# Patient Record
Sex: MALE | Race: WHITE | Employment: OTHER | URBAN - METROPOLITAN AREA
[De-identification: names, ages, dates, MRNs, and addresses within clinical notes are randomized per-mention and may not be internally consistent; named-entity substitution may affect disease eponyms.]

---

## 2017-01-19 ENCOUNTER — HOSPITAL ENCOUNTER (OUTPATIENT)
Dept: NUCLEAR MEDICINE | Facility: CLINIC | Age: 82
Setting detail: NUCLEAR MEDICINE
Discharge: HOME | End: 2017-01-19
Attending: INTERNAL MEDICINE | Admitting: INTERNAL MEDICINE
Payer: MEDICARE

## 2017-01-19 PROCEDURE — 34300033 ZZH RX 343: Performed by: INTERNAL MEDICINE

## 2017-01-19 PROCEDURE — 78452 HT MUSCLE IMAGE SPECT MULT: CPT

## 2017-01-19 PROCEDURE — A9502 TC99M TETROFOSMIN: HCPCS | Performed by: INTERNAL MEDICINE

## 2017-01-19 PROCEDURE — 25000125 ZZHC RX 250: Performed by: INTERNAL MEDICINE

## 2017-01-19 PROCEDURE — 78452 HT MUSCLE IMAGE SPECT MULT: CPT | Mod: 26 | Performed by: INTERNAL MEDICINE

## 2017-01-19 PROCEDURE — 93017 CV STRESS TEST TRACING ONLY: CPT

## 2017-01-19 PROCEDURE — 93016 CV STRESS TEST SUPVJ ONLY: CPT | Performed by: INTERNAL MEDICINE

## 2017-01-19 PROCEDURE — 93018 CV STRESS TEST I&R ONLY: CPT | Performed by: INTERNAL MEDICINE

## 2017-01-19 RX ORDER — REGADENOSON 0.08 MG/ML
0.4 INJECTION, SOLUTION INTRAVENOUS ONCE
Status: COMPLETED | OUTPATIENT
Start: 2017-01-19 | End: 2017-01-19

## 2017-01-19 RX ADMIN — TETROFOSMIN 31.1 MCI.: 0.23 INJECTION, POWDER, LYOPHILIZED, FOR SOLUTION INTRAVENOUS at 14:42

## 2017-01-19 RX ADMIN — REGADENOSON 0.4 MG: 0.08 INJECTION, SOLUTION INTRAVENOUS at 13:46

## 2017-01-19 RX ADMIN — TETROFOSMIN 10.4 MCI.: 0.23 INJECTION, POWDER, LYOPHILIZED, FOR SOLUTION INTRAVENOUS at 12:30

## 2017-02-02 ENCOUNTER — OFFICE VISIT (OUTPATIENT)
Dept: DERMATOLOGY | Facility: CLINIC | Age: 82
End: 2017-02-02
Payer: MEDICARE

## 2017-02-02 VITALS — SYSTOLIC BLOOD PRESSURE: 134 MMHG | HEART RATE: 71 BPM | OXYGEN SATURATION: 92 % | DIASTOLIC BLOOD PRESSURE: 59 MMHG

## 2017-02-02 DIAGNOSIS — D18.01 CHERRY ANGIOMA: ICD-10-CM

## 2017-02-02 DIAGNOSIS — L81.4 LENTIGO: ICD-10-CM

## 2017-02-02 DIAGNOSIS — D48.5 NEOPLASM OF UNCERTAIN BEHAVIOR OF SKIN: Primary | ICD-10-CM

## 2017-02-02 DIAGNOSIS — L82.1 SEBORRHEIC KERATOSIS: ICD-10-CM

## 2017-02-02 PROCEDURE — 88305 TISSUE EXAM BY PATHOLOGIST: CPT | Performed by: PHYSICIAN ASSISTANT

## 2017-02-02 PROCEDURE — 99213 OFFICE O/P EST LOW 20 MIN: CPT | Mod: 25 | Performed by: PHYSICIAN ASSISTANT

## 2017-02-02 PROCEDURE — 11101 HC BIOPSY SKIN/SUBQ/MUC MEM, EACH ADDTL LESION: CPT | Performed by: PHYSICIAN ASSISTANT

## 2017-02-02 PROCEDURE — 11100 HC BIOPSY SKIN/SUBQ/MUC MEM, SINGLE LESION: CPT | Performed by: PHYSICIAN ASSISTANT

## 2017-02-02 PROCEDURE — 88305 TISSUE EXAM BY PATHOLOGIST: CPT | Mod: 26 | Performed by: PHYSICIAN ASSISTANT

## 2017-02-02 NOTE — NURSING NOTE
"Chief Complaint   Patient presents with     Derm Problem     skin check       Initial /59 mmHg  Pulse 71  SpO2 92% Estimated body mass index is 30.85 kg/(m^2) as calculated from the following:    Height as of 12/26/16: 1.727 m (5' 8\").    Weight as of 12/26/16: 92 kg (202 lb 13.2 oz).  BP completed using cuff size: bibiana Botello LPN.................2/2/2017      "

## 2017-02-02 NOTE — MR AVS SNAPSHOT
After Visit Summary   2/2/2017    Griffin Walton    MRN: [id number]           Patient Information     Date Of Birth          5/18/1934        Visit Information        Provider Department      2/2/2017 2:40 PM Camilla Sanabria PA-C [facility]        Care Instructions          Wound Care Instructions     FOR SUPERFICIAL WOUNDS     [facility] [phone]    [facility] [phone]                       AFTER 24 HOURS YOU SHOULD REMOVE THE BANDAGE AND BEGIN DAILY DRESSING CHANGES AS FOLLOWS:     1) Remove Dressing.     2) Clean and dry the area with tap water using a Q-tip or sterile gauze pad.     3) Apply Vaseline, Aquaphor, Polysporin ointment or Bacitracin ointment over entire wound.  Do NOT use Neosporin ointment.     4) Cover the wound with a band-aid, or a sterile non-stick gauze pad and micropore paper tape      REPEAT THESE INSTRUCTIONS AT LEAST ONCE A DAY UNTIL THE WOUND HAS COMPLETELY HEALED.    It is an old wives tale that a wound heals better when it is exposed to air and allowed to dry out. The wound will heal faster with a better cosmetic result if it is kept moist with ointment and covered with a bandage.    **Do not let the wound dry out.**      Supplies Needed:      *Cotton tipped applicators (Q-tips)    *Polysporin Ointment or Bacitracin Ointment (NOT NEOSPORIN)    *Band-aids or non-stick gauze pads and micropore paper tape.      PATIENT INFORMATION:    During the healing process you will notice a number of changes. All wounds develop a small halo of redness surrounding the wound.  This means healing is occurring. Severe itching with extensive redness usually indicates sensitivity to the ointment or bandage tape used to dress the wound.  You should call our office if this develops.      Swelling  and/or discoloration around your surgical site is common, particularly when performed around the eye.    All wounds normally drain.  The  larger the wound the more drainage there will be.  After 7-10 days, you will notice the wound beginning to shrink and new skin will begin to grow.  The wound is healed when you can see skin has formed over the entire area.  A healed wound has a healthy, shiny look to the surface and is red to dark pink in color to normalize.  Wounds may take approximately 4-6 weeks to heal.  Larger wounds may take 6-8 weeks.  After the wound is healed you may discontinue dressing changes.    You may experience a sensation of tightness as your wound heals. This is normal and will gradually subside.    Your healed wound may be sensitive to temperature changes. This sensitivity improves with time, but if you re having a lot of discomfort, try to avoid temperature extremes.    Patients frequently experience itching after their wound appears to have healed because of the continue healing under the skin.  Plain Vaseline will help relieve the itching.        POSSIBLE COMPLICATIONS    BLEEDIN. Leave the bandage in place.  2. Use tightly rolled up gauze or a cloth to apply direct pressure over the bandage for 30  minutes.  3. Reapply pressure for an additional 30 minutes if necessary  4. Use additional gauze and tape to maintain pressure once the bleeding has stopped.  5.         Follow-ups after your visit        Who to contact     If you have questions or need follow up information about today's clinic visit or your schedule please contact [facility] directly at [phone].  Normal or non-critical lab and imaging results will be communicated to you by [email or web address], letter or phone within 4 business days after the clinic has received the results. If you do not hear from us within 7 days, please contact the clinic through [email or web address] or phone. If you have a critical or abnormal lab result, we will notify you by phone as soon as possible.  Submit refill requests through [employer] or call your pharmacy and they will forward the  "refill request to us. Please allow 3 business days for your refill to be completed.          Additional Information About Your Visit        MyChart Information     [facility] lets you send messages to your doctor, view your test results, renew your prescriptions, schedule appointments and more. To sign up, go to [email or web address] . Click on \"Log in\" on the left side of the screen, which will take you to the Welcome page. Then click on \"Sign up Now\" on the right side of the page.     You will be asked to enter the access code listed below, as well as some personal information. Please follow the directions to create your username and password.     Your access code is: [email or web address]  Expires: 3/26/2017 11:27 AM     Your access code will  in 90 days. If you need help or a new code, please call your [place] clinic or [phone].        Care EveryWhere ID     This is your Care EveryWhere ID. This could be used by other organizations to access your [place] medical records  [id number]        Your Vitals Were     Pulse Pulse Oximetry                71 92%           Blood Pressure from Last 3 Encounters:   17 134/59   16 145/60   16 101/69    Weight from Last 3 Encounters:   16 92 kg (202 lb 13.2 oz)   16 92.534 kg (204 lb)   16 93.895 kg (207 lb)              Today, you had the following     No orders found for display       Primary Care Provider Office Phone # Fax #    Stefanie DANNA [facility] [phone]       [facility] [address] [address]        Thank you!     Thank you for choosing [facility]  for your care. Our goal is always to provide you with excellent care. Hearing back from our patients is one way we can continue to improve our services. Please take a few minutes to complete the written survey that you may receive in the mail after your visit with us. Thank you!             Your Updated Medication List - " "Protect others around you: Learn how to safely use, store and throw away your medicines at www.disposemymeds.org.          This list is accurate as of: 2/2/17  3:37 PM.  Always use your most recent med list.                   Brand Name Dispense Instructions for use    albuterol (2.5 MG/3ML) 0.083% neb solution     1 Box    Take 1 vial (2.5 mg) by nebulization every 6 hours as needed for shortness of breath / dyspnea or wheezing       blood glucose monitoring meter device kit    no brand specified    1 kit    Use to test blood sugar 3 times daily or as directed.       blood glucose monitoring test strip    ONE TOUCH ULTRA    350 each    test 4 times daily Profile Rx: patient will contact pharmacy when needed       bumetanide 1 MG tablet    BUMEX    180 tablet    Take 1 tablet (1 mg) by mouth 2 times daily       candesartan cilexetil 32 MG Tabs     90 tablet    Take 32 mg by mouth daily       clopidogrel 75 MG tablet    PLAVIX    90 tablet    Take 1 tablet (75 mg) by mouth daily       dorzolamide-timolol 2-0.5 % ophthalmic solution    COSOPT     Place 1 drop into both eyes 2 times daily       fluticasone-salmeterol 100-50 MCG/DOSE diskus inhaler    ADVAIR DISKUS    1 Inhaler    Inhale 1 puff into the lungs every 12 hours       insulin aspart 100 UNIT/ML injection    NovoLOG FLEXPEN    3 Month    6 units before breakfast, 6 units before lunch, 4 units before dinner       insulin glargine 100 UNIT/ML injection    LANTUS    3 Month    Inject 8 Units Subcutaneous At Bedtime       insulin pen needle 30G X 8 MM    NOVOFINE    300 each    Use 3 daily or as directed. Profile Rx: patient will contact pharmacy when needed       insulin syringe-needle U-100 31G X 5/16\" 0.5 ML     100 each    Profile Rx: patient will contact pharmacy when needed       latanoprost 0.005 % ophthalmic solution    XALATAN     Place 1 drop into both eyes At Bedtime       lovastatin 40 MG tablet    MEVACOR    135 tablet    Take 1.5 tablets (60 mg) by " mouth daily (with dinner)       NIFEdipine ER 90 MG Tb24    ADALAT CC    90 tablet    Take 1 tablet (90 mg) by mouth daily       * NONFORMULARY      Take 1 tablet by mouth At Bedtime Vitamin (Dialyvite) that he gets from [facility].       * order for DME     1 each    Equipment being ordered: Walker with wheels and brakes, and a seat       order for DME     1 Units    Nebulizer machine Qty #1       order for DME     2 each    Equipment being ordered: Oxygen- HOME and portable. Georgie Coulter [facility] Medical Assistant Signed  Service date: 05/24/2016 10:48 AM     O2 Sat on Room air at rest:84% O2 sat on room air with walk: 82-91% O2 Sat on 1L of oxygen with walk 91-93% O2 Sat on 2 L of Oxygen with walk 91-96% O2 Sat on 1 L O2 at rest 94%       terazosin 10 MG capsule    HYTRIN    90 capsule    Take 1 capsule (10 mg) by mouth At Bedtime       TYLENOL PO      Take 650 mg by mouth nightly as needed       WARFARIN SODIUM PO      Daily as directed per [facility]       * Notice:  This list has 2 medication(s) that are the same as other medications prescribed for you. Read the directions carefully, and ask your doctor or other care provider to review them with you.

## 2017-02-02 NOTE — PATIENT INSTRUCTIONS
Wound Care Instructions     FOR SUPERFICIAL WOUNDS     [facility] [phone]    [facility] [phone]                       AFTER 24 HOURS YOU SHOULD REMOVE THE BANDAGE AND BEGIN DAILY DRESSING CHANGES AS FOLLOWS:     1) Remove Dressing.     2) Clean and dry the area with tap water using a Q-tip or sterile gauze pad.     3) Apply Vaseline, Aquaphor, Polysporin ointment or Bacitracin ointment over entire wound.  Do NOT use Neosporin ointment.     4) Cover the wound with a band-aid, or a sterile non-stick gauze pad and micropore paper tape      REPEAT THESE INSTRUCTIONS AT LEAST ONCE A DAY UNTIL THE WOUND HAS COMPLETELY HEALED.    It is an old wives tale that a wound heals better when it is exposed to air and allowed to dry out. The wound will heal faster with a better cosmetic result if it is kept moist with ointment and covered with a bandage.    **Do not let the wound dry out.**      Supplies Needed:      *Cotton tipped applicators (Q-tips)    *Polysporin Ointment or Bacitracin Ointment (NOT NEOSPORIN)    *Band-aids or non-stick gauze pads and micropore paper tape.      PATIENT INFORMATION:    During the healing process you will notice a number of changes. All wounds develop a small halo of redness surrounding the wound.  This means healing is occurring. Severe itching with extensive redness usually indicates sensitivity to the ointment or bandage tape used to dress the wound.  You should call our office if this develops.      Swelling  and/or discoloration around your surgical site is common, particularly when performed around the eye.    All wounds normally drain.  The larger the wound the more drainage there will be.  After 7-10 days, you will notice the wound beginning to shrink and new skin will begin to grow.  The wound is healed when you can see skin has formed over the entire area.  A healed wound has a healthy, shiny look to the surface and is red to dark pink in color  to normalize.  Wounds may take approximately 4-6 weeks to heal.  Larger wounds may take 6-8 weeks.  After the wound is healed you may discontinue dressing changes.    You may experience a sensation of tightness as your wound heals. This is normal and will gradually subside.    Your healed wound may be sensitive to temperature changes. This sensitivity improves with time, but if you re having a lot of discomfort, try to avoid temperature extremes.    Patients frequently experience itching after their wound appears to have healed because of the continue healing under the skin.  Plain Vaseline will help relieve the itching.        POSSIBLE COMPLICATIONS    BLEEDIN. Leave the bandage in place.  2. Use tightly rolled up gauze or a cloth to apply direct pressure over the bandage for 30  minutes.  3. Reapply pressure for an additional 30 minutes if necessary  4. Use additional gauze and tape to maintain pressure once the bleeding has stopped.  5.

## 2017-02-07 LAB — COPATH REPORT: NORMAL

## 2017-02-14 ENCOUNTER — OFFICE VISIT (OUTPATIENT)
Dept: DERMATOLOGY | Facility: CLINIC | Age: 82
End: 2017-02-14
Payer: MEDICARE

## 2017-02-14 VITALS — DIASTOLIC BLOOD PRESSURE: 57 MMHG | SYSTOLIC BLOOD PRESSURE: 120 MMHG | OXYGEN SATURATION: 97 % | HEART RATE: 90 BPM

## 2017-02-14 DIAGNOSIS — C44.612 BASAL CELL CARCINOMA OF RIGHT UPPER EXTREMITY: ICD-10-CM

## 2017-02-14 DIAGNOSIS — C44.319 BASAL CELL CARCINOMA OF TEMPLE REGION: Primary | ICD-10-CM

## 2017-02-14 PROCEDURE — 17311 MOHS 1 STAGE H/N/HF/G: CPT | Performed by: DERMATOLOGY

## 2017-02-14 PROCEDURE — 11603 EXC TR-EXT MAL+MARG 2.1-3 CM: CPT | Mod: 59 | Performed by: DERMATOLOGY

## 2017-02-14 PROCEDURE — 14041 TIS TRNFR F/C/C/M/N/A/G/H/F: CPT | Performed by: DERMATOLOGY

## 2017-02-14 PROCEDURE — 13121 CMPLX RPR S/A/L 2.6-7.5 CM: CPT | Mod: 59 | Performed by: DERMATOLOGY

## 2017-02-14 PROCEDURE — 88331 PATH CONSLTJ SURG 1 BLK 1SPC: CPT | Mod: 59 | Performed by: DERMATOLOGY

## 2017-02-14 PROCEDURE — 17312 MOHS ADDL STAGE: CPT | Performed by: DERMATOLOGY

## 2017-02-14 NOTE — PATIENT INSTRUCTIONS
Sutured Wound Care     [facility]: [phone]    [facility]: [phone]    Right temple and right arm      ? No strenuous activity for 48 hours. Resume moderate activity in 48 hours. No heavy exercising until you are seen for follow up in one week.     ? Take Tylenol as needed for discomfort.                         ? Do not drink alcoholic beverages for 48 hours.     ? Keep the pressure bandage in place for 24 hours. If the bandage becomes blood tinged or loose, reinforce it with gauze and tape.        (Refer to the reverse side of this page for management of bleeding).    ? Remove pressure bandage in 24 hours     ? Leave the flat bandage in place until your follow up appointment.    ? Keep the bandage dry. Wash around it carefully.    ? If the tape becomes soiled or starts to come off, reinforce it with additional paper tape.    ? Do not smoke for 3 weeks; smoking is detrimental to wound healing.    ? It is normal to have swelling and bruising around the surgical site. The bruising will fade in approximately 10-14 days. Elevate the area to reduce swelling.    ? Numbness, itchiness and sensitivity to temperature changes can occur after surgery and may take up to 18 months to normalize.      POSSIBLE COMPLICATIONS    BLEEDIN. Leave the bandage in place.  2. Use tightly rolled up gauze or a cloth to apply direct pressure over the bandage for 20   minutes.  3. Reapply pressure for an additional 20 minutes if necessary  4. Call the office or go to the nearest emergency room if pressure fails to stop the bleeding.  5. Use additional gauze and tape to maintain pressure once the bleeding has stopped.        PAIN:    1. Post operative pain should slowly get better, never worse.  2. A severe increase in pain may indicate a problem. Call the office if this occurs.    In case of emergency phone:Dr Ware [phone]      Return in 1 week for a bandage change

## 2017-02-14 NOTE — PROGRESS NOTES
Griffin Walton is a 82 year old year old male patient here today for evaluation and managment of basal cell carcinoma on right arm and temple.   .  Patient states this has been present for years.  Patient reports the following symptoms:  none .  Patient reports the following previous treatments none.  Patient reports the following modifying factors none.  Associated symptoms: none.  Patient has no other skin complaints today.  Remainder of the HPI, Meds, PMH, Allergies, FH, and SH was reviewed in chart.    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     Type II or unspecified type diabetes mellitus without mention of complication, not stated as uncontrolled      Unspecified disorder of kidney and ureter        Past Surgical History   Procedure Laterality Date     Surgical history of -   2/13/2004     Right knee arthroscopy     Surgical history of -        Vocal cord biopsy-benign     Surgical history of -   3/3/2005     AAA, left partial nephrectomy     Cataract iol, rt/lt  2/4/08     left eye - phacoemulsification w/ posterior chamber lens implantation combined w/ glaucoma filtering procedure     Abdomen surgery  2005     aortic aneurysm     Create fistula arteriovenous upper extremity  1/3/2012     Procedure:CREATE FISTULA ARTERIOVENOUS UPPER EXTREMITY; LEFT UPPER ARM ARTERIOVENOUS FISTULA; Surgeon:JENA PEARSON; Location:[facility]        Family History   Problem Relation Age of Onset     CANCER Father       Asophageal      CANCER Brother      Throat      Other Cancer Sister      Lung        Social History     Social History     Marital status:      Spouse name: N/A     Number of children: N/A     Years of education: N/A     Occupational History     Paper  Retired     Social History Main Topics     Smoking status: Former Smoker     Packs/day: 1.00     Years: 55.00     Types: Cigarettes     Quit date: 1/1/2005     Smokeless tobacco: Never Used     Alcohol use No     Drug use: No     Sexual activity: Not Currently     Other Topics Concern     Parent/Sibling W/ Cabg, Mi Or Angioplasty Before 65f 55m? No     Social History Narrative       Outpatient Encounter Prescriptions as of 2/14/2017   Medication Sig Dispense Refill     latanoprost (XALATAN) 0.005 % ophthalmic solution Place 1 drop into both eyes At Bedtime       insulin glargine (LANTUS) 100 UNIT/ML injection Inject 8 Units Subcutaneous At Bedtime 3 Month 1     blood glucose monitoring (NO BRAND SPECIFIED) meter device kit Use to test blood sugar 3 times daily or as directed. 1 kit 0     insulin aspart (NOVOLOG FLEXPEN) 100 UNIT/ML soln 6 units before breakfast, 6 units before lunch, 4 units before dinner 3 Month 3     blood glucose monitoring (ONE TOUCH ULTRA) test strip test 4 times daily  Profile Rx: patient will contact pharmacy when needed 350 each 3     candesartan cilexetil 32 MG TABS Take 32 mg by mouth daily 90 tablet 3     fluticasone-salmeterol (ADVAIR DISKUS) 100-50 MCG/DOSE diskus inhaler Inhale 1 puff into the lungs every 12 hours 1 Inhaler 3     terazosin (HYTRIN) 10 MG capsule Take 1 capsule (10 mg) by mouth At Bedtime 90 capsule 3     clopidogrel (PLAVIX) 75 MG tablet Take 1 tablet (75 mg) by mouth daily 90 tablet 3     insulin pen needle (NOVOFINE) 30G X 8 MM Use 3 daily or as directed.  Profile Rx: patient will contact pharmacy when needed 300 each 3     bumetanide (BUMEX) 1 MG tablet Take 1 tablet (1 mg) by mouth 2 times daily 180  "tablet 3     NIFEdipine ER (ADALAT CC) 90 MG TB24 Take 1 tablet (90 mg) by mouth daily 90 tablet 3     insulin syringe-needle U-100 31G X 5/16\" 0.5 ML Profile Rx: patient will contact pharmacy when needed 100 each 11     lovastatin (MEVACOR) 40 MG tablet Take 1.5 tablets (60 mg) by mouth daily (with dinner) 135 tablet 1     order for DME Equipment being ordered: Oxygen- HOME and portable. Georgie Coulter CMA Medical Assistant Signed  Service date: 05/24/2016 10:48 AM       O2 Sat on Room air at rest:84%  O2 sat on room air with walk: 82-91%  O2 Sat on 1L of oxygen with walk 91-93%  O2 Sat on 2 L of Oxygen with walk 91-96%  O2 Sat on 1 L O2 at rest 94% 2 each prn     WARFARIN SODIUM PO Daily as directed per [facility]       order for DME Nebulizer machine Qty #1 1 Units 0     Acetaminophen (TYLENOL PO) Take 650 mg by mouth nightly as needed       NONFORMULARY Take 1 tablet by mouth At Bedtime Vitamin (Dialyvite) that he gets from [facility].       dorzolamide-timolol (COSOPT) 2-0.5 % ophthalmic solution Place 1 drop into both eyes 2 times daily       ORDER FOR DME Equipment being ordered: Walker with wheels and brakes, and a seat 1 each 0     albuterol (2.5 MG/3ML) 0.083% nebulizer solution Take 1 vial (2.5 mg) by nebulization every 6 hours as needed for shortness of breath / dyspnea or wheezing (Patient not taking: Reported on 2/14/2017) 1 Box 11     No facility-administered encounter medications on file as of 2/14/2017.              Review Of Systems  Skin: As above  Eyes: negative  Ears/Nose/Throat: negative  Respiratory: No shortness of breath, dyspnea on exertion, cough, or hemoptysis  Cardiovascular: negative  Gastrointestinal: negative  Genitourinary: negative  Musculoskeletal: negative  Neurologic: negative  Psychiatric: negative  Hematologic/Lymphatic/Immunologic: negative  Endocrine: negative      O:   NAD, WDWN, Alert & Oriented, Mood & Affect wnl, Vitals stable   Here today alone   BP " 120/57  Pulse 90  SpO2 97%   General appearance normal   Vitals stable   Alert, oriented and in no acute distress      Following lymph nodes palpated: Occipital, Cervical, Supraclavicular no lad   R TEMPLE 1.5CM PINK PEARLY PAPULE    R arm 1.5cm pink pearly papule         Eyes: Conjunctivae/lids:Normal     ENT: Lips, buccal mucosa, tongue: normal    MSK:Normal    Cardiovascular: peripheral edema none    Pulm: Breathing Normal    Lymph Nodes: No Head and Neck Lymphadenopathy     Neuro/Psych: Orientation:Normal; Mood/Affect:Normal      MICRO:   R arm:Unremarkable epidermis with parallel bundles of cellular collagen within the superficial dermis.  No concerning areas for malignancy.     A/P:  1. R arm basal cell carcinoma   EXCISION OF BASAL CELL CARCINOMA, Margins confirmed with FROZEN SECTIONS AND COMPLEX: After thorough discussion of PGACAC, consent obtained, anesthesia and prep, the margins of the lesion were identified and an elliptical incision was made encompassing the lesion with 4mm margin. The incisions were made through the skin and down to and including the subcutaneous tissue. The lesion was removed en bloc and submitted for frozen section pathologic review. Clear margins obtained (1.9cm).  The wound edges were widely undermined until adequate tissue mobility was obtained. hemostasis was achieved. The wound edges were then closed in a layered fashion, being careful not to leave any dead space. Postoperative length was 4 cm.   EBL minimal; complications none; wound care routine. The patient was from the clinic in good condition and will return in one week for wound check.       2. R temple basal cell carcinoma   MOHS:   Location    After PGACAC discussed with patient, decision for Mohs surgery was made. Indication for Mohs was Location. Patient confirmed the site with Dr. Ware.  After anesthesia with LEC, the tumor was excised using standard Mohs technique in 4 stages(s).  CLEAR MARGINS OBTAINED and  Final defect size was 3.3 cm.     REPAIR RHOMBIC: Because of Because of the size and full thickness nature of the defect, a rhombic transposition flap was planned. After anesthesia and prep, the rhombic flap was carefully incised within relaxed skin tension lines, and the resulting Burow's triangle removed. The flap was raised and the wound edges were all undermined in the subcutaneous plane. After hemostasis, the flap was transposed into the defect and sutured in a layered fashion using Vicryl and Fast Absorbing Plain Gut sutures. Postoperative size was 6 x 3.3 cm.  EBL minimal; complications none; wound care routine.  The patient was discharged in good condition and will return in one week for wound evaluation.          BENIGN LESIONS DISCUSSED WITH PATIENT:  I discussed the specifics of tumor, prognosis, and genetics of benign lesions.  I explained that treatment of these lesions would be purely cosmetic and not medically neccessary.  I discussed with patient different removal options including excision, cautery and /or laser.      Nature and genetics of benign skin lesions dicussed with patient.  Signs and Symptoms of skin cancer discussed with patient.  Patient encouraged to perform monthly skin exams.  UV precautions reviewed with patient.  Skin care regimen reviewed with patient: Eliminate harsh soaps, i.e. Dial, zest, irsih spring; Mild soaps such as Cetaphil or Dove sensitive skin, avoid hot or cold showers, aggressive use of emollients including vanicream, cetaphil or cerave discussed with patient.    Risks of non-melanoma skin cancer discussed with patient   Return to clinic 6 months

## 2017-02-14 NOTE — NURSING NOTE
"Initial /57  Pulse 90  SpO2 97% Estimated body mass index is 30.84 kg/(m^2) as calculated from the following:    Height as of 12/26/16: 1.727 m (5' 8\").    Weight as of 12/26/16: 92 kg (202 lb 13.2 oz). .      "

## 2017-02-14 NOTE — MR AVS SNAPSHOT
After Visit Summary   2017    Griffin Walton    MRN: [id number]           Patient Information     Date Of Birth          1934        Visit Information        Provider Department      2017 8:00 AM Geraldo Ware MD [facility]        Today's Diagnoses     Basal cell carcinoma of temple region    -  1    Basal cell carcinoma of right upper extremity          Care Instructions      Sutured Wound Care     [facility]: [phone]    [facility]: [phone]    Right temple and right arm      ? No strenuous activity for 48 hours. Resume moderate activity in 48 hours. No heavy exercising until you are seen for follow up in one week.     ? Take Tylenol as needed for discomfort.                         ? Do not drink alcoholic beverages for 48 hours.     ? Keep the pressure bandage in place for 24 hours. If the bandage becomes blood tinged or loose, reinforce it with gauze and tape.        (Refer to the reverse side of this page for management of bleeding).    ? Remove pressure bandage in 24 hours     ? Leave the flat bandage in place until your follow up appointment.    ? Keep the bandage dry. Wash around it carefully.    ? If the tape becomes soiled or starts to come off, reinforce it with additional paper tape.    ? Do not smoke for 3 weeks; smoking is detrimental to wound healing.    ? It is normal to have swelling and bruising around the surgical site. The bruising will fade in approximately 10-14 days. Elevate the area to reduce swelling.    ? Numbness, itchiness and sensitivity to temperature changes can occur after surgery and may take up to 18 months to normalize.      POSSIBLE COMPLICATIONS    BLEEDIN. Leave the bandage in place.  2. Use tightly rolled up gauze or a cloth to apply direct pressure over the bandage for 20   minutes.  3. Reapply pressure for an additional 20 minutes if necessary  4. Call the office or go to the nearest  "emergency room if pressure fails to stop the bleeding.  5. Use additional gauze and tape to maintain pressure once the bleeding has stopped.        PAIN:    1. Post operative pain should slowly get better, never worse.  2. A severe increase in pain may indicate a problem. Call the office if this occurs.    In case of emergency phone:Dr Ware [phone]      Return in 1 week for a bandage change            Follow-ups after your visit        Who to contact     If you have questions or need follow up information about today's clinic visit or your schedule please contact [facility] directly at [phone].  Normal or non-critical lab and imaging results will be communicated to you by Snapversehart, letter or phone within 4 business days after the clinic has received the results. If you do not hear from us within 7 days, please contact the clinic through Snapversehart or phone. If you have a critical or abnormal lab result, we will notify you by phone as soon as possible.  Submit refill requests through [employer] or call your pharmacy and they will forward the refill request to us. Please allow 3 business days for your refill to be completed.          Additional Information About Your Visit        SnapverseharInstaclustr Information     [employer] lets you send messages to your doctor, view your test results, renew your prescriptions, schedule appointments and more. To sign up, go to [email or web address] . Click on \"Log in\" on the left side of the screen, which will take you to the Welcome page. Then click on \"Sign up Now\" on the right side of the page.     You will be asked to enter the access code listed below, as well as some personal information. Please follow the directions to create your username and password.     Your access code is: [email or web address]  Expires: 3/26/2017 11:27 AM     Your access code will  in 90 days. If you need help or a new code, please call your [facility] or [phone].        Care EveryWhere ID "     This is your Care EveryWhere ID. This could be used by other organizations to access your [place] medical records  [id number]        Your Vitals Were     Pulse Pulse Oximetry                90 97%           Blood Pressure from Last 3 Encounters:   02/14/17 120/57   02/02/17 134/59   12/26/16 145/60    Weight from Last 3 Encounters:   12/26/16 92 kg (202 lb 13.2 oz)   11/29/16 92.5 kg (204 lb)   09/20/16 93.9 kg (207 lb)              Today, you had the following     No orders found for display       Primary Care Provider Office Phone # Fax #    Stefanie Louis, DANNA [facility] [phone]       [facility] [address] [address]        Thank you!     Thank you for choosing [facility]  for your care. Our goal is always to provide you with excellent care. Hearing back from our patients is one way we can continue to improve our services. Please take a few minutes to complete the written survey that you may receive in the mail after your visit with us. Thank you!             Your Updated Medication List - Protect others around you: Learn how to safely use, store and throw away your medicines at www.disposemymeds.org.          This list is accurate as of: 2/14/17 12:48 PM.  Always use your most recent med list.                   Brand Name Dispense Instructions for use    albuterol (2.5 MG/3ML) 0.083% neb solution     1 Box    Take 1 vial (2.5 mg) by nebulization every 6 hours as needed for shortness of breath / dyspnea or wheezing       blood glucose monitoring meter device kit    no brand specified    1 kit    Use to test blood sugar 3 times daily or as directed.       blood glucose monitoring test strip    ONE TOUCH ULTRA    350 each    test 4 times daily Profile Rx: patient will contact pharmacy when needed       bumetanide 1 MG tablet    BUMEX    180 tablet    Take 1 tablet (1 mg) by mouth 2 times daily       candesartan cilexetil 32 MG Tabs     90 tablet    Take 32  "mg by mouth daily       clopidogrel 75 MG tablet    PLAVIX    90 tablet    Take 1 tablet (75 mg) by mouth daily       dorzolamide-timolol 2-0.5 % ophthalmic solution    COSOPT     Place 1 drop into both eyes 2 times daily       fluticasone-salmeterol 100-50 MCG/DOSE diskus inhaler    ADVAIR DISKUS    1 Inhaler    Inhale 1 puff into the lungs every 12 hours       insulin aspart 100 UNIT/ML injection    NovoLOG FLEXPEN    3 Month    6 units before breakfast, 6 units before lunch, 4 units before dinner       insulin glargine 100 UNIT/ML injection    LANTUS    3 Month    Inject 8 Units Subcutaneous At Bedtime       insulin pen needle 30G X 8 MM    NOVOFINE    300 each    Use 3 daily or as directed. Profile Rx: patient will contact pharmacy when needed       insulin syringe-needle U-100 31G X 5/16\" 0.5 ML     100 each    Profile Rx: patient will contact pharmacy when needed       latanoprost 0.005 % ophthalmic solution    XALATAN     Place 1 drop into both eyes At Bedtime       lovastatin 40 MG tablet    MEVACOR    135 tablet    Take 1.5 tablets (60 mg) by mouth daily (with dinner)       NIFEdipine ER 90 MG Tb24    ADALAT CC    90 tablet    Take 1 tablet (90 mg) by mouth daily       * NONFORMULARY      Take 1 tablet by mouth At Bedtime Vitamin (Dialyvite) that he gets from [facility].       * order for DME     1 each    Equipment being ordered: Walker with wheels and brakes, and a seat       order for DME     1 Units    Nebulizer machine Qty #1       order for DME     2 each    Equipment being ordered: Oxygen- HOME and portable. Georgie Coulter CMA Medical Assistant Signed  Service date: 05/24/2016 10:48 AM     O2 Sat on Room air at rest:84% O2 sat on room air with walk: 82-91% O2 Sat on 1L of oxygen with walk 91-93% O2 Sat on 2 L of Oxygen with walk 91-96% O2 Sat on 1 L O2 at rest 94%       terazosin 10 MG capsule    HYTRIN    90 capsule    Take 1 capsule (10 mg) by mouth At Bedtime       TYLENOL PO      " Take 650 mg by mouth nightly as needed       WARFARIN SODIUM PO      Daily as directed per [facility]       * Notice:  This list has 2 medication(s) that are the same as other medications prescribed for you. Read the directions carefully, and ask your doctor or other care provider to review them with you.

## 2017-02-15 DIAGNOSIS — J44.9 CHRONIC OBSTRUCTIVE PULMONARY DISEASE, UNSPECIFIED COPD TYPE (H): ICD-10-CM

## 2017-02-15 NOTE — TELEPHONE ENCOUNTER
Advair      Last Written Prescription Date: 09/20/16  Last Fill Quantity: 60, # refills: 3    Last Office Visit with G, P or [facility] prescribing provider:  11/29/16   Future Office Visit:    Next 5 appointments (look out 90 days)     Feb 21, 2017  1:00 PM CST   Nurse Only with [facility]   [facility]    [address] [address]   [phone]                   Date of Last Asthma Action Plan Letter:   There are no preventive care reminders to display for this patient.   Asthma Control Test: No flowsheet data found.    Date of Last Spirometry Test:   No results found for this or any previous visit.

## 2017-02-21 ENCOUNTER — ALLIED HEALTH/NURSE VISIT (OUTPATIENT)
Dept: DERMATOLOGY | Facility: CLINIC | Age: 82
End: 2017-02-21
Payer: MEDICARE

## 2017-02-21 DIAGNOSIS — Z48.01 ENCOUNTER FOR CHANGE OR REMOVAL OF SURGICAL WOUND DRESSING: Primary | ICD-10-CM

## 2017-02-21 PROCEDURE — 99207 ZZC NO CHARGE NURSE ONLY: CPT

## 2017-02-21 NOTE — PROGRESS NOTES
Pt returned to clinic for post surgery 1 week follow up bandage change. Pt has no complaints, denies pain. Bandage removed from right arm and right temple area, area cleansed with normal saline. Site is healing and wound edges approximating well. Reapplied new steri strips and paper tape.    Advised to watch for signs/sx of infection; spreading redness, drainage, odor, fever. Call or report promptly to clinic. Pt given written instructions and informed to rtc as needed. Patient verbalized understanding.     Christianne Kovacs, CMA

## 2017-02-21 NOTE — PATIENT INSTRUCTIONS
WOUND CARE INSTRUCTIONS  for  ONE WEEK AFTER SURGERY          1) Leave flat bandage on your skin for one week after today s bandage change.  2) In one week when you remove the bandage, you may resume your regular skin care routine, including washing with mild soap and water, applying moisturizer, make-up and sunscreen.    3) If there are any open or bleeding areas at the incision/graft site you should begin to cover the area with a bandage daily as follows:    1) Clean and dry the area with plain tap water using a Q-tip or sterile gauze pad.  2) Apply Polysporin or Bacitracin ointment to the open area.  3) Cover the wound with a band-aid or a sterile non-stick gauze pad and micropore paper tape.             *Once the bandages are removed, the scar will be red and firm (especially in the lip/chin area). This is normal and will fade in time. It might take 6-12 months for this to happen.     *Massaging the area will help the scar soften and fade quicker. Begin to massage the area one month after the bandages have been removed. To massage apply pressure directly and firmly over the scar with the fingertips and move in a circular motion. Massage the area for a few minutes several times a day. Continue to massage the site for several months.    *Approximately 6-8 weeks after surgery it is not uncommon to see the formation of  tender pimple-like  bump along the scar. This is normal. As the scar continues to mature and the stitches underneath the skin begin to dissolve, this might occur. Do not pick or squeeze, this will resolve on it s own. Should one break open producing a small amount of drainage, apply Polysporin or Bacitracin ointment a few times a day until the wound is completely healed.    *Numbness in the surgical area is expected. It might take 12-18 months for the feeling to return to normal. During this time sensations of itchiness, tingling and occasional sharp pains might be noted. These feelings are normal  and will subside once the nerves have completely healed.         IN CASE OF EMERGENCY: Dr Ware [phone]     If you were seen in [place] call: [phone]    If you were seen in [place] call: [phone]

## 2017-02-21 NOTE — MR AVS SNAPSHOT
After Visit Summary   2/21/2017    Griffin Walton    MRN: [id number]           Patient Information     Date Of Birth          5/18/1934        Visit Information        Provider Department      2/21/2017 1:00 PM [facility]        Care Instructions    WOUND CARE INSTRUCTIONS  for  ONE WEEK AFTER SURGERY          1) Leave flat bandage on your skin for one week after today s bandage change.  2) In one week when you remove the bandage, you may resume your regular skin care routine, including washing with mild soap and water, applying moisturizer, make-up and sunscreen.    3) If there are any open or bleeding areas at the incision/graft site you should begin to cover the area with a bandage daily as follows:    1) Clean and dry the area with plain tap water using a Q-tip or sterile gauze pad.  2) Apply Polysporin or Bacitracin ointment to the open area.  3) Cover the wound with a band-aid or a sterile non-stick gauze pad and micropore paper tape.             *Once the bandages are removed, the scar will be red and firm (especially in the lip/chin area). This is normal and will fade in time. It might take 6-12 months for this to happen.     *Massaging the area will help the scar soften and fade quicker. Begin to massage the area one month after the bandages have been removed. To massage apply pressure directly and firmly over the scar with the fingertips and move in a circular motion. Massage the area for a few minutes several times a day. Continue to massage the site for several months.    *Approximately 6-8 weeks after surgery it is not uncommon to see the formation of  tender pimple-like  bump along the scar. This is normal. As the scar continues to mature and the stitches underneath the skin begin to dissolve, this might occur. Do not pick or squeeze, this will resolve on it s own. Should one break open producing a small amount of drainage, apply Polysporin or Bacitracin  "ointment a few times a day until the wound is completely healed.    *Numbness in the surgical area is expected. It might take 12-18 months for the feeling to return to normal. During this time sensations of itchiness, tingling and occasional sharp pains might be noted. These feelings are normal and will subside once the nerves have completely healed.         IN CASE OF EMERGENCY: Dr Ware [phone]     If you were seen in [place] call: [phone]    If you were seen in [place] call: [phone]            Follow-ups after your visit        Who to contact     If you have questions or need follow up information about today's clinic visit or your schedule please contact [facility] directly at [phone].  Normal or non-critical lab and imaging results will be communicated to you by MyChart, letter or phone within 4 business days after the clinic has received the results. If you do not hear from us within 7 days, please contact the clinic through MyChart or phone. If you have a critical or abnormal lab result, we will notify you by phone as soon as possible.  Submit refill requests through [employer] or call your pharmacy and they will forward the refill request to us. Please allow 3 business days for your refill to be completed.          Additional Information About Your Visit        [employer]own Information     [employer] lets you send messages to your doctor, view your test results, renew your prescriptions, schedule appointments and more. To sign up, go to [email or web address] . Click on \"Log in\" on the left side of the screen, which will take you to the Welcome page. Then click on \"Sign up Now\" on the right side of the page.     You will be asked to enter the access code listed below, as well as some personal information. Please follow the directions to create your username and password.     Your access code is: [email or web address]  Expires: 3/26/2017 11:27 AM     Your access code will  in 90 " days. If you need help or a new code, please call your [place] clinic or [phone].        Care EveryWhere ID     This is your Care EveryWhere ID. This could be used by other organizations to access your [place] medical records  [id number]         Blood Pressure from Last 3 Encounters:   02/14/17 120/57   02/02/17 134/59   12/26/16 145/60    Weight from Last 3 Encounters:   12/26/16 92 kg (202 lb 13.2 oz)   11/29/16 92.5 kg (204 lb)   09/20/16 93.9 kg (207 lb)              Today, you had the following     No orders found for display       Primary Care Provider Office Phone # Fax #    Stefanie Guillen Heriberto APRMIR [facility] [phone]       [facility] [address] [address]        Thank you!     Thank you for choosing [facility]  for your care. Our goal is always to provide you with excellent care. Hearing back from our patients is one way we can continue to improve our services. Please take a few minutes to complete the written survey that you may receive in the mail after your visit with us. Thank you!             Your Updated Medication List - Protect others around you: Learn how to safely use, store and throw away your medicines at www.disposemymeds.org.          This list is accurate as of: 2/21/17  1:12 PM.  Always use your most recent med list.                   Brand Name Dispense Instructions for use    albuterol (2.5 MG/3ML) 0.083% neb solution     1 Box    Take 1 vial (2.5 mg) by nebulization every 6 hours as needed for shortness of breath / dyspnea or wheezing       blood glucose monitoring meter device kit    no brand specified    1 kit    Use to test blood sugar 3 times daily or as directed.       blood glucose monitoring test strip    ONE TOUCH ULTRA    350 each    test 4 times daily Profile Rx: patient will contact pharmacy when needed       bumetanide 1 MG tablet    BUMEX    180 tablet    Take 1 tablet (1 mg) by mouth 2 times daily       candesartan  "cilexetil 32 MG Tabs     90 tablet    Take 32 mg by mouth daily       clopidogrel 75 MG tablet    PLAVIX    90 tablet    Take 1 tablet (75 mg) by mouth daily       dorzolamide-timolol 2-0.5 % ophthalmic solution    COSOPT     Place 1 drop into both eyes 2 times daily       fluticasone-salmeterol 100-50 MCG/DOSE diskus inhaler    ADVAIR DISKUS    1 Inhaler    Inhale 1 puff into the lungs every 12 hours       insulin aspart 100 UNIT/ML injection    NovoLOG FLEXPEN    3 Month    6 units before breakfast, 6 units before lunch, 4 units before dinner       insulin glargine 100 UNIT/ML injection    LANTUS    3 Month    Inject 8 Units Subcutaneous At Bedtime       insulin pen needle 30G X 8 MM    NOVOFINE    300 each    Use 3 daily or as directed. Profile Rx: patient will contact pharmacy when needed       insulin syringe-needle U-100 31G X 5/16\" 0.5 ML     100 each    Profile Rx: patient will contact pharmacy when needed       latanoprost 0.005 % ophthalmic solution    XALATAN     Place 1 drop into both eyes At Bedtime       lovastatin 40 MG tablet    MEVACOR    135 tablet    Take 1.5 tablets (60 mg) by mouth daily (with dinner)       NIFEdipine ER 90 MG Tb24    ADALAT CC    90 tablet    Take 1 tablet (90 mg) by mouth daily       * NONFORMULARY      Take 1 tablet by mouth At Bedtime Vitamin (Dialyvite) that he gets from [facility].       * order for DME     1 each    Equipment being ordered: Walker with wheels and brakes, and a seat       order for DME     1 Units    Nebulizer machine Qty #1       order for DME     2 each    Equipment being ordered: Oxygen- HOME and portable. Georgie Coulter CMA Medical Assistant Signed  Service date: 05/24/2016 10:48 AM     O2 Sat on Room air at rest:84% O2 sat on room air with walk: 82-91% O2 Sat on 1L of oxygen with walk 91-93% O2 Sat on 2 L of Oxygen with walk 91-96% O2 Sat on 1 L O2 at rest 94%       terazosin 10 MG capsule    HYTRIN    90 capsule    Take 1 capsule (10 " mg) by mouth At Bedtime       TYLENOL PO      Take 650 mg by mouth nightly as needed       WARFARIN SODIUM PO      Daily as directed per [facility]       * Notice:  This list has 2 medication(s) that are the same as other medications prescribed for you. Read the directions carefully, and ask your doctor or other care provider to review them with you.

## 2017-02-24 DIAGNOSIS — E78.5 HYPERLIPIDEMIA LDL GOAL <130: ICD-10-CM

## 2017-02-24 NOTE — TELEPHONE ENCOUNTER
Lovastatin     Last Written Prescription Date: 08/25/16  Last Fill Quantity: 135, # refills: 1  Last Office Visit with G, P or [facility] prescribing provider: 11/29/16       Lab Results   Component Value Date    CHOL 129 05/31/2016     Lab Results   Component Value Date    HDL 48 05/31/2016     Lab Results   Component Value Date    LDL 62 05/31/2016     Lab Results   Component Value Date    TRIG 93 05/31/2016     Lab Results   Component Value Date    CHOLHDLRATIO 2.4 02/10/2015

## 2017-03-01 ENCOUNTER — TELEPHONE (OUTPATIENT)
Dept: FAMILY MEDICINE | Facility: CLINIC | Age: 82
End: 2017-03-01

## 2017-03-01 NOTE — TELEPHONE ENCOUNTER
Reason for Call:  Other call back    Detailed comments: Pt would like to talk with a nurse about getting a new order for home oxygen, as his will  in April    Phone Number Patient can be reached at: Home number on file [phone] (home)    Best Time: any    Can we leave a detailed message on this number? YES    Call taken on 3/1/2017 at 1:00 PM by Janette Mancuso

## 2017-03-02 ENCOUNTER — TELEPHONE (OUTPATIENT)
Dept: FAMILY MEDICINE | Facility: CLINIC | Age: 82
End: 2017-03-02

## 2017-03-02 DIAGNOSIS — E11.21 TYPE 2 DIABETES MELLITUS WITH DIABETIC NEPHROPATHY, UNSPECIFIED LONG TERM INSULIN USE STATUS: Primary | ICD-10-CM

## 2017-03-02 RX ORDER — LOVASTATIN 40 MG
60 TABLET ORAL
Qty: 135 TABLET | Refills: 0 | Status: SHIPPED | OUTPATIENT
Start: 2017-03-02 | End: 2017-05-26

## 2017-03-02 NOTE — TELEPHONE ENCOUNTER
Reason for Call:  Other prescription    Detailed comments: Monique is calling because pt is out of his insulin pen needle (NOVOFINE) 30G X 8 MM  Needs filled today   Last filled: 9/20/16  Qt.300 Refills 3    Phone Number Patient can be reached at: Other phone number:  [phone]    Best Time: any     Can we leave a detailed message on this number? YES    Call taken on 3/2/2017 at 11:29 AM by Heather Castillo

## 2017-03-03 ENCOUNTER — TELEPHONE (OUTPATIENT)
Dept: FAMILY MEDICINE | Facility: CLINIC | Age: 82
End: 2017-03-03

## 2017-03-03 NOTE — TELEPHONE ENCOUNTER
He needs to be seen sometime in April per [place]. He needs to be retested to be sure he qualifies. This is medicare's requirement, after that he shouldn't have to do it again. Please set him up an appt to get the documentation done.  Natalia Recinos RN

## 2017-03-03 NOTE — TELEPHONE ENCOUNTER
Reason for call:  Patient reporting a symptom    Symptom or request: cough    Duration (how long have symptoms been present): on going    Have you been treated for this before? No    Additional comments: pt calling stating he was at the [facility] this morning and the nurse said he sounded wheezy. He has had trouble with a cough and they suggested him to call us and maybe get a prescription.    Phone Number patient can be reached at:  Home number on file [phone] (home)    Best Time:  any    Can we leave a detailed message on this number:  YES    Call taken on 3/3/2017 at 12:18 PM by Ruby Ball

## 2017-03-03 NOTE — TELEPHONE ENCOUNTER
Reason for Call:  Other oxygen    Detailed comments: pt calling to let us know that the oxygen company he goes through was to renew and he hasn't used the oxygen for a while now. He wants to know if you thing he should cancel it. The oxygen company asked him to call them and let them know what you decided.    Phone Number Patient can be reached at: Home number on file [phone] (home)    Best Time: any    Can we leave a detailed message on this number? YES    Call taken on 3/3/2017 at 12:17 PM by Ruby Ball

## 2017-03-03 NOTE — TELEPHONE ENCOUNTER
Advised him he should try his neb and see if it helps. If it does not he may need to be seen. He has no fever had a cold 3-4 days. Using advair.Natalia Recinos RN

## 2017-03-09 ENCOUNTER — OFFICE VISIT (OUTPATIENT)
Dept: FAMILY MEDICINE | Facility: CLINIC | Age: 82
End: 2017-03-09
Payer: MEDICARE

## 2017-03-09 ENCOUNTER — TELEPHONE (OUTPATIENT)
Dept: FAMILY MEDICINE | Facility: CLINIC | Age: 82
End: 2017-03-09

## 2017-03-09 VITALS
WEIGHT: 205 LBS | DIASTOLIC BLOOD PRESSURE: 75 MMHG | SYSTOLIC BLOOD PRESSURE: 135 MMHG | HEART RATE: 67 BPM | BODY MASS INDEX: 31.17 KG/M2 | OXYGEN SATURATION: 94 % | TEMPERATURE: 97.1 F

## 2017-03-09 DIAGNOSIS — Z99.2 CKD (CHRONIC KIDNEY DISEASE) STAGE V REQUIRING CHRONIC DIALYSIS (H): ICD-10-CM

## 2017-03-09 DIAGNOSIS — N18.6 CKD (CHRONIC KIDNEY DISEASE) STAGE V REQUIRING CHRONIC DIALYSIS (H): ICD-10-CM

## 2017-03-09 DIAGNOSIS — E78.5 HYPERLIPIDEMIA LDL GOAL <70: ICD-10-CM

## 2017-03-09 DIAGNOSIS — Z79.4 CONTROLLED TYPE 2 DIABETES MELLITUS WITH DIABETIC NEPHROPATHY, WITH LONG-TERM CURRENT USE OF INSULIN (H): ICD-10-CM

## 2017-03-09 DIAGNOSIS — J44.9 CHRONIC OBSTRUCTIVE PULMONARY DISEASE, UNSPECIFIED COPD TYPE (H): Primary | ICD-10-CM

## 2017-03-09 DIAGNOSIS — E11.21 CONTROLLED TYPE 2 DIABETES MELLITUS WITH DIABETIC NEPHROPATHY, WITH LONG-TERM CURRENT USE OF INSULIN (H): ICD-10-CM

## 2017-03-09 DIAGNOSIS — Z85.528 HISTORY OF RENAL CELL CANCER: ICD-10-CM

## 2017-03-09 PROCEDURE — 99214 OFFICE O/P EST MOD 30 MIN: CPT | Performed by: NURSE PRACTITIONER

## 2017-03-09 NOTE — PROGRESS NOTES
SUBJECTIVE:                                                    Griffin Walton is a 82 year old male who presents to clinic today for the following health issues:    History of CHRONIC KIDNEY DISEASE- stage 4- stable- followed by nephrology with dialysis 3 days/week. Started dialysis in 2012.     History of renal cell carcinoma - partial left nephrectomy in 2007.    Hyperlipidemia: Tolerating statin- will need labs in 2 months.   LDL Cholesterol Calculated   Date Value Ref Range Status   05/31/2016 62 <100 mg/dL Final     Comment:     Desirable:       <100 mg/dl   ]  HDL Cholesterol   Date Value Ref Range Status   05/31/2016 48 >39 mg/dL Final   ]          COPD Follow-Up    Symptoms are currently: improved    Current fatigue or dyspnea with ambulation: none    Shortness of breath: improved    Increased or change in Cough/Sputum: Yes- cough now has a cold getting better    Fever(s): No    Baseline ambulation without stopping to rest  feet, blocks. Able to walk up tow flights of stairs without stopping to rest.    Any ER/UC or hospital admissions since your last visit? No     History   Smoking Status     Former Smoker     Packs/day: 1.00     Years: 55.00     Types: Cigarettes     Quit date: 1/1/2005   Smokeless Tobacco     Never Used     No results found for: FEV1, [id number]       Amount of exercise or physical activity: 2-3 days/week for an average of 15-30 minutes    Problems taking medications regularly: No    Medication side effects: none  Diet: regular (no restrictions)    Medication Followup of Oxygen, has not used portable oxygen tank for 3-4 months. Occasionally uses his home one though    Taking Medication as prescribed: no, has been feeling better    Side Effects:  None    Medication Helping Symptoms:  Yes    Received a letter from Medicare - needs renewal of oxygen tanks- patient tells me that has not used portable tank in last 3-4 months. He occasionally uses oxygen at home- last used 2 nights ago.      Oximetry testing 10/2016- approval for overnight oxygen use.        Problem list and histories reviewed & adjusted, as indicated.  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)     Anemia     Hyperlipidemia LDL goal <70     Anxiety disorder due to medical condition     Cerebral embolism with cerebral infarction (H)     zAdvanced directives, counseling/discussion     Health Care Home     Seborrheic keratosis     Leg edema, right     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)     Thrombocytopenia, primary (H)     Hypotension     Glaucoma     Controlled type 2 diabetes mellitus with diabetic nephropathy, with long-term current use of insulin (H)     Chronic obstructive pulmonary disease, unspecified COPD type (H)     Past Surgical History   Procedure Laterality Date     Surgical history of -   2/13/2004     Right knee arthroscopy     Surgical history of -        Vocal cord biopsy-benign     Surgical history of -   3/3/2005     AAA, left partial nephrectomy     Cataract iol, rt/lt  2/4/08     left eye - phacoemulsification w/ posterior chamber lens implantation combined w/ glaucoma filtering procedure     Abdomen surgery  2005     aortic aneurysm     Create fistula arteriovenous upper extremity  1/3/2012     Procedure:CREATE FISTULA ARTERIOVENOUS UPPER EXTREMITY; LEFT UPPER ARM ARTERIOVENOUS FISTULA; Surgeon:JENA PEARSON; Location[facility]       Social History   Substance Use Topics     Smoking status: Former Smoker     Packs/day: 1.00     Years: 55.00     Types: Cigarettes     Quit date: 1/1/2005     Smokeless tobacco: Never Used     Alcohol use No     Family History   Problem Relation Age of  Onset     CANCER Father      Asophageal      CANCER Brother      Throat      Other Cancer Sister      Lung            Reviewed and updated as needed this visit by clinical staff       Reviewed and updated as needed this visit by Provider         ROS:  Constitutional, HEENT, cardiovascular, pulmonary, GI, , musculoskeletal, neuro, skin, endocrine and psych systems are negative, except as otherwise noted.    OBJECTIVE:                                                    /75 (BP Location: Left arm, Patient Position: Chair, Cuff Size: Adult Large)  Pulse 67  Temp 97.1  F (36.2  C) (Tympanic)  Wt 205 lb (93 kg)  SpO2 96%  BMI 31.17 kg/m2  Body mass index is 31.17 kg/(m^2).  GENERAL: healthy, alert and no distress  RESP: lungs clear to auscultation - no rales, rhonchi or wheezes  CV: regular rate and rhythm, normal S1 S2, no S3 or S4, no murmur, click or rub,   MS: no gross musculoskeletal defects noted, no edema    Diagnostic Test Results:  none      ASSESSMENT/PLAN:                                                      1. Chronic obstructive pulmonary disease, unspecified COPD type (H)  Patient had overnight oximetry - stating that needs to use oxygen when sleeping  He is here today with medicare form stating that needs renewal of portable oxygen if he is still using. Currently patient does not qualify for portable oxygen as his oxygen sats were> 94% on room air while walking- patient to contact medicare / send back portable oxygen         2. 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)  Stable- followed by Nephrology     3. History of renal cell cancer  Left partial nephrectomy in 2007    4. Hyperlipidemia LDL goal <70  Tolerating statin- will need updated labs in 2 months  - Lipid Profile with reflex to direct LDL  - AST    Follow up in 3 months    DANNA Coleman [facility]

## 2017-03-09 NOTE — MR AVS SNAPSHOT
After Visit Summary   3/9/2017    Griffin Walton    MRN: [id number]           Patient Information     Date Of Birth          5/18/1934        Visit Information        Provider Department      3/9/2017 9:20 AM Stefanie Louis APRN CNP [facility]        Today's Diagnoses     Chronic obstructive pulmonary disease, unspecified COPD type (H)    -  1    Controlled type 2 diabetes mellitus with diabetic nephropathy, with long-term current use of insulin (H)        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)        History of renal cell cancer        Hyperlipidemia LDL goal <70          Care Instructions    1. Schedule fasting lab work for cholesterol in May          Thank you for choosing [facility].  You may be receiving a survey in the mail from [employer] regarding your visit today.  Please take a few minutes to complete and return the survey to let us know how we are doing.      If you have questions or concerns, please contact us via [email or web address] or you can contact your care team at [phone].    Our Clinic hours are:  Monday 6:40 am  to 7:00 pm  Tuesday -Friday 6:40 am to 5:00 pm    The [place] outpatient lab hours are:  Monday - Friday 6:10 am to 4:45 pm  Saturdays 7:00 am to 11:00 am  Appointments are required, call [phone]    If you have clinical questions after hours or would like to schedule an appointment,  call the clinic at [phone].        Follow-ups after your visit        Who to contact     If you have questions or need follow up information about today's clinic visit or your schedule please contact [facility] directly at [phone].  Normal or non-critical lab and imaging results will be communicated to you by MyChart, letter or phone within 4 business days after the clinic has received the results. If you do not hear from us within 7 days, please contact the clinic through [employer] or phone. If you have a critical or  "abnormal lab result, we will notify you by phone as soon as possible.  Submit refill requests through [employer] or call your pharmacy and they will forward the refill request to us. Please allow 3 business days for your refill to be completed.          Additional Information About Your Visit        MyChart Information     [employer] lets you send messages to your doctor, view your test results, renew your prescriptions, schedule appointments and more. To sign up, go to [email or web address] . Click on \"Log in\" on the left side of the screen, which will take you to the Welcome page. Then click on \"Sign up Now\" on the right side of the page.     You will be asked to enter the access code listed below, as well as some personal information. Please follow the directions to create your username and password.     Your access code is: [email or web address]  Expires: 3/26/2017 11:27 AM     Your access code will  in 90 days. If you need help or a new code, please call your [place] clinic or [phone].        Care EveryWhere ID     This is your Care EveryWhere ID. This could be used by other organizations to access your [place] medical records  [id number]        Your Vitals Were     Pulse Temperature Pulse Oximetry BMI (Body Mass Index)          67 97.1  F (36.2  C) (Tympanic) 96% 31.17 kg/m2         Blood Pressure from Last 3 Encounters:   17 135/75   17 120/57   17 134/59    Weight from Last 3 Encounters:   17 205 lb (93 kg)   16 202 lb 13.2 oz (92 kg)   16 204 lb (92.5 kg)              We Performed the Following     AST     Lipid Profile with reflex to direct LDL        Primary Care Provider Office Phone # Fax #    StefanieDANNA Lind [phone]       [facility] [address] [facility] [address]        Thank you!     Thank you for choosing [facility]  for your care. Our goal is always to provide you with excellent care. Hearing back from our " "patients is one way we can continue to improve our services. Please take a few minutes to complete the written survey that you may receive in the mail after your visit with us. Thank you!             Your Updated Medication List - Protect others around you: Learn how to safely use, store and throw away your medicines at www.disposemymeds.org.          This list is accurate as of: 3/9/17  9:42 AM.  Always use your most recent med list.                   Brand Name Dispense Instructions for use    albuterol (2.5 MG/3ML) 0.083% neb solution     1 Box    Take 1 vial (2.5 mg) by nebulization every 6 hours as needed for shortness of breath / dyspnea or wheezing       blood glucose monitoring meter device kit    no brand specified    1 kit    Use to test blood sugar 3 times daily or as directed.       blood glucose monitoring test strip    ONE TOUCH ULTRA    350 each    test 4 times daily Profile Rx: patient will contact pharmacy when needed       bumetanide 1 MG tablet    BUMEX    180 tablet    Take 1 tablet (1 mg) by mouth 2 times daily       candesartan cilexetil 32 MG Tabs     90 tablet    Take 32 mg by mouth daily       clopidogrel 75 MG tablet    PLAVIX    90 tablet    Take 1 tablet (75 mg) by mouth daily       dorzolamide-timolol 2-0.5 % ophthalmic solution    COSOPT     Place 1 drop into both eyes 2 times daily       fluticasone-salmeterol 100-50 MCG/DOSE diskus inhaler    ADVAIR DISKUS    1 Inhaler    Inhale 1 puff into the lungs every 12 hours       insulin aspart 100 UNIT/ML injection    NovoLOG FLEXPEN    3 Month    6 units before breakfast, 6 units before lunch, 4 units before dinner       insulin glargine 100 UNIT/ML injection    LANTUS    3 Month    Inject 8 Units Subcutaneous At Bedtime       insulin pen needle 30G X 8 MM    NOVOFINE    300 each    Use 3 daily or as directed.       insulin syringe-needle U-100 31G X 5/16\" 0.5 ML     100 each    Profile Rx: patient will contact pharmacy when needed       " latanoprost 0.005 % ophthalmic solution    XALATAN     Place 1 drop into both eyes At Bedtime       lovastatin 40 MG tablet    MEVACOR    135 tablet    Take 1.5 tablets (60 mg) by mouth daily (with dinner)       NIFEdipine ER 90 MG Tb24    ADALAT CC    90 tablet    Take 1 tablet (90 mg) by mouth daily       * NONFORMULARY      Take 1 tablet by mouth At Bedtime Vitamin (Dialyvite) that he gets from [facility].       * order for DME     1 each    Equipment being ordered: Walker with wheels and brakes, and a seat       order for DME     1 Units    Nebulizer machine Qty #1       order for DME     2 each    Equipment being ordered: Oxygen- HOME and portable. Georgie Coulter [facility] Medical Assistant Signed  Service date: 05/24/2016 10:48 AM     O2 Sat on Room air at rest:84% O2 sat on room air with walk: 82-91% O2 Sat on 1L of oxygen with walk 91-93% O2 Sat on 2 L of Oxygen with walk 91-96% O2 Sat on 1 L O2 at rest 94%       terazosin 10 MG capsule    HYTRIN    90 capsule    Take 1 capsule (10 mg) by mouth At Bedtime       TYLENOL PO      Take 650 mg by mouth nightly as needed       WARFARIN SODIUM PO      Daily as directed per [facility]       * Notice:  This list has 2 medication(s) that are the same as other medications prescribed for you. Read the directions carefully, and ask your doctor or other care provider to review them with you.

## 2017-03-09 NOTE — TELEPHONE ENCOUNTER
Reason for Call:  Form, our goal is to have forms completed with 72 hours, however, some forms may require a visit or additional information.    Type of letter, form or note:  medical    Who is the form from?: Sudarshan  (if other please explain)    Where did the form come from: form was faxed in    Where the form was placed: Given to physician    What number is listed as a contact on the form?:  Fax back to [phone]    Call taken on 3/9/2017 at 2:42 PM by Heather Castillo

## 2017-03-09 NOTE — PATIENT INSTRUCTIONS
1. Schedule fasting lab work for cholesterol in May          Thank you for choosing [facility].  You may be receiving a survey in the mail from [email or web address] regarding your visit today.  Please take a few minutes to complete and return the survey to let us know how we are doing.      If you have questions or concerns, please contact us via Allclasses or you can contact your care team at [phone].    Our Clinic hours are:  Monday 6:40 am  to 7:00 pm  Tuesday -Friday 6:40 am to 5:00 pm    The [place] outpatient lab hours are:  Monday - Friday 6:10 am to 4:45 pm  Saturdays 7:00 am to 11:00 am  Appointments are required, call [phone]    If you have clinical questions after hours or would like to schedule an appointment,  call the clinic at [phone].

## 2017-03-09 NOTE — NURSING NOTE
"Initial /75 (BP Location: Left arm, Patient Position: Chair, Cuff Size: Adult Large)  Pulse 67  Temp 97.1  F (36.2  C) (Tympanic)  Wt 205 lb (93 kg)  SpO2 96%  BMI 31.17 kg/m2 Estimated body mass index is 31.17 kg/(m^2) as calculated from the following:    Height as of 12/26/16: 5' 8\" (1.727 m).    Weight as of this encounter: 205 lb (93 kg). .    Georgie Coulter    "

## 2017-03-14 ENCOUNTER — MEDICAL CORRESPONDENCE (OUTPATIENT)
Dept: HEALTH INFORMATION MANAGEMENT | Facility: CLINIC | Age: 82
End: 2017-03-14

## 2017-03-22 ENCOUNTER — TELEPHONE (OUTPATIENT)
Dept: FAMILY MEDICINE | Facility: CLINIC | Age: 82
End: 2017-03-22

## 2017-03-23 ENCOUNTER — MEDICAL CORRESPONDENCE (OUTPATIENT)
Dept: HEALTH INFORMATION MANAGEMENT | Facility: CLINIC | Age: 82
End: 2017-03-23

## 2017-03-23 NOTE — TELEPHONE ENCOUNTER
[facility] oxygen certification form completed and routed to Stefanie Louis for review and signature.

## 2017-05-26 DIAGNOSIS — E78.5 HYPERLIPIDEMIA LDL GOAL <130: ICD-10-CM

## 2017-05-26 NOTE — TELEPHONE ENCOUNTER
Lovastatin     Last Written Prescription Date: 3/2/2017  Last Fill Quantity: 135, # refills: 0  Last Office Visit with G, P or [facility] prescribing provider: 3/19/2017       Lab Results   Component Value Date    CHOL 129 05/31/2016     Lab Results   Component Value Date    HDL 48 05/31/2016     Lab Results   Component Value Date    LDL 62 05/31/2016     Lab Results   Component Value Date    TRIG 93 05/31/2016     Lab Results   Component Value Date    CHOLHDLRATIO 2.4 02/10/2015

## 2017-06-01 RX ORDER — LOVASTATIN 40 MG
60 TABLET ORAL
Qty: 45 TABLET | Refills: 0 | Status: SHIPPED | OUTPATIENT
Start: 2017-06-01 | End: 2017-06-29

## 2017-06-07 ENCOUNTER — TELEPHONE (OUTPATIENT)
Dept: FAMILY MEDICINE | Facility: CLINIC | Age: 82
End: 2017-06-07

## 2017-06-08 NOTE — TELEPHONE ENCOUNTER
[employer] oxygen concentrator order form started and routed to RN to review [facility]

## 2017-06-13 ENCOUNTER — MEDICAL CORRESPONDENCE (OUTPATIENT)
Dept: HEALTH INFORMATION MANAGEMENT | Facility: CLINIC | Age: 82
End: 2017-06-13

## 2017-06-25 DIAGNOSIS — I63.40 CEREBRAL INFARCTION DUE TO EMBOLISM OF CEREBRAL ARTERY (H): ICD-10-CM

## 2017-06-26 NOTE — TELEPHONE ENCOUNTER
Clopidogrel           Last Written Prescription Date: 09/20/16  Last Fill Quantity: 90, # refills: 3    Last Office Visit with [facility], [facility] or [facility] prescribing provider:  03/09/17   Future Office Visit:       Lab Results   Component Value Date    WBC 10.1 05/17/2016     Lab Results   Component Value Date    RBC 3.92 05/17/2016     Lab Results   Component Value Date    HGB 10.9 05/17/2016     Lab Results   Component Value Date    HCT 34.0 05/17/2016     No components found for: MCT  Lab Results   Component Value Date    MCV 87 05/17/2016     Lab Results   Component Value Date    MCH 27.8 05/17/2016     Lab Results   Component Value Date    MCHC 32.1 05/17/2016     Lab Results   Component Value Date    RDW 15.1 05/17/2016     Lab Results   Component Value Date     05/17/2016     Lab Results   Component Value Date    AST 8 11/28/2016     Lab Results   Component Value Date    ALT 15 11/28/2016     Creatinine   Date Value Ref Range Status   11/28/2016 10.01 (H) 0.60 - 1.30 mg/dL Final   ]

## 2017-06-29 DIAGNOSIS — E78.5 HYPERLIPIDEMIA LDL GOAL <130: ICD-10-CM

## 2017-06-30 NOTE — TELEPHONE ENCOUNTER
Lovastatin     Last Written Prescription Date: 06/01/17  Last Fill Quantity: 45, # refills: 0  Last Office Visit with G, P or [facility] prescribing provider: 03/09/17       Lab Results   Component Value Date    CHOL 129 05/31/2016     Lab Results   Component Value Date    HDL 48 05/31/2016     Lab Results   Component Value Date    LDL 62 05/31/2016     Lab Results   Component Value Date    TRIG 93 05/31/2016     Lab Results   Component Value Date    CHOLHDLRATIO 2.4 02/10/2015

## 2017-07-03 RX ORDER — LOVASTATIN 40 MG
TABLET ORAL
Qty: 45 TABLET | Refills: 0 | Status: SHIPPED | OUTPATIENT
Start: 2017-07-03 | End: 2017-08-03

## 2017-07-03 RX ORDER — CLOPIDOGREL BISULFATE 75 MG/1
TABLET ORAL
Qty: 30 TABLET | Refills: 0 | Status: SHIPPED | OUTPATIENT
Start: 2017-07-03 | End: 2017-08-03

## 2017-07-03 NOTE — TELEPHONE ENCOUNTER
Routing refill request to provider for review/approval because:  Labs not current:     LDL Cholesterol Calculated 62  <100 mg/dL Final 05/31/2016 10:45 AM 59     Per last office visit note on 3-9-17:    . Hyperlipidemia LDL goal <70  Tolerating statin- will need updated labs in 2 months  - Lipid Profile with reflex to direct LDL  - AST     Follow up in 3 months     DANNA Coleman [facility]    Patient did not have follow up Lipid, no future appointment noted, ok to refill?  [place]

## 2017-07-03 NOTE — TELEPHONE ENCOUNTER
[facility] pharmacy calling for the status of this refill. She thinks pt is getting close to being out and wondering if this can be filled.    Ruby Quinn  [facility] [place]

## 2017-07-03 NOTE — TELEPHONE ENCOUNTER
Routing refill request to provider for review/approval because:  Labs out of range:  Creatinine high.  Patient is almost out of this medication.    Sandra MOSS RN

## 2017-07-03 NOTE — TELEPHONE ENCOUNTER
"I called pt and his wife.  Informed that Plavix was sent for 30 days while sorting out the question about dosing with both Plavix and warfarin.  Yes, pt has been on Plavix about 5 years per pt.  (1/13/12 is what I see in pt's chart.)  Pt has been warfarin about 1 year.  Warfarin is prescribed by Dr Keene.  Warfarin is documented as \"pt reported\" on 5/17/16.  Pt was last seen in clinic 3/9/17.  Routed to provider for further follow up instructions.  Lilo Rosas RN    "

## 2017-07-03 NOTE — TELEPHONE ENCOUNTER
[facility] pharmacy calling for the status of this refill. She thinks pt is getting close to being out and wondering if this can be filled.    Ruby Quinn  [facility] [place]

## 2017-07-11 DIAGNOSIS — E78.5 HYPERLIPIDEMIA LDL GOAL <70: ICD-10-CM

## 2017-07-11 LAB
AST SERPL W P-5'-P-CCNC: 17 U/L (ref 0–45)
CHOLEST SERPL-MCNC: 105 MG/DL
HDLC SERPL-MCNC: 50 MG/DL
LDLC SERPL CALC-MCNC: 44 MG/DL
NONHDLC SERPL-MCNC: 55 MG/DL
TRIGL SERPL-MCNC: 56 MG/DL

## 2017-07-11 PROCEDURE — 80061 LIPID PANEL: CPT | Performed by: NURSE PRACTITIONER

## 2017-07-11 PROCEDURE — 84450 TRANSFERASE (AST) (SGOT): CPT | Performed by: NURSE PRACTITIONER

## 2017-07-11 PROCEDURE — 36415 COLL VENOUS BLD VENIPUNCTURE: CPT | Performed by: NURSE PRACTITIONER

## 2017-08-05 ENCOUNTER — TELEPHONE (OUTPATIENT)
Dept: FAMILY MEDICINE | Facility: CLINIC | Age: 82
End: 2017-08-05

## 2017-08-05 NOTE — TELEPHONE ENCOUNTER
[facility] pharmacy - one touch test strips order form received, completed and routed to LUCIANO Louis for review and signature.

## 2017-08-08 ENCOUNTER — OFFICE VISIT (OUTPATIENT)
Dept: FAMILY MEDICINE | Facility: CLINIC | Age: 82
End: 2017-08-08
Payer: MEDICARE

## 2017-08-08 ENCOUNTER — MEDICAL CORRESPONDENCE (OUTPATIENT)
Dept: HEALTH INFORMATION MANAGEMENT | Facility: CLINIC | Age: 82
End: 2017-08-08

## 2017-08-08 ENCOUNTER — RADIANT APPOINTMENT (OUTPATIENT)
Dept: GENERAL RADIOLOGY | Facility: CLINIC | Age: 82
End: 2017-08-08
Attending: NURSE PRACTITIONER
Payer: MEDICARE

## 2017-08-08 ENCOUNTER — TRANSFERRED RECORDS (OUTPATIENT)
Dept: HEALTH INFORMATION MANAGEMENT | Facility: CLINIC | Age: 82
End: 2017-08-08

## 2017-08-08 VITALS
WEIGHT: 204 LBS | BODY MASS INDEX: 32.02 KG/M2 | DIASTOLIC BLOOD PRESSURE: 56 MMHG | TEMPERATURE: 98.9 F | OXYGEN SATURATION: 95 % | HEIGHT: 67 IN | SYSTOLIC BLOOD PRESSURE: 114 MMHG | HEART RATE: 71 BPM

## 2017-08-08 DIAGNOSIS — N18.6 CKD (CHRONIC KIDNEY DISEASE) STAGE V REQUIRING CHRONIC DIALYSIS (H): ICD-10-CM

## 2017-08-08 DIAGNOSIS — R53.83 FATIGUE, UNSPECIFIED TYPE: ICD-10-CM

## 2017-08-08 DIAGNOSIS — Z99.2 CKD (CHRONIC KIDNEY DISEASE) STAGE V REQUIRING CHRONIC DIALYSIS (H): ICD-10-CM

## 2017-08-08 DIAGNOSIS — R06.02 SOB (SHORTNESS OF BREATH): ICD-10-CM

## 2017-08-08 DIAGNOSIS — E11.21 TYPE 2 DIABETES MELLITUS WITH DIABETIC NEPHROPATHY, WITH LONG-TERM CURRENT USE OF INSULIN (H): ICD-10-CM

## 2017-08-08 DIAGNOSIS — R06.02 SOB (SHORTNESS OF BREATH): Primary | ICD-10-CM

## 2017-08-08 DIAGNOSIS — Z79.4 TYPE 2 DIABETES MELLITUS WITH DIABETIC NEPHROPATHY, WITH LONG-TERM CURRENT USE OF INSULIN (H): ICD-10-CM

## 2017-08-08 DIAGNOSIS — J18.9 PNEUMONIA OF BOTH LUNGS DUE TO INFECTIOUS ORGANISM, UNSPECIFIED PART OF LUNG: Primary | ICD-10-CM

## 2017-08-08 LAB
ERYTHROCYTE [DISTWIDTH] IN BLOOD BY AUTOMATED COUNT: 12.9 % (ref 10–15)
HCT VFR BLD AUTO: 28.8 % (ref 40–53)
HGB BLD-MCNC: 9.3 G/DL (ref 13.3–17.7)
MCH RBC QN AUTO: 30.4 PG (ref 26.5–33)
MCHC RBC AUTO-ENTMCNC: 32.3 G/DL (ref 31.5–36.5)
MCV RBC AUTO: 94 FL (ref 78–100)
PLATELET # BLD AUTO: 271 10E9/L (ref 150–450)
RBC # BLD AUTO: 3.06 10E12/L (ref 4.4–5.9)
TSH SERPL DL<=0.005 MIU/L-ACNC: 2.11 MU/L (ref 0.4–4)
WBC # BLD AUTO: 10.3 10E9/L (ref 4–11)

## 2017-08-08 PROCEDURE — 85027 COMPLETE CBC AUTOMATED: CPT | Performed by: NURSE PRACTITIONER

## 2017-08-08 PROCEDURE — 84443 ASSAY THYROID STIM HORMONE: CPT | Performed by: NURSE PRACTITIONER

## 2017-08-08 PROCEDURE — 99214 OFFICE O/P EST MOD 30 MIN: CPT | Performed by: NURSE PRACTITIONER

## 2017-08-08 PROCEDURE — 71020 XR CHEST 2 VW: CPT

## 2017-08-08 PROCEDURE — 36415 COLL VENOUS BLD VENIPUNCTURE: CPT | Performed by: NURSE PRACTITIONER

## 2017-08-08 RX ORDER — AZITHROMYCIN 250 MG/1
TABLET, FILM COATED ORAL
Qty: 6 TABLET | Refills: 0 | Status: SHIPPED | OUTPATIENT
Start: 2017-08-08 | End: 2017-08-31

## 2017-08-08 NOTE — PROGRESS NOTES
SUBJECTIVE:                                                    Griffin Walton is a 83 year old male who presents to clinic today for the following health issues:    Chief Complaint   Patient presents with     Oxygen level concerns     Pt states that he would like to discuss his o2 levels. He states that about 2 weeks ago his O2sats started going into the 80's. He is currently on 4 liters of O2 and believes he may need it increased.     History of chronic kidney disease- on dialysis.   History of COPD- uses 2 liters with sleep. On albuterol and Advair.   History of diabetes mellitus-     3-4 weeks ago started feeling dyspnea on exertion, fatigued, sleeping more,  only walking 1/2 block before he is needing to rest. He had to start wearing oxygen during the day 2 liters and up to 4 liters with activity.  No pain in chest , no swelling in legs, no recent long trips. He does take warfarin. History of abdominal aortic aneurysm in 2005 with graft repair.    Denies blood in stools. No recent rep. Infections.     History of tobacco use- quit smoking 2005.     Diabetes mellitus: checking 4 X/day with each meal- BS running - occasional high reading. Uses Insulin.   Lab Results   Component Value Date    A1C 5.6 11/29/2016    A1C 6.3 10/24/2016    A1C 5.3 07/25/2016    A1C 6.2 05/24/2016    A1C 5.4 04/25/2016           -------------------------------------    Problem list and histories reviewed & adjusted, as indicated.  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)     Anemia     Hyperlipidemia LDL goal <70     Anxiety disorder due to medical condition     Cerebral embolism with cerebral  "infarction (H)     zAdvanced directives, counseling/discussion     Health Care Home     Seborrheic keratosis     Leg edema, right     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)     Thrombocytopenia, primary (H)     Hypotension     Glaucoma     Controlled type 2 diabetes mellitus with diabetic nephropathy, with long-term current use of insulin (H)     Chronic obstructive pulmonary disease, unspecified COPD type (H)     Past Surgical History:   Procedure Laterality Date     ABDOMEN SURGERY  2005    aortic aneurysm     CATARACT IOL, RT/LT  2/4/08    left eye - phacoemulsification w/ posterior chamber lens implantation combined w/ glaucoma filtering procedure     CREATE FISTULA ARTERIOVENOUS UPPER EXTREMITY  1/3/2012    Procedure:CREATE FISTULA ARTERIOVENOUS UPPER EXTREMITY; LEFT UPPER ARM ARTERIOVENOUS FISTULA; Surgeon:JENA PEARSON; Location:[facility]     SURGICAL HISTORY OF -   2/13/2004    Right knee arthroscopy     SURGICAL HISTORY OF -       Vocal cord biopsy-benign     SURGICAL HISTORY OF -   3/3/2005    AAA, left partial nephrectomy       Social History   Substance Use Topics     Smoking status: Former Smoker     Packs/day: 1.00     Years: 55.00     Types: Cigarettes     Quit date: 1/1/2005     Smokeless tobacco: Never Used     Alcohol use No     Family History   Problem Relation Age of Onset     CANCER Father      Asophageal      CANCER Brother      Throat      Other Cancer Sister      Lung              Reviewed and updated as needed this visit by clinical staff       Reviewed and updated as needed this visit by Provider         ROS:  Constitutional, HEENT, cardiovascular, pulmonary, GI, , musculoskeletal, neuro, skin, endocrine and psych systems are negative, except as otherwise noted.      OBJECTIVE:   /56 (BP Location: Right arm, Patient Position: Sitting, Cuff Size: Adult Regular)  Pulse 71  Temp 98.9  F (37.2  C) (Tympanic)  Ht 5' 6.5\" (1.689 m)  Wt 204 lb (92.5 kg)  SpO2 95%  BMI 32.43 " kg/m2  Body mass index is 32.43 kg/(m^2).  GENERAL: healthy, alert and no distress  NECK: no adenopathy, no asymmetry, masses, or scars and thyroid normal to palpation  RESP: lungs clear to auscultation - no rales, rhonchi or wheezes  CV: regular rate and rhythm, normal S1 S2, no S3 or S4, no murmur, click or rub  MS: no gross musculoskeletal defects noted, no edema    Diagnostic Test Results:  none     ASSESSMENT/PLAN:         1. SOB (shortness of breath)  Unknown etiology- ? Respiratory vs anemia vs Cardiology  - XR Chest 2 Views; Future  - CBC with platelets  - Echocardiogram Complete; Future    2. Fatigue, unspecified type  ? Cardiac vs thyroid vs anemia  - TSH with free T4 reflex    3. Type 2 diabetes mellitus with diabetic nephropathy, with long-term current use of insulin (H)  - patient currently checking BS 4X/day a day- will reduce to bid since BS are stable.   - blood glucose monitoring (ONE TOUCH ULTRA) test strip; test 2 times daily  Profile Rx: patient will contact pharmacy when needed  Dispense: 60 each; Refill: 11    4. 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)  Stable on dialysis           DANNA Lopez [facility]

## 2017-08-08 NOTE — NURSING NOTE
"Chief Complaint   Patient presents with     Oxygen level concerns     Pt states that he would like to discuss his o2 levels. He states that about 2 weeks ago his O2sats started going into the 80's. He is currently on 4 liters of O2 and believes he may need it increased.       Initial /56 (BP Location: Right arm, Patient Position: Sitting, Cuff Size: Adult Regular)  Pulse 71  Temp 98.9  F (37.2  C) (Tympanic)  Ht 5' 6.5\" (1.689 m)  Wt 204 lb (92.5 kg)  SpO2 95%  BMI 32.43 kg/m2 Estimated body mass index is 32.43 kg/(m^2) as calculated from the following:    Height as of this encounter: 5' 6.5\" (1.689 m).    Weight as of this encounter: 204 lb (92.5 kg).  Medication Reconciliation: complete    "

## 2017-08-08 NOTE — MR AVS SNAPSHOT
After Visit Summary   8/8/2017    Griffin Walton    MRN: [id number]           Patient Information     Date Of Birth          5/18/1934        Visit Information        Provider Department      8/8/2017 10:00 AM Stefanie Louis APRN CNP [facility]        Today's Diagnoses     SOB (shortness of breath)    -  1    Fatigue, unspecified type          Care Instructions    1. Labs and chest x-ray today  2. Schedule echocardiogram        Thank you for choosing [facility].  You may be receiving a survey in the mail from [employer] regarding your visit today.  Please take a few minutes to complete and return the survey to let us know how we are doing.      If you have questions or concerns, please contact us via [employer] or you can contact your care team at [phone].    Our Clinic hours are:  Monday 6:40 am  to 7:00 pm  Tuesday -Friday 6:40 am to 5:00 pm    The [place] outpatient lab hours are:  Monday - Friday 6:10 am to 4:45 pm  Saturdays 7:00 am to 11:00 am  Appointments are required, call [phone]    If you have clinical questions after hours or would like to schedule an appointment,  call the clinic at [phone].            Follow-ups after your visit        Your next 10 appointments already scheduled     Aug 29, 2017 10:15 AM CDT   Return Visit with Geraldo Ware MD   [facility]    [address] [address]   [phone]              Future tests that were ordered for you today     Open Future Orders        Priority Expected Expires Ordered    XR Chest 2 Views Routine 8/8/2017 8/8/2018 8/8/2017    Echocardiogram Complete Routine  8/8/2018 8/8/2017            Who to contact     If you have questions or need follow up information about today's clinic visit or your schedule please contact [facility] directly at [phone].  Normal or non-critical lab and imaging results will be  "communicated to you by [employer], letter or phone within 4 business days after the clinic has received the results. If you do not hear from us within 7 days, please contact the clinic through [employer] or phone. If you have a critical or abnormal lab result, we will notify you by phone as soon as possible.  Submit refill requests through [employer] or call your pharmacy and they will forward the refill request to us. Please allow 3 business days for your refill to be completed.          Additional Information About Your Visit        [employer] Information     [employer] lets you send messages to your doctor, view your test results, renew your prescriptions, schedule appointments and more. To sign up, go to [email or web address] . Click on \"Log in\" on the left side of the screen, which will take you to the Welcome page. Then click on \"Sign up Now\" on the right side of the page.     You will be asked to enter the access code listed below, as well as some personal information. Please follow the directions to create your username and password.     Your access code is: [email or web address]  Expires: 2017 10:38 AM     Your access code will  in 90 days. If you need help or a new code, please call your [facility] clinic or [phone].        Care EveryWhere ID     This is your Care EveryWhere ID. This could be used by other organizations to access your [facility] medical records  [id number]        Your Vitals Were     Pulse Temperature Height Pulse Oximetry BMI (Body Mass Index)       71 98.9  F (37.2  C) (Tympanic) 5' 6.5\" (1.689 m) 95% 32.43 kg/m2        Blood Pressure from Last 3 Encounters:   17 114/56   17 135/75   17 120/57    Weight from Last 3 Encounters:   17 204 lb (92.5 kg)   17 205 lb (93 kg)   16 202 lb 13.2 oz (92 kg)              We Performed the Following     CBC with platelets     TSH with free T4 reflex        Primary Care Provider Office Phone # Fax #    StefanieDANNA Lind " [phone]       [facility] [address] [address]        Equal Access to Services     ANUSHKA BILL : Guy Gamboa, vera gauthier, charyjoi danielspbzack meraroxy, renzo iliain hayaamele merayifan gottlieb hakeem correa. So [facility] [phone].    ATENCIÓN: Si habla español, tiene a louis disposición servicios gratuitos de asistencia lingüística. Llame al [phone].    We comply with applicable federal civil rights laws and [place] laws. We do not discriminate on the basis of race, color, national origin, age, disability sex, sexual orientation or gender identity.            Thank you!     Thank you for choosing [facility]  for your care. Our goal is always to provide you with excellent care. Hearing back from our patients is one way we can continue to improve our services. Please take a few minutes to complete the written survey that you may receive in the mail after your visit with us. Thank you!             Your Updated Medication List - Protect others around you: Learn how to safely use, store and throw away your medicines at www.disposemymeds.org.          This list is accurate as of: 8/8/17 10:39 AM.  Always use your most recent med list.                   Brand Name Dispense Instructions for use Diagnosis    albuterol (2.5 MG/3ML) 0.083% neb solution     1 Box    Take 1 vial (2.5 mg) by nebulization every 6 hours as needed for shortness of breath / dyspnea or wheezing    Chronic obstructive pulmonary disease, unspecified COPD type (H)       blood glucose monitoring meter device kit    no brand specified    1 kit    Use to test blood sugar 3 times daily or as directed.    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)       dorzolamide-timolol 2-0.5 % ophthalmic solution    COSOPT     Place 1 drop into both eyes 2 times daily        fluticasone-salmeterol 100-50 MCG/DOSE diskus inhaler    ADVAIR DISKUS    1 Inhaler    Inhale 1 puff into the lungs every 12 hours    Chronic obstructive pulmonary disease, unspecified COPD type (H)       insulin aspart 100 UNIT/ML injection    NovoLOG FLEXPEN    3 Month    6 units before breakfast, 6 units before lunch, 4 units before dinner    Type 2 diabetes mellitus with diabetic nephropathy (H)       insulin glargine 100 UNIT/ML injection    LANTUS    3 Month    Inject 8 Units Subcutaneous At Bedtime    Type 2 diabetes mellitus with diabetic nephropathy (H)       insulin pen needle 30G X 8 MM    NOVOFINE    300 each    Use 3 daily or as directed.    Type 2 diabetes mellitus with diabetic nephropathy, unspecified long term insulin use status (H)       insulin syringe-needle U-100 31G X 5/16\" 0.5 ML     100 each    Profile Rx: patient will contact pharmacy when needed    Type 2 diabetes mellitus with diabetic nephropathy (H)       latanoprost 0.005 % ophthalmic solution    XALATAN     Place 1 drop into both eyes At Bedtime        LOSARTAN POTASSIUM PO      Take by mouth daily        lovastatin 40 MG tablet    MEVACOR    45 tablet    TAKE 1 & 1/2 TABLETS BY MOUTH ONCE A DAY WITH DINNER. *NEEDS FASTING LAB WORK*    Hyperlipidemia LDL goal <130       NIFEdipine ER 90 MG Tb24    ADALAT CC    90 tablet    Take 1 tablet (90 mg) by mouth daily    Benign essential hypertension       * NONFORMULARY      Take 1 tablet by mouth At Bedtime Vitamin (Dialyvite) that he gets from [facility].        * order for DME     1 each    Equipment being ordered: Walker with wheels and brakes, and a seat    ESRD (end stage renal disease) on dialysis (H), Cerebral embolism with cerebral infarction (H)       order for DME     1 Units    Nebulizer machine Qty #1    Fever, unspecified, Cough, Chronic obstructive pulmonary disease, unspecified COPD type (H), Acute bronchospasm       order for DME     2 each    Equipment being ordered: Oxygen- HOME and portable. 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), Type 2 diabetes mellitus with diabetic nephropathy (H)       terazosin 10 MG capsule    HYTRIN    90 capsule    Take 1 capsule (10 mg) by mouth At Bedtime    Benign non-nodular prostatic hyperplasia with lower urinary tract symptoms       TYLENOL PO      Take 650 mg by mouth nightly as needed        WARFARIN SODIUM PO      Daily as directed per [facility]        * Notice:  This list has 2 medication(s) that are the same as other medications prescribed for you. Read the directions carefully, and ask your doctor or other care provider to review them with you.

## 2017-08-08 NOTE — PATIENT INSTRUCTIONS
1. Labs and chest x-ray today  2. Schedule echocardiogram        Thank you for choosing [facility].  You may be receiving a survey in the mail from [employer] regarding your visit today.  Please take a few minutes to complete and return the survey to let us know how we are doing.      If you have questions or concerns, please contact us via [employer] or you can contact your care team at [phone].    Our Clinic hours are:  Monday 6:40 am  to 7:00 pm  Tuesday -Friday 6:40 am to 5:00 pm    The [place] outpatient lab hours are:  Monday - Friday 6:10 am to 4:45 pm  Saturdays 7:00 am to 11:00 am  Appointments are required, call [phone]    If you have clinical questions after hours or would like to schedule an appointment,  call the clinic at [phone].

## 2017-08-10 DIAGNOSIS — I10 BENIGN ESSENTIAL HYPERTENSION: ICD-10-CM

## 2017-08-10 NOTE — TELEPHONE ENCOUNTER
Bumetanide      Last Written Prescription Date: 09/20/2016  Last Fill Quantity: 180, # refills: 3  Last Office Visit with [facility], [facility] or [facility] prescribing provider: 08/08/2017  Next 5 appointments (look out 90 days)     Aug 29, 2017 10:15 AM CDT   Return Visit with Geraldo Ware MD   [facility]    [address] [address]   [phone]            Aug 31, 2017  9:20 AM CDT   SHORT with DANNA Lopez [facility] ([facility])    [address] [address]   [phone]                   Potassium   Date Value Ref Range Status   11/28/2016 4.8 3.5 - 5.1 mmol/L Final     Creatinine   Date Value Ref Range Status   11/28/2016 10.01 (H) 0.60 - 1.30 mg/dL Final     BP Readings from Last 3 Encounters:   08/08/17 114/56   03/09/17 135/75   02/14/17 120/57       Dillon ALFARO (R)

## 2017-08-11 RX ORDER — BUMETANIDE 1 MG/1
TABLET ORAL
Qty: 180 TABLET | Refills: 2 | Status: SHIPPED | OUTPATIENT
Start: 2017-08-11 | End: 2018-02-12

## 2017-08-11 NOTE — TELEPHONE ENCOUNTER
"Doctor covering for Andrés Lux refill request to provider for review/approval because:  Labs out of range:  Creatinine; dialysis patient  Labs not current:  Basic  LOV 8/8/17    Component      Latest Ref Rng & Units 5/24/2016   Sodium      133 - 144 mmol/L 136   Potassium      3.4 - 5.3 mmol/L 4.6   Chloride      94 - 109 mmol/L 101   Carbon Dioxide      20 - 32 mmol/L 26   Anion Gap      3 - 14 mmol/L 9   Glucose      70 - 99 mg/dL 103 (H)   Urea Nitrogen      7 - 30 mg/dL 39 (H)   Creatinine      0.66 - 1.25 mg/dL 5.34 (H)   GFR Estimate      >60 mL/min/1.7m2 10 (L)   GFR Estimate If Black      >60 mL/min/1.7m2 13 (L)   Calcium      8.5 - 10.1 mg/dL 7.6 (L)     Vital Signs 2/14/2017 3/9/2017 3/9/2017 8/8/2017   Systolic 120 135  114   Diastolic 57 75  56   Pulse 90 67  71   Temperature  97.1  98.9   Respirations       Weight (LB)  205 lb  204 lb   Height    5' 6.5\"   BMI (Calculated)    32.5   Pain       O2 97 96 94 95     "

## 2017-08-18 ENCOUNTER — TELEPHONE (OUTPATIENT)
Dept: FAMILY MEDICINE | Facility: CLINIC | Age: 82
End: 2017-08-18

## 2017-08-18 DIAGNOSIS — Z79.4 CONTROLLED TYPE 2 DIABETES MELLITUS WITH DIABETIC NEPHROPATHY, WITH LONG-TERM CURRENT USE OF INSULIN (H): ICD-10-CM

## 2017-08-18 DIAGNOSIS — Z12.11 SPECIAL SCREENING FOR MALIGNANT NEOPLASMS, COLON: Primary | ICD-10-CM

## 2017-08-18 DIAGNOSIS — E11.21 CONTROLLED TYPE 2 DIABETES MELLITUS WITH DIABETIC NEPHROPATHY, WITH LONG-TERM CURRENT USE OF INSULIN (H): ICD-10-CM

## 2017-08-18 NOTE — TELEPHONE ENCOUNTER
Pt is requesting a refill on his Novolog. Pt was seen last on 8/8/17. My protocol calls for annual LDL,A1C and Microalbumin. Last LDL 7/11/17, last A1C 11/29/16, last microalbumin 2010.   Also, pt reports that he is taking this medication 6 units before breakfast, 6 units before lunch, 6 units before dinner.   I will route this request to the provider for review. Pt has an appt scheduled for 8/31/17.   I have an order for a FIT test cued up. It looks like it was recommended in an lab result. I was not sure what diagnosis to add.   Marilu Lemos RN

## 2017-08-18 NOTE — TELEPHONE ENCOUNTER
Pt's wife said he take 6, 6 and 6 and somehow it was written for 8, 8 and 8. She's not sure where the error happened but wanted to make sure he get the correct prescription sent in.      insulin aspart (NOVOLOG FLEXPEN) 100 UNIT/ML soln           Last Written Prescription Date: 09/20/16  Last Fill Quantity: 3 mth, # refills: 3  [facility] Visit with [facility] or [facility] prescribing provider:  08/08/17   Next 5 appointments (look out 90 days)     Aug 29, 2017 10:15 AM CDT   Return Visit with Geraldo Ware MD   [facility] ([facility]    [address] [place] [address]   [phone]            Aug 31, 2017  9:20 AM CDT   SHORT with DANNA Lopez [facility] ([facility])    [address] [address]   [phone]                   BP Readings from Last 3 Encounters:   08/08/17 114/56   03/09/17 135/75   02/14/17 120/57     Lab Results   Component Value Date    MICROL 1821 05/06/2010     Lab Results   Component Value Date    UMALCR 1640.54 05/06/2010     Creatinine   Date Value Ref Range Status   11/28/2016 10.01 (H) 0.60 - 1.30 mg/dL Final   ]  GFR Estimate   Date Value Ref Range Status   05/24/2016 10 (L) >60 mL/min/1.7m2 Final     Comment:     Non  GFR Calc   02/10/2015 9 (L) >60 mL/min/1.7m2 Final     Comment:     Non  GFR Calc   01/12/2014 12 (L) >60 mL/min/1.7m2 Final     GFR Estimate If Black   Date Value Ref Range Status   05/24/2016 13 (L) >60 mL/min/1.7m2 Final     Comment:      GFR Calc   02/10/2015 11 (L) >60 mL/min/1.7m2 Final     Comment:      GFR Calc   01/12/2014 15 (L) >60 mL/min/1.7m2 Final     Lab Results   Component Value Date    CHOL 105 07/11/2017     Lab Results   Component Value Date    HDL 50 07/11/2017     Lab Results   Component Value Date    LDL 44 07/11/2017     Lab Results   Component Value Date    TRIG 56  07/11/2017     Lab Results   Component Value Date    CHOLHDLRATIO 2.4 02/10/2015     Lab Results   Component Value Date    AST 17 07/11/2017     Lab Results   Component Value Date    ALT 15 11/28/2016     Lab Results   Component Value Date    A1C 5.6 11/29/2016    A1C 6.3 10/24/2016    A1C 5.3 07/25/2016    A1C 6.2 05/24/2016    A1C 5.4 04/25/2016     Potassium   Date Value Ref Range Status   11/28/2016 4.8 3.5 - 5.1 mmol/L Final     [facility]

## 2017-08-18 NOTE — TELEPHONE ENCOUNTER
Pt's wife is also asking about previous lab results. See notes below:  Notes Recorded by Stefanie Louis APRN CNP on 8/9/2017 at 7:05 AM  THS normal  HBG stable compared to [facility] labs however HBG is still low- please send out FIT screen  Follow up in 2 weeks in clinic    Pt's wife reports that they have not received a FIT test. We will send another test out.   Marilu Lemos RN

## 2017-08-20 DIAGNOSIS — E11.21 TYPE 2 DIABETES MELLITUS WITH DIABETIC NEPHROPATHY (H): ICD-10-CM

## 2017-08-21 NOTE — TELEPHONE ENCOUNTER
Please see note below,   Please send in mail for patient  Order completed in [facility]    Bhavana MOSS Rn

## 2017-08-22 ENCOUNTER — HOSPITAL ENCOUNTER (OUTPATIENT)
Dept: CARDIOLOGY | Facility: CLINIC | Age: 82
Discharge: HOME | End: 2017-08-22
Attending: NURSE PRACTITIONER | Admitting: NURSE PRACTITIONER
Payer: MEDICARE

## 2017-08-22 ENCOUNTER — TELEPHONE (OUTPATIENT)
Dept: FAMILY MEDICINE | Facility: CLINIC | Age: 82
End: 2017-08-22

## 2017-08-22 DIAGNOSIS — Z79.4 CONTROLLED TYPE 2 DIABETES MELLITUS WITH DIABETIC NEPHROPATHY, WITH LONG-TERM CURRENT USE OF INSULIN (H): ICD-10-CM

## 2017-08-22 DIAGNOSIS — E11.21 CONTROLLED TYPE 2 DIABETES MELLITUS WITH DIABETIC NEPHROPATHY, WITH LONG-TERM CURRENT USE OF INSULIN (H): ICD-10-CM

## 2017-08-22 DIAGNOSIS — R06.02 SOB (SHORTNESS OF BREATH): ICD-10-CM

## 2017-08-22 PROCEDURE — 93306 TTE W/DOPPLER COMPLETE: CPT | Mod: 26 | Performed by: INTERNAL MEDICINE

## 2017-08-22 PROCEDURE — 93306 TTE W/DOPPLER COMPLETE: CPT

## 2017-08-22 NOTE — TELEPHONE ENCOUNTER
Pharmacy calling for Clarification on novalog pen     Pen comes in 3ml per pen   15ml per box  Vital comes in 10ml    [facility]   The prescription sent  Was       insulin aspart (NOVOLOG FLEXPEN) 100 UNIT/ML injection 10 mL 11 2017  No   Si units before breakfast, 6 units before lunch, 4 units before dinner     They can not use pen for  RX  Per Pharmacy   Please call and advise  Jackie Medellin- CSS

## 2017-08-22 NOTE — TELEPHONE ENCOUNTER
Pharmacy asking for clarification on novalog pen as the dispensed amount on the prescription is for the novolog vial.  Order pended.    Helena Cr RN

## 2017-08-29 ENCOUNTER — OFFICE VISIT (OUTPATIENT)
Dept: DERMATOLOGY | Facility: CLINIC | Age: 82
End: 2017-08-29
Payer: MEDICARE

## 2017-08-29 VITALS — DIASTOLIC BLOOD PRESSURE: 62 MMHG | SYSTOLIC BLOOD PRESSURE: 142 MMHG | HEART RATE: 65 BPM | OXYGEN SATURATION: 97 %

## 2017-08-29 DIAGNOSIS — L81.4 LENTIGO: ICD-10-CM

## 2017-08-29 DIAGNOSIS — Z85.828 HISTORY OF SKIN CANCER: Primary | ICD-10-CM

## 2017-08-29 DIAGNOSIS — D18.00 ANGIOMA: ICD-10-CM

## 2017-08-29 DIAGNOSIS — L82.1 SK (SEBORRHEIC KERATOSIS): ICD-10-CM

## 2017-08-29 PROCEDURE — 99213 OFFICE O/P EST LOW 20 MIN: CPT | Performed by: DERMATOLOGY

## 2017-08-29 NOTE — PROGRESS NOTES
Griffin Walton is a 83 year old year old male patient here today for f/u hx of non-melanoma skin cancer.  Today he notes red spot on left calf.   .  Patient states this has been present for ?.  Patient reports the following symptoms:  none .  Patient reports the following previous treatments none.  Patient reports the following modifying factors none.  Associated symptoms: none.  Patient has no other skin complaints today.  Remainder of the HPI, Meds, PMH, Allergies, FH, and SH was reviewed in chart.    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     Type II or unspecified type diabetes mellitus without mention of complication, not stated as uncontrolled      Unspecified disorder of kidney and ureter        Past Surgical History:   Procedure Laterality Date     ABDOMEN SURGERY  2005    aortic aneurysm     CATARACT IOL, RT/LT  2/4/08    left eye - phacoemulsification w/ posterior chamber lens implantation combined w/ glaucoma filtering procedure     CREATE FISTULA ARTERIOVENOUS UPPER EXTREMITY  1/3/2012    Procedure:CREATE FISTULA ARTERIOVENOUS UPPER EXTREMITY; LEFT UPPER ARM ARTERIOVENOUS FISTULA; Surgeon:JENA PEARSON; Location: [facility]     SURGICAL HISTORY OF -   2/13/2004    Right knee arthroscopy     SURGICAL HISTORY OF -       Vocal cord biopsy-benign     SURGICAL HISTORY OF -   3/3/2005    AAA, left partial nephrectomy        Family History   Problem Relation Age of Onset     CANCER Father      Asophageal      CANCER  Brother      Throat      Other Cancer Sister      Lung        Social History     Social History     Marital status:      Spouse name: N/A     Number of children: N/A     Years of education: N/A     Occupational History     Paper  Retired     Social History Main Topics     Smoking status: Former Smoker     Packs/day: 1.00     Years: 55.00     Types: Cigarettes     Quit date: 1/1/2005     Smokeless tobacco: Never Used     Alcohol use No     Drug use: No     Sexual activity: Not Currently     Other Topics Concern     Parent/Sibling W/ Cabg, Mi Or Angioplasty Before 65f 55m? No     Social History Narrative       Outpatient Encounter Prescriptions as of 8/29/2017   Medication Sig Dispense Refill     insulin aspart (NOVOLOG FLEXPEN) 100 UNIT/ML injection 6 units before breakfast, 6 units before lunch, 4 units before dinner 15 mL 11     bumetanide (BUMEX) 1 MG tablet TAKE 1 TABLET (1 MG) BY MOUTH 2 TIMES DAILY 180 tablet 2     lovastatin (MEVACOR) 40 MG tablet TAKE 1 AND 1/2 TABLETS BY MOUTH ONCE A DAY WITH DINNER. NEED FASTING LAB WORK 135 tablet 3     clopidogrel (PLAVIX) 75 MG tablet TAKE ONE TABLET BY MOUTH ONE TIME DAILY  90 tablet 3     LOSARTAN POTASSIUM PO Take by mouth daily       blood glucose monitoring (ONE TOUCH ULTRA) test strip test 2 times daily  Profile Rx: patient will contact pharmacy when needed 60 each 11     insulin pen needle (NOVOFINE) 30G X 8 MM Use 3 daily or as directed. 300 each 2     fluticasone-salmeterol (ADVAIR DISKUS) 100-50 MCG/DOSE diskus inhaler Inhale 1 puff into the lungs every 12 hours 1 Inhaler 5     latanoprost (XALATAN) 0.005 % ophthalmic solution Place 1 drop into both eyes At Bedtime       insulin glargine (LANTUS) 100 UNIT/ML injection Inject 8 Units Subcutaneous At Bedtime 3 Month 1     blood glucose monitoring (NO BRAND SPECIFIED) meter device kit Use to test blood sugar 3 times daily or as directed. 1 kit 0     albuterol (2.5 MG/3ML) 0.083% nebulizer  "solution Take 1 vial (2.5 mg) by nebulization every 6 hours as needed for shortness of breath / dyspnea or wheezing 1 Box 11     terazosin (HYTRIN) 10 MG capsule Take 1 capsule (10 mg) by mouth At Bedtime 90 capsule 3     NIFEdipine ER (ADALAT CC) 90 MG TB24 Take 1 tablet (90 mg) by mouth daily 90 tablet 3     insulin syringe-needle U-100 31G X 5/16\" 0.5 ML Profile Rx: patient will contact pharmacy when needed 100 each 11     order for DME Equipment being ordered: Oxygen- HOME and portable. Georgie Coulter [facility] Medical Assistant Signed  Service date: 05/24/2016 10:48 AM       O2 Sat on Room air at rest:84%  O2 sat on room air with walk: 82-91%  O2 Sat on 1L of oxygen with walk 91-93%  O2 Sat on 2 L of Oxygen with walk 91-96%  O2 Sat on 1 L O2 at rest 94% 2 each prn     WARFARIN SODIUM PO Daily as directed per [facility]       order for DME Nebulizer machine Qty #1 1 Units 0     Acetaminophen (TYLENOL PO) Take 650 mg by mouth nightly as needed       NONFORMULARY Take 1 tablet by mouth At Bedtime Vitamin (Dialyvite) that he gets from [facility].       dorzolamide-timolol (COSOPT) 2-0.5 % ophthalmic solution Place 1 drop into both eyes 2 times daily       ORDER FOR DME Equipment being ordered: Walker with wheels and brakes, and a seat 1 each 0     azithromycin (ZITHROMAX) 250 MG tablet Two tablets first day, then one tablet daily for four days. (Patient not taking: Reported on 8/29/2017) 6 tablet 0     No facility-administered encounter medications on file as of 8/29/2017.              Review Of Systems  Skin: As above  Eyes: negative  Ears/Nose/Throat: negative  Respiratory: No shortness of breath, dyspnea on exertion, cough, or hemoptysis  Cardiovascular: negative  Gastrointestinal: negative  Genitourinary: negative  Musculoskeletal: negative  Neurologic: negative  Psychiatric: negative  Hematologic/Lymphatic/Immunologic: negative  Endocrine: negative      O:   NAD, WDWN, Alert & Oriented, Mood & " Affect wnl, Vitals stable   Here today alone   /62 (BP Location: Right arm, Patient Position: Chair, Cuff Size: Adult Regular)  Pulse 65  SpO2 97%   General appearance normal   Vitals stable   Alert, oriented and in no acute distress      Following lymph nodes palpated: Occipital, Cervical, Supraclavicular no lad   Stuck on papules and brown macules on trunk and ext    Red papules on trunk and L calf           The remainder of the full exam was unremarkable; the following areas were examined:  conjunctiva/lids, oral mucosa, neck, peripheral vascular system, abdomen, lymph nodes, digits/nails, eccrine and apocrine glands, scalp/hair, face, neck, chest, abdomen, buttocks, back, RUE, LUE, RLE, LLE       Eyes: Conjunctivae/lids:Normal     ENT: Lips, buccal mucosa, tongue: normal    MSK:Normal    Cardiovascular: peripheral edema none    Pulm: Breathing Normal    Lymph Nodes: No Head and Neck Lymphadenopathy     Neuro/Psych: Orientation:Normal; Mood/Affect:Normal      A/P:  1. Hx of non-melanoma skin cancer, seborrheic keratosis, lentigo, angioma  BENIGN LESIONS DISCUSSED WITH PATIENT:  I discussed the specifics of tumor, prognosis, and genetics of benign lesions.  I explained that treatment of these lesions would be purely cosmetic and not medically neccessary.  I discussed with patient different removal options including excision, cautery and /or laser.      Nature and genetics of benign skin lesions dicussed with patient.  Signs and Symptoms of skin cancer discussed with patient.  ABCDEs of melanoma reviewed with patient.  Patient encouraged to perform monthly skin exams.  UV precautions reviewed with patient.  Skin care regimen reviewed with patient: Eliminate harsh soaps, i.e. Dial, zest, irsih spring; Mild soaps such as Cetaphil or Dove sensitive skin, avoid hot or cold showers, aggressive use of emollients including vanicream, cetaphil or cerave discussed with patient.    Risks of non-melanoma skin cancer  discussed with patient   Return to clinic 12 months

## 2017-08-29 NOTE — NURSING NOTE
"Chief Complaint   Patient presents with     Derm Problem     6 month skin check       Initial /62 (BP Location: Right arm, Patient Position: Chair, Cuff Size: Adult Regular)  Pulse 65  SpO2 97% Estimated body mass index is 32.43 kg/(m^2) as calculated from the following:    Height as of 8/8/17: 1.689 m (5' 6.5\").    Weight as of 8/8/17: 92.5 kg (204 lb).  Medication Reconciliation: complete     Laura Viera CMA      "

## 2017-08-29 NOTE — MR AVS SNAPSHOT
After Visit Summary   8/29/2017    Griffin Walton    MRN: [id number]           Patient Information     Date Of Birth          5/18/1934        Visit Information        Provider Department      8/29/2017 10:15 AM Geraldo Ware MD [facility]        Today's Diagnoses     History of skin cancer    -  1    Lentigo        SK (seborrheic keratosis)        Angioma           Follow-ups after your visit        Your next 10 appointments already scheduled     Aug 31, 2017  9:20 AM CDT   SHORT with DANNA Lopez [facility] ([facility])    [address] [address]   [phone]            Feb 27, 2018 10:00 AM CST   Return Visit with Geraldo Ware MD   [facility] ([facility])    [address] [address]   [phone]              Who to contact     If you have questions or need follow up information about today's clinic visit or your schedule please contact [facility] directly at [phone].  Normal or non-critical lab and imaging results will be communicated to you by MyChart, letter or phone within 4 business days after the clinic has received the results. If you do not hear from us within 7 days, please contact the clinic through [facility] or phone. If you have a critical or abnormal lab result, we will notify you by phone as soon as possible.  Submit refill requests through [employer] or call your pharmacy and they will forward the refill request to us. Please allow 3 business days for your refill to be completed.          Additional Information About Your Visit        MyChart Information     [employer] gives you secure access to your electronic health record. If you see a primary care provider, you can also send messages to your care team and make appointments. If you have questions, please call your primary care clinic.  If you do not have a primary  care provider, please call [phone] and they will assist you.        Care EveryWhere ID     This is your Care EveryWhere ID. This could be used by other organizations to access your [facility] medical records  [id number]        Your Vitals Were     Pulse Pulse Oximetry                65 97%           Blood Pressure from Last 3 Encounters:   08/29/17 142/62   08/08/17 114/56   03/09/17 135/75    Weight from Last 3 Encounters:   08/08/17 92.5 kg (204 lb)   03/09/17 93 kg (205 lb)   12/26/16 92 kg (202 lb 13.2 oz)              Today, you had the following     No orders found for display       Primary Care Provider Office Phone # Fax #    DNANA Lopez [facility] [phone] [address] [address]        Equal Access to Services     ANUSHKA BILL : Hadii camila ku hadasho Sodaphneali, waaxda luqadaha, qaybta kaalmada adeegyada, renzo mitchell haykurt palacio . So [facility] [phone].    ATENCIÓN: Si habla español, tiene a lousi disposición servicios gratuitos de asistencia lingüística. Zarina al [phone].    We comply with applicable federal civil rights laws and [place] laws. We do not discriminate on the basis of race, color, national origin, age, disability sex, sexual orientation or gender identity.            Thank you!     Thank you for choosing [facility]  for your care. Our goal is always to provide you with excellent care. Hearing back from our patients is one way we can continue to improve our services. Please take a few minutes to complete the written survey that you may receive in the mail after your visit with us. Thank you!             Your Updated Medication List - Protect others around you: Learn how to safely use, store and throw away your medicines at www.disposemymeds.org.          This list is accurate as of: 8/29/17 12:51 PM.  Always use your most recent med list.                   Brand Name Dispense Instructions for use Diagnosis     albuterol (2.5 MG/3ML) 0.083% neb solution     1 Box    Take 1 vial (2.5 mg) by nebulization every 6 hours as needed for shortness of breath / dyspnea or wheezing    Chronic obstructive pulmonary disease, unspecified COPD type (H)       azithromycin 250 MG tablet    ZITHROMAX    6 tablet    Two tablets first day, then one tablet daily for four days.    Pneumonia of both lungs due to infectious organism, unspecified part of lung       blood glucose monitoring meter device kit    no brand specified    1 kit    Use to test blood sugar 3 times daily or as directed.    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)       dorzolamide-timolol 2-0.5 % ophthalmic solution    COSOPT     Place 1 drop into both eyes 2 times daily        fluticasone-salmeterol 100-50 MCG/DOSE diskus inhaler    ADVAIR DISKUS    1 Inhaler    Inhale 1 puff into the lungs every 12 hours    Chronic obstructive pulmonary disease, unspecified COPD type (H)       insulin aspart 100 UNIT/ML injection    NovoLOG FLEXPEN    15 mL    6 units before breakfast, 6 units before lunch, 4 units before dinner    Controlled type 2 diabetes mellitus with diabetic nephropathy, with long-term current use of insulin (H)       insulin glargine 100 UNIT/ML injection    LANTUS    3 Month    Inject 8 Units Subcutaneous At Bedtime    Type 2 diabetes mellitus with diabetic nephropathy (H)     "   insulin pen needle 30G X 8 MM    NOVOFINE    300 each    Use 3 daily or as directed.    Type 2 diabetes mellitus with diabetic nephropathy, unspecified long term insulin use status (H)       insulin syringe-needle U-100 31G X 5/16\" 0.5 ML     100 each    Profile Rx: patient will contact pharmacy when needed    Type 2 diabetes mellitus with diabetic nephropathy (H)       latanoprost 0.005 % ophthalmic solution    XALATAN     Place 1 drop into both eyes At Bedtime        LOSARTAN POTASSIUM PO      Take by mouth daily        lovastatin 40 MG tablet    MEVACOR    135 tablet    TAKE 1 AND 1/2 TABLETS BY MOUTH ONCE A DAY WITH DINNER. NEED FASTING LAB WORK    Hyperlipidemia LDL goal <130       NIFEdipine ER 90 MG Tb24    ADALAT CC    90 tablet    Take 1 tablet (90 mg) by mouth daily    Benign essential hypertension       * NONFORMULARY      Take 1 tablet by mouth At Bedtime Vitamin (Dialyvite) that he gets from [facility].        * order for DME     1 each    Equipment being ordered: Walker with wheels and brakes, and a seat    ESRD (end stage renal disease) on dialysis (H), Cerebral embolism with cerebral infarction (H)       order for DME     1 Units    Nebulizer machine Qty #1    Fever, unspecified, Cough, Chronic obstructive pulmonary disease, unspecified COPD type (H), Acute bronchospasm       order for DME     2 each    Equipment being ordered: Oxygen- HOME and portable. 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), Type 2 diabetes mellitus with diabetic nephropathy (H) "       terazosin 10 MG capsule    HYTRIN    90 capsule    Take 1 capsule (10 mg) by mouth At Bedtime    Benign non-nodular prostatic hyperplasia with lower urinary tract symptoms       TYLENOL PO      Take 650 mg by mouth nightly as needed        WARFARIN SODIUM PO      Daily as directed per [facility]        * Notice:  This list has 2 medication(s) that are the same as other medications prescribed for you. Read the directions carefully, and ask your doctor or other care provider to review them with you.

## 2017-08-31 ENCOUNTER — OFFICE VISIT (OUTPATIENT)
Dept: FAMILY MEDICINE | Facility: CLINIC | Age: 82
End: 2017-08-31
Payer: MEDICARE

## 2017-08-31 VITALS
HEIGHT: 66 IN | WEIGHT: 204 LBS | SYSTOLIC BLOOD PRESSURE: 134 MMHG | DIASTOLIC BLOOD PRESSURE: 64 MMHG | HEART RATE: 64 BPM | OXYGEN SATURATION: 97 % | BODY MASS INDEX: 32.78 KG/M2 | TEMPERATURE: 98.2 F

## 2017-08-31 DIAGNOSIS — I35.0 NONRHEUMATIC AORTIC VALVE STENOSIS: ICD-10-CM

## 2017-08-31 DIAGNOSIS — R09.02 HYPOXIA: ICD-10-CM

## 2017-08-31 DIAGNOSIS — D63.1 ANEMIA OF CHRONIC RENAL FAILURE, STAGE 5 (H): ICD-10-CM

## 2017-08-31 DIAGNOSIS — N18.5 ANEMIA OF CHRONIC RENAL FAILURE, STAGE 5 (H): ICD-10-CM

## 2017-08-31 DIAGNOSIS — D50.8 OTHER IRON DEFICIENCY ANEMIA: ICD-10-CM

## 2017-08-31 DIAGNOSIS — J43.8 OTHER EMPHYSEMA (H): Primary | ICD-10-CM

## 2017-08-31 PROBLEM — H54.8 LEGALLY BLIND IN RIGHT EYE, AS DEFINED IN USA: Status: ACTIVE | Noted: 2017-08-31

## 2017-08-31 LAB
ERYTHROCYTE [DISTWIDTH] IN BLOOD BY AUTOMATED COUNT: 15.1 % (ref 10–15)
FERRITIN SERPL-MCNC: 635 NG/ML (ref 26–388)
FOLATE SERPL-MCNC: 35.5 NG/ML
HCT VFR BLD AUTO: 29.7 % (ref 40–53)
HGB BLD-MCNC: 9.5 G/DL (ref 13.3–17.7)
IRON SERPL-MCNC: 68 UG/DL (ref 35–180)
MCH RBC QN AUTO: 30.4 PG (ref 26.5–33)
MCHC RBC AUTO-ENTMCNC: 32 G/DL (ref 31.5–36.5)
MCV RBC AUTO: 95 FL (ref 78–100)
PLATELET # BLD AUTO: 177 10E9/L (ref 150–450)
RBC # BLD AUTO: 3.12 10E12/L (ref 4.4–5.9)
RETICS # AUTO: 71.2 10E9/L (ref 25–95)
RETICS/RBC NFR AUTO: 2.2 % (ref 0.5–2)
VIT B12 SERPL-MCNC: 617 PG/ML (ref 193–986)
WBC # BLD AUTO: 8.3 10E9/L (ref 4–11)

## 2017-08-31 PROCEDURE — 85045 AUTOMATED RETICULOCYTE COUNT: CPT | Performed by: NURSE PRACTITIONER

## 2017-08-31 PROCEDURE — 99214 OFFICE O/P EST MOD 30 MIN: CPT | Performed by: NURSE PRACTITIONER

## 2017-08-31 PROCEDURE — 82728 ASSAY OF FERRITIN: CPT | Performed by: NURSE PRACTITIONER

## 2017-08-31 PROCEDURE — 83540 ASSAY OF IRON: CPT | Performed by: NURSE PRACTITIONER

## 2017-08-31 PROCEDURE — 36415 COLL VENOUS BLD VENIPUNCTURE: CPT | Performed by: NURSE PRACTITIONER

## 2017-08-31 PROCEDURE — 82274 ASSAY TEST FOR BLOOD FECAL: CPT | Performed by: NURSE PRACTITIONER

## 2017-08-31 PROCEDURE — 82746 ASSAY OF FOLIC ACID SERUM: CPT | Performed by: NURSE PRACTITIONER

## 2017-08-31 PROCEDURE — 85027 COMPLETE CBC AUTOMATED: CPT | Performed by: NURSE PRACTITIONER

## 2017-08-31 PROCEDURE — 82607 VITAMIN B-12: CPT | Performed by: NURSE PRACTITIONER

## 2017-08-31 RX ORDER — INSULIN ASPART 100 [IU]/ML
INJECTION, SOLUTION INTRAVENOUS; SUBCUTANEOUS
Qty: 15 ML | Refills: 2 | OUTPATIENT
Start: 2017-08-31

## 2017-08-31 NOTE — PATIENT INSTRUCTIONS
1. Labs today  2. Schedule CT scan    Thank you for choosing [facility].  You may be receiving a survey in the mail from [facility] regarding your visit today.  Please take a few minutes to complete and return the survey to let us know how we are doing.      If you have questions or concerns, please contact us via [facility] or you can contact your care team at [phone].    Our Clinic hours are:  Monday 6:40 am  to 7:00 pm  Tuesday -Friday 6:40 am to 5:00 pm    The [place] outpatient lab hours are:  Monday - Friday 6:10 am to 4:45 pm  Saturdays 7:00 am to 11:00 am  Appointments are required, call [phone]    If you have clinical questions after hours or would like to schedule an appointment,  call the clinic at [phone].

## 2017-08-31 NOTE — PROGRESS NOTES
SUBJECTIVE:   Griffin Walton is a 83 year old male who presents to clinic today for the following health issues:        Chief Complaint   Patient presents with     Shortness of Breath     Follow up brom 8/8/17 offoce visit, possible infection on cxr - given antibiotic, finished with med, hgb stable, patient states he is doing much better.     Patient is here today for 2 week follow up for worsening shortness of breath- overall patient is feeling slightly better however he continues to use his oxygen 24/7.   He denies chest pain or palpitations. Feels like he is able to walk longer distances than 2 weeks ago before stopping to rest.     History of chronic kidney disease- on dialysis.   History of COPD- uses 2 liters with sleep. On albuterol and Advair.   History of diabetes mellitus-   History of abdominal aortic aneurysm in 2005 with graft repair.  History of anemia      History of tobacco use- quit smoking 2005.     Chest x-ray 8/8/17  IMPRESSION: Mildly enlarged cardiac silhouette. Interstitial  prominence is seen bilaterally, nonspecific and may represent  fibrosis, atypical infection or mild pulmonary edema. No pleural  effusion or pneumothorax. Vascular stent projects over the left  Axilla.    ECHO 8/22/17:  The left ventricle is normal in size.  There is mild concentric left ventricular hypertrophy.  Left ventricular systolic function is normal.  The visual ejection fraction is estimated at 55-60%.  Grade II or moderate diastolic dysfunction.  The left atrium is severely dilated.  There is mild (1+) mitral regurgitation.  There is mild (1+) aortic regurgitation.  Mild to moderate valvular aortic stenosis.     Since the study on 1/12/2014, the mean AV gradient has increased from 19 to 25  MmHg      Problem list and histories reviewed & adjusted, as indicated.  Additional history: as documented    Patient Active Problem List   Diagnosis     Essential Hypertension, Benign     Gouty arthropathy     Disorder  of bursae and tendons in shoulder region     Malignant neoplasm of kidney excluding renal pelvis (H)     Abdominal aortic aneurysm (H)     Proteinuria     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)     Anemia     Hyperlipidemia LDL goal <70     Anxiety disorder due to medical condition     Cerebral embolism with cerebral infarction (H)     zAdvanced directives, counseling/discussion     Health Care Home     Seborrheic keratosis     Leg edema, right     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)     Thrombocytopenia, primary (H)     Hypotension     Glaucoma     Controlled type 2 diabetes mellitus with diabetic nephropathy, with long-term current use of insulin (H)     Chronic obstructive pulmonary disease, unspecified COPD type (H)     Legally blind in right eye, as defined in [place]     Past Surgical History:   Procedure Laterality Date     ABDOMEN SURGERY  2005    aortic aneurysm     CATARACT IOL, RT/LT  2/4/08    left eye - phacoemulsification w/ posterior chamber lens implantation combined w/ glaucoma filtering procedure     CREATE FISTULA ARTERIOVENOUS UPPER EXTREMITY  1/3/2012    Procedure:CREATE FISTULA ARTERIOVENOUS UPPER EXTREMITY; LEFT UPPER ARM ARTERIOVENOUS FISTULA; Surgeon:JENA PEARSON; Location:[facility]     SURGICAL HISTORY OF -   2/13/2004    Right knee arthroscopy     SURGICAL HISTORY OF -       Vocal cord biopsy-benign     SURGICAL HISTORY OF -   3/3/2005    AAA, left partial nephrectomy       Social History   Substance Use Topics     Smoking status: Former Smoker     Packs/day: 1.00     Years: 55.00     Types: Cigarettes     Quit date: 1/1/2005     Smokeless tobacco: Never Used     Alcohol use No     Family History   Problem Relation Age of Onset     CANCER Father      Asophageal      CANCER Brother      Throat      Other Cancer Sister      Lung              Reviewed and updated as needed this visit by clinical staffTobacco  Allergies  Med Hx  Surg Hx  Fam Hx  Soc Hx     "  Reviewed and updated as needed this visit by Provider         ROS:  Constitutional, HEENT, cardiovascular, pulmonary, GI, , musculoskeletal, neuro, skin, endocrine and psych systems are negative, except as otherwise noted.      OBJECTIVE:   /64 (BP Location: Right arm, Patient Position: Chair, Cuff Size: Adult Large)  Pulse 64  Temp 98.2  F (36.8  C) (Tympanic)  Ht 5' 5.5\" (1.664 m)  Wt 204 lb (92.5 kg)  SpO2 97%  BMI 33.43 kg/m2  Body mass index is 33.43 kg/(m^2).  GENERAL: healthy, alert and no distress  RESP: lungs clear to auscultation - no rales, rhonchi or wheezes  CV: regular rate and rhythm, normal S1 S2, no S3 or S4, no murmur, click or rub, no peripheral edema and peripheral pulses strong  MS: no gross musculoskeletal defects noted, no edema    Diagnostic Test Results:  none     ASSESSMENT/PLAN:       1. Other emphysema (H)  History of COPD- however patient having increasing shortness of breath- chest x-ray was stable  - CT Chest w/o Contrast; Future    2. Hypoxia  Needing to use oxygen 24/7 ranging from 2-4 liters  - CT Chest w/o Contrast; Future    3. Anemia of chronic renal failure, stage 5 (H)    - CBC with platelets  - Ferritin  - IRON  - Reticulocyte count  - Vitamin B12  - Folate  - ONC/HEME ADULT REFERRAL    4. Nonrheumatic aortic valve stenosis    - CARDIOLOGY EVAL ADULT REFERRAL      Follow up to discuss CT scan results     DANNA Lopez [facility]  "

## 2017-08-31 NOTE — NURSING NOTE
"Chief Complaint   Patient presents with     Shortness of Breath     Follow up brom 8/8/17 offoce visit, possible infection on cxr - given antibiotic, finished with med, hgb stable, patient states he is doing much better.       Initial /64 (BP Location: Right arm, Patient Position: Chair, Cuff Size: Adult Large)  Pulse 64  Temp 98.2  F (36.8  C) (Tympanic)  Ht 5' 5.5\" (1.664 m)  Wt 204 lb (92.5 kg)  SpO2 97%  BMI 33.43 kg/m2 Estimated body mass index is 33.43 kg/(m^2) as calculated from the following:    Height as of this encounter: 5' 5.5\" (1.664 m).    Weight as of this encounter: 204 lb (92.5 kg).  Medication Reconciliation: complete  "

## 2017-08-31 NOTE — MR AVS SNAPSHOT
After Visit Summary   8/31/2017    Griffin Walton    MRN: [id number]           Patient Information     Date Of Birth          5/18/1934        Visit Information        Provider Department      8/31/2017 9:20 AM Stefanie Louis APRN CNP [facility]        Today's Diagnoses     Other emphysema (H)    -  1    Hypoxia        Anemia of chronic renal failure, stage 5 (H)          Care Instructions      1. Labs today  2. Schedule CT scan    Thank you for choosing [facility].  You may be receiving a survey in the mail from [employer] regarding your visit today.  Please take a few minutes to complete and return the survey to let us know how we are doing.      If you have questions or concerns, please contact us via [employer] or you can contact your care team at [phone].    Our Clinic hours are:  Monday 6:40 am  to 7:00 pm  Tuesday -Friday 6:40 am to 5:00 pm    The [place] outpatient lab hours are:  Monday - Friday 6:10 am to 4:45 pm  Saturdays 7:00 am to 11:00 am  Appointments are required, call [phone]    If you have clinical questions after hours or would like to schedule an appointment,  call the clinic at [phone].            Follow-ups after your visit        Your next 10 appointments already scheduled     Feb 27, 2018 10:00 AM CST   [facility] Visit with [facility]eraldo Ware MD   [facility]    [address] [address]   [phone]              Future tests that were ordered for you today     Open Future Orders        Priority Expected Expires Ordered    CT Chest w/o Contrast Routine  8/31/2018 8/31/2017            Who to contact     If you have questions or need follow up information about today's clinic visit or your schedule please contact [facility] directly at [phone].  Normal or non-critical lab and imaging results will be communicated to you by MyChart, letter or phone within 4  "business days after the clinic has received the results. If you do not hear from us within 7 days, please contact the clinic through [employer] or phone. If you have a critical or abnormal lab result, we will notify you by phone as soon as possible.  Submit refill requests through [employer] or call your pharmacy and they will forward the refill request to us. Please allow 3 business days for your refill to be completed.          Additional Information About Your Visit        [employer] Information     [employer] gives you secure access to your electronic health record. If you see a primary care provider, you can also send messages to your care team and make appointments. If you have questions, please call your primary care clinic.  If you do not have a primary care provider, please call [phone] and they will assist you.        Care EveryWhere ID     This is your Care EveryWhere ID. This could be used by other organizations to access your [place] medical records  [id number]        Your Vitals Were     Pulse Temperature Height Pulse Oximetry BMI (Body Mass Index)       64 98.2  F (36.8  C) (Tympanic) 5' 5.5\" (1.664 m) 97% 33.43 kg/m2        Blood Pressure from Last 3 Encounters:   08/31/17 134/64   08/29/17 142/62   08/08/17 114/56    Weight from Last 3 Encounters:   08/31/17 204 lb (92.5 kg)   08/08/17 204 lb (92.5 kg)   03/09/17 205 lb (93 kg)              We Performed the Following     CBC with platelets     Ferritin     Folate     IRON     Reticulocyte count     Vitamin B12        Primary Care Provider Office Phone # Fax #    Stefanie Guillen DANNA Louis [phone] [address] [address]        Equal Access to Services     [facility] Hospital[facility]ANESSA : Hadii camila mejia Somichael, waaxda luqadaha, qaybta kaalmada adeegyada, renzo correa. So [facility] [phone].    ATENCIÓN: Si habla español, tiene a louis disposición servicios gratuitos de asistencia lingüística. Llame al " [phone].    We comply with applicable federal civil rights laws and [place] laws. We do not discriminate on the basis of race, color, national origin, age, disability sex, sexual orientation or gender identity.            Thank you!     Thank you for choosing [facility]  for your care. Our goal is always to provide you with excellent care. Hearing back from our patients is one way we can continue to improve our services. Please take a few minutes to complete the written survey that you may receive in the mail after your visit with us. Thank you!             Your Updated Medication List - Protect others around you: Learn how to safely use, store and throw away your medicines at www.disposemymeds.org.          This list is accurate as of: 8/31/17  9:56 AM.  Always use your most recent med list.                   Brand Name Dispense Instructions for use Diagnosis    albuterol (2.5 MG/3ML) 0.083% neb solution     1 Box    Take 1 vial (2.5 mg) by nebulization every 6 hours as needed for shortness of breath / dyspnea or wheezing    Chronic obstructive pulmonary disease, unspecified COPD type (H)       blood glucose monitoring meter device kit    no brand specified    1 kit    Use to test blood sugar 3 times daily or as directed.    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)       dorzolamide-timolol 2-0.5 %  "ophthalmic solution    COSOPT     Place 1 drop into both eyes 2 times daily        fluticasone-salmeterol 100-50 MCG/DOSE diskus inhaler    ADVAIR DISKUS    1 Inhaler    Inhale 1 puff into the lungs every 12 hours    Chronic obstructive pulmonary disease, unspecified COPD type (H)       insulin aspart 100 UNIT/ML injection    NovoLOG FLEXPEN    15 mL    6 units before breakfast, 6 units before lunch, 4 units before dinner    Controlled type 2 diabetes mellitus with diabetic nephropathy, with long-term current use of insulin (H)       insulin glargine 100 UNIT/ML injection    LANTUS    3 Month    Inject 8 Units Subcutaneous At Bedtime    Type 2 diabetes mellitus with diabetic nephropathy (H)       insulin pen needle 30G X 8 MM    NOVOFINE    300 each    Use 3 daily or as directed.    Type 2 diabetes mellitus with diabetic nephropathy, unspecified long term insulin use status (H)       insulin syringe-needle U-100 31G X 5/16\" 0.5 ML     100 each    Profile Rx: patient will contact pharmacy when needed    Type 2 diabetes mellitus with diabetic nephropathy (H)       latanoprost 0.005 % ophthalmic solution    XALATAN     Place 1 drop into both eyes At Bedtime        LOSARTAN POTASSIUM PO      Take by mouth daily        lovastatin 40 MG tablet    MEVACOR    135 tablet    TAKE 1 AND 1/2 TABLETS BY MOUTH ONCE A DAY WITH DINNER. NEED FASTING LAB WORK    Hyperlipidemia LDL goal <130       NIFEdipine ER 90 MG Tb24    ADALAT CC    90 tablet    Take 1 tablet (90 mg) by mouth daily    Benign essential hypertension       * NONFORMULARY      Take 1 tablet by mouth At Bedtime Vitamin (Dialyvite) that he gets from [facility] dialysis [place].        * order for DME     1 each    Equipment being ordered: Walker with wheels and brakes, and a seat    ESRD (end stage renal disease) on dialysis (H), Cerebral embolism with cerebral infarction (H)       order for DME     1 Units    Nebulizer machine Qty #1    Fever, unspecified, Cough, Chronic " obstructive pulmonary disease, unspecified COPD type (H), Acute bronchospasm       order for DME     2 each    Equipment being ordered: Oxygen- HOME and portable. 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), Type 2 diabetes mellitus with diabetic nephropathy (H)       terazosin 10 MG capsule    HYTRIN    90 capsule    Take 1 capsule (10 mg) by mouth At Bedtime    Benign non-nodular prostatic hyperplasia with lower urinary tract symptoms       TYLENOL PO      Take 650 mg by mouth nightly as needed        WARFARIN SODIUM PO      Daily as directed per [facility]madin Clinic        * Notice:  This list has 2 medication(s) that are the same as other medications prescribed for you. Read the directions carefully, and ask your doctor or other care provider to review them with you.

## 2017-09-01 DIAGNOSIS — Z12.11 SPECIAL SCREENING FOR MALIGNANT NEOPLASMS, COLON: ICD-10-CM

## 2017-09-01 PROCEDURE — 82274 ASSAY TEST FOR BLOOD FECAL: CPT | Performed by: NURSE PRACTITIONER

## 2017-09-04 LAB
HEMOCCULT STL QL IA: NEGATIVE
HEMOCCULT STL QL IA: NEGATIVE

## 2017-09-08 ENCOUNTER — HOSPITAL ENCOUNTER (OUTPATIENT)
Dept: CT IMAGING | Facility: CLINIC | Age: 82
Discharge: HOME | End: 2017-09-08
Attending: NURSE PRACTITIONER | Admitting: NURSE PRACTITIONER
Payer: MEDICARE

## 2017-09-08 DIAGNOSIS — R09.02 HYPOXIA: ICD-10-CM

## 2017-09-08 DIAGNOSIS — J43.8 OTHER EMPHYSEMA (H): ICD-10-CM

## 2017-09-08 PROCEDURE — 71250 CT THORAX DX C-: CPT

## 2017-09-11 DIAGNOSIS — J84.9 ILD (INTERSTITIAL LUNG DISEASE) (H): Primary | ICD-10-CM

## 2017-09-20 DIAGNOSIS — J84.9 ILD (INTERSTITIAL LUNG DISEASE) (H): Primary | ICD-10-CM

## 2017-09-27 ENCOUNTER — TELEPHONE (OUTPATIENT)
Dept: FAMILY MEDICINE | Facility: CLINIC | Age: 82
End: 2017-09-27

## 2017-09-27 DIAGNOSIS — J44.9 CHRONIC OBSTRUCTIVE PULMONARY DISEASE, UNSPECIFIED COPD TYPE (H): ICD-10-CM

## 2017-09-28 RX ORDER — ALBUTEROL SULFATE 0.83 MG/ML
SOLUTION RESPIRATORY (INHALATION)
Qty: 1 BOX | Refills: 11 | Status: SHIPPED | OUTPATIENT
Start: 2017-09-28 | End: 2017-09-28

## 2017-09-28 NOTE — TELEPHONE ENCOUNTER
albuterol (2.5 MG/3ML) 0.083% nebulizer solution       Last Written Prescription Date: 9/20/16  Last Fill Quantity: 1, # refills: 11    Last Office Visit with G, P or [facility] prescribing provider:  8/31/17   Future Office Visit:       Date of Last Asthma Action Plan Letter:   There are no preventive care reminders to display for this patient.   Asthma Control Test: No flowsheet data found.    Date of Last Spirometry Test:   No results found for this or any previous visit.

## 2017-09-28 NOTE — TELEPHONE ENCOUNTER
Routing refill request to provider for review/approval because:  Drug interaction warning    Routing to provider.    Bhavana MOSS Rn

## 2017-09-28 NOTE — TELEPHONE ENCOUNTER
[facility] calling wondering about qty. She states there are 2 box sizes one that is 25 vials which would last 6 days and 63 which would last 15 days. She needs to know what size box you want.     Ruby [place]  [facility]

## 2017-10-02 RX ORDER — ALBUTEROL SULFATE 0.83 MG/ML
SOLUTION RESPIRATORY (INHALATION)
Qty: 63 VIAL | Refills: 11 | Status: SHIPPED | OUTPATIENT
Start: 2017-10-02 | End: 2018-11-07

## 2017-10-04 ENCOUNTER — TELEPHONE (OUTPATIENT)
Dept: FAMILY MEDICINE | Facility: CLINIC | Age: 82
End: 2017-10-04

## 2017-10-04 NOTE — TELEPHONE ENCOUNTER
Called patient and left message to return call. JANESSA Louis wanted the patient to be informed of two referrals that were placed. 1) Cardiology- his Echo was normal but she recommends that he should  follow up with them 2) Hematology- he has been Anemic so she would like him to see them for evaluation.  FYI- the patient might need help arranging these appointments  Georgie Coulter

## 2017-10-04 NOTE — TELEPHONE ENCOUNTER
Patient returned our call and I gave him the message, I also helped him with phone numbers for the Cardiology Clinic here and Hematology Here.  Susan [facility]  [facility]

## 2017-10-23 ENCOUNTER — TRANSFERRED RECORDS (OUTPATIENT)
Dept: HEALTH INFORMATION MANAGEMENT | Facility: CLINIC | Age: 82
End: 2017-10-23

## 2017-10-24 ENCOUNTER — MYC MEDICAL ADVICE (OUTPATIENT)
Dept: FAMILY MEDICINE | Facility: CLINIC | Age: 82
End: 2017-10-24

## 2017-10-26 ENCOUNTER — TRANSFERRED RECORDS (OUTPATIENT)
Dept: HEALTH INFORMATION MANAGEMENT | Facility: CLINIC | Age: 82
End: 2017-10-26

## 2017-10-26 ENCOUNTER — ONCOLOGY VISIT (OUTPATIENT)
Dept: ONCOLOGY | Facility: CLINIC | Age: 82
End: 2017-10-26
Attending: INTERNAL MEDICINE
Payer: MEDICARE

## 2017-10-26 VITALS
SYSTOLIC BLOOD PRESSURE: 138 MMHG | HEART RATE: 73 BPM | WEIGHT: 205.9 LBS | BODY MASS INDEX: 32.31 KG/M2 | RESPIRATION RATE: 12 BRPM | TEMPERATURE: 98.9 F | DIASTOLIC BLOOD PRESSURE: 85 MMHG | OXYGEN SATURATION: 95 % | HEIGHT: 67 IN

## 2017-10-26 DIAGNOSIS — C64.9 MALIGNANT NEOPLASM OF KIDNEY EXCLUDING RENAL PELVIS, UNSPECIFIED LATERALITY (H): Primary | ICD-10-CM

## 2017-10-26 DIAGNOSIS — D63.1 ANEMIA IN STAGE 5 CHRONIC KIDNEY DISEASE, NOT ON CHRONIC DIALYSIS (H): ICD-10-CM

## 2017-10-26 DIAGNOSIS — N18.5 ANEMIA IN STAGE 5 CHRONIC KIDNEY DISEASE, NOT ON CHRONIC DIALYSIS (H): ICD-10-CM

## 2017-10-26 PROCEDURE — 99204 OFFICE O/P NEW MOD 45 MIN: CPT | Performed by: INTERNAL MEDICINE

## 2017-10-26 PROCEDURE — 99211 OFF/OP EST MAY X REQ PHY/QHP: CPT

## 2017-10-26 RX ORDER — LORAZEPAM 0.5 MG
1 TABLET ORAL DAILY
COMMUNITY

## 2017-10-26 ASSESSMENT — PAIN SCALES - GENERAL: PAINLEVEL: NO PAIN (0)

## 2017-10-26 NOTE — MR AVS SNAPSHOT
After Visit Summary   10/26/2017    Griffin Walton    MRN: [id number]           Patient Information     Date Of Birth          5/18/1934        Visit Information        Provider Department      10/26/2017 2:30 PM Jessica Corral MD [facility] ONCOLOGY       Follow-ups after your visit        Follow-up notes from your care team     Return if symptoms worsen or fail to improve.      Your next 10 appointments already scheduled     Nov 02, 2017  2:00 PM CDT   New Visit with Mac Perla MD   [facility] PHYSICIAN HEART AT [facility] ([facility])    [address] [place] [address]   [phone]            Nov 09, 2017  9:30 AM CST   SIX MINUTE WALK with UC PFL B, UC PFL 6 MINUTE WALK 2   [facility] Pulmonary Function Testing ([facility])    [address] [place] [address]   [phone]            Nov 09, 2017 10:30 AM CST   (Arrive by 10:15 AM)   New Interstitial Lung with David Morris Perlman, MD   [facility] ([facility])    [address] [place] [address]   [phone]            Feb 27, 2018 10:00 AM CST   Return Visit with Geraldo Ware MD   [facility]    [address] [place] [id number] [phone]              Who to contact     If you have questions or need follow up information about today's clinic visit or your schedule please contact [facility] [place] directly at [phone].  Normal or non-critical lab and imaging results will be communicated to you by MyChart, letter or phone within 4 business days after the clinic has received the results. If you do not hear from us within 7 days, please contact the clinic through MyChart or phone. If you have a critical or abnormal lab result, we will notify you by phone as soon as  "possible.  Submit refill requests through [employer] or call your pharmacy and they will forward the refill request to us. Please allow 3 business days for your refill to be completed.          Additional Information About Your Visit        [employer] Information     [employer] gives you secure access to your electronic health record. If you see a primary care provider, you can also send messages to your care team and make appointments. If you have questions, please call your primary care clinic.  If you do not have a primary care provider, please call [phone] and they will assist you.        Care EveryWhere ID     This is your Care EveryWhere ID. This could be used by other organizations to access your [place] medical records  [id number]        Your Vitals Were     Pulse Temperature Respirations Height Pulse Oximetry BMI (Body Mass Index)    73 98.9  F (37.2  C) (Tympanic) 12 1.695 m (5' 6.75\") 95% 32.49 kg/m2       Blood Pressure from Last 3 Encounters:   10/26/17 138/85   08/31/17 134/64   08/29/17 142/62    Weight from Last 3 Encounters:   10/26/17 93.4 kg (205 lb 14.4 oz)   08/31/17 92.5 kg (204 lb)   08/08/17 92.5 kg (204 lb)              Today, you had the following     No orders found for display       Primary Care Provider Office Phone # Fax #    DANNA Lopez [phone] [address] [facility] [facility] [address]        Equal Access to Services     ANUSHKA BILL : Hadii aad ku hadasho Soomaali, waaxda luqadaha, qaybta kaalmada adeegyada, renzo palacio . So [facility] [phone].    ATENCIÓN: Si habla español, tiene a louis disposición servicios gratuitos de asistencia lingüística. Llame al [phone].    We comply with applicable federal civil rights laws and [place] laws. We do not discriminate on the basis of race, color, national origin, age, disability, sex, sexual orientation, or gender identity.            Thank you!     Thank you for choosing [facility]  for your care. Our goal is always to provide you with excellent care. Hearing back from our patients is one way we can continue to improve our services. Please take a few minutes to complete the written survey that you may receive in the mail after your visit with us. Thank you!             Your Updated Medication List - Protect others around you: Learn how to safely use, store and throw away your medicines at www.disposemymeds.org.          This list is accurate as of: 10/26/17  3:27 PM.  Always use your most recent med list.                   Brand Name Dispense Instructions for use Diagnosis    ADVAIR DISKUS 100-50 MCG/DOSE diskus inhaler   Generic drug:  fluticasone-salmeterol     3 Inhaler    inhale 1 puff into the lungs every 12 hours.    Chronic obstructive pulmonary disease, unspecified COPD type (H)       albuterol (2.5 MG/3ML) 0.083% neb solution     63 vial    TAKE 1 VIAL (2.5 MG) BY NEBULIZATION EVERY 6 HOURS AS NEEDED FOR SHORTNESS OF BREATH / DYSPNEA OR WHEEZING    Chronic obstructive pulmonary disease, unspecified COPD type (H)       blood glucose monitoring meter device kit    no brand specified    1 kit    Use to test blood sugar 3 times daily or as directed.    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)       dorzolamide-timolol 2-0.5 % ophthalmic solution    COSOPT     Place 1 drop into both eyes  "2 times daily        insulin aspart 100 UNIT/ML injection    NovoLOG FLEXPEN    15 mL    6 units before breakfast, 6 units before lunch, 4 units before dinner    Controlled type 2 diabetes mellitus with diabetic nephropathy, with long-term current use of insulin (H)       insulin glargine 100 UNIT/ML injection    LANTUS    3 Month    Inject 11 Units Subcutaneous At Bedtime    Type 2 diabetes mellitus with diabetic nephropathy (H)       insulin pen needle 30G X 8 MM    NOVOFINE    300 each    Use 3 daily or as directed.    Type 2 diabetes mellitus with diabetic nephropathy, unspecified long term insulin use status (H)       insulin syringe-needle U-100 31G X 5/16\" 0.5 ML     100 each    Profile Rx: patient will contact pharmacy when needed    Type 2 diabetes mellitus with diabetic nephropathy (H)       latanoprost 0.005 % ophthalmic solution    XALATAN     Place 1 drop into both eyes At Bedtime        lovastatin 40 MG tablet    MEVACOR    135 tablet    TAKE 1 AND 1/2 TABLETS BY MOUTH ONCE A DAY WITH DINNER. NEED FASTING LAB WORK    Hyperlipidemia LDL goal <130       NIFEdipine ER 90 MG Tb24    ADALAT CC    90 tablet    Take 1 tablet (90 mg) by mouth daily    Benign essential hypertension       * NONFORMULARY      Take 1 tablet by mouth At Bedtime Vitamin (Dialyvite) that he gets from [facility] dialysis [place].        * order for DME     1 each    Equipment being ordered: Walker with wheels and brakes, and a seat    ESRD (end stage renal disease) on dialysis (H), Cerebral embolism with cerebral infarction (H)       order for DME     1 Units    Nebulizer machine Qty #1    Fever, unspecified, Cough, Chronic obstructive pulmonary disease, unspecified COPD type (H), Acute bronchospasm       order for DME     2 each    Equipment being ordered: Oxygen- HOME and portable. Georgie Coulter CMA Medical Assistant Signed  Service date: 05/24/2016 10:48 AM     O2 Sat on Room air at rest:84% O2 sat on room air with walk: 82-91% O2 " Sat on 1L of oxygen with walk 91-93% O2 Sat on 2 L of Oxygen with walk 91-96% O2 Sat on 1 L O2 at rest 94%    Cough, Hypoxia, Chronic obstructive pulmonary disease, unspecified COPD type (H), Chronic obstructive pulmonary disease with acute exacerbation (H), 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), Type 2 diabetes mellitus with diabetic nephropathy (H)       TART CHERRY ADVANCED Caps      Take 1 capsule by mouth daily        terazosin 10 MG capsule    HYTRIN    90 capsule    TAKE 1 CAPSULE (10 MG) BY MOUTH AT BEDTIME    Benign non-nodular prostatic hyperplasia with lower urinary tract symptoms       TYLENOL PO      Take 650 mg by mouth nightly as needed        WARFARIN SODIUM PO      Daily as directed per Coumadin Clinic        * Notice:  This list has 2 medication(s) that are the same as other medications prescribed for you. Read the directions carefully, and ask your doctor or other care provider to review them with you.

## 2017-10-26 NOTE — PATIENT INSTRUCTIONS
We would like to see you back in clinic with Dr. Corral as needed if symptoms worsen or fail to improve. Copy of appointments, and after visit summary (AVS) given to patient.  If you have any questions during business hours (M-F 8 AM- 4PM), please call Annel Sheriff RN, BSN, OCN Oncology Hematology /Breast Cancer Navigator at [facility] [phone].   For questions after business hours, or on holidays/weekends, please call our after hours Nurse Triage line [phone]. Thank you.

## 2017-10-26 NOTE — NURSING NOTE
"Oncology Rooming Note    October 26, 2017 2:49 PM   Griffin Walton is a 83 year old male who presents for:    Chief Complaint   Patient presents with     Hematology     New Patient - Anemia      Initial Vitals: /59 (BP Location: Right arm, Patient Position: Sitting, Cuff Size: Adult Regular)  Pulse 74  Temp 98.9  F (37.2  C) (Tympanic)  Resp 12  Ht 1.695 m (5' 6.75\")  Wt 93.4 kg (205 lb 14.4 oz)  SpO2 95%  BMI 32.49 kg/m2 Estimated body mass index is 32.49 kg/(m^2) as calculated from the following:    Height as of this encounter: 1.695 m (5' 6.75\").    Weight as of this encounter: 93.4 kg (205 lb 14.4 oz). Body surface area is 2.1 meters squared.  No Pain (0) Comment: Data Unavailable   No LMP for male patient.  Allergies reviewed: Yes  Medications reviewed: Yes    Medications: Medication refills not needed today.  Pharmacy name entered into [facility]:    [facility] - [place], [place] - [address] [id number] - [place], [place] - [address] [place], [place]    Clinical concerns New Patient - Anemia     10 minutes for nursing intake (face to face time)     Shana Snowden CMA              "

## 2017-10-27 NOTE — PROGRESS NOTES
DATE OF VISIT: Oct 26, 2017    REASON FOR REFERRAL: Management of anemia    CHIEF COMPLAINT:   Chief Complaint   Patient presents with     Hematology     New Patient - Anemia        HISTORY OF PRESENT ILLNESS:   This is a 83-year-old gentleman with a long history of diabetes, diabetic nephropathy and has been on dialysis for the last 5 years. He was recently found to be anemic. His hemoglobin was 9.5 on August 31, 2017. His MCV although 95. His total white count of 8.3 and platelet count of 171. Stool for occult blood was repeated with came back negative. Patient currently on dialysis 3 times a week. His been getting Neupogen with his dialysis. Most recent hemoglobin during dialysis was 10.6 and September 30, 2017 11.1 on October 14, 2017. He has been receiving Epogen 30,000 units 3 times a week. Patient has been feeling well without any recent complaints of nausea or vomiting or diarrhea. He denies any bruising or bleeding. Denies any fever or chills. Denies any recent weight loss or poor appetite.    REVIEW OF SYSTEMS:   Constitutional: Negative for fever, chills, and night sweats.  Skin: negative.  Eyes: negative.  Ears/Nose/Throat: negative.  Respiratory: No shortness of breath, dyspnea on exertion, cough, or hemoptysis.  Cardiovascular: negative.  Gastrointestinal: negative.  Genitourinary: negative.  Musculoskeletal: negative.  Neurologic: negative.  Psychiatric: negative.  Hematologic/Lymphatic/Immunologic: negative.  Endocrine: negative.    PAST MEDICAL HISTORY:   Past Medical History:   Diagnosis Date     Abdominal aneurysm without mention of rupture     s/p open repair 2005     Atherosclerosis of renal artery (H)      Basal cell carcinoma      Essential hypertension, benign      Gout, unspecified      Malignant neoplasm of kidney, except pelvis 2005    papillary carcinoma, s/p partial left nephrectomy     Other and unspecified hyperlipidemia      Other peripheral vascular disease(443.89) 2005    s/p  aorto-right common femoral; left external iliac bypass with graft.     Type II or unspecified type diabetes mellitus without mention of complication, not stated as uncontrolled      Unspecified disorder of kidney and ureter        PAST SURGICAL HISTORY:   Past Surgical History:   Procedure Laterality Date     ABDOMEN SURGERY  2005    aortic aneurysm     CATARACT IOL, RT/LT  2/4/08    left eye - phacoemulsification w/ posterior chamber lens implantation combined w/ glaucoma filtering procedure     CREATE FISTULA ARTERIOVENOUS UPPER EXTREMITY  1/3/2012    Procedure:CREATE FISTULA ARTERIOVENOUS UPPER EXTREMITY; LEFT UPPER ARM ARTERIOVENOUS FISTULA; Surgeon:JENA PEARSON; Location:[facility]     SURGICAL HISTORY OF -   2/13/2004    Right knee arthroscopy     SURGICAL HISTORY OF -       Vocal cord biopsy-benign     SURGICAL HISTORY OF -   3/3/2005    AAA, left partial nephrectomy       ALLERGIES:   Allergies as of 10/26/2017 - Bud as Reviewed 10/26/2017   Allergen Reaction Noted     Hydralazine Shortness Of Breath and Swelling 04/13/2010     Aspirin Swelling 03/24/2005     Heparin flush Other (See Comments) 01/08/2014     Monosodium glutamate Swelling 01/12/2012       MEDICATIONS:   Current Outpatient Prescriptions   Medication Sig Dispense Refill     Misc Natural Products (TART CHERRY ADVANCED) CAPS Take 1 capsule by mouth daily       terazosin (HYTRIN) 10 MG capsule TAKE 1 CAPSULE (10 MG) BY MOUTH AT BEDTIME 90 capsule 3     albuterol (2.5 MG/3ML) 0.083% neb solution TAKE 1 VIAL (2.5 MG) BY NEBULIZATION EVERY 6 HOURS AS NEEDED FOR SHORTNESS OF BREATH / DYSPNEA OR WHEEZING 63 vial 11     ADVAIR DISKUS 100-50 MCG/DOSE diskus inhaler inhale 1 puff into the lungs every 12 hours. 3 Inhaler 3     insulin aspart (NOVOLOG FLEXPEN) 100 UNIT/ML injection 6 units before breakfast, 6 units before lunch, 4 units before dinner 15 mL 11     bumetanide (BUMEX) 1 MG tablet TAKE 1 TABLET (1 MG) BY MOUTH 2 TIMES DAILY 180 tablet 2      "lovastatin (MEVACOR) 40 MG tablet TAKE 1 AND 1/2 TABLETS BY MOUTH ONCE A DAY WITH DINNER. NEED FASTING LAB WORK 135 tablet 3     clopidogrel (PLAVIX) 75 MG tablet TAKE ONE TABLET BY MOUTH ONE TIME DAILY  90 tablet 3     latanoprost (XALATAN) 0.005 % ophthalmic solution Place 1 drop into both eyes At Bedtime       insulin glargine (LANTUS) 100 UNIT/ML injection Inject 11 Units Subcutaneous At Bedtime  3 Month 1     WARFARIN SODIUM PO Daily as directed per [facility]       Acetaminophen (TYLENOL PO) Take 650 mg by mouth nightly as needed       dorzolamide-timolol (COSOPT) 2-0.5 % ophthalmic solution Place 1 drop into both eyes 2 times daily       blood glucose monitoring (ONE TOUCH ULTRA) test strip test 2 times daily  Profile Rx: patient will contact pharmacy when needed 60 each 11     insulin pen needle (NOVOFINE) 30G X 8 MM Use 3 daily or as directed. 300 each 2     blood glucose monitoring (NO BRAND SPECIFIED) meter device kit Use to test blood sugar 3 times daily or as directed. 1 kit 0     NIFEdipine ER (ADALAT CC) 90 MG TB24 Take 1 tablet (90 mg) by mouth daily 90 tablet 3     insulin syringe-needle U-100 31G X 5/16\" 0.5 ML Profile Rx: patient will contact pharmacy when needed 100 each 11     order for DME Equipment being ordered: Oxygen- HOME and portable. Georgie Coulter [facility] Medical Assistant Signed  Service date: 05/24/2016 10:48 AM       O2 Sat on Room air at rest:84%  O2 sat on room air with walk: 82-91%  O2 Sat on 1L of oxygen with walk 91-93%  O2 Sat on 2 L of Oxygen with walk 91-96%  O2 Sat on 1 L O2 at rest 94% 2 each prn     order for DME Nebulizer machine Qty #1 1 Units 0     NONFORMULARY Take 1 tablet by mouth At Bedtime Vitamin (Dialyvite) that he gets from [facility].       ORDER FOR DME Equipment being ordered: Walker with wheels and brakes, and a seat 1 each 0        FAMILY HISTORY:   Family History   Problem Relation Age of Onset     CANCER Father      Asophageal      CANCER " "Brother      Throat      Other Cancer Sister      Lung       There is no history of hematologic disease in his family. Has 1 brother with head and neck cancer and his father  with complication of esophageal cancer. He has 1 sister with lung cancer    SOCIAL HISTORY:   Social History     Social History     Marital status:      Spouse name: N/A     Number of children: N/A     Years of education: N/A     Occupational History     Paper  Retired     Social History Main Topics     Smoking status: Former Smoker     Packs/day: 1.00     Years: 55.00     Types: Cigarettes     Quit date: 2005     Smokeless tobacco: Never Used     Alcohol use No     Drug use: No     Sexual activity: Not Currently     Other Topics Concern     Parent/Sibling W/ Cabg, Mi Or Angioplasty Before 65f 55m? No     Social History Narrative       PHYSICAL EXAMINATION:   /85 (BP Location: Right arm, Patient Position: Sitting, Cuff Size: Adult Large)  Pulse 73  Temp 98.9  F (37.2  C) (Tympanic)  Resp 12  Ht 1.695 m (5' 6.75\")  Wt 93.4 kg (205 lb 14.4 oz)  SpO2 95%  BMI 32.49 kg/m2  Wt Readings from Last 10 Encounters:   10/26/17 93.4 kg (205 lb 14.4 oz)   17 92.5 kg (204 lb)   17 92.5 kg (204 lb)   17 93 kg (205 lb)   16 92 kg (202 lb 13.2 oz)   16 92.5 kg (204 lb)   16 93.9 kg (207 lb)   16 93.4 kg (206 lb)   16 94.8 kg (209 lb)   16 94.6 kg (208 lb 9.6 oz)      GENERAL APPEARANCE: Healthy, alert and in no acute distress.  HEENT: Sclerae anicteric. PERRLA. Oropharynx without ulcers, lesions, or thrush.  NECK: Supple. No asymmetry or masses.  LYMPHATICS: No palpable cervical, supraclavicular, axillary, or inguinal lymphadenopathy.  RESP: Lungs clear to auscultation bilaterally without rales, rhonchi or wheezes.  CARDIOVASCULAR: Regular rate and rhythm. Normal S1, S2; no S3 or S4. No murmur, gallop, or rub.  ABDOMEN: Soft, nontender. Bowel sounds normal. No palpable " organomegaly or masses.  MUSCULOSKELETAL: Extremities without gross deformities noted. No edema of bilateral lower extremities.  SKIN: No suspicious lesions or rashes.  NEURO: Alert and oriented x 3. Cranial nerves II-XII grossly intact.  PSYCHIATRIC: Mentation and affect appear normal.    LABORATORY RESULTS:  Orders Only on 09/01/2017   Component Date Value Ref Range Status     Occult Blood Scn FIT 08/31/2017 Negative  NEG^Negative Final     Occult Blood Scn FIT 09/01/2017 Negative  NEG^Negative Final       Medical records from outside hemodialysis facility [facility] was reviewed today.    ASSESSMENT AND PLAN:  This is an 83-year-old gentleman with long history of diabetes, and diabetic nephropathy, history of kidney cancer status post resection with chronic renal failure. The patient currently on dialysis 3 times a week. Patient with anemia of chronic disease secondary to his renal impairment. His anemia has been controlled with the use if Epogen 3000 units 3 times weekly. Patient is currently asymptomatic from his anemia. I haven't ordered any new investigations. I would recommend hemoglobin to be continuously monitored. I will see the patient if there is worsening of his hemoglobin. At this time the patient has been compensated and managed by nephrology team during his dialysis.     The patient is ready to learn, no apparent learning barriers were identified, Diagnosis and treatment plans were explained to the patient. The patient expressed understanding of the content. The patient questions were answered to his satisfaction.    Jessica Corral MD    Chart documentation with Dragon Voice recognition Software. Although reviewed after completion, some words and grammatical errors may remain.

## 2017-11-01 ENCOUNTER — TELEPHONE (OUTPATIENT)
Dept: FAMILY MEDICINE | Facility: CLINIC | Age: 82
End: 2017-11-01

## 2017-11-01 NOTE — TELEPHONE ENCOUNTER
Lillie from [facility] called and needed help with some clinic notes and and order from June for this patients Oxygen. I faxed the clinic note and order to [facility] at [phone].  Susan Baumann  [facility] [place] [facility]

## 2017-11-02 ENCOUNTER — OFFICE VISIT (OUTPATIENT)
Dept: CARDIOLOGY | Facility: CLINIC | Age: 82
End: 2017-11-02
Attending: NURSE PRACTITIONER
Payer: MEDICARE

## 2017-11-02 VITALS
WEIGHT: 205 LBS | OXYGEN SATURATION: 95 % | DIASTOLIC BLOOD PRESSURE: 62 MMHG | SYSTOLIC BLOOD PRESSURE: 143 MMHG | BODY MASS INDEX: 32.35 KG/M2 | HEART RATE: 77 BPM

## 2017-11-02 DIAGNOSIS — I71.40 ABDOMINAL AORTIC ANEURYSM (AAA) WITHOUT RUPTURE (H): ICD-10-CM

## 2017-11-02 DIAGNOSIS — I35.0 NONRHEUMATIC AORTIC VALVE STENOSIS: Primary | ICD-10-CM

## 2017-11-02 DIAGNOSIS — I10 ESSENTIAL HYPERTENSION: ICD-10-CM

## 2017-11-02 PROCEDURE — 99205 OFFICE O/P NEW HI 60 MIN: CPT | Performed by: INTERNAL MEDICINE

## 2017-11-02 RX ORDER — METOPROLOL SUCCINATE 25 MG/1
25 TABLET, EXTENDED RELEASE ORAL DAILY
Qty: 90 TABLET | Refills: 3 | Status: SHIPPED | OUTPATIENT
Start: 2017-11-02 | End: 2017-12-19

## 2017-11-02 NOTE — PROGRESS NOTES
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     Type II or unspecified type diabetes mellitus without mention of complication, not stated as uncontrolled      Unspecified disorder of kidney and ureter        CURRENT MEDICATIONS:  Current Outpatient Prescriptions   Medication Sig Dispense Refill     Misc Natural Products (TART CHERRY ADVANCED) CAPS Take 1 capsule by mouth daily       terazosin (HYTRIN) 10 MG capsule TAKE 1 CAPSULE (10 MG) BY MOUTH AT BEDTIME 90 capsule 3     albuterol (2.5 MG/3ML) 0.083% neb solution TAKE 1 VIAL (2.5 MG) BY NEBULIZATION EVERY 6 HOURS AS NEEDED FOR SHORTNESS OF BREATH / DYSPNEA OR WHEEZING 63 vial 11     ADVAIR DISKUS 100-50 MCG/DOSE diskus inhaler inhale 1 puff into the lungs every 12 hours. 3 Inhaler 3     insulin aspart (NOVOLOG FLEXPEN) 100 UNIT/ML injection 6 units before breakfast, 6 units before lunch, 4 units before dinner 15 mL 11     bumetanide (BUMEX) 1 MG tablet TAKE 1 TABLET (1 MG) BY MOUTH 2 TIMES DAILY 180 tablet 2     lovastatin (MEVACOR) 40 MG tablet TAKE 1 AND 1/2 TABLETS BY MOUTH ONCE A DAY WITH DINNER. NEED FASTING LAB WORK 135 tablet 3     clopidogrel (PLAVIX) 75 MG tablet TAKE ONE TABLET BY MOUTH ONE TIME DAILY  90 tablet 3     blood glucose monitoring (ONE TOUCH ULTRA) test strip test 2 times daily  Profile Rx: patient will contact pharmacy when needed 60 each 11     insulin pen needle (NOVOFINE) 30G X 8 MM Use 3 daily or as directed. 300 each  "2     latanoprost (XALATAN) 0.005 % ophthalmic solution Place 1 drop into both eyes At Bedtime       insulin glargine (LANTUS) 100 UNIT/ML injection Inject 11 Units Subcutaneous At Bedtime  3 Month 1     blood glucose monitoring (NO BRAND SPECIFIED) meter device kit Use to test blood sugar 3 times daily or as directed. 1 kit 0     NIFEdipine ER (ADALAT CC) 90 MG TB24 Take 1 tablet (90 mg) by mouth daily 90 tablet 3     insulin syringe-needle U-100 31G X 5/16\" 0.5 ML Profile Rx: patient will contact pharmacy when needed 100 each 11     order for DME Equipment being ordered: Oxygen- HOME and portable. Georgie Coulter [facility] Medical Assistant Signed  Service date: 05/24/2016 10:48 AM       O2 Sat on Room air at rest:84%  O2 sat on room air with walk: 82-91%  O2 Sat on 1L of oxygen with walk 91-93%  O2 Sat on 2 L of Oxygen with walk 91-96%  O2 Sat on 1 L O2 at rest 94% 2 each prn     WARFARIN SODIUM PO Daily as directed per [facility]       order for DME Nebulizer machine Qty #1 1 Units 0     Acetaminophen (TYLENOL PO) Take 650 mg by mouth nightly as needed       NONFORMULARY Take 1 tablet by mouth At Bedtime Vitamin (Dialyvite) that he gets from [facility].       dorzolamide-timolol (COSOPT) 2-0.5 % ophthalmic solution Place 1 drop into both eyes 2 times daily       ORDER FOR DME Equipment being ordered: Walker with wheels and brakes, and a seat 1 each 0       PAST SURGICAL HISTORY:  Past Surgical History:   Procedure Laterality Date     ABDOMEN SURGERY  2005    aortic aneurysm     CATARACT IOL, RT/LT  2/4/08    left eye - phacoemulsification w/ posterior chamber lens implantation combined w/ glaucoma filtering procedure     CREATE FISTULA ARTERIOVENOUS UPPER EXTREMITY  1/3/2012    Procedure:CREATE FISTULA ARTERIOVENOUS UPPER EXTREMITY; LEFT UPPER ARM ARTERIOVENOUS FISTULA; Surgeon:JENA PEARSON; Location:[facility]     SURGICAL HISTORY OF -   2/13/2004    Right knee arthroscopy     SURGICAL HISTORY OF -  "      Vocal cord biopsy-benign     SURGICAL HISTORY OF -   3/3/2005    AAA, left partial nephrectomy       ALLERGIES     Allergies   Allergen Reactions     Hydralazine Shortness Of Breath and Swelling     Aspirin Swelling     Heparin Flush Other (See Comments)     thrombocytopenia     Monosodium Glutamate Swelling       FAMILY HISTORY:  Family History   Problem Relation Age of Onset     CANCER Father      Asophageal      CANCER Brother      Throat      Other Cancer Sister      Lung        SOCIAL HISTORY:  Social History     Social History     Marital status:      Spouse name: N/A     Number of children: N/A     Years of education: N/A     Occupational History     Paper  Retired     Social History Main Topics     Smoking status: Former Smoker     Packs/day: 1.00     Years: 55.00     Types: Cigarettes     Quit date: 1/1/2005     Smokeless tobacco: Never Used     Alcohol use No     Drug use: No     Sexual activity: Not Currently     Other Topics Concern     Parent/Sibling W/ Cabg, Mi Or Angioplasty Before 65f 55m? No     Social History Narrative       ROS:   Constitutional: No fever, chills, or sweats. No weight gain/loss   ENT: No visual disturbance, ear ache, epistaxis, sore throat  Allergies/Immunologic: Negative.   Respiratory: No cough, hemoptysia  Cardiovascular: As per HPI  GI: No nausea, vomiting, hematemesis, melena, or hematochezia  : No urinary frequency, dysuria, or hematuria  Integument: Negative  Psychiatric: Negative  Neuro: Negative  Endocrinology: Negative   Musculoskeletal: Negative    EXAM:  /62  Pulse 77  Wt 93 kg (205 lb)  SpO2 95%  BMI 32.35 kg/m2  In general, the patient is a pleasant male in no apparent distress.    HEENT: NC/AT.  PERRLA.  EOMI.  Sclerae white, not injected.  Nares clear.    Neck:  Carotids +4/4 bilaterally without bruits.  No jugular venous distension.   Heart: RRR. Normal S1, S2 splits physiologically. There is a mid-peaking systolic murmur.  No  diastolic murmur.  Lungs: CTA.  No ronchi, wheezes, rales.    Abdomen: Soft, nontender, nondistended.  Extremities: No clubbing, cyanosis, or edema.   Neurologic: Alert and oriented to person/place/time, normal speech, gait and affect  Skin: No petechiae, purpura or rash.    Labs:  LIPID RESULTS:  Lab Results   Component Value Date    CHOL 105 07/11/2017    HDL 50 07/11/2017    LDL 44 07/11/2017    TRIG 56 07/11/2017    CHOLHDLRATIO 2.4 02/10/2015    NHDL 55 07/11/2017       LIVER ENZYME RESULTS:  Lab Results   Component Value Date    AST 17 07/11/2017    ALT 15 11/28/2016       CBC RESULTS:  Lab Results   Component Value Date    WBC 8.3 08/31/2017    RBC 3.12 (L) 08/31/2017    HGB 9.5 (L) 08/31/2017    HCT 29.7 (L) 08/31/2017    MCV 95 08/31/2017    MCH 30.4 08/31/2017    MCHC 32.0 08/31/2017    RDW 15.1 (H) 08/31/2017     08/31/2017       BMP RESULTS:  Lab Results   Component Value Date     05/24/2016    POTASSIUM 4.8 11/28/2016    CHLORIDE 101 05/24/2016    CO2 26 05/24/2016    ANIONGAP 9 05/24/2016     (H) 11/28/2016    BUN 39 (H) 05/24/2016    CR 10.01 (H) 11/28/2016    GFRESTIMATED 10 (L) 05/24/2016    GFRESTBLACK 13 (L) 05/24/2016    MELISSA 7.6 (L) 05/24/2016        A1C RESULTS:  Lab Results   Component Value Date    A1C 5.6 11/29/2016       INR RESULTS:  Lab Results   Component Value Date    INR 0.96 01/10/2014    INR 0.94 01/08/2014     ROXANA Perla MD     CC  Patient Care Team:  Kitty Deras APRN CNP as PCP - General (Nurse Practitioner)  Rafael Keene MD as MD (Nephrology)  Darion Arshad MD as MD (Ophthalmology)  KITTY DERAS

## 2017-11-02 NOTE — PATIENT INSTRUCTIONS
Thank you for your [facility] visit today. Your provider has recommended the following:  Medication Changes:  Start Metoprolol 25mg daily    Recommendations:  Schedule Ultrasound   Schedule Echocardiogram    Follow-up:  See Dr. Perla for cardiology follow up in summer 2018    We kindly ask that you call cardiology scheduling at [phone] three months prior to requested revisit date to schedule future cardiology appointments.  Reminder:  1. Please bring in your current medication list or your medication, over the counter supplements and vitamin bottles as we will review these at each office visit.               [facility]  [facility]~[address]~[place], [place]~[address]  Questions about your visit today?  Call your Cardiology Clinic RN's-Edith Dumont and/or Ally Canada at [phone].

## 2017-11-02 NOTE — MR AVS SNAPSHOT
After Visit Summary   11/2/2017    Griffin Walton    MRN: [id number]           Patient Information     Date Of Birth          5/18/1934        Visit Information        Provider Department      11/2/2017 2:00 PM Mac Perla MD [facility]        Today's Diagnoses     Nonrheumatic aortic valve stenosis    -  1    Essential hypertension        Abdominal aortic aneurysm (AAA) without rupture (H)          Care Instructions    Thank you for your  Heart Care visit today. Your provider has recommended the following:  Medication Changes:  Start Metoprolol 25mg daily    Recommendations:  Schedule Ultrasound   Schedule Echocardiogram    Follow-up:  See Dr. Perla for cardiology follow up in summer 2018    We kindly ask that you call cardiology scheduling at [phone] three months prior to requested revisit date to schedule future cardiology appointments.  Reminder:  1. Please bring in your current medication list or your medication, over the counter supplements and vitamin bottles as we will review these at each office visit.               [facility]~[address]~[place], [place]~[address]  Questions about your visit today?  Call your Cardiology Clinic RN's-Edith Dumont and/or Ally Canada at [phone].          Follow-ups after your visit        Additional Services     Follow-Up with Cardiologist                 Your next 10 appointments already scheduled     Nov 09, 2017  9:30 AM CST   SIX MINUTE WALK with [facility] PFL B, UC PFL 6 MINUTE WALK 2   [facility] Function Testing ([facility])    [address] [place] [address]  3rd [facility] [id number] [phone]            Nov 09, 2017 10:30 AM CST   (Arrive by 10:15 AM)   New Interstitial Lung with David Morris Perlman, MD   [facility] ([facility])    [facility]  [address] [address]   [phone]            Feb 27, 2018 10:00 AM CST   Return Visit with Geraldo Ware MD   [facility]    [address] [address]   [phone]              Future tests that were ordered for you today     Open Future Orders        Priority Expected Expires Ordered    US Abdominal Aorta Imaging Routine 5/2/2018 11/2/2018 11/2/2017    Echocardiogram Routine 11/2/2018 11/3/2018 11/2/2017    Follow-Up with Cardiologist Routine 11/2/2018 11/3/2018 11/2/2017            Who to contact     If you have questions or need follow up information about today's clinic visit or your schedule please contact [facility] directly at [phone].  Normal or non-critical lab and imaging results will be communicated to you by MyChart, letter or phone within 4 business days after the clinic has received the results. If you do not hear from us within 7 days, please contact the clinic through [employer] or phone. If you have a critical or abnormal lab result, we will notify you by phone as soon as possible.  Submit refill requests through [employer] or call your pharmacy and they will forward the refill request to us. Please allow 3 business days for your refill to be completed.          Additional Information About Your Visit        MyChart Information     [employer] gives you secure access to your electronic health record. If you see a primary care provider, you can also send messages to your care team and make appointments. If you have questions, please call your primary care clinic.  If you do not have a primary care provider, please call [phone] and they will assist you.        Care EveryWhere ID     This is your Care EveryWhere ID. This could be used by other organizations to access your [facility] medical records  [id number]        Your Vitals Were     Pulse Pulse  Oximetry BMI (Body Mass Index)             77 95% 32.35 kg/m2          Blood Pressure from Last 3 Encounters:   11/02/17 143/62   10/26/17 138/85   08/31/17 134/64    Weight from Last 3 Encounters:   11/02/17 93 kg (205 lb)   10/26/17 93.4 kg (205 lb 14.4 oz)   08/31/17 92.5 kg (204 lb)                 Today's Medication Changes          These changes are accurate as of: 11/2/17  2:28 PM.  If you have any questions, ask your nurse or doctor.               Start taking these medicines.        Dose/Directions    metoprolol 25 MG 24 hr tablet   Commonly known as:  TOPROL XL   Used for:  Essential hypertension   Started by:  Mac Perla MD        Dose:  25 mg   Take 1 tablet (25 mg) by mouth daily   Quantity:  90 tablet   Refills:  3            Where to get your medicines      These medications were sent to [id number] - [place], [place] - [address]  [address] [facility] [address]     Phone:  [phone]     metoprolol 25 MG 24 hr tablet                Primary Care Provider Office Phone # Fax #    Stefanie Louis, APRN [facility] [phone] [address] [address]        Equal Access to Services     ANUSHKA BILL AH: Hadii camila ku hadasho Soomaali, waaxda luqadaha, qaybta kaalmada adeegyada, waxay idiin haykurt correa. So [facility] [phone].    ATENCIÓN: Si habla español, tiene a louis disposición servicios gratuitos de asistencia lingüística. Llame al [phone].    We comply with applicable federal civil rights laws and [place] laws. We do not discriminate on the basis of race, color, national origin, age, disability, sex, sexual orientation, or gender identity.            Thank you!     Thank you for choosing [facility]  for your care. Our goal is always to provide you with excellent care. Hearing back from our patients is one way we can continue to improve our services. Please take a few  minutes to complete the written survey that you may receive in the mail after your visit with us. Thank you!             Your Updated Medication List - Protect others around you: Learn how to safely use, store and throw away your medicines at www.disposemymeds.org.          This list is accurate as of: 11/2/17  2:28 PM.  Always use your most recent med list.                   Brand Name Dispense Instructions for use Diagnosis    ADVAIR DISKUS 100-50 MCG/DOSE diskus inhaler   Generic drug:  fluticasone-salmeterol     3 Inhaler    inhale 1 puff into the lungs every 12 hours.    Chronic obstructive pulmonary disease, unspecified COPD type (H)       albuterol (2.5 MG/3ML) 0.083% neb solution     63 vial    TAKE 1 VIAL (2.5 MG) BY NEBULIZATION EVERY 6 HOURS AS NEEDED FOR SHORTNESS OF BREATH / DYSPNEA OR WHEEZING    Chronic obstructive pulmonary disease, unspecified COPD type (H)       blood glucose monitoring meter device kit    no brand specified    1 kit    Use to test blood sugar 3 times daily or as directed.    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)       dorzolamide-timolol 2-0.5 % ophthalmic solution    COSOPT     Place 1 drop into both eyes 2 times daily        insulin aspart 100 UNIT/ML injection    NovoLOG FLEXPEN    15 mL    6 units before breakfast, 6 units before lunch, 4 units before dinner    Controlled type 2  "diabetes mellitus with diabetic nephropathy, with long-term current use of insulin (H)       insulin glargine 100 UNIT/ML injection    LANTUS    3 Month    Inject 11 Units Subcutaneous At Bedtime    Type 2 diabetes mellitus with diabetic nephropathy (H)       insulin pen needle 30G X 8 MM    NOVOFINE    300 each    Use 3 daily or as directed.    Type 2 diabetes mellitus with diabetic nephropathy, unspecified long term insulin use status (H)       insulin syringe-needle U-100 31G X 5/16\" 0.5 ML     100 each    Profile Rx: patient will contact pharmacy when needed    Type 2 diabetes mellitus with diabetic nephropathy (H)       latanoprost 0.005 % ophthalmic solution    XALATAN     Place 1 drop into both eyes At Bedtime        lovastatin 40 MG tablet    MEVACOR    135 tablet    TAKE 1 AND 1/2 TABLETS BY MOUTH ONCE A DAY WITH DINNER. NEED FASTING LAB WORK    Hyperlipidemia LDL goal <130       metoprolol 25 MG 24 hr tablet    TOPROL XL    90 tablet    Take 1 tablet (25 mg) by mouth daily    Essential hypertension       NIFEdipine ER 90 MG Tb24    ADALAT CC    90 tablet    Take 1 tablet (90 mg) by mouth daily    Benign essential hypertension       * NONFORMULARY      Take 1 tablet by mouth At Bedtime Vitamin (Dialyvite) that he gets from [facility].        * order for DME     1 each    Equipment being ordered: Walker with wheels and brakes, and a seat    ESRD (end stage renal disease) on dialysis (H), Cerebral embolism with cerebral infarction (H)       order for DME     1 Units    Nebulizer machine Qty #1    Fever, unspecified, Cough, Chronic obstructive pulmonary disease, unspecified COPD type (H), Acute bronchospasm       order for DME     2 each    Equipment being ordered: Oxygen- HOME and portable. Georgie Coulter CMA Medical Assistant Signed  Service date: 05/24/2016 10:48 AM     O2 Sat on Room air at rest:84% O2 sat on room air with walk: 82-91% O2 Sat on 1L of oxygen with walk 91-93% O2 Sat on 2 L of " Oxygen with walk 91-96% O2 Sat on 1 L O2 at rest 94%    Cough, Hypoxia, Chronic obstructive pulmonary disease, unspecified COPD type (H), Chronic obstructive pulmonary disease with acute exacerbation (H), 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), Type 2 diabetes mellitus with diabetic nephropathy (H)       TART CHERRY ADVANCED Caps      Take 1 capsule by mouth daily        terazosin 10 MG capsule    HYTRIN    90 capsule    TAKE 1 CAPSULE (10 MG) BY MOUTH AT BEDTIME    Benign non-nodular prostatic hyperplasia with lower urinary tract symptoms       TYLENOL PO      Take 650 mg by mouth nightly as needed        WARFARIN SODIUM PO      Daily as directed per Coumadin Clinic        * Notice:  This list has 2 medication(s) that are the same as other medications prescribed for you. Read the directions carefully, and ask your doctor or other care provider to review them with you.

## 2017-11-02 NOTE — LETTER
11/2/2017    Stefanie Louis, APRN CNP  [address] [place] [address]    RE: Griffin Collinsjulieta       Dear Colleague,    I had the pleasure of seeing Griffin Walton in the [facility].    Mr. Walton is a very pleasant 83-year-old gentleman who is here with his wife.  He has a past medical history significant for abdominal aortic aneurysm, end-stage renal disease on dialysis x5 years, diabetes type 2 x10 years, COPD on 4 liters of oxygen, hypertension, hyperlipidemia, history of cerebrovascular accident, history of peripheral vascular disease with bifem bypass who quit smoking 30 years ago.  Notably, he has no known history of coronary artery disease with his last stress test, a Lexiscan in 01/2017 that was negative for ischemia.      Mr. Walton does have aortic valve stenosis.  His aortic valve stenosis is just mild to moderate.  In 2014 when he had his last echo, his mean gradient was 19.  He had this most recent echo in 08/2017, and his mean gradient has increased to 25 and his ejection fraction is preserved with grade II diastolic dysfunction, so he does have a bit of HFpEF as well, most likely.  He is on diuretic in addition to the dialysis.  He actually is fairly active.  His wife and him note that they walk at least an hour a day, and he has no symptoms with his activities.  It does appear that it would likely be at a slow pace, as he does look like he uses a walker.  He has good control of his risk factors.  His cholesterol was checked last in 07/2017, LDL was 44 with an HDL of 50.  His A1c was 5.6 in 11/2016 when it was last checked.  He does have anemia of chronic disease with a hemoglobin sticking around 9.5 at baseline.  His last screening for his aneurysm was in 2015 and it measured 4.4 at that time, so he is due for that in the near future.     Outpatient Encounter Prescriptions as of 11/2/2017   Medication Sig Dispense Refill     metoprolol (TOPROL XL)  "25 MG 24 hr tablet Take 1 tablet (25 mg) by mouth daily 90 tablet 3     Misc Natural Products (TART CHERRY ADVANCED) CAPS Take 1 capsule by mouth daily       terazosin (HYTRIN) 10 MG capsule TAKE 1 CAPSULE (10 MG) BY MOUTH AT BEDTIME 90 capsule 3     albuterol (2.5 MG/3ML) 0.083% neb solution TAKE 1 VIAL (2.5 MG) BY NEBULIZATION EVERY 6 HOURS AS NEEDED FOR SHORTNESS OF BREATH / DYSPNEA OR WHEEZING 63 vial 11     ADVAIR DISKUS 100-50 MCG/DOSE diskus inhaler inhale 1 puff into the lungs every 12 hours. 3 Inhaler 3     insulin aspart (NOVOLOG FLEXPEN) 100 UNIT/ML injection 6 units before breakfast, 6 units before lunch, 4 units before dinner 15 mL 11     bumetanide (BUMEX) 1 MG tablet TAKE 1 TABLET (1 MG) BY MOUTH 2 TIMES DAILY 180 tablet 2     lovastatin (MEVACOR) 40 MG tablet TAKE 1 AND 1/2 TABLETS BY MOUTH ONCE A DAY WITH DINNER. NEED FASTING LAB WORK 135 tablet 3     clopidogrel (PLAVIX) 75 MG tablet TAKE ONE TABLET BY MOUTH ONE TIME DAILY  90 tablet 3     blood glucose monitoring (ONE TOUCH ULTRA) test strip test 2 times daily  Profile Rx: patient will contact pharmacy when needed 60 each 11     insulin pen needle (NOVOFINE) 30G X 8 MM Use 3 daily or as directed. 300 each 2     latanoprost (XALATAN) 0.005 % ophthalmic solution Place 1 drop into both eyes At Bedtime       insulin glargine (LANTUS) 100 UNIT/ML injection Inject 11 Units Subcutaneous At Bedtime  3 Month 1     blood glucose monitoring (NO BRAND SPECIFIED) meter device kit Use to test blood sugar 3 times daily or as directed. 1 kit 0     NIFEdipine ER (ADALAT CC) 90 MG TB24 Take 1 tablet (90 mg) by mouth daily 90 tablet 3     insulin syringe-needle U-100 31G X 5/16\" 0.5 ML Profile Rx: patient will contact pharmacy when needed 100 each 11     order for DME Equipment being ordered: Oxygen- HOME and portable. Georgie Coulter CMA Medical Assistant Signed  Service date: 05/24/2016 10:48 AM       O2 Sat on Room air at rest:84%  O2 sat on room air with walk: " 82-91%  O2 Sat on 1L of oxygen with walk 91-93%  O2 Sat on 2 L of Oxygen with walk 91-96%  O2 Sat on 1 L O2 at rest 94% 2 each prn     WARFARIN SODIUM PO Daily as directed per Coumadin Clinic       order for DME Nebulizer machine Qty #1 1 Units 0     Acetaminophen (TYLENOL PO) Take 650 mg by mouth nightly as needed       NONFORMULARY Take 1 tablet by mouth At Bedtime Vitamin (Dialyvite) that he gets from [facility].       dorzolamide-timolol (COSOPT) 2-0.5 % ophthalmic solution Place 1 drop into both eyes 2 times daily       ORDER FOR DME Equipment being ordered: Walker with wheels and brakes, and a seat 1 each 0     No facility-administered encounter medications on file as of 11/2/2017.       IMPRESSION, REPORT, PLAN:   1.  Aortic valve stenosis with a mean gradient of 25.   2.  Diastolic dysfunction, grade II, with a preserved ejection fraction of 55%-60%.   3.  End-stage renal disease on dialysis.   4.  COPD on chronic O2.   5.  History of nicotine dependence, quit 30 years ago.   6.  History of stroke in 2012.  Negative carotid ultrasound at less than 50% bilaterally in 09/2015.   7.  Abdominal aortic aneurysm repair in 2005, with last CT evaluation in 2015 measuring 4.4.   8.  Peripheral vascular disease, status post bypass, with no claudication symptoms at this time.   9.  Hypertension.   10.  Hyperlipidemia, reasonably controlled.      DISCUSSION:  It was a pleasure to see Mr. Walton in Cardiology Clinic.  Today I discussed his history, reviewed all of his testing and discussed his symptoms.  He reports to me that currently he has not had any concerning symptoms and he has been feeling well.  His wife also reported the same.  I think there is a concern about his oxygen requirements going up, but that does not suggest a cardiovascular etiology for that, based on what I reviewed today.  He has not had any chest pain or discomfort and had a negative stress test back in January.  I would like to  have him on a beta blocker and discussed starting him on Toprol 25 a day.  He will continue with the Plavix that he has been taking.  His cholesterol is well controlled.  I will plan to see him back in 6 months with an echo and an ultrasound of the abdomen.  All questions were answered.  It was a pleasure to see him today.  Please do not hesitate to contact me with any questions or concerns.     Again, thank you for allowing me to participate in the care of your patient.      Sincerely,    Mac Perla MD     [facility]

## 2017-11-03 NOTE — PROGRESS NOTES
HISTORY OF PRESENT ILLNESS:  Mr. Walton is a very pleasant 83-year-old gentleman who is here with his wife.  He has a past medical history significant for abdominal aortic aneurysm, end-stage renal disease on dialysis x5 years, diabetes type 2 x10 years, COPD on 4 liters of oxygen, hypertension, hyperlipidemia, history of cerebrovascular accident, history of peripheral vascular disease with bifem bypass who quit smoking 30 years ago.  Notably, he has no known history of coronary artery disease with his last stress test, a Lexiscan in 01/2017 that was negative for ischemia.      Mr. Walton does have aortic valve stenosis.  His aortic valve stenosis is just mild to moderate.  In 2014 when he had his last echo, his mean gradient was 19.  He had this most recent echo in 08/2017, and his mean gradient has increased to 25 and his ejection fraction is preserved with grade II diastolic dysfunction, so he does have a bit of HFpEF as well, most likely.  He is on diuretic in addition to the dialysis.  He actually is fairly active.  His wife and him note that they walk at least an hour a day, and he has no symptoms with his activities.  It does appear that it would likely be at a slow pace, as he does look like he uses a walker.  He has good control of his risk factors.  His cholesterol was checked last in 07/2017, LDL was 44 with an HDL of 50.  His A1c was 5.6 in 11/2016 when it was last checked.  He does have anemia of chronic disease with a hemoglobin sticking around 9.5 at baseline.  His last screening for his aneurysm was in 2015 and it measured 4.4 at that time, so he is due for that in the near future.      IMPRESSION, REPORT, PLAN:   1.  Aortic valve stenosis with a mean gradient of 25.   2.  Diastolic dysfunction, grade II, with a preserved ejection fraction of 55%-60%.   3.  End-stage renal disease on dialysis.   4.  COPD on chronic O2.   5.  History of nicotine dependence, quit 30 years ago.   6.  History of  stroke in .  Negative carotid ultrasound at less than 50% bilaterally in 2015.   7.  Abdominal aortic aneurysm repair in , with last CT evaluation in  measuring 4.4.   8.  Peripheral vascular disease, status post bypass, with no claudication symptoms at this time.   9.  Hypertension.   10.  Hyperlipidemia, reasonably controlled.      DISCUSSION:  It was a pleasure to see Mr. Ledesma in Cardiology Clinic.  Today I discussed his history, reviewed all of his testing and discussed his symptoms.  He reports to me that currently he has not had any concerning symptoms and he has been feeling well.  His wife also reported the same.  I think there is a concern about his oxygen requirements going up, but that does not suggest a cardiovascular etiology for that, based on what I reviewed today.  He has not had any chest pain or discomfort and had a negative stress test back in January.  I would like to have him on a beta blocker and discussed starting him on Toprol 25 a day.  He will continue with the Plavix that he has been taking.  His cholesterol is well controlled.  I will plan to see him back in 6 months with an echo and an ultrasound of the abdomen.  All questions were answered.  It was a pleasure to see him today.  Please do not hesitate to contact me with any questions or concerns.         BELKYS SORIANO MD             D: 2017 14:35   T: 2017 22:27   MT: VEE      Name:     JULI LEDESMA   MRN:      [id number]        Account:      [id number]   :      1934           Service Date: 2017      Document: [id number]

## 2017-11-04 ENCOUNTER — MYC MEDICAL ADVICE (OUTPATIENT)
Dept: FAMILY MEDICINE | Facility: CLINIC | Age: 82
End: 2017-11-04

## 2017-11-09 ENCOUNTER — OFFICE VISIT (OUTPATIENT)
Dept: PULMONOLOGY | Facility: CLINIC | Age: 82
End: 2017-11-09
Attending: INTERNAL MEDICINE
Payer: MEDICARE

## 2017-11-09 VITALS
RESPIRATION RATE: 16 BRPM | HEART RATE: 63 BPM | DIASTOLIC BLOOD PRESSURE: 77 MMHG | SYSTOLIC BLOOD PRESSURE: 171 MMHG | OXYGEN SATURATION: 98 %

## 2017-11-09 DIAGNOSIS — J84.9 ILD (INTERSTITIAL LUNG DISEASE) (H): Primary | ICD-10-CM

## 2017-11-09 DIAGNOSIS — J43.2 CENTRILOBULAR EMPHYSEMA (H): ICD-10-CM

## 2017-11-09 LAB
6 MIN WALK (FT): 890 FT
6 MIN WALK (M): 271 M
DLCOUNC-%PRED-PRE: 45 %
DLCOUNC-PRE: 9.75 ML/MIN/MMHG
DLCOUNC-PRED: 21.59 ML/MIN/MMHG
ERV-%PRED-PRE: 135 %
ERV-PRE: 0.49 L
ERV-PRED: 0.36 L
EXPTIME-PRE: 7.78 SEC
FEF2575-%PRED-PRE: 64 %
FEF2575-PRE: 1.07 L/SEC
FEF2575-PRED: 1.66 L/SEC
FEFMAX-%PRED-PRE: 50 %
FEFMAX-PRE: 3.01 L/SEC
FEFMAX-PRED: 6.01 L/SEC
FEV1-%PRED-PRE: 73 %
FEV1-PRE: 1.7 L
FEV1FEV6-PRE: 70 %
FEV1FEV6-PRED: 76 %
FEV1FVC-PRE: 69 %
FEV1FVC-PRED: 76 %
FEV1SVC-PRE: 67 %
FEV1SVC-PRED: 60 %
FIFMAX-PRE: 2.17 L/SEC
FRCPLETH-%PRED-PRE: 68 %
FRCPLETH-PRE: 2.47 L
FRCPLETH-PRED: 3.61 L
FVC-%PRED-PRE: 80 %
FVC-PRE: 2.48 L
FVC-PRED: 3.08 L
IC-%PRED-PRE: 58 %
IC-PRE: 2.04 L
IC-PRED: 3.48 L
RVPLETH-%PRED-PRE: 70 %
RVPLETH-PRE: 1.99 L
RVPLETH-PRED: 2.82 L
TLCPLETH-%PRED-PRE: 70 %
TLCPLETH-PRE: 4.52 L
TLCPLETH-PRED: 6.42 L
VA-%PRED-PRE: 53 %
VA-PRE: 3.26 L
VC-%PRED-PRE: 65 %
VC-PRE: 2.53 L
VC-PRED: 3.84 L

## 2017-11-09 ASSESSMENT — PAIN SCALES - GENERAL: PAINLEVEL: NO PAIN (0)

## 2017-11-09 NOTE — PROGRESS NOTES
Reason for Visit  rGiffin Walton is a 83 year old year old male who is being seen for Consult (Patient is being seen for consulation of ILD)    ILD HPI    Griffin Walton is an 83-year-old male with a history of COPD and some evidence of interstitial lung disease was seen today for consult at the request of Stefanie Louis. Other patient first began to develop breathing problems about one year ago at which time he was diagnosed with COPD given that he has a 40-pack-year history of smoking. He was started on oxygen at that time as well as Advair and he has albuterol nebulizer at home. He used an albuterol Joanna for a period of time although he did not feel that that helped him very much. Overall the patient states that actually he feels relatively well and in terms of his breathing while he does have some dyspnea on exertion it is fairly stable and if he uses his oxygen he does not feel limited he is able to go up stairs and do all his other activities. Of note the patient has a history of end-stage renal disease he is on hemodialysis 3 times a week. He denies significant cough or shortness of breath at rest. No wheezing.      Current Outpatient Prescriptions   Medication     metoprolol (TOPROL XL) 25 MG 24 hr tablet     Misc Natural Products (TART CHERRY ADVANCED) CAPS     terazosin (HYTRIN) 10 MG capsule     albuterol (2.5 MG/3ML) 0.083% neb solution     ADVAIR DISKUS 100-50 MCG/DOSE diskus inhaler     insulin aspart (NOVOLOG FLEXPEN) 100 UNIT/ML injection     bumetanide (BUMEX) 1 MG tablet     lovastatin (MEVACOR) 40 MG tablet     clopidogrel (PLAVIX) 75 MG tablet     blood glucose monitoring (ONE TOUCH ULTRA) test strip     insulin pen needle (NOVOFINE) 30G X 8 MM     latanoprost (XALATAN) 0.005 % ophthalmic solution     insulin glargine (LANTUS) 100 UNIT/ML injection     blood glucose monitoring (NO BRAND SPECIFIED) meter device kit     NIFEdipine ER (ADALAT CC) 90 MG TB24     insulin syringe-needle U-100  "31G X 5/16\" 0.5 ML     order for DME     WARFARIN SODIUM PO     order for DME     Acetaminophen (TYLENOL PO)     NONFORMULARY     dorzolamide-timolol (COSOPT) 2-0.5 % ophthalmic solution     ORDER FOR DME     No current facility-administered medications for this visit.      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     Type II or unspecified type diabetes mellitus without mention of complication, not stated as uncontrolled      Unspecified disorder of kidney and ureter        Past Surgical History:   Procedure Laterality Date     ABDOMEN SURGERY  2005    aortic aneurysm     CATARACT IOL, RT/LT  2/4/08    left eye - phacoemulsification w/ posterior chamber lens implantation combined w/ glaucoma filtering procedure     CREATE FISTULA ARTERIOVENOUS UPPER EXTREMITY  1/3/2012    Procedure:CREATE FISTULA ARTERIOVENOUS UPPER EXTREMITY; LEFT UPPER ARM ARTERIOVENOUS FISTULA; Surgeon:JENA PEARSON; Location:[facility]     SURGICAL HISTORY OF -   2/13/2004    Right knee arthroscopy     SURGICAL HISTORY OF -       Vocal cord biopsy-benign     SURGICAL HISTORY OF -   3/3/2005    AAA, left partial nephrectomy       Social History     Social History     Marital status:      Spouse name: N/A     Number of children: N/A     Years of education: N/A     Occupational " History     Paper  Retired     Social History Main Topics     Smoking status: Former Smoker     Packs/day: 1.00     Years: 55.00     Types: Cigarettes     Quit date: 1/1/2005     Smokeless tobacco: Never Used     Alcohol use No     Drug use: No     Sexual activity: Not Currently     Other Topics Concern     Parent/Sibling W/ Cabg, Mi Or Angioplasty Before 65f 55m? No     Social History Narrative       Family History   Problem Relation Age of Onset     CANCER Father      Asophageal      CANCER Brother      Throat      Other Cancer Sister      Lung        ROS Pulmonary    A complete ROS was otherwise negative except as noted in the HPI.  Vitals:    11/09/17 1019   BP: 171/77   BP Location: Right arm   Patient Position: Chair   Cuff Size: Adult Large   Pulse: 63   Resp: 16   SpO2: 98%     Exam:   GENERAL APPEARANCE: Well developed, well nourished, alert, and in no apparent distress.  NECK: supple, no masses, no thyromegaly.  LYMPHATICS: No significant axillary, cervical, or supraclavicular nodes.  RESP: good air flow throughout, - mildly diminished BS, mild crackles at the bases bilaterally.  CV: Normal S1, S2, regular rhythm, normal rate, no rub, no murmur,  no gallop, no LE edema.   ABDOMEN:  Bowel sounds normal, soft, nontender, no HSM or masses.   MS: extremities normal- no clubbing, no cyanosis.  SKIN: no rash on limited exam  NEURO: Mentation intact, speech normal, normal strength and tone, normal gait and stance  PSYCH: mentation appears normal. and affect normal/bright  Results: I have reviewed all imaging, PFTs and other relavent tests, please see below for details, PFT and imaging results were reviewed with the patient.  PFTs: Mild mixed obstriction and restriction, moderate reduction in DLCO, c/w CPFE physiology\  Chest CT (reviewed by me): centrilobular emphysema with some peripheral interstitial infiltrates. No honeycombing. Not suggestive of UIP  Assessment and plan:     83-year-old male with  COPD and some evidence of interstitial lung disease on his CT scan. His primary function tests are consistent with CPFE physiology. At this point the patient is doing relatively well he feels that on his oxygen is able to do all his activities of daily living. In reviewing the patient's pulmonary function tests and CT scans it appears that overall the disease is quite stable PFTs are actually improved slightly from 5 years ago. And there was clearly evidence of interstitial markings on his previous CT scans going back as far as 2006 although it has progressed since then. Overall I think his imaging over the past 5 years has remained quite stable. 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 I do not think his CT is suggestive of UIP. There is no evidence of underlying connective tissue disorder. We will do the standard ILD workup for connective tissue disease and hypersensitivity pneumonitis however at this point given the patient's stability and relative lack of symptoms I certainly do not think any additional therapy is indicated. If he were to have worsening of his interstitial markings or flare then I think we could consider treatment and it would be helpful to have had those serologies done prior to that. Otherwise he should continue on his oxygen at 1.5 L which is adequate based on the 6 minute walk test in clinic today. I will order the labs to be drawn the next time he has hemodialysis. Otherwise we'll plan to see the patient back in 6 months I will have him call sooner with any changes in his breathing however this point he is quite stable.    CBC   Recent Labs   Lab Test  08/31/17   1006  08/08/17   1059   RBC  3.12*  3.06*   HGB  9.5*  9.3*   HCT  29.7*  28.8*   PLT  177  271       Basic Metabolic Panel  Recent Labs   Lab Test 11/28/16 05/24/16   1051   02/10/15   0757    02/29/12   1544   NA   --   136   --   137   < >   --    POTASSIUM  4.8  4.6   < >  4.5   < >   --    CHLORIDE   --   101   --   103   < >   --    CO2   --   26   --   27   < >   --    BUN   --   39*   --   38*   < >   --    CT   --    --    --    --    --   Red Blood Cells Leukocyte Reduced  Red Blood Cells Leukocyte Reduced  Red Blood Cells Leukocyte Reduced  Red Blood Cells Leukocyte Reduced   GLC  154*  103*   < >  116*   < >   --    MELISSA   --   7.6*   --   7.9*   < >   --     < > = values in this interval not displayed.       INR  Recent Labs   Lab Test  01/10/14   1035  01/08/14   1005   INR  0.96  0.94       PFT  PFT Latest Ref Rng & Units 11/9/2017   FVC L 2.48   FEV1 L 1.70   FVC% % 80   FEV1% % 73           CC:

## 2017-11-09 NOTE — NURSING NOTE
Chief Complaint   Patient presents with     Consult     Patient is being seen for consulation of ILD      Khushi Montiel CMA at 10:28 AM on 11/9/2017

## 2017-11-09 NOTE — MR AVS SNAPSHOT
After Visit Summary   11/9/2017    Griffin Walton    MRN: [id number]           Patient Information     Date Of Birth          5/18/1934        Visit Information        Provider Department      11/9/2017 10:30 AM Perlman, David Morris, MD [facility]        Today's Diagnoses     ILD (interstitial lung disease) (H)    -  1    Centrilobular emphysema (H)           Follow-ups after your visit        Follow-up notes from your care team     Return in about 6 months (around 5/9/2018).      Your next 10 appointments already scheduled     Nov 14, 2017  9:30 AM CST   US ABDOMINAL AORTIC IMAGING with [email or web address]   [facility] Ultrasound ([facility])    [address] [facility] [id number] [phone]           Please bring a list of your medicines (including vitamins, minerals and over-the-counter drugs). Also, tell your doctor about any allergies you may have. Wear comfortable clothes and leave your valuables at home.  Adults: No eating or drinking for 8 hours before the exam. You may take medicine with a small sip of water.  Children: - Children 6+ years: No food or drink for 6 hours before exam. - Children 1-5 years: No food or drink for 4 hours before exam. - Infants, breast-fed: may have breast milk up to 2 hours before exam. - Infants, formula: may have bottle until 4 hours before exam.  Please call the Imaging Department at your exam site with any questions.            Nov 14, 2017 10:45 AM CST   Ech Complete with [id number]   [facility] Echocardiography ([facility])    [address] [id number] [phone]           1. Please bring or wear a comfortable two-piece outfit. 2. You may eat, drink and take your normal medicines. 3. For any questions that cannot be answered, please contact the ordering physician            Feb 27, 2018 10:00 AM CST   Return Visit with Geraldo Ware MD   [facility]  [place] ([facility])    [address] [address]   [phone]            May 15, 2018  1:00 PM CDT   SIX MINUTE WALK with UC PFL A, UC PFL 6 MINUTE WALK 1   [facility] Pulmonary Function Testing ([facility])    [address] [facility] [id number] [phone]            May 15, 2018  2:00 PM CDT   (Arrive by 1:45 PM)   Return Interstitial Lung with David Morris Perlman, MD   [facility] ([facility])    [address]  [address] [facility] [id number] [phone]              Future tests that were ordered for you today     Open Future Orders        Priority Expected Expires Ordered    Anti Nuclear Sarah IgG by IFA with Reflex Routine 11/10/2017 7/9/2018 11/9/2017    Erythrocyte sedimentation rate auto Routine 11/10/2017 7/9/2018 11/9/2017    CRP inflammation Routine 11/10/2017 7/9/2018 11/9/2017    CK total Routine 11/10/2017 7/9/2018 11/9/2017    Bebe 1 Antibody IgG Routine 11/10/2017 7/9/2018 11/9/2017    Scleroderma Antibody Scl70 JOSE RAMON IgG Routine 11/10/2017 7/9/2018 11/9/2017    Rheumatoid factor Routine 11/10/2017 7/9/2018 11/9/2017    SSA Ro JOSE RAMON Antibody IgG Routine 11/10/2017 7/9/2018 11/9/2017    SSB La JOSE RAMON Antibody IgG Routine 11/10/2017 7/9/2018 11/9/2017    Aldolase Routine 11/10/2017 7/9/2018 11/9/2017    Hypersensitivity pneumonitis Routine 11/10/2017 7/9/2018 11/9/2017    Hypersens Pneumonitis - Farmer's Lung II Routine 11/10/2017 7/9/2018 11/9/2017    Antineutrophil cytoplasmic Sarah IgG Routine 11/10/2017 7/9/2018 11/9/2017    IgG Subclasses Routine 11/10/2017 7/9/2018 11/9/2017    Cyclic Citrullinated Peptide Antibody IgG Routine 11/10/2017 7/9/2018 11/9/2017    General PFT Lab (Please always keep checked) Routine  11/9/2018 11/9/2017    Pulmonary Function Test Routine  1/26/2027 11/9/2017    6 minute walk test Routine  11/9/2018 11/9/2017             Who to contact     If you have questions or need follow up information about today's clinic visit or your schedule please contact [facility] directly at [phone].  Normal or non-critical lab and imaging results will be communicated to you by MyChart, letter or phone within 4 business days after the clinic has received the results. If you do not hear from us within 7 days, please contact the clinic through MyChart or phone. If you have a critical or abnormal lab result, we will notify you by phone as soon as possible.  Submit refill requests through [employer] or call your pharmacy and they will forward the refill request to us. Please allow 3 business days for your refill to be completed.          Additional Information About Your Visit        [employer] Information     [employer] gives you secure access to your electronic health record. If you see a primary care provider, you can also send messages to your care team and make appointments. If you have questions, please call your primary care clinic.  If you do not have a primary care provider, please call [phone] and they will assist you.        Care EveryWhere ID     This is your Care EveryWhere ID. This could be used by other organizations to access your [facility] medical records  [id number]        Your Vitals Were     Pulse Respirations Pulse Oximetry             63 16 98%          Blood Pressure from Last 3 Encounters:   11/09/17 171/77   11/02/17 143/62   10/26/17 138/85    Weight from Last 3 Encounters:   11/02/17 93 kg (205 lb)   10/26/17 93.4 kg (205 lb 14.4 oz)   08/31/17 92.5 kg (204 lb)               Primary Care Provider Office Phone # Fax #    Stefanie Guillen DANNA Louis [facility] [phone] [address] [facility] [address]        Equal Access to Services     ANUSHKA BILL : Guy Gamboa, vera gauthier, bre patten, renzo correa. So [facility]  [phone].    ATENCIÓN: Si saad torres, tiene a louis disposición servicios gratuitos de asistencia lingüística. Zarina bartholomew [phone].    We comply with applicable federal civil rights laws and [place] laws. We do not discriminate on the basis of race, color, national origin, age, disability, sex, sexual orientation, or gender identity.            Thank you!     Thank you for choosing [facility]  for your care. Our goal is always to provide you with excellent care. Hearing back from our patients is one way we can continue to improve our services. Please take a few minutes to complete the written survey that you may receive in the mail after your visit with us. Thank you!             Your Updated Medication List - Protect others around you: Learn how to safely use, store and throw away your medicines at www.disposemymeds.org.          This list is accurate as of: 11/9/17 11:24 AM.  Always use your most recent med list.                   Brand Name Dispense Instructions for use Diagnosis    ADVAIR DISKUS 100-50 MCG/DOSE diskus inhaler   Generic drug:  fluticasone-salmeterol     3 Inhaler    inhale 1 puff into the lungs every 12 hours.    Chronic obstructive pulmonary disease, unspecified COPD type (H)       albuterol (2.5 MG/3ML) 0.083% neb solution     63 vial    TAKE 1 VIAL (2.5 MG) BY NEBULIZATION EVERY 6 HOURS AS NEEDED FOR SHORTNESS OF BREATH / DYSPNEA OR WHEEZING    Chronic obstructive pulmonary disease, unspecified COPD type (H)       blood glucose monitoring meter device kit    no brand specified    1 kit    Use to test blood sugar 3 times daily or as directed.    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)       dorzolamide-timolol 2-0.5 % ophthalmic solution    COSOPT     Place 1 drop into both eyes 2 times daily        insulin aspart 100 UNIT/ML injection    NovoLOG FLEXPEN    15 mL    6 units before breakfast, 6 units before lunch, 4 units before dinner    Controlled type 2 diabetes mellitus with diabetic nephropathy, with long-term current use of insulin (H)       insulin glargine 100 UNIT/ML injection    LANTUS    3 Month    Inject 11 Units Subcutaneous At Bedtime    Type 2 diabetes mellitus with diabetic nephropathy (H)       insulin pen needle 30G X 8 MM    NOVOFINE    300 each    Use 3 daily or as directed.    Type 2 diabetes mellitus with diabetic nephropathy, unspecified long term insulin use status (H)       insulin syringe-needle U-100 31G X 5/16\" 0.5 ML     100 each    Profile Rx: patient will contact pharmacy when needed    Type 2 diabetes mellitus with diabetic nephropathy (H)       latanoprost 0.005 % ophthalmic solution    XALATAN     Place 1 drop into both eyes At Bedtime        lovastatin 40 MG tablet    MEVACOR    135 tablet    TAKE 1 AND 1/2 TABLETS BY MOUTH ONCE A DAY WITH DINNER. NEED FASTING LAB WORK    Hyperlipidemia LDL goal <130       metoprolol 25 MG 24 hr tablet    TOPROL XL    90 tablet    Take 1 tablet (25 mg) by mouth daily    Essential hypertension       NIFEdipine ER 90 MG Tb24    ADALAT CC    90 tablet    Take 1 tablet (90 mg) by mouth daily    Benign essential hypertension       * NONFORMULARY      Take 1 tablet by mouth At Bedtime Vitamin (Dialyvite) that he gets from [facility] [place].        * order for DME     1 each    Equipment being ordered: Walker " with wheels and brakes, and a seat    ESRD (end stage renal disease) on dialysis (H), Cerebral embolism with cerebral infarction (H)       order for DME     1 Units    Nebulizer machine Qty #1    Fever, unspecified, Cough, Chronic obstructive pulmonary disease, unspecified COPD type (H), Acute bronchospasm       order for DME     2 each    Equipment being ordered: Oxygen- HOME and portable. 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), Type 2 diabetes mellitus with diabetic nephropathy (H)       TART CHERRY ADVANCED Caps      Take 1 capsule by mouth daily        terazosin 10 MG capsule    HYTRIN    90 capsule    TAKE 1 CAPSULE (10 MG) BY MOUTH AT BEDTIME    Benign non-nodular prostatic hyperplasia with lower urinary tract symptoms       TYLENOL PO      Take 650 mg by mouth nightly as needed        WARFARIN SODIUM PO      Daily as directed per [facility]        * Notice:  This list has 2 medication(s) that are the same as other medications prescribed for you. Read the directions carefully, and ask your doctor or other care provider to review them with you.

## 2017-11-11 ENCOUNTER — MYC MEDICAL ADVICE (OUTPATIENT)
Dept: FAMILY MEDICINE | Facility: CLINIC | Age: 82
End: 2017-11-11

## 2017-11-11 DIAGNOSIS — Z79.4 CONTROLLED TYPE 2 DIABETES MELLITUS WITH DIABETIC NEPHROPATHY, WITH LONG-TERM CURRENT USE OF INSULIN (H): Primary | ICD-10-CM

## 2017-11-11 DIAGNOSIS — E11.21 CONTROLLED TYPE 2 DIABETES MELLITUS WITH DIABETIC NEPHROPATHY, WITH LONG-TERM CURRENT USE OF INSULIN (H): Primary | ICD-10-CM

## 2017-11-13 RX ORDER — INSULIN GLARGINE 100 [IU]/ML
11 INJECTION, SOLUTION SUBCUTANEOUS AT BEDTIME
Qty: 15 ML | Refills: 0 | Status: SHIPPED | OUTPATIENT
Start: 2017-11-13 | End: 2018-01-02

## 2017-11-13 NOTE — TELEPHONE ENCOUNTER
Maci, please see his mychart note re: insurance formulary change and his insulin.   Currently on   insulin glargine (LANTUS) 100 UNIT/ML injection 3 Month 1 11/30/2016  No   Sig: Inject 11 Units Subcutaneous At Bedtime      Do you want to change to basaglar?     Christine Oliver RNC

## 2017-11-13 NOTE — TELEPHONE ENCOUNTER
Yes- that is ok to switch to- please que up new order of Basaglar with current units patient is using of his Lantus,

## 2017-11-14 ENCOUNTER — TRANSFERRED RECORDS (OUTPATIENT)
Dept: HEALTH INFORMATION MANAGEMENT | Facility: CLINIC | Age: 82
End: 2017-11-14

## 2017-11-14 ENCOUNTER — HOSPITAL ENCOUNTER (EMERGENCY)
Facility: CLINIC | Age: 82
Discharge: ANOTHER HOSPITAL | End: 2017-11-14
Attending: FAMILY MEDICINE | Admitting: FAMILY MEDICINE
Payer: MEDICARE

## 2017-11-14 ENCOUNTER — APPOINTMENT (OUTPATIENT)
Dept: GENERAL RADIOLOGY | Facility: CLINIC | Age: 82
End: 2017-11-14
Attending: FAMILY MEDICINE
Payer: MEDICARE

## 2017-11-14 VITALS
RESPIRATION RATE: 30 BRPM | HEIGHT: 68 IN | WEIGHT: 204 LBS | BODY MASS INDEX: 30.92 KG/M2 | SYSTOLIC BLOOD PRESSURE: 109 MMHG | OXYGEN SATURATION: 97 % | TEMPERATURE: 98.9 F | DIASTOLIC BLOOD PRESSURE: 76 MMHG

## 2017-11-14 DIAGNOSIS — I48.91 ATRIAL FIBRILLATION WITH RAPID VENTRICULAR RESPONSE (H): ICD-10-CM

## 2017-11-14 DIAGNOSIS — J18.9 COMMUNITY ACQUIRED PNEUMONIA OF LEFT LOWER LOBE OF LUNG: ICD-10-CM

## 2017-11-14 DIAGNOSIS — J96.01 ACUTE RESPIRATORY FAILURE WITH HYPOXIA (H): ICD-10-CM

## 2017-11-14 LAB
ALBUMIN SERPL-MCNC: 3.2 G/DL (ref 3.4–5)
ALBUMIN UR-MCNC: 300 MG/DL
ALP SERPL-CCNC: 59 U/L (ref 40–150)
ALT SERPL W P-5'-P-CCNC: 21 U/L (ref 0–70)
ANION GAP SERPL CALCULATED.3IONS-SCNC: 15 MMOL/L (ref 3–14)
APPEARANCE UR: CLEAR
AST SERPL W P-5'-P-CCNC: 17 U/L (ref 0–45)
BASE DEFICIT BLDV-SCNC: 2.8 MMOL/L
BASOPHILS # BLD AUTO: 0 10E9/L (ref 0–0.2)
BASOPHILS NFR BLD AUTO: 0.2 %
BILIRUB SERPL-MCNC: 0.6 MG/DL (ref 0.2–1.3)
BILIRUB UR QL STRIP: NEGATIVE
BUN SERPL-MCNC: 92 MG/DL (ref 7–30)
CALCIUM SERPL-MCNC: 8.4 MG/DL (ref 8.5–10.1)
CHLORIDE SERPL-SCNC: 96 MMOL/L (ref 94–109)
CO2 SERPL-SCNC: 21 MMOL/L (ref 20–32)
COLOR UR AUTO: YELLOW
CREAT SERPL-MCNC: 9.87 MG/DL (ref 0.66–1.25)
DIFFERENTIAL METHOD BLD: ABNORMAL
EOSINOPHIL # BLD AUTO: 0.1 10E9/L (ref 0–0.7)
EOSINOPHIL NFR BLD AUTO: 0.9 %
ERYTHROCYTE [DISTWIDTH] IN BLOOD BY AUTOMATED COUNT: 15.2 % (ref 10–15)
GFR SERPL CREATININE-BSD FRML MDRD: 5 ML/MIN/1.7M2
GLUCOSE SERPL-MCNC: 161 MG/DL (ref 70–99)
GLUCOSE UR STRIP-MCNC: 70 MG/DL
HCO3 BLDV-SCNC: 22 MMOL/L (ref 21–28)
HCT VFR BLD AUTO: 31.7 % (ref 40–53)
HGB BLD-MCNC: 10.5 G/DL (ref 13.3–17.7)
HGB UR QL STRIP: ABNORMAL
IMM GRANULOCYTES # BLD: 0 10E9/L (ref 0–0.4)
IMM GRANULOCYTES NFR BLD: 0.2 %
INR PPP: 1.53 (ref 0.86–1.14)
KETONES UR STRIP-MCNC: NEGATIVE MG/DL
LACTATE BLD-SCNC: 1.2 MMOL/L (ref 0.7–2)
LEUKOCYTE ESTERASE UR QL STRIP: NEGATIVE
LYMPHOCYTES # BLD AUTO: 0.8 10E9/L (ref 0.8–5.3)
LYMPHOCYTES NFR BLD AUTO: 5.4 %
MCH RBC QN AUTO: 29.7 PG (ref 26.5–33)
MCHC RBC AUTO-ENTMCNC: 33.1 G/DL (ref 31.5–36.5)
MCV RBC AUTO: 90 FL (ref 78–100)
MONOCYTES # BLD AUTO: 1.1 10E9/L (ref 0–1.3)
MONOCYTES NFR BLD AUTO: 7.4 %
NEUTROPHILS # BLD AUTO: 12.4 10E9/L (ref 1.6–8.3)
NEUTROPHILS NFR BLD AUTO: 85.9 %
NITRATE UR QL: NEGATIVE
NT-PROBNP SERPL-MCNC: ABNORMAL PG/ML (ref 0–1800)
PCO2 BLDV: 37 MM HG (ref 40–50)
PH BLDV: 7.38 PH (ref 7.32–7.43)
PH UR STRIP: 7.5 PH (ref 5–7)
PLATELET # BLD AUTO: 150 10E9/L (ref 150–450)
PO2 BLDV: 50 MM HG (ref 25–47)
POTASSIUM SERPL-SCNC: 5 MMOL/L (ref 3.4–5.3)
PROT SERPL-MCNC: 7.3 G/DL (ref 6.8–8.8)
RBC # BLD AUTO: 3.53 10E12/L (ref 4.4–5.9)
RBC #/AREA URNS AUTO: 6 /HPF (ref 0–2)
SODIUM SERPL-SCNC: 132 MMOL/L (ref 133–144)
SOURCE: ABNORMAL
SP GR UR STRIP: 1.01 (ref 1–1.03)
SQUAMOUS #/AREA URNS AUTO: 4 /HPF (ref 0–1)
TROPONIN I SERPL-MCNC: 0.14 UG/L (ref 0–0.04)
UROBILINOGEN UR STRIP-MCNC: NORMAL MG/DL (ref 0–2)
WBC # BLD AUTO: 14.4 10E9/L (ref 4–11)
WBC #/AREA URNS AUTO: 2 /HPF (ref 0–2)

## 2017-11-14 PROCEDURE — 81001 URINALYSIS AUTO W/SCOPE: CPT | Performed by: FAMILY MEDICINE

## 2017-11-14 PROCEDURE — 85610 PROTHROMBIN TIME: CPT | Performed by: FAMILY MEDICINE

## 2017-11-14 PROCEDURE — 71020 XR CHEST 2 VW: CPT

## 2017-11-14 PROCEDURE — 87086 URINE CULTURE/COLONY COUNT: CPT | Performed by: FAMILY MEDICINE

## 2017-11-14 PROCEDURE — 96365 THER/PROPH/DIAG IV INF INIT: CPT | Performed by: FAMILY MEDICINE

## 2017-11-14 PROCEDURE — 80053 COMPREHEN METABOLIC PANEL: CPT | Performed by: FAMILY MEDICINE

## 2017-11-14 PROCEDURE — 94640 AIRWAY INHALATION TREATMENT: CPT

## 2017-11-14 PROCEDURE — 96375 TX/PRO/DX INJ NEW DRUG ADDON: CPT | Performed by: FAMILY MEDICINE

## 2017-11-14 PROCEDURE — 83880 ASSAY OF NATRIURETIC PEPTIDE: CPT | Performed by: FAMILY MEDICINE

## 2017-11-14 PROCEDURE — 25000128 H RX IP 250 OP 636: Performed by: FAMILY MEDICINE

## 2017-11-14 PROCEDURE — 93010 ELECTROCARDIOGRAM REPORT: CPT | Mod: Z6 | Performed by: FAMILY MEDICINE

## 2017-11-14 PROCEDURE — 99285 EMERGENCY DEPT VISIT HI MDM: CPT | Mod: 25 | Performed by: FAMILY MEDICINE

## 2017-11-14 PROCEDURE — 93005 ELECTROCARDIOGRAM TRACING: CPT | Performed by: FAMILY MEDICINE

## 2017-11-14 PROCEDURE — 87040 BLOOD CULTURE FOR BACTERIA: CPT | Mod: 91 | Performed by: FAMILY MEDICINE

## 2017-11-14 PROCEDURE — 85025 COMPLETE CBC W/AUTO DIFF WBC: CPT | Performed by: FAMILY MEDICINE

## 2017-11-14 PROCEDURE — 83605 ASSAY OF LACTIC ACID: CPT | Performed by: FAMILY MEDICINE

## 2017-11-14 PROCEDURE — 84484 ASSAY OF TROPONIN QUANT: CPT | Performed by: FAMILY MEDICINE

## 2017-11-14 PROCEDURE — 82803 BLOOD GASES ANY COMBINATION: CPT | Performed by: FAMILY MEDICINE

## 2017-11-14 PROCEDURE — 25000125 ZZHC RX 250: Performed by: FAMILY MEDICINE

## 2017-11-14 RX ORDER — AZITHROMYCIN 250 MG/1
250 TABLET, FILM COATED ORAL EVERY 24 HOURS
Status: DISCONTINUED | OUTPATIENT
Start: 2017-11-15 | End: 2017-11-14 | Stop reason: HOSPADM

## 2017-11-14 RX ORDER — CEFTRIAXONE SODIUM 2 G/50ML
2 INJECTION, SOLUTION INTRAVENOUS EVERY 24 HOURS
Status: DISCONTINUED | OUTPATIENT
Start: 2017-11-14 | End: 2017-11-14 | Stop reason: HOSPADM

## 2017-11-14 RX ORDER — METHYLPREDNISOLONE SODIUM SUCCINATE 40 MG/ML
60 INJECTION, POWDER, LYOPHILIZED, FOR SOLUTION INTRAMUSCULAR; INTRAVENOUS ONCE
Status: COMPLETED | OUTPATIENT
Start: 2017-11-14 | End: 2017-11-14

## 2017-11-14 RX ORDER — DILTIAZEM HYDROCHLORIDE 5 MG/ML
15 INJECTION INTRAVENOUS
Status: DISCONTINUED | OUTPATIENT
Start: 2017-11-14 | End: 2017-11-14 | Stop reason: HOSPADM

## 2017-11-14 RX ORDER — DILTIAZEM HYDROCHLORIDE 5 MG/ML
10 INJECTION INTRAVENOUS ONCE
Status: DISCONTINUED | OUTPATIENT
Start: 2017-11-14 | End: 2017-11-14 | Stop reason: HOSPADM

## 2017-11-14 RX ORDER — SODIUM CHLORIDE 9 MG/ML
INJECTION, SOLUTION INTRAVENOUS CONTINUOUS
Status: DISCONTINUED | OUTPATIENT
Start: 2017-11-14 | End: 2017-11-14 | Stop reason: HOSPADM

## 2017-11-14 RX ORDER — IPRATROPIUM BROMIDE AND ALBUTEROL SULFATE 2.5; .5 MG/3ML; MG/3ML
3 SOLUTION RESPIRATORY (INHALATION) ONCE
Status: COMPLETED | OUTPATIENT
Start: 2017-11-14 | End: 2017-11-14

## 2017-11-14 RX ADMIN — IPRATROPIUM BROMIDE AND ALBUTEROL SULFATE 3 ML: .5; 3 SOLUTION RESPIRATORY (INHALATION) at 05:42

## 2017-11-14 RX ADMIN — AZITHROMYCIN MONOHYDRATE 500 MG: 500 INJECTION, POWDER, LYOPHILIZED, FOR SOLUTION INTRAVENOUS at 07:34

## 2017-11-14 RX ADMIN — METHYLPREDNISOLONE SODIUM SUCCINATE 60 MG: 40 INJECTION, POWDER, FOR SOLUTION INTRAMUSCULAR; INTRAVENOUS at 07:01

## 2017-11-14 RX ADMIN — CEFTRIAXONE SODIUM 2 G: 2 INJECTION, SOLUTION INTRAVENOUS at 07:01

## 2017-11-14 NOTE — ED PROVIDER NOTES
History     Chief Complaint   Patient presents with     Shortness of Breath     12 hrs ago, acute onset/ bladder control problem as well/ diabetic   SOA  HPI  Rajinderabdias Walton is a 83 year old male, past medical history is significant for CVA, renal cancer, type II diabetes, COPD, thrombocytopenia, anxiety, hyperlipidemia, anemia, stage V kidney disease on dialysis, hypertension, abdominal aortic aneurysm, gout, presents to the emergency department with concerns of shortness of air.  History is obtained from the patient and to a lesser extent his wife.  He states that his felt well up until late yesterday afternoon evening when he began with more shortness of breath than usual at baseline.  Questionable if any change in his cough.  He slept very little if at all being fitful and short of air questionable if this is worse lying down.  He notes no fever or chills or sweats.  No change in baseline cough.  Has not been around any sick contacts that he is aware of such as grandchildren with illness.  He notes no chest pain palpitations nausea or vomiting.  He did notice that he had some urinary frequency and urgency, also notes 3 episodes of urinary incontinence with coughing.  His wife states that he normally uses oxygen around 2 L at home, she noted that his saturations were in the 70s on 2 L and increasing this to 3 had a minimal impact on his saturation.  They were advised by the nurse advice line to come in.        Problem List:    Patient Active Problem List    Diagnosis Date Noted     Cerebral embolism with cerebral infarction (H) 02/17/2012     Priority: High     1/12/12 presented with dizziness, ataxia and disorientation.  Dx with acute infarct in the left occipital lobe and posterior left temporal lobe without hemorrhage on head CT. Also had right homonymous hemianopsia. MRI 1/12- 1. Acute infarction of the left occipital lobe in left PCA distribution. No evidence of hemorrhage. Likely occlusion of the left  posterior cerebral artery at its P2 segment.  Severe chronic small vessel ischemic disease.  Neck vessels are grossly patent. However cannot exclude 50% stenosis of the left ICA.       Malignant neoplasm of kidney excluding renal pelvis (H) 11/03/2007     Priority: High     Renal cell cancer.  S/p partial left nephrectomy.  MRI abd 7/07 - showed no recurrence of cancer  Followed by Dr. Carrion, urology, in [place], yearly  Problem list name updated by automated process. Provider to review       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) 11/20/2017     Priority: Medium     Legally blind in right eye, as defined in [place] 08/31/2017     Priority: Medium     Controlled type 2 diabetes mellitus with diabetic nephropathy, with long-term current use of insulin (H) 11/29/2016     Priority: Medium     Chronic obstructive pulmonary disease, unspecified COPD type (H) 11/29/2016     Priority: Medium     Glaucoma 02/04/2015     Priority: Medium     Thrombocytopenia, primary (H) 01/16/2014     Priority: Medium     Hypotension 01/16/2014     Priority: Medium     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   Questions or concerns outside clinic hours   I will call the 24 hour nurse line at   [phone] or [address] [place] [address].   Need to schedule an appointment   I will call the 24 hour scheduling team at [phone] or my clinic directly at [phone].   Same day treatment     I will call my clinic first, nurse line if after hours, urgent care and express care if needed.  "  Clinic care coordination services (regular clinic hours)   I will call a clinic care coordinator directly:     Snow Ribera, RN  [phone]    PARKER Barcenas:    [phone]    Or call my clinic at [phone] and ask to speak with care coordination.   Crisis Services: Behavioral or Mental Health  [facility] 24 Hour Phone Line  [phone]    [facility] 24 Hour Crisis Services  [phone]    [facility] (Behavioral Health Providers) [facility] [phone]    [facility]   [phone]     Emergency treatment -- Immediately    CAll 911              Anxiety disorder due to medical condition 02/17/2012     Priority: Medium     S/p CVA in 1/12  Did not want to take citalopram due to fear of side effects, but seems to be improving       Hyperlipidemia LDL goal <70 10/31/2010     Priority: Medium     CHOL      137   1/13/2012  HDL       35   1/13/2012  LDL       85   1/13/2012  TRIG       84   1/13/2012  CHOLHDLRATIO      3.9   1/13/2012  Lovastatin 60mg         Anemia 09/10/2010     Priority: Medium     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) 05/06/2010     Priority: Medium     Fistula placed fall 2011, on dialysis since 5/12       Disorder of bursae and tendons in shoulder region 05/03/2007     Priority: Medium     Problem list name updated by automated process. Provider to review       Essential Hypertension, Benign 04/06/2005     Priority: Medium     Controlled  Nifedipine does cause fatigue       zAdvanced directives, counseling/discussion 02/29/2012     Priority: Low     Patient does not have an Advance/Health Care Directive (HCD), given \"What is Advance Care Planning?\" flyer and requests blank HCD form.    Jane Martinez  February 29, 2012         Proteinuria 05/06/2010     Priority: Low     Abdominal aortic aneurysm (H) 11/03/2007     Priority: Low     S/p AAA repair 2005  Follows with Dr. Tinsley.  Will see him in f/u fall 2008  Problem list name " updated by automated process. Provider to review       Gouty arthropathy 04/25/2006     Priority: Low     Frequent attacks   Discussed allopurinol will hold off for now  Problem list name updated by automated process. Provider to review and confirm  Imo Update utility          Past Medical History:    Past Medical History:   Diagnosis Date     Abdominal aneurysm without mention of rupture      Atherosclerosis of renal artery (H)      Basal cell carcinoma      Essential hypertension, benign      Gout, unspecified      Malignant neoplasm of kidney, except pelvis 2005     Other and unspecified hyperlipidemia      Other peripheral vascular disease(443.89) 2005     Type II or unspecified type diabetes mellitus without mention of complication, not stated as uncontrolled      Unspecified disorder of kidney and ureter        Past Surgical History:    Past Surgical History:   Procedure Laterality Date     ABDOMEN SURGERY  2005    aortic aneurysm     CATARACT IOL, RT/LT  2/4/08    left eye - phacoemulsification w/ posterior chamber lens implantation combined w/ glaucoma filtering procedure     CREATE FISTULA ARTERIOVENOUS UPPER EXTREMITY  1/3/2012    Procedure:CREATE FISTULA ARTERIOVENOUS UPPER EXTREMITY; LEFT UPPER ARM ARTERIOVENOUS FISTULA; Surgeon:JENA PEARSON; Location:[facility]     SURGICAL HISTORY OF -   2/13/2004    Right knee arthroscopy     SURGICAL HISTORY OF -       Vocal cord biopsy-benign     SURGICAL HISTORY OF -   3/3/2005    AAA, left partial nephrectomy       Family History:    Family History   Problem Relation Age of Onset     CANCER Father      Asophageal      CANCER Brother      Throat      Other Cancer Sister      Lung        Social History:  Marital Status:   [2]  Social History   Substance Use Topics     Smoking status: Former Smoker     Packs/day: 1.00     Years: 55.00     Types: Cigarettes     Quit date: 1/1/2005     Smokeless tobacco: Never Used     Alcohol use No        Medications:   "    metoprolol (TOPROL XL) 25 MG 24 hr tablet   albuterol (2.5 MG/3ML) 0.083% neb solution   ADVAIR DISKUS 100-50 MCG/DOSE diskus inhaler   bumetanide (BUMEX) 1 MG tablet   clopidogrel (PLAVIX) 75 MG tablet   BASAGLAR 100 UNIT/ML injection   Misc Natural Products (TART CHERRY ADVANCED) CAPS   terazosin (HYTRIN) 10 MG capsule   insulin aspart (NOVOLOG FLEXPEN) 100 UNIT/ML injection   lovastatin (MEVACOR) 40 MG tablet   blood glucose monitoring (ONE TOUCH ULTRA) test strip   insulin pen needle (NOVOFINE) 30G X 8 MM   latanoprost (XALATAN) 0.005 % ophthalmic solution   blood glucose monitoring (NO BRAND SPECIFIED) meter device kit   NIFEdipine ER (ADALAT CC) 90 MG TB24   insulin syringe-needle U-100 31G X 5/16\" 0.5 ML   order for DME   WARFARIN SODIUM PO   order for DME   Acetaminophen (TYLENOL PO)   NONFORMULARY   dorzolamide-timolol (COSOPT) 2-0.5 % ophthalmic solution   ORDER FOR DME         Review of Systems   All other systems reviewed and are negative.      Physical Exam   BP: 156/85  Heart Rate: 130  Temp: 98.9  F (37.2  C)  Resp: 28  Height: 172.7 cm (5' 8\")  Weight: 92.5 kg (204 lb)  SpO2: 91 %      Physical Exam   Constitutional: He is oriented to person, place, and time. He appears well-developed and well-nourished.   HENT:   Head: Normocephalic and atraumatic.   Right Ear: External ear normal.   Left Ear: External ear normal.   Nose: Nose normal.   Eyes: Conjunctivae and EOM are normal. Pupils are equal, round, and reactive to light.   Neck: Normal range of motion. Neck supple.   Cardiovascular: Normal rate, regular rhythm, normal heart sounds and intact distal pulses.    Pulmonary/Chest: Effort normal.   Poor air entry throughout.  Expiratory wheezes bibasilar.   Abdominal: Soft. Bowel sounds are normal.   Musculoskeletal: Normal range of motion.   Scant lower extremity edema.  No change per patient.   Neurological: He is alert and oriented to person, place, and time.   Skin: Skin is warm and dry. "   Psychiatric: He has a normal mood and affect. His behavior is normal.   Nursing note and vitals reviewed.      ED Course     ED Course     Procedures               EKG Interpretation:      Interpreted by Antonio Bear  Time reviewed: 05:57  Symptoms at time of EKG: SOA   Rhythm: Possible A. fib/flutter  Rate: 133  Axis: Normal  Ectopy: none  Conduction: normal  ST Segments/ T Waves: No ST-T wave changes and No acute ischemic changes  Q Waves: none  Comparison to prior: Significant increase in rate and possible change in rhythm from a cardiogram dated 12/26/16    Clinical Impression: Probable atrial flutter/fibrillation 133 bpm without evidence of ischemic changes or infarct.                    Critical Care time:  none             Labs Ordered and Resulted from Time of ED Arrival Up to the Time of Departure from the ED   CBC WITH PLATELETS DIFFERENTIAL - Abnormal; Notable for the following:        Result Value    WBC 14.4 (*)     RBC Count 3.53 (*)     Hemoglobin 10.5 (*)     Hematocrit 31.7 (*)     RDW 15.2 (*)     Absolute Neutrophil 12.4 (*)     All other components within normal limits   COMPREHENSIVE METABOLIC PANEL - Abnormal; Notable for the following:     Sodium 132 (*)     Anion Gap 15 (*)     Glucose 161 (*)     Urea Nitrogen 92 (*)     Creatinine 9.87 (*)     GFR Estimate 5 (*)     GFR Estimate If Black 6 (*)     Calcium 8.4 (*)     Albumin 3.2 (*)     All other components within normal limits   TROPONIN I - Abnormal; Notable for the following:     Troponin I ES 0.137 (*)     All other components within normal limits   NT PROBNP INPATIENT - Abnormal; Notable for the following:     N-Terminal Pro BNP Inpatient 79447 (*)     All other components within normal limits   BLOOD GAS VENOUS - Abnormal; Notable for the following:     PCO2 Venous 37 (*)     PO2 Venous 50 (*)     All other components within normal limits   UA MACROSCOPIC WITH REFLEX TO MICRO AND CULTURE - Abnormal; Notable for the  following:     Glucose Urine 70 (*)     Blood Urine Moderate (*)     pH Urine 7.5 (*)     Protein Albumin Urine 300 (*)     RBC Urine 6 (*)     Squamous Epithelial /HPF Urine 4 (*)     All other components within normal limits   INR - Abnormal; Notable for the following:     INR 1.53 (*)     All other components within normal limits   LACTIC ACID WHOLE BLOOD     This patient was discussed with and signed out to Dr. Norberto Chandler at shift change with pending lab diagnostics and imaging.  Disposition per Dr. Chandler      Assessments & Plan (with Medical Decision Making)       Disclaimer: This note consists of symbols derived from keyboarding, dictation and/or voice recognition software. As a result, there may be errors in the script that have gone undetected. Please consider this when interpreting information found in this chart.      I have reviewed the nursing notes.    I have reviewed the findings, diagnosis, plan and need for follow up with the patient.       Discharge Medication List as of 11/14/2017  9:18 AM          Final diagnoses:   Community acquired pneumonia of left lower lobe of lung (H)   Acute respiratory failure with hypoxia (H)   Atrial fibrillation with rapid ventricular response (H)       11/14/2017   [facility] EMERGENCY DEPARTMENT     Antonio Bear MD  11/21/17 0633

## 2017-11-14 NOTE — ED PROVIDER NOTES
"ED Physician Note: Continued Care     HPI  Patient is an 83-year-old male presenting with shortness of breath and fever symptoms.  Please see Dr. Bear's note for more HPI details.      Physical  /69  Temp 98.9  F (37.2  C) (Oral)  Resp 24  Ht 1.727 m (5' 8\")  Wt 92.5 kg (204 lb)  SpO2 96%  BMI 31.02 kg/m2  General: Patient is in no distress.  Talking in complete sentences.  Neurological: Alert.  Moving upper and lower extremities equally, bilaterally.  Head / Neck: Atraumatic.  Ears: Not done.  Eyes: Pupils are equal, round, and reactive.  Normal conjunctiva.  Nose: Midline.  No epistaxis.  Mouth / Throat: No ulcerations or lesions.  Upper pharynx is not erythematous.   Respiratory: No respiratory distress.  Rhonchi.   Cardiovascular: Tachycardia.  Peripheral extremities are warm.  Trace edema in the lower extremities.  Abdomen / Pelvis: Not tender.  Genitalia: Not done.  Musculoskeletal: Not tender to palpation over major muscles and joints.  Skin: No evidence of rash or trauma.       ED Course    Labs Ordered and Resulted from Time of ED Arrival Up to the Time of Departure from the ED   CBC WITH PLATELETS DIFFERENTIAL - Abnormal; Notable for the following:        Result Value    WBC 14.4 (*)     RBC Count 3.53 (*)     Hemoglobin 10.5 (*)     Hematocrit 31.7 (*)     RDW 15.2 (*)     Absolute Neutrophil 12.4 (*)     All other components within normal limits   COMPREHENSIVE METABOLIC PANEL - Abnormal; Notable for the following:     Sodium 132 (*)     Anion Gap 15 (*)     Glucose 161 (*)     Urea Nitrogen 92 (*)     Creatinine 9.87 (*)     GFR Estimate 5 (*)     GFR Estimate If Black 6 (*)     Calcium 8.4 (*)     Albumin 3.2 (*)     All other components within normal limits   TROPONIN I - Abnormal; Notable for the following:     Troponin I ES 0.137 (*)     All other components within normal limits   NT PROBNP INPATIENT - Abnormal; Notable for the following:     N-Terminal Pro BNP Inpatient 67209 (*)     " "All other components within normal limits   BLOOD GAS VENOUS - Abnormal; Notable for the following:     PCO2 Venous 37 (*)     PO2 Venous 50 (*)     All other components within normal limits   INR - Abnormal; Notable for the following:     INR 1.53 (*)     All other components within normal limits   LACTIC ACID WHOLE BLOOD   UA MACROSCOPIC WITH REFLEX TO MICRO AND CULTURE   URINE CULTURE AEROBIC BACTERIAL   BLOOD CULTURE   BLOOD CULTURE       XR Chest 2 Views   Final Result   IMPRESSION: Left basilar pneumonia and left pleural effusion.      ODILON VIGIL MD          0647.  Patient presents with shortness of breath over the past 2-3 days with significant worsening overnight.  Two days ago, the patient had onset of chills and nausea.  He threw up once.  He has been coughing with minimal change in production.  Chest x-ray shows obvious infiltrate on the left side consistent with pneumonia.  No recent hospitalization.  Ceftriaxone and azithromycin will be given.  He continues to have tachycardia with an irregularly irregular rhythm consistent with atrial fibrillation.  His pulse is 130.  He is a hemodialysis patient and I'm hesitant to provide large boluses of fluid until he has access to hemodialysis if needed.  He does not appear dry clinically.  Normal saline infusion at 100 mL per hour is ordered.  His pulse has not gone above 135.  He is very comfortable clinically at this time.  He would like to be transferred to [facility].  I am awaiting their call back.    0724.  Patient continues to have atrial fibrillation with rapid rate.  Diltiazem 10 mg bolus, 15 mg bolus prn, and then IV infusion is ordered.  Patient is accepted at [facility].    0728.  The patient\"s vital signs are reassessed and he is found to have a pulse between 113 and 123.  His blood pressure is 117 systolic.  He is very comfortable.  I'm not going to provide diltiazem at this time.  He can be reassessed at his receiving center.  " I will provide diltiazem as needed for increasing heart rate.  He does have a troponin that is mildly elevated.  This is likely related to his poor kidney function.  He also has a BNP that is elevated.  This may be related to some mild CHF but is more likely related to his kidney function as well.      IMPRESSION    ICD-10-CM    1. Community acquired pneumonia of left lower lobe of lung (H) J18.1    2. Acute respiratory failure with hypoxia (H) J96.01    3. Atrial fibrillation with rapid ventricular response (H) I48.91              Critical Care time:  none                 Norberto Espinoza MD  11/14/17 6693

## 2017-11-14 NOTE — ED NOTES
Pt states that he had a sudden onset of SOB about 12 hrs ago. He has been on home oxygen for 2 yrs. He is alert and cooperative and states that he is currently SOB. He is tachypnea with low reserve with activity. He is on hemo dialysis and has been for 5 yrs. He denies any recent swelling in legs. He states that he had a sensation of chills yesterday. He has not had a fever. His blood sugar was 187 this evening. Has had 3 episodes of incontinence. Recent cough.

## 2017-11-15 LAB
BACTERIA SPEC CULT: NORMAL
BACTERIA SPEC CULT: NORMAL
SPECIMEN SOURCE: NORMAL

## 2017-11-16 ENCOUNTER — HOME CARE/HOSPICE - HEALTHEAST (OUTPATIENT)
Dept: HOME HEALTH SERVICES | Facility: HOME HEALTH | Age: 82
End: 2017-11-16

## 2017-11-18 ENCOUNTER — COMMUNICATION - HEALTHEAST (OUTPATIENT)
Dept: SCHEDULING | Facility: CLINIC | Age: 82
End: 2017-11-18

## 2017-11-20 ENCOUNTER — ANTICOAGULATION THERAPY VISIT (OUTPATIENT)
Dept: ANTICOAGULATION | Facility: CLINIC | Age: 82
End: 2017-11-20
Payer: MEDICARE

## 2017-11-20 ENCOUNTER — TELEPHONE (OUTPATIENT)
Dept: FAMILY MEDICINE | Facility: CLINIC | Age: 82
End: 2017-11-20

## 2017-11-20 ENCOUNTER — ALLIED HEALTH/NURSE VISIT (OUTPATIENT)
Dept: FAMILY MEDICINE | Facility: CLINIC | Age: 82
End: 2017-11-20
Payer: MEDICARE

## 2017-11-20 ENCOUNTER — OFFICE VISIT (OUTPATIENT)
Dept: FAMILY MEDICINE | Facility: CLINIC | Age: 82
End: 2017-11-20
Payer: MEDICARE

## 2017-11-20 ENCOUNTER — TELEPHONE (OUTPATIENT)
Dept: ANTICOAGULATION | Facility: CLINIC | Age: 82
End: 2017-11-20

## 2017-11-20 VITALS
SYSTOLIC BLOOD PRESSURE: 128 MMHG | TEMPERATURE: 98.9 F | WEIGHT: 206 LBS | DIASTOLIC BLOOD PRESSURE: 58 MMHG | OXYGEN SATURATION: 92 % | BODY MASS INDEX: 31.32 KG/M2 | HEART RATE: 80 BPM

## 2017-11-20 DIAGNOSIS — I63.40 CEREBRAL INFARCTION DUE TO EMBOLISM OF CEREBRAL ARTERY (H): ICD-10-CM

## 2017-11-20 DIAGNOSIS — A41.9 SEPSIS, DUE TO UNSPECIFIED ORGANISM: ICD-10-CM

## 2017-11-20 DIAGNOSIS — I63.40 CEREBRAL INFARCTION DUE TO EMBOLISM OF CEREBRAL ARTERY (H): Primary | ICD-10-CM

## 2017-11-20 DIAGNOSIS — I48.91 ATRIAL FIBRILLATION, NEW ONSET (H): ICD-10-CM

## 2017-11-20 DIAGNOSIS — J44.9 CHRONIC OBSTRUCTIVE PULMONARY DISEASE, UNSPECIFIED COPD TYPE (H): Primary | ICD-10-CM

## 2017-11-20 DIAGNOSIS — J18.9 PNEUMONIA DUE TO INFECTIOUS ORGANISM, UNSPECIFIED LATERALITY, UNSPECIFIED PART OF LUNG: ICD-10-CM

## 2017-11-20 DIAGNOSIS — Z09 HOSPITAL DISCHARGE FOLLOW-UP: Primary | ICD-10-CM

## 2017-11-20 LAB
BACTERIA SPEC CULT: NO GROWTH
BACTERIA SPEC CULT: NO GROWTH
INR POINT OF CARE: 3.3 (ref 0.86–1.14)
Lab: NORMAL
Lab: NORMAL
SPECIMEN SOURCE: NORMAL
SPECIMEN SOURCE: NORMAL

## 2017-11-20 PROCEDURE — 99207 ZZC NO CHARGE NURSE ONLY: CPT

## 2017-11-20 PROCEDURE — 36416 COLLJ CAPILLARY BLOOD SPEC: CPT

## 2017-11-20 PROCEDURE — 85610 PROTHROMBIN TIME: CPT | Mod: QW

## 2017-11-20 PROCEDURE — 99214 OFFICE O/P EST MOD 30 MIN: CPT | Performed by: FAMILY MEDICINE

## 2017-11-20 NOTE — PATIENT INSTRUCTIONS
Thank you for choosing [facility].  You may be receiving a survey in the mail from [employer] regarding your visit today.  Please take a few minutes to complete and return the survey to let us know how we are doing.      If you have questions or concerns, please contact us via [employer] or you can contact your care team at [phone].    Our Clinic hours are:  Monday 6:40 am  to 7:00 pm  Tuesday -Friday 6:40 am to 5:00 pm    The [place] outpatient lab hours are:  Monday - Friday 6:10 am to 4:45 pm  Saturdays 7:00 am to 11:00 am  Appointments are required, call [phone]    If you have clinical questions after hours or would like to schedule an appointment,  call the clinic at [phone].  Understanding Atrial Fibrillation    An arrhythmia is any problem with the speed or pattern of the heartbeat. Atrial fibrillation (AFib) is a common type of arrhythmia. It causes fast, chaotic electrical signals in the atria. This leads to poor functioning of the heart. It also affects how much blood your heart can pump out to the body.  Afib may occur once in a while and go away on its own. Or it may continue for longer periods and need treatment.  AFib can lead to serious problems, such as stroke. Your healthcare provider will need to monitor and manage it.  What happens during atrial fibrillation?   The heart has an electrical system that sends signals to control the heartbeat. As signals move through the heart, they tell the heart s upper chambers (atria) and lower chambers (ventricles) when to squeeze (contract) and relax. This lets blood move through the heart and out to the body and lungs.  With AFib, the atria receive abnormal signals. This causes them to contract in a fast and irregular way, and out of sync with the ventricles. When this happens, the atria also have a harder time moving blood into the ventricles. Blood may then pool in the atria, which increases the risk for blood clots and stroke. The  ventricles also may contract too quickly and irregularly. As a result, they may not pump blood to the body and lungs as well as they should. This can weaken the heart muscle over time and cause heart failure.  What causes atrial fibrillation?  AFib is more common in older adults. It has many possible causes including:    Coronary artery disease    Heart valve disease    Heart attack    Heart surgery    High blood pressure    Thyroid disease    Diabetes    Lung disease    Sleep apnea    Heavy alcohol use  In some cases of AFib, doctors do not know the cause.  What are the symptoms of atrial fibrillation?  AFib may or may not cause symptoms. If symptoms do occur, they may include:    A fast, pounding, irregular heartbeat    Shortness of breath    Tiredness    Dizziness or fainting    Chest pain  How is atrial fibrillation treated?  Treatments for AFib can include any of the options below.    Medicines. You may be prescribed:    Heart rate medicines to help slow down the heartbeat    Heart rhythm medicines to help the heart beat more regularly    Anti-clotting medicines to help reduce the risk for blood clots and stroke    Electrical cardioversion. Your healthcare provider uses special pads or paddles to send one or more brief electrical shocks to the heart. This can help reset the heartbeat to normal.    Ablation. Long, thin tubes called catheters are threaded through a blood vessel to the heart. There, the catheters send out hot or cold energy to the areas causing the abnormal signals. This energy destroys the problem tissue or cells. This improves the chances that your heart will stay in normal rhythm without using medicines. If your heart rate and rhythm can t be controlled, you may need ablation and a pacemaker. These will help control the heart rate and regularity of the heartbeat.    Surgery. During surgery, your healthcare provider may use different methods to create scar tissue in the areas of the heart  causing the abnormal signals. The scar tissue disrupts the abnormal signals and may stop AFib from occurring.  What are the complications of atrial fibrillation?  These can include:    Blood clots    Stroke    Heart failure. This problem occurs when the heart muscle weakens so much that it can no longer pump blood well.  When should I call my healthcare provider?  Call your healthcare provider right away if you have any of these:    Symptoms that don t get better with treatment, or get worse    New symptoms   Date Last Reviewed: 5/1/2016 2000-2017 [employer]. [address] [place] [address], [place] [address]. All rights reserved. This information is not intended as a substitute for professional medical care. Always follow your healthcare professional's instructions.

## 2017-11-20 NOTE — NURSING NOTE
Wife Justine stopped in hoping that Griffin would be able to get an appointment for Griffin today with Stefanie Louis for a hospital follow-up today. Was not able to get him in with Stefanie but did get him scheduled with Dr. Jasmine today. He was seen at [facility].    Parvin Naik RN

## 2017-11-20 NOTE — PROGRESS NOTES
ANTICOAGULATION FOLLOW-UP CLINIC VISIT    Patient Name:  Griffin Walton  Date:  11/20/2017  Contact Type:  Face to Face, accompanied by spouse    SUBJECTIVE:     Patient Findings     Positives Hospital admission (11/14/17 came to ED then was sent to [facility] due to dialysis needs and was there from 11/14 - 11/18)    Comments Pt was sent direct from appt with Dr. Jasmine to [facility] for an INR, had not been scheduled, was added - no time to prepare or review chart. Seen by Dr. Jasmine as his PCP is not available today.  Spouse reports that pt's maintenance dose is 4mg every day, and was this prior to going to hospital.  Has been on warfarin for approx one year. Per pt, he has been getting dosing instructions from Dr. Keene, now plans to come to [facility].            OBJECTIVE    INR Protime   Date Value Ref Range Status   11/20/2017 3.3 (A) 0.86 - 1.14 Final       ASSESSMENT / PLAN  INR assessment SUPRA    Recheck INR In: 8 DAYS due to holiday and dialysis schedule   INR Location Clinic      Anticoagulation Summary as of 11/20/2017     INR goal 2.0-3.0   Today's INR 3.3!   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 [facility] MWF 8-12pm in [place].      Anticoagulation Care Providers     Provider Role Specialty Phone number    Stefanie Louis APRN CNP Responsible Nurse Practitioner [phone]            See the Encounter Report to view Anticoagulation Flowsheet and Dosing Calendar (Go to Encounters tab in chart review, and find the Anticoagulation Therapy Visit)    Sending message to  Dr. Keene.    Alisa Craig RN

## 2017-11-20 NOTE — NURSING NOTE
"Chief Complaint   Patient presents with     Hospital F/U       Initial /58 (BP Location: Right arm, Cuff Size: Adult Regular)  Pulse 80  Temp 98.9  F (37.2  C) (Tympanic)  Wt 206 lb (93.4 kg)  SpO2 92%  BMI 31.32 kg/m2 Estimated body mass index is 31.32 kg/(m^2) as calculated from the following:    Height as of 11/14/17: 5' 8\" (1.727 m).    Weight as of this encounter: 206 lb (93.4 kg).  Medication Reconciliation: complete  "

## 2017-11-20 NOTE — MR AVS SNAPSHOT
Griffin Walton   11/20/2017 2:00 PM   Anticoagulation Therapy Visit    Description:  83 year old male   Provider:  [place] [facility]   Department:  [place] [facility]           INR as of 11/20/2017     Today's INR 3.3!      Anticoagulation Summary as of 11/20/2017     INR goal 2.0-3.0   Today's INR 3.3!   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  Then resume 4mg every day.  Recheck INR Tuesday 11/28/17.      Your next Anticoagulation Clinic appointment(s)     Nov 28, 2017 10:30 AM CST   [facility]agulation [facility] with [place] [facility]    [address] [facility] [id number] [phone]              Contact Numbers     Please call [phone] to cancel and/or reschedule your appointment.  Please call [phone] with any problems or questions regarding your therapy          November 2017 Details    Sun Mon Tue Wed Thu Fri Sat        1               2               3               4                 5               6               7               8               9               10               11                 12               13               14               15               16               17               18                 19               20      2 mg   See details      21      4 mg         22      4 mg         23      4 mg         24      4 mg         25      4 mg           26      4 mg         27      4 mg         28            29               30                  Date Details   11/20 This INR check       Date of next INR:  11/28/2017         How to take your warfarin dose     To take:  2 mg Take 1 of the 2 mg tablets.    To take:  4 mg Take 2 of the 2 mg tablets.

## 2017-11-20 NOTE — TELEPHONE ENCOUNTER
Verbal order has been given. Homecare has been ordered on discharge from the hospital. The pt is in to see Dr Jasmine today.  Marilu Lemos RN

## 2017-11-20 NOTE — MR AVS SNAPSHOT
After Visit Summary   11/20/2017    Griffin Walton    MRN: [id number]           Patient Information     Date Of Birth          5/18/1934        Visit Information        Provider Department      11/20/2017 1:00 PM Amna Jasmine MD [facility]        Today's Diagnoses     Atrial fibrillation, new onset (H)    -  1      Care Instructions          Thank you for choosing [facility].  You may be receiving a survey in the mail from [facility] regarding your visit today.  Please take a few minutes to complete and return the survey to let us know how we are doing.      If you have questions or concerns, please contact us via [employer] or you can contact your care team at [phone].    Our Clinic hours are:  Monday 6:40 am  to 7:00 pm  Tuesday -Friday 6:40 am to 5:00 pm    The [place] outpatient lab hours are:  Monday - Friday 6:10 am to 4:45 pm  Saturdays 7:00 am to 11:00 am  Appointments are required, call [phone]    If you have clinical questions after hours or would like to schedule an appointment,  call the clinic at [phone].  Understanding Atrial Fibrillation    An arrhythmia is any problem with the speed or pattern of the heartbeat. Atrial fibrillation (AFib) is a common type of arrhythmia. It causes fast, chaotic electrical signals in the atria. This leads to poor functioning of the heart. It also affects how much blood your heart can pump out to the body.  Afib may occur once in a while and go away on its own. Or it may continue for longer periods and need treatment.  AFib can lead to serious problems, such as stroke. Your healthcare provider will need to monitor and manage it.  What happens during atrial fibrillation?   The heart has an electrical system that sends signals to control the heartbeat. As signals move through the heart, they tell the heart s upper chambers (atria) and lower chambers (ventricles) when to squeeze (contract) and relax. This lets  blood move through the heart and out to the body and lungs.  With AFib, the atria receive abnormal signals. This causes them to contract in a fast and irregular way, and out of sync with the ventricles. When this happens, the atria also have a harder time moving blood into the ventricles. Blood may then pool in the atria, which increases the risk for blood clots and stroke. The ventricles also may contract too quickly and irregularly. As a result, they may not pump blood to the body and lungs as well as they should. This can weaken the heart muscle over time and cause heart failure.  What causes atrial fibrillation?  AFib is more common in older adults. It has many possible causes including:    Coronary artery disease    Heart valve disease    Heart attack    Heart surgery    High blood pressure    Thyroid disease    Diabetes    Lung disease    Sleep apnea    Heavy alcohol use  In some cases of AFib, doctors do not know the cause.  What are the symptoms of atrial fibrillation?  AFib may or may not cause symptoms. If symptoms do occur, they may include:    A fast, pounding, irregular heartbeat    Shortness of breath    Tiredness    Dizziness or fainting    Chest pain  How is atrial fibrillation treated?  Treatments for AFib can include any of the options below.    Medicines. You may be prescribed:    Heart rate medicines to help slow down the heartbeat    Heart rhythm medicines to help the heart beat more regularly    Anti-clotting medicines to help reduce the risk for blood clots and stroke    Electrical cardioversion. Your healthcare provider uses special pads or paddles to send one or more brief electrical shocks to the heart. This can help reset the heartbeat to normal.    Ablation. Long, thin tubes called catheters are threaded through a blood vessel to the heart. There, the catheters send out hot or cold energy to the areas causing the abnormal signals. This energy destroys the problem tissue or cells. This  improves the chances that your heart will stay in normal rhythm without using medicines. If your heart rate and rhythm can t be controlled, you may need ablation and a pacemaker. These will help control the heart rate and regularity of the heartbeat.    Surgery. During surgery, your healthcare provider may use different methods to create scar tissue in the areas of the heart causing the abnormal signals. The scar tissue disrupts the abnormal signals and may stop AFib from occurring.  What are the complications of atrial fibrillation?  These can include:    Blood clots    Stroke    Heart failure. This problem occurs when the heart muscle weakens so much that it can no longer pump blood well.  When should I call my healthcare provider?  Call your healthcare provider right away if you have any of these:    Symptoms that don t get better with treatment, or get worse    New symptoms   Date Last Reviewed: 5/1/2016 2000-2017 [employer]. [address] [place] [address]. All rights reserved. This information is not intended as a substitute for professional medical care. Always follow your healthcare professional's instructions.                Follow-ups after your visit        Additional Services     INR CLINIC REFERRAL       Your provider has referred you to INR Services.    Please be aware that coverage of these services is subject to the terms and limitations of your health insurance plan.  Call member services at your health plan with any benefit or coverage questions.    Indication for Anticoagulation: Atrial Fibrillation  If nonstandard INR is desired, indicate goal range and explanation: 2-3  Expected Duration of Therapy: Lifetime                  Your next 10 appointments already scheduled     Feb 27, 2018 10:00 AM CST   [facility] with Geraldo [facility] MD   [facility]    [address] [place] [id number]   [phone]             May 15, 2018  1:00 PM CDT   SIX MINUTE WALK with UC PFL A, UC PFL 6 MINUTE WALK 1   [facility] Pulmonary Function Testing ([facility])    [address] [place] [id number] [phone]            May 15, 2018  2:00 PM CDT   (Arrive by 1:45 PM)   Return Interstitial Lung with David Morris Perlman, MD   [facility] ([facility])    [address] [place] [address] [phone]              Who to contact     If you have questions or need follow up information about today's clinic visit or your schedule please contact [facility] directly at [phone].  Normal or non-critical lab and imaging results will be communicated to you by MyChart, letter or phone within 4 business days after the clinic has received the results. If you do not hear from us within 7 days, please contact the clinic through [facility] or phone. If you have a critical or abnormal lab result, we will notify you by phone as soon as possible.  Submit refill requests through [employer] or call your pharmacy and they will forward the refill request to us. Please allow 3 business days for your refill to be completed.          Additional Information About Your Visit        [facility] Information     [employer] gives you secure access to your electronic health record. If you see a primary care provider, you can also send messages to your care team and make appointments. If you have questions, please call your primary care clinic.  If you do not have a primary care provider, please call [phone] and they will assist you.        Care EveryWhere ID     This is your Care EveryWhere ID. This could be used by other organizations to access your [facility] medical records  [id number]        Your Vitals Were     Pulse Temperature Pulse Oximetry BMI (Body Mass Index)          80 98.9  F (37.2  C) (Tympanic) 92% 31.32 kg/m2          Blood Pressure from Last 3 Encounters:   11/20/17 128/58   11/14/17 109/76   11/09/17 171/77    Weight from Last 3 Encounters:   11/20/17 206 lb (93.4 kg)   11/14/17 204 lb (92.5 kg)   11/02/17 205 lb (93 kg)              We Performed the Following     INR CLINIC REFERRAL     INR        Primary Care Provider Office Phone # Fax #    DANNA Lopez [phone] [address] [facility] [address]        Equal Access to Services     BASIA BILL : Hadii camila ku hadasho Somichael, waaxda luqadaha, qaybta kaalmada aderoxy, renzo palacio . So [facility] [phone].    ATENCIÓN: Si habla español, tiene a louis disposición servicios gratuitos de asistencia lingüística. Llame al [phone].    We comply with applicable federal civil rights laws and [place] laws. We do not discriminate on the basis of race, color, national origin, age, disability, sex, sexual orientation, or gender identity.            Thank you!     Thank you for choosing [facility]  for your care. Our goal is always to provide you with excellent care. Hearing back from our patients is one way we can continue to improve our services. Please take a few minutes to complete the written survey that you may receive in the mail after your visit with us. Thank you!             Your Updated Medication List - Protect others around you: Learn how to safely use, store and throw away your medicines at www.disposemymeds.org.          This list is accurate as of: 11/20/17  1:46 PM.  Always use your most recent med list.                   Brand Name Dispense Instructions for use Diagnosis    ADVAIR DISKUS 100-50 MCG/DOSE diskus inhaler   Generic drug:  fluticasone-salmeterol     3 Inhaler    inhale 1 puff into the lungs every 12 hours.    Chronic obstructive pulmonary disease, unspecified COPD type (H)       albuterol (2.5 MG/3ML) 0.083% neb solution     63 vial    TAKE 1 VIAL (2.5 MG) BY  "NEBULIZATION EVERY 6 HOURS AS NEEDED FOR SHORTNESS OF BREATH / DYSPNEA OR WHEEZING    Chronic obstructive pulmonary disease, unspecified COPD type (H)       BASAGLAR 100 UNIT/ML injection     15 mL    Inject 11 Units Subcutaneous At Bedtime    Controlled type 2 diabetes mellitus with diabetic nephropathy, with long-term current use of insulin (H)       blood glucose monitoring meter device kit    no brand specified    1 kit    Use to test blood sugar 3 times daily or as directed.    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)       dorzolamide-timolol 2-0.5 % ophthalmic solution    COSOPT     Place 1 drop into both eyes 2 times daily        insulin aspart 100 UNIT/ML injection    NovoLOG FLEXPEN    15 mL    6 units before breakfast, 6 units before lunch, 4 units before dinner    Controlled type 2 diabetes mellitus with diabetic nephropathy, with long-term current use of insulin (H)       insulin pen needle 30G X 8 MM    NOVOFINE    300 each    Use 3 daily or as directed.    Type 2 diabetes mellitus with diabetic nephropathy, unspecified long term insulin use status (H)       insulin syringe-needle U-100 31G X 5/16\" 0.5 ML     100 each    Profile Rx: patient will contact pharmacy when needed    Type 2 diabetes mellitus with diabetic nephropathy (H)       latanoprost 0.005 % ophthalmic solution    XALATAN     Place 1 drop into both " eyes At Bedtime        lovastatin 40 MG tablet    MEVACOR    135 tablet    TAKE 1 AND 1/2 TABLETS BY MOUTH ONCE A DAY WITH DINNER. NEED FASTING LAB WORK    Hyperlipidemia LDL goal <130       metoprolol 25 MG 24 hr tablet    TOPROL XL    90 tablet    Take 1 tablet (25 mg) by mouth daily    Essential hypertension       NIFEdipine ER 90 MG Tb24    ADALAT CC    90 tablet    Take 1 tablet (90 mg) by mouth daily    Benign essential hypertension       * NONFORMULARY      Take 1 tablet by mouth At Bedtime Vitamin (Dialyvite) that he gets from [facility].        * order for DME     1 each    Equipment being ordered: Walker with wheels and brakes, and a seat    ESRD (end stage renal disease) on dialysis (H), Cerebral embolism with cerebral infarction (H)       order for DME     1 Units    Nebulizer machine Qty #1    Fever, unspecified, Cough, Chronic obstructive pulmonary disease, unspecified COPD type (H), Acute bronchospasm       order for DME     2 each    Equipment being ordered: Oxygen- HOME and portable. 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), Type 2 diabetes mellitus with diabetic nephropathy (H)       TART CHERRY ADVANCED Caps      Take 1 capsule by mouth daily        terazosin 10 MG capsule    HYTRIN    90 capsule    TAKE 1 CAPSULE (10 MG) BY MOUTH AT BEDTIME    Benign non-nodular prostatic hyperplasia with lower urinary tract symptoms       TYLENOL PO      Take 650 mg by mouth nightly as needed        WARFARIN SODIUM PO      Daily as directed per [facility]madin Clinic         * Notice:  This list has 2 medication(s) that are the same as other medications prescribed for you. Read the directions carefully, and ask your doctor or other care provider to review them with you.

## 2017-11-20 NOTE — TELEPHONE ENCOUNTER
Reason for Call: Request for an order or referral:Verbal Orders from Dr. Cummins  Order or referral being requested: Physical Therapy start of care.    Date needed: as soon as possible    Has the patient been seen by the PCP for this problem? YES    Additional comments: please call Mary from [facility] at [phone]    Best Time:  any    Can we leave a detailed message on this number?  YES this is a secure Voice Mail    Susan Baumann  Clinic Station  [facility]      Call taken on 11/20/2017 at 1:14 PM by Susan Baumann

## 2017-11-20 NOTE — TELEPHONE ENCOUNTER
"Dr. Keene,     [facility] received an INR Clinic Referral from Dr. Jasmine (seeing pt today as he could not see PCP, Stefanie Louis CNP), and was seen right after appt with provider. Pt states he has been on warfarin for about a year and INRs and dosing have been done by you. Is this accurate and if so do you wish to continue? Or are you Ok with [facility] now checking INRs and managing dosing?    The INR Clinic Referral received today indicates: \" Indication for Anticoagulation: Atrial Fibrillation  If nonstandard INR is desired, indicate goal range and explanation: 2-3  Expected Duration of Therapy: Lifetime'    Thanks,     Alisa LI RN  P - [phone]  "

## 2017-11-20 NOTE — MR AVS SNAPSHOT
After Visit Summary   11/20/2017    Griffin Walton    MRN: [id number]           Patient Information     Date Of Birth          5/18/1934        Visit Information        Provider Department      11/20/2017 9:30 AM ANTHONY ZAMBRANO FP/IM RN [facility]        Today's Diagnoses     Chronic obstructive pulmonary disease, unspecified COPD type (H)    -  1       Follow-ups after your visit        Your next 10 appointments already scheduled     Nov 20, 2017  9:30 AM CST   Nurse Only with ANTHONY ZAMBRANO FP/IM RN   [facility]    [address] [address]   [phone]            Nov 20, 2017  1:00 PM CST   SHORT with Amna Jasmine MD   [facility] ([facility]    [address] [address]   [phone]            Feb 27, 2018 10:00 AM CST   Return Visit with Geraldo Ware MD   [facility] ([facility])    [address] [address]   [phone]            May 15, 2018  1:00 PM CDT   SIX MINUTE WALK with UC PFL A, UC PFL 6 MINUTE WALK 1   [facility] Pulmonary Function Testing ([facility])    [address] [place] [id number] [phone]            May 15, 2018  2:00 PM CDT   (Arrive by 1:45 PM)   Return Interstitial Lung with David Morris Perlman, MD   [facility] ([facility])    [address] [place] [id number] [phone]              Who to contact     If you have questions or need follow up information about today's clinic visit or your schedule please contact [facility] directly at [phone].  Normal or non-critical lab and imaging results will be communicated to you by MyChart, letter or phone within 4 business days after the clinic has received the results. If you do not hear  from us within 7 days, please contact the clinic through [employer] or phone. If you have a critical or abnormal lab result, we will notify you by phone as soon as possible.  Submit refill requests through [employer] or call your pharmacy and they will forward the refill request to us. Please allow 3 business days for your refill to be completed.          Additional Information About Your Visit        Adapt[employer] Information     [employer] gives you secure access to your electronic health record. If you see a primary care provider, you can also send messages to your care team and make appointments. If you have questions, please call your primary care clinic.  If you do not have a primary care provider, please call [phone] and they will assist you.        Care EveryWhere ID     This is your Care EveryWhere ID. This could be used by other organizations to access your [employer] medical records  [id number]         Blood Pressure from Last 3 Encounters:   11/14/17 109/76   11/09/17 171/77   11/02/17 143/62    Weight from Last 3 Encounters:   11/14/17 204 lb (92.5 kg)   11/02/17 205 lb (93 kg)   10/26/17 205 lb 14.4 oz (93.4 kg)              Today, you had the following     No orders found for display       Primary Care Provider Office Phone # Fax #    Stefanie Falconbrandt APRMIR [facility] [phone] [address] [address]        Equal Access to Services     ANUSHKA BILL : Hadii aad ku hadasho Soomaali, waaxda luqadaha, qaybta kaalmada adeegyada, renzo mitchell haykurt palacio . So [facility] [phone].    ATENCIÓN: Si habla español, tiene a louis disposición servicios gratuitos de asistencia lingüística. Llame al [phone].    We comply with applicable federal civil rights laws and [place] laws. We do not discriminate on the basis of race, color, national origin, age, disability, sex, sexual orientation, or gender identity.            Thank you!     Thank you for choosing [facility]  [place]  for your care. Our goal is always to provide you with excellent care. Hearing back from our patients is one way we can continue to improve our services. Please take a few minutes to complete the written survey that you may receive in the mail after your visit with us. Thank you!             Your Updated Medication List - Protect others around you: Learn how to safely use, store and throw away your medicines at www.disposemymeds.org.          This list is accurate as of: 11/20/17  8:44 AM.  Always use your most recent med list.                   Brand Name Dispense Instructions for use Diagnosis    ADVAIR DISKUS 100-50 MCG/DOSE diskus inhaler   Generic drug:  fluticasone-salmeterol     3 Inhaler    inhale 1 puff into the lungs every 12 hours.    Chronic obstructive pulmonary disease, unspecified COPD type (H)       albuterol (2.5 MG/3ML) 0.083% neb solution     63 vial    TAKE 1 VIAL (2.5 MG) BY NEBULIZATION EVERY 6 HOURS AS NEEDED FOR SHORTNESS OF BREATH / DYSPNEA OR WHEEZING    Chronic obstructive pulmonary disease, unspecified COPD type (H)       BASAGLAR 100 UNIT/ML injection     15 mL    Inject 11 Units Subcutaneous At Bedtime    Controlled type 2 diabetes mellitus with diabetic nephropathy, with long-term current use of insulin (H)       blood glucose monitoring meter device kit    no brand specified    1 kit    Use to test blood sugar 3 times daily or as directed.    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    Type 2 diabetes mellitus with diabetic nephropathy, unspecified long term insulin use status (H)       insulin syringe-needle U-100 31G X 5/16\" 0.5 ML     100 each    Profile Rx: patient will contact pharmacy when needed    Type 2 diabetes mellitus with diabetic nephropathy (H)       latanoprost 0.005 % ophthalmic solution    XALATAN     Place 1 drop into both eyes At Bedtime        lovastatin 40 MG tablet    MEVACOR    135 tablet    TAKE 1 AND 1/2 TABLETS BY MOUTH ONCE A DAY WITH DINNER. NEED FASTING LAB WORK    Hyperlipidemia LDL goal <130       metoprolol 25 MG 24 hr tablet    TOPROL XL    90 tablet    Take 1 tablet (25 mg) by mouth daily    Essential hypertension       NIFEdipine ER 90 MG Tb24    ADALAT CC    90 tablet    Take 1 tablet (90 mg) by mouth daily    Benign essential hypertension       * NONFORMULARY      Take 1 tablet by mouth At Bedtime Vitamin (Dialyvite) that he gets from [facility].        * order for DME     1 each    Equipment being ordered: Walker with wheels and brakes, and a seat    ESRD (end stage renal disease) on dialysis (H), Cerebral embolism with cerebral infarction (H)       order for DME     1 Units    Nebulizer machine Qty #1    Fever, unspecified, Cough, Chronic obstructive pulmonary disease, unspecified COPD type (H), Acute bronchospasm       order for DME     2 each    Equipment being ordered: Oxygen- HOME and portable. " Georgie Coulter, [facility] Medical Assistant Signed  Service date: 05/24/2016 10:48 AM     O2 Sat on Room air at rest:84% O2 sat on room air with walk: 82-91% O2 Sat on 1L of oxygen with walk 91-93% O2 Sat on 2 L of Oxygen with walk 91-96% O2 Sat on 1 L O2 at rest 94%    Cough, Hypoxia, Chronic obstructive pulmonary disease, unspecified COPD type (H), Chronic obstructive pulmonary disease with acute exacerbation (H), 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), Type 2 diabetes mellitus with diabetic nephropathy (H)       TART CHERRY ADVANCED Caps      Take 1 capsule by mouth daily        terazosin 10 MG capsule    HYTRIN    90 capsule    TAKE 1 CAPSULE (10 MG) BY MOUTH AT BEDTIME    Benign non-nodular prostatic hyperplasia with lower urinary tract symptoms       TYLENOL PO      Take 650 mg by mouth nightly as needed        WARFARIN SODIUM PO      Daily as directed per [facility]mad[facility] Clinic        * Notice:  This list has 2 medication(s) that are the same as other medications prescribed for you. Read the directions carefully, and ask your doctor or other care provider to review them with you.

## 2017-11-20 NOTE — PROGRESS NOTES
SUBJECTIVE:   Griffin Walton is a 83 year old male who presents to clinic today for the following health issues:          Hospital Follow-up Visit:    Hospital/Nursing Home/ Rehab Facility: [facility]   Date of Admission: 11/14  Date of Discharge: 11/17  Reason(s) for Admission: Sepsis  Pneumonia             Problems taking medications regularly:  None       Medication changes since discharge: losartan was change dicreased to 25mg  Levofloxacin and diltiazem         Problems adhering to non-medication therapy:  Home health aid to come out and acess    Summary of hospitalization:  [facility] discharge summary reviewed  Diagnostic Tests/Treatments reviewed.  Follow up needed: none  Other Healthcare Providers Involved in Patient s Care:         None  Update since discharge: stable.     Post Discharge Medication Reconciliation: discharge medications reconciled, continue medications without change.  Plan of care communicated with patient     Coding guidelines for this visit:  Type of Medical   Decision Making Face-to-Face Visit       within 7 Days of discharge Face-to-Face Visit        within 14 days of discharge   Moderate Complexity [id number]   High Complexity 08854 19503            Patient is an 83 yr old male here for a hospital follow up . He was seen for pneumonia, atrial fibrillation with RVR and also sepsis. He reports that he is feeling much better presently. Patient denies any cough or shortness of breath. He was started on diltiazem in the hospital because of the atrial fibrillation with RVR. He was already on Toprol XL before this admission.Patient was also already on coumadin started by his nephrologist and being managed by his nephrologist . He will like to establish with coumadin clinic today. No other complaints today.    Problem list and histories reviewed & adjusted, as indicated.  Additional history: as documented    Patient Active Problem List   Diagnosis     Essential Hypertension,  Benign     Gouty arthropathy     Disorder of bursae and tendons in shoulder region     Malignant neoplasm of kidney excluding renal pelvis (H)     Abdominal aortic aneurysm (H)     Proteinuria     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)     Anemia     Hyperlipidemia LDL goal <70     Anxiety disorder due to medical condition     Cerebral embolism with cerebral infarction (H)     zAdvanced directives, counseling/discussion     Health Care Home     Seborrheic keratosis     Leg edema, right     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)     Thrombocytopenia, primary (H)     Hypotension     Glaucoma     Controlled type 2 diabetes mellitus with diabetic nephropathy, with long-term current use of insulin (H)     Chronic obstructive pulmonary disease, unspecified COPD type (H)     Legally blind in right eye, as defined in [place]     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)     Past Surgical History:   Procedure Laterality Date     ABDOMEN SURGERY  2005    aortic aneurysm     CATARACT IOL, RT/LT  2/4/08    left eye - phacoemulsification w/ posterior chamber lens implantation combined w/ glaucoma filtering procedure     CREATE FISTULA ARTERIOVENOUS UPPER EXTREMITY  1/3/2012    Procedure:CREATE FISTULA ARTERIOVENOUS UPPER EXTREMITY; LEFT UPPER ARM ARTERIOVENOUS FISTULA; Surgeon:JENA PEARSON; [facility]     SURGICAL HISTORY OF -   2/13/2004    Right knee arthroscopy     SURGICAL HISTORY OF -       Vocal cord biopsy-benign     SURGICAL HISTORY OF -   3/3/2005    AAA, left partial nephrectomy       Social History   Substance Use Topics     Smoking status: Former Smoker     Packs/day: 1.00     Years: 55.00     Types: Cigarettes     Quit date: 1/1/2005     Smokeless tobacco: Never Used     Alcohol use No     Family History   Problem Relation Age of Onset     CANCER Father      Asophageal      CANCER Brother      Throat      Other Cancer Sister      Lung          Current Outpatient  Prescriptions   Medication Sig Dispense Refill     BASAGLAR 100 UNIT/ML injection Inject 11 Units Subcutaneous At Bedtime 15 mL 0     metoprolol (TOPROL XL) 25 MG 24 hr tablet Take 1 tablet (25 mg) by mouth daily 90 tablet 3     terazosin (HYTRIN) 10 MG capsule TAKE 1 CAPSULE (10 MG) BY MOUTH AT BEDTIME 90 capsule 3     albuterol (2.5 MG/3ML) 0.083% neb solution TAKE 1 VIAL (2.5 MG) BY NEBULIZATION EVERY 6 HOURS AS NEEDED FOR SHORTNESS OF BREATH / DYSPNEA OR WHEEZING 63 vial 11     ADVAIR DISKUS 100-50 MCG/DOSE diskus inhaler inhale 1 puff into the lungs every 12 hours. 3 Inhaler 3     insulin aspart (NOVOLOG FLEXPEN) 100 UNIT/ML injection 6 units before breakfast, 6 units before lunch, 4 units before dinner 15 mL 11     bumetanide (BUMEX) 1 MG tablet TAKE 1 TABLET (1 MG) BY MOUTH 2 TIMES DAILY 180 tablet 2     lovastatin (MEVACOR) 40 MG tablet TAKE 1 AND 1/2 TABLETS BY MOUTH ONCE A DAY WITH DINNER. NEED FASTING LAB WORK 135 tablet 3     clopidogrel (PLAVIX) 75 MG tablet TAKE ONE TABLET BY MOUTH ONE TIME DAILY  90 tablet 3     blood glucose monitoring (ONE TOUCH ULTRA) test strip test 2 times daily  Profile Rx: patient will contact pharmacy when needed 60 each 11     insulin pen needle (NOVOFINE) 30G X 8 MM Use 3 daily or as directed. 300 each 2     latanoprost (XALATAN) 0.005 % ophthalmic solution Place 1 drop into both eyes At Bedtime       WARFARIN SODIUM PO Daily as directed per [facility]madin Clinic       NONFORMULARY Take 1 tablet by mouth At Bedtime Vitamin (Dialyvite) that he gets from [facility].       dorzolamide-timolol (COSOPT) 2-0.5 % ophthalmic solution Place 1 drop into both eyes 2 times daily       Misc Natural Products (TART CHERRY ADVANCED) CAPS Take 1 capsule by mouth daily       blood glucose monitoring (NO BRAND SPECIFIED) meter device kit Use to test blood sugar 3 times daily or as directed. 1 kit 0     NIFEdipine ER (ADALAT CC) 90 MG TB24 Take 1 tablet (90 mg) by mouth daily (Patient not  "taking: Reported on 11/20/2017) 90 tablet 3     insulin syringe-needle U-100 31G X 5/16\" 0.5 ML Profile Rx: patient will contact pharmacy when needed 100 each 11     order for DME Equipment being ordered: Oxygen- HOME and portable. Georgie Coulter CMA Medical Assistant Signed  Service date: 05/24/2016 10:48 AM       O2 Sat on Room air at rest:84%  O2 sat on room air with walk: 82-91%  O2 Sat on 1L of oxygen with walk 91-93%  O2 Sat on 2 L of Oxygen with walk 91-96%  O2 Sat on 1 L O2 at rest 94% 2 each prn     order for DME Nebulizer machine Qty #1 1 Units 0     Acetaminophen (TYLENOL PO) Take 650 mg by mouth nightly as needed       ORDER FOR DME Equipment being ordered: Walker with wheels and brakes, and a seat 1 each 0     Allergies   Allergen Reactions     Hydralazine Shortness Of Breath and Swelling     Aspirin Swelling     Heparin Flush Other (See Comments)     thrombocytopenia     Monosodium Glutamate Swelling         Reviewed and updated as needed this visit by clinical staffAllergies       Reviewed and updated as needed this visit by Provider         ROS:  Constitutional, HEENT, cardiovascular, pulmonary, gi and gu systems are negative, except as otherwise noted.      OBJECTIVE:   /58 (BP Location: Right arm, Cuff Size: Adult Regular)  Pulse 80  Temp 98.9  F (37.2  C) (Tympanic)  Wt 206 lb (93.4 kg)  SpO2 92%  BMI 31.32 kg/m2  Body mass index is 31.32 kg/(m^2).  GENERAL: healthy, alert and no distress  EYES: Eyes grossly normal to inspection, PERRL and conjunctivae and sclerae normal  HENT: ear canals and TM's normal, nose and mouth without ulcers or lesions  NECK: no adenopathy, no asymmetry, masses, or scars and thyroid normal to palpation  RESP: lungs clear to auscultation - no rales, rhonchi or wheezes  CV: irregularly irregular rhythm, normal S1 S2, no S3 or S4, no murmur, click or rub and no peripheral edema  ABDOMEN: soft, nontender, no hepatosplenomegaly, no masses and bowel sounds " normal  MS: no gross musculoskeletal defects noted, no edema  SKIN: no suspicious lesions or rashes  BACK: no CVA tenderness, no paralumbar tenderness  PSYCH: mentation appears normal, affect normal/bright    Diagnostic Test Results:  Results for orders placed or performed during the hospital encounter of 11/14/17   XR Chest 2 Views    Narrative    XR CHEST 2 VW  11/14/2017 6:02 AM      HISTORY: Shortness of air.    COMPARISON: 8/8/2017.    FINDINGS: There is a left lower lobe infiltrate and a left pleural  effusion. Minimal right basilar atelectasis. The heart size is  probably normal. Thoracic aorta is calcified. No pneumothorax.  Degenerative disease in the spine.      Impression    IMPRESSION: Left basilar pneumonia and left pleural effusion.    ODILON VIGIL MD   CBC with platelets differential   Result Value Ref Range    WBC 14.4 (H) 4.0 - 11.0 10e9/L    RBC Count 3.53 (L) 4.4 - 5.9 10e12/L    Hemoglobin 10.5 (L) 13.3 - 17.7 g/dL    Hematocrit 31.7 (L) 40.0 - 53.0 %    MCV 90 78 - 100 fl    MCH 29.7 26.5 - 33.0 pg    MCHC 33.1 31.5 - 36.5 g/dL    RDW 15.2 (H) 10.0 - 15.0 %    Platelet Count 150 150 - 450 10e9/L    Diff Method Automated Method     % Neutrophils 85.9 %    % Lymphocytes 5.4 %    % Monocytes 7.4 %    % Eosinophils 0.9 %    % Basophils 0.2 %    % Immature Granulocytes 0.2 %    Absolute Neutrophil 12.4 (H) 1.6 - 8.3 10e9/L    Absolute Lymphocytes 0.8 0.8 - 5.3 10e9/L    Absolute Monocytes 1.1 0.0 - 1.3 10e9/L    Absolute Eosinophils 0.1 0.0 - 0.7 10e9/L    Absolute Basophils 0.0 0.0 - 0.2 10e9/L    Abs Immature Granulocytes 0.0 0 - 0.4 10e9/L   Comprehensive metabolic panel   Result Value Ref Range    Sodium 132 (L) 133 - 144 mmol/L    Potassium 5.0 3.4 - 5.3 mmol/L    Chloride 96 94 - 109 mmol/L    Carbon Dioxide 21 20 - 32 mmol/L    Anion Gap 15 (H) 3 - 14 mmol/L    Glucose 161 (H) 70 - 99 mg/dL    Urea Nitrogen 92 (H) 7 - 30 mg/dL    Creatinine 9.87 (H) 0.66 - 1.25 mg/dL    GFR Estimate 5 (L)  >60 mL/min/1.7m2    GFR Estimate If Black 6 (L) >60 mL/min/1.7m2    Calcium 8.4 (L) 8.5 - 10.1 mg/dL    Bilirubin Total 0.6 0.2 - 1.3 mg/dL    Albumin 3.2 (L) 3.4 - 5.0 g/dL    Protein Total 7.3 6.8 - 8.8 g/dL    Alkaline Phosphatase 59 40 - 150 U/L    ALT 21 0 - 70 U/L    AST 17 0 - 45 U/L   Lactic acid whole blood   Result Value Ref Range    Lactic Acid 1.2 0.7 - 2.0 mmol/L   Troponin I   Result Value Ref Range    Troponin I ES 0.137 (HH) 0.000 - 0.045 ug/L   Nt probnp inpatient (BNP)   Result Value Ref Range    N-Terminal Pro BNP Inpatient 18867 (H) 0 - 1800 pg/mL   Blood gas venous   Result Value Ref Range    Ph Venous 7.38 7.32 - 7.43 pH    PCO2 Venous 37 (L) 40 - 50 mm Hg    PO2 Venous 50 (H) 25 - 47 mm Hg    Bicarbonate Venous 22 21 - 28 mmol/L    Base Deficit Venous 2.8 mmol/L   UA reflex to Microscopic and Culture   Result Value Ref Range    Color Urine Yellow     Appearance Urine Clear     Glucose Urine 70 (A) NEG^Negative mg/dL    Bilirubin Urine Negative NEG^Negative    Ketones Urine Negative NEG^Negative mg/dL    Specific Gravity Urine 1.011 1.003 - 1.035    Blood Urine Moderate (A) NEG^Negative    pH Urine 7.5 (H) 5.0 - 7.0 pH    Protein Albumin Urine 300 (A) NEG^Negative mg/dL    Urobilinogen mg/dL Normal 0.0 - 2.0 mg/dL    Nitrite Urine Negative NEG^Negative    Leukocyte Esterase Urine Negative NEG^Negative    Source Midstream Urine     RBC Urine 6 (H) 0 - 2 /HPF    WBC Urine 2 0 - 2 /HPF    Squamous Epithelial /HPF Urine 4 (H) 0 - 1 /HPF   INR   Result Value Ref Range    INR 1.53 (H) 0.86 - 1.14   Urine Culture Aerobic Bacterial   Result Value Ref Range    Specimen Description Midstream Urine     Culture Micro <10,000 colonies/mL  mixed urogenital kiera       Culture Micro Susceptibility testing not routinely done    Blood culture   Result Value Ref Range    Specimen Description Blood Left Hand     Special Requests Aerobic and anaerobic bottles received     Culture Micro No growth    Blood culture    Result Value Ref Range    Specimen Description Blood Right Arm     Special Requests Aerobic and anaerobic bottles received     Culture Micro No growth        ASSESSMENT/PLAN:   1. Hospital discharge follow-up  Doing better    2. Atrial fibrillation, new onset (H)  - INR CLINIC REFERRAL  - INR; Future  Encouraged a cardiology follow up for new onset atrial fibrillation.Talked to cardiology about him being on Toprol XL and Cardizem and they said it is okay for now and this will be addressed at his follow up.    3. Sepsis, due to unspecified organism (H)  No signs of sepsis    4. Pneumonia due to infectious organism, unspecified laterality, unspecified part of lung  Doing better      FUTURE APPOINTMENTS:       - Follow-up visit as needed    Amna Jasmine MD  [facility]

## 2017-11-21 ENCOUNTER — MEDICAL CORRESPONDENCE (OUTPATIENT)
Dept: HEALTH INFORMATION MANAGEMENT | Facility: CLINIC | Age: 82
End: 2017-11-21

## 2017-11-21 ENCOUNTER — HOME CARE/HOSPICE - HEALTHEAST (OUTPATIENT)
Dept: HOME HEALTH SERVICES | Facility: HOME HEALTH | Age: 82
End: 2017-11-21

## 2017-11-21 ENCOUNTER — TELEPHONE (OUTPATIENT)
Dept: FAMILY MEDICINE | Facility: CLINIC | Age: 82
End: 2017-11-21

## 2017-11-21 NOTE — TELEPHONE ENCOUNTER
Reason for Call: Request for an order or referral:    Order or referral being requested: verbal orders    Date needed: as soon as possible    Has the patient been seen by the PCP for this problem? YES    Additional comments: [facility] for verbal orders for PT 2 times a week for 4 weeks to work on strength, gait and balance.    Phone number Patient can be reached at:  Other phone number:  Lila [phone]    Best Time:  any    Can we leave a detailed message on this number?  YES    Call taken on 11/21/2017 at 2:27 PM by Ruby Ball

## 2017-11-22 ENCOUNTER — MEDICAL CORRESPONDENCE (OUTPATIENT)
Dept: HEALTH INFORMATION MANAGEMENT | Facility: CLINIC | Age: 82
End: 2017-11-22

## 2017-11-22 NOTE — TELEPHONE ENCOUNTER
'Lois Moore     I had been managing Ed's warfarin.  I prescribed it for the purpose of preventing his dialysis circuit from clotting as he has an allergy to heparin.  In order to prevent this clotting he did not require full anticoagulation. All it took was an INR of 1.5 or so.     Now he has developed atrial fibrillation.  This developed during a recent hospital stay for pneumonia.       So now he needs more traditional anticoagulation management.  INR 2-3.       I am very happy to have the [facility] handle his warfarin now.     Thanks.       Rafael Keene MD'

## 2017-11-24 ENCOUNTER — TELEPHONE (OUTPATIENT)
Dept: FAMILY MEDICINE | Facility: CLINIC | Age: 82
End: 2017-11-24

## 2017-11-25 ENCOUNTER — HOME CARE/HOSPICE - HEALTHEAST (OUTPATIENT)
Dept: HOME HEALTH SERVICES | Facility: HOME HEALTH | Age: 82
End: 2017-11-25

## 2017-11-27 PROBLEM — I48.91 ATRIAL FIBRILLATION, NEW ONSET (H): Status: ACTIVE | Noted: 2017-11-27

## 2017-11-28 ENCOUNTER — HOME CARE/HOSPICE - HEALTHEAST (OUTPATIENT)
Dept: HOME HEALTH SERVICES | Facility: HOME HEALTH | Age: 82
End: 2017-11-28

## 2017-11-28 ENCOUNTER — ANTICOAGULATION THERAPY VISIT (OUTPATIENT)
Dept: ANTICOAGULATION | Facility: CLINIC | Age: 82
End: 2017-11-28
Payer: MEDICARE

## 2017-11-28 DIAGNOSIS — I63.40 CEREBRAL INFARCTION DUE TO EMBOLISM OF CEREBRAL ARTERY (H): ICD-10-CM

## 2017-11-28 DIAGNOSIS — I48.91 ATRIAL FIBRILLATION, NEW ONSET (H): ICD-10-CM

## 2017-11-28 DIAGNOSIS — Z79.01 LONG-TERM (CURRENT) USE OF ANTICOAGULANTS: ICD-10-CM

## 2017-11-28 LAB — INR POINT OF CARE: 3.3 (ref 0.86–1.14)

## 2017-11-28 PROCEDURE — 99207 ZZC NO CHARGE NURSE ONLY: CPT

## 2017-11-28 PROCEDURE — 36416 COLLJ CAPILLARY BLOOD SPEC: CPT

## 2017-11-28 PROCEDURE — 85610 PROTHROMBIN TIME: CPT | Mod: QW

## 2017-11-28 NOTE — PROGRESS NOTES
ANTICOAGULATION FOLLOW-UP CLINIC VISIT    Patient Name:  Griffin Walton  Date:  11/28/2017  Contact Type:  Face to Face    SUBJECTIVE:     Patient Findings     Positives Other complaints (still has a cough from recent pneumonia)    Comments Patient is on oxygen and states his only concern is a lingering cough (he was hospitalized for pneumonia a couple weeks ago). The patient states his cough is productive but he is not sure if it is clear or not as he does not look. Due to INR at 3.3 twice in a row and no other changes, will decrease dosing by 7% with a recheck in 2 weeks after dialysis.           OBJECTIVE    INR Protime   Date Value Ref Range Status   11/28/2017 3.3 (A) 0.86 - 1.14 Final       ASSESSMENT / PLAN  INR assessment SUPRA    Recheck INR In: 2 WEEKS    INR Location Clinic      Anticoagulation Summary as of 11/28/2017     INR goal 2.0-3.0   Today's INR 3.3!   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 [facility] MWF 8-12pm in [place].      Anticoagulation Care Providers     Provider Role Specialty Phone number    Stefanie Louis APRN CNP Responsible Nurse Practitioner [phone]            See the Encounter Report to view Anticoagulation Flowsheet and Dosing Calendar (Go to Encounters tab in chart review, and find the Anticoagulation Therapy Visit)        Lissa Bello RN

## 2017-11-28 NOTE — MR AVS SNAPSHOT
Griffin Walton   11/28/2017 10:30 AM   Anticoagulation Therapy Visit    Description:  83 year old male   Provider:  [place] [facility]   Department:  [place] [facility]           INR as of 11/28/2017     Today's INR 3.3!      Anticoagulation Summary as of 11/28/2017     INR goal 2.0-3.0   Today's INR 3.3!   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 Take 4 mg (2 tabs) all other days.      Your next Anticoagulation Clinic appointment(s)     Dec 11, 2017  1:00 PM CST   Anticoagulation Visit with [place] [facility]AG   [facility]    [address], [place] [address] [phone]              Contact Numbers     Please call [phone] to cancel and/or reschedule your appointment.  Please call [phone] with any problems or questions regarding your therapy          November 2017 Details    Sun Mon Tue Wed Thu Fri Sat        1               2               3               4                 5               6               7               8               9               10               11                 12               13               14               15               16               17               18                 19               20               21               22               23               24               25                 26               27               28      2 mg   See details      29      4 mg         30      4 mg            Date Details   11/28 This INR check               How to take your warfarin dose     To take:  2 mg Take 1 of the 2 mg tablets.    To take:  4 mg Take 2 of the 2 mg tablets.           December 2017 Details    Sun Mon Tue Wed Thu Fri Sat          1      4 mg         2      4 mg           3      4 mg         4      4  mg         5      2 mg         6      4 mg         7      4 mg         8      4 mg         9      4 mg           10      4 mg         11            12               13               14               15               16                 17               18               19               20               21               22               23                 24               25               26               27               28               29               30                 31                      Date Details   No additional details    Date of next INR:  12/11/2017         How to take your warfarin dose     To take:  2 mg Take 1 of the 2 mg tablets.    To take:  4 mg Take 2 of the 2 mg tablets.

## 2017-12-01 ENCOUNTER — HOME CARE/HOSPICE - HEALTHEAST (OUTPATIENT)
Dept: HOME HEALTH SERVICES | Facility: HOME HEALTH | Age: 82
End: 2017-12-01

## 2017-12-02 ENCOUNTER — HOME CARE/HOSPICE - HEALTHEAST (OUTPATIENT)
Dept: HOME HEALTH SERVICES | Facility: HOME HEALTH | Age: 82
End: 2017-12-02

## 2017-12-05 ENCOUNTER — OFFICE VISIT (OUTPATIENT)
Dept: FAMILY MEDICINE | Facility: CLINIC | Age: 82
End: 2017-12-05
Payer: MEDICARE

## 2017-12-05 ENCOUNTER — HOME CARE/HOSPICE - HEALTHEAST (OUTPATIENT)
Dept: HOME HEALTH SERVICES | Facility: HOME HEALTH | Age: 82
End: 2017-12-05

## 2017-12-05 VITALS
TEMPERATURE: 98 F | HEART RATE: 80 BPM | HEIGHT: 67 IN | BODY MASS INDEX: 31.86 KG/M2 | SYSTOLIC BLOOD PRESSURE: 118 MMHG | WEIGHT: 203 LBS | OXYGEN SATURATION: 94 % | DIASTOLIC BLOOD PRESSURE: 49 MMHG

## 2017-12-05 DIAGNOSIS — J06.9 URI WITH COUGH AND CONGESTION: Primary | ICD-10-CM

## 2017-12-05 DIAGNOSIS — I48.20 CHRONIC ATRIAL FIBRILLATION (H): ICD-10-CM

## 2017-12-05 DIAGNOSIS — T50.905A ADVERSE EFFECT OF DRUG, INITIAL ENCOUNTER: ICD-10-CM

## 2017-12-05 PROCEDURE — 99214 OFFICE O/P EST MOD 30 MIN: CPT | Performed by: FAMILY MEDICINE

## 2017-12-05 RX ORDER — DILTIAZEM HYDROCHLORIDE 240 MG/1
240 CAPSULE, COATED, EXTENDED RELEASE ORAL DAILY
Qty: 30 CAPSULE | Refills: 1 | COMMUNITY
Start: 2017-12-05 | End: 2017-12-18

## 2017-12-05 RX ORDER — PREDNISONE 20 MG/1
40 TABLET ORAL DAILY
Qty: 10 TABLET | Refills: 0 | Status: SHIPPED | OUTPATIENT
Start: 2017-12-05 | End: 2017-12-10

## 2017-12-05 NOTE — NURSING NOTE
"Chief Complaint   Patient presents with     URI       Initial /49 (BP Location: Right arm, Cuff Size: Adult Large)  Pulse 80  Temp 98  F (36.7  C) (Tympanic)  Ht 5' 6.75\" (1.695 m)  Wt 203 lb (92.1 kg)  SpO2 94%  BMI 32.03 kg/m2 Estimated body mass index is 32.03 kg/(m^2) as calculated from the following:    Height as of this encounter: 5' 6.75\" (1.695 m).    Weight as of this encounter: 203 lb (92.1 kg).  Medication Reconciliation: complete  "

## 2017-12-05 NOTE — MR AVS SNAPSHOT
After Visit Summary   12/5/2017    Griffin Walton    MRN: [id number]           Patient Information     Date Of Birth          5/18/1934        Visit Information        Provider Department      12/5/2017 10:00 AM Amna Jasmine MD [facility]        Today's Diagnoses     URI with cough and congestion    -  1      Care Instructions          Thank you for choosing [facility].  You may be receiving a survey in the mail from [facility] regarding your visit today.  Please take a few minutes to complete and return the survey to let us know how we are doing.      If you have questions or concerns, please contact us via [employer] or you can contact your care team at [phone].    Our Clinic hours are:  Monday 6:40 am  to 7:00 pm  Tuesday -Friday 6:40 am to 5:00 pm    The [place] outpatient lab hours are:  Monday - Friday 6:10 am to 4:45 pm  Saturdays 7:00 am to 11:00 am  Appointments are required, call [phone]    If you have clinical questions after hours or would like to schedule an appointment,  call the clinic at [phone].  Viral Upper Respiratory Illness (Adult)  You have a viral upper respiratory illness (URI), which is another term for the common cold. This illness is contagious during the first few days. It is spread through the air by coughing and sneezing. It may also be spread by direct contact (touching the sick person and then touching your own eyes, nose, or mouth). Frequent handwashing will decrease risk of spread. Most viral illnesses go away within 7 to 10 days with rest and simple home remedies. Sometimes the illness may last for several weeks. Antibiotics will not kill a virus, and they are generally not prescribed for this condition.    Home care    If symptoms are severe, rest at home for the first 2 to 3 days. When you resume activity, don't let yourself get too tired.    Avoid being exposed to cigarette smoke (yours or others ).    You may use  acetaminophen or ibuprofen to control pain and fever, unless another medicine was prescribed. (Note: If you have chronic liver or kidney disease, have ever had a stomach ulcer or gastrointestinal bleeding, or are taking blood-thinning medicines, talk with your healthcare provider before using these medicines.) Aspirin should never be given to anyone under 18 years of age who is ill with a viral infection or fever. It may cause severe liver or brain damage.    Your appetite may be poor, so a light diet is fine. Avoid dehydration by drinking 6 to 8 glasses of fluids per day (water, soft drinks, juices, tea, or soup). Extra fluids will help loosen secretions in the nose and lungs.    Over-the-counter cold medicines will not shorten the length of time you re sick, but they may be helpful for the following symptoms: cough, sore throat, and nasal and sinus congestion. (Note: Do not use decongestants if you have high blood pressure.)  Follow-up care  Follow up with your healthcare provider, or as advised.  When to seek medical advice  Call your healthcare provider right away if any of these occur:    Cough with lots of colored sputum (mucus)    Severe headache; face, neck, or ear pain    Difficulty swallowing due to throat pain    Fever of 100.4 F (38 C)  Call 911, or get immediate medical care  Call emergency services right away if any of these occur:    Chest pain, shortness of breath, wheezing, or difficulty breathing    Coughing up blood    Inability to swallow due to throat pain  Date Last Reviewed: 9/13/2015 2000-2017 [employer]. [address] [place] [address]. All rights reserved. This information is not intended as a substitute for professional medical care. Always follow your healthcare professional's instructions.                Follow-ups after your visit        Your next 10 appointments already scheduled     Dec 11, 2017  1:00 PM CST   Anticoagulation Visit with [place] [facility]    [facility] ([facility]    [address] [address]   [phone]            Jan 04, 2018  1:30 PM CST   US ABDOMINAL AORTIC IMAGING with MIKEYVUS   [facility] Echocardiography ([facility])    [address] [address]   [phone]           Please bring a list of your medicines (including vitamins, minerals and over-the-counter drugs). Also, tell your doctor about any allergies you may have. Wear comfortable clothes and leave your valuables at home.  Adults: No eating or drinking for 8 hours before the exam. You may take medicine with a small sip of water.  Children: - Children 6+ years: No food or drink for 6 hours before exam. - Children 1-5 years: No food or drink for 4 hours before exam. - Infants, breast-fed: may have breast milk up to 2 hours before exam. - Infants, formula: may have bottle until 4 hours before exam.  Please call the Imaging Department at your exam site with any questions.            Jan 04, 2018  3:00 PM CST   Ech Complete with WYECHKEVIN   [facility] Echocardiography ([facility])    [address] [address]   [phone]           1. Please bring or wear a comfortable two-piece outfit. 2. You may eat, drink and take your normal medicines. 3. For any questions that cannot be answered, please contact the ordering physician            Jan 31, 2018  3:00 PM CST   Return Visit with Mac Perla MD   [facility] ([facility])    [address] [address]   [phone]            Feb 27, 2018 10:00 AM CST   Return Visit with Geraldo Ware MD   [facility] ([facility])    [address] [address]   [phone]            May 15, 2018  1:00 PM CDT   SIX MINUTE WALK with [facility] PFL A, UC PFL 6 MINUTE WALK 1   [facility] Function  "Testing ([facility])    [address]  [address] [facility] [id number] [phone]            May 15, 2018  2:00 PM CDT   (Arrive by 1:45 PM)   Return Interstitial Lung with David Morris Perlman, MD   [facility] ([facility])    [address] [id number] [phone]              Who to contact     If you have questions or need follow up information about today's clinic visit or your schedule please contact [facility] directly at [phone].  Normal or non-critical lab and imaging results will be communicated to you by MyChart, letter or phone within 4 business days after the clinic has received the results. If you do not hear from us within 7 days, please contact the clinic through [employer] or phone. If you have a critical or abnormal lab result, we will notify you by phone as soon as possible.  Submit refill requests through [employer] or call your pharmacy and they will forward the refill request to us. Please allow 3 business days for your refill to be completed.          Additional Information About Your Visit        [employer] Information     [employer] gives you secure access to your electronic health record. If you see a primary care provider, you can also send messages to your care team and make appointments. If you have questions, please call your primary care clinic.  If you do not have a primary care provider, please call [phone] and they will assist you.        Care EveryWhere ID     This is your Care EveryWhere ID. This could be used by other organizations to access your [place] medical records  [id number]        Your Vitals Were     Pulse Temperature Height Pulse Oximetry BMI (Body Mass Index)       80 98  F (36.7  C) (Tympanic) 5' 6.75\" (1.695 m) 94% 32.03 kg/m2        Blood Pressure from Last 3 Encounters:   12/05/17 118/49   11/20/17 128/58   11/14/17 " 109/76    Weight from Last 3 Encounters:   12/05/17 203 lb (92.1 kg)   11/20/17 206 lb (93.4 kg)   11/14/17 204 lb (92.5 kg)              Today, you had the following     No orders found for display         Today's Medication Changes          These changes are accurate as of: 12/5/17 10:48 AM.  If you have any questions, ask your nurse or doctor.               Start taking these medicines.        Dose/Directions    predniSONE 20 MG tablet   Commonly known as:  DELTASONE   Used for:  URI with cough and congestion   Started by:  Amna Jasmine MD        Dose:  40 mg   Take 2 tablets (40 mg) by mouth daily for 5 days   Quantity:  10 tablet   Refills:  0            Where to get your medicines      These medications were sent to [id number] - [place], [place] - [address]  [address] [address]     Phone:  [phone]     predniSONE 20 MG tablet                Primary Care Provider Office Phone # Fax #    Stefanie Guillen Heriberto, APRN [facility] [phone] [address] [facility] [address]        Equal Access to Services     ANUSHKA BILL AH: Hadii camila ku hadasho Soomaali, waaxda luqadaha, qaybta kaalmada adeegyada, renzo palacio . So [facility] [phone].    ATENCIÓN: Si habla español, tiene a louis disposición servicios gratuitos de asistencia lingüística. Zarina al [phone].    We comply with applicable federal civil rights laws and [place] laws. We do not discriminate on the basis of race, color, national origin, age, disability, sex, sexual orientation, or gender identity.            Thank you!     Thank you for choosing [facility]  for your care. Our goal is always to provide you with excellent care. Hearing back from our patients is one way we can continue to improve our services. Please take a few minutes to complete the written survey that you may receive in the mail after your visit with us. Thank you!              Your Updated Medication List - Protect others around you: Learn how to safely use, store and throw away your medicines at www.disposemymeds.org.          This list is accurate as of: 12/5/17 10:48 AM.  Always use your most recent med list.                   Brand Name Dispense Instructions for use Diagnosis    ADVAIR DISKUS 100-50 MCG/DOSE diskus inhaler   Generic drug:  fluticasone-salmeterol     3 Inhaler    inhale 1 puff into the lungs every 12 hours.    Chronic obstructive pulmonary disease, unspecified COPD type (H)       albuterol (2.5 MG/3ML) 0.083% neb solution     63 vial    TAKE 1 VIAL (2.5 MG) BY NEBULIZATION EVERY 6 HOURS AS NEEDED FOR SHORTNESS OF BREATH / DYSPNEA OR WHEEZING    Chronic obstructive pulmonary disease, unspecified COPD type (H)       BASAGLAR 100 UNIT/ML injection     15 mL    Inject 11 Units Subcutaneous At Bedtime    Controlled type 2 diabetes mellitus with diabetic nephropathy, with long-term current use of insulin (H)       blood glucose monitoring meter device kit    no brand specified    1 kit    Use to test blood sugar 3 times daily or as directed.    Controlled type 2 diabetes mellitus with diabetic nephropathy, with long-term current use of insulin (H)       blood glucose monitoring test strip    ONETOUCH ULTRA    60 each    test 2 times daily Profile Rx: patient will contact pharmacy when needed    Type 2 diabetes mellitus with diabetic nephropathy, with long-term current use of insulin (H)       bumetanide 1 MG tablet    BUMEX    180 tablet    TAKE 1 TABLET (1 MG) BY MOUTH 2 TIMES DAILY    Benign essential hypertension       CARDIZEM  MG 24 hr capsule   Generic drug:  diltiazem     30 capsule    Take 1 capsule (240 mg) by mouth daily        clopidogrel 75 MG tablet    PLAVIX    90 tablet    TAKE ONE TABLET BY MOUTH ONE TIME DAILY    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)       dorzolamide-timolol 2-0.5 % ophthalmic solution    COSOPT      "Place 1 drop into both eyes 2 times daily        insulin aspart 100 UNIT/ML injection    NovoLOG FLEXPEN    15 mL    6 units before breakfast, 6 units before lunch, 4 units before dinner    Controlled type 2 diabetes mellitus with diabetic nephropathy, with long-term current use of insulin (H)       insulin pen needle 30G X 8 MM    NOVOFINE    300 each    Use 3 daily or as directed.    Type 2 diabetes mellitus with diabetic nephropathy, unspecified long term insulin use status (H)       insulin syringe-needle U-100 31G X 5/16\" 0.5 ML     100 each    Profile Rx: patient will contact pharmacy when needed    Type 2 diabetes mellitus with diabetic nephropathy (H)       latanoprost 0.005 % ophthalmic solution    XALATAN     Place 1 drop into both eyes At Bedtime        lovastatin 40 MG tablet    MEVACOR    135 tablet    TAKE 1 AND 1/2 TABLETS BY MOUTH ONCE A DAY WITH DINNER. NEED FASTING LAB WORK    Hyperlipidemia LDL goal <130       metoprolol 25 MG 24 hr tablet    TOPROL XL    90 tablet    Take 1 tablet (25 mg) by mouth daily    Essential hypertension       NIFEdipine ER 90 MG Tb24    ADALAT CC    90 tablet    Take 1 tablet (90 mg) by mouth daily    Benign essential hypertension       * NONFORMULARY      Take 1 tablet by mouth At Bedtime Vitamin (Dialyvite) that he gets from [facility].        * order for DME     1 each    Equipment being ordered: Walker with wheels and brakes, and a seat    ESRD (end stage renal disease) on dialysis (H), Cerebral embolism with cerebral infarction (H)       order for DME     1 Units    Nebulizer machine Qty #1    Fever, unspecified, Cough, Chronic obstructive pulmonary disease, unspecified COPD type (H), Acute bronchospasm       order for DME     2 each    Equipment being ordered: Oxygen- HOME and portable. Goergie Coulter CMA Medical Assistant Signed  Service date: 05/24/2016 10:48 AM     O2 Sat on Room air at rest:84% O2 sat on room air with walk: 82-91% O2 Sat on 1L of " oxygen with walk 91-93% O2 Sat on 2 L of Oxygen with walk 91-96% O2 Sat on 1 L O2 at rest 94%    Cough, Hypoxia, Chronic obstructive pulmonary disease, unspecified COPD type (H), Chronic obstructive pulmonary disease with acute exacerbation (H), 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), Type 2 diabetes mellitus with diabetic nephropathy (H)       predniSONE 20 MG tablet    DELTASONE    10 tablet    Take 2 tablets (40 mg) by mouth daily for 5 days    URI with cough and congestion       TART CHERRY ADVANCED Caps      Take 1 capsule by mouth daily        terazosin 10 MG capsule    HYTRIN    90 capsule    TAKE 1 CAPSULE (10 MG) BY MOUTH AT BEDTIME    Benign non-nodular prostatic hyperplasia with lower urinary tract symptoms       TYLENOL PO      Take 650 mg by mouth nightly as needed        WARFARIN SODIUM PO      Daily as directed per Coumadin Clinic        * Notice:  This list has 2 medication(s) that are the same as other medications prescribed for you. Read the directions carefully, and ask your doctor or other care provider to review them with you.

## 2017-12-05 NOTE — PATIENT INSTRUCTIONS
Thank you for choosing [facility].  You may be receiving a survey in the mail from [employer] regarding your visit today.  Please take a few minutes to complete and return the survey to let us know how we are doing.      If you have questions or concerns, please contact us via [employer] or you can contact your care team at [phone].    Our Clinic hours are:  Monday 6:40 am  to 7:00 pm  Tuesday -Friday 6:40 am to 5:00 pm    The [place] outpatient lab hours are:  Monday - Friday 6:10 am to 4:45 pm  Saturdays 7:00 am to 11:00 am  Appointments are required, call [phone]    If you have clinical questions after hours or would like to schedule an appointment,  call the clinic at [phone].  Viral Upper Respiratory Illness (Adult)  You have a viral upper respiratory illness (URI), which is another term for the common cold. This illness is contagious during the first few days. It is spread through the air by coughing and sneezing. It may also be spread by direct contact (touching the sick person and then touching your own eyes, nose, or mouth). Frequent handwashing will decrease risk of spread. Most viral illnesses go away within 7 to 10 days with rest and simple home remedies. Sometimes the illness may last for several weeks. Antibiotics will not kill a virus, and they are generally not prescribed for this condition.    Home care    If symptoms are severe, rest at home for the first 2 to 3 days. When you resume activity, don't let yourself get too tired.    Avoid being exposed to cigarette smoke (yours or others ).    You may use acetaminophen or ibuprofen to control pain and fever, unless another medicine was prescribed. (Note: If you have chronic liver or kidney disease, have ever had a stomach ulcer or gastrointestinal bleeding, or are taking blood-thinning medicines, talk with your healthcare provider before using these medicines.) Aspirin should never be given to anyone under 18 years of age who  is ill with a viral infection or fever. It may cause severe liver or brain damage.    Your appetite may be poor, so a light diet is fine. Avoid dehydration by drinking 6 to 8 glasses of fluids per day (water, soft drinks, juices, tea, or soup). Extra fluids will help loosen secretions in the nose and lungs.    Over-the-counter cold medicines will not shorten the length of time you re sick, but they may be helpful for the following symptoms: cough, sore throat, and nasal and sinus congestion. (Note: Do not use decongestants if you have high blood pressure.)  Follow-up care  Follow up with your healthcare provider, or as advised.  When to seek medical advice  Call your healthcare provider right away if any of these occur:    Cough with lots of colored sputum (mucus)    Severe headache; face, neck, or ear pain    Difficulty swallowing due to throat pain    Fever of 100.4 F (38 C)  Call 911, or get immediate medical care  Call emergency services right away if any of these occur:    Chest pain, shortness of breath, wheezing, or difficulty breathing    Coughing up blood    Inability to swallow due to throat pain  Date Last Reviewed: 9/13/2015 2000-2017 [employer]. [address] [place] [address], [place] [address]. All rights reserved. This information is not intended as a substitute for professional medical care. Always follow your healthcare professional's instructions.

## 2017-12-05 NOTE — PROGRESS NOTES
SUBJECTIVE:   Griffin Walton is a 83 year old male who presents to clinic today for the following health issues:      Acute Illness   Acute illness concerns: cough congestion   Onset: 3 days ago     Fever: no     Chills/Sweats: no     Headache (location?): no     Sinus Pressure:no    Conjunctivitis:  no    Ear Pain: no    Rhinorrhea: YES    Congestion: YES- chest     Sore Throat: YES- little     Cough: YES-productive of brownish sputum, with shortness of breath    Wheeze: YES    Decreased Appetite: no    Nausea: YES    Vomiting: no    Diarrhea:  no    Dysuria/Freq.: no    Fatigue/Achiness: YES    Sick/Strep Exposure: no     Therapies Tried and outcome: Patient was in the hosp 11-14 with pneumonia at that time was given antibiotic last taken on 11/24         83 yr old male with history of COPD on oxygen. Here for cough ongoing for weeks. He was hospitalized in November for pneumonia. He felt better on getting home. He says the cough has not really gone away.   He is not more short of breath than usual .Patient reports that cough is sometimes productive of yellowish sputum . He reports no fevers or chills. Wife says she checks his pulse oximeter at home and there are times that it drops into the 70 s .    Patient also had atrial fibrillation with RVR in the hospital . He was started on Diltiazem even though he was on Toprol XL. He reports that since starting the Diltiazem he has been experiencing this gagging feeling where it almost feels like he wants to vomit but he actually does not . He says that he thinks it is the medication.     Otherwise he has been doing well.   Problem list and histories reviewed & adjusted, as indicated.  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)     Anemia     Hyperlipidemia LDL goal <70     Anxiety disorder due to medical condition     Cerebral embolism with cerebral infarction (H)     zAdvanced directives, counseling/discussion     Health Care Home     Seborrheic keratosis     Leg edema, right     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)     Thrombocytopenia, primary (H)     Hypotension     Glaucoma     Controlled type 2 diabetes mellitus with diabetic nephropathy, with long-term current use of insulin (H)     Chronic obstructive pulmonary disease, unspecified COPD type (H)     Legally blind in right eye, as defined in [place]     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)     Atrial fibrillation, new onset (H)     Long-term (current) use of anticoagulants [Z79.01]     Past Surgical History:   Procedure Laterality Date     ABDOMEN SURGERY  2005    aortic aneurysm     CATARACT IOL, RT/LT  2/4/08    left eye - phacoemulsification w/ posterior chamber lens implantation combined w/ glaucoma filtering procedure     CREATE FISTULA ARTERIOVENOUS UPPER EXTREMITY  1/3/2012    Procedure:CREATE FISTULA ARTERIOVENOUS UPPER EXTREMITY; LEFT UPPER ARM ARTERIOVENOUS FISTULA; Surgeon:JENA PEARSON; Location:[facility]     SURGICAL HISTORY OF -   2/13/2004    Right knee arthroscopy     SURGICAL HISTORY OF -       Vocal cord biopsy-benign     SURGICAL HISTORY OF -   3/3/2005    AAA, left partial nephrectomy       Social History   Substance Use Topics     Smoking status: Former Smoker     Packs/day: 1.00     Years: 55.00     Types: Cigarettes     Quit date: 1/1/2005     Smokeless tobacco: Never Used     Alcohol use No     Family History   Problem Relation Age of Onset     CANCER Father      Asophageal      CANCER Brother      Throat      Other Cancer Sister      Lung          Current Outpatient Prescriptions   Medication Sig Dispense Refill     diltiazem (CARDIZEM CD) 240 MG  24 hr capsule Take 1 capsule (240 mg) by mouth daily 30 capsule 1     predniSONE (DELTASONE) 20 MG tablet Take 2 tablets (40 mg) by mouth daily for 5 days 10 tablet 0     metoprolol (TOPROL XL) 25 MG 24 hr tablet Take 1 tablet (25 mg) by mouth daily 90 tablet 3     Misc Natural Products (TART CHERRY ADVANCED) CAPS Take 1 capsule by mouth daily       terazosin (HYTRIN) 10 MG capsule TAKE 1 CAPSULE (10 MG) BY MOUTH AT BEDTIME 90 capsule 3     albuterol (2.5 MG/3ML) 0.083% neb solution TAKE 1 VIAL (2.5 MG) BY NEBULIZATION EVERY 6 HOURS AS NEEDED FOR SHORTNESS OF BREATH / DYSPNEA OR WHEEZING 63 vial 11     ADVAIR DISKUS 100-50 MCG/DOSE diskus inhaler inhale 1 puff into the lungs every 12 hours. 3 Inhaler 3     insulin aspart (NOVOLOG FLEXPEN) 100 UNIT/ML injection 6 units before breakfast, 6 units before lunch, 4 units before dinner 15 mL 11     bumetanide (BUMEX) 1 MG tablet TAKE 1 TABLET (1 MG) BY MOUTH 2 TIMES DAILY 180 tablet 2     lovastatin (MEVACOR) 40 MG tablet TAKE 1 AND 1/2 TABLETS BY MOUTH ONCE A DAY WITH DINNER. NEED FASTING LAB WORK 135 tablet 3     clopidogrel (PLAVIX) 75 MG tablet TAKE ONE TABLET BY MOUTH ONE TIME DAILY  90 tablet 3     blood glucose monitoring (ONE TOUCH ULTRA) test strip test 2 times daily  Profile Rx: patient will contact pharmacy when needed 60 each 11     insulin pen needle (NOVOFINE) 30G X 8 MM Use 3 daily or as directed. 300 each 2     latanoprost (XALATAN) 0.005 % ophthalmic solution Place 1 drop into both eyes At Bedtime       WARFARIN SODIUM PO Daily as directed per [facility]mad[facility] Clinic       dorzolamide-timolol (COSOPT) 2-0.5 % ophthalmic solution Place 1 drop into both eyes 2 times daily       BASAGLAR 100 UNIT/ML injection Inject 11 Units Subcutaneous At Bedtime 15 mL 0     blood glucose monitoring (NO BRAND SPECIFIED) meter device kit Use to test blood sugar 3 times daily or as directed. 1 kit 0     NIFEdipine ER (ADALAT CC) 90 MG TB24 Take 1 tablet (90 mg) by mouth daily  "(Patient not taking: Reported on 11/20/2017) 90 tablet 3     insulin syringe-needle U-100 31G X 5/16\" 0.5 ML Profile Rx: patient will contact pharmacy when needed 100 each 11     order for DME Equipment being ordered: Oxygen- HOME and portable. Georgie Coulter [facility] Medical Assistant Signed  Service date: 05/24/2016 10:48 AM       O2 Sat on Room air at rest:84%  O2 sat on room air with walk: 82-91%  O2 Sat on 1L of oxygen with walk 91-93%  O2 Sat on 2 L of Oxygen with walk 91-96%  O2 Sat on 1 L O2 at rest 94% 2 each prn     order for DME Nebulizer machine Qty #1 1 Units 0     Acetaminophen (TYLENOL PO) Take 650 mg by mouth nightly as needed       NONFORMULARY Take 1 tablet by mouth At Bedtime Vitamin (Dialyvite) that he gets from [facility].       ORDER FOR DME Equipment being ordered: Walker with wheels and brakes, and a seat 1 each 0     Allergies   Allergen Reactions     Hydralazine Shortness Of Breath and Swelling     Aspirin Swelling     Heparin Flush Other (See Comments)     thrombocytopenia     Monosodium Glutamate Swelling     BP Readings from Last 3 Encounters:   12/05/17 118/49   11/20/17 128/58   11/14/17 109/76    Wt Readings from Last 3 Encounters:   12/05/17 203 lb (92.1 kg)   11/20/17 206 lb (93.4 kg)   11/14/17 204 lb (92.5 kg)                  Labs reviewed in EPIC        Reviewed and updated as needed this visit by clinical staff     Reviewed and updated as needed this visit by Provider         ROS:  Constitutional, HEENT, cardiovascular, pulmonary, gi and gu systems are negative, except as otherwise noted.      OBJECTIVE:   /49 (BP Location: Right arm, Cuff Size: Adult Large)  Pulse 80  Temp 98  F (36.7  C) (Tympanic)  Ht 5' 6.75\" (1.695 m)  Wt 203 lb (92.1 kg)  SpO2 94%  BMI 32.03 kg/m2  Body mass index is 32.03 kg/(m^2).  GENERAL: healthy, alert and no distress  NECK: no adenopathy, no asymmetry, masses, or scars and thyroid normal to palpation  RESP: expiratory wheezes " very mild and scattered, lungs otherwise clear to auscultation .   CV: regular rate and rhythm, normal S1 S2, no S3 or S4, no murmur, click or rub, no peripheral edema and peripheral pulses strong  ABDOMEN: soft, nontender, no hepatosplenomegaly, no masses and bowel sounds normal  MS: no gross musculoskeletal defects noted, no edema    Diagnostic Test Results:  none     ASSESSMENT/PLAN:       1. URI with cough and congestion  Patient prescribed some prednisone. Because he is diabetic , warned that blood sugars are going to go up . Asked to increase Lantus to 15 units at night . No need for more antibiotics at this time .  - predniSONE (DELTASONE) 20 MG tablet; Take 2 tablets (40 mg) by mouth daily for 5 days  Dispense: 10 tablet; Refill: 0    2. Chronic atrial fibrillation (H)  Rate is controlled today . Asked that he continue with the Diltiazem for now excpet if the reaction is bad.     3. Adverse effect of drug, initial encounter  Stay on medication for now , if symptoms worsen stop medication       FUTURE APPOINTMENTS:       - Follow-up visit as needed    Amna Jasmine MD  [facility]

## 2017-12-07 ENCOUNTER — HOME CARE/HOSPICE - HEALTHEAST (OUTPATIENT)
Dept: HOME HEALTH SERVICES | Facility: HOME HEALTH | Age: 82
End: 2017-12-07

## 2017-12-11 ENCOUNTER — ANTICOAGULATION THERAPY VISIT (OUTPATIENT)
Dept: ANTICOAGULATION | Facility: CLINIC | Age: 82
End: 2017-12-11
Payer: MEDICARE

## 2017-12-11 DIAGNOSIS — I63.40 CEREBRAL INFARCTION DUE TO EMBOLISM OF CEREBRAL ARTERY (H): ICD-10-CM

## 2017-12-11 DIAGNOSIS — Z79.01 LONG-TERM (CURRENT) USE OF ANTICOAGULANTS: ICD-10-CM

## 2017-12-11 DIAGNOSIS — I48.91 ATRIAL FIBRILLATION, NEW ONSET (H): ICD-10-CM

## 2017-12-11 LAB — INR POINT OF CARE: 3.4 (ref 0.86–1.14)

## 2017-12-11 PROCEDURE — 99207 ZZC NO CHARGE NURSE ONLY: CPT

## 2017-12-11 PROCEDURE — 85610 PROTHROMBIN TIME: CPT | Mod: QW

## 2017-12-11 PROCEDURE — 36416 COLLJ CAPILLARY BLOOD SPEC: CPT

## 2017-12-11 RX ORDER — WARFARIN SODIUM 2 MG/1
TABLET ORAL
Qty: 30 TABLET | COMMUNITY
Start: 2017-12-11 | End: 2018-01-04

## 2017-12-11 NOTE — PROGRESS NOTES
ANTICOAGULATION FOLLOW-UP CLINIC VISIT    Patient Name:  Griffin Walton  Date:  12/11/2017  Contact Type:  Face to Face, accompanied by his daughter    SUBJECTIVE:     Patient Findings     Positives Antibiotic use or infection (prednisone for URI with cough and congestion)    Comments Patient is feeling much better now.            OBJECTIVE    INR Protime   Date Value Ref Range Status   12/11/2017 3.4 (A) 0.86 - 1.14 Final       ASSESSMENT / PLAN  INR assessment SUPRA    Recheck INR In: 2 WEEKS    INR Location Clinic      Anticoagulation Summary as of 12/11/2017     INR goal 2.0-3.0   Today's INR 3.4!   Maintenance plan 2 mg (2 mg x 1) on Tue; 4 mg (2 mg x 2) all other days   Full instructions 12/11: 2 mg; Otherwise 2 mg on Tue; 4 mg all other days   Weekly total 26 mg   Plan last modified Lissa Bello RN (11/28/2017)   Next INR check 12/27/2017   Priority INR   Target end date Indefinite    Indications   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) [I63.40]  Atrial fibrillation  new onset (H) [I48.91]  Long-term (current) use of anticoagulants [Z79.01] [Z79.01]         Anticoagulation Episode Summary     INR check location     Preferred lab     Send INR reminders to [facility]    Comments * 2mg tablets. [facility] MWF 8-12pm in [place]. Pt not new to warfarin (has been on approx 1 yr)      Anticoagulation Care Providers     Provider Role Specialty Phone number    HeribertoStefanie carlton, APRN CNP Responsible Nurse Practitioner [phone]            See the Encounter Report to view Anticoagulation Flowsheet and Dosing Calendar (Go to Encounters tab in chart review, and find the Anticoagulation Therapy Visit)        Sandra Keller, FAUSTINA

## 2017-12-11 NOTE — MR AVS SNAPSHOT
Griffin LYNCH Isabelle   12/11/2017 1:00 PM   Anticoagulation Therapy Visit    Description:  83 year old male   Provider:  [place] [facility]   Department:  [facility]           INR as of 12/11/2017     Today's INR 3.4!      Anticoagulation Summary as of 12/11/2017     INR goal 2.0-3.0   Today's INR 3.4!   Full instructions 12/11: 2 mg; Otherwise 2 mg on Tue; 4 mg all other days   Next INR check 12/27/2017    Indications   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) [I63.40]  Atrial fibrillation  new onset (H) [I48.91]  Long-term (current) use of anticoagulants [Z79.01] [Z79.01]         Your next Anticoagulation Clinic appointment(s)     Dec 27, 2017  1:15 PM CST   Anticoagulation Visit with [place] [facility]   [facility]    [id number] [id number] [phone]              Contact Numbers     Please call [phone] to cancel and/or reschedule your appointment.  Please call [phone] with any problems or questions regarding your therapy          December 2017 Details    Sun Mon Tue Wed Thu Fri Sat          1               2                 3               4               5               6               7               8               9                 10               11      2 mg   See details      12      2 mg         13      4 mg         14      4 mg         15      4 mg         16      4 mg           17      4 mg         18      4 mg         19      2 mg         20      4 mg         21      4 mg         22      4 mg         23      4 mg           24      4 mg         25      4 mg         26      2 mg         27            28               29               30                 31                      Date Details   12/11 This INR check       Date of next INR:  12/27/2017         How to take your warfarin dose     To take:  2 mg Take 1 of the 2 mg tablets.    To take:  4 mg Take 2 of the 2 mg tablets.

## 2017-12-12 ENCOUNTER — HOME CARE/HOSPICE - HEALTHEAST (OUTPATIENT)
Dept: HOME HEALTH SERVICES | Facility: HOME HEALTH | Age: 82
End: 2017-12-12

## 2017-12-14 ENCOUNTER — HOME CARE/HOSPICE - HEALTHEAST (OUTPATIENT)
Dept: HOME HEALTH SERVICES | Facility: HOME HEALTH | Age: 82
End: 2017-12-14

## 2017-12-14 ENCOUNTER — MYC MEDICAL ADVICE (OUTPATIENT)
Dept: FAMILY MEDICINE | Facility: CLINIC | Age: 82
End: 2017-12-14

## 2017-12-14 DIAGNOSIS — I10 ESSENTIAL HYPERTENSION: ICD-10-CM

## 2017-12-14 RX ORDER — DILTIAZEM HYDROCHLORIDE 240 MG/1
240 CAPSULE, COATED, EXTENDED RELEASE ORAL DAILY
Qty: 30 CAPSULE | Refills: 1 | Status: CANCELLED | OUTPATIENT
Start: 2017-12-14

## 2017-12-15 RX ORDER — LOSARTAN POTASSIUM 25 MG/1
25 TABLET ORAL DAILY
Qty: 90 TABLET | Refills: 3 | Status: SHIPPED | OUTPATIENT
Start: 2017-12-15 | End: 2017-12-15 | Stop reason: ALTCHOICE

## 2017-12-15 RX ORDER — LOSARTAN POTASSIUM 50 MG/1
50 TABLET ORAL DAILY
Qty: 90 TABLET | Refills: 1 | Status: SHIPPED | OUTPATIENT
Start: 2017-12-15 | End: 2017-12-19

## 2017-12-15 NOTE — TELEPHONE ENCOUNTER
I reviewed Care Everywhere records and saw that at last admission 11/201/7 he was supposed to be on 25mg of the losartan but I don't see any records after that of it having changes doses.  But then on the most recent [facility] medication list it has both the 50mg and the 100mg losartan. And it says the 50mg is the active dose so let's plan that I'll send the 50mg daily dose.  If this is not correct please schedule a clinic visit to get medications and blood pressure sorted out.    Thanks,  .Covering for provider  Santos Ortiz MD  [facility]

## 2017-12-15 NOTE — TELEPHONE ENCOUNTER
Patient is requesting losartan refill - this is not active on his med list.  He states it was ordered by Dr. Oumou James at [facility] - see mychart.    Med and pharm ready..  Routing to provider.  Sandra MOSS RN

## 2017-12-18 DIAGNOSIS — I48.91 ATRIAL FIBRILLATION (H): Primary | ICD-10-CM

## 2017-12-18 RX ORDER — DILTIAZEM HYDROCHLORIDE 240 MG/1
240 CAPSULE, COATED, EXTENDED RELEASE ORAL DAILY
Qty: 30 CAPSULE | Refills: 1 | Status: SHIPPED | OUTPATIENT
Start: 2017-12-18 | End: 2017-12-19

## 2017-12-19 ENCOUNTER — MEDICAL CORRESPONDENCE (OUTPATIENT)
Dept: HEALTH INFORMATION MANAGEMENT | Facility: CLINIC | Age: 82
End: 2017-12-19

## 2017-12-19 ENCOUNTER — HOME CARE/HOSPICE - HEALTHEAST (OUTPATIENT)
Dept: HOME HEALTH SERVICES | Facility: HOME HEALTH | Age: 82
End: 2017-12-19

## 2017-12-19 ENCOUNTER — OFFICE VISIT (OUTPATIENT)
Dept: FAMILY MEDICINE | Facility: CLINIC | Age: 82
End: 2017-12-19
Payer: MEDICARE

## 2017-12-19 VITALS
DIASTOLIC BLOOD PRESSURE: 80 MMHG | BODY MASS INDEX: 31.88 KG/M2 | SYSTOLIC BLOOD PRESSURE: 145 MMHG | WEIGHT: 202 LBS | TEMPERATURE: 98.2 F | OXYGEN SATURATION: 97 %

## 2017-12-19 DIAGNOSIS — I10 ESSENTIAL HYPERTENSION: ICD-10-CM

## 2017-12-19 DIAGNOSIS — N40.1 BENIGN NON-NODULAR PROSTATIC HYPERPLASIA WITH LOWER URINARY TRACT SYMPTOMS: ICD-10-CM

## 2017-12-19 DIAGNOSIS — H35.30 MACULAR DEGENERATION (SENILE) OF RETINA: ICD-10-CM

## 2017-12-19 DIAGNOSIS — E78.5 HYPERLIPIDEMIA LDL GOAL <130: ICD-10-CM

## 2017-12-19 DIAGNOSIS — I48.0 PAROXYSMAL ATRIAL FIBRILLATION (H): Primary | ICD-10-CM

## 2017-12-19 DIAGNOSIS — E11.21 TYPE 2 DIABETES MELLITUS WITH DIABETIC NEPHROPATHY, UNSPECIFIED LONG TERM INSULIN USE STATUS: ICD-10-CM

## 2017-12-19 DIAGNOSIS — I63.40 CEREBRAL INFARCTION DUE TO EMBOLISM OF CEREBRAL ARTERY (H): ICD-10-CM

## 2017-12-19 LAB
ANION GAP SERPL CALCULATED.3IONS-SCNC: 7 MMOL/L (ref 3–14)
BUN SERPL-MCNC: 39 MG/DL (ref 7–30)
CALCIUM SERPL-MCNC: 8.1 MG/DL (ref 8.5–10.1)
CHLORIDE SERPL-SCNC: 99 MMOL/L (ref 94–109)
CO2 SERPL-SCNC: 32 MMOL/L (ref 20–32)
CREAT SERPL-MCNC: 5.44 MG/DL (ref 0.66–1.25)
GFR SERPL CREATININE-BSD FRML MDRD: 10 ML/MIN/1.7M2
GLUCOSE SERPL-MCNC: 110 MG/DL (ref 70–99)
HBA1C MFR BLD: 6.3 % (ref 4.3–6)
POTASSIUM SERPL-SCNC: 4.2 MMOL/L (ref 3.4–5.3)
SODIUM SERPL-SCNC: 138 MMOL/L (ref 133–144)

## 2017-12-19 PROCEDURE — 36415 COLL VENOUS BLD VENIPUNCTURE: CPT | Performed by: NURSE PRACTITIONER

## 2017-12-19 PROCEDURE — 99214 OFFICE O/P EST MOD 30 MIN: CPT | Performed by: NURSE PRACTITIONER

## 2017-12-19 PROCEDURE — 80048 BASIC METABOLIC PNL TOTAL CA: CPT | Mod: AY | Performed by: NURSE PRACTITIONER

## 2017-12-19 PROCEDURE — 83036 HEMOGLOBIN GLYCOSYLATED A1C: CPT | Performed by: NURSE PRACTITIONER

## 2017-12-19 RX ORDER — LOVASTATIN 40 MG
TABLET ORAL
Qty: 135 TABLET | Refills: 3 | Status: SHIPPED | OUTPATIENT
Start: 2017-12-19

## 2017-12-19 RX ORDER — METOPROLOL SUCCINATE 25 MG/1
25 TABLET, EXTENDED RELEASE ORAL DAILY
Qty: 90 TABLET | Refills: 3 | Status: SHIPPED | OUTPATIENT
Start: 2017-12-19 | End: 2018-01-31

## 2017-12-19 RX ORDER — CLOPIDOGREL BISULFATE 75 MG/1
75 TABLET ORAL DAILY
Qty: 90 TABLET | Refills: 3 | Status: SHIPPED | OUTPATIENT
Start: 2017-12-19

## 2017-12-19 RX ORDER — DILTIAZEM HYDROCHLORIDE 240 MG/1
240 CAPSULE, COATED, EXTENDED RELEASE ORAL DAILY
Qty: 90 CAPSULE | Refills: 3 | Status: SHIPPED | OUTPATIENT
Start: 2017-12-19

## 2017-12-19 RX ORDER — LOSARTAN POTASSIUM 50 MG/1
50 TABLET ORAL DAILY
Qty: 90 TABLET | Refills: 3 | Status: ON HOLD | OUTPATIENT
Start: 2017-12-19 | End: 2018-01-20

## 2017-12-19 RX ORDER — TERAZOSIN 10 MG/1
CAPSULE ORAL
Qty: 90 CAPSULE | Refills: 3 | Status: SHIPPED | OUTPATIENT
Start: 2017-12-19

## 2017-12-19 NOTE — NURSING NOTE
"Initial /84 (BP Location: Right arm, Patient Position: Chair, Cuff Size: Adult Large)  Temp 98.2  F (36.8  C) (Tympanic)  Wt 202 lb (91.6 kg)  BMI 31.88 kg/m2 Estimated body mass index is 31.88 kg/(m^2) as calculated from the following:    Height as of 12/5/17: 5' 6.75\" (1.695 m).    Weight as of this encounter: 202 lb (91.6 kg). .    Georgie Coulter    "

## 2017-12-19 NOTE — PATIENT INSTRUCTIONS
1. Lab today  2. Schedule diabetic eye exam          Thank you for choosing [facility].  You may be receiving a survey in the mail from [employer] regarding your visit today.  Please take a few minutes to complete and return the survey to let us know how we are doing.      If you have questions or concerns, please contact us via [facility] or you can contact your care team at [phone].    Our Clinic hours are:  Monday 6:40 am  to 7:00 pm  Tuesday -Friday 6:40 am to 5:00 pm    The [place] outpatient lab hours are:  Monday - Friday 6:10 am to 4:45 pm  Saturdays 7:00 am to 11:00 am  Appointments are required, call [phone]    If you have clinical questions after hours or would like to schedule an appointment,  call the clinic at [phone].

## 2017-12-19 NOTE — PROGRESS NOTES
"  SUBJECTIVE:   Griffin Walton is a 83 year old male who presents to clinic today for the following health issues:      Diabetes Follow-up    Patient is checking blood sugars: 2 times daily.    Blood sugar testing frequency justification:   Results are as follows:       100        Diabetic concerns: None     Symptoms of hypoglycemia (low blood sugar): none     Paresthesias (numbness or burning in feet) or sores: No     Date of last diabetic eye exam: 10/2017  Lab Results   Component Value Date    A1C 6.3 12/19/2017    A1C 5.6 11/29/2016    A1C 6.3 10/24/2016    A1C 5.3 07/25/2016    A1C 6.2 05/24/2016       BPH: stable with medications    AFIB: stable on medications and plavix.     Hyperlipidemia Follow-Up      Rate your low fat/cholesterol diet?: good    Taking statin?  Yes, no muscle aches from statin    Other lipid medications/supplements?:  none    Hypertension Follow-up      Outpatient blood pressures are being checked at home.  Results are \"good\".    Low Salt Diet: no added salt  BP Readings from Last 2 Encounters:   12/05/17 118/49   11/20/17 128/58     Hemoglobin A1C (%)   Date Value   11/29/2016 5.6   10/24/2016 6.3 (H)     LDL Cholesterol Calculated (mg/dL)   Date Value   07/11/2017 44   05/31/2016 62         Amount of exercise or physical activity: None    Problems taking medications regularly: No    Medication side effects: none    Diet: regular (no restrictions)        -------------------------------------    Problem list and histories reviewed & adjusted, as indicated.  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)     Anemia     Hyperlipidemia LDL goal <70     Anxiety disorder " due to medical condition     Cerebral embolism with cerebral infarction (H)     zAdvanced directives, counseling/discussion     Health Care Home     Seborrheic keratosis     Leg edema, right     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)     Thrombocytopenia, primary (H)     Hypotension     Glaucoma     Controlled type 2 diabetes mellitus with diabetic nephropathy, with long-term current use of insulin (H)     Chronic obstructive pulmonary disease, unspecified COPD type (H)     Legally blind in right eye, as defined in [place]     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)     Atrial fibrillation, new onset (H)     Long-term (current) use of anticoagulants [Z79.01]     Past Surgical History:   Procedure Laterality Date     ABDOMEN SURGERY  2005    aortic aneurysm     CATARACT IOL, RT/LT  2/4/08    left eye - phacoemulsification w/ posterior chamber lens implantation combined w/ glaucoma filtering procedure     CREATE FISTULA ARTERIOVENOUS UPPER EXTREMITY  1/3/2012    Procedure:CREATE FISTULA ARTERIOVENOUS UPPER EXTREMITY; LEFT UPPER ARM ARTERIOVENOUS FISTULA; Surgeon:JENA PEARSON; [facility]     SURGICAL HISTORY OF -   2/13/2004    Right knee arthroscopy     SURGICAL HISTORY OF -       Vocal cord biopsy-benign     SURGICAL HISTORY OF -   3/3/2005    AAA, left partial nephrectomy       Social History   Substance Use Topics     Smoking status: Former Smoker     Packs/day: 1.00     Years: 55.00     Types: Cigarettes     Quit date: 1/1/2005     Smokeless tobacco: Never Used     Alcohol use No     Family History   Problem Relation Age of Onset     CANCER Father      Asophageal      CANCER Brother      Throat      Other Cancer Sister      Lung              Reviewed and updated as needed this visit by clinical staffMeds       Reviewed and updated as needed this visit by Provider         ROS:  Constitutional, HEENT, cardiovascular, pulmonary, GI, , musculoskeletal, neuro, skin, endocrine and psych  systems are negative, except as otherwise noted.      OBJECTIVE:   /80  Temp 98.2  F (36.8  C) (Tympanic)  Wt 202 lb (91.6 kg)  SpO2 97%  BMI 31.88 kg/m2  Body mass index is 31.88 kg/(m^2).  GENERAL: healthy, alert and no distress  NECK: no adenopathy, no asymmetry, masses, or scars and thyroid normal to palpation  RESP: lungs clear to auscultation - no rales, rhonchi or wheezes  CV: regular rate and rhythm, normal S1 S2, no S3 or S4, no murmur, click or rub, no peripheral edema and peripheral pulses strong  MS: no gross musculoskeletal defects noted, no edema    Diagnostic Test Results:  none     ASSESSMENT/PLAN:       1. Essential hypertension  controlled  - losartan (COZAAR) 50 MG tablet; Take 1 tablet (50 mg) by mouth daily  Dispense: 90 tablet; Refill: 3  - metoprolol (TOPROL XL) 25 MG 24 hr tablet; Take 1 tablet (25 mg) by mouth daily  Dispense: 90 tablet; Refill: 3  - Albumin Random Urine Quantitative with Creat Ratio; Future    2. Benign non-nodular prostatic hyperplasia with lower urinary tract symptoms  stable  - terazosin (HYTRIN) 10 MG capsule; Profile Rx: patient will contact pharmacy when needed  TAKE 1 CAPSULE (10 MG) BY MOUTH AT BEDTIME  Dispense: 90 capsule; Refill: 3  - Basic metabolic panel    3. Hyperlipidemia LDL goal <130  Tolerating statin  - lovastatin (MEVACOR) 40 MG tablet; Profile Rx: patient will contact pharmacy when needed  TAKE 1 AND 1/2 TABLETS BY MOUTH ONCE A DAY WITH DINNER. NEED FASTING LAB WORK  Dispense: 135 tablet; Refill: 3    4. 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)  stable  - clopidogrel (PLAVIX) 75 MG tablet; Take 1 tablet (75 mg) by mouth daily  Dispense: 90 tablet; Refill: 3    5. Type 2 diabetes mellitus with diabetic nephropathy, unspecified long term insulin use status (H)    - insulin pen needle (NOVOFINE) 30G X 8 MM; Use 3 daily or as directed.  Dispense: 300 each; Refill: 3  - Hemoglobin A1c    6. Paroxysmal atrial fibrillation  (H)  Stable  - followed by cardiology   - diltiazem (CARDIZEM CD) 240 MG 24 hr capsule; Take 1 capsule (240 mg) by mouth daily  Dispense: 90 capsule; Refill: 3    7. Macular degeneration (senile) of retina  Followed by Ophthalmologist       Follow up in 3 months     DANNA Lopez [facility]

## 2017-12-19 NOTE — NURSING NOTE
"Initial /84 (BP Location: Right arm, Patient Position: Chair, Cuff Size: Adult Large)  Temp 98.2  F (36.8  C) (Tympanic)  Wt 202 lb (91.6 kg)  SpO2 97%  BMI 31.88 kg/m2 Estimated body mass index is 31.88 kg/(m^2) as calculated from the following:    Height as of 12/5/17: 5' 6.75\" (1.695 m).    Weight as of this encounter: 202 lb (91.6 kg). .    Georgie Coulter  Initial /80  Temp 98.2  F (36.8  C) (Tympanic)  Wt 202 lb (91.6 kg)  SpO2 97%  BMI 31.88 kg/m2 Estimated body mass index is 31.88 kg/(m^2) as calculated from the following:    Height as of 12/5/17: 5' 6.75\" (1.695 m).    Weight as of this encounter: 202 lb (91.6 kg). .    Georgie Coulter    "

## 2017-12-19 NOTE — MR AVS SNAPSHOT
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 Lab today  2. Schedule diabetic eye exam          Thank you for choosing [facility]  You may be receiving a survey in the mail from [employer] regarding your visit today.  Please take a few minutes to complete and return the survey to let us know how we are doing.      If you have questions or concerns, please contact us via [employer] or you can contact your care team at [phone].    Our Clinic hours are:  Monday 6:40 am  to 7:00 pm  Tuesday -Friday 6:40 am to 5:00 pm    The [place] outpatient lab hours are:  Monday - Friday 6:10 am to 4:45 pm  Saturdays 7:00 am to 11:00 am  Appointments are required, call [phone]    If you have clinical questions after hours or would like to schedule an appointment,  call the clinic at [phone].          Follow-ups after your visit        Your next 10 appointments already scheduled     Dec 27, 2017  1:15 PM CST   Anticoagulation Visit with [place] ANTI COAG   [facility] ([facility])    [address]  [facility] [place] [address]   [phone]            Jan 04, 2018  1:30 PM CST   US ABDOMINAL AORTIC IMAGING with FERNANDO   [facility] Echocardiography ([facility])    [address]  [facility] [address]   [phone]           Please bring a list of your medicines (including vitamins, minerals and over-the-counter drugs). Also, tell your doctor about any allergies you may have. Wear comfortable clothes and leave your valuables at home.  Adults: No eating or drinking for 8 hours before the exam. You may take medicine with a small sip of water.  Children: - Children 6+ years: No food or drink for 6 hours before exam. - Children 1-5 years: No food or drink for 4 hours before exam. - Infants, breast-fed: may have breast milk up to 2 hours before exam. - Infants, formula: may have bottle until 4 hours before exam.  Please call the Imaging Department at your exam site with any questions.            Jan 04, 2018  3:00 PM CST   Ech Complete with [address] Echocardiography ([facility])    [address] [address]   [phone]           1. Please bring or wear a comfortable two-piece outfit. 2. You may eat, drink and take your normal medicines. 3. For any questions that cannot be answered, please contact the ordering physician            Jan 31, 2018  3:00 PM CST   Return Visit with Mac Perla MD   [facility] ([facility])    [address] [address]   [phone]            Feb 27, 2018 10:00 AM CST   [facility]    [address] [address]   [phone]            May 15, 2018  1:00 PM CDT   SIX MINUTE WALK with UC PFL A, UC PFL 6 MINUTE WALK 1   [facility] Pulmonary Function Testing ([facility])    [address]  3rd [address]  [place] [address] [phone]            May 15, 2018  2:00 PM CDT   (Arrive by 1:45 PM)   Return Interstitial Lung with David Morris Perlman, MD   [facility]  ([facility])    [address]  3rd [address]  [facility] [address] [phone]              Who to contact     If you have questions or need follow up information about today's clinic visit or your schedule please contact [facility] directly at [phone].  Normal or non-critical lab and imaging results will be communicated to you by MyChart, letter or phone within 4 business days after the clinic has received the results. If you do not hear from us within 7 days, please contact the clinic through [employer] or phone. If you have a critical or abnormal lab result, we will notify you by phone as soon as possible.  Submit refill requests through [employer] or call your pharmacy and they will forward the refill request to us. Please allow 3 business days for your refill to be completed.          Additional Information About Your Visit        MyChart Information     [employer] gives you secure access to your electronic health record. If you see a primary care provider, you can also send messages to your care team and make appointments. If you have questions, please call your primary care clinic.  If you do not have a primary care provider, please call [phone] and they will assist you.        Care EveryWhere ID     This is your Care EveryWhere ID. This could be used by other organizations to access your [place] medical records  [id number]        Your Vitals Were     Temperature BMI (Body Mass Index)                98.2  F (36.8  C) (Tympanic) 31.88 kg/m2           Blood Pressure from Last 3 Encounters:   12/19/17 149/84   12/05/17 118/49   11/20/17 128/58    Weight from Last 3 Encounters:   12/19/17 202 lb (91.6 kg)   12/05/17 203 lb (92.1 kg)   11/20/17 206 lb (93.4 kg)              We Performed the Following     Albumin Random Urine Quantitative with Creat Ratio     Basic metabolic panel     Hemoglobin A1c          Today's Medication Changes          These changes are  accurate as of: 12/19/17 11:53 AM.  If you have any questions, ask your nurse or doctor.               These medicines have changed or have updated prescriptions.        Dose/Directions    clopidogrel 75 MG tablet   Commonly known as:  PLAVIX   This may have changed:  See the new instructions.   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   Used for:  Hyperlipidemia LDL goal <130        Profile Rx: patient will contact pharmacy when needed TAKE 1 AND 1/2 TABLETS BY MOUTH ONCE A DAY WITH DINNER. NEED FASTING LAB WORK   Quantity:  135 tablet   Refills:  3       terazosin 10 MG capsule   Commonly known as:  HYTRIN   This may have changed:  See the new instructions.   Used for:  Benign non-nodular prostatic hyperplasia with lower urinary tract symptoms        Profile Rx: patient will contact pharmacy when needed TAKE 1 CAPSULE (10 MG) BY MOUTH AT BEDTIME   Quantity:  90 capsule   Refills:  3         Stop taking these medicines if you haven't already. Please contact your care team if you have questions.     NIFEdipine ER 90 MG Tb24   Commonly known as:  ADALAT CC                Where to get your medicines      These medications were sent to [id number] - [place], [place] - [address]  [address] [facility] [address]     Phone:  [phone]     clopidogrel 75 MG tablet    diltiazem 240 MG 24 hr capsule    insulin pen needle 30G X 8 MM    losartan 50 MG tablet    metoprolol 25 MG 24 hr tablet         Some of these will need a paper prescription and others can be bought over the counter.  Ask your nurse if you have questions.     Bring a paper prescription for each of these medications     lovastatin 40 MG tablet    terazosin 10 MG capsule                 Primary Care Provider Office Phone # Fax #    DANNA Lopez [facility] [phone] [address] [address]        Equal Access to Services     ANUSHKA BILL : Hadii aad ku hadcoletteo Soomaali, waaxda luqadaha, qaybta kaalmada adeegyada, renzo huertamele martinez marceloseth correa. So [facility] [phone].    ATENCIÓN: Si habla español, tiene a louis disposición servicios gratuitos de asistencia lingüística. Llame al [phone].    We comply with applicable federal civil rights laws and [place] laws. We do not discriminate on the basis of race, color, national origin, age, disability, sex, sexual orientation, or gender identity.            Thank you!     Thank you for choosing [facility]  for your care. Our goal is always to provide you with excellent care. Hearing back from our patients is one way we can continue to improve our services. Please take a few minutes to complete the written survey that you may receive in the mail after your visit with us. Thank you!             Your Updated Medication List - Protect others around you: Learn how to safely use, store and throw away your medicines at www.disposemymeds.org.          This list is accurate as of: 12/19/17 11:53 AM.  Always use your most recent med list.                   Brand Name Dispense Instructions for use Diagnosis    ADVAIR DISKUS 100-50 MCG/DOSE diskus inhaler   Generic drug:  fluticasone-salmeterol     3 Inhaler    inhale 1 puff into the lungs every 12 hours.    Chronic obstructive pulmonary disease, unspecified COPD type (H)       albuterol (2.5 MG/3ML) 0.083% neb solution     63 vial    TAKE 1 VIAL (2.5 MG) BY NEBULIZATION EVERY 6 HOURS AS NEEDED FOR SHORTNESS OF BREATH / DYSPNEA OR WHEEZING    Chronic obstructive pulmonary disease, unspecified COPD type (H)       BASAGLAR 100 UNIT/ML injection     15 mL    Inject 11 Units Subcutaneous At Bedtime    Controlled type 2 diabetes mellitus with diabetic  "nephropathy, with long-term current use of insulin (H)       blood glucose monitoring meter device kit    no brand specified    1 kit    Use to test blood sugar 3 times daily or as directed.    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)       diltiazem 240 MG 24 hr capsule    CARDIZEM CD    90 capsule    Take 1 capsule (240 mg) by mouth daily    Paroxysmal atrial fibrillation (H)       dorzolamide-timolol 2-0.5 % ophthalmic solution    COSOPT     Place 1 drop into both eyes 2 times daily        insulin aspart 100 UNIT/ML injection    NovoLOG FLEXPEN    15 mL    6 units before breakfast, 6 units before lunch, 4 units before dinner    Controlled type 2 diabetes mellitus with diabetic nephropathy, with long-term current use of insulin (H)       insulin pen needle 30G X 8 MM    NOVOFINE    300 each    Use 3 daily or as directed.    Type 2 diabetes mellitus with diabetic nephropathy, unspecified long term insulin use status (H)       insulin syringe-needle U-100 31G X 5/16\" 0.5 ML     100 each    Profile Rx: patient will contact pharmacy when needed    Type 2 diabetes mellitus with diabetic nephropathy (H)       latanoprost 0.005 % ophthalmic solution    XALATAN     Place 1 drop into both eyes At Bedtime        losartan 50 MG tablet    COZAAR    90 tablet    Take 1 tablet (50 mg) by mouth daily    Essential hypertension       lovastatin " 40 MG tablet    MEVACOR    135 tablet    Profile Rx: patient will contact pharmacy when needed TAKE 1 AND 1/2 TABLETS BY MOUTH ONCE A DAY WITH DINNER. NEED FASTING LAB WORK    Hyperlipidemia LDL goal <130       metoprolol 25 MG 24 hr tablet    TOPROL XL    90 tablet    Take 1 tablet (25 mg) by mouth daily    Essential hypertension       * NONFORMULARY      Take 1 tablet by mouth At Bedtime Vitamin (Dialyvite) that he gets from [facility].        * order for DME     1 each    Equipment being ordered: Walker with wheels and brakes, and a seat    ESRD (end stage renal disease) on dialysis (H), Cerebral embolism with cerebral infarction (H)       order for DME     1 Units    Nebulizer machine Qty #1    Fever, unspecified, Cough, Chronic obstructive pulmonary disease, unspecified COPD type (H), Acute bronchospasm       order for DME     2 each    Equipment being ordered: Oxygen- HOME and portable. 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), Type 2 diabetes mellitus with diabetic nephropathy (H)       TART CHERRY ADVANCED Caps      Take 1 capsule by mouth daily        terazosin 10 MG capsule    HYTRIN    90 capsule    Profile Rx: patient will contact pharmacy when needed TAKE 1 CAPSULE (10 MG) BY MOUTH AT BEDTIME    Benign non-nodular prostatic hyperplasia with lower urinary tract symptoms       TYLENOL PO      Take 650 mg by mouth nightly as needed        warfarin 2 MG tablet    COUMADIN    30 tablet    Take 2 mg on Tue; 4 mg all other days or as directed by the  Anticoagulation Clinic        * Notice:  This list has 2 medication(s) that are the same as other medications prescribed for you. Read the directions carefully, and ask your doctor or other care provider to review them with you.

## 2017-12-20 ENCOUNTER — MEDICAL CORRESPONDENCE (OUTPATIENT)
Dept: HEALTH INFORMATION MANAGEMENT | Facility: CLINIC | Age: 82
End: 2017-12-20

## 2017-12-22 ENCOUNTER — ALLIED HEALTH/NURSE VISIT (OUTPATIENT)
Dept: FAMILY MEDICINE | Facility: CLINIC | Age: 82
End: 2017-12-22
Payer: MEDICARE

## 2017-12-22 ENCOUNTER — HOSPITAL ENCOUNTER (EMERGENCY)
Facility: CLINIC | Age: 82
Discharge: HOME | End: 2017-12-22
Attending: FAMILY MEDICINE | Admitting: FAMILY MEDICINE
Payer: MEDICARE

## 2017-12-22 ENCOUNTER — APPOINTMENT (OUTPATIENT)
Dept: GENERAL RADIOLOGY | Facility: CLINIC | Age: 82
End: 2017-12-22
Attending: FAMILY MEDICINE
Payer: MEDICARE

## 2017-12-22 VITALS — DIASTOLIC BLOOD PRESSURE: 54 MMHG | SYSTOLIC BLOOD PRESSURE: 157 MMHG | HEART RATE: 87 BPM | OXYGEN SATURATION: 90 %

## 2017-12-22 VITALS
OXYGEN SATURATION: 89 % | SYSTOLIC BLOOD PRESSURE: 158 MMHG | RESPIRATION RATE: 17 BRPM | DIASTOLIC BLOOD PRESSURE: 85 MMHG | TEMPERATURE: 98.3 F

## 2017-12-22 DIAGNOSIS — J18.9 COMMUNITY ACQUIRED PNEUMONIA OF LEFT LOWER LOBE OF LUNG: ICD-10-CM

## 2017-12-22 DIAGNOSIS — R09.02 HYPOXIA: Primary | ICD-10-CM

## 2017-12-22 DIAGNOSIS — J44.1 COPD EXACERBATION (H): ICD-10-CM

## 2017-12-22 LAB
ALBUMIN SERPL-MCNC: 3 G/DL (ref 3.4–5)
ALP SERPL-CCNC: 82 U/L (ref 40–150)
ALT SERPL W P-5'-P-CCNC: 25 U/L (ref 0–70)
ANION GAP SERPL CALCULATED.3IONS-SCNC: 8 MMOL/L (ref 3–14)
AST SERPL W P-5'-P-CCNC: 23 U/L (ref 0–45)
BASOPHILS # BLD AUTO: 0 10E9/L (ref 0–0.2)
BASOPHILS NFR BLD AUTO: 0.3 %
BILIRUB SERPL-MCNC: 0.6 MG/DL (ref 0.2–1.3)
BUN SERPL-MCNC: 22 MG/DL (ref 7–30)
CALCIUM SERPL-MCNC: 7.9 MG/DL (ref 8.5–10.1)
CHLORIDE SERPL-SCNC: 96 MMOL/L (ref 94–109)
CO2 SERPL-SCNC: 32 MMOL/L (ref 20–32)
CREAT SERPL-MCNC: 3.25 MG/DL (ref 0.66–1.25)
DIFFERENTIAL METHOD BLD: ABNORMAL
EOSINOPHIL # BLD AUTO: 0.2 10E9/L (ref 0–0.7)
EOSINOPHIL NFR BLD AUTO: 1.6 %
ERYTHROCYTE [DISTWIDTH] IN BLOOD BY AUTOMATED COUNT: 15 % (ref 10–15)
GFR SERPL CREATININE-BSD FRML MDRD: 18 ML/MIN/1.7M2
GLUCOSE SERPL-MCNC: 205 MG/DL (ref 70–99)
HCT VFR BLD AUTO: 25.5 % (ref 40–53)
HGB BLD-MCNC: 8.1 G/DL (ref 13.3–17.7)
IMM GRANULOCYTES # BLD: 0 10E9/L (ref 0–0.4)
IMM GRANULOCYTES NFR BLD: 0.3 %
LYMPHOCYTES # BLD AUTO: 0.8 10E9/L (ref 0.8–5.3)
LYMPHOCYTES NFR BLD AUTO: 7.8 %
MCH RBC QN AUTO: 29.6 PG (ref 26.5–33)
MCHC RBC AUTO-ENTMCNC: 31.8 G/DL (ref 31.5–36.5)
MCV RBC AUTO: 93 FL (ref 78–100)
MONOCYTES # BLD AUTO: 0.9 10E9/L (ref 0–1.3)
MONOCYTES NFR BLD AUTO: 8.9 %
NEUTROPHILS # BLD AUTO: 7.9 10E9/L (ref 1.6–8.3)
NEUTROPHILS NFR BLD AUTO: 81.1 %
NT-PROBNP SERPL-MCNC: ABNORMAL PG/ML (ref 0–1800)
PLATELET # BLD AUTO: 209 10E9/L (ref 150–450)
POTASSIUM SERPL-SCNC: 3.5 MMOL/L (ref 3.4–5.3)
PROT SERPL-MCNC: 7 G/DL (ref 6.8–8.8)
RBC # BLD AUTO: 2.74 10E12/L (ref 4.4–5.9)
SODIUM SERPL-SCNC: 136 MMOL/L (ref 133–144)
TROPONIN I SERPL-MCNC: 0.03 UG/L (ref 0–0.04)
WBC # BLD AUTO: 9.8 10E9/L (ref 4–11)

## 2017-12-22 PROCEDURE — 25000132 ZZH RX MED GY IP 250 OP 250 PS 637: Mod: GY | Performed by: FAMILY MEDICINE

## 2017-12-22 PROCEDURE — 93005 ELECTROCARDIOGRAM TRACING: CPT | Performed by: FAMILY MEDICINE

## 2017-12-22 PROCEDURE — 94640 AIRWAY INHALATION TREATMENT: CPT

## 2017-12-22 PROCEDURE — 93010 ELECTROCARDIOGRAM REPORT: CPT | Mod: Z6 | Performed by: FAMILY MEDICINE

## 2017-12-22 PROCEDURE — 99285 EMERGENCY DEPT VISIT HI MDM: CPT | Mod: 25 | Performed by: FAMILY MEDICINE

## 2017-12-22 PROCEDURE — 25000125 ZZHC RX 250: Performed by: FAMILY MEDICINE

## 2017-12-22 PROCEDURE — 99207 ZZC NO BILLABLE SERVICE THIS VISIT: CPT

## 2017-12-22 PROCEDURE — 85025 COMPLETE CBC W/AUTO DIFF WBC: CPT | Performed by: FAMILY MEDICINE

## 2017-12-22 PROCEDURE — 71020 XR CHEST 2 VW: CPT

## 2017-12-22 PROCEDURE — 80053 COMPREHEN METABOLIC PANEL: CPT | Performed by: FAMILY MEDICINE

## 2017-12-22 PROCEDURE — 84484 ASSAY OF TROPONIN QUANT: CPT | Performed by: FAMILY MEDICINE

## 2017-12-22 PROCEDURE — 83880 ASSAY OF NATRIURETIC PEPTIDE: CPT | Performed by: FAMILY MEDICINE

## 2017-12-22 PROCEDURE — A9270 NON-COVERED ITEM OR SERVICE: HCPCS | Mod: GY | Performed by: FAMILY MEDICINE

## 2017-12-22 RX ORDER — AZITHROMYCIN 250 MG/1
250 TABLET, FILM COATED ORAL DAILY
Qty: 4 TABLET | Refills: 0 | Status: SHIPPED | OUTPATIENT
Start: 2017-12-22 | End: 2017-12-26

## 2017-12-22 RX ORDER — AZITHROMYCIN 250 MG/1
500 TABLET, FILM COATED ORAL ONCE
Status: COMPLETED | OUTPATIENT
Start: 2017-12-22 | End: 2017-12-22

## 2017-12-22 RX ORDER — IPRATROPIUM BROMIDE AND ALBUTEROL SULFATE 2.5; .5 MG/3ML; MG/3ML
3 SOLUTION RESPIRATORY (INHALATION) ONCE
Status: COMPLETED | OUTPATIENT
Start: 2017-12-22 | End: 2017-12-22

## 2017-12-22 RX ORDER — PREDNISONE 20 MG/1
40 TABLET ORAL ONCE
Status: COMPLETED | OUTPATIENT
Start: 2017-12-22 | End: 2017-12-22

## 2017-12-22 RX ORDER — PREDNISONE 10 MG/1
TABLET ORAL
Qty: 32 TABLET | Refills: 0 | Status: SHIPPED | OUTPATIENT
Start: 2017-12-22 | End: 2018-01-19

## 2017-12-22 RX ADMIN — PREDNISONE 40 MG: 20 TABLET ORAL at 16:26

## 2017-12-22 RX ADMIN — IPRATROPIUM BROMIDE AND ALBUTEROL SULFATE 3 ML: .5; 3 SOLUTION RESPIRATORY (INHALATION) at 14:26

## 2017-12-22 RX ADMIN — AZITHROMYCIN 500 MG: 250 TABLET, FILM COATED ORAL at 16:26

## 2017-12-22 NOTE — ED AVS SNAPSHOT
[facility] Emergency Department    [address] [address]    Phone:  [phone]    Fax:  [phone]                                       Griffin Walton   MRN: [id number]    Department:  [facility] Emergency Department   Date of Visit:  12/22/2017           Patient Information     Date Of Birth          5/18/1934        Your diagnoses for this visit were:     COPD exacerbation (H)     Community acquired pneumonia of left lower lobe of lung (H)        You were seen by Norberto Espinoza MD.        Discharge Instructions       Return to the Emergency Room if the following occurs:     Worsened breathing, fever >101, dehydration, or for any concern at anytime.    Or, follow-up with the following provider as we discussed:     Return to your primary doctor next week for reevaluation.    Medications discussed:    Azithromycin.  Your first dose was given here in the ER.  Start your prescription tomorrow.  Of note, if taking this medication and using Coumadin at the same time it can increase your INR.  As a result, I recommend you decrease the Coumadin dose by half while taking the azithromycin.  Prednisone.  Your first dose was given here in the ER.  Start her prescription tomorrow.    If you received pain-relieving or sedating medication during your time in the ER, avoid alcohol, driving automobiles, or working with machinery.  Also, a responsible adult must stay with you.        Call the Nurse Advice Line at [phone] or [phone] for any concern at anytime.      Future Appointments        Provider Department Dept Phone Center    12/27/2017 1:15 PM [place] [facility] provider, [facility] [phone] GABRIELE    1/4/2018 1:30 PM [facility] VASCULAR ULTRASOUND ROOM [facility] Echocardiography [phone] [place] DAMIÁN    1/4/2018 3:00 PM [facility] ECHO ROOM 1 [facility] Echocardiography [phone] [facility]    1/31/2018 3:00 PM  Mac Perla MD [facility] [phone] UMP PSA CLIN    2/27/2018 10:00 AM Geraldo Ware MD [facility] [phone] FLWY    5/15/2018 1:00 PM PFL 6 minute Walk; Pulmonary Function Lab [facility] Pulmonary Function Testing [phone] [facility]    5/15/2018 2:00 PM David Morris Perlman, MD [facility] [phone] [facility]      24 Hour Appointment Hotline       To make an appointment at any [facility], call [phone] ([phone]). If you don't have a family doctor or clinic, we will help you find one. [facility] are conveniently located to serve the needs of you and your family.             Review of your medicines      START taking        Dose / Directions Last dose taken    azithromycin 250 MG tablet   Commonly known as:  ZITHROMAX Z-MICHAELA   Dose:  250 mg   Quantity:  4 tablet        Take 1 tablet (250 mg) by mouth daily for 4 days   Refills:  0        predniSONE 10 MG tablet   Commonly known as:  DELTASONE   Quantity:  32 tablet        Take 40mg daily x 3 days, then 30mg daily x 3 days, then 20mg daily x 3 days, then 10mg daily x 3 days, then 5 mg daily x 3 days.   Refills:  0          Our records show that you are taking the medicines listed below. If these are incorrect, please call your family doctor or clinic.        Dose / Directions Last dose taken    ADVAIR DISKUS 100-50 MCG/DOSE diskus inhaler   Quantity:  3 Inhaler   Generic drug:  fluticasone-salmeterol        inhale 1 puff into the lungs every 12 hours.   Refills:  3        albuterol (2.5 MG/3ML) 0.083% neb solution   Quantity:  63 vial        TAKE 1 VIAL (2.5 MG) BY NEBULIZATION EVERY 6 HOURS AS NEEDED FOR SHORTNESS OF BREATH / DYSPNEA OR WHEEZING   Refills:  11        BASAGLAR 100 UNIT/ML injection   Dose:  11 Units   Quantity:  15 mL        Inject 11 Units Subcutaneous At Bedtime   Refills:  0        blood glucose monitoring meter  "device kit   Commonly known as:  no brand specified   Quantity:  1 kit        Use to test blood sugar 3 times daily or as directed.   Refills:  0        blood glucose monitoring test strip   Commonly known as:  ONETOUCH ULTRA   Quantity:  60 each        test 2 times daily Profile Rx: patient will contact pharmacy when needed   Refills:  11        bumetanide 1 MG tablet   Commonly known as:  BUMEX   Quantity:  180 tablet        TAKE 1 TABLET (1 MG) BY MOUTH 2 TIMES DAILY   Refills:  2        clopidogrel 75 MG tablet   Commonly known as:  PLAVIX   Dose:  75 mg   Quantity:  90 tablet        Take 1 tablet (75 mg) by mouth daily   Refills:  3        diltiazem 240 MG 24 hr capsule   Commonly known as:  CARDIZEM CD   Dose:  240 mg   Quantity:  90 capsule        Take 1 capsule (240 mg) by mouth daily   Refills:  3        dorzolamide-timolol 2-0.5 % ophthalmic solution   Commonly known as:  COSOPT   Dose:  1 drop        Place 1 drop into both eyes 2 times daily   Refills:  0        insulin aspart 100 UNIT/ML injection   Commonly known as:  NovoLOG FLEXPEN   Quantity:  15 mL        6 units before breakfast, 6 units before lunch, 4 units before dinner   Refills:  11        insulin pen needle 30G X 8 MM   Commonly known as:  NOVOFINE   Quantity:  300 each        Use 3 daily or as directed.   Refills:  3        insulin syringe-needle U-100 31G X 5/16\" 0.5 ML   Quantity:  100 each        Profile Rx: patient will contact pharmacy when needed   Refills:  11        latanoprost 0.005 % ophthalmic solution   Commonly known as:  XALATAN   Dose:  1 drop        Place 1 drop into both eyes At Bedtime   Refills:  0        losartan 50 MG tablet   Commonly known as:  COZAAR   Dose:  50 mg   Quantity:  90 tablet        Take 1 tablet (50 mg) by mouth daily   Refills:  3        lovastatin 40 MG tablet   Commonly known as:  MEVACOR   Quantity:  135 tablet        Profile Rx: patient will contact pharmacy when needed TAKE 1 AND 1/2 TABLETS BY " MOUTH ONCE A DAY WITH DINNER. NEED FASTING LAB WORK   Refills:  3        metoprolol 25 MG 24 hr tablet   Commonly known as:  TOPROL XL   Dose:  25 mg   Quantity:  90 tablet        Take 1 tablet (25 mg) by mouth daily   Refills:  3        order for DME   Quantity:  1 Units        Nebulizer machine Qty #1   Refills:  0        order for DME   Quantity:  2 each        Equipment being ordered: Oxygen- HOME and portable. Georgie Coulter CMA Medical Assistant Signed  Service date: 05/24/2016 10:48 AM     O2 Sat on Room air at rest:84% O2 sat on room air with walk: 82-91% O2 Sat on 1L of oxygen with walk 91-93% O2 Sat on 2 L of Oxygen with walk 91-96% O2 Sat on 1 L O2 at rest 94%   Refills:  prn        TART CHERRY ADVANCED Caps   Dose:  1 capsule        Take 1 capsule by mouth daily   Refills:  0        terazosin 10 MG capsule   Commonly known as:  HYTRIN   Quantity:  90 capsule        Profile Rx: patient will contact pharmacy when needed TAKE 1 CAPSULE (10 MG) BY MOUTH AT BEDTIME   Refills:  3        TYLENOL PO   Dose:  650 mg        Take 650 mg by mouth nightly as needed   Refills:  0        warfarin 2 MG tablet   Commonly known as:  COUMADIN   Quantity:  30 tablet        Take 2 mg on Tue; 4 mg all other days or as directed by the Anticoagulation Clinic   Refills:  0          ASK your doctor about these medications        Dose / Directions Last dose taken    * NONFORMULARY   Dose:  1 tablet   Ask about: Which instructions should I use?        Take 1 tablet by mouth At Bedtime Vitamin (Dialyvite) that he gets from [facility].   Refills:  0        * order for DME   Quantity:  1 each   Ask about: Which instructions should I use?        Equipment being ordered: Walker with wheels and brakes, and a seat   Refills:  0        * Notice:  This list has 2 medication(s) that are the same as other medications prescribed for you. Read the directions carefully, and ask your doctor or other care provider to review them  with you.            Prescriptions were sent or printed at these locations (2 Prescriptions)                   [id number] - [place], [place] - [address]   [address] [address]    Telephone:  [phone]   Fax:  [phone]   Hours:                  Printed at Department/Unit printer (2 of 2)         azithromycin (ZITHROMAX Z-MICHAELA) 250 MG tablet               predniSONE (DELTASONE) 10 MG tablet                Procedures and tests performed during your visit     CBC with platelets, differential    Comprehensive metabolic panel    EKG 12-lead, tracing only    NT pro BNP    Troponin I    XR Chest 2 Views      Orders Needing Specimen Collection     None      Pending Results     No orders found from 12/20/2017 to 12/23/2017.            Pending Culture Results     No orders found from 12/20/2017 to 12/23/2017.            Pending Results Instructions     If you had any lab results that were not finalized at the time of your Discharge, you can call the ED Lab Result RN at [phone]. You will be contacted by this team for any positive Lab results or changes in treatment. The nurses are available 7 days a week from 10A to 6:30P.  You can leave a message 24 hours per day and they will return your call.        Test Results From Your Hospital Stay        12/22/2017  3:04 PM      Component Results     Component Value Ref Range & Units Status    WBC 9.8 4.0 - 11.0 10e9/L Final    RBC Count 2.74 (L) 4.4 - 5.9 10e12/L Final    Hemoglobin 8.1 (L) 13.3 - 17.7 g/dL Final    Hematocrit 25.5 (L) 40.0 - 53.0 % Final    MCV 93 78 - 100 fl Final    MCH 29.6 26.5 - 33.0 pg Final    MCHC 31.8 31.5 - 36.5 g/dL Final    RDW 15.0 10.0 - 15.0 % Final    Platelet Count 209 150 - 450 10e9/L Final    Diff Method Automated Method  Final    % Neutrophils 81.1 % Final    % Lymphocytes 7.8 % Final    % Monocytes 8.9 % Final    % Eosinophils 1.6 % Final    % Basophils 0.3 % Final    % Immature  Granulocytes 0.3 % Final    Absolute Neutrophil 7.9 1.6 - 8.3 10e9/L Final    Absolute Lymphocytes 0.8 0.8 - 5.3 10e9/L Final    Absolute Monocytes 0.9 0.0 - 1.3 10e9/L Final    Absolute Eosinophils 0.2 0.0 - 0.7 10e9/L Final    Absolute Basophils 0.0 0.0 - 0.2 10e9/L Final    Abs Immature Granulocytes 0.0 0 - 0.4 10e9/L Final         12/22/2017  3:13 PM      Component Results     Component Value Ref Range & Units Status    Sodium 136 133 - 144 mmol/L Final    Potassium 3.5 3.4 - 5.3 mmol/L Final    Chloride 96 94 - 109 mmol/L Final    Carbon Dioxide 32 20 - 32 mmol/L Final    Anion Gap 8 3 - 14 mmol/L Final    Glucose 205 (H) 70 - 99 mg/dL Final    Urea Nitrogen 22 7 - 30 mg/dL Final    Creatinine 3.25 (H) 0.66 - 1.25 mg/dL Final    GFR Estimate 18 (L) >60 mL/min/1.7m2 Final    Non  GFR Calc    GFR Estimate If Black 22 (L) >60 mL/min/1.7m2 Final    African American GFR Calc    Calcium 7.9 (L) 8.5 - 10.1 mg/dL Final    Bilirubin Total 0.6 0.2 - 1.3 mg/dL Final    Albumin 3.0 (L) 3.4 - 5.0 g/dL Final    Protein Total 7.0 6.8 - 8.8 g/dL Final    Alkaline Phosphatase 82 40 - 150 U/L Final    ALT 25 0 - 70 U/L Final    AST 23 0 - 45 U/L Final         12/22/2017  3:13 PM      Component Results     Component Value Ref Range & Units Status    N-Terminal Pro BNP Inpatient [address] (H) 0 - 1800 pg/mL Final       Reference range shown and results flagged as abnormal are suggested inpatient   cut points for confirming diagnosis if CHF in an acute setting. Establishing a   baseline value for each individual patient is useful for follow-up. An   inpatient or emergency department NT-proPBNP <300 pg/mL effectively rules out   acute CHF, with 99% negative predictive value.  The outpatient non-acute reference range for ruling out CHF is:   0-125 pg/mL (age 18 to less than 75)   0-450 pg/mL (age 75 yrs and older)           12/22/2017  3:13 PM      Component Results     Component Value Ref Range & Units Status    Troponin  I ES 0.027 0.000 - 0.045 ug/L Final    The 99th percentile for upper reference range is 0.045 ug/L.  Troponin values   in the range of 0.045 - 0.120 ug/L may be associated with risks of adverse   clinical events.           12/22/2017  3:35 PM      Narrative     CHEST TWO VIEWS 12/22/2017 3:17 PM     HISTORY: Shortness of breath.    COMPARISON: 11/14/2017.    FINDINGS: Chronic-appearing interstitial abnormalities are again  noted. There appears to be superimposed airspace opacity at the left  lung base, suspicious for pneumonia. No pneumothorax or significant  pleural fluid. Heart size similar to prior.         Impression     IMPRESSION: Probable left lower lobe pneumonia.     ROBBIN DICKSON MD                Thank you for choosing [facility]       Thank you for choosing [facility] for your care. Our goal is always to provide you with excellent care. Hearing back from our patients is one way we can continue to improve our services. Please take a few minutes to complete the written survey that you may receive in the mail after you visit with us. Thank you!        [employer] Information     [employer] gives you secure access to your electronic health record. If you see a primary care provider, you can also send messages to your care team and make appointments. If you have questions, please call your primary care clinic.  If you do not have a primary care provider, please call [phone] and they will assist you.        Care EveryWhere ID     This is your Care EveryWhere ID. This could be used by other organizations to access your [facility] medical records  [id number]        Equal Access to Services     ANUSHKA BILL : Hadii camila mayo Somichael, waaxda luqadaha, qaybta kaalmada adeegyada, renzo correa. So [facility] [phone].    ATENCIÓN: Si habla español, tiene a louis disposición servicios gratuitos de asistencia lingüística. Llame al [phone].    We comply with applicable federal civil  rights laws and [place] laws. We do not discriminate on the basis of race, color, national origin, age, disability, sex, sexual orientation, or gender identity.            After Visit Summary       This is your record. Keep this with you and show to your community pharmacist(s) and doctor(s) at your next visit.

## 2017-12-22 NOTE — DISCHARGE INSTRUCTIONS
Return to the Emergency Room if the following occurs:     Worsened breathing, fever >101, dehydration, or for any concern at anytime.    Or, follow-up with the following provider as we discussed:     Return to your primary doctor next week for reevaluation.    Medications discussed:    Azithromycin.  Your first dose was given here in the ER.  Start your prescription tomorrow.  Of note, if taking this medication and using Coumadin at the same time it can increase your INR.  As a result, I recommend you decrease the Coumadin dose by half while taking the azithromycin.  Prednisone.  Your first dose was given here in the ER.  Start her prescription tomorrow.    If you received pain-relieving or sedating medication during your time in the ER, avoid alcohol, driving automobiles, or working with machinery.  Also, a responsible adult must stay with you.        Call the Nurse Advice Line at [phone] or [phone] for any concern at anytime.

## 2017-12-22 NOTE — MR AVS SNAPSHOT
After Visit Summary   12/22/2017    Griffin Walton    MRN: [id number]           Patient Information     Date Of Birth          5/18/1934        Visit Information        Provider Department      12/22/2017 1:00 PM ANTHONY PULIDO/DAVID WEEMS [facility]        Today's Diagnoses     Hypoxia    -  1       Follow-ups after your visit        Your next 10 appointments already scheduled     Dec 27, 2017  1:15 PM CST   Anticoagulation Visit with [place] ANTI COAG   [facility] ([facility])    [address], [place] [address]   [phone]            Jan 04, 2018  1:30 PM CST   US ABDOMINAL AORTIC IMAGING with MIKEYVUS   [facility] Echocardiography ([facility])    [address] [address]   [phone]           Please bring a list of your medicines (including vitamins, minerals and over-the-counter drugs). Also, tell your doctor about any allergies you may have. Wear comfortable clothes and leave your valuables at home.  Adults: No eating or drinking for 8 hours before the exam. You may take medicine with a small sip of water.  Children: - Children 6+ years: No food or drink for 6 hours before exam. - Children 1-5 years: No food or drink for 4 hours before exam. - Infants, breast-fed: may have breast milk up to 2 hours before exam. - Infants, formula: may have bottle until 4 hours before exam.  Please call the Imaging Department at your exam site with any questions.            Jan 04, 2018  3:00 PM CST   Ech Complete with PATRICIO   [facility]chocardiography ([facility])    [address] [address]   [phone]           1. Please bring or wear a comfortable two-piece outfit. 2. You may eat, drink and take your normal medicines. 3. For any questions that cannot be answered, please contact the ordering physician            Jan 31, 2018  3:00 PM CST   Return Visit with Mac Hyde  MD Minda   [facility] ([facility])    [address]  [facility] [address]   [phone]            Feb 27, 2018 10:00 AM CST   Return Visit with Geraldo Ware MD   [facility]    [address] [address]   [phone]            May 15, 2018  1:00 PM CDT   SIX MINUTE WALK with UC PFL A, UC PFL 6 MINUTE WALK 1   [facility] Pulmonary Function Testing ([facility])    [address] [place] [id number] [phone]            May 15, 2018  2:00 PM CDT   (Arrive by 1:45 PM)   Return Interstitial Lung with David Morris Perlman, MD   [facility] ([facility])    [address] [place] [id number] [phone]              Who to contact     If you have questions or need follow up information about today's clinic visit or your schedule please contact [facility] directly at [phone].  Normal or non-critical lab and imaging results will be communicated to you by MyChart, letter or phone within 4 business days after the clinic has received the results. If you do not hear from us within 7 days, please contact the clinic through [facility] or phone. If you have a critical or abnormal lab result, we will notify you by phone as soon as possible.  Submit refill requests through [facility] or call your pharmacy and they will forward the refill request to us. Please allow 3 business days for your refill to be completed.          Additional Information About Your Visit        [facility] Information     [facility] gives you secure access to your electronic health record. If you see a primary care provider, you can also send messages to your care team and make appointments. If you have questions, please call your primary care clinic.  If you do not have a primary care  provider, please call [phone] and they will assist you.        Care EveryWhere ID     This is your Care EveryWhere ID. This could be used by other organizations to access your [place] medical records  [id number]        Your Vitals Were     Pulse Pulse Oximetry                87 90%           Blood Pressure from Last 3 Encounters:   12/22/17 157/54   12/19/17 145/80   12/05/17 118/49    Weight from Last 3 Encounters:   12/19/17 202 lb (91.6 kg)   12/05/17 203 lb (92.1 kg)   11/20/17 206 lb (93.4 kg)              Today, you had the following     No orders found for display       Primary Care Provider Office Phone # Fax #    DANNA Lopez [facility] [phone] [address] [address]        Equal Access to Services     ANUSHKA BILL : Hadii camila ku hadasho Soomaali, waaxda luqadaha, qaybta kaalmada adeegyada, renzo mitchell haykurt palacio . So [facility] [phone].    ATENCIÓN: Si habla español, tiene a louis disposición servicios gratuitos de asistencia lingüística. Llame al [phone].    We comply with applicable federal civil rights laws and [place] laws. We do not discriminate on the basis of race, color, national origin, age, disability, sex, sexual orientation, or gender identity.            Thank you!     Thank you for choosing [facility]  for your care. Our goal is always to provide you with excellent care. Hearing back from our patients is one way we can continue to improve our services. Please take a few minutes to complete the written survey that you may receive in the mail after your visit with us. Thank you!             Your Updated Medication List - Protect others around you: Learn how to safely use, store and throw away your medicines at www.disposemymeds.org.          This list is accurate as of: 12/22/17  1:37 PM.  Always use your most recent med list.                   Brand Name Dispense Instructions for use Diagnosis    ADVAIR DISKUS  100-50 MCG/DOSE diskus inhaler   Generic drug:  fluticasone-salmeterol     3 Inhaler    inhale 1 puff into the lungs every 12 hours.    Chronic obstructive pulmonary disease, unspecified COPD type (H)       albuterol (2.5 MG/3ML) 0.083% neb solution     63 vial    TAKE 1 VIAL (2.5 MG) BY NEBULIZATION EVERY 6 HOURS AS NEEDED FOR SHORTNESS OF BREATH / DYSPNEA OR WHEEZING    Chronic obstructive pulmonary disease, unspecified COPD type (H)       BASAGLAR 100 UNIT/ML injection     15 mL    Inject 11 Units Subcutaneous At Bedtime    Controlled type 2 diabetes mellitus with diabetic nephropathy, with long-term current use of insulin (H)       blood glucose monitoring meter device kit    no brand specified    1 kit    Use to test blood sugar 3 times daily or as directed.    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)       diltiazem 240 MG 24 hr capsule    CARDIZEM CD    90 capsule    Take 1 capsule (240 mg) by mouth daily    Paroxysmal atrial fibrillation (H)       dorzolamide-timolol 2-0.5 % ophthalmic solution    COSOPT     Place 1 drop into both eyes 2 times daily        insulin aspart 100 UNIT/ML injection    NovoLOG FLEXPEN    15 mL    6 units before breakfast, 6 units before lunch, 4 units before dinner    Controlled type 2 diabetes mellitus with diabetic nephropathy, with long-term current use of insulin (H)       insulin pen needle  "30G X 8 MM    NOVOFINE    300 each    Use 3 daily or as directed.    Type 2 diabetes mellitus with diabetic nephropathy, unspecified long term insulin use status (H)       insulin syringe-needle U-100 31G X 5/16\" 0.5 ML     100 each    Profile Rx: patient will contact pharmacy when needed    Type 2 diabetes mellitus with diabetic nephropathy (H)       latanoprost 0.005 % ophthalmic solution    XALATAN     Place 1 drop into both eyes At Bedtime        losartan 50 MG tablet    COZAAR    90 tablet    Take 1 tablet (50 mg) by mouth daily    Essential hypertension       lovastatin 40 MG tablet    MEVACOR    135 tablet    Profile Rx: patient will contact pharmacy when needed TAKE 1 AND 1/2 TABLETS BY MOUTH ONCE A DAY WITH DINNER. NEED FASTING LAB WORK    Hyperlipidemia LDL goal <130       metoprolol 25 MG 24 hr tablet    TOPROL XL    90 tablet    Take 1 tablet (25 mg) by mouth daily    Essential hypertension       * NONFORMULARY      Take 1 tablet by mouth At Bedtime Vitamin (Dialyvite) that he gets from [facility].        * order for DME     1 each    Equipment being ordered: Walker with wheels and brakes, and a seat    ESRD (end stage renal disease) on dialysis (H), Cerebral embolism with cerebral infarction (H)       order for DME     1 Units    Nebulizer machine Qty #1    Fever, unspecified, Cough, Chronic obstructive pulmonary disease, unspecified COPD type (H), Acute bronchospasm       order for DME     2 each    Equipment being ordered: Oxygen- HOME and portable. Georgie Coulter [facility] Medical Assistant Signed  Service date: 05/24/2016 10:48 AM     O2 Sat on Room air at rest:84% O2 sat on room air with walk: 82-91% O2 Sat on 1L of oxygen with walk 91-93% O2 Sat on 2 L of Oxygen with walk 91-96% O2 Sat on 1 L O2 at rest 94%    Cough, Hypoxia, Chronic obstructive pulmonary disease, unspecified COPD type (H), Chronic obstructive pulmonary disease with acute exacerbation (H), CKD (chronic kidney disease) " stage V requiring chronic dialysis (H), Type 2 diabetes mellitus with diabetic nephropathy (H)       TART CHERRY ADVANCED Caps      Take 1 capsule by mouth daily        terazosin 10 MG capsule    HYTRIN    90 capsule    Profile Rx: patient will contact pharmacy when needed TAKE 1 CAPSULE (10 MG) BY MOUTH AT BEDTIME    Benign non-nodular prostatic hyperplasia with lower urinary tract symptoms       TYLENOL PO      Take 650 mg by mouth nightly as needed        warfarin 2 MG tablet    COUMADIN    30 tablet    Take 2 mg on Tue; 4 mg all other days or as directed by the Anticoagulation Clinic        * Notice:  This list has 2 medication(s) that are the same as other medications prescribed for you. Read the directions carefully, and ask your doctor or other care provider to review them with you.

## 2017-12-22 NOTE — ED AVS SNAPSHOT
[facility] Emergency Department    [address] [address]    Phone:  [phone]    Fax:  [phone]                                       Griffin Walton   MRN: [id number]    Department:  [facility] Emergency Department   Date of Visit:  12/22/2017           After Visit Summary Signature Page     I have received my discharge instructions, and my questions have been answered. I have discussed any challenges I see with this plan with the nurse or doctor.    ..........................................................................................................................................  Patient/Patient Representative Signature      ..........................................................................................................................................  Patient Representative Print Name and Relationship to Patient    ..................................................               ................................................  Date                                            Time    ..........................................................................................................................................  Reviewed by Signature/Title    ...................................................              ..............................................  Date                                                            Time

## 2017-12-22 NOTE — ED PROVIDER NOTES
HPI  Patient is an 83-year-old male presenting with shortness of breath and hypoxemia.  Known history of COPD.  He uses oxygen by nasal cannula at home with 2 L.  He has renal failure and gets dialysis 3 times a week, Monday, Wednesday, and Friday.  He was at dialysis today and finished his run before he came over.  Known history of renal malignancy.  Known history of atrial fibrillation.  Known history of hypertension.  Known history of diabetes.  Known history of anemia.  He has had an abdominal aortic aneurysm repaired.  He denies other medication changes.  He takes albuterol once a day and Advair twice a day.  Quit smoking in 2005.  No drugs.  No alcohol.    The patient awoke at about 6:30 AM this morning and was found to have an O2 saturation of 78%.  He told his wife off and on throughout the morning that he was short of breath.  He denies feeling sick yesterday.  His wife tells me that he had not complained of any shortness of breath yesterday.  The patient denies chest pain.  He denies leg pain.  No calf or ankle or foot swelling reported.  He has had some cough today but this is dry.  No fever.  No upper back pain.  No neck, jaw, arm pain.  No abdominal pain.  No nausea or vomiting.  No diarrhea.  No constipation.  No hematochezia or melena.  No dysuria, urgency, frequency, or hematuria.  He was found to have an O2 saturation that was in the 60s at the dialysis clinic.  His O2 saturation was in the 70s here but with facemask is in the 90s.  He denies any shortness of breath.  He does report feeling short of breath throughout the day even at rest.    ROS: All other review of systems are negative other than that noted above.     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     Type II or unspecified type diabetes mellitus without mention of complication, not stated as uncontrolled      Unspecified disorder of kidney and ureter      Past Surgical History:   Procedure Laterality Date     ABDOMEN SURGERY  2005    aortic aneurysm     CATARACT IOL, RT/LT  2/4/08    left eye - phacoemulsification w/ posterior chamber lens implantation combined w/ glaucoma filtering procedure     CREATE FISTULA ARTERIOVENOUS UPPER EXTREMITY  1/3/2012    Procedure:CREATE FISTULA ARTERIOVENOUS UPPER EXTREMITY; LEFT UPPER ARM ARTERIOVENOUS FISTULA; Surgeon:JENA PEARSON; Location:[facility]     SURGICAL HISTORY OF -   2/13/2004    Right knee arthroscopy     SURGICAL HISTORY OF -       Vocal cord biopsy-benign     SURGICAL HISTORY OF -   3/3/2005    AAA, left partial nephrectomy     Family History   Problem Relation Age of Onset     CANCER Father      Asophageal      CANCER Brother      Throat      Other Cancer Sister      Lung      Social History   Substance Use Topics     Smoking status: Former Smoker     Packs/day: 1.00     Years: 55.00     Types: Cigarettes     Quit date: 1/1/2005     Smokeless tobacco: Never Used     Alcohol use No         PHYSICAL  /85  Temp 98.3  F (36.8  C) (Oral)  Resp 17  SpO2 (!) 89%  General: Patient is alert and in mild distress.  Facemask on, answering questions appropriately.  Wife at his side.  Neurological: Alert.  Moving upper and lower extremities equally, bilaterally.  Head / Neck: Atraumatic.  Ears: Not done.  Eyes: Pupils are equal, round, and reactive.  Normal conjunctiva.  Nose: Midline.  No epistaxis.  Mouth / Throat: No ulcerations or lesions.  Upper pharynx is not erythematous.  Moist.  Respiratory: No respiratory distress.  Distant  breath sounds, wheeze.  Cardiovascular: Regular rhythm.  Peripheral extremities are warm.  Trace edema.  No calf tenderness.  Abdomen / Pelvis: Not tender.  No distention.  Soft throughout.  Genitalia: Not done.  Musculoskeletal: No tenderness over major muscles and joints.  Skin: No evidence of rash or trauma.        PHYSICIAN  1358.  Patient has shortness of breath and documented hypoxemia.  He does use nasal cannula oxygen 2 L/min at baseline.  Chest x-ray pending.  EKG pending.  Blood work pending.  Albuterol/Atrovent neb ordered.    Labs Ordered and Resulted from Time of ED Arrival Up to the Time of Departure from the ED   CBC WITH PLATELETS DIFFERENTIAL - Abnormal; Notable for the following:        Result Value    RBC Count 2.74 (*)     Hemoglobin 8.1 (*)     Hematocrit 25.5 (*)     All other components within normal limits   COMPREHENSIVE METABOLIC PANEL - Abnormal; Notable for the following:     Glucose 205 (*)     Creatinine 3.25 (*)     GFR Estimate 18 (*)     GFR Estimate If Black 22 (*)     Calcium 7.9 (*)     Albumin 3.0 (*)     All other components within normal limits   NT PROBNP INPATIENT - Abnormal; Notable for the following:     N-Terminal Pro BNP Inpatient 20546 (*)     All other components within normal limits   TROPONIN I       IMAGING  Images reviewed by me.  Radiology report also reviewed.  XR Chest 2 Views   Final Result   IMPRESSION: Probable left lower lobe pneumonia.       ROBBIN DICKSON MD        EKG  (1400)   Interpretation performed by me.  Rate: 78     Rhythm: atrial fib     Axis: nl  Intervals: NE (12-2) -, QRS (<12) 94, QTc (>5) 417  P wave: -     QRS complex: nl  ST segment / T-wave: nl  Conclusion: Atrial fibrillation, otherwise unremarkable    1623.  Chest x-ray is read by the radiologist as showing a new infiltrate in the left lower lobe.  There is chronic infiltrate and scarring present throughout the lungs which makes it difficult to read.  The patient does not have a fever  and his white blood cell count is normal.  He did have a new cough described over the past 1-2 days.  It is nonproductive.  The radiologist is concerned about her pneumonia.  The patient will be given azithromycin over the next 5 days.  Also, prednisone will be given for presumed COPD exacerbation.  No evidence of acute coronary syndrome.  His BNP is quite high but there is no evidence for pulmonary congestion or effusion on x-ray.  No peripheral edema that is new or different.  He has been getting his dialysis runs as scheduled.  Low concern at this point for true CHF exacerbation or pulmonary edema.  The patient is adamant that he wants to go home.  He is able to maintain his O2 saturation at 89-91% while at rest with nasal cannula 2 L.  The patient will be discharged with azithromycin and prednisone to finish his courses.  Follow-up next week for reevaluation.  Return here for worsening as discussed.  Family is present in the room and agrees with the plan.      IMPRESSION    ICD-10-CM    1. COPD exacerbation (H) J44.1    2. Community acquired pneumonia of left lower lobe of lung (H) J18.1            Critical Care time:  none                    Norberto Espinoza MD  12/22/17 4539

## 2017-12-22 NOTE — PROGRESS NOTES
Wife brings him in as his o2 sat was low at home about 75. They turned up o2 to 4 liters and his order is only for 2 liters. Then he went to dialysis. At 4 liters he was 95 %, I got 89 % at 2 liters  (after just a few minutes)and he is quite sleepy. He fell asleep about 3 times just in a few minutes. Brought patient to ER as it seems he has low profusion.  Natalia Recinos RN

## 2017-12-26 ENCOUNTER — MEDICAL CORRESPONDENCE (OUTPATIENT)
Dept: HEALTH INFORMATION MANAGEMENT | Facility: CLINIC | Age: 82
End: 2017-12-26

## 2017-12-27 ENCOUNTER — ANTICOAGULATION THERAPY VISIT (OUTPATIENT)
Dept: ANTICOAGULATION | Facility: CLINIC | Age: 82
End: 2017-12-27
Payer: MEDICARE

## 2017-12-27 ENCOUNTER — CARE COORDINATION (OUTPATIENT)
Dept: CARE COORDINATION | Facility: CLINIC | Age: 82
End: 2017-12-27

## 2017-12-27 ENCOUNTER — TELEPHONE (OUTPATIENT)
Dept: FAMILY MEDICINE | Facility: CLINIC | Age: 82
End: 2017-12-27

## 2017-12-27 DIAGNOSIS — I48.91 ATRIAL FIBRILLATION, NEW ONSET (H): ICD-10-CM

## 2017-12-27 DIAGNOSIS — Z79.01 LONG-TERM (CURRENT) USE OF ANTICOAGULANTS: ICD-10-CM

## 2017-12-27 DIAGNOSIS — I63.40 CEREBRAL INFARCTION DUE TO EMBOLISM OF CEREBRAL ARTERY (H): ICD-10-CM

## 2017-12-27 LAB — INR POINT OF CARE: 1.8 (ref 0.86–1.14)

## 2017-12-27 PROCEDURE — 36416 COLLJ CAPILLARY BLOOD SPEC: CPT

## 2017-12-27 PROCEDURE — 85610 PROTHROMBIN TIME: CPT | Mod: QW

## 2017-12-27 PROCEDURE — 99207 ZZC NO CHARGE NURSE ONLY: CPT

## 2017-12-27 NOTE — LETTER
[place] CARE COORDINATION  [facility]  [address] [place] [address]  Phone: [phone]          December 27, 2017        Griffin Walton  [address]  [facility] [address]      Dear Griffin,    I am a clinic care coordinator who works with DANNA Lopez CNP at [facility]. I wanted to introduce myself and provide you with my contact information so that you can call me with questions or concerns about your health care. Below is a description of clinic care coordination and how I can further assist you.     The clinic care coordinator is a registered nurse and/or  who understand the health care system. The goal of clinic care coordination is to help you manage your health and improve access to the [facility] in the most efficient manner. The registered nurse can assist you in meeting your health care goals by providing education, coordinating services, and strengthening the communication among your providers. The  can assist you with financial, behavioral, psychosocial, chemical dependency, counseling, and/or psychiatric resources.    Please feel free to contact me at [phone], with any questions or concerns. We at [facility] are focused on providing you with the highest-quality healthcare experience possible and that all starts with you.     Sincerely,     Janette Hurd    Enclosed: I have enclosed a copy of a 24 Hour Access Plan. This has helpful phone numbers for you to call when needed. Please keep this in an easy to access place to use as needed. and I have enclosed an Authorization to Discuss Protected Health Information form. Please review, fill out, sign and send back to me so I can make sure we have this on file to be able to talk to family/friends if you would like us to be able to.

## 2017-12-27 NOTE — PROGRESS NOTES
ANTICOAGULATION FOLLOW-UP CLINIC VISIT    Patient Name:  Griffin Walton  Date:  12/27/2017  Contact Type:  Face to Face    SUBJECTIVE:     Patient Findings     Positives Antibiotic use or infection (just finished 4 day zpak yesterday. still taking prednisone taper)    Comments Pt in ED on 12-22-17 for pneumonia and COPD flare up. Was taking zpak but just finished this. He was advised at ED visit to cut warfarin dose in half while on zpak. Patient's prior INRs had been supra therapeutic but he is low today from 4 days of cutting warfarin in half. Will resume prior dose but he will likely need this reduced if INR supra therapeutic at next recheck in 8 days.           OBJECTIVE    INR Protime   Date Value Ref Range Status   12/27/2017 1.8 (A) 0.86 - 1.14 Final       ASSESSMENT / PLAN  INR assessment SUB    Recheck INR In: 8 DAYS    INR Location Clinic      Anticoagulation Summary as of 12/27/2017     INR goal 2.0-3.0   Today's INR 1.8!   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 [facility] MWF 8-12pm in [place]. Pt not new to warfarin (has been on approx 1 yr)      Anticoagulation Care Providers     Provider Role Specialty Phone number    Stefanie Louis APRN CNP Responsible Nurse Practitioner [phone]            See the Encounter Report to view Anticoagulation Flowsheet and Dosing Calendar (Go to Encounters tab  in chart review, and find the Anticoagulation Therapy Visit)        Lissa Bello RN

## 2017-12-27 NOTE — MR AVS SNAPSHOT
Griffin LYNCH Isabelle   12/27/2017 1:15 PM   Anticoagulation Therapy Visit    Description:  83 year old male   Provider:  [facility]   Department:  [facility]           INR as of 12/27/2017     Today's INR 1.8!      Anticoagulation Summary as of 12/27/2017     INR goal 2.0-3.0   Today's INR 1.8!   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 Then resume 2 mg Tuesdays and 4 mg all other days.      December 2017 Details    Sun Mon Tue Wed Thu Fri Sat          1               2                 3               4               5               6               7               8               9                 10               11               12               13               14               15               16                 17               18               19               20               21               22               23                 24               25               26               27      5 mg   See details      28      4 mg         29      4 mg         30      4 mg           31      4 mg                Date Details   12/27 This INR check               How to take your warfarin dose     To take:  4 mg Take 2 of the 2 mg tablets.    To take:  5 mg Take 2.5 of the 2 mg tablets.           January 2018 Details    Sun Mon Tue Wed Thu Fri Sat      1      4 mg         2      2 mg         3      4 mg         4            5               6                 7               8               9               10               11               12               13                 14               15               16               17               18               19               20                 21               22               23                24               25               26               27                 28               29               30               31                   Date Details   No additional details    Date of next INR:  1/4/2018         How to take your warfarin dose     To take:  2 mg Take 1 of the 2 mg tablets.    To take:  4 mg Take 2 of the 2 mg tablets.

## 2017-12-27 NOTE — LETTER
Health Care Home - Access Care Plan    About Me  Patient Name:  Griffin Ledesma    YOB: 1934  Age:                             83 year old   [place] MRN:            [id number] Telephone Information:     Home Phone [phone]   Mobile [phone]       Address:    [address] [facility] [address] Email address:  [email or web address]      Emergency Contact(s)  Name Relationship Lgl Grd Work Phone Home Phone Mobile Phone   1. MARTIN LEDESMA Spouse No [phone]   2. MARGARITO LEDESMA Son No [phone]             Health Maintenance: Routine Health maintenance Reviewed: Due/Overdue   Health Maintenance Due   Topic Date Due     EYE EXAM Q1 YEAR  10/01/2015     COPD ACTION PLAN Q1 YR  02/04/2016     ADVANCE DIRECTIVE PLANNING Q5 YRS  02/28/2017         My Access Plan  Medical Emergency 911   Questions or concerns during clinic hours Primary Clinic Line, I will call the clinic directly: Primary Clinic: [facility]- [phone]   24 Hour Appointment Line [phone] or  [phone]) (toll free)   24 Hour Nurse Line [phone] (toll free)   Questions or concerns outside clinic hours 24 Hour Appointment Line, I will call the after-hours on-call line:   [facility] [phone] or [phone]) (toll-free)   Preferred Urgent Care Preferred Urgent Care: [facility], [phone]   Preferred Hospital Preferred Hospital: [place], [place]  [phone]   Preferred Pharmacy [facility], [address]     Behavioral Health Crisis Line The National Suicide Prevention Lifeline at 1-589.110.1597 or 911     My Care Team Members  Patient Care Team       Relationship Specialty Notifications Start End    HeribertoStefanie APRN CNP PCP - General Nurse Practitioner  1/8/14     Phone: [phone] Fax: [phone]          [address] [facility] [address]    Rafael Keene MD MD Nephrology  10/26/17     Phone: [phone] Fax: [phone]         [facility] [address] [place] [address]    Darion Arshad MD MD Ophthalmology  10/26/17     Phone: [phone] Fax: [phone]         [facility] [address] [facility] [address]    Janette Hurd, RN Clinic Care Coordinator  Admissions 12/27/17     Phone: [phone] Fax: [phone]        Perlman, David Morris, MD MD Pulmonary Admissions 12/27/17     Phone: [phone] Pager: [phone] Fax: [phone]        [address] [facility] [address] [facility] [address]    March, Mac Hyde MD MD Cardiology Admissions 12/27/17     Phone: [phone] Fax: [phone] [address] [facility] [address]    Jessica Corral MD MD Hematology & Oncology Admissions 12/27/17     Phone: [phone] Fax: [phone]         [address] [facility] [address]    Geraldo Ware MD MD Dermatology Admissions 12/27/17     Phone: [phone] Pager: [phone] Fax: [phone]        [address] 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)     Anemia     Hyperlipidemia LDL goal <70     Anxiety disorder due to medical condition     Cerebral embolism with cerebral infarction (H)     zAdvanced directives, counseling/discussion     Health Care Home     Seborrheic keratosis     Leg edema, right      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

## 2017-12-27 NOTE — PROGRESS NOTES
Clinic Care Coordination Contact  [facility]/Voicemail    Referral Source: ED Follow-Up    Clinical Data: Care Coordinator Outreach, ED f/u 12/22 for COPD exacerbation, home O2 2L/NC, Zpack x 5 days, Prednisone.    Outreach attempted x 1.  Left message on voicemail with call back information and requested return call.    Plan: Care Coordinator will mail out care coordination introduction letter with care coordinator contact information and explanation of care coordination services. Care Coordinator will try to reach patient again in 1-2 business days.    Janette Hurd RN, [facility]  RN Care Coordinator  [facility]  Phone # [phone]  12/27/2017 8:28 AM

## 2017-12-28 NOTE — PROGRESS NOTES
Clinic Care Coordination Contact  Care Team Conversations    Patient's wife left RN CC a VM stating she was returning RN CC call and that they would be home after 3pm yesterday afternoon.    Janette Hurd RN, [facility]  RN Care Coordinator  [facility]  Phone # [phone]  12/28/2017 8:29 AM

## 2017-12-28 NOTE — PROGRESS NOTES
Clinic Care Coordination Contact  OUTREACH    Referral Information:  Referral Source: ED Follow-Up  Reason for Contact: Patient seen in the ER 12/22/17 for COPD exacerbation.   Care Conference: No     Universal Utilization:   ED Visits in last year: 2  Hospital visits in last year: 0  Last PCP appointment: 12/19/17             Clinical Concerns:  Current Medical Concerns: RN CC spoke with patient, he states he is feeling pretty good. He denies having any questions or concerns regarding his discharge instructions. He states he still has a few days of his medications to take but is feeling pretty good.       Current Behavioral Concerns: No concerns at this time.      Education Provided to patient: RN CC educated about Care Coordination Services, discharge instructions, medications reviewed and follow up.      Clinical Pathway Name: None  Clinical Pathway: None    Medication Management:  Per patient, he is taking his medications as prescribed and denies having any questions or concerns.     Functional Status:  Mobility Status: Independent  Equipment Currently Used at Home: oxygen  Transportation: Patient or family           Psychosocial:  Current living arrangement:: I live in a private home with spouse  Financial/Insurance: No concerns at this time.       Resources and Interventions:  Current Resources: DME, Outpatient Dialysis at [facility] in [place], M-W-F.        Advanced Care Plans/Directives on file:: No        Goals:   n/a              Barriers: No known barriers at this time.  Strengths: great family and friend support. Support from health care team.   Patient/Caregiver understanding: Patient and spouse verbalized understanding of his d/c instructions.  Frequency of Care Coordination: PRN  Upcoming appointment: 12/27/17 (INR)     Plan:   No further Care Coordination needs identified at this time. Patient may be referred to Care Coordination in the future if additional needs arise.  Pt encouraged to contact Care  Coordinator through the clinic if situation changes and assistance is needed. No follow-up planned.    Janette Hurd RN, [facility]  RN Care Coordinator  [facility]  Phone # [phone]  12/28/2017 8:48 AM

## 2018-01-02 ENCOUNTER — OFFICE VISIT (OUTPATIENT)
Dept: FAMILY MEDICINE | Facility: CLINIC | Age: 83
End: 2018-01-02
Payer: MEDICARE

## 2018-01-02 VITALS
BODY MASS INDEX: 32.98 KG/M2 | HEART RATE: 91 BPM | OXYGEN SATURATION: 94 % | TEMPERATURE: 96.8 F | DIASTOLIC BLOOD PRESSURE: 80 MMHG | WEIGHT: 209 LBS | SYSTOLIC BLOOD PRESSURE: 139 MMHG

## 2018-01-02 DIAGNOSIS — Z79.4 CONTROLLED TYPE 2 DIABETES MELLITUS WITH DIABETIC NEPHROPATHY, WITH LONG-TERM CURRENT USE OF INSULIN (H): ICD-10-CM

## 2018-01-02 DIAGNOSIS — E11.21 CONTROLLED TYPE 2 DIABETES MELLITUS WITH DIABETIC NEPHROPATHY, WITH LONG-TERM CURRENT USE OF INSULIN (H): ICD-10-CM

## 2018-01-02 DIAGNOSIS — J44.1 COPD EXACERBATION (H): Primary | ICD-10-CM

## 2018-01-02 DIAGNOSIS — J18.9 PNEUMONIA OF LEFT LOWER LOBE DUE TO INFECTIOUS ORGANISM: ICD-10-CM

## 2018-01-02 PROCEDURE — 99214 OFFICE O/P EST MOD 30 MIN: CPT | Performed by: NURSE PRACTITIONER

## 2018-01-02 RX ORDER — INSULIN GLARGINE 100 [IU]/ML
15 INJECTION, SOLUTION SUBCUTANEOUS AT BEDTIME
Qty: 3 ML | Refills: 11 | Status: ON HOLD | OUTPATIENT
Start: 2018-01-02 | End: 2018-01-24

## 2018-01-02 NOTE — PROGRESS NOTES
SUBJECTIVE:   Griffin Walton is a 83 year old male who presents to clinic today for the following health issues:      ED/UC Followup:    Facility:  [facility]  Date of visit: 12/22/17  Reason for visit: COPD exacerbation, pneumonia  Current Status: better   Patient completed taking Azithromycin and prednisone- he has been getting higher blood sugars levels due to the prednisone. He denies shortness of breath or cough- remains on 2 liters of oxygen therapy which is his baseline.       Medication Followup of Novolog- cheaper alternative    Taking Medication as prescribed: yes    Side Effects:  None    Medication Helping Symptoms:  yes         Problem list and histories reviewed & adjusted, as indicated.  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)     Anemia     Hyperlipidemia LDL goal <70     Anxiety disorder due to medical condition     Cerebral embolism with cerebral infarction (H)     zAdvanced directives, counseling/discussion     Health Care Home     Seborrheic keratosis     Leg edema, right     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)     Thrombocytopenia, primary (H)     Hypotension     Glaucoma     Controlled type 2 diabetes mellitus with diabetic nephropathy, with long-term current use of insulin (H)     Chronic obstructive pulmonary disease, unspecified COPD type (H)     Legally blind in right eye, as defined in [place]     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)     Atrial fibrillation, new onset (H)     Long-term (current) use of anticoagulants [Z79.01]     Past Surgical History:   Procedure Laterality Date     ABDOMEN SURGERY  2005    aortic aneurysm     CATARACT  IOL, RT/LT  2/4/08    left eye - phacoemulsification w/ posterior chamber lens implantation combined w/ glaucoma filtering procedure     CREATE FISTULA ARTERIOVENOUS UPPER EXTREMITY  1/3/2012    Procedure:CREATE FISTULA ARTERIOVENOUS UPPER EXTREMITY; LEFT UPPER ARM ARTERIOVENOUS FISTULA; Surgeon:JENA PEARSON; Location:[facility]     SURGICAL HISTORY OF -   2/13/2004    Right knee arthroscopy     SURGICAL HISTORY OF -       Vocal cord biopsy-benign     SURGICAL HISTORY OF -   3/3/2005    AAA, left partial nephrectomy       Social History   Substance Use Topics     Smoking status: Former Smoker     Packs/day: 1.00     Years: 55.00     Types: Cigarettes     Quit date: 1/1/2005     Smokeless tobacco: Never Used     Alcohol use No     Family History   Problem Relation Age of Onset     CANCER Father      Asophageal      CANCER Brother      Throat      Other Cancer Sister      Lung              Reviewed and updated as needed this visit by clinical staff     Reviewed and updated as needed this visit by Provider         ROS:  Constitutional, HEENT, cardiovascular, pulmonary, GI, , musculoskeletal, neuro, skin, endocrine and psych systems are negative, except as otherwise noted.      OBJECTIVE:   /80  Pulse 91  Temp 96.8  F (36  C) (Tympanic)  Wt 209 lb (94.8 kg)  SpO2 94%  BMI 32.98 kg/m2  Body mass index is 32.98 kg/(m^2).  GENERAL: alert, no distress and elderly  RESP: lungs clear to auscultation - no rales, rhonchi or wheezes  CV: regular rate and rhythm, normal S1 S2, no S3 or S4, no murmur, click or rub,   MS: no gross musculoskeletal defects noted, no edema    Diagnostic Test Results:  none     ASSESSMENT/PLAN:       1. COPD exacerbation (H)  Patient will complete prednisone taper-   - patient continues to wear supplemental oxygen at 2 Liters    2. Pneumonia of left lower lobe due to infectious organism (H)  - patient completed Azithromycin -     3. Controlled type 2 diabetes mellitus with diabetic  nephropathy, with long-term current use of insulin (H)  Currently having higher blood sugar levels while on prednisone taper.   - will try to get cheaper insulin since Novolog increased in price - will reach out to diabetic educator on cheaper insulin options.   - BASAGLAR 100 UNIT/ML injection; Inject 15 Units Subcutaneous At Bedtime  Dispense: 3 mL; Refill: 11        DANNA Lopez [facility]

## 2018-01-02 NOTE — NURSING NOTE
"Initial /66 (BP Location: Left arm, Patient Position: Chair, Cuff Size: Adult Large)  Pulse 91  Temp 96.8  F (36  C) (Tympanic)  Wt 209 lb (94.8 kg)  SpO2 94%  BMI 32.98 kg/m2 Estimated body mass index is 32.98 kg/(m^2) as calculated from the following:    Height as of 12/5/17: 5' 6.75\" (1.695 m).    Weight as of this encounter: 209 lb (94.8 kg). .    Georgie Coulter    "

## 2018-01-02 NOTE — MR AVS SNAPSHOT
After Visit Summary   1/2/2018    Griffin Walton    MRN: [id number]           Patient Information     Date Of Birth          5/18/1934        Visit Information        Provider Department      1/2/2018 10:00 AM Stefanie Louis APRN [facility]        Today's Diagnoses     Controlled type 2 diabetes mellitus with diabetic nephropathy, with long-term current use of insulin (H)          Care Instructions          Thank you for choosing [facility] (formerly [facility]).  You may be receiving a survey in the mail from Bronson Jeff regarding your visit today.  Please take a few minutes to complete and return the survey to let us know how we are doing.      If you have questions or concerns, please contact us via Neptune or you can contact your care team at [phone].    Our Clinic hours are:  Monday 6:40 am  to 7:00 pm  Tuesday -Friday 6:40 am to 5:00 pm    The [place] outpatient lab hours are:  Monday - Friday 6:10 am to 4:45 pm  Saturdays 7:00 am to 11:00 am  Appointments are required, call [phone]    If you have clinical questions after hours or would like to schedule an appointment,  call the clinic at [phone].          Follow-ups after your visit        Your next 10 appointments already scheduled     Jan 04, 2018  1:30 PM CST   US ABDOMINAL AORTIC IMAGING with EdPuzzle   [facility] Echocardiography ([facility])    [address] [id number] [phone]           Please bring a list of your medicines (including vitamins, minerals and over-the-counter drugs). Also, tell your doctor about any allergies you may have. Wear comfortable clothes and leave your valuables at home.  Adults: No eating or drinking for 8 hours before the exam. You may take medicine with a small sip of water.  Children: - Children 6+ years: No food or drink for 6 hours before exam. - Children 1-5 years: No food or drink for 4 hours before exam. - Infants, breast-fed: may have  breast milk up to 2 hours before exam. - Infants, formula: may have bottle until 4 hours before exam.  Please call the Imaging Department at your exam site with any questions.            Jan 04, 2018  2:15 PM CST   [facility] with [place] [facility]TI [facility]    [address] [address]   [phone]            Jan 04, 2018  3:00 PM CST   Ech Complete with PATRICIO   [facility] Echocardiography ([facility])    [address] [address]   [phone]           1. Please bring or wear a comfortable two-piece outfit. 2. You may eat, drink and take your normal medicines. 3. For any questions that cannot be answered, please contact the ordering physician            Jan 31, 2018  3:00 PM CST   Return Visit with Mac Perla MD   [facility] ([facility])    [address] [address]   [phone]            Feb 27, 2018 10:00 AM CST   [facility] with Geraldo Ware [facility]   [facility]    [address] [address]   [phone]            May 15, 2018  1:00 PM CDT   SIX MINUTE WALK with  PFL A,  PFL 6 MINUTE WALK 1   [facility] Pulmonary Function Testing ([facility])    [address] [address] [phone]            May 15, 2018  2:00 PM CDT   (Arrive by 1:45 PM)   Return Interstitial Lung with David Morris Perlman, MD   [facility] ([facility])    [address] [address] [phone]              Who to contact     If you have questions or need follow up information about today's clinic visit or your schedule please contact [facility] directly at [phone].  Normal or non-critical lab and imaging  results will be communicated to you by Proxlyhart, letter or phone within 4 business days after the clinic has received the results. If you do not hear from us within 7 days, please contact the clinic through Grid Net or phone. If you have a critical or abnormal lab result, we will notify you by phone as soon as possible.  Submit refill requests through Grid Net or call your pharmacy and they will forward the refill request to us. Please allow 3 business days for your refill to be completed.          Additional Information About Your Visit        Grid Net Information     Grid Net gives you secure access to your electronic health record. If you see a primary care provider, you can also send messages to your care team and make appointments. If you have questions, please call your primary care clinic.  If you do not have a primary care provider, please call [phone] and they will assist you.        Care EveryWhere ID     This is your Care EveryWhere ID. This could be used by other organizations to access your [place] medical records  [id number]        Your Vitals Were     Pulse Temperature Pulse Oximetry BMI (Body Mass Index)          91 96.8  F (36  C) (Tympanic) 94% 32.98 kg/m2         Blood Pressure from Last 3 Encounters:   01/02/18 146/66   12/22/17 158/85   12/22/17 157/54    Weight from Last 3 Encounters:   01/02/18 209 lb (94.8 kg)   12/19/17 202 lb (91.6 kg)   12/05/17 203 lb (92.1 kg)              Today, you had the following     No orders found for display         Today's Medication Changes          These changes are accurate as of: 1/2/18 10:29 AM.  If you have any questions, ask your nurse or doctor.               These medicines have changed or have updated prescriptions.        Dose/Directions    losartan 50 MG tablet   Commonly known as:  COZAAR   This may have changed:  how much to take   Used for:  Essential hypertension        Dose:  50 mg   Take 1 tablet (50 mg) by mouth daily   Quantity:  90 tablet    Refills:  3            Where to get your medicines      These medications were sent to [id number] - [place], [place] - [address]  [address] [address]     Phone:  [phone]     BASAGLAR 100 UNIT/ML injection                Primary Care Provider Office Phone # Fax #    DANNA Lopez [phone] [address] [address]        Equal Access to Services     ANUSHKA BILL : Hadii aad ku hadasho Soomaali, waaxda luqadaha, qaybta kaalmada adeegyada, waxay idiin hayaan adeeg kharash la'kurt . So [facility] [phone].    ATENCIÓN: Si habla español, tiene a louis disposición servicios gratuitos de asistencia lingüística. [facility] [phone].    We comply with applicable federal civil rights laws and [place] laws. We do not discriminate on the basis of race, color, national origin, age, disability, sex, sexual orientation, or gender identity.            Thank you!     Thank you for choosing [facility]  for your care. Our goal is always to provide you with excellent care. Hearing back from our patients is one way we can continue to improve our services. Please take a few minutes to complete the written survey that you may receive in the mail after your visit with us. Thank you!             Your Updated Medication List - Protect others around you: Learn how to safely use, store and throw away your medicines at www.disposemymeds.org.          This list is accurate as of: 1/2/18 10:29 AM.  Always use your most recent med list.                   Brand Name Dispense Instructions for use Diagnosis    ADVAIR DISKUS 100-50 MCG/DOSE diskus inhaler   Generic drug:  fluticasone-salmeterol     3 Inhaler    inhale 1 puff into the lungs every 12 hours.    Chronic obstructive pulmonary disease, unspecified COPD type (H)       albuterol (2.5 MG/3ML) 0.083% neb solution     63 vial    TAKE 1 VIAL (2.5 MG) BY NEBULIZATION EVERY 6 HOURS  "AS NEEDED FOR SHORTNESS OF BREATH / DYSPNEA OR WHEEZING    Chronic obstructive pulmonary disease, unspecified COPD type (H)       BASAGLAR 100 UNIT/ML injection     3 mL    Inject 15 Units Subcutaneous At Bedtime    Controlled type 2 diabetes mellitus with diabetic nephropathy, with long-term current use of insulin (H)       blood glucose monitoring meter device kit    no brand specified    1 kit    Use to test blood sugar 3 times daily or as directed.    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)       diltiazem 240 MG 24 hr capsule    CARDIZEM CD    90 capsule    Take 1 capsule (240 mg) by mouth daily    Paroxysmal atrial fibrillation (H)       dorzolamide-timolol 2-0.5 % ophthalmic solution    COSOPT     Place 1 drop into both eyes 2 times daily        insulin aspart 100 UNIT/ML injection    NovoLOG FLEXPEN    15 mL    6 units before breakfast, 6 units before lunch, 4 units before dinner    Controlled type 2 diabetes mellitus with diabetic nephropathy, with long-term current use of insulin (H)       insulin pen needle 30G X 8 MM    NOVOFINE    300 each    Use 3 daily or as directed.    Type 2 diabetes mellitus with diabetic nephropathy, unspecified long term insulin use status (H)       insulin syringe-needle U-100 31G X 5/16\" 0.5 ML     100 each    Profile Rx: patient will contact pharmacy when needed    Type 2 diabetes mellitus " with diabetic nephropathy (H)       latanoprost 0.005 % ophthalmic solution    XALATAN     Place 1 drop into both eyes At Bedtime        losartan 50 MG tablet    COZAAR    90 tablet    Take 1 tablet (50 mg) by mouth daily    Essential hypertension       lovastatin 40 MG tablet    MEVACOR    135 tablet    Profile Rx: patient will contact pharmacy when needed TAKE 1 AND 1/2 TABLETS BY MOUTH ONCE A DAY WITH DINNER. NEED FASTING LAB WORK    Hyperlipidemia LDL goal <130       metoprolol 25 MG 24 hr tablet    TOPROL XL    90 tablet    Take 1 tablet (25 mg) by mouth daily    Essential hypertension       * NONFORMULARY      Take 1 tablet by mouth At Bedtime Vitamin (Dialyvite) that he gets from [facility].        * order for DME     1 each    Equipment being ordered: Walker with wheels and brakes, and a seat    ESRD (end stage renal disease) on dialysis (H), Cerebral embolism with cerebral infarction (H)       order for DME     1 Units    Nebulizer machine Qty #1    Fever, unspecified, Cough, Chronic obstructive pulmonary disease, unspecified COPD type (H), Acute bronchospasm       order for DME     2 each    Equipment being ordered: Oxygen- HOME and portable. Georgie Coulter [facility] Medical Assistant Signed  Service date: 05/24/2016 10:48 AM     O2 Sat on Room air at rest:84% O2 sat on room air with walk: 82-91% O2 Sat on 1L of oxygen with walk 91-93% O2 Sat on 2 L of Oxygen with walk 91-96% O2 Sat on 1 L O2 at rest 94%    Cough, Hypoxia, Chronic obstructive pulmonary disease, unspecified COPD type (H), Chronic obstructive pulmonary disease with acute exacerbation (H), 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), Type 2 diabetes mellitus with diabetic nephropathy (H)       predniSONE 10 MG tablet    DELTASONE    32 tablet    Take 40mg daily x 3 days, then 30mg daily x 3 days, then 20mg daily x 3 days, then 10mg daily x 3 days, then 5 mg daily x 3 days.        TART CHERRY ADVANCED Caps       Take 1 capsule by mouth daily        terazosin 10 MG capsule    HYTRIN    90 capsule    Profile Rx: patient will contact pharmacy when needed TAKE 1 CAPSULE (10 MG) BY MOUTH AT BEDTIME    Benign non-nodular prostatic hyperplasia with lower urinary tract symptoms       TYLENOL PO      Take 650 mg by mouth nightly as needed        warfarin 2 MG tablet    COUMADIN    30 tablet    Take 2 mg on Tue; 4 mg all other days or as directed by the Anticoagulation Clinic        * Notice:  This list has 2 medication(s) that are the same as other medications prescribed for you. Read the directions carefully, and ask your doctor or other care provider to review them with you.

## 2018-01-02 NOTE — PATIENT INSTRUCTIONS
Thank you for choosing [facility].  You may be receiving a survey in the mail from [employer] Jeff regarding your visit today.  Please take a few minutes to complete and return the survey to let us know how we are doing.      If you have questions or concerns, please contact us via Lowfoot or you can contact your care team at [phone].    Our Clinic hours are:  Monday 6:40 am  to 7:00 pm  Tuesday -Friday 6:40 am to 5:00 pm    The [place] outpatient lab hours are:  Monday - Friday 6:10 am to 4:45 pm  Saturdays 7:00 am to 11:00 am  Appointments are required, call [phone]    If you have clinical questions after hours or would like to schedule an appointment,  call the clinic at [phone].

## 2018-01-02 NOTE — NURSING NOTE
"Initial /80  Pulse 91  Temp 96.8  F (36  C) (Tympanic)  Wt 209 lb (94.8 kg)  SpO2 94%  BMI 32.98 kg/m2 Estimated body mass index is 32.98 kg/(m^2) as calculated from the following:    Height as of 12/5/17: 5' 6.75\" (1.695 m).    Weight as of this encounter: 209 lb (94.8 kg). .    Georgie Coulter    "

## 2018-01-04 ENCOUNTER — HOSPITAL ENCOUNTER (OUTPATIENT)
Dept: CARDIOLOGY | Facility: CLINIC | Age: 83
Discharge: HOME | End: 2018-01-04
Attending: INTERNAL MEDICINE | Admitting: INTERNAL MEDICINE
Payer: MEDICARE

## 2018-01-04 ENCOUNTER — ANTICOAGULATION THERAPY VISIT (OUTPATIENT)
Dept: ANTICOAGULATION | Facility: CLINIC | Age: 83
End: 2018-01-04
Payer: MEDICARE

## 2018-01-04 DIAGNOSIS — I63.40 CEREBRAL INFARCTION DUE TO EMBOLISM OF CEREBRAL ARTERY (H): ICD-10-CM

## 2018-01-04 DIAGNOSIS — I35.0 NONRHEUMATIC AORTIC VALVE STENOSIS: ICD-10-CM

## 2018-01-04 DIAGNOSIS — Z79.01 LONG-TERM (CURRENT) USE OF ANTICOAGULANTS: ICD-10-CM

## 2018-01-04 DIAGNOSIS — I71.40 ABDOMINAL AORTIC ANEURYSM (AAA) WITHOUT RUPTURE (H): ICD-10-CM

## 2018-01-04 DIAGNOSIS — I48.91 ATRIAL FIBRILLATION, NEW ONSET (H): ICD-10-CM

## 2018-01-04 LAB — INR POINT OF CARE: 2 (ref 0.86–1.14)

## 2018-01-04 PROCEDURE — 85610 PROTHROMBIN TIME: CPT | Mod: QW

## 2018-01-04 PROCEDURE — 93306 TTE W/DOPPLER COMPLETE: CPT | Mod: 26 | Performed by: INTERNAL MEDICINE

## 2018-01-04 PROCEDURE — 99207 ZZC NO CHARGE NURSE ONLY: CPT

## 2018-01-04 PROCEDURE — 76775 US EXAM ABDO BACK WALL LIM: CPT | Mod: 26 | Performed by: INTERNAL MEDICINE

## 2018-01-04 PROCEDURE — 36416 COLLJ CAPILLARY BLOOD SPEC: CPT

## 2018-01-04 PROCEDURE — 93306 TTE W/DOPPLER COMPLETE: CPT

## 2018-01-04 RX ORDER — WARFARIN SODIUM 2 MG/1
TABLET ORAL
Qty: 30 TABLET | COMMUNITY
Start: 2018-01-04 | End: 2018-01-25

## 2018-01-04 NOTE — PROGRESS NOTES
ANTICOAGULATION FOLLOW-UP CLINIC VISIT    Patient Name:  Griffin Walton  Date:  1/4/2018  Contact Type:  Face to Face/accompanied by spouse    SUBJECTIVE:     Patient Findings     Positives No Problem Findings    Comments Pt denies changes in medications, diet, activity or health, reports taking warfarin as directed.  Patient had 26mg in the previous 7 days, will increase dose to 28 mg by next INR check in 7 days (~8% increase).             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 [facility] MWF 8-12pm in [place]. Pt not new to warfarin (has been on approx 1 yr)      Anticoagulation Care Providers     Provider Role Specialty Phone number    Stefanie Louis APRN CNP Responsible Nurse Practitioner [phone]            See the Encounter Report to view Anticoagulation Flowsheet and Dosing Calendar (Go to Encounters tab in chart review, and find the Anticoagulation Therapy Visit)        Nikki Harry RN

## 2018-01-11 ENCOUNTER — ANTICOAGULATION THERAPY VISIT (OUTPATIENT)
Dept: ANTICOAGULATION | Facility: CLINIC | Age: 83
End: 2018-01-11
Payer: MEDICARE

## 2018-01-11 DIAGNOSIS — I63.40 CEREBRAL INFARCTION DUE TO EMBOLISM OF CEREBRAL ARTERY (H): ICD-10-CM

## 2018-01-11 DIAGNOSIS — I48.91 ATRIAL FIBRILLATION, NEW ONSET (H): ICD-10-CM

## 2018-01-11 DIAGNOSIS — Z79.01 LONG-TERM (CURRENT) USE OF ANTICOAGULANTS: ICD-10-CM

## 2018-01-11 LAB — INR POINT OF CARE: 2.3 (ref 0.86–1.14)

## 2018-01-11 PROCEDURE — 85610 PROTHROMBIN TIME: CPT | Mod: QW

## 2018-01-11 PROCEDURE — 36416 COLLJ CAPILLARY BLOOD SPEC: CPT

## 2018-01-11 PROCEDURE — 99207 ZZC NO CHARGE NURSE ONLY: CPT

## 2018-01-11 NOTE — PROGRESS NOTES
ANTICOAGULATION FOLLOW-UP CLINIC VISIT    Patient Name:  Griffin Walton  Date:  1/11/2018  Contact Type:  Face to Face/accompanied by spouse    SUBJECTIVE:     Patient Findings     Positives No Problem Findings    Comments Pt denies changes in medications, diet, activity or health, reports taking warfarin as directed.             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    Comments * 2mg tablets. [facility] MWF 8-12pm in [place]. Pt not new to warfarin (has been on approx 1 yr)      Anticoagulation Care Providers     Provider Role Specialty Phone number    HeribertoStefanie carlton APRN CNP Responsible Nurse Practitioner [phone]            See the Encounter Report to view Anticoagulation Flowsheet and Dosing Calendar (Go to Encounters tab in chart review, and find the Anticoagulation Therapy Visit)        Nikki Harry, RN

## 2018-01-11 NOTE — MR AVS SNAPSHOT
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  Please call [phone] with any problems or questions regarding your therapy          January 2018 Details    Sun Mon Tue Wed Thu Fri Sat      1               2               3               4               5               6                 7               8               9               10               11      4 mg   See details      12      4 mg         13      4 mg           14      4 mg         15      4 mg         16      4 mg         17      4 mg         18      4 mg         19      4 mg         20      4 mg           21      4 mg         22      4 mg         23      4 mg         24      4 mg         25            26               27                 28               29               30               31                   Date Details   01/11 This INR check       Date of next INR:  1/25/2018         How to take your warfarin dose     To take:  4 mg Take 2 of the 2 mg tablets.

## 2018-01-12 ENCOUNTER — MYC MEDICAL ADVICE (OUTPATIENT)
Dept: FAMILY MEDICINE | Facility: CLINIC | Age: 83
End: 2018-01-12

## 2018-01-12 NOTE — TELEPHONE ENCOUNTER
Covering for PCP:  He can resume using 11 units since he is now done with the prednisone.  Thanks.    Eusebio Nugent MD

## 2018-01-12 NOTE — TELEPHONE ENCOUNTER
Please see my chart note below from patient  He has finished his Prednisone therapy, looks like insulin was increased due to being on prednisone  Please advise    Routing to provider.    Bhavana MOSS Rn

## 2018-01-19 ENCOUNTER — APPOINTMENT (OUTPATIENT)
Dept: CT IMAGING | Facility: CLINIC | Age: 83
End: 2018-01-19
Attending: EMERGENCY MEDICINE
Payer: MEDICARE

## 2018-01-19 ENCOUNTER — RADIANT APPOINTMENT (OUTPATIENT)
Dept: GENERAL RADIOLOGY | Facility: CLINIC | Age: 83
End: 2018-01-19
Attending: FAMILY MEDICINE
Payer: MEDICARE

## 2018-01-19 ENCOUNTER — HOSPITAL ENCOUNTER (EMERGENCY)
Facility: CLINIC | Age: 83
Discharge: ANOTHER HOSPITAL | End: 2018-01-20
Attending: EMERGENCY MEDICINE | Admitting: EMERGENCY MEDICINE
Payer: MEDICARE

## 2018-01-19 ENCOUNTER — OFFICE VISIT (OUTPATIENT)
Dept: FAMILY MEDICINE | Facility: CLINIC | Age: 83
End: 2018-01-19
Payer: MEDICARE

## 2018-01-19 ENCOUNTER — NURSE TRIAGE (OUTPATIENT)
Dept: NURSING | Facility: CLINIC | Age: 83
End: 2018-01-19

## 2018-01-19 VITALS
HEIGHT: 67 IN | TEMPERATURE: 99.9 F | BODY MASS INDEX: 32.8 KG/M2 | WEIGHT: 209 LBS | HEART RATE: 113 BPM | OXYGEN SATURATION: 94 % | DIASTOLIC BLOOD PRESSURE: 94 MMHG | SYSTOLIC BLOOD PRESSURE: 155 MMHG

## 2018-01-19 DIAGNOSIS — J44.9 CHRONIC OBSTRUCTIVE PULMONARY DISEASE, UNSPECIFIED COPD TYPE (H): ICD-10-CM

## 2018-01-19 DIAGNOSIS — Z79.01 LONG-TERM (CURRENT) USE OF ANTICOAGULANTS: ICD-10-CM

## 2018-01-19 DIAGNOSIS — E11.21 CONTROLLED TYPE 2 DIABETES MELLITUS WITH DIABETIC NEPHROPATHY, WITH LONG-TERM CURRENT USE OF INSULIN (H): ICD-10-CM

## 2018-01-19 DIAGNOSIS — J45.40 MODERATE PERSISTENT ASTHMA WITHOUT COMPLICATION: ICD-10-CM

## 2018-01-19 DIAGNOSIS — Z79.4 CONTROLLED TYPE 2 DIABETES MELLITUS WITH DIABETIC NEPHROPATHY, WITH LONG-TERM CURRENT USE OF INSULIN (H): ICD-10-CM

## 2018-01-19 DIAGNOSIS — J18.9 PNEUMONIA OF BOTH LUNGS DUE TO INFECTIOUS ORGANISM, UNSPECIFIED PART OF LUNG: ICD-10-CM

## 2018-01-19 DIAGNOSIS — R06.02 SOB (SHORTNESS OF BREATH): ICD-10-CM

## 2018-01-19 DIAGNOSIS — Z99.2 ESRD (END STAGE RENAL DISEASE) ON DIALYSIS (H): ICD-10-CM

## 2018-01-19 DIAGNOSIS — I50.9 ACUTE CONGESTIVE HEART FAILURE, UNSPECIFIED CONGESTIVE HEART FAILURE TYPE: ICD-10-CM

## 2018-01-19 DIAGNOSIS — N18.6 ESRD (END STAGE RENAL DISEASE) ON DIALYSIS (H): ICD-10-CM

## 2018-01-19 DIAGNOSIS — I10 ESSENTIAL HYPERTENSION, BENIGN: ICD-10-CM

## 2018-01-19 DIAGNOSIS — J18.9 PNEUMONIA OF BOTH LUNGS DUE TO INFECTIOUS ORGANISM, UNSPECIFIED PART OF LUNG: Primary | ICD-10-CM

## 2018-01-19 DIAGNOSIS — J96.21 ACUTE AND CHRONIC RESPIRATORY FAILURE WITH HYPOXIA (H): ICD-10-CM

## 2018-01-19 LAB
ALBUMIN SERPL-MCNC: 3.1 G/DL (ref 3.4–5)
ALBUMIN SERPL-MCNC: NORMAL G/DL (ref 3.4–5)
ALP SERPL-CCNC: 66 U/L (ref 40–150)
ALP SERPL-CCNC: NORMAL U/L (ref 40–150)
ALT SERPL W P-5'-P-CCNC: 25 U/L (ref 0–70)
ALT SERPL W P-5'-P-CCNC: NORMAL U/L (ref 0–70)
ANION GAP SERPL CALCULATED.3IONS-SCNC: 9 MMOL/L (ref 3–14)
ANION GAP SERPL CALCULATED.3IONS-SCNC: NORMAL MMOL/L (ref 6–17)
AST SERPL W P-5'-P-CCNC: 16 U/L (ref 0–45)
AST SERPL W P-5'-P-CCNC: NORMAL U/L (ref 0–45)
BASE EXCESS BLDV CALC-SCNC: 2.5 MMOL/L
BASOPHILS # BLD AUTO: 0 10E9/L (ref 0–0.2)
BASOPHILS NFR BLD AUTO: 0.3 %
BILIRUB SERPL-MCNC: 0.4 MG/DL (ref 0.2–1.3)
BILIRUB SERPL-MCNC: NORMAL MG/DL (ref 0.2–1.3)
BUN SERPL-MCNC: 57 MG/DL (ref 7–30)
BUN SERPL-MCNC: NORMAL MG/DL (ref 7–30)
CALCIUM SERPL-MCNC: 8.2 MG/DL (ref 8.5–10.1)
CALCIUM SERPL-MCNC: NORMAL MG/DL (ref 8.5–10.1)
CHLORIDE SERPL-SCNC: 99 MMOL/L (ref 94–109)
CHLORIDE SERPL-SCNC: NORMAL MMOL/L (ref 94–109)
CO2 SERPL-SCNC: 28 MMOL/L (ref 20–32)
CO2 SERPL-SCNC: NORMAL MMOL/L (ref 20–32)
CREAT SERPL-MCNC: 7.48 MG/DL (ref 0.66–1.25)
CREAT SERPL-MCNC: NORMAL MG/DL (ref 0.66–1.25)
DIFFERENTIAL METHOD BLD: ABNORMAL
EOSINOPHIL # BLD AUTO: 0 10E9/L (ref 0–0.7)
EOSINOPHIL NFR BLD AUTO: 0.3 %
ERYTHROCYTE [DISTWIDTH] IN BLOOD BY AUTOMATED COUNT: 16.3 % (ref 10–15)
FLUAV+FLUBV AG SPEC QL: NEGATIVE
FLUAV+FLUBV AG SPEC QL: NEGATIVE
GFR SERPL CREATININE-BSD FRML MDRD: 7 ML/MIN/1.7M2
GFR SERPL CREATININE-BSD FRML MDRD: NORMAL ML/MIN/1.7M2
GLUCOSE SERPL-MCNC: 212 MG/DL (ref 70–99)
GLUCOSE SERPL-MCNC: NORMAL MG/DL (ref 70–99)
HCO3 BLDV-SCNC: 28 MMOL/L (ref 21–28)
HCT VFR BLD AUTO: 26 % (ref 40–53)
HGB BLD-MCNC: 8 G/DL (ref 13.3–17.7)
IMM GRANULOCYTES # BLD: 0 10E9/L (ref 0–0.4)
IMM GRANULOCYTES NFR BLD: 0.3 %
INR PPP: 2.6 (ref 0.86–1.14)
LACTATE BLD-SCNC: 1.1 MMOL/L (ref 0.7–2)
LYMPHOCYTES # BLD AUTO: 0.4 10E9/L (ref 0.8–5.3)
LYMPHOCYTES NFR BLD AUTO: 6 %
MCH RBC QN AUTO: 29.7 PG (ref 26.5–33)
MCHC RBC AUTO-ENTMCNC: 30.8 G/DL (ref 31.5–36.5)
MCV RBC AUTO: 97 FL (ref 78–100)
MONOCYTES # BLD AUTO: 0.6 10E9/L (ref 0–1.3)
MONOCYTES NFR BLD AUTO: 9.8 %
NEUTROPHILS # BLD AUTO: 5.3 10E9/L (ref 1.6–8.3)
NEUTROPHILS NFR BLD AUTO: 83.3 %
NT-PROBNP SERPL-MCNC: ABNORMAL PG/ML (ref 0–1800)
PCO2 BLDV: 46 MM HG (ref 40–50)
PH BLDV: 7.39 PH (ref 7.32–7.43)
PLATELET # BLD AUTO: 240 10E9/L (ref 150–450)
PO2 BLDV: 49 MM HG (ref 25–47)
POTASSIUM SERPL-SCNC: 5 MMOL/L (ref 3.4–5.3)
POTASSIUM SERPL-SCNC: NORMAL MMOL/L (ref 3.4–5.3)
PROT SERPL-MCNC: 6.8 G/DL (ref 6.8–8.8)
PROT SERPL-MCNC: NORMAL G/DL (ref 6.8–8.8)
RBC # BLD AUTO: 2.69 10E12/L (ref 4.4–5.9)
SODIUM SERPL-SCNC: 136 MMOL/L (ref 133–144)
SODIUM SERPL-SCNC: NORMAL MMOL/L (ref 133–144)
SPECIMEN SOURCE: NORMAL
WBC # BLD AUTO: 6.3 10E9/L (ref 4–11)

## 2018-01-19 PROCEDURE — 87633 RESP VIRUS 12-25 TARGETS: CPT | Performed by: EMERGENCY MEDICINE

## 2018-01-19 PROCEDURE — 99285 EMERGENCY DEPT VISIT HI MDM: CPT | Mod: 25 | Performed by: EMERGENCY MEDICINE

## 2018-01-19 PROCEDURE — 25000125 ZZHC RX 250: Performed by: EMERGENCY MEDICINE

## 2018-01-19 PROCEDURE — 94640 AIRWAY INHALATION TREATMENT: CPT

## 2018-01-19 PROCEDURE — 85025 COMPLETE CBC W/AUTO DIFF WBC: CPT | Performed by: EMERGENCY MEDICINE

## 2018-01-19 PROCEDURE — 80053 COMPREHEN METABOLIC PANEL: CPT | Performed by: EMERGENCY MEDICINE

## 2018-01-19 PROCEDURE — 82803 BLOOD GASES ANY COMBINATION: CPT | Performed by: EMERGENCY MEDICINE

## 2018-01-19 PROCEDURE — 87804 INFLUENZA ASSAY W/OPTIC: CPT | Performed by: FAMILY MEDICINE

## 2018-01-19 PROCEDURE — 85610 PROTHROMBIN TIME: CPT | Performed by: EMERGENCY MEDICINE

## 2018-01-19 PROCEDURE — 71046 X-RAY EXAM CHEST 2 VIEWS: CPT | Mod: FY

## 2018-01-19 PROCEDURE — 83880 ASSAY OF NATRIURETIC PEPTIDE: CPT | Performed by: EMERGENCY MEDICINE

## 2018-01-19 PROCEDURE — 71250 CT THORAX DX C-: CPT

## 2018-01-19 PROCEDURE — 87040 BLOOD CULTURE FOR BACTERIA: CPT | Performed by: EMERGENCY MEDICINE

## 2018-01-19 PROCEDURE — 84145 PROCALCITONIN (PCT): CPT | Performed by: EMERGENCY MEDICINE

## 2018-01-19 PROCEDURE — 36415 COLL VENOUS BLD VENIPUNCTURE: CPT | Performed by: EMERGENCY MEDICINE

## 2018-01-19 PROCEDURE — 99214 OFFICE O/P EST MOD 30 MIN: CPT | Performed by: FAMILY MEDICINE

## 2018-01-19 PROCEDURE — 83605 ASSAY OF LACTIC ACID: CPT | Performed by: EMERGENCY MEDICINE

## 2018-01-19 PROCEDURE — 99285 EMERGENCY DEPT VISIT HI MDM: CPT | Mod: Z6 | Performed by: EMERGENCY MEDICINE

## 2018-01-19 PROCEDURE — 93010 ELECTROCARDIOGRAM REPORT: CPT | Mod: Z6 | Performed by: EMERGENCY MEDICINE

## 2018-01-19 PROCEDURE — 93005 ELECTROCARDIOGRAM TRACING: CPT | Performed by: EMERGENCY MEDICINE

## 2018-01-19 RX ORDER — METHYLPREDNISOLONE SODIUM SUCCINATE 125 MG/2ML
125 INJECTION, POWDER, LYOPHILIZED, FOR SOLUTION INTRAMUSCULAR; INTRAVENOUS ONCE
Status: COMPLETED | OUTPATIENT
Start: 2018-01-19 | End: 2018-01-20

## 2018-01-19 RX ORDER — LIDOCAINE 40 MG/G
CREAM TOPICAL
Status: DISCONTINUED | OUTPATIENT
Start: 2018-01-19 | End: 2018-01-20 | Stop reason: HOSPADM

## 2018-01-19 RX ORDER — LEVOFLOXACIN 5 MG/ML
500 INJECTION, SOLUTION INTRAVENOUS ONCE
Status: COMPLETED | OUTPATIENT
Start: 2018-01-19 | End: 2018-01-20

## 2018-01-19 RX ORDER — IPRATROPIUM BROMIDE AND ALBUTEROL SULFATE 2.5; .5 MG/3ML; MG/3ML
3 SOLUTION RESPIRATORY (INHALATION) ONCE
Status: COMPLETED | OUTPATIENT
Start: 2018-01-19 | End: 2018-01-19

## 2018-01-19 RX ORDER — IPRATROPIUM BROMIDE AND ALBUTEROL SULFATE 2.5; .5 MG/3ML; MG/3ML
3 SOLUTION RESPIRATORY (INHALATION)
Status: DISCONTINUED | OUTPATIENT
Start: 2018-01-19 | End: 2018-01-20 | Stop reason: HOSPADM

## 2018-01-19 RX ADMIN — IPRATROPIUM BROMIDE AND ALBUTEROL SULFATE 3 ML: .5; 3 SOLUTION RESPIRATORY (INHALATION) at 20:09

## 2018-01-19 NOTE — MR AVS SNAPSHOT
After Visit Summary   1/19/2018    Griffin Walton    MRN: [id number]           Patient Information     Date Of Birth          5/18/1934        Visit Information        Provider Department      1/19/2018 9:40 AM Rafael Cummins MD [facility]        Today's Diagnoses     Pneumonia of both lungs due to infectious organism, unspecified part of lung    -  1    Moderate persistent asthma without complication          Care Instructions          Thank you for choosing [facility].  You may be receiving a survey in the mail from [facility] regarding your visit today.  Please take a few minutes to complete and return the survey to let us know how we are doing.      If you have questions or concerns, please contact us via [employer] or you can contact your care team at [phone].    Our Clinic hours are:  Monday 6:40 am  to 7:00 pm  Tuesday -Friday 6:40 am to 5:00 pm    The [place] outpatient lab hours are:  Monday - Friday 6:10 am to 4:45 pm  Saturdays 7:00 am to 11:00 am  Appointments are required, call [phone]    If you have clinical questions after hours or would like to schedule an appointment,  call the clinic at [phone].      (J18.9) Pneumonia of both lungs due to infectious organism, unspecified part of lung  (primary encounter diagnosis)  Comment:   Plan: XR Chest 2 Views, Influenza A/B antigen,         CANCELED: Influenza A and B and RSV PCR        The lungs are healing slowly from the November pneumonia. The symptoms today are minimally from that old infection and there is likely some new virus with asthma.   Use the albuterol nebulizer twice daily before the Advair. Rinse the mouth after the Advair. You may use the Albuterol alone as often as every 3-4 hours. Monitor the breathing rate and   Consider the home peak flow meter. Avoid contagious exposures and use good hygiene to prevent infection. Stay well hydrated and use Robitussin DM cough medication.   Monitor  the oxygen saturation and this needs to stay over 92%. Use the lowest effective amount.     (J45.40) Moderate persistent asthma without complication  Comment:   Plan: CANCELED: Influenza A and B and RSV PCR        See above.           Follow-ups after your visit        Your next 10 appointments already scheduled     Jan 25, 2018  9:30 AM CST   [facility] with [place] [facility]TI [facility]    [address] [address]   [phone]            Jan 31, 2018  3:00 PM CST   Return Visit with Mac Perla MD   [facility]    [address] [address]   [phone]            Feb 27, 2018 10:00 AM CST   [facility] Geraldo Ware MD   [facility]    [address] [address]   [phone]            May 15, 2018  1:00 PM CDT   SIX MINUTE WALK with UC PFL A, UC PFL 6 MINUTE WALK 1   [facility] Pulmonary Function Testing ([place] [facility])    [address]  3rd Floor  [facility] [address] [phone]            May 15, 2018  2:00 PM CDT   (Arrive by 1:45 PM)   Return Interstitial Lung with David Morris Perlman, MD   [facility] ([place] [facility])    [address]  [address] [place] [id number] [phone]              Who to contact     If you have questions or need follow up information about today's clinic visit or your schedule please contact [facility] directly at [phone].  Normal or non-critical lab and imaging results will be communicated to you by MyChart, letter or phone within 4 business days after the clinic has received the results. If you do not hear from us within 7 days, please contact the clinic through MyChart or phone. If you have a critical or abnormal lab  "result, we will notify you by phone as soon as possible.  Submit refill requests through [employer] or call your pharmacy and they will forward the refill request to us. Please allow 3 business days for your refill to be completed.          Additional Information About Your Visit        [facility] Information     [employer] gives you secure access to your electronic health record. If you see a primary care provider, you can also send messages to your care team and make appointments. If you have questions, please call your primary care clinic.  If you do not have a primary care provider, please call [phone] and they will assist you.        Care EveryWhere ID     This is your Care EveryWhere ID. This could be used by other organizations to access your [facility] medical records  [id number]        Your Vitals Were     Pulse Temperature Height Pulse Oximetry BMI (Body Mass Index)       113 99.9  F (37.7  C) (Tympanic) 5' 7\" (1.702 m) 94% 32.73 kg/m2        Blood Pressure from Last 3 Encounters:   01/19/18 (!) 155/94   01/02/18 139/80   12/22/17 158/85    Weight from Last 3 Encounters:   01/19/18 209 lb (94.8 kg)   01/02/18 209 lb (94.8 kg)   12/19/17 202 lb (91.6 kg)              We Performed the Following     Influenza A/B antigen          Today's Medication Changes          These changes are accurate as of: 1/19/18 11:58 AM.  If you have any questions, ask your nurse or doctor.               These medicines have changed or have updated prescriptions.        Dose/Directions    losartan 50 MG tablet   Commonly known as:  COZAAR   This may have changed:  how much to take   Used for:  Essential hypertension        Dose:  50 mg   Take 1 tablet (50 mg) by mouth daily   Quantity:  90 tablet   Refills:  3                Primary Care Provider Office Phone # Fax #    DANNA Lopez [facility] [phone] [address] [address]        Equal Access to Services     ANUSHKA BILL AH: Guy mejia " Bee, amritda lucataadaha, kayodeta kakayla patten, renzo martinez troyellie laborismele madeline. So [facility] [phone].    ATENCIÓN: Si saad torres, tiene a louis disposición servicios gratuitos de asistencia lingüística. Zarina al [phone].    We comply with applicable federal civil rights laws and [place] laws. We do not discriminate on the basis of race, color, national origin, age, disability, sex, sexual orientation, or gender identity.            Thank you!     Thank you for choosing [facility]  for your care. Our goal is always to provide you with excellent care. Hearing back from our patients is one way we can continue to improve our services. Please take a few minutes to complete the written survey that you may receive in the mail after your visit with us. Thank you!             Your Updated Medication List - Protect others around you: Learn how to safely use, store and throw away your medicines at www.disposemymeds.org.          This list is accurate as of: 1/19/18 11:58 AM.  Always use your most recent med list.                   Brand Name Dispense Instructions for use Diagnosis    ADVAIR DISKUS 100-50 MCG/DOSE diskus inhaler   Generic drug:  fluticasone-salmeterol     3 Inhaler    inhale 1 puff into the lungs every 12 hours.    Chronic obstructive pulmonary disease, unspecified COPD type (H)       albuterol (2.5 MG/3ML) 0.083% neb solution     63 vial    TAKE 1 VIAL (2.5 MG) BY NEBULIZATION EVERY 6 HOURS AS NEEDED FOR SHORTNESS OF BREATH / DYSPNEA OR WHEEZING    Chronic obstructive pulmonary disease, unspecified COPD type (H)       BASAGLAR 100 UNIT/ML injection     3 mL    Inject 15 Units Subcutaneous At Bedtime    Controlled type 2 diabetes mellitus with diabetic nephropathy, with long-term current use of insulin (H)       blood glucose monitoring meter device kit    no brand specified    1 kit    Use to test blood sugar 3 times daily or as directed.    Controlled type 2 diabetes  "mellitus with diabetic nephropathy, with long-term current use of insulin (H)       blood glucose monitoring test strip    ONETOUCH ULTRA    60 each    test 2 times daily Profile Rx: patient will contact pharmacy when needed    Type 2 diabetes mellitus with diabetic nephropathy, with long-term current use of insulin (H)       bumetanide 1 MG tablet    BUMEX    180 tablet    TAKE 1 TABLET (1 MG) BY MOUTH 2 TIMES DAILY    Benign essential hypertension       clopidogrel 75 MG tablet    PLAVIX    90 tablet    Take 1 tablet (75 mg) by mouth daily    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)       diltiazem 240 MG 24 hr capsule    CARDIZEM CD    90 capsule    Take 1 capsule (240 mg) by mouth daily    Paroxysmal atrial fibrillation (H)       dorzolamide-timolol 2-0.5 % ophthalmic solution    COSOPT     Place 1 drop into both eyes 2 times daily        insulin aspart 100 UNIT/ML injection    NovoLOG FLEXPEN    15 mL    6 units before breakfast, 6 units before lunch, 4 units before dinner    Controlled type 2 diabetes mellitus with diabetic nephropathy, with long-term current use of insulin (H)       insulin pen needle 30G X 8 MM    NOVOFINE    300 each    Use 3 daily or as directed.    Type 2 diabetes mellitus with diabetic nephropathy, unspecified long term insulin use status (H)       insulin syringe-needle U-100 31G X 5/16\" 0.5 ML     100 each    Profile Rx: patient will contact pharmacy when needed    Type 2 diabetes mellitus with diabetic nephropathy (H)       latanoprost 0.005 % ophthalmic solution    XALATAN     Place 1 drop into both eyes At Bedtime        losartan 50 MG tablet    COZAAR    90 tablet    Take 1 tablet (50 mg) by mouth daily    Essential hypertension       lovastatin 40 MG tablet    MEVACOR    135 tablet    Profile Rx: patient will contact pharmacy when needed TAKE 1 AND 1/2 TABLETS BY MOUTH ONCE A DAY WITH DINNER. NEED FASTING LAB WORK    Hyperlipidemia LDL goal <130       metoprolol " succinate 25 MG 24 hr tablet    TOPROL XL    90 tablet    Take 1 tablet (25 mg) by mouth daily    Essential hypertension       * NONFORMULARY      Take 1 tablet by mouth At Bedtime Vitamin (Dialyvite) that he gets from [facility] [place].        * order for DME     1 each    Equipment being ordered: Walker with wheels and brakes, and a seat    ESRD (end stage renal disease) on dialysis (H), Cerebral embolism with cerebral infarction (H)       order for DME     1 Units    Nebulizer machine Qty #1    Fever, unspecified, Cough, Chronic obstructive pulmonary disease, unspecified COPD type (H), Acute bronchospasm       order for DME     2 each    Equipment being ordered: Oxygen- HOME and portable. 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), Type 2 diabetes mellitus with diabetic nephropathy (H)       TART CHERRY ADVANCED Caps      Take 1 capsule by mouth daily        terazosin 10 MG capsule    HYTRIN    90 capsule    Profile Rx: patient will contact pharmacy when needed TAKE 1 CAPSULE (10 MG) BY MOUTH AT BEDTIME    Benign non-nodular prostatic hyperplasia with lower urinary tract symptoms       TYLENOL PO      Take 650 mg by mouth nightly as needed        warfarin 2 MG tablet    COUMADIN    30 tablet    Take 4 mg daily as directed by the Anticoagulation Clinic        * Notice:  This list has 2 medication(s) that are the same as other medications prescribed for you. Read the directions carefully, and ask your doctor or other care provider to review them with you.

## 2018-01-19 NOTE — NURSING NOTE
"Chief Complaint   Patient presents with     Cough     harsh cough, SOB, and vomit this AM       Initial BP (!) 182/92 (BP Location: Right arm, Patient Position: Chair, Cuff Size: Adult Large)  Pulse 113  Temp 99.9  F (37.7  C) (Tympanic)  Ht 5' 7\" (1.702 m)  Wt 209 lb (94.8 kg)  SpO2 94%  BMI 32.73 kg/m2 Estimated body mass index is 32.73 kg/(m^2) as calculated from the following:    Height as of this encounter: 5' 7\" (1.702 m).    Weight as of this encounter: 209 lb (94.8 kg).  Medication Reconciliation: complete  "

## 2018-01-19 NOTE — PATIENT INSTRUCTIONS
Thank you for choosing [facility].  You may be receiving a survey in the mail from [facility] regarding your visit today.  Please take a few minutes to complete and return the survey to let us know how we are doing.      If you have questions or concerns, please contact us via [employer] or you can contact your care team at [phone].    Our Clinic hours are:  Monday 6:40 am  to 7:00 pm  Tuesday -Friday 6:40 am to 5:00 pm    The [place] outpatient lab hours are:  Monday - Friday 6:10 am to 4:45 pm  Saturdays 7:00 am to 11:00 am  Appointments are required, call [phone]    If you have clinical questions after hours or would like to schedule an appointment,  call the clinic at [phone].      (J18.9) Pneumonia of both lungs due to infectious organism, unspecified part of lung  (primary encounter diagnosis)  Comment:   Plan: XR Chest 2 Views, Influenza A/B antigen,         CANCELED: Influenza A and B and RSV PCR        The lungs are healing slowly from the November pneumonia. The symptoms today are minimally from that old infection and there is likely some new virus with asthma.   Use the albuterol nebulizer twice daily before the Advair. Rinse the mouth after the Advair. You may use the Albuterol alone as often as every 3-4 hours. Monitor the breathing rate and   Consider the home peak flow meter. Avoid contagious exposures and use good hygiene to prevent infection. Stay well hydrated and use Robitussin DM cough medication.   Monitor the oxygen saturation and this needs to stay over 92%. Use the lowest effective amount.     (J45.40) Moderate persistent asthma without complication  Comment:   Plan: CANCELED: Influenza A and B and RSV PCR        See above.

## 2018-01-19 NOTE — PROGRESS NOTES
SUBJECTIVE:   Griffin Walton is a 83 year old male who presents to clinic today for the following health issues:      ENT Symptoms             Symptoms: cc Present Absent Comment   Fever/Chills  x  chills   Fatigue   x    Muscle Aches  x  Body aches   Eye Irritation   x    Sneezing   x    Nasal Larry/Drg   x    Sinus Pressure/Pain   x    Loss of smell   x    Dental pain   x    Sore Throat  x  Mild sore throat   Swollen Glands   x    Ear Pain/Fullness   x    Cough x x  Harsh cough   Wheeze  x  Possible wheezing   Chest Pain  x  Due to coughing   Shortness of breath  x  Quite worse with supine position   Rash   x    Other   x      Symptom duration:  1 day   Symptom severity:  moderate   Treatments tried:  Nebulizer   Contacts:  None         Current Outpatient Prescriptions:      warfarin (COUMADIN) 2 MG tablet, Take 4 mg daily as directed by the Anticoagulation Clinic, Disp: 30 tablet, Rfl:      BASAGLAR 100 UNIT/ML injection, Inject 15 Units Subcutaneous At Bedtime, Disp: 3 mL, Rfl: 11     diltiazem (CARDIZEM CD) 240 MG 24 hr capsule, Take 1 capsule (240 mg) by mouth daily, Disp: 90 capsule, Rfl: 3     losartan (COZAAR) 50 MG tablet, Take 1 tablet (50 mg) by mouth daily (Patient taking differently: Take 25 mg by mouth daily ), Disp: 90 tablet, Rfl: 3     metoprolol (TOPROL XL) 25 MG 24 hr tablet, Take 1 tablet (25 mg) by mouth daily, Disp: 90 tablet, Rfl: 3     terazosin (HYTRIN) 10 MG capsule, Profile Rx: patient will contact pharmacy when needed TAKE 1 CAPSULE (10 MG) BY MOUTH AT BEDTIME, Disp: 90 capsule, Rfl: 3     lovastatin (MEVACOR) 40 MG tablet, Profile Rx: patient will contact pharmacy when needed TAKE 1 AND 1/2 TABLETS BY MOUTH ONCE A DAY WITH DINNER. NEED FASTING LAB WORK, Disp: 135 tablet, Rfl: 3     clopidogrel (PLAVIX) 75 MG tablet, Take 1 tablet (75 mg) by mouth daily, Disp: 90 tablet, Rfl: 3     Misc Natural Products (TART CHERRY ADVANCED) CAPS, Take 1 capsule by mouth daily, Disp: , Rfl:       "albuterol (2.5 MG/3ML) 0.083% neb solution, TAKE 1 VIAL (2.5 MG) BY NEBULIZATION EVERY 6 HOURS AS NEEDED FOR SHORTNESS OF BREATH / DYSPNEA OR WHEEZING, Disp: 63 vial, Rfl: 11     ADVAIR DISKUS 100-50 MCG/DOSE diskus inhaler, inhale 1 puff into the lungs every 12 hours., Disp: 3 Inhaler, Rfl: 3     bumetanide (BUMEX) 1 MG tablet, TAKE 1 TABLET (1 MG) BY MOUTH 2 TIMES DAILY, Disp: 180 tablet, Rfl: 2     blood glucose monitoring (ONE TOUCH ULTRA) test strip, test 2 times daily Profile Rx: patient will contact pharmacy when needed, Disp: 60 each, Rfl: 11     latanoprost (XALATAN) 0.005 % ophthalmic solution, Place 1 drop into both eyes At Bedtime, Disp: , Rfl:      NONFORMULARY, Take 1 tablet by mouth At Bedtime Vitamin (Dialyvite) that he gets from [facility] Disp: , Rfl:      dorzolamide-timolol (COSOPT) 2-0.5 % ophthalmic solution, Place 1 drop into both eyes 2 times daily, Disp: , Rfl:      insulin pen needle (NOVOFINE) 30G X 8 MM, Use 3 daily or as directed., Disp: 300 each, Rfl: 3     insulin aspart (NOVOLOG FLEXPEN) 100 UNIT/ML injection, 6 units before breakfast, 6 units before lunch, 4 units before dinner, Disp: 15 mL, Rfl: 11     blood glucose monitoring (NO BRAND SPECIFIED) meter device kit, Use to test blood sugar 3 times daily or as directed., Disp: 1 kit, Rfl: 0     insulin syringe-needle U-100 31G X 5/16\" 0.5 ML, Profile Rx: patient will contact pharmacy when needed, Disp: 100 each, Rfl: 11     order for DME, Equipment being ordered: Oxygen- HOME and portable. Georgie Coulter CMA Medical Assistant Signed  Service date: 05/24/2016 10:48 AM     O2 Sat on Room air at rest:84% O2 sat on room air with walk: 82-91% O2 Sat on 1L of oxygen with walk 91-93% O2 Sat on 2 L of Oxygen with walk 91-96% O2 Sat on 1 L O2 at rest 94%, Disp: 2 each, Rfl: prn     order for DME, Nebulizer machine Qty #1, Disp: 1 Units, Rfl: 0     Acetaminophen (TYLENOL PO), Take 650 mg by mouth nightly as needed, Disp: , Rfl: " "     ORDER FOR DME, Equipment being ordered: Walker with wheels and brakes, and a seat, Disp: 1 each, Rfl: 0    Patient Active Problem List   Diagnosis     Essential Hypertension, Benign     Gouty arthropathy     Disorder of bursae and tendons in shoulder region     Malignant neoplasm of kidney excluding renal pelvis (H)     Abdominal aortic aneurysm (H)     Proteinuria     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)     Anemia     Hyperlipidemia LDL goal <70     Anxiety disorder due to medical condition     Cerebral embolism with cerebral infarction (H)     zAdvanced directives, counseling/discussion     Health Care Home     Seborrheic keratosis     Leg edema, right     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)     Thrombocytopenia, primary (H)     Hypotension     Glaucoma     Controlled type 2 diabetes mellitus with diabetic nephropathy, with long-term current use of insulin (H)     Chronic obstructive pulmonary disease, unspecified COPD type (H)     Legally blind in right eye, as defined in [place]     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    Exam:  GENERAL APPEARANCE: healthy, alert and no distress  EYES: EOMI,  PERRL  NECK: no adenopathy, no asymmetry, masses, or scars and thyroid normal to palpation  RESP: rhonchi bilateral and expiratory wheezes bilateral    Flu swab: negative for A and B  CXR: we carefully reviewed the films from 11-14-17, 12-22-17 and today. The infiltrates are slowly resolving. There are no new infiltrates.       (J18.9) Pneumonia of both lungs due to infectious organism, unspecified part of lung  (primary encounter diagnosis)  Comment:   Plan: " XR Chest 2 Views, Influenza A/B antigen,         CANCELED: Influenza A and B and RSV PCR        The lungs are healing slowly from the November pneumonia. The symptoms today are minimally from that old infection and there is likely some new virus with asthma.   Use the albuterol nebulizer twice daily before the Advair. Rinse the mouth after the Advair. You may use the Albuterol alone as often as every 3-4 hours. Monitor the breathing rate and   Consider the home peak flow meter. Avoid contagious exposures and use good hygiene to prevent infection. Stay well hydrated and use Robitussin DM cough medication.   Monitor the oxygen saturation and this needs to stay over 92%. Use the lowest effective amount.     (J45.40) Moderate persistent asthma without complication  Comment:   Plan: CANCELED: Influenza A and B and RSV PCR        See above.     Rafael Cummins

## 2018-01-20 ENCOUNTER — HOSPITAL ENCOUNTER (INPATIENT)
Facility: CLINIC | Age: 83
LOS: 4 days | Discharge: HOME | DRG: 193 | End: 2018-01-24
Attending: INTERNAL MEDICINE | Admitting: INTERNAL MEDICINE
Payer: MEDICARE

## 2018-01-20 VITALS
OXYGEN SATURATION: 90 % | DIASTOLIC BLOOD PRESSURE: 80 MMHG | HEART RATE: 107 BPM | SYSTOLIC BLOOD PRESSURE: 143 MMHG | TEMPERATURE: 99.5 F

## 2018-01-20 DIAGNOSIS — Z79.4 CONTROLLED TYPE 2 DIABETES MELLITUS WITH DIABETIC NEPHROPATHY, WITH LONG-TERM CURRENT USE OF INSULIN (H): ICD-10-CM

## 2018-01-20 DIAGNOSIS — E11.21 CONTROLLED TYPE 2 DIABETES MELLITUS WITH DIABETIC NEPHROPATHY, WITH LONG-TERM CURRENT USE OF INSULIN (H): ICD-10-CM

## 2018-01-20 DIAGNOSIS — J44.9 CHRONIC OBSTRUCTIVE PULMONARY DISEASE, UNSPECIFIED COPD TYPE (H): Primary | ICD-10-CM

## 2018-01-20 PROBLEM — J96.01 ACUTE RESPIRATORY FAILURE WITH HYPOXIA (H): Status: ACTIVE | Noted: 2018-01-20

## 2018-01-20 LAB
ANION GAP SERPL CALCULATED.3IONS-SCNC: 11 MMOL/L (ref 3–14)
BUN SERPL-MCNC: 68 MG/DL (ref 7–30)
CALCIUM SERPL-MCNC: 8.1 MG/DL (ref 8.5–10.1)
CHLORIDE SERPL-SCNC: 97 MMOL/L (ref 94–109)
CO2 SERPL-SCNC: 26 MMOL/L (ref 20–32)
CREAT SERPL-MCNC: 7.65 MG/DL (ref 0.66–1.25)
FLUAV H1 2009 PAND RNA SPEC QL NAA+PROBE: NEGATIVE
FLUAV H1 RNA SPEC QL NAA+PROBE: NEGATIVE
FLUAV H3 RNA SPEC QL NAA+PROBE: POSITIVE
FLUAV RNA SPEC QL NAA+PROBE: POSITIVE
FLUBV RNA SPEC QL NAA+PROBE: NEGATIVE
GFR SERPL CREATININE-BSD FRML MDRD: 7 ML/MIN/1.7M2
GLUCOSE BLDC GLUCOMTR-MCNC: 193 MG/DL (ref 70–99)
GLUCOSE BLDC GLUCOMTR-MCNC: 216 MG/DL (ref 70–99)
GLUCOSE SERPL-MCNC: 265 MG/DL (ref 70–99)
HADV DNA SPEC QL NAA+PROBE: NEGATIVE
HADV DNA SPEC QL NAA+PROBE: NEGATIVE
HGB BLD-MCNC: 7.6 G/DL (ref 13.3–17.7)
HMPV RNA SPEC QL NAA+PROBE: NEGATIVE
HPIV1 RNA SPEC QL NAA+PROBE: NEGATIVE
HPIV2 RNA SPEC QL NAA+PROBE: NEGATIVE
HPIV3 RNA SPEC QL NAA+PROBE: NEGATIVE
INR PPP: 3.01 (ref 0.86–1.14)
MICROBIOLOGIST REVIEW: ABNORMAL
POTASSIUM SERPL-SCNC: 4.7 MMOL/L (ref 3.4–5.3)
PROCALCITONIN SERPL-MCNC: 0.6 NG/ML
RHINOVIRUS RNA SPEC QL NAA+PROBE: NEGATIVE
RSV RNA SPEC QL NAA+PROBE: NEGATIVE
RSV RNA SPEC QL NAA+PROBE: NEGATIVE
SODIUM SERPL-SCNC: 134 MMOL/L (ref 133–144)
SPECIMEN SOURCE: ABNORMAL
TROPONIN I SERPL-MCNC: 0.07 UG/L (ref 0–0.04)

## 2018-01-20 PROCEDURE — 25000125 ZZHC RX 250: Performed by: EMERGENCY MEDICINE

## 2018-01-20 PROCEDURE — 40000275 ZZH STATISTIC RCP TIME EA 10 MIN

## 2018-01-20 PROCEDURE — 96375 TX/PRO/DX INJ NEW DRUG ADDON: CPT | Performed by: EMERGENCY MEDICINE

## 2018-01-20 PROCEDURE — 85018 HEMOGLOBIN: CPT | Performed by: INTERNAL MEDICINE

## 2018-01-20 PROCEDURE — 5A1D70Z PERFORMANCE OF URINARY FILTRATION, INTERMITTENT, LESS THAN 6 HOURS PER DAY: ICD-10-PCS | Performed by: INTERNAL MEDICINE

## 2018-01-20 PROCEDURE — 94640 AIRWAY INHALATION TREATMENT: CPT | Mod: 76

## 2018-01-20 PROCEDURE — A9270 NON-COVERED ITEM OR SERVICE: HCPCS | Mod: GY | Performed by: INTERNAL MEDICINE

## 2018-01-20 PROCEDURE — 96374 THER/PROPH/DIAG INJ IV PUSH: CPT | Performed by: EMERGENCY MEDICINE

## 2018-01-20 PROCEDURE — 94640 AIRWAY INHALATION TREATMENT: CPT

## 2018-01-20 PROCEDURE — 25000125 ZZHC RX 250: Performed by: INTERNAL MEDICINE

## 2018-01-20 PROCEDURE — 12000007 ZZH R&B INTERMEDIATE

## 2018-01-20 PROCEDURE — 63400005 ZZH RX 634: Performed by: INTERNAL MEDICINE

## 2018-01-20 PROCEDURE — 85610 PROTHROMBIN TIME: CPT | Performed by: INTERNAL MEDICINE

## 2018-01-20 PROCEDURE — 25000131 ZZH RX MED GY IP 250 OP 636 PS 637: Mod: GY | Performed by: INTERNAL MEDICINE

## 2018-01-20 PROCEDURE — 25000132 ZZH RX MED GY IP 250 OP 250 PS 637: Mod: GY | Performed by: INTERNAL MEDICINE

## 2018-01-20 PROCEDURE — 25000128 H RX IP 250 OP 636: Performed by: INTERNAL MEDICINE

## 2018-01-20 PROCEDURE — 99207 ZZC APP CREDIT; MD BILLING SHARED VISIT: CPT | Performed by: INTERNAL MEDICINE

## 2018-01-20 PROCEDURE — 36415 COLL VENOUS BLD VENIPUNCTURE: CPT | Performed by: INTERNAL MEDICINE

## 2018-01-20 PROCEDURE — 25000128 H RX IP 250 OP 636: Performed by: EMERGENCY MEDICINE

## 2018-01-20 PROCEDURE — 84484 ASSAY OF TROPONIN QUANT: CPT | Performed by: INTERNAL MEDICINE

## 2018-01-20 PROCEDURE — 99223 1ST HOSP IP/OBS HIGH 75: CPT | Mod: AI | Performed by: INTERNAL MEDICINE

## 2018-01-20 PROCEDURE — 00000146 ZZHCL STATISTIC GLUCOSE BY METER IP

## 2018-01-20 PROCEDURE — 90937 HEMODIALYSIS REPEATED EVAL: CPT

## 2018-01-20 PROCEDURE — 80048 BASIC METABOLIC PNL TOTAL CA: CPT | Performed by: INTERNAL MEDICINE

## 2018-01-20 RX ORDER — LOVASTATIN 20 MG
40 TABLET ORAL AT BEDTIME
Status: DISCONTINUED | OUTPATIENT
Start: 2018-01-20 | End: 2018-01-24 | Stop reason: HOSPADM

## 2018-01-20 RX ORDER — METOPROLOL SUCCINATE 25 MG/1
25 TABLET, EXTENDED RELEASE ORAL DAILY
Status: DISCONTINUED | OUTPATIENT
Start: 2018-01-20 | End: 2018-01-24 | Stop reason: HOSPADM

## 2018-01-20 RX ORDER — DILTIAZEM HYDROCHLORIDE 240 MG/1
240 CAPSULE, EXTENDED RELEASE ORAL DAILY
Status: DISCONTINUED | OUTPATIENT
Start: 2018-01-20 | End: 2018-01-24 | Stop reason: HOSPADM

## 2018-01-20 RX ORDER — DORZOLAMIDE HYDROCHLORIDE AND TIMOLOL MALEATE 20; 5 MG/ML; MG/ML
1 SOLUTION/ DROPS OPHTHALMIC 2 TIMES DAILY
Status: DISCONTINUED | OUTPATIENT
Start: 2018-01-20 | End: 2018-01-20

## 2018-01-20 RX ORDER — DORZOLAMIDE HCL 20 MG/ML
1 SOLUTION/ DROPS OPHTHALMIC 2 TIMES DAILY
Status: DISCONTINUED | OUTPATIENT
Start: 2018-01-20 | End: 2018-01-24 | Stop reason: HOSPADM

## 2018-01-20 RX ORDER — FUROSEMIDE 10 MG/ML
80 INJECTION INTRAMUSCULAR; INTRAVENOUS
Status: DISCONTINUED | OUTPATIENT
Start: 2018-01-20 | End: 2018-01-20

## 2018-01-20 RX ORDER — GUAIFENESIN/DEXTROMETHORPHAN 100-10MG/5
10 SYRUP ORAL EVERY 4 HOURS PRN
Status: DISCONTINUED | OUTPATIENT
Start: 2018-01-20 | End: 2018-01-24 | Stop reason: HOSPADM

## 2018-01-20 RX ORDER — POLYETHYLENE GLYCOL 3350 17 G/17G
17 POWDER, FOR SOLUTION ORAL DAILY PRN
Status: DISCONTINUED | OUTPATIENT
Start: 2018-01-20 | End: 2018-01-24 | Stop reason: HOSPADM

## 2018-01-20 RX ORDER — DEXTROSE MONOHYDRATE 25 G/50ML
25-50 INJECTION, SOLUTION INTRAVENOUS
Status: DISCONTINUED | OUTPATIENT
Start: 2018-01-20 | End: 2018-01-24 | Stop reason: HOSPADM

## 2018-01-20 RX ORDER — ONDANSETRON 4 MG/1
4 TABLET, ORALLY DISINTEGRATING ORAL EVERY 6 HOURS PRN
Status: DISCONTINUED | OUTPATIENT
Start: 2018-01-20 | End: 2018-01-24 | Stop reason: HOSPADM

## 2018-01-20 RX ORDER — ALBUTEROL SULFATE 0.83 MG/ML
2.5 SOLUTION RESPIRATORY (INHALATION)
Status: DISCONTINUED | OUTPATIENT
Start: 2018-01-20 | End: 2018-01-24 | Stop reason: HOSPADM

## 2018-01-20 RX ORDER — LATANOPROST 50 UG/ML
1 SOLUTION/ DROPS OPHTHALMIC AT BEDTIME
Status: DISCONTINUED | OUTPATIENT
Start: 2018-01-20 | End: 2018-01-24 | Stop reason: HOSPADM

## 2018-01-20 RX ORDER — DOXERCALCIFEROL 4 UG/2ML
1 INJECTION INTRAVENOUS
Status: COMPLETED | OUTPATIENT
Start: 2018-01-20 | End: 2018-01-20

## 2018-01-20 RX ORDER — TIMOLOL MALEATE 5 MG/ML
1 SOLUTION/ DROPS OPHTHALMIC 2 TIMES DAILY
Status: DISCONTINUED | OUTPATIENT
Start: 2018-01-20 | End: 2018-01-24 | Stop reason: HOSPADM

## 2018-01-20 RX ORDER — NICOTINE POLACRILEX 4 MG
15-30 LOZENGE BUCCAL
Status: DISCONTINUED | OUTPATIENT
Start: 2018-01-20 | End: 2018-01-20

## 2018-01-20 RX ORDER — LEVOFLOXACIN 5 MG/ML
250 INJECTION, SOLUTION INTRAVENOUS
Status: DISCONTINUED | OUTPATIENT
Start: 2018-01-22 | End: 2018-01-21

## 2018-01-20 RX ORDER — IPRATROPIUM BROMIDE AND ALBUTEROL SULFATE 2.5; .5 MG/3ML; MG/3ML
3 SOLUTION RESPIRATORY (INHALATION)
Status: DISCONTINUED | OUTPATIENT
Start: 2018-01-20 | End: 2018-01-24 | Stop reason: HOSPADM

## 2018-01-20 RX ORDER — METHYLPREDNISOLONE SODIUM SUCCINATE 40 MG/ML
40 INJECTION, POWDER, LYOPHILIZED, FOR SOLUTION INTRAMUSCULAR; INTRAVENOUS EVERY 24 HOURS
Status: DISCONTINUED | OUTPATIENT
Start: 2018-01-20 | End: 2018-01-23

## 2018-01-20 RX ORDER — CLOPIDOGREL BISULFATE 75 MG/1
75 TABLET ORAL DAILY
Status: DISCONTINUED | OUTPATIENT
Start: 2018-01-20 | End: 2018-01-24 | Stop reason: HOSPADM

## 2018-01-20 RX ORDER — AMOXICILLIN 250 MG
1 CAPSULE ORAL 2 TIMES DAILY PRN
Status: DISCONTINUED | OUTPATIENT
Start: 2018-01-20 | End: 2018-01-24 | Stop reason: HOSPADM

## 2018-01-20 RX ORDER — LOSARTAN POTASSIUM 25 MG/1
25 TABLET ORAL DAILY
Status: DISCONTINUED | OUTPATIENT
Start: 2018-01-20 | End: 2018-01-24 | Stop reason: HOSPADM

## 2018-01-20 RX ORDER — ACETAMINOPHEN 325 MG/1
650 TABLET ORAL EVERY 4 HOURS PRN
Status: DISCONTINUED | OUTPATIENT
Start: 2018-01-20 | End: 2018-01-24 | Stop reason: HOSPADM

## 2018-01-20 RX ORDER — DEXTROSE MONOHYDRATE 25 G/50ML
25-50 INJECTION, SOLUTION INTRAVENOUS
Status: DISCONTINUED | OUTPATIENT
Start: 2018-01-20 | End: 2018-01-20

## 2018-01-20 RX ORDER — MAGNESIUM CARB/ALUMINUM HYDROX 105-160MG
148 TABLET,CHEWABLE ORAL
Status: DISCONTINUED | OUTPATIENT
Start: 2018-01-20 | End: 2018-01-24 | Stop reason: HOSPADM

## 2018-01-20 RX ORDER — TERAZOSIN 10 MG/1
10 CAPSULE ORAL AT BEDTIME
Status: DISCONTINUED | OUTPATIENT
Start: 2018-01-20 | End: 2018-01-24 | Stop reason: HOSPADM

## 2018-01-20 RX ORDER — NALOXONE HYDROCHLORIDE 0.4 MG/ML
.1-.4 INJECTION, SOLUTION INTRAMUSCULAR; INTRAVENOUS; SUBCUTANEOUS
Status: DISCONTINUED | OUTPATIENT
Start: 2018-01-20 | End: 2018-01-24 | Stop reason: HOSPADM

## 2018-01-20 RX ORDER — NICOTINE POLACRILEX 4 MG
15-30 LOZENGE BUCCAL
Status: DISCONTINUED | OUTPATIENT
Start: 2018-01-20 | End: 2018-01-24 | Stop reason: HOSPADM

## 2018-01-20 RX ORDER — ONDANSETRON 2 MG/ML
4 INJECTION INTRAMUSCULAR; INTRAVENOUS EVERY 6 HOURS PRN
Status: DISCONTINUED | OUTPATIENT
Start: 2018-01-20 | End: 2018-01-24 | Stop reason: HOSPADM

## 2018-01-20 RX ORDER — AMOXICILLIN 250 MG
2 CAPSULE ORAL 2 TIMES DAILY PRN
Status: DISCONTINUED | OUTPATIENT
Start: 2018-01-20 | End: 2018-01-24 | Stop reason: HOSPADM

## 2018-01-20 RX ADMIN — IPRATROPIUM BROMIDE AND ALBUTEROL SULFATE 3 ML: .5; 3 SOLUTION RESPIRATORY (INHALATION) at 00:06

## 2018-01-20 RX ADMIN — LOSARTAN POTASSIUM 25 MG: 25 TABLET ORAL at 08:30

## 2018-01-20 RX ADMIN — TIMOLOL MALEATE 1 DROP: 5 SOLUTION OPHTHALMIC at 21:31

## 2018-01-20 RX ADMIN — LATANOPROST 1 DROP: 50 SOLUTION OPHTHALMIC at 21:34

## 2018-01-20 RX ADMIN — FUROSEMIDE 80 MG: 10 INJECTION, SOLUTION INTRAVENOUS at 05:07

## 2018-01-20 RX ADMIN — TERAZOSIN HYDROCHLORIDE 10 MG: 10 CAPSULE ORAL at 21:30

## 2018-01-20 RX ADMIN — FLUTICASONE FUROATE AND VILANTEROL TRIFENATATE 1 PUFF: 100; 25 POWDER RESPIRATORY (INHALATION) at 08:36

## 2018-01-20 RX ADMIN — INSULIN ASPART 2 UNITS: 100 INJECTION, SOLUTION INTRAVENOUS; SUBCUTANEOUS at 18:43

## 2018-01-20 RX ADMIN — LOVASTATIN 40 MG: 20 TABLET ORAL at 21:29

## 2018-01-20 RX ADMIN — DORZOLAMIDE HYDROCHLORIDE 1 DROP: 20 SOLUTION/ DROPS OPHTHALMIC at 08:37

## 2018-01-20 RX ADMIN — EPOETIN ALFA 7000 UNITS: 3000 SOLUTION INTRAVENOUS; SUBCUTANEOUS at 14:58

## 2018-01-20 RX ADMIN — METHYLPREDNISOLONE SODIUM SUCCINATE 40 MG: 40 INJECTION, POWDER, FOR SOLUTION INTRAMUSCULAR; INTRAVENOUS at 21:29

## 2018-01-20 RX ADMIN — LEVOFLOXACIN 500 MG: 5 INJECTION, SOLUTION INTRAVENOUS at 00:10

## 2018-01-20 RX ADMIN — CLOPIDOGREL 75 MG: 75 TABLET, FILM COATED ORAL at 08:30

## 2018-01-20 RX ADMIN — DOXERCALCIFEROL 1 MCG: 4 INJECTION INTRAVENOUS at 14:58

## 2018-01-20 RX ADMIN — IPRATROPIUM BROMIDE AND ALBUTEROL SULFATE 3 ML: .5; 3 SOLUTION RESPIRATORY (INHALATION) at 20:36

## 2018-01-20 RX ADMIN — METOPROLOL SUCCINATE 25 MG: 25 TABLET, EXTENDED RELEASE ORAL at 08:30

## 2018-01-20 RX ADMIN — METHYLPREDNISOLONE SODIUM SUCCINATE 125 MG: 125 INJECTION, POWDER, FOR SOLUTION INTRAMUSCULAR; INTRAVENOUS at 00:09

## 2018-01-20 RX ADMIN — INSULIN GLARGINE 15 UNITS: 100 INJECTION, SOLUTION SUBCUTANEOUS at 21:30

## 2018-01-20 RX ADMIN — IPRATROPIUM BROMIDE AND ALBUTEROL SULFATE 3 ML: .5; 3 SOLUTION RESPIRATORY (INHALATION) at 08:50

## 2018-01-20 RX ADMIN — TIMOLOL MALEATE 1 DROP: 5 SOLUTION OPHTHALMIC at 08:36

## 2018-01-20 RX ADMIN — DORZOLAMIDE HYDROCHLORIDE 1 DROP: 20 SOLUTION/ DROPS OPHTHALMIC at 20:18

## 2018-01-20 RX ADMIN — IPRATROPIUM BROMIDE AND ALBUTEROL SULFATE 3 ML: .5; 3 SOLUTION RESPIRATORY (INHALATION) at 12:13

## 2018-01-20 RX ADMIN — DILTIAZEM HYDROCHLORIDE 240 MG: 240 CAPSULE, EXTENDED RELEASE ORAL at 08:31

## 2018-01-20 ASSESSMENT — ENCOUNTER SYMPTOMS
COUGH: 1
ABDOMINAL PAIN: 0
FEVER: 0
NEUROLOGICAL NEGATIVE: 1
SHORTNESS OF BREATH: 1

## 2018-01-20 NOTE — CONSULTS
"[facility]    Nephrology Consultation     Date of Admission:  1/20/2018    Assessment & Plan      Griffin Walton is a 83 year old male with ESRD who was admitted on 1/20/2018.     1) Viral Syndrome    2) Exacerbation of chronic lung disease ?    3) Volume up    4) ESRD:  He has HD MWF in [facility] under Dr. Keene.  YECENIA AVF, 3 H. TW 92.3 kg, 15 ga,  mL/min.  No heparin as he has allergy.  Missed run yesterday.    5) Anemia:  On EPO 6400 units.  HGB was 8.5 last week.  8.0 yesterday.      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    Plan:    HD today  UF as able  Increase EPO        Rafael Keene MD  [facility] - Nephrology  [phone]    Reason for Consult      I was asked to see the patient for ESRD    Primary Care Physician      Stefanie Louis    Chief Complaint      I feel fine.      History is obtained from the patient and chart review.      History of Present Illness      Griffin Walton is a 83 year old male with ESRD who presents with hypoxic respiratory failure, COPD exacerbation, viral syndrome and volume overload.    He has been ill for 4-5 days.  Progressive SOB, cough. \"Subjective fever.\"  Hypoxia noted at [place].      Findings so far:    Low grade fever  Hypoxia - on 4 LPM  Wheezes  Interstitial densities and bilateral pleural effusions  Swab neg for flu      He missed HD yesterday.    Past Medical History   I have reviewed this patient's medical history and updated it with pertinent information if needed.   Past Medical History:   Diagnosis Date     Abdominal aneurysm without mention of rupture     s/p open repair 2005     Atherosclerosis of renal artery (H)      Basal cell carcinoma      Essential hypertension, benign      Gout, unspecified      Malignant neoplasm of kidney, except pelvis 2005    papillary carcinoma, s/p partial left nephrectomy     Other and unspecified hyperlipidemia      Other peripheral vascular disease(443.89) 2005    s/p " aorto-right common femoral; left external iliac bypass with graft.     Type II or unspecified type diabetes mellitus without mention of complication, not stated as uncontrolled      Unspecified disorder of kidney and ureter        Past Surgical History   I have reviewed this patient's surgical history and updated it with pertinent information if needed.  Past Surgical History:   Procedure Laterality Date     ABDOMEN SURGERY  2005    aortic aneurysm     CATARACT IOL, RT/LT  2/4/08    left eye - phacoemulsification w/ posterior chamber lens implantation combined w/ glaucoma filtering procedure     CREATE FISTULA ARTERIOVENOUS UPPER EXTREMITY  1/3/2012    Procedure:CREATE FISTULA ARTERIOVENOUS UPPER EXTREMITY; LEFT UPPER ARM ARTERIOVENOUS FISTULA; Surgeon:JENA PEARSON; Location:[facility]     SURGICAL HISTORY OF -   2/13/2004    Right knee arthroscopy     SURGICAL HISTORY OF -       Vocal cord biopsy-benign     SURGICAL HISTORY OF -   3/3/2005    AAA, left partial nephrectomy       Prior to Admission Medications   Prior to Admission Medications   Prescriptions Last Dose Informant Patient Reported? Taking?   ADVAIR DISKUS 100-50 MCG/DOSE diskus inhaler 1/18/2018 at PM  No Yes   Sig: inhale 1 puff into the lungs every 12 hours.   Acetaminophen (TYLENOL PO) Past Week at Unknown time Self Yes Yes   Sig: Take 650 mg by mouth nightly as needed   B Complex-C-Folic Acid (DIALYVITE PO) 1/18/2018 at HS  Yes Yes   Sig: Take 1 tablet by mouth daily   BASAGLAR 100 UNIT/ML injection 1/18/2018 at HS  No Yes   Sig: Inject 15 Units Subcutaneous At Bedtime   LOSARTAN POTASSIUM PO 1/18/2018 at PM  Yes Yes   Sig: Take 25 mg by mouth daily   Misc Natural Products (TART CHERRY ADVANCED) CAPS 1/18/2018  Yes Yes   Sig: Take 1 capsule by mouth daily   ORDER FOR DME  Self No No   Sig: Equipment being ordered: Walker with wheels and brakes, and a seat   albuterol (2.5 MG/3ML) 0.083% neb solution 1/18/2018  No Yes   Sig: TAKE 1 VIAL (2.5 MG) BY  NEBULIZATION EVERY 6 HOURS AS NEEDED FOR SHORTNESS OF BREATH / DYSPNEA OR WHEEZING   blood glucose monitoring (NO BRAND SPECIFIED) meter device kit   No No   Sig: Use to test blood sugar 3 times daily or as directed.   blood glucose monitoring (ONE TOUCH ULTRA) test strip   No No   Sig: test 2 times daily  Profile Rx: patient will contact pharmacy when needed   bumetanide (BUMEX) 1 MG tablet 2018 at AM  No Yes   Sig: TAKE 1 TABLET (1 MG) BY MOUTH 2 TIMES DAILY   clopidogrel (PLAVIX) 75 MG tablet 2018 at AM  No Yes   Sig: Take 1 tablet (75 mg) by mouth daily   diltiazem (CARDIZEM CD) 240 MG 24 hr capsule 2018 at AM  No Yes   Sig: Take 1 capsule (240 mg) by mouth daily   dorzolamide-timolol (COSOPT) 2-0.5 % ophthalmic solution 2018 at AM Self Yes Yes   Sig: Place 1 drop into both eyes 2 times daily   insulin aspart (NOVOLOG FLEXPEN) 100 UNIT/ML injection 2018 at Unknown time  No Yes   Si units before breakfast, 6 units before lunch, 4 units before dinner   insulin pen needle (NOVOFINE) 30G X 8 MM   No No   Sig: Use 3 daily or as directed.   latanoprost (XALATAN) 0.005 % ophthalmic solution 2018  Yes Yes   Sig: Place 1 drop into both eyes At Bedtime   lovastatin (MEVACOR) 40 MG tablet 2018 at PM  No Yes   Sig: Profile Rx: patient will contact pharmacy when needed  TAKE 1 AND 1/2 TABLETS BY MOUTH ONCE A DAY WITH DINNER. NEED FASTING LAB WORK   metoprolol (TOPROL XL) 25 MG 24 hr tablet 2018 at PM  No Yes   Sig: Take 1 tablet (25 mg) by mouth daily   order for DME   No No   Sig: Nebulizer machine Qty #1   order for DME   No No   Sig: Equipment being ordered: Oxygen- HOME and portable. Georige Coulter CMA Medical Assistant Signed  Service date: 2016 10:48 AM       O2 Sat on Room air at rest:84%  O2 sat on room air with walk: 82-91%  O2 Sat on 1L of oxygen with walk 91-93%  O2 Sat on 2 L of Oxygen with walk 91-96%  O2 Sat on 1 L O2 at rest 94%   terazosin (HYTRIN) 10 MG  capsule 1/18/2018 at HS  No No   Sig: Profile Rx: patient will contact pharmacy when needed  TAKE 1 CAPSULE (10 MG) BY MOUTH AT BEDTIME   warfarin (COUMADIN) 2 MG tablet 1/18/2018 at HS  Yes No   Sig: Take 4 mg daily as directed by the Anticoagulation Clinic      Facility-Administered Medications: None     Allergies   Allergies   Allergen Reactions     Hydralazine Shortness Of Breath and Swelling     Aspirin Swelling     Heparin Flush Other (See Comments)     thrombocytopenia     Monosodium Glutamate Swelling       Social History   I have reviewed this patient's social history and updated it with pertinent information if needed. Griffin Walton  reports that he quit smoking about 13 years ago. His smoking use included Cigarettes. He has a 55.00 pack-year smoking history. He has never used smokeless tobacco. He reports that he does not drink alcohol or use illicit drugs.    Family History   I have reviewed this patient's family history and updated it with pertinent information if needed.   Family History   Problem Relation Age of Onset     CANCER Father      Asophageal      CANCER Brother      Throat      Other Cancer Sister      Lung        Review of Systems   The 10 point Review of Systems is negative other than noted in the HPI.    Physical Exam   Temp: 98.8  F (37.1  C) Temp src: Oral BP: 142/88 Pulse: 115   Resp: 18 SpO2: 95 % O2 Device: Nasal cannula Oxygen Delivery: 4 LPM  Vital Signs with Ranges  Temp:  [98.8  F (37.1  C)-100.3  F (37.9  C)] 98.8  F (37.1  C)  Pulse:  [107-117] 115  Resp:  [18-20] 18  BP: (131-177)/(65-94) 142/88  SpO2:  [85 %-95 %] 95 %  0 lbs 0 oz    GENERAL: sleepy, O X 3, recognizes me  HEENT:  Normocephalic. No gross abnormalities.  Pupils equal.  MMM.  Dentition is fair.   CV: irreg irreg, systole M, no clicks, gallops, or rubs, tr BLE edema  RESP: diffuse exp wheezes, insp crackles, diminished at bases  GI: Abdomen soft/nt/nd, BS normal. No masses, organomegaly  MUSCULOSKELETAL:  extremities nl - patent LUE AVF.   SKIN: no suspicious lesions or rashes, dry to touch  NEURO:  Globally weak  PSYCH: mood good, affect appropriate  LYMPH: No palpable ant/post cervical and supraclavicular adenopathy    Data   BMP  Recent Labs  Lab 01/19/18 2145 01/19/18 2025    Canceled, Test credited   POTASSIUM 5.0 Canceled, Test credited   CHLORIDE 99 Canceled, Test credited   MELISSA 8.2* Canceled, Test credited   CO2 28 Canceled, Test credited   BUN 57* Canceled, Test credited   CR 7.48* Canceled, Test credited   * Canceled, Test credited     [email or web address]([email or web address])  CBC)  Recent Labs  Lab 01/19/18 2025   WBC 6.3   HGB 8.0*   HCT 26.0*   MCV 97          Recent Labs  Lab 01/19/18 2145   AST 16   ALT 25   ALKPHOS 66   BILITOTAL 0.4       Recent Labs  Lab 01/19/18 2025   INR 2.60*     25 OH Vit D2   Date Value Ref Range Status   08/05/2011 <5 ug/L Final     25 OH Vit D3   Date Value Ref Range Status   08/05/2011 30 ug/L Final     25 OH Vit D total   Date Value Ref Range Status   04/28/2014 13  Final       Recent Labs  Lab 01/19/18 2025   HGB 8.0*   HCT 26.0*   MCV 97     No results for input(s): PTHI in the last 168 hours.

## 2018-01-20 NOTE — PROGRESS NOTES
[facility]    Hospitalist Progress Note  Miguel A Art MD    Assessment & Plan     83 year old male with PmHx of Type 2 DM, Gout, peripheral vascular disease, hypertension, Abdominal aortic aneursym, partial left nephrectomy due to cancer, gout, who was admitted on 1/19/18 due to shortness of breath and prodcutive cough for 4 days. He was found to have O2 Sats of 85% on RA at [facility]. Work up done on 1/19/18 revealed, CMP significant for BUN 57, Creat 7.48, Alb 3.1. CBC revealed, Hb 8.0, WBC 6.3 and Plts 240. Lactic acid 1.1, N terminal pro-BNP was 39752. Procalitonin was 0.6. Chest x-rays on 1/19/18 was chronic-appearing interstitial densities within the lungs. Blunting of the costophrenic angles, likely representing small pleural effusions with adjacent atelectasis. CT Chest on 1/19/18 revealed, moderate bilateral pleural effusions, new since 9/8/2017. Pulmonary emphysematous/fibrotic changes. Vitals on admission revealed, temp 100.3F.        1. COPD exacerbation with Acute hypoxic respiratory failure: continue 4L via NC. Contnue scheduled duonebs and alb nebs prn. Continue IV levaquin 750mg qd. Continue IV solu-medrol. Continue fluticasone-vilanterol inh. For Robitussin DM prn.       2. End-stage kidney disease: pt gets hemodialysis Mon/Wed/Fri as an outpt. Appreciate Nephrology input. For hemodialysis today. Discontinued scheduled IV lasix today.     3. Atrial fibrillation: continue plavix, warfarin and diltiazem XR. INR was 2.6 on 1/19/18. ECHO on 1/4/18 revealed, LVEF 55-60%, mild MR, mild concentric LVH. LA is moderately,   RA is normal.     4. Hypertension: SBP is in the 110s to 140s. Continue diltiazem 240 mg daily, losartan 50 mg daily, metoprolol 25 mg daily     5. Glaucoma: Continue eyedrops.     6. Type 2 Diabetes mellitus: HbA1C was 6.3% on 12/19/17. BG is ranging in the 200s today - due to the IV Solu-medrol. Continue lantus qhs. For medium dose insulin  aspart sliding scale prn.      7. Hyperlipidemia: Continue lovastatin      DVT Prophylaxis: Ambulate every shift  Code Status: Full Code    Disposition: Expected discharge in 2 days.      Interval History   The pt was seen in the dialysis unit. He reported having a dry cough. He has some improvement of his breathing since admission.    -Data reviewed today: I reviewed all new labs and imaging results over the last 24 hours. I personally reviewed no images or EKG's today.    Physical Exam   Temp: 98.8  F (37.1  C) Temp src: Oral BP: 142/88 Pulse: 115   Resp: 18 SpO2: 95 % O2 Device: Nasal cannula Oxygen Delivery: 4 LPM  There were no vitals filed for this visit.  Vital Signs with Ranges  Temp:  [98.8  F (37.1  C)-100.3  F (37.9  C)] 98.8  F (37.1  C)  Pulse:  [107-117] 115  Resp:  [18-20] 18  BP: (131-165)/(65-94) 142/88  SpO2:  [85 %-95 %] 95 %       Constitutional: Elderly white male, awake, cooperative, no apparent distress, O2 Sats 98% on 2L via NC  Respiratory: BS vesicular bilaterally, but absent in both bases, no crackles or wheezing  Cardiovascular: S1 and S2, reg, no murmur noted  GI: Soft, non-distended, non-tender, no masses, BS present+  Skin/Integumen: No rashes  Extremities: Bilat pedal edema 1+    Medications     - MEDICATION INSTRUCTIONS -       Warfarin Therapy Reminder         fluticasone-vilanterol  1 puff Inhalation Daily     insulin glargine  15 Units Subcutaneous At Bedtime     clopidogrel  75 mg Oral Daily     diltiazem  240 mg Oral Daily     latanoprost  1 drop Both Eyes At Bedtime     losartan  25 mg Oral Daily     lovastatin  40 mg Oral At Bedtime     metoprolol succinate  25 mg Oral Daily     terazosin  10 mg Oral At Bedtime     ipratropium - albuterol 0.5 mg/2.5 mg/3 mL  3 mL Nebulization Q4H While awake     furosemide  80 mg Intravenous BID     methylPREDNISolone  40 mg Intravenous Q24H     dorzolamide  1 drop Both Eyes BID     timolol  1 drop Both Eyes BID       Data     Recent Labs  Lab  01/19/18 2145 01/19/18 2025   WBC  --  6.3   HGB  --  8.0*   MCV  --  97   PLT  --  240   INR  --  2.60*    Canceled, Test credited   POTASSIUM 5.0 Canceled, Test credited   CHLORIDE 99 Canceled, Test credited   CO2 28 Canceled, Test credited   BUN 57* Canceled, Test credited   CR 7.48* Canceled, Test credited   ANIONGAP 9 Canceled, Test credited   MELISSA 8.2* Canceled, Test credited   * Canceled, Test credited   ALBUMIN 3.1* Canceled, Test credited   PROTTOTAL 6.8 Canceled, Test credited   BILITOTAL 0.4 Canceled, Test credited   ALKPHOS 66 Canceled, Test credited   ALT 25 Canceled, Test credited   AST 16 Canceled, Test credited       Recent Results (from the past 24 hour(s))   XR Chest 2 Views    Narrative    XR CHEST 2 VW   1/19/2018 10:55 AM     HISTORY: ; Pneumonia of both lungs due to infectious organism,  unspecified part of lung    COMPARISON: 12/22/2017.      Impression    IMPRESSION: Chronic-appearing interstitial densities are again noted  within the lungs. Again noted is blunting of the costophrenic angles  likely representing small pleural effusions with adjacent atelectasis.    SHON WINSLOW MD   Chest CT w/o contrast    Narrative    CT CHEST WITHOUT CONTRAST 1/19/2018 10:08 PM     HISTORY: COPD with hypoxia. On Coumadin. History of pneumonia. Non  contrast due to poor renal function.    Radiation dose for this scan was reduced using automated exposure  control, adjustment of the mA and/or kV according to patient size, or  iterative reconstruction technique.    FINDINGS: Bilateral moderate-sized pleural effusions are noted with  adjacent parenchymal opacity likely representing relaxation  atelectasis. Emphysematous and fibrotic changes are present throughout  both lungs. No pneumothorax. Aortic and coronary artery calcifications  are noted. Nonenlarged mediastinal lymph nodes are noted. The ave are  suboptimally evaluated without contrast enhancement. Density within  the gallbladder  could represent gallstones within a contracted  thick-walled gallbladder. Left renal atrophy is noted. The right  kidney is not included on the scan. On the distal margin of the scan,  there may be an aortic aneurysm.      Impression    IMPRESSION:  1. Moderate bilateral pleural effusions, new since 9/8/2017.  2. Pulmonary emphysematous/fibrotic changes, similar in appearance to  the prior scan.  3. Aortic and coronary artery calcification.  4. Density centrally within the gallbladder which could represent  stones within a contracted thick-walled gallbladder.  5. Suspected abdominal aortic aneurysm only partly included on the  distal edge of the scan.  6. Left renal atrophy. The right kidney is not included on the scan.    AIRAM GRAY MD

## 2018-01-20 NOTE — H&P
[facility]    History and Physical  Hospitalist       Date of Admission:  1/20/2018  Date of Service (when I saw the patient): 01/20/18    Assessment & Plan   Griffin Walton is a 83 year old male who presents with acute hypoxic respiratory failure    Acute hypoxic respiratory failure  COPD exacerbation  Question of bronchitis versus pneumonia, bacterial versus viral  This appears to be multifactorial.  He does have evidence on exam of having a COPD exacerbation as he is quite wheezy and with diminished breath sounds on exam.  He also is a temperature to 100.3 on presentation to the hospital raising the question of pneumonia, viral versus bacteria.  Is given a dose of Levaquin is outside hospital.  There is also potential volume component as he missed his dialysis on Friday.  At the time of visit he is comfortable on nasal cannula oxygen.  -Admit to inpatient  -Duo nebs every 4 hours while awake  -As needed albuterol nebs every 2 hours  -Continue steroids, will continue Solu-Medrol at 40 mg for now  -We will continue antibiotics, he was started on Levaquin and will continue this  -Nephrology has been consulted for hemodialysis, hopefully later today Saturday, 20 January  -Given the fact that he still makes urine of given 80 of Lasix IV twice daily to try to volume removal.  -Continue supplemental oxygen  -Will place on telemetry     End-stage kidney disease  He lives up by [facility] normally dialyzes on Monday Wednesday Friday under the care of Dr. Rafael Bender.  -Consult nephrology given missed dialysis as well as potential fluid removal to see if it improves his respiratory status  -Remainder of his electrolytes are stable at this time is no indication for acute dialysis    Atrial fibrillation  Hypertension  -Pharmacy consult to dose warfarin  -Continue his outpatient medications including diltiazem 240 mg daily, losartan 50 mg daily, metoprolol 25 mg daily    Gl[facility]  [facility]    Diabetes mellitus  Outpatient is normally on insulin glargine 15 units at at bedtime as well as aspart 6 units in the morning 6 units at noon and 4 units at night.  -We will can continue his Lantus  -Sliding-scale insulin  -Dialysis diet    Hyperlipidemia  Continue his lovastatin    History of AAA repair  History of kidney cancer status post removal part of his kidney  History of gout, not currently on medications  Reported history of CVA although the patient denies this  Reported history of heart failure although the patient denies this as well    DVT Prophylaxis: Warfarin  Code Status: Full Code    Disposition: Expected discharge in 2+ days once his respiratory status improves.    Miguel Acevedo MD  [phone] (P)  Text Page     Primary Care Physician   Dr Stefanie Louis    Chief Complaint   Shortness of breath, acute hypoxic respiratory failure    History is obtained from the patient and medical records    History of Present Illness   Griffin Walton is a 83 year old male who presents with the above symptoms.  Patient notes that approximately 4 days ago he started having shortness of breath with productive cough.  Last dialyzed on Wednesday.  His shortness of breath worsened to the point that he presented to the emergency department.  He went to [facility] and on arrival was found to be 85% on room air.  His transfers his oxygen sats dipped into the upper 70% range.  He was placed on nasal cannula with improvement in his oxygen status.  He states he has had a \"light\" fever but denies chills.  He denies any chest pain.  He denies any GI complaints.  He states he still makes urine.    Past Medical History    I have reviewed this patient's medical history and updated it with pertinent information if needed.   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     Type II or unspecified type diabetes mellitus without mention of complication, not stated as uncontrolled      Unspecified disorder of kidney and ureter        Past Surgical History   I have reviewed this patient's surgical history and updated it with pertinent information if needed.  Past Surgical History:   Procedure Laterality Date     ABDOMEN SURGERY  2005    aortic aneurysm     CATARACT IOL, RT/LT  2/4/08    left eye - phacoemulsification w/ posterior chamber lens implantation combined w/ glaucoma filtering procedure     CREATE FISTULA ARTERIOVENOUS UPPER EXTREMITY  1/3/2012    Procedure:CREATE FISTULA ARTERIOVENOUS UPPER EXTREMITY; LEFT UPPER ARM ARTERIOVENOUS FISTULA; Surgeon:JENA PEARSON; Location:[facility]     SURGICAL HISTORY OF -   2/13/2004    Right knee arthroscopy     SURGICAL HISTORY OF -       Vocal cord biopsy-benign     SURGICAL HISTORY OF -   3/3/2005    AAA, left partial nephrectomy       Prior to Admission Medications   Prior to Admission Medications   Prescriptions Last Dose Informant Patient Reported? Taking?   ADVAIR DISKUS 100-50 MCG/DOSE diskus inhaler   No No   Sig: inhale 1 puff into the lungs every 12 hours.   Acetaminophen (TYLENOL PO)  Self Yes No   Sig: Take 650 mg by mouth nightly as needed   BASAGLAR 100 UNIT/ML injection   No No   Sig: Inject 15 Units Subcutaneous At Bedtime   Misc Natural Products (TART CHERRY ADVANCED) CAPS   Yes No   Sig: Take 1 capsule by mouth daily   NONFORMULARY  Self Yes No   Sig: Take 1 tablet by mouth At Bedtime Vitamin (Dialyvite) that he gets from [facility] dialysis [facility].   ORDER FOR DME  Self No No   Sig:  Equipment being ordered: Walker with wheels and brakes, and a seat   albuterol (2.5 MG/3ML) 0.083% neb solution   No No   Sig: TAKE 1 VIAL (2.5 MG) BY NEBULIZATION EVERY 6 HOURS AS NEEDED FOR SHORTNESS OF BREATH / DYSPNEA OR WHEEZING   blood glucose monitoring (NO BRAND SPECIFIED) meter device kit   No No   Sig: Use to test blood sugar 3 times daily or as directed.   blood glucose monitoring (ONE TOUCH ULTRA) test strip   No No   Sig: test 2 times daily  Profile Rx: patient will contact pharmacy when needed   bumetanide (BUMEX) 1 MG tablet   No No   Sig: TAKE 1 TABLET (1 MG) BY MOUTH 2 TIMES DAILY   clopidogrel (PLAVIX) 75 MG tablet   No No   Sig: Take 1 tablet (75 mg) by mouth daily   diltiazem (CARDIZEM CD) 240 MG 24 hr capsule   No No   Sig: Take 1 capsule (240 mg) by mouth daily   dorzolamide-timolol (COSOPT) 2-0.5 % ophthalmic solution  Self Yes No   Sig: Place 1 drop into both eyes 2 times daily   insulin aspart (NOVOLOG FLEXPEN) 100 UNIT/ML injection   No No   Si units before breakfast, 6 units before lunch, 4 units before dinner   insulin pen needle (NOVOFINE) 30G X 8 MM   No No   Sig: Use 3 daily or as directed.   latanoprost (XALATAN) 0.005 % ophthalmic solution   Yes No   Sig: Place 1 drop into both eyes At Bedtime   losartan (COZAAR) 50 MG tablet   No No   Sig: Take 1 tablet (50 mg) by mouth daily   Patient taking differently: Take 25 mg by mouth daily    lovastatin (MEVACOR) 40 MG tablet   No No   Sig: Profile Rx: patient will contact pharmacy when needed  TAKE 1 AND 1/2 TABLETS BY MOUTH ONCE A DAY WITH DINNER. NEED FASTING LAB WORK   metoprolol (TOPROL XL) 25 MG 24 hr tablet   No No   Sig: Take 1 tablet (25 mg) by mouth daily   order for DME   No No   Sig: Nebulizer machine Qty #1   order for DME   No No   Sig: Equipment being ordered: Oxygen- HOME and portable. Georgie Coulter CMA Medical Assistant Signed  Service date: 2016 10:48 AM       O2 Sat on Room air at rest:84%  O2 sat on room air  with walk: 82-91%  O2 Sat on 1L of oxygen with walk 91-93%  O2 Sat on 2 L of Oxygen with walk 91-96%  O2 Sat on 1 L O2 at rest 94%   terazosin (HYTRIN) 10 MG capsule   No No   Sig: Profile Rx: patient will contact pharmacy when needed  TAKE 1 CAPSULE (10 MG) BY MOUTH AT BEDTIME   warfarin (COUMADIN) 2 MG tablet   Yes No   Sig: Take 4 mg daily as directed by the Anticoagulation Clinic      Facility-Administered Medications: None     Allergies   Allergies   Allergen Reactions     Hydralazine Shortness Of Breath and Swelling     Aspirin Swelling     Heparin Flush Other (See Comments)     thrombocytopenia     Monosodium Glutamate Swelling       Social History   I have reviewed this patient's social history and updated it with pertinent information if needed. Griffin Walton  reports that he quit smoking about 13 years ago. His smoking use included Cigarettes. He has a 55.00 pack-year smoking history. He has never used smokeless tobacco. He reports that he does not drink alcohol or use illicit drugs.    Family History   I have reviewed this patient's family history and updated it with pertinent information if needed.   Family History   Problem Relation Age of Onset     CANCER Father      Asophageal      CANCER Brother      Throat      Other Cancer Sister      Lung        Review of Systems   The 10 point Review of Systems is negative other than noted in the HPI or here.     Physical Exam   Temp: 100.3  F (37.9  C) Temp src: Oral BP: 149/81 Pulse: 117   Resp: 18 SpO2: 93 % O2 Device: Nasal cannula Oxygen Delivery: 4 LPM  Vital Signs with Ranges  0 lbs 0 oz    Constitutional: alert, oriented and in no acute distress  Eyes: EOMI, PERRL  HEENT: OP clear  Respiratory: Diminished breath sounds throughout with wheezing appreciated in all lung fields  Cardiovascular: RRR without m/r/g.  Trace peripheral edema  GI: soft, nontender, obese  Lymph/Hematologic: no cervical LAD  Genitourinary: deferred  Skin: no rashes or lesions  grossly  Musculoskeletal: no deformities or arthritis  Neurologic: CN II-XII, GERARD, sensation grossly intact  Psychiatric: mood and affect wnl    Data   Data reviewed today:  I personally reviewed the EKG tracing showing No acute changes and the chest CT image(s) showing Bilateral pleural effusions described as moderate, emphysema as well as question gallstones..    Recent Labs  Lab 01/19/18 2145 01/19/18 2025   WBC  --  6.3   HGB  --  8.0*   MCV  --  97   PLT  --  240   INR  --  2.60*    Canceled, Test credited   POTASSIUM 5.0 Canceled, Test credited   CHLORIDE 99 Canceled, Test credited   CO2 28 Canceled, Test credited   BUN 57* Canceled, Test credited   CR 7.48* Canceled, Test credited   ANIONGAP 9 Canceled, Test credited   MELISSA 8.2* Canceled, Test credited   * Canceled, Test credited   ALBUMIN 3.1* Canceled, Test credited   PROTTOTAL 6.8 Canceled, Test credited   BILITOTAL 0.4 Canceled, Test credited   ALKPHOS 66 Canceled, Test credited   ALT 25 Canceled, Test credited   AST 16 Canceled, Test credited       Recent Results (from the past 24 hour(s))   XR Chest 2 Views    Narrative    XR CHEST 2 VW   1/19/2018 10:55 AM     HISTORY: ; Pneumonia of both lungs due to infectious organism,  unspecified part of lung    COMPARISON: 12/22/2017.      Impression    IMPRESSION: Chronic-appearing interstitial densities are again noted  within the lungs. Again noted is blunting of the costophrenic angles  likely representing small pleural effusions with adjacent atelectasis.    SHON WINSLOW MD   Chest CT w/o contrast    Narrative    CT CHEST WITHOUT CONTRAST 1/19/2018 10:08 PM     HISTORY: COPD with hypoxia. On Coumadin. History of pneumonia. Non  contrast due to poor renal function.    Radiation dose for this scan was reduced using automated exposure  control, adjustment of the mA and/or kV according to patient size, or  iterative reconstruction technique.    FINDINGS: Bilateral moderate-sized pleural  effusions are noted with  adjacent parenchymal opacity likely representing relaxation  atelectasis. Emphysematous and fibrotic changes are present throughout  both lungs. No pneumothorax. Aortic and coronary artery calcifications  are noted. Nonenlarged mediastinal lymph nodes are noted. The ave are  suboptimally evaluated without contrast enhancement. Density within  the gallbladder could represent gallstones within a contracted  thick-walled gallbladder. Left renal atrophy is noted. The right  kidney is not included on the scan. On the distal margin of the scan,  there may be an aortic aneurysm.      Impression    IMPRESSION:  1. Moderate bilateral pleural effusions, new since 9/8/2017.  2. Pulmonary emphysematous/fibrotic changes, similar in appearance to  the prior scan.  3. Aortic and coronary artery calcification.  4. Density centrally within the gallbladder which could represent  stones within a contracted thick-walled gallbladder.  5. Suspected abdominal aortic aneurysm only partly included on the  distal edge of the scan.  6. Left renal atrophy. The right kidney is not included on the scan.    AIRAM GRAY MD

## 2018-01-20 NOTE — ED PROVIDER NOTES
"  History     Chief Complaint   Patient presents with     Shortness of Breath     soa for past 2 days, \"low grade fever\" yellow productive cough here this am for same complaint     HPI  Griffin Walton is a 83 year old male who has past medical history significant for multiple medical issues including atrial fibrillation anticoagulated on Coumadin, history of stroke, end-stage renal disease on dialysis Monday, Wednesday, Friday, COPD, CHF, presenting to the emergency department with increased amounts of shortness of breath.  Patient's recent history is significant for multiple COPD exacerbations, and has had pneumonia earlier in the winter, treated in November.  Patient had follow-up with his primary care provider earlier today, and had influenza swab which was negative, in addition to chest x-ray which showed no signs of pneumonia.  He was continued on nebulizers, and also continued on Advair inhaler, and instructed to monitor oxygen saturations.  Patient had difficulty maintaining oxygen saturations above 90%, and with increased amounts of shortness of breath, presented to the emergency department with his wife for further evaluation.  Patient has also had slight yellowish productive cough.  Has had low-grade fever.  No other recent changes in medications.  Has lower extremity swelling.  Secondary to patient's clinic visit, patient was unable to attend dialysis earlier today.  Normally has dialysis Monday, Wednesday, and Friday.  No traveling.  Denies any chest pain.  No headache, or vomiting.    Problem List:    Patient Active Problem List    Diagnosis Date Noted     Cerebral embolism with cerebral infarction (H) 02/17/2012     Priority: High     1/12/12 presented with dizziness, ataxia and disorientation.  Dx with acute infarct in the left occipital lobe and posterior left temporal lobe without hemorrhage on head CT. Also had right homonymous hemianopsia. MRI 1/12- 1. Acute infarction of the left occipital " lobe in left PCA distribution. No evidence of hemorrhage. Likely occlusion of the left posterior cerebral artery at its P2 segment.  Severe chronic small vessel ischemic disease.  Neck vessels are grossly patent. However cannot exclude 50% stenosis of the left ICA.       Malignant neoplasm of kidney excluding renal pelvis (H) 11/03/2007     Priority: High     Renal cell cancer.  S/p partial left nephrectomy.  MRI abd 7/07 - showed no recurrence of cancer  Followed by Dr. Carrion, urology, in [place], yearly  Problem list name updated by automated process. Provider to review       Moderate persistent asthma without complication 01/19/2018     Priority: Medium     Long-term (current) use of anticoagulants [Z79.01] 11/28/2017     Priority: Medium     Atrial fibrillation, new onset (H) 11/27/2017     Priority: Medium     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) 11/20/2017     Priority: Medium     Legally blind in right eye, as defined in [place] 08/31/2017     Priority: Medium     Controlled type 2 diabetes mellitus with diabetic nephropathy, with long-term current use of insulin (H) 11/29/2016     Priority: Medium     Chronic obstructive pulmonary disease, unspecified COPD type (H) 11/29/2016     Priority: Medium     Glaucoma 02/04/2015     Priority: Medium     Thrombocytopenia, primary (H) 01/16/2014     Priority: Medium     Hypotension 01/16/2014     Priority: Medium     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  [address] [place] [address] [phone].   Questions or concerns outside clinic  hours   I will call the 24 hour nurse line at   [phone] or [phone].   Need to schedule an appointment   I will call the 24 hour scheduling team at [phone] or my clinic directly at [phone].   Same day treatment     I will call my clinic first, nurse line if after hours, urgent care and express care if needed.   Clinic care coordination services (regular clinic hours)   I will call a clinic care coordinator directly:     Snow Ribera RN  [phone]    Ame Coe SW:    [phone]    Or call my clinic at [phone] and ask to speak with care coordination.   Crisis Services: Behavioral or Mental Health  [facility] 24 Hour Phone Line  [phone]    [facility] 24 Hour Crisis Services  [phone]    [facility] [phone]    [facility]   [phone]     Emergency treatment -- Immediately    CAll 911              Anxiety disorder due to medical condition 02/17/2012     Priority: Medium     S/p CVA in 1/12  Did not want to take citalopram due to fear of side effects, but seems to be improving       Hyperlipidemia LDL goal <70 10/31/2010     Priority: Medium     CHOL      137   1/13/2012  HDL       35   1/13/2012  LDL       85   1/13/2012  TRIG       84   1/13/2012  CHOLHDLRATIO      3.9   1/13/2012  Lovastatin 60mg         Anemia 09/10/2010     Priority: Medium     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) 05/06/2010     Priority: Medium     Fistula placed fall 2011, on dialysis since 5/12       Disorder of bursae and tendons in shoulder region 05/03/2007     Priority: Medium     Problem list name updated by automated process. Provider to review       Essential Hypertension, Benign 04/06/2005     Priority: Medium     Controlled  Nifedipine does cause fatigue       zAdvanced directives, counseling/discussion 02/29/2012     Priority: Low     Patient does not have an Advance/Health Care Directive  "(HCD), given \"What is Advance Care Planning?\" flyer and requests blank HCD form.    Jane Martinez  February 29, 2012         Proteinuria 05/06/2010     Priority: Low     Abdominal aortic aneurysm (H) 11/03/2007     Priority: Low     S/p AAA repair 2005  Follows with Dr. Pearson.  Will see him in f/u fall 2008  Problem list name updated by automated process. Provider to review       Gouty arthropathy 04/25/2006     Priority: Low     Frequent attacks   Discussed allopurinol will hold off for now  Problem list name updated by automated process. Provider to review and confirm  Imo Update utility          Past Medical History:    Past Medical History:   Diagnosis Date     Abdominal aneurysm without mention of rupture      Atherosclerosis of renal artery (H)      Basal cell carcinoma      Essential hypertension, benign      Gout, unspecified      Malignant neoplasm of kidney, except pelvis 2005     Other and unspecified hyperlipidemia      Other peripheral vascular disease(443.89) 2005     Type II or unspecified type diabetes mellitus without mention of complication, not stated as uncontrolled      Unspecified disorder of kidney and ureter        Past Surgical History:    Past Surgical History:   Procedure Laterality Date     ABDOMEN SURGERY  2005    aortic aneurysm     CATARACT IOL, RT/LT  2/4/08    left eye - phacoemulsification w/ posterior chamber lens implantation combined w/ glaucoma filtering procedure     CREATE FISTULA ARTERIOVENOUS UPPER EXTREMITY  1/3/2012    Procedure:CREATE FISTULA ARTERIOVENOUS UPPER EXTREMITY; LEFT UPPER ARM ARTERIOVENOUS FISTULA; Surgeon:JENA PEARSON; Location:[facility]     SURGICAL HISTORY OF -   2/13/2004    Right knee arthroscopy     SURGICAL HISTORY OF -       Vocal cord biopsy-benign     SURGICAL HISTORY OF -   3/3/2005    AAA, left partial nephrectomy       Family History:    Family History   Problem Relation Age of Onset     CANCER Father      Asophageal      CANCER Brother      " Throat      Other Cancer Sister      Lung        Social History:  Marital Status:   [2]  Social History   Substance Use Topics     Smoking status: Former Smoker     Packs/day: 1.00     Years: 55.00     Types: Cigarettes     Quit date: 1/1/2005     Smokeless tobacco: Never Used     Alcohol use No        Medications:      warfarin (COUMADIN) 2 MG tablet   BASAGLAR 100 UNIT/ML injection   diltiazem (CARDIZEM CD) 240 MG 24 hr capsule   losartan (COZAAR) 50 MG tablet   metoprolol (TOPROL XL) 25 MG 24 hr tablet   terazosin (HYTRIN) 10 MG capsule   lovastatin (MEVACOR) 40 MG tablet   clopidogrel (PLAVIX) 75 MG tablet   insulin pen needle (NOVOFINE) 30G X 8 MM   Misc Natural Products (TART CHERRY ADVANCED) CAPS   albuterol (2.5 MG/3ML) 0.083% neb solution   ADVAIR DISKUS 100-50 MCG/DOSE diskus inhaler   insulin aspart (NOVOLOG FLEXPEN) 100 UNIT/ML injection   bumetanide (BUMEX) 1 MG tablet   blood glucose monitoring (ONE TOUCH ULTRA) test strip   latanoprost (XALATAN) 0.005 % ophthalmic solution   blood glucose monitoring (NO BRAND SPECIFIED) meter device kit   order for DME   order for DME   Acetaminophen (TYLENOL PO)   NONFORMULARY   dorzolamide-timolol (COSOPT) 2-0.5 % ophthalmic solution   ORDER FOR DME         Review of Systems   Constitutional: Negative for fever.   Respiratory: Positive for cough and shortness of breath.    Cardiovascular: Negative for chest pain.   Gastrointestinal: Negative for abdominal pain.   Neurological: Negative.    All other systems reviewed and are negative.      Physical Exam   BP: 165/78  Pulse: 107  Temp: 99.5  F (37.5  C)  SpO2: (!) 85 %      Physical Exam  /82  Pulse 107  Temp 99.5  F (37.5  C) (Temporal)  SpO2 93%  General: alert, interactive, tachypneic, with increased work of breathing, ill-appearing, however nontoxic  Head: atraumatic  Nose: no rhinorrhea or epistaxis  Ears: no external auditory canal discharge or bleeding.    Eyes: Sclera nonicteric. Conjunctiva  noninjected.   Mouth: no tonsillar erythema, edema, or exudate  Neck: supple, no palp LAD  Lungs: Audible wheezing is present, with diminished lung sounds at the bases, with bibasilar crackles, and diffuse and expiratory wheezing  CV: Irregularly irregular, tachycardic ; peripheral pulses palpable and symmetric  Abdomen: soft, nt, nd, no guarding or rebound. Positive bowel sounds  Extremities: Bilateral lower extremity pitting edema.  LUE with palpable thrill  Skin: no rash or diaphoresis  Neuro:   sensation intact to light touch in UE and LEs bilaterally;       ED Course     ED Course     Procedures               EKG Interpretation:      Interpreted by Rafael Elise  Time reviewed: 2310  Symptoms at time of EKG: sob   Rhythm: atrial fibrillation - rapid  Rate: Tachycardia  Axis: Normal  Ectopy: none  Conduction: normal  ST Segments/ T Waves: No acute ischemic changes  Comparison to prior: Unchanged    Clinical Impression: non-specific EKG and atrial fibrillation (chronic)                  Critical Care time:  none             Labs Ordered and Resulted from Time of ED Arrival Up to the Time of Departure from the ED   CBC WITH PLATELETS DIFFERENTIAL - Abnormal; Notable for the following:        Result Value    RBC Count 2.69 (*)     Hemoglobin 8.0 (*)     Hematocrit 26.0 (*)     MCHC 30.8 (*)     RDW 16.3 (*)     Absolute Lymphocytes 0.4 (*)     All other components within normal limits   INR - Abnormal; Notable for the following:     INR 2.60 (*)     All other components within normal limits   BLOOD GAS VENOUS - Abnormal; Notable for the following:     PO2 Venous 49 (*)     All other components within normal limits   NT PROBNP INPATIENT - Abnormal; Notable for the following:     N-Terminal Pro BNP Inpatient 87957 (*)     All other components within normal limits   COMPREHENSIVE METABOLIC PANEL - Abnormal; Notable for the following:     Glucose 212 (*)     Urea Nitrogen 57 (*)     Creatinine 7.48 (*)     GFR  Estimate 7 (*)     GFR Estimate If Black 8 (*)     Calcium 8.2 (*)     Albumin 3.1 (*)     All other components within normal limits   COMPREHENSIVE METABOLIC PANEL   LACTIC ACID WHOLE BLOOD   PROCALCITONIN   ROUTINE UA WITH MICROSCOPIC   PULSE OXIMETRY NURSING   PERIPHERAL IV CATHETER   CARDIAC CONTINUOUS MONITORING   PATIENT CARE ORDER   BLOOD CULTURE   BLOOD CULTURE   RESPIRATORY VIRUS PANEL BY PCR     Results for orders placed or performed during the hospital encounter of 01/19/18 (from the past 24 hour(s))   CBC with platelets differential   Result Value Ref Range    WBC 6.3 4.0 - 11.0 10e9/L    RBC Count 2.69 (L) 4.4 - 5.9 10e12/L    Hemoglobin 8.0 (L) 13.3 - 17.7 g/dL    Hematocrit 26.0 (L) 40.0 - 53.0 %    MCV 97 78 - 100 fl    MCH 29.7 26.5 - 33.0 pg    MCHC 30.8 (L) 31.5 - 36.5 g/dL    RDW 16.3 (H) 10.0 - 15.0 %    Platelet Count 240 150 - 450 10e9/L    Diff Method Automated Method     % Neutrophils 83.3 %    % Lymphocytes 6.0 %    % Monocytes 9.8 %    % Eosinophils 0.3 %    % Basophils 0.3 %    % Immature Granulocytes 0.3 %    Absolute Neutrophil 5.3 1.6 - 8.3 10e9/L    Absolute Lymphocytes 0.4 (L) 0.8 - 5.3 10e9/L    Absolute Monocytes 0.6 0.0 - 1.3 10e9/L    Absolute Eosinophils 0.0 0.0 - 0.7 10e9/L    Absolute Basophils 0.0 0.0 - 0.2 10e9/L    Abs Immature Granulocytes 0.0 0 - 0.4 10e9/L   Comprehensive metabolic panel   Result Value Ref Range    Sodium Canceled, Test credited 133 - 144 mmol/L    Potassium Canceled, Test credited 3.4 - 5.3 mmol/L    Chloride Canceled, Test credited 94 - 109 mmol/L    Carbon Dioxide Canceled, Test credited 20 - 32 mmol/L    Anion Gap Canceled, Test credited 6 - 17 mmol/L    Glucose Canceled, Test credited 70 - 99 mg/dL    Urea Nitrogen Canceled, Test credited 7 - 30 mg/dL    Creatinine Canceled, Test credited 0.66 - 1.25 mg/dL    GFR Estimate Canceled, Test credited >60 mL/min/1.7m2    GFR Estimate If Black Canceled, Test credited >60 mL/min/1.7m2    Calcium Canceled,  Test credited 8.5 - 10.1 mg/dL    Bilirubin Total Canceled, Test credited 0.2 - 1.3 mg/dL    Albumin Canceled, Test credited 3.4 - 5.0 g/dL    Protein Total Canceled, Test credited 6.8 - 8.8 g/dL    Alkaline Phosphatase Canceled, Test credited 40 - 150 U/L    ALT Canceled, Test credited 0 - 70 U/L    AST Canceled, Test credited 0 - 45 U/L   Lactic acid whole blood   Result Value Ref Range    Lactic Acid 1.1 0.7 - 2.0 mmol/L   INR   Result Value Ref Range    INR 2.60 (H) 0.86 - 1.14   Blood gas venous   Result Value Ref Range    Ph Venous 7.39 7.32 - 7.43 pH    PCO2 Venous 46 40 - 50 mm Hg    PO2 Venous 49 (H) 25 - 47 mm Hg    Bicarbonate Venous 28 21 - 28 mmol/L    Base Excess Venous 2.5 mmol/L   NT pro BNP   Result Value Ref Range    N-Terminal Pro BNP Inpatient 10731 (H) 0 - 1800 pg/mL   Comprehensive metabolic panel   Result Value Ref Range    Sodium 136 133 - 144 mmol/L    Potassium 5.0 3.4 - 5.3 mmol/L    Chloride 99 94 - 109 mmol/L    Carbon Dioxide 28 20 - 32 mmol/L    Anion Gap 9 3 - 14 mmol/L    Glucose 212 (H) 70 - 99 mg/dL    Urea Nitrogen 57 (H) 7 - 30 mg/dL    Creatinine 7.48 (H) 0.66 - 1.25 mg/dL    GFR Estimate 7 (L) >60 mL/min/1.7m2    GFR Estimate If Black 8 (L) >60 mL/min/1.7m2    Calcium 8.2 (L) 8.5 - 10.1 mg/dL    Bilirubin Total 0.4 0.2 - 1.3 mg/dL    Albumin 3.1 (L) 3.4 - 5.0 g/dL    Protein Total 6.8 6.8 - 8.8 g/dL    Alkaline Phosphatase 66 40 - 150 U/L    ALT 25 0 - 70 U/L    AST 16 0 - 45 U/L   Chest CT w/o contrast    Narrative    CT CHEST WITHOUT CONTRAST 1/19/2018 10:08 PM     HISTORY: COPD with hypoxia. On Coumadin. History of pneumonia. Non  contrast due to poor renal function.    Radiation dose for this scan was reduced using automated exposure  control, adjustment of the mA and/or kV according to patient size, or  iterative reconstruction technique.    FINDINGS: Bilateral moderate-sized pleural effusions are noted with  adjacent parenchymal opacity likely representing  relaxation  atelectasis. Emphysematous and fibrotic changes are present throughout  both lungs. No pneumothorax. Aortic and coronary artery calcifications  are noted. Nonenlarged mediastinal lymph nodes are noted. The ave are  suboptimally evaluated without contrast enhancement. Density within  the gallbladder could represent gallstones within a contracted  thick-walled gallbladder. Left renal atrophy is noted. The right  kidney is not included on the scan. On the distal margin of the scan,  there may be an aortic aneurysm.      Impression    IMPRESSION:  1. Moderate bilateral pleural effusions, new since 9/8/2017.  2. Pulmonary emphysematous/fibrotic changes, similar in appearance to  the prior scan.  3. Aortic and coronary artery calcification.  4. Density centrally within the gallbladder which could represent  stones within a contracted thick-walled gallbladder.  5. Suspected abdominal aortic aneurysm only partly included on the  distal edge of the scan.  6. Left renal atrophy. The right kidney is not included on the scan.    AIRAM GRAY MD          Assessments & Plan (with Medical Decision Making)  83 year old male, with past medical history significant for insulin-dependent diabetes, hypertension, CHF, end-stage renal disease on dialysis Monday, Wednesday, and Friday, and recent healthcare visits, including visit with clinic earlier today for cough, low-grade fever and otherwise generalized illness.  Patient had influenza swab which was obtained earlier today, in addition to chest x-ray.  I reviewed the visit from earlier today, x-ray images, and laboratory results.  Influenza swab negative, with chest x-ray showing no signs of pneumonia.  Patient was encouraged to continue with nebulizers, inhalers, and oxygen as needed.  Patient is normally on 2 L nasal cannula oxygen.  Upon arrival, patient is tachycardic, tachypneic, with increased work of breathing.  He is breathing about 80 breaths per minute, and  with very minimal exertion, only moving from the wheelchair to the bed, patient had hypoxia with oxygen saturations around 78%, with increased work of breathing.  Audible wheezing is present.  Patient given duo nebulizer.  Multiple laboratory tests are performed to help determine patients because of acute on chronic hypoxic respiratory failure.    Patient with laboratory workup showing CBC with anemia of chronic kidney disease, with normal white blood cell count.  CMP with creatinine and BUN which are elevated consistent with end-stage renal disease.  Glucose is 212.  His potassium was 5.0 in the context of not obtaining dialysis since Wednesday, 2 days ago.  Lactate and blood cultures were performed.  Patient with VBG which is normal.  BNP is elevated.  Chest CT was performed given patient's productive cough, however no signs of pneumonia are specifically noted.  There are bilateral pleural effusions which are new, and increased in size.    At this point, patient had received duo nebulizer, and I did have discussion with patient regarding the increased amounts of oxygen use, and subjective shortness of breath, which does require hospitalization.  He is a dialysis patient, and therefore requires transfer.  We discussed with [facility] facilities, which do not have that capability.  Also discussed with hospitalist at [facility], who later also determined that no bed was available at the [facility].  Discussed also with [facility], who agreed to accept patient in transfer to [facility].    Patient has received treatment for both COPD exacerbation, possible pneumonia, and likely also has component of CHF exacerbation with volume overload.  He will require dialysis to help with volume overload, pleural effusions, and lower extremity edema, likely secondary to third spacing, in addition to CHF in patient who does not make urine, and is dialysis dependent.  Additionally, likely with viral  versus pneumonia source of respiratory symptoms and COPD exacerbation also contributing to hypoxia with increased work of breathing.  Given steroids, and Levaquin.  No overt pneumonia seen on imaging.  I discussed with hospitalist, and will defer on Tamiflu at this point as influenza swab is negative.  Viral respiratory panel has been sent, and pro-calcitonin ordered which is pending.  Patient will be transferred via [facility] ambulance to [facility].     I have reviewed the nursing notes.    I have reviewed the findings, diagnosis, plan and need for follow up with the patient.       New Prescriptions    No medications on file       Final diagnoses:   SOB (shortness of breath)   ESRD (end stage renal disease) on dialysis (H)   Acute and chronic respiratory failure with hypoxia (H)   Long-term (current) use of anticoagulants [Z79.01]   Controlled type 2 diabetes mellitus with diabetic nephropathy, with long-term current use of insulin (H)   Chronic obstructive pulmonary disease, unspecified COPD type (H)   Essential hypertension, benign   Acute congestive heart failure, unspecified congestive heart failure type (H)       1/19/2018   [facility] EMERGENCY DEPARTMENT     Rafael Elise MD  01/20/18 0113

## 2018-01-20 NOTE — LETTER
Transition Communication Hand-off for Care Transitions to Next Level of Care Provider    Name: Griffin Walton  MRN #: [id number]  Primary Care Provider: Stefanie Louis     Primary Clinic: [address] [address]  Primary Care Clinic Name: [facility]  Reason for Hospitalization:  hypoxia, chf  Acute respiratory failure with hypoxia (H)  Acute respiratory failure with hypoxia (H)  Admit Date/Time: 1/20/2018  2:37 AM  Discharge Date: 1/24/2018  Payor Source: Payor: MEDICARE / Plan: MEDICARE / Product Type: Medicare /     Readmission Assessment Measure (AGUILA) Risk Score/category: Average         Reason for Communication Hand-off Referral: Admission diagnoses: COPD    Discharge Plan: discharge to home with spouse help       Concern for non-adherence with plan of care:   Y/N N  Discharge Needs Assessment:  Needs       Most Recent Value    # of Referrals Placed by CTS Scheduled Follow-up appointments, Communication hand-offs to next level of Care Providers          Already enrolled in Tele-monitoring program and name of program:  No  Follow-up specialty is recommended: No    Follow-up plan:  Future Appointments  Date Time Provider Department Center   1/25/2018 9:30 AM [facility] ANTI COAG [facility]   1/30/2018 10:20 AM Stefanie Louis, APRN CNP [facility]   1/31/2018 3:00 PM MarchMac MD [facility]   2/27/2018 10:00 [facility]   5/15/2018 1:00 PM  [facility]   5/15/2018 2:00 PM Perlman, David Morris, MD [facility]       Any outstanding tests or procedures:              Key Recommendations:  Home today with follow up. Hospitalized for COPD exacerbation and influenza A. Home on steroid taper.     Snow Jean    AVS/Discharge Summary is the source of truth; this is a helpful guide for improved communication of patient story

## 2018-01-20 NOTE — IP AVS SNAPSHOT
MRN:[id number]                      After Visit Summary   1/20/2018    Griffin Walton    MRN: [id number]           Thank you!     Thank you for choosing [facility] for your care. Our goal is always to provide you with excellent care. Hearing back from our patients is one way we can continue to improve our services. Please take a few minutes to complete the written survey that you may receive in the mail after you visit with us. Thank you!        Patient Information     Date Of Birth          5/18/1934        Designated Caregiver       Most Recent Value    Caregiver    Will someone help with your care after discharge? yes    Name of designated caregiver Yas    Phone number of caregiver     Caregiver [place] [address] [facility]. [place]      About your hospital stay     You were admitted on:  January 20, 2018 You last received care in the:  [place] [address] [facility]    You were discharged on:  January 24, 2018        Reason for your hospital stay       You were hospitalized for the flu and COPD exacerbation                  Who to Call     For medical emergencies, please call 911.  For non-urgent questions about your medical care, please call your primary care provider or clinic, [phone]          Attending Provider     Provider Specialty    Miguel Acevedo MD Internal Medicine       Primary Care Provider Office Phone # Fax #    Stefanie Louis, DANNA [facility] [phone]      After Care Instructions     Activity       Your activity upon discharge: activity as tolerated            Diet       Follow this diet upon discharge: Orders Placed This Encounter      Combination Diet Dialysis Diet; 6122-6587 Calories: High Consistent CHO (4-7 CHO units/meal)                  Follow-up Appointments     Follow-up and recommended labs and tests        Follow up with primary care provider, Stefanie Louis, within 7 days for hospital follow up.                   Your next 10 appointments already scheduled     Jan 25, 2018  9:30 AM CST   [facility] [place] [facility]    [address] [facility] [address]   [phone]            Jan 30, 2018 10:20 AM CST   [facility] DANNA Lopez CNP   [facility]    [address] [address]   [phone]           Bring a current list of meds and any records pertaining to this visit. For Physicals, please bring immunization records and any forms needing to be filled out. Please arrive 10 minutes early to complete paperwork.            Jan 31, 2018  3:00 PM CST   Return Visit with Mac Perla MD   [facility] ([facility])    [address] [address]   [phone]            Feb 27, 2018 10:00 AM CST   [facility] Visit with Geraldo Ware MD   [facility]    [address] [address]   [phone]            May 15, 2018  1:00 PM CDT   SIX MINUTE WALK with  PFL A,  PFL 6 MINUTE WALK 1   [facility] ([facility])    [address]  [facility] [id number] [phone]            May 15, 2018  2:00 PM CDT   (Arrive by 1:45 PM)   Return Interstitial Lung with David Morris Perlman, MD   [facility] ([facility])    [address] [facility]  [address] [place] [id number] [phone]              Pending Results     Date and Time Order Name Status Description    1/19/2018 2022 Blood culture Preliminary     1/19/2018 2022 Blood culture Preliminary             Statement of Approval     Ordered          01/24/18 1040  I have reviewed and agree with all the recommendations and orders detailed in this document.   EFFECTIVE NOW     Approved and electronically signed by:  Ozzie Small MD             Admission Information     Date & Time Provider Department Dept. Phone    1/20/2018 Miguel Acevedo MD [place] [address] [facility] [phone]      Your Vitals Were     Blood Pressure Pulse Temperature Respirations Weight Pulse Oximetry    147/90 (BP Location: Right arm) 106 98.2  F (36.8  C) (Oral) 16 87.7 kg (193 lb 5.5 oz) 96%    BMI (Body Mass Index)                   30.28 kg/m2           MyChart Information     [employer] gives you secure access to your electronic health record. If you see a primary care provider, you can also send messages to your care team and make appointments. If you have questions, please call your primary care clinic.  If you do not have a primary care provider, please call [phone] and they will assist you.        Care EveryWhere ID     This is your Care EveryWhere ID. This could be used by other organizations to access your [facility] medical records  [id number]        Equal Access to Services     ANUSHKA BILL : Hadii camila rosales hadasho Soomaali, waaxda luqadaha, qaybta kaalmada adeegyada, renzo palacio . So [facility] [phone].    ATENCIÓN: Si habla español, tiene a louis disposición servicios gratuitos de asistencia lingüística. Llame al [phone].    We comply with applicable federal civil rights laws and [place] laws. We do not discriminate on the basis of race, color, national origin, age, disability, sex, sexual orientation, or gender identity.               Review of your medicines      START taking        Dose / Directions    predniSONE 10 MG tablet   Commonly known as:  DELTASONE   Used for:  Chronic obstructive pulmonary disease, unspecified COPD type (H)   Notes to Patient:  1/25- 2 tablets  1/26- 2 tablets  1/27- 1 tablet  1/28- 1 tablet        Take 3 tablets (30 mg) daily for two days, then 2 tablets for two days, then one tablet  for 2 days.   Quantity:  12 tablet   Refills:  0         CONTINUE these medicines which may have CHANGED, or have new prescriptions. If we are uncertain of the size of tablets/capsules you have at home, strength may be listed as something that might have changed.        Dose / Directions    BASAGLAR 100 UNIT/ML injection   This may have changed:    - how much to take  - how to take this  - when to take this  - additional instructions   Used for:  Controlled type 2 diabetes mellitus with diabetic nephropathy, with long-term current use of insulin (H)        Inject 20 Units Subcutaneous At Bedtime, until 1/30/2018. Thereafter, Inject 15 Units Subcutaneous At Bedtime   Quantity:  3 mL   Refills:  11         CONTINUE these medicines which have NOT CHANGED        Dose / Directions    ADVAIR DISKUS 100-50 MCG/DOSE diskus inhaler   Used for:  Chronic obstructive pulmonary disease, unspecified COPD type (H)   Generic drug:  fluticasone-salmeterol        inhale 1 puff into the lungs every 12 hours.   Quantity:  3 Inhaler   Refills:  3       albuterol (2.5 MG/3ML) 0.083% neb solution   Used for:  Chronic obstructive pulmonary disease, unspecified COPD type (H)        TAKE 1 VIAL (2.5 MG) BY NEBULIZATION EVERY 6 HOURS AS NEEDED FOR SHORTNESS OF BREATH / DYSPNEA OR WHEEZING   Quantity:  63 vial   Refills:  11       blood glucose monitoring meter device kit   Commonly known as:  no brand specified   Used for:  Controlled type 2 diabetes mellitus with diabetic nephropathy, with long-term current use of insulin (H)        Use to test blood sugar 3 times daily or as directed.   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)        Dose:  75 mg   Take 1 tablet (75 mg) by mouth daily   Quantity:  90 tablet   Refills:  3       DIALYVITE PO        Dose:  1 tablet   Take 1 tablet by mouth daily   Refills:  0       diltiazem 240 MG 24 hr capsule   Commonly known as:  CARDIZEM CD   Used for:  Paroxysmal atrial fibrillation (H)        Dose:  240 mg   Take 1 capsule (240 mg) by mouth daily   Quantity:  90 capsule   Refills:  3       dorzolamide-timolol 2-0.5 % ophthalmic solution   Commonly known as:  COSOPT        Dose:  1 drop   Place 1 drop into both eyes 2 times daily   Refills:  0       insulin aspart 100 UNIT/ML injection   Commonly known as:  NovoLOG FLEXPEN   Used for:  Controlled type 2 diabetes mellitus with diabetic nephropathy, with long-term current use of insulin (H)        6 units before breakfast, 6 units before lunch, 4 units before dinner   Quantity:  15 mL   Refills:  11       insulin pen needle 30G X 8 MM   Commonly known as:  NOVOFINE   Used for:  Type 2 diabetes mellitus with diabetic nephropathy, unspecified long term insulin use status (H)        Use 3 daily or as directed.   Quantity:  300 each   Refills:  3       latanoprost 0.005 % ophthalmic solution   Commonly known as:  XALATAN        Dose:  1 drop   Place 1 drop into both eyes At Bedtime   Refills:  0       LOSARTAN POTASSIUM PO        Dose:  25 mg   Take 25 mg by mouth daily   Refills:  0       lovastatin 40 MG tablet   Commonly known as:  MEVACOR   Used for:  Hyperlipidemia LDL goal <130        Profile Rx: patient will contact pharmacy when needed TAKE 1 AND 1/2 TABLETS BY MOUTH ONCE A DAY WITH DINNER. NEED FASTING LAB WORK   Quantity:  135 tablet   Refills:  3        metoprolol succinate 25 MG 24 hr tablet   Commonly known as:  TOPROL XL   Used for:  Essential hypertension        Dose:  25 mg   Take 1 tablet (25 mg) by mouth daily   Quantity:  90 tablet   Refills:  3       order for DME   Used for:  ESRD (end stage renal disease) on dialysis (H), Cerebral embolism with cerebral infarction (H)        Equipment being ordered: Walker with wheels and brakes, and a seat   Quantity:  1 each   Refills:  0       order for DME   Used for:  Fever, unspecified, Cough, Chronic obstructive pulmonary disease, unspecified COPD type (H), Acute bronchospasm        Nebulizer machine Qty #1   Quantity:  1 Units   Refills:  0       order for DME   Used for:  Cough, Hypoxia, Chronic obstructive pulmonary disease, unspecified COPD type (H), Chronic obstructive pulmonary disease with acute exacerbation (H), 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), Type 2 diabetes mellitus with diabetic nephropathy (H)        Equipment being ordered: Oxygen- HOME and portable. Georgie Coulter CMA Medical Assistant Signed  Service date: 05/24/2016 10:48 AM     O2 Sat on Room air at rest:84% O2 sat on room air with walk: 82-91% O2 Sat on 1L of oxygen with walk 91-93% O2 Sat on 2 L of Oxygen with walk 91-96% O2 Sat on 1 L O2 at rest 94%   Quantity:  2 each   Refills:  prn       TART CHERRY ADVANCED Caps        Dose:  1 capsule   Take 1 capsule by mouth daily   Refills:  0       terazosin 10 MG capsule   Commonly known as:  HYTRIN   Used for:  Benign non-nodular prostatic hyperplasia with lower urinary tract symptoms        Profile Rx: patient will contact pharmacy when needed TAKE 1 CAPSULE (10 MG) BY MOUTH AT BEDTIME   Quantity:  90 capsule   Refills:  3       TYLENOL PO        Dose:  650 mg   Take 650 mg by mouth nightly as needed   Refills:  0       warfarin 2 MG tablet   Commonly known as:  COUMADIN        Take 4 mg daily as directed by the Anticoagulation Clinic   Quantity:  30 tablet    Refills:  0            Where to get your medicines      These medications were sent to [facility] [place], [place] - [address]  [address] [place] [address]     Phone:  [phone]     predniSONE 10 MG tablet         Some of these will need a paper prescription and others can be bought over the counter. Ask your nurse if you have questions.     You don't need a prescription for these medications     BASAGLAR 100 UNIT/ML injection                Protect others around you: Learn how to safely use, store and throw away your medicines at www.disposemymeds.org.             Medication List: This is a list of all your medications and when to take them. Check marks below indicate your daily home schedule. Keep this list as a reference.      Medications           Morning Afternoon Evening Bedtime As Needed    ADVAIR DISKUS 100-50 MCG/DOSE diskus inhaler   inhale 1 puff into the lungs every 12 hours.   Generic drug:  fluticasone-salmeterol                                      albuterol (2.5 MG/3ML) 0.083% neb solution   TAKE 1 VIAL (2.5 MG) BY NEBULIZATION EVERY 6 HOURS AS NEEDED FOR SHORTNESS OF BREATH / DYSPNEA OR WHEEZING                                   BASAGLAR 100 UNIT/ML injection   Inject 20 Units Subcutaneous At Bedtime, until 1/30/2018. Thereafter, Inject 15 Units Subcutaneous At Bedtime   Last time this was given:  18 Units on 1/23/2018 10:32 PM                                   blood glucose monitoring meter device kit   Commonly known as:  no brand specified   Use to test blood sugar 3 times daily or as directed.                                         blood glucose monitoring test strip   Commonly known as:  ONETOUCH ULTRA   test 2 times daily Profile Rx: patient will contact pharmacy when needed                                bumetanide 1 MG tablet   Commonly known as:  BUMEX   TAKE 1 TABLET (1 MG) BY MOUTH 2 TIMES DAILY                                      clopidogrel 75  MG tablet   Commonly known as:  PLAVIX   Take 1 tablet (75 mg) by mouth daily   Last time this was given:  75 mg on 1/23/2018  8:56 AM                                   DIALYVITE PO   Take 1 tablet by mouth daily                                   diltiazem 240 MG 24 hr capsule   Commonly known as:  CARDIZEM CD   Take 1 capsule (240 mg) by mouth daily                                   dorzolamide-timolol 2-0.5 % ophthalmic solution   Commonly known as:  COSOPT   Place 1 drop into both eyes 2 times daily                                insulin aspart 100 UNIT/ML injection   Commonly known as:  NovoLOG FLEXPEN   6 units before breakfast, 6 units before lunch, 4 units before dinner   Last time this was given:  8 Units on 1/24/2018  8:15 AM            6 units       6 units       4 units               insulin pen needle 30G X 8 MM   Commonly known as:  NOVOFINE   Use 3 daily or as directed.                                latanoprost 0.005 % ophthalmic solution   Commonly known as:  XALATAN   Place 1 drop into both eyes At Bedtime   Last time this was given:  1 drop on 1/23/2018 10:28 PM                                   LOSARTAN POTASSIUM PO   Take 25 mg by mouth daily   Last time this was given:  25 mg on 1/23/2018  8:56 AM                                   lovastatin 40 MG tablet   Commonly known as:  MEVACOR   Profile Rx: patient will contact pharmacy when needed TAKE 1 AND 1/2 TABLETS BY MOUTH ONCE A DAY WITH DINNER. NEED FASTING LAB WORK   Last time this was given:  40 mg on 1/23/2018 10:28 PM                                metoprolol succinate 25 MG 24 hr tablet   Commonly known as:  TOPROL XL   Take 1 tablet (25 mg) by mouth daily   Last time this was given:  25 mg on 1/23/2018  8:56 AM                                   order for DME   Equipment being ordered: Walker with wheels and brakes, and a seat                                order for DME   Nebulizer machine Qty #1                                order for DME    Equipment being ordered: Oxygen- HOME and portable. Georgie Coulter CMA Medical Assistant Signed  Service date: 05/24/2016 10:48 AM     O2 Sat on Room air at rest:84% O2 sat on room air with walk: 82-91% O2 Sat on 1L of oxygen with walk 91-93% O2 Sat on 2 L of Oxygen with walk 91-96% O2 Sat on 1 L O2 at rest 94%                                predniSONE 10 MG tablet   Commonly known as:  DELTASONE   Take 3 tablets (30 mg) daily for two days, then 2 tablets for two days, then one tablet for 2 days.   Last time this was given:  40 mg on 1/23/2018  4:26 PM   Notes to Patient:  1/25- 2 tablets  1/26- 2 tablets  1/27- 1 tablet  1/28- 1 tablet                                   TART CHERRY ADVANCED Caps   Take 1 capsule by mouth daily                                   terazosin 10 MG capsule   Commonly known as:  HYTRIN   Profile Rx: patient will contact pharmacy when needed TAKE 1 CAPSULE (10 MG) BY MOUTH AT BEDTIME   Last time this was given:  10 mg on 1/23/2018 10:28 PM                                TYLENOL PO   Take 650 mg by mouth nightly as needed   Last time this was given:  650 mg on 1/21/2018  8:43 AM                                   warfarin 2 MG tablet   Commonly known as:  COUMADIN   Take 4 mg daily as directed by the Anticoagulation Clinic   Last time this was given:  2.5 mg on 1/23/2018  5:12 PM                                             More Information        Chronic Lung Disease: Preventing Lung Infections  Chronic lung diseases include chronic obstructive pulmonary disease (COPD), which includes chronic bronchitis and emphysema. Other chronic lung diseases include pulmonary fibrosis, sarcoidosis, and other conditions. When you have chronic lung diseases, it's very important to protect yourself from respiratory infections, like colds, the flu, and lung infections. Infections may cause your lung condition to worsen. Although you can't completely avoid them, there are things you can do to lessen the  chance of infections.    Take precautions  Taking the following precautions can help you avoid illness:    Remember to keep your hands away from your nose and mouth. Germs on your hands get into your respiratory system this way.    Wash your hands often. When you wash them:    Use soap and warm water.    Rub your hands together well for at least 20 seconds.    Make sure to rinse them well.    Dry your hands on clean towels or air-dry them.    Use hand  containing alcohol, if you are unable to wash your hands. Use the  after touching doorknobs, handles, and supermarket carts, for example, since lots of people touch them. Then wash your hands as soon as you can.    To help prevent the flu, get a flu vaccination every year. This may be given at your healthcare provider's office, a drugstore, or pharmacy, or at work. Get your flu shot as soon as the vaccines are available in your area. This is usually around September each year.    To help prevent pneumococcal pneumonia, get pneumonia vaccinations. Talk with your healthcare provider about which pneumococcal vaccinations you need.    Try to stay away from people with respiratory infections, such as colds or the flu. Stay away from crowded places, like shopping centers or movie theatres during cold and flu season.    If you smoke, think about quitting. In addition to causing or worsening many lung conditions, the lung damage from smoking increases your risk of infections. Stay away from others who smoke, too. This is also harmful and increases your chance of infections.  Date Last Reviewed: 4/14/2016 2000-2017 [employer]. [address] [place] [address], [place] [address]. All rights reserved. This information is not intended as a substitute for professional medical care. Always follow your healthcare professional's instructions.                COPD Flare    You have had a flare-up of your COPD.  COPD, or chronic obstructive pulmonary disease,  is a common lung disease. It causes your airways to become irritated and narrower. This makes it harder for you to breathe. Emphysema and chronic bronchitis are both types of COPD. This is a chronic condition, which means you always have it. Sometimes it gets worse. When this happens, it is called a flare-up.  Symptoms of COPD  People with COPD may have symptoms most of the time. In a flare-up, your symptoms get worse. These symptoms may mean you are having a flare-up:    Shortness of breath, shallow or rapid breathing, or wheezing that gets worse    Lung infection    Cough that gets worse    More mucus, thicker mucus or mucus of a different color    Tiredness, decreased energy, or trouble doing your usual activities    Fever    Chest tightness    Your symptoms don t get better even when you use your usual medicines, inhalers, and nebulizer    Trouble talking    You feel confused  Causes of flare-ups  Unfortunately, a flare-up can happen even though you did everything right, and you followed your doctor s instructions. Some causes of flare-ups are:    Smoking or secondhand smoke    Colds, the flu, or respiratory infections    Air pollution    Sudden change in the weather    Dust, irritating chemicals, or strong fumes    Not taking your medicines as prescribed  Home care  Here are some things you can do at home to treat a flare-up:    Try not to panic. This makes it harder to breathe, and keeps you from doing the right things.    Don t smoke or be around others who are smoking.    Try to drink more fluids than usual during a flare-up, unless your doctor has told you not to because of heart and kidney problems. More fluids can help loosen the mucus.    Use your inhalers and nebulizer, if you have one, as you have been told to.    If you were given antibiotics, take them until they are used up or your doctor tells you to stop. It s important to finish the antibiotics, even though you feel better. This will make sure  the infection has cleared.    If you were given prednisone or another steroid, finish it even if you feel better.  Preventing a flare-up  Even though flare-ups happen, the best way to treat one is to prevent it before it starts. Here are some pointers:    Don t smoke or be around others who are smoking.    Take your medicines as you have been told.    Talk with your doctor about getting a flu shot every year. Also find out if you need a pneumonia shot.    If there is a weather advisory warning to stay indoors, try to stay inside when possible.    Try to eat healthy and get plenty of sleep.    Try to avoid things that usually set you off, like dust, chemical fumes, hairsprays, or strong perfumes.  Follow-up care  Follow up with your healthcare provider, or as advised.  If a culture was done, you will be told if your treatment needs to be changed. You can call as directed for the results.  If X-rays were done, you will be notified of any new findings that may affect your care.  Call 911  Call 911 if any of these occur:    You have trouble breathing    You feel confused or it s difficult to wake you up    You faint or lose consciousness    You have a rapid heart rate    You have new pain in your chest, arm, shoulder, neck or upper back  When to seek medical advice  Call your healthcare provider right away if any of these occur:    Wheezing or shortness of breath gets worse    You need to use your inhalers more often than usual without relief    Fever of 100.4 F (38 C) or higher, or as directed by your healthcare provider    Coughing up lots of dark-colored or bloody mucus (sputum)    Chest pain with each breath    You do not start to get better within 24 hours    Swelling of your ankles gets worse    Dizziness or weakness  Date Last Reviewed: 9/1/2016 2000-2017 [employer]. [address] [place] [address], [place] [address]. All rights reserved. This information is not intended as a substitute for professional  medical care. Always follow your healthcare professional's instructions.                Discharge Instructions: COPD  You have been diagnosed with chronic obstructive pulmonary disease (COPD). This is a name given to a group of diseases that limit the flow of air in and out of your lungs. This makes it harder to breathe. With COPD, you are also more likely to get lung infections. COPD includes chronic bronchitis and emphysema. COPD is most often caused by heavy, long-term cigarette smoking.  Home care  Quit smoking    If you smoke, quit. It is the best thing you can do for your COPD and your overall health.    Join a stop-smoking program. There are even telephone, text message, and Internet programs to help you quit.    Ask your healthcare provider about medicines or other methods to help you quit.    Ask family members to quit smoking as well.    Don't allow people to smoke in your home, in your car, or when they are around you.  Protect yourself from infection    Wash your hands often. Do your best to keep your hands away from your face. Most germs are spread from your hands to your mouth.    Get a flu shot every year. Also ask your provider about pneumonia vaccines.    Avoid crowds. It's especially important to do this in the winter when more people have colds and flu.    To stay healthy, get enough sleep, exercise regularly, and eat a balanced diet. You should:    Get about 8 hours of sleep every night.    Try to exercise for at least 30 minutes on most days.    Have healthy foods including fruits and vegetables, 100% whole grains, lean meats and fish, and low-fat dairy products. Try to stay away from foods high in fats and sugar.  Take your medicines  Take your medicines exactly as directed. Don't skip doses.  Manage your stress  Stress can make COPD worse. Use this stress management technique:    Find a quiet place and sit or lie in a comfortable position.    Close your eyes and perform breathing exercises for  several minutes. Ask your provider about the best way to breathe.  Pulmonary rehabilitation    Pulmonary rehab can help you feel better. These programs include exercise, breathing techniques, information about COPD, counseling, and help for smokers.    Ask your provider or your local hospital about programs in your area.  When to call your healthcare provider  Call your provider immediately if you have any of the following:    Shortness of breath, wheezing, or coughing    Increased mucus    Yellow, green, bloody, or smelly mucus    Fever or chills    Tightness in your chest that does not go away with rest or medicine    An irregular heartbeat or a feeling that your heart is beating very fast    Swollen ankles   Date Last Reviewed: 5/1/2016 2000-2017 [employer]. [address] [place] [address], [place] [address]. All rights reserved. This information is not intended as a substitute for professional medical care. Always follow your healthcare professional's instructions.

## 2018-01-20 NOTE — TELEPHONE ENCOUNTER
Reason for Disposition    [1] MODERATE difficulty breathing (e.g., speaks in phrases, SOB even at rest) AND [2] worse than normal    Protocols used: COPD OXYGEN MONITORING AND HYPOXIA-ADULT-AH    Sats at 82%, using meds and can't feel improvement. Saw MD today and they ruled out flu. They think he has a virus.  She will bring him to the ER.  Corie Dupont RN-[facility]

## 2018-01-20 NOTE — PHARMACY-ANTICOAGULATION SERVICE
Clinical Pharmacy - Warfarin Dosing Consult     Pharmacy has been consulted to manage this patient s warfarin therapy.  Indication: Atrial Fibrillation  Therapy Goal: INR 2-3  Warfarin Prior to Admission: Yes  Warfarin PTA Regimen: 4 mg daily  Significant drug interactions: New Levofloxacin  Dose Comments: INR increasing depite No dose x 2 days     INR   Date Value Ref Range Status   01/20/2018 3.01 (H) 0.86 - 1.14 Final   01/19/2018 2.60 (H) 0.86 - 1.14 Final       Recommend No Warfarin dose today.  Pharmacy will monitor Griffin Walton daily and order warfarin doses to achieve specified goal.      Please contact pharmacy as soon as possible if the warfarin needs to be held for a procedure or if the warfarin goals change.        Rafael GarzonD

## 2018-01-20 NOTE — PLAN OF CARE
Problem: Patient Care Overview  Goal: Plan of Care/Patient Progress Review  Outcome: No Change  Pt arrived to unit at 0230.  A/O x4, VSS on 4L O2.  Tele:  Uncontrolled afib.  Denies pain. LS diminished, expiratory wheezes.  Frequent cough, productive at times.  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  Dialysis diet.  Up w/asst x1, uses cane. Anticipate dialysis later today as normal Friday dialysis was missed.

## 2018-01-20 NOTE — ED NOTES
Pt here with SOA and cough that has been getting worse over the past 2 days. Pt was seen in clinic today- chest x-ray showed improving pneumonia and flu swab was negative per pt's wife. Pt uses 2 L oxygen at baseline and has been unable to keep his oxygen saturations above 90%. Pt using home neb treatments and advair inhaler with no help in breathing. Hx of COPD.

## 2018-01-20 NOTE — IP AVS SNAPSHOT
[facility]    [address] [place] [address]    Phone:  [phone]                                       After Visit Summary   1/20/2018    Griffin Walton    MRN: [id number]           After Visit Summary Signature Page     I have received my discharge instructions, and my questions have been answered. I have discussed any challenges I see with this plan with the nurse or doctor.    ..........................................................................................................................................  Patient/Patient Representative Signature      ..........................................................................................................................................  Patient Representative Print Name and Relationship to Patient    ..................................................               ................................................  Date                                            Time    ..........................................................................................................................................  Reviewed by Signature/Title    ...................................................              ..............................................  Date                                                            Time

## 2018-01-20 NOTE — PROGRESS NOTES
Lab Results   Component Value Date    POTASSIUM 5.0 01/19/2018     Lab Results   Component Value Date    HGB 8.0 01/19/2018          Hemodialysis Note:      All machine safety checks completed and passed.  Total chlorine checks less than 0.1ppm   All connections secured, saline line double clamped.  Venous and arterial parameters set  Report rec'd from Mervat Leon RN    Rec'd pt per w/c acc'd by transport team. Consent obtained for dialysis Rx this hospitalization.  Hepatitis B labs verified on machine log from previous patient.  Good circulation to fistula arm. Time out taken.    LAF cannulated with 15 Ga needles with no difficulty. Tourniquet used.      Pt ran 3 hours on a K 2 bath, with 2L removed. Blood flow rate of 450 ml/min was obtained.   PRE-WEIGHT:94.8kgs,    TARGET WEIGHT 92.3kgs,     POST-WT 92.8kgs.      Complications: None.   Education regarding ESRD given to patient with good understanding.     Protocol explained to staff RN regarding possibility of incapacitated dialysis RN.  Vascular Access: Aseptic prep done for both on/off  Total Heparin given: 0 units    Meds given: Epogen and Hectorol.      Dr. Keene visited pt during run.   Transducers checked Q 15 minutes, remain clear throughout Rx.  Machine water alarms in place and functioning.  Total blood volume processed:75.4L  Hemostasis of needle sites achieved after 15 minutes. Patient dialyzes at [place] [facility] M-W-F.  POST ASSESSMENTS: Skin warm and dry, alert, denies discomfort, Lungs diminished, Resp rate regular, apical rate irregular. No change of edema.  See flowsheet in EPIC for further details.  Report given to Mervat Leon RN.   Blanche Yusuf RN  [facility]

## 2018-01-21 LAB
GLUCOSE BLDC GLUCOMTR-MCNC: 173 MG/DL (ref 70–99)
GLUCOSE BLDC GLUCOMTR-MCNC: 200 MG/DL (ref 70–99)
GLUCOSE BLDC GLUCOMTR-MCNC: 216 MG/DL (ref 70–99)
GLUCOSE BLDC GLUCOMTR-MCNC: 217 MG/DL (ref 70–99)
GLUCOSE BLDC GLUCOMTR-MCNC: 253 MG/DL (ref 70–99)
INR PPP: 1.9 (ref 0.86–1.14)

## 2018-01-21 PROCEDURE — 40000275 ZZH STATISTIC RCP TIME EA 10 MIN

## 2018-01-21 PROCEDURE — 99232 SBSQ HOSP IP/OBS MODERATE 35: CPT | Performed by: INTERNAL MEDICINE

## 2018-01-21 PROCEDURE — 85610 PROTHROMBIN TIME: CPT | Performed by: INTERNAL MEDICINE

## 2018-01-21 PROCEDURE — 25000125 ZZHC RX 250: Performed by: INTERNAL MEDICINE

## 2018-01-21 PROCEDURE — A9270 NON-COVERED ITEM OR SERVICE: HCPCS | Mod: GY | Performed by: INTERNAL MEDICINE

## 2018-01-21 PROCEDURE — 00000146 ZZHCL STATISTIC GLUCOSE BY METER IP

## 2018-01-21 PROCEDURE — 12000007 ZZH R&B INTERMEDIATE

## 2018-01-21 PROCEDURE — 36415 COLL VENOUS BLD VENIPUNCTURE: CPT | Performed by: INTERNAL MEDICINE

## 2018-01-21 PROCEDURE — 94640 AIRWAY INHALATION TREATMENT: CPT | Mod: 76

## 2018-01-21 PROCEDURE — 25000132 ZZH RX MED GY IP 250 OP 250 PS 637: Mod: GY | Performed by: INTERNAL MEDICINE

## 2018-01-21 PROCEDURE — 25000128 H RX IP 250 OP 636: Performed by: INTERNAL MEDICINE

## 2018-01-21 PROCEDURE — 25000131 ZZH RX MED GY IP 250 OP 636 PS 637: Mod: GY | Performed by: INTERNAL MEDICINE

## 2018-01-21 PROCEDURE — 94640 AIRWAY INHALATION TREATMENT: CPT

## 2018-01-21 RX ORDER — OSELTAMIVIR PHOSPHATE 30 MG/1
30 CAPSULE ORAL ONCE
Status: COMPLETED | OUTPATIENT
Start: 2018-01-21 | End: 2018-01-21

## 2018-01-21 RX ORDER — WARFARIN SODIUM 2 MG/1
2 TABLET ORAL
Status: COMPLETED | OUTPATIENT
Start: 2018-01-21 | End: 2018-01-21

## 2018-01-21 RX ORDER — OSELTAMIVIR PHOSPHATE 30 MG/1
30 CAPSULE ORAL
Status: DISCONTINUED | OUTPATIENT
Start: 2018-01-22 | End: 2018-01-24 | Stop reason: HOSPADM

## 2018-01-21 RX ADMIN — DORZOLAMIDE HYDROCHLORIDE 1 DROP: 20 SOLUTION/ DROPS OPHTHALMIC at 21:32

## 2018-01-21 RX ADMIN — DILTIAZEM HYDROCHLORIDE 240 MG: 240 CAPSULE, EXTENDED RELEASE ORAL at 08:43

## 2018-01-21 RX ADMIN — INSULIN ASPART 2 UNITS: 100 INJECTION, SOLUTION INTRAVENOUS; SUBCUTANEOUS at 08:39

## 2018-01-21 RX ADMIN — IPRATROPIUM BROMIDE AND ALBUTEROL SULFATE 3 ML: .5; 3 SOLUTION RESPIRATORY (INHALATION) at 08:53

## 2018-01-21 RX ADMIN — LATANOPROST 1 DROP: 50 SOLUTION OPHTHALMIC at 22:00

## 2018-01-21 RX ADMIN — LOVASTATIN 40 MG: 20 TABLET ORAL at 22:00

## 2018-01-21 RX ADMIN — METHYLPREDNISOLONE SODIUM SUCCINATE 40 MG: 40 INJECTION, POWDER, FOR SOLUTION INTRAMUSCULAR; INTRAVENOUS at 22:00

## 2018-01-21 RX ADMIN — WARFARIN SODIUM 2 MG: 2 TABLET ORAL at 18:18

## 2018-01-21 RX ADMIN — DORZOLAMIDE HYDROCHLORIDE 1 DROP: 20 SOLUTION/ DROPS OPHTHALMIC at 12:55

## 2018-01-21 RX ADMIN — METOPROLOL SUCCINATE 25 MG: 25 TABLET, EXTENDED RELEASE ORAL at 08:43

## 2018-01-21 RX ADMIN — TIMOLOL MALEATE 1 DROP: 5 SOLUTION OPHTHALMIC at 21:37

## 2018-01-21 RX ADMIN — LOSARTAN POTASSIUM 25 MG: 25 TABLET ORAL at 08:43

## 2018-01-21 RX ADMIN — IPRATROPIUM BROMIDE AND ALBUTEROL SULFATE 3 ML: .5; 3 SOLUTION RESPIRATORY (INHALATION) at 20:41

## 2018-01-21 RX ADMIN — FLUTICASONE FUROATE AND VILANTEROL TRIFENATATE 1 PUFF: 100; 25 POWDER RESPIRATORY (INHALATION) at 12:56

## 2018-01-21 RX ADMIN — INSULIN ASPART 2 UNITS: 100 INJECTION, SOLUTION INTRAVENOUS; SUBCUTANEOUS at 12:55

## 2018-01-21 RX ADMIN — CLOPIDOGREL 75 MG: 75 TABLET, FILM COATED ORAL at 08:43

## 2018-01-21 RX ADMIN — OSELTAMIVIR PHOSPHATE 30 MG: 30 CAPSULE ORAL at 16:09

## 2018-01-21 RX ADMIN — TIMOLOL MALEATE 1 DROP: 5 SOLUTION OPHTHALMIC at 08:45

## 2018-01-21 RX ADMIN — ACETAMINOPHEN 650 MG: 325 TABLET, FILM COATED ORAL at 08:43

## 2018-01-21 RX ADMIN — INSULIN GLARGINE 15 UNITS: 100 INJECTION, SOLUTION SUBCUTANEOUS at 22:00

## 2018-01-21 RX ADMIN — TERAZOSIN HYDROCHLORIDE 10 MG: 10 CAPSULE ORAL at 22:00

## 2018-01-21 RX ADMIN — INSULIN ASPART 1 UNITS: 100 INJECTION, SOLUTION INTRAVENOUS; SUBCUTANEOUS at 16:54

## 2018-01-21 NOTE — PLAN OF CARE
Problem: Patient Care Overview  Goal: Plan of Care/Patient Progress Review  Outcome: Improving  A&O x4, AVSS 94% on 2 liter. Lungs clear/diminished. Productive cough. Denied pain.

## 2018-01-21 NOTE — PROGRESS NOTES
"[facility]    Nephrology Progress Note     Assessment & Plan     Griffin Walton is a 83 year old male with ESRD who was admitted on 1/20/2018.      1) Viral Syndrome     2) Exacerbation of chronic lung disease ? On methylpred + levofloxacin.     3) Volume up     4) ESRD:  He has HD MWF in [facility] under Dr. Keene.  YECENIA AVF, 3 H. TW 92.3 kg, 15 ga,  mL/min.  No heparin as he has allergy.  Ran Sat instead of Fri.       5) Anemia:  On EPO 6400 units.  HGB was 8.5 last week.  8.0 yesterday.       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       Plan:    HD tomorrow.  Consider increase in metoprolol if needed for rate control.        Rafael Keene MD  [facility] - Nephrology  [phone]    Interval History     \"I'm here.\"  Not SOB.  Weak.  No N/V    Physical Exam   Temp: 98.4  F (36.9  C) Temp src: Oral BP: 148/84 Pulse: 106 Heart Rate: 91 Resp: 16 SpO2: 97 % O2 Device: Nasal cannula Oxygen Delivery: 4 LPM  Vitals:    01/21/18 0600   Weight: 90.3 kg (199 lb 1.2 oz)     Vital Signs with Ranges  Temp:  [98.2  F (36.8  C)-99.6  F (37.6  C)] 98.4  F (36.9  C)  Pulse:  [106-115] 106  Heart Rate:  [66-91] 91  Resp:  [16-18] 16  BP: (113-153)/(49-87) 148/84  SpO2:  [90 %-99 %] 97 %  I/O last 3 completed shifts:  In: 240 [P.O.:240]  Out: 2250 [Urine:250; Other:2000]    GENERAL APPEARANCE: pleasant, NAD, a & o, lying in bed.    HEENT:  Eyes/ears/nose/neck grossly normal  RESP: lungs mild exp wheeze, fine insp crackles  CV: irreg irreg, 2/6 systole M   ABDOMEN: o/s/nt/nd, bs present  EXTREMITIES/SKIN: no rashes/lesions; no edema    Medications     - MEDICATION INSTRUCTIONS -       Warfarin Therapy Reminder       - MEDICATION INSTRUCTIONS -         warfarin  2 mg Oral ONCE at 18:00     fluticasone-vilanterol  1 puff Inhalation Daily     insulin glargine  15 Units Subcutaneous At Bedtime     clopidogrel  75 mg Oral Daily     diltiazem  240 mg Oral Daily     latanoprost  1 drop " Both Eyes At Bedtime     losartan  25 mg Oral Daily     lovastatin  40 mg Oral At Bedtime     metoprolol succinate  25 mg Oral Daily     terazosin  10 mg Oral At Bedtime     ipratropium - albuterol 0.5 mg/2.5 mg/3 mL  3 mL Nebulization Q4H While awake     methylPREDNISolone  40 mg Intravenous Q24H     dorzolamide  1 drop Both Eyes BID     timolol  1 drop Both Eyes BID     sodium chloride 0.9%  250 mL Intravenous Once in dialysis     sodium chloride 0.9%  1,000-2,000 mL Hemodialysis Machine Once     gelatin absorbable  1 each Topical During Hemodialysis (from stock)     - MEDICATION INSTRUCTIONS -   Does not apply Once     lidocaine (buffered or not buffered)  0.5 mL Intradermal Once     lidocaine (buffered or not buffered)  0.5 mL Intradermal Once     [START ON 1/22/2018] levofloxacin  250 mg Intravenous Q48H     - MEDICATION INSTRUCTIONS for Dialysis Patients -   Does not apply See Admin Instructions     insulin aspart  1-7 Units Subcutaneous TID AC     insulin aspart  1-5 Units Subcutaneous At Bedtime     sodium chloride (PF)  3 mL Intracatheter Q8H       Data   BMP  Recent Labs  Lab 01/20/18  1345 01/19/18 2145 01/19/18 2025    136 Canceled, Test credited   POTASSIUM 4.7 5.0 Canceled, Test credited   CHLORIDE 97 99 Canceled, Test credited   MELISSA 8.1* 8.2* Canceled, Test credited   CO2 26 28 Canceled, Test credited   BUN 68* 57* Canceled, Test credited   CR 7.65* 7.48* Canceled, Test credited   * 212* Canceled, Test credited     [email or web address]([email or web address])  CBC)  Recent Labs  Lab 01/20/18  1345 01/19/18 2025   WBC  --  6.3   HGB 7.6* 8.0*   HCT  --  26.0*   MCV  --  97   PLT  --  240       Recent Labs  Lab 01/19/18 2145   AST 16   ALT 25   ALKPHOS 66   BILITOTAL 0.4       Recent Labs  Lab 01/21/18  0756   INR 1.90*     25 OH Vit D2   Date Value Ref Range Status   08/05/2011 <5 ug/L Final     25 OH Vit D3   Date Value Ref Range Status   08/05/2011 30 ug/L Final     25 OH Vit D total   Date Value Ref  Range Status   04/28/2014 13  Final       Recent Labs  Lab 01/20/18  1345 01/19/18 2025   HGB 7.6* 8.0*   HCT  --  26.0*   MCV  --  97     No results for input(s): PTHI in the last 168 hours.    Attestation:   I have reviewed today's relevant vital signs, notes, medications, labs and imaging.

## 2018-01-21 NOTE — PLAN OF CARE
Problem: Patient Care Overview  Goal: Plan of Care/Patient Progress Review  Outcome: No Change  A/O, VSS. LS diminished, productive cough. 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 Up w/assist x1 and cane. Patient denies pain. Renal diet. Pt had dialysis yesterday.  Tele: Afib/RVR

## 2018-01-21 NOTE — PLAN OF CARE
Problem: Patient Care Overview  Goal: Plan of Care/Patient Progress Review  Outcome: Improving  VSS, on 2L O2. Denies pain. LS diminished, expiratory wheezes. Up with 1 and cane. Had dialysis today, tolerated well.

## 2018-01-21 NOTE — PLAN OF CARE
Problem: Patient Care Overview  Goal: Plan of Care/Patient Progress Review  Outcome: Improving  A&Ox4. VSS. LS diminished with some expiratory wheezes and fine crackles noted at bases, having productive cough. Blind in right eye and partial blind in left eye. Up w/assist of 1 and cane. Patient denies pain. Tolerating Renal diet. Pt had dialysis yesterday.  LUE Fistula intact. Tele Afib with CVR. IV SL.  Positive for Influenza. On Droplet Isolation. To start Tamiflu. Voiding in urinal.  BG-200 & 217.  Wife at bedside.

## 2018-01-22 LAB
ALBUMIN SERPL-MCNC: 3.2 G/DL (ref 3.4–5)
ANION GAP SERPL CALCULATED.3IONS-SCNC: 10 MMOL/L (ref 3–14)
BASOPHILS # BLD AUTO: 0 10E9/L (ref 0–0.2)
BASOPHILS NFR BLD AUTO: 0 %
BUN SERPL-MCNC: 78 MG/DL (ref 7–30)
CALCIUM SERPL-MCNC: 8 MG/DL (ref 8.5–10.1)
CHLORIDE SERPL-SCNC: 97 MMOL/L (ref 94–109)
CO2 SERPL-SCNC: 27 MMOL/L (ref 20–32)
CREAT SERPL-MCNC: 6.93 MG/DL (ref 0.66–1.25)
DIFFERENTIAL METHOD BLD: ABNORMAL
EOSINOPHIL # BLD AUTO: 0 10E9/L (ref 0–0.7)
EOSINOPHIL NFR BLD AUTO: 0 %
ERYTHROCYTE [DISTWIDTH] IN BLOOD BY AUTOMATED COUNT: 16.4 % (ref 10–15)
GFR SERPL CREATININE-BSD FRML MDRD: 8 ML/MIN/1.7M2
GLUCOSE BLDC GLUCOMTR-MCNC: 152 MG/DL (ref 70–99)
GLUCOSE BLDC GLUCOMTR-MCNC: 169 MG/DL (ref 70–99)
GLUCOSE BLDC GLUCOMTR-MCNC: 209 MG/DL (ref 70–99)
GLUCOSE BLDC GLUCOMTR-MCNC: 351 MG/DL (ref 70–99)
GLUCOSE BLDC GLUCOMTR-MCNC: 373 MG/DL (ref 70–99)
GLUCOSE SERPL-MCNC: 205 MG/DL (ref 70–99)
HCT VFR BLD AUTO: 27.3 % (ref 40–53)
HGB BLD-MCNC: 8.6 G/DL (ref 13.3–17.7)
IMM GRANULOCYTES # BLD: 0 10E9/L (ref 0–0.4)
IMM GRANULOCYTES NFR BLD: 0.3 %
INR PPP: 1.34 (ref 0.86–1.14)
LYMPHOCYTES # BLD AUTO: 0.4 10E9/L (ref 0.8–5.3)
LYMPHOCYTES NFR BLD AUTO: 5.5 %
MCH RBC QN AUTO: 29.5 PG (ref 26.5–33)
MCHC RBC AUTO-ENTMCNC: 31.5 G/DL (ref 31.5–36.5)
MCV RBC AUTO: 94 FL (ref 78–100)
MONOCYTES # BLD AUTO: 0.3 10E9/L (ref 0–1.3)
MONOCYTES NFR BLD AUTO: 3.3 %
NEUTROPHILS # BLD AUTO: 7.1 10E9/L (ref 1.6–8.3)
NEUTROPHILS NFR BLD AUTO: 90.9 %
NRBC # BLD AUTO: 0 10*3/UL
NRBC BLD AUTO-RTO: 0 /100
PHOSPHATE SERPL-MCNC: 5.8 MG/DL (ref 2.5–4.5)
PLATELET # BLD AUTO: 237 10E9/L (ref 150–450)
POTASSIUM SERPL-SCNC: 4.9 MMOL/L (ref 3.4–5.3)
RBC # BLD AUTO: 2.92 10E12/L (ref 4.4–5.9)
SODIUM SERPL-SCNC: 134 MMOL/L (ref 133–144)
WBC # BLD AUTO: 7.8 10E9/L (ref 4–11)

## 2018-01-22 PROCEDURE — 40000275 ZZH STATISTIC RCP TIME EA 10 MIN

## 2018-01-22 PROCEDURE — 99232 SBSQ HOSP IP/OBS MODERATE 35: CPT | Performed by: INTERNAL MEDICINE

## 2018-01-22 PROCEDURE — 12000007 ZZH R&B INTERMEDIATE

## 2018-01-22 PROCEDURE — A9270 NON-COVERED ITEM OR SERVICE: HCPCS | Mod: GY | Performed by: INTERNAL MEDICINE

## 2018-01-22 PROCEDURE — 63400005 ZZH RX 634: Performed by: INTERNAL MEDICINE

## 2018-01-22 PROCEDURE — 25000128 H RX IP 250 OP 636: Performed by: INTERNAL MEDICINE

## 2018-01-22 PROCEDURE — 36415 COLL VENOUS BLD VENIPUNCTURE: CPT | Performed by: INTERNAL MEDICINE

## 2018-01-22 PROCEDURE — 90937 HEMODIALYSIS REPEATED EVAL: CPT

## 2018-01-22 PROCEDURE — 85025 COMPLETE CBC W/AUTO DIFF WBC: CPT | Performed by: INTERNAL MEDICINE

## 2018-01-22 PROCEDURE — 25000131 ZZH RX MED GY IP 250 OP 636 PS 637: Mod: GY | Performed by: INTERNAL MEDICINE

## 2018-01-22 PROCEDURE — 94640 AIRWAY INHALATION TREATMENT: CPT | Mod: 76

## 2018-01-22 PROCEDURE — 80069 RENAL FUNCTION PANEL: CPT | Performed by: INTERNAL MEDICINE

## 2018-01-22 PROCEDURE — 25000125 ZZHC RX 250: Performed by: INTERNAL MEDICINE

## 2018-01-22 PROCEDURE — 25000132 ZZH RX MED GY IP 250 OP 250 PS 637: Mod: GY | Performed by: INTERNAL MEDICINE

## 2018-01-22 PROCEDURE — 94640 AIRWAY INHALATION TREATMENT: CPT

## 2018-01-22 PROCEDURE — 00000146 ZZHCL STATISTIC GLUCOSE BY METER IP

## 2018-01-22 PROCEDURE — 85610 PROTHROMBIN TIME: CPT | Performed by: INTERNAL MEDICINE

## 2018-01-22 RX ORDER — WARFARIN SODIUM 2 MG/1
4 TABLET ORAL
Status: COMPLETED | OUTPATIENT
Start: 2018-01-22 | End: 2018-01-22

## 2018-01-22 RX ORDER — TRAZODONE HYDROCHLORIDE 50 MG/1
50 TABLET, FILM COATED ORAL
Status: DISCONTINUED | OUTPATIENT
Start: 2018-01-22 | End: 2018-01-24 | Stop reason: HOSPADM

## 2018-01-22 RX ADMIN — INSULIN ASPART 5 UNITS: 100 INJECTION, SOLUTION INTRAVENOUS; SUBCUTANEOUS at 12:35

## 2018-01-22 RX ADMIN — METOPROLOL SUCCINATE 25 MG: 25 TABLET, EXTENDED RELEASE ORAL at 09:03

## 2018-01-22 RX ADMIN — DILTIAZEM HYDROCHLORIDE 240 MG: 240 CAPSULE, EXTENDED RELEASE ORAL at 09:03

## 2018-01-22 RX ADMIN — TERAZOSIN HYDROCHLORIDE 10 MG: 10 CAPSULE ORAL at 22:02

## 2018-01-22 RX ADMIN — LOVASTATIN 40 MG: 20 TABLET ORAL at 22:02

## 2018-01-22 RX ADMIN — INSULIN ASPART 5 UNITS: 100 INJECTION, SOLUTION INTRAVENOUS; SUBCUTANEOUS at 16:56

## 2018-01-22 RX ADMIN — INSULIN GLARGINE 15 UNITS: 100 INJECTION, SOLUTION SUBCUTANEOUS at 22:01

## 2018-01-22 RX ADMIN — INSULIN ASPART 2 UNITS: 100 INJECTION, SOLUTION INTRAVENOUS; SUBCUTANEOUS at 09:20

## 2018-01-22 RX ADMIN — OSELTAMIVIR PHOSPHATE 30 MG: 30 CAPSULE ORAL at 13:12

## 2018-01-22 RX ADMIN — TIMOLOL MALEATE 1 DROP: 5 SOLUTION OPHTHALMIC at 22:09

## 2018-01-22 RX ADMIN — FLUTICASONE FUROATE AND VILANTEROL TRIFENATATE 1 PUFF: 100; 25 POWDER RESPIRATORY (INHALATION) at 09:06

## 2018-01-22 RX ADMIN — CLOPIDOGREL 75 MG: 75 TABLET, FILM COATED ORAL at 09:03

## 2018-01-22 RX ADMIN — IPRATROPIUM BROMIDE AND ALBUTEROL SULFATE 3 ML: .5; 3 SOLUTION RESPIRATORY (INHALATION) at 07:33

## 2018-01-22 RX ADMIN — IPRATROPIUM BROMIDE AND ALBUTEROL SULFATE 3 ML: .5; 3 SOLUTION RESPIRATORY (INHALATION) at 12:25

## 2018-01-22 RX ADMIN — SODIUM CHLORIDE 250 ML: 9 INJECTION, SOLUTION INTRAVENOUS at 18:42

## 2018-01-22 RX ADMIN — IPRATROPIUM BROMIDE AND ALBUTEROL SULFATE 3 ML: .5; 3 SOLUTION RESPIRATORY (INHALATION) at 20:42

## 2018-01-22 RX ADMIN — METHYLPREDNISOLONE SODIUM SUCCINATE 40 MG: 40 INJECTION, POWDER, FOR SOLUTION INTRAMUSCULAR; INTRAVENOUS at 22:02

## 2018-01-22 RX ADMIN — DORZOLAMIDE HYDROCHLORIDE 1 DROP: 20 SOLUTION/ DROPS OPHTHALMIC at 09:06

## 2018-01-22 RX ADMIN — DORZOLAMIDE HYDROCHLORIDE 1 DROP: 20 SOLUTION/ DROPS OPHTHALMIC at 22:09

## 2018-01-22 RX ADMIN — IPRATROPIUM BROMIDE AND ALBUTEROL SULFATE 3 ML: .5; 3 SOLUTION RESPIRATORY (INHALATION) at 16:00

## 2018-01-22 RX ADMIN — EPOETIN ALFA 10000 UNITS: 10000 SOLUTION INTRAVENOUS; SUBCUTANEOUS at 20:42

## 2018-01-22 RX ADMIN — TIMOLOL MALEATE 1 DROP: 5 SOLUTION OPHTHALMIC at 11:08

## 2018-01-22 RX ADMIN — WARFARIN SODIUM 4 MG: 2 TABLET ORAL at 22:02

## 2018-01-22 RX ADMIN — LOSARTAN POTASSIUM 25 MG: 25 TABLET ORAL at 09:03

## 2018-01-22 RX ADMIN — LATANOPROST 1 DROP: 50 SOLUTION OPHTHALMIC at 22:09

## 2018-01-22 NOTE — PROGRESS NOTES
Care Transition Initial Assessment - RN        Met with: Patient and Family.    DATA   Active Problems:    Acute respiratory failure with hypoxia (H)       Cognitive Status: awake, alert and oriented.        Contact information and PCP information verified: Yes    Lives With: spouse                      Insurance concerns: No Insurance issues identified    ASSESSMENT  Patient currently receives the following services:  None, home care just ended        Identified issues/concerns regarding health management: none, anxious to go home.   Patient lives with his spouse. He dialyzes Monday, Wednesday and Friday at [facility]. His oxygen provider is [facility] and he uses 2L/NC. He has a cane, walker and scooter at home to use as needed. He also follows in the anticoagulation clinic at [facility] for his chronic coumadin use.     PLAN  Financial costs for the patient include per insurance.  Patient given options and choices for discharge Yes .  Patient/family is agreeable to the plan?  Yes: home with assist as needed from spouse  Patient anticipates discharging to home .        Patient anticipates needs for home equipment: No  Discharge Planner   Discharge Plans in progress: no  Barriers to discharge plan: none  Plan/Disposition: Home   Appointments:         Care  (CTS) will continue to follow as needed.

## 2018-01-22 NOTE — PROGRESS NOTES
SPIRITUAL HEALTH SERVICES Progress Note  [facility]    Visited pt per request. Pt was sitting up in his chair eating breakfast and reported feeling much better. Pt welcomed the visit and spoke a bit about his family and his kaylin. Pt's wife is staying with pt's brother just a few miles away. Pt has 4 living children and one daughter who  at age 40 after a long struggle with a chronic illness. Pt would like to receive Qu[employer] communion but isn't a candidate for the [employer] communion volunteers due to his isolation status. Pt would appreciate seeing a .  will put in a communion request with Father Yanelis.  provided reflective listening and prayer.  has no formal plans to follow but is available upon request.    Araceli Garcia  Chaplain Resident  Pager: [phone]  Office: [phone]

## 2018-01-22 NOTE — PLAN OF CARE
Problem: Pneumonia (Adult)  Goal: Signs and Symptoms of Listed Potential Problems Will be Absent, Minimized or Managed (Pneumonia)  Signs and symptoms of listed potential problems will be absent, minimized or managed by discharge/transition of care (reference Pneumonia (Adult) CPG).   Outcome: No Change  A/O. VSS on 3lpm NC. Tele: A fib with CVR. LS-crackles in bases, expiratory wheezes. +BS, +flatus. Pt on HD. Has dialysis MWF. BLE edema +2. Up with assist of 1 and cane.

## 2018-01-22 NOTE — PLAN OF CARE
Problem: Pneumonia (Adult)  Goal: Signs and Symptoms of Listed Potential Problems Will be Absent, Minimized or Managed (Pneumonia)  Signs and symptoms of listed potential problems will be absent, minimized or managed by discharge/transition of care (reference Pneumonia (Adult) CPG).   Outcome: No Change  A/O. VSS on 4lpm NC. Tele: A fib CVR. LS diminished with expiratory wheezes and fine crackles in bases. +BS, +flatus. Pt on hemodialysis, MWF. Droplet isolation. Up with assist of 1 and cane. Tolerating diet, coverage given for glucose 173, 216. Denies pain.

## 2018-01-22 NOTE — PROGRESS NOTES
Renal Medicine Progress Note            Assessment/Plan:     Griffin Walton is a 83 year old male with ESRD who was admitted on 1/20/2018.       # Influenza A. On Tamiflu. He is improving.      # Exacerbation of chronic lung disease ? On methylpred.       # ESRD.  He has HD MWF in [facility] under Dr. Keene.  YECENIA AVF, 3 H. TW 92.3 kg, 15 ga,  mL/min.  No heparin as he has allergy.      # Anemia:  On EPO 6400 units.  HGB was 8.5 last week.        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    # Atria fibrillation   -dilt/metoprolo/coumadin    Plan.   # Hemodialysis today        Interval History:     He feels better. His breathing is better.          Medications and Allergies:       sodium chloride 0.9%  250 mL Intravenous Once in dialysis     sodium chloride 0.9%  1,000-2,000 mL Hemodialysis Machine Once     gelatin absorbable  1 each Topical During Hemodialysis (from stock)     - MEDICATION INSTRUCTIONS -   Does not apply Once     epoetin seda (EPOGEN,PROCRIT) inj ESRD  10,000 Units Intravenous Once     lidocaine (buffered or not buffered)  0.5 mL Intradermal Once     lidocaine (buffered or not buffered)  0.5 mL Intradermal Once     warfarin  4 mg Oral ONCE at 18:00     oseltamivir  30 mg Oral Once per day on Mon Wed Fri     fluticasone-vilanterol  1 puff Inhalation Daily     insulin glargine  15 Units Subcutaneous At Bedtime     clopidogrel  75 mg Oral Daily     diltiazem  240 mg Oral Daily     latanoprost  1 drop Both Eyes At Bedtime     losartan  25 mg Oral Daily     lovastatin  40 mg Oral At Bedtime     metoprolol succinate  25 mg Oral Daily     terazosin  10 mg Oral At Bedtime     ipratropium - albuterol 0.5 mg/2.5 mg/3 mL  3 mL Nebulization Q4H While awake     methylPREDNISolone  40 mg Intravenous Q24H     dorzolamide  1 drop Both Eyes BID     timolol  1 drop Both Eyes BID     - MEDICATION INSTRUCTIONS for Dialysis Patients -   Does not apply See Admin Instructions     insulin aspart  1-7  Units Subcutaneous TID AC     insulin aspart  1-5 Units Subcutaneous At Bedtime     sodium chloride (PF)  3 mL Intracatheter Q8H        Allergies   Allergen Reactions     Hydralazine Shortness Of Breath and Swelling     Aspirin Swelling     Heparin Flush Other (See Comments)     thrombocytopenia     Monosodium Glutamate Swelling            Physical Exam:   Vitals were reviewed  Heart Rate: 66, Blood pressure 118/61, pulse 77, temperature 97.9  F (36.6  C), temperature source Oral, resp. rate 16, weight 92.2 kg (203 lb 4.2 oz), SpO2 98 %.    Wt Readings from Last 3 Encounters:   01/22/18 92.2 kg (203 lb 4.2 oz)   01/19/18 94.8 kg (209 lb)   01/02/18 94.8 kg (209 lb)       Intake/Output Summary (Last 24 hours) at 01/22/18 1608  Last data filed at 01/22/18 1230   Gross per 24 hour   Intake             1170 ml   Output              325 ml   Net              845 ml       GENERAL APPEARANCE: NAD  HEENT:  Eyes/ears/nose/neck grossly normal  RESP: tight ane wheezes  CV: irregular,  nl S1/S2, no m/r/g   ABDOMEN: obese, soft, NT  EXTREMITIES/SKIN: no rashes/lesions on observed skin; some edema  Neuro: Awake and alert.         Data:     CBC RESULTS:     Recent Labs  Lab 01/22/18  0807 01/20/18  1345 01/19/18 2025   WBC 7.8  --  6.3   RBC 2.92*  --  2.69*   HGB 8.6* 7.6* 8.0*   HCT 27.3*  --  26.0*     --  240       Basic Metabolic Panel:    Recent Labs  Lab 01/22/18  0807 01/20/18  1345 01/19/18  2145 01/19/18 2025    134 136 Canceled, Test credited   POTASSIUM 4.9 4.7 5.0 Canceled, Test credited   CHLORIDE 97 97 99 Canceled, Test credited   CO2 27 26 28 Canceled, Test credited   BUN 78* 68* 57* Canceled, Test credited   CR 6.93* 7.65* 7.48* Canceled, Test credited   * 265* 212* Canceled, Test credited   MELISSA 8.0* 8.1* 8.2* Canceled, Test credited       INR  Recent Labs  Lab 01/22/18  0807 01/21/18  0756 01/20/18  1310 01/19/18 2025   INR 1.34* 1.90* 3.01* 2.60*      Attestation:   I have reviewed today's  relevant vital signs, notes, medications, labs and imaging.    Oscar Taylor MD  [facility]ph[facility]  Office phone :[phone]  Pager: [phone]

## 2018-01-22 NOTE — PLAN OF CARE
Problem: Patient Care Overview  Goal: Plan of Care/Patient Progress Review  Outcome: Improving  A/Ox4. AVSS, on 3LPM. LS occasional exp wheezing. Pt ambulated in hallway with A1 and cane. BG of 209 & 373, coverage given. Tele a-fib CVR. BLE edema 1-2+. Had large BM today. Wife at bedside.

## 2018-01-22 NOTE — PROGRESS NOTES
[facility]    Hospitalist Progress Note  Miguel A Art MD    Assessment & Plan     83 year old male with PmHx of Type 2 DM, Gout, peripheral vascular disease, hypertension, Abdominal aortic aneursym, partial left nephrectomy due to cancer, gout, who was admitted on 1/19/18 due to shortness of breath and prodcutive cough for 4 days. He was found to have O2 Sats of 85% on RA at [facility]. Work up done on 1/19/18 revealed, CMP significant for BUN 57, Creat 7.48, Alb 3.1. CBC revealed, Hb 8.0, WBC 6.3 and Plts 240. Lactic acid 1.1, N terminal pro-BNP was 24651. Procalitonin was 0.6. Chest x-rays on 1/19/18 was chronic-appearing interstitial densities within the lungs. Blunting of the costophrenic angles, likely representing small pleural effusions with adjacent atelectasis. CT Chest on 1/19/18 revealed, moderate bilateral pleural effusions, new since 9/8/2017. Pulmonary emphysematous/fibrotic changes. Vitals on admission revealed, temp 100.3F.        1. COPD exacerbation with Acute hypoxic respiratory failure: continue 4L via NC. Contnue scheduled duonebs and alb nebs prn. Continue IV levaquin 750mg qd. Continue IV solu-medrol. Continue fluticasone-vilanterol inh. For Robitussin DM prn.     2. Acute Influenza A infection: Influenza by PCR on 1/19/18 was positive. For Tamilfu.       3. End-stage kidney disease: pt gets hemodialysis Mon/Wed/Fri as an outpt. Appreciate Nephrology input.      4. Atrial fibrillation: HR ranges . Continue plavix, warfarin and diltiazem XR. INR was 2.6 on 1/19/18. ECHO on 1/4/18 revealed, LVEF 55-60%, mild MR, mild concentric LVH.   LA is moderately, RA is normal.     5. Hypertension: SBP is in the 110s to 140s. Continue diltiazem 240 mg daily, losartan 50 mg daily, metoprolol 25 mg daily     6. Glaucoma: Continue eyedrops.     7. Type 2 Diabetes mellitus: HbA1C was 6.3% on 12/19/17. BG was 200s this am. Continue lantus qhs. For medium dose  insulin aspart sliding scale prn.      8. Hyperlipidemia: Continue lovastatin      DVT Prophylaxis: Ambulate every shift  Code Status: Full Code    Disposition: Expected discharge in 2 days.      Interval History   The pt reported coughing up thick yellow sputum. No complaints of shortness of breath/chest pain.    -Data reviewed today: I reviewed all new labs and imaging results over the last 24 hours. I personally reviewed no images or EKG's today.    Physical Exam   Temp: 98.7  F (37.1  C) Temp src: Oral BP: 128/68 Pulse: 60 Heart Rate: 56 Resp: 16 SpO2: 96 % O2 Device: Nasal cannula Oxygen Delivery: 4 LPM  Vitals:    01/21/18 0600   Weight: 90.3 kg (199 lb 1.2 oz)     Vital Signs with Ranges  Temp:  [98.3  F (36.8  C)-99.6  F (37.6  C)] 98.7  F (37.1  C)  Pulse:  [] 60  Heart Rate:  [56] 56  Resp:  [16] 16  BP: (126-153)/(68-87) 128/68  SpO2:  [90 %-99 %] 96 %  I/O last 3 completed shifts:  In: 720 [P.O.:720]  Out: 2150 [Urine:150; Other:2000]    Constitutional: Elderly white male, awake, cooperative, no apparent distress, O2 Sats 96% on 4L via NC  Respiratory: BS vesicular bilaterally, but absent in both bases, few rhonchi, no crackles,   Cardiovascular: S1 and S2, reg, no murmur noted  GI: Soft, non-distended, non-tender, no masses, BS present+  Skin/Integumen: No rashes  Extremities: Bilat pedal edema 1+    Medications     - MEDICATION INSTRUCTIONS -       Warfarin Therapy Reminder       - MEDICATION INSTRUCTIONS -         [START ON 1/22/2018] oseltamivir  30 mg Oral Once per day on Mon Wed Fri     fluticasone-vilanterol  1 puff Inhalation Daily     insulin glargine  15 Units Subcutaneous At Bedtime     clopidogrel  75 mg Oral Daily     diltiazem  240 mg Oral Daily     latanoprost  1 drop Both Eyes At Bedtime     losartan  25 mg Oral Daily     lovastatin  40 mg Oral At Bedtime     metoprolol succinate  25 mg Oral Daily     terazosin  10 mg Oral At Bedtime     ipratropium - albuterol 0.5 mg/2.5 mg/3 mL  3  mL Nebulization Q4H While awake     methylPREDNISolone  40 mg Intravenous Q24H     dorzolamide  1 drop Both Eyes BID     timolol  1 drop Both Eyes BID     sodium chloride 0.9%  250 mL Intravenous Once in dialysis     sodium chloride 0.9%  1,000-2,000 mL Hemodialysis Machine Once     gelatin absorbable  1 each Topical During Hemodialysis (from stock)     - MEDICATION INSTRUCTIONS -   Does not apply Once     lidocaine (buffered or not buffered)  0.5 mL Intradermal Once     lidocaine (buffered or not buffered)  0.5 mL Intradermal Once     - MEDICATION INSTRUCTIONS for Dialysis Patients -   Does not apply See Admin Instructions     insulin aspart  1-7 Units Subcutaneous TID AC     insulin aspart  1-5 Units Subcutaneous At Bedtime     sodium chloride (PF)  3 mL Intracatheter Q8H       Data     Recent Labs  Lab 01/21/18  0756 01/20/18  1345 01/20/18  1310 01/20/18  1011 01/19/18  2145 01/19/18 2025   WBC  --   --   --   --   --  6.3   HGB  --  7.6*  --   --   --  8.0*   MCV  --   --   --   --   --  97   PLT  --   --   --   --   --  240   INR 1.90*  --  3.01*  --   --  2.60*   NA  --  134  --   --  136 Canceled, Test credited   POTASSIUM  --  4.7  --   --  5.0 Canceled, Test credited   CHLORIDE  --  97  --   --  99 Canceled, Test credited   CO2  --  26  --   --  28 Canceled, Test credited   BUN  --  68*  --   --  57* Canceled, Test credited   CR  --  7.65*  --   --  7.48* Canceled, Test credited   ANIONGAP  --  11  --   --  9 Canceled, Test credited   MELISSA  --  8.1*  --   --  8.2* Canceled, Test credited   GLC  --  265*  --   --  212* Canceled, Test credited   ALBUMIN  --   --   --   --  3.1* Canceled, Test credited   PROTTOTAL  --   --   --   --  6.8 Canceled, Test credited   BILITOTAL  --   --   --   --  0.4 Canceled, Test credited   ALKPHOS  --   --   --   --  66 Canceled, Test credited   ALT  --   --   --   --  25 Canceled, Test credited   AST  --   --   --   --  16 Canceled, Test credited   TROPI  --   --   --  0.068*   --   --        No results found for this or any previous visit (from the past 24 hour(s)).

## 2018-01-23 LAB
GLUCOSE BLDC GLUCOMTR-MCNC: 185 MG/DL (ref 70–99)
GLUCOSE BLDC GLUCOMTR-MCNC: 197 MG/DL (ref 70–99)
GLUCOSE BLDC GLUCOMTR-MCNC: 277 MG/DL (ref 70–99)
GLUCOSE BLDC GLUCOMTR-MCNC: 285 MG/DL (ref 70–99)
GLUCOSE BLDC GLUCOMTR-MCNC: 319 MG/DL (ref 70–99)
INR PPP: 1.51 (ref 0.86–1.14)

## 2018-01-23 PROCEDURE — A9270 NON-COVERED ITEM OR SERVICE: HCPCS | Mod: GY | Performed by: INTERNAL MEDICINE

## 2018-01-23 PROCEDURE — 99232 SBSQ HOSP IP/OBS MODERATE 35: CPT | Performed by: INTERNAL MEDICINE

## 2018-01-23 PROCEDURE — 85610 PROTHROMBIN TIME: CPT | Performed by: INTERNAL MEDICINE

## 2018-01-23 PROCEDURE — 00000146 ZZHCL STATISTIC GLUCOSE BY METER IP

## 2018-01-23 PROCEDURE — 12000007 ZZH R&B INTERMEDIATE

## 2018-01-23 PROCEDURE — 99207 ZZC CDG-MDM COMPONENT: MEETS LOW - DOWN CODED: CPT | Performed by: INTERNAL MEDICINE

## 2018-01-23 PROCEDURE — 25000132 ZZH RX MED GY IP 250 OP 250 PS 637: Mod: GY | Performed by: INTERNAL MEDICINE

## 2018-01-23 PROCEDURE — 25000125 ZZHC RX 250: Performed by: INTERNAL MEDICINE

## 2018-01-23 PROCEDURE — 25000132 ZZH RX MED GY IP 250 OP 250 PS 637: Mod: GY

## 2018-01-23 PROCEDURE — 25000131 ZZH RX MED GY IP 250 OP 636 PS 637: Mod: GY | Performed by: INTERNAL MEDICINE

## 2018-01-23 PROCEDURE — 40000275 ZZH STATISTIC RCP TIME EA 10 MIN

## 2018-01-23 PROCEDURE — 94640 AIRWAY INHALATION TREATMENT: CPT | Mod: 76

## 2018-01-23 PROCEDURE — A9270 NON-COVERED ITEM OR SERVICE: HCPCS | Mod: GY

## 2018-01-23 PROCEDURE — 94640 AIRWAY INHALATION TREATMENT: CPT

## 2018-01-23 PROCEDURE — 36415 COLL VENOUS BLD VENIPUNCTURE: CPT | Performed by: INTERNAL MEDICINE

## 2018-01-23 RX ORDER — WARFARIN SODIUM 2.5 MG/1
2.5 TABLET ORAL
Status: COMPLETED | OUTPATIENT
Start: 2018-01-23 | End: 2018-01-23

## 2018-01-23 RX ORDER — PREDNISONE 20 MG/1
40 TABLET ORAL DAILY
Status: DISCONTINUED | OUTPATIENT
Start: 2018-01-23 | End: 2018-01-24 | Stop reason: HOSPADM

## 2018-01-23 RX ADMIN — CLOPIDOGREL 75 MG: 75 TABLET, FILM COATED ORAL at 08:56

## 2018-01-23 RX ADMIN — FLUTICASONE FUROATE AND VILANTEROL TRIFENATATE 1 PUFF: 100; 25 POWDER RESPIRATORY (INHALATION) at 09:02

## 2018-01-23 RX ADMIN — DORZOLAMIDE HYDROCHLORIDE 1 DROP: 20 SOLUTION/ DROPS OPHTHALMIC at 20:52

## 2018-01-23 RX ADMIN — INSULIN ASPART 3 UNITS: 100 INJECTION, SOLUTION INTRAVENOUS; SUBCUTANEOUS at 12:31

## 2018-01-23 RX ADMIN — INSULIN ASPART 2 UNITS: 100 INJECTION, SOLUTION INTRAVENOUS; SUBCUTANEOUS at 08:53

## 2018-01-23 RX ADMIN — INSULIN ASPART 4 UNITS: 100 INJECTION, SOLUTION INTRAVENOUS; SUBCUTANEOUS at 17:11

## 2018-01-23 RX ADMIN — IPRATROPIUM BROMIDE AND ALBUTEROL SULFATE 3 ML: .5; 3 SOLUTION RESPIRATORY (INHALATION) at 06:58

## 2018-01-23 RX ADMIN — IPRATROPIUM BROMIDE AND ALBUTEROL SULFATE 3 ML: .5; 3 SOLUTION RESPIRATORY (INHALATION) at 16:30

## 2018-01-23 RX ADMIN — METOPROLOL SUCCINATE 25 MG: 25 TABLET, EXTENDED RELEASE ORAL at 08:56

## 2018-01-23 RX ADMIN — TERAZOSIN HYDROCHLORIDE 10 MG: 10 CAPSULE ORAL at 22:28

## 2018-01-23 RX ADMIN — PREDNISONE 40 MG: 20 TABLET ORAL at 16:26

## 2018-01-23 RX ADMIN — WARFARIN SODIUM 2.5 MG: 2.5 TABLET ORAL at 17:12

## 2018-01-23 RX ADMIN — LOVASTATIN 40 MG: 20 TABLET ORAL at 22:28

## 2018-01-23 RX ADMIN — IPRATROPIUM BROMIDE AND ALBUTEROL SULFATE 3 ML: .5; 3 SOLUTION RESPIRATORY (INHALATION) at 19:21

## 2018-01-23 RX ADMIN — INSULIN GLARGINE 18 UNITS: 100 INJECTION, SOLUTION SUBCUTANEOUS at 22:32

## 2018-01-23 RX ADMIN — DORZOLAMIDE HYDROCHLORIDE 1 DROP: 20 SOLUTION/ DROPS OPHTHALMIC at 08:52

## 2018-01-23 RX ADMIN — LOSARTAN POTASSIUM 25 MG: 25 TABLET ORAL at 08:56

## 2018-01-23 RX ADMIN — IPRATROPIUM BROMIDE AND ALBUTEROL SULFATE 3 ML: .5; 3 SOLUTION RESPIRATORY (INHALATION) at 10:44

## 2018-01-23 RX ADMIN — DILTIAZEM HYDROCHLORIDE 240 MG: 240 CAPSULE, EXTENDED RELEASE ORAL at 08:56

## 2018-01-23 RX ADMIN — LATANOPROST 1 DROP: 50 SOLUTION OPHTHALMIC at 22:28

## 2018-01-23 RX ADMIN — TIMOLOL MALEATE 1 DROP: 5 SOLUTION OPHTHALMIC at 20:52

## 2018-01-23 RX ADMIN — TIMOLOL MALEATE 1 DROP: 5 SOLUTION OPHTHALMIC at 08:59

## 2018-01-23 RX ADMIN — INSULIN ASPART 3 UNITS: 100 INJECTION, SOLUTION INTRAVENOUS; SUBCUTANEOUS at 08:53

## 2018-01-23 NOTE — PLAN OF CARE
Problem: Pneumonia (Adult)  Goal: Signs and Symptoms of Listed Potential Problems Will be Absent, Minimized or Managed (Pneumonia)  Signs and symptoms of listed potential problems will be absent, minimized or managed by discharge/transition of care (reference Pneumonia (Adult) CPG).   Outcome: No Change  A/O x4. VSS on 4L NC. Tele A-fib w/ CVR. Up w/ assist of 1, cane. Pt denies pain. LS expiratory wheeze. LE edema, +1. Dialysis this evening (has MWF). Tolerating renal diet.

## 2018-01-23 NOTE — PLAN OF CARE
Problem: Patient Care Overview  Goal: Plan of Care/Patient Progress Review  Outcome: Improving  A&O. 2L NC. Tele a.fib with cvr/rvr. LS diminished. Fistula LUE. Up with Ax1 and cane. BLE edema. Polyuria. Dialysis tomorrow.

## 2018-01-23 NOTE — PROGRESS NOTES
Potassium   Date Value Ref Range Status   01/22/2018 4.9 3.4 - 5.3 mmol/L Final   ]  Lab Results   Component Value Date    HGB 8.6 01/22/2018     Weight: 92.2 kg (203 lb 4.2 oz)  POST WT  DIALYSIS PROCEDURE NOTE  Hepatitis status of previous patient on machine log was checked and ok to use with this patient hepatitis status.  Patient dialyzed for 3 hrs on a 2 K bath with a  net fluid removal of 3L.  A BFR of 450 ml/min was obtained via LUE AVF.  Patient was seen by Dr. Taylor during treatment.  Total heparin received during treatment:: 0 units; pt has listed allergy to heparin.  Heparin instilled in both ports post run.  Meds given: Epogen. Complications: None.   Procedure and ESRD teaching done and questions answered.  See flowsheet in EPIC for further details and post assessment.  Machine water alarm in place and functioning.  HEPATITIS B SURFACE ANTIGEN Non-Reactive Date 12/26/12   HEPATITIS B SURFACE ANTIBODY Immune DATE 7/24/17  CHLORINE AND CHLORAMINES CHECK on WATER SYSTEM EVERY 4 hours.   PT returned via bed  Catheter Access: Aseptic prep done for both on/off.  Report received from: NICOLE Walsh RN  Report given to: NICOLE Walsh RN  Outpatient Dialysis at [facility]

## 2018-01-23 NOTE — PLAN OF CARE
Problem: Patient Care Overview  Goal: Plan of Care/Patient Progress Review  Outcome: No Change  [id number] Patient A/Ox4. 3L NC other VSS. Patient denies pain, SOB, nausea, numbness/tingling. Recieves hemodialysis. MWF. LS exp wheezing. + 1 BLE edema. Tele: A-fib CVR. Up ith 1 + cane. Tolerating dialysis diet. Plan possible d/c today. Will continue to monitor

## 2018-01-23 NOTE — PROGRESS NOTES
[facility]    Hospitalist Progress Note  Name: Griffin Walton    MRN: [id number]  Provider:  Ray Nevarez DO, FHM (Text Page)    Assessment & Plan   Summary of Stay: Griffin Walton is a 83 year old male with ESRD on HD, baseline reduced hearing/vision, who was admitted on 1/20/2018 with an acute COPD exacerbation/resp failure associated with influenza A.      1.  Acute COPD exacerbation with acute hypoxic resp failure superimposed on chronic hypoxic resp failure (baseline uses 2 liters NC at home):  -  Change solumedrol to prednisone.  He likely can wean to 30 mg daily tomorrow with further improvement.  -  Duonebs (reports he has a machine at home so these can continue after discharge)  -  O2 weaning back today to home 2 liter amount, he was requiring 4+ prior.    2.  Influenza A:  -  Trigger for #1.  Continue tamiflu.  He is on ESRD dosing with it timed on dialysis days.  He should receive a dose tomorrow.    3.  DM, uncontrolled/exacerbated by above:  -  I resumed his home meal scheduled novolog this AM at reduced dose but his glu further spike so I increased him back to his home full dose regiment of 6U AM, 6U Lunch, 4U supper along with the lantus.  I also will continue SSI and have increased his lantus tonight a couple more units.  I expect this all to improve over the next few days as the infection clears and his prednisone dose is weaned.    4.  HTN:  -  Variable BP values but overall reasonably controlled given infection.  Continue diltiazem, losartan, metoprolol.    5.  Glaucoma:  -  Eye drops to continue    6.  Hyperlipidemia:  -  Statin to continue    7.  BPH:  -  Hytrin (he is still making urine with his ESRD)    8.  ESRD on HD:  -  Nephrology assistance appreciated.  Dialysis planned again for tomorrow.    9.  Chronic anemia:  -  No overt bleeding and hgb stable.  Likely related to #8.  No indication for a current transfusion.  I will defer tx to nephrology as they monitor as  part of his dialysis regimen.    DVT Prophylaxis: PCD's, ambulating  Code Status: Full Code    Disposition Plan   Expected discharge tomorrow with continued improvement.  He felt he needed one more day with his current complaints.  I think that is reasonable given his comorbidities, recent illness trend.  Plan is assuming he continues to improve for him to have dialysis tomorrow with d/c following dialysis back to home with his wife.     Entered: Ray Nevarez 01/23/2018, 2:13 PM       Interval History   Assumed care for the day, history reviewed.  He is slowly feeling better.  He has felt generally weak and SOB, both of which he feels are better.  He has a dry cough.  He feels he needs one more day in the hospital.  He denies any new pain or other new complaints.    -Data reviewed today: I reviewed all new labs and imaging reports over the last 24 hours. I personally reviewed no images or EKG's today.    Physical Exam   Temp: 98.9  F (37.2  C) Temp src: Oral BP: 155/87 Pulse: 102 Heart Rate: 117 Resp: 16 SpO2: 92 % O2 Device: Nasal cannula Oxygen Delivery: 2 LPM  Vitals:    01/21/18 0600 01/22/18 0600   Weight: 90.3 kg (199 lb 1.2 oz) 92.2 kg (203 lb 4.2 oz)     Vital Signs with Ranges  Temp:  [98  F (36.7  C)-98.9  F (37.2  C)] 98.9  F (37.2  C)  Pulse:  [] 102  Heart Rate:  [] 117  Resp:  [15-16] 16  BP: (119-158)/(55-87) 155/87  SpO2:  [92 %-99 %] 92 %  I/O last 3 completed shifts:  In: 810 [P.O.:810]  Out: 3100 [Urine:100; Other:3000]     GEN:  Alert, oriented x 3, appears lll but comfortable, NAD.  He was finishing lunch when I saw him and he appeared to be eating well.  HEENT:  Normocephalic/atraumatic, no scleral icterus, no nasal discharge, mouth moist.  CV:  Regular rate and rhythm, distant.  No rub.  LUNGS:  Decreased breath sounds, mild exp wheezing.  No crackles/retractions.  Symmetric chest rise on inhalation noted.  ABD:  Active bowel sounds, soft, non-tender/non-distended.  No  rebound/guarding/rigidity.  EXT:  Trace LE edema.  No cyanosis.  No acute joint synovitis noted.  SKIN:  Dry to touch, no exanthems noted in the visualized areas.    Medications     - MEDICATION INSTRUCTIONS -       Warfarin Therapy Reminder         warfarin  2.5 mg Oral ONCE at 18:00     [START ON 1/24/2018] insulin aspart  6 Units Subcutaneous Daily with breakfast     insulin aspart  4 Units Subcutaneous Daily with supper     insulin aspart  3 Units Subcutaneous Once     [START ON 1/24/2018] insulin aspart  6 Units Subcutaneous Daily with lunch     oseltamivir  30 mg Oral Once per day on Mon Wed Fri     fluticasone-vilanterol  1 puff Inhalation Daily     insulin glargine  15 Units Subcutaneous At Bedtime     clopidogrel  75 mg Oral Daily     diltiazem  240 mg Oral Daily     latanoprost  1 drop Both Eyes At Bedtime     losartan  25 mg Oral Daily     lovastatin  40 mg Oral At Bedtime     metoprolol succinate  25 mg Oral Daily     terazosin  10 mg Oral At Bedtime     ipratropium - albuterol 0.5 mg/2.5 mg/3 mL  3 mL Nebulization Q4H While awake     methylPREDNISolone  40 mg Intravenous Q24H     dorzolamide  1 drop Both Eyes BID     timolol  1 drop Both Eyes BID     - MEDICATION INSTRUCTIONS for Dialysis Patients -   Does not apply See Admin Instructions     insulin aspart  1-7 Units Subcutaneous TID AC     insulin aspart  1-5 Units Subcutaneous At Bedtime     sodium chloride (PF)  3 mL Intracatheter Q8H     Data       Recent Labs  Lab 01/22/18  0807 01/20/18  1345 01/19/18 2025   WBC 7.8  --  6.3   HGB 8.6* 7.6* 8.0*   HCT 27.3*  --  26.0*   MCV 94  --  97     --  240       Recent Labs  Lab 01/19/18  2145 01/19/18 2025   CULT No growth after 3 days No growth after 3 days       Recent Labs  Lab 01/22/18  0807 01/20/18  1345 01/19/18  2145 01/19/18 2025    134 136 Canceled, Test credited   POTASSIUM 4.9 4.7 5.0 Canceled, Test credited   CHLORIDE 97 97 99 Canceled, Test credited   CO2 27 26 28 Canceled,  Test credited   ANIONGAP 10 11 9 Canceled, Test credited   * 265* 212* Canceled, Test credited   BUN 78* 68* 57* Canceled, Test credited   CR 6.93* 7.65* 7.48* Canceled, Test credited   GFRESTIMATED 8* 7* 7* Canceled, Test credited   GFRESTBLACK 9* 8* 8* Canceled, Test credited   MELISSA 8.0* 8.1* 8.2* Canceled, Test credited   PHOS 5.8*  --   --   --    PROTTOTAL  --   --  6.8 Canceled, Test credited   ALBUMIN 3.2*  --  3.1* Canceled, Test credited   BILITOTAL  --   --  0.4 Canceled, Test credited   ALKPHOS  --   --  66 Canceled, Test credited   AST  --   --  16 Canceled, Test credited   ALT  --   --  25 Canceled, Test credited       No results found for this or any previous visit (from the past 24 hour(s)).

## 2018-01-23 NOTE — PROGRESS NOTES
[facility]    Hospitalist Progress Note  Miguel A Art MD    Assessment & Plan     83 year old male with PmHx of Type 2 DM, Gout, peripheral vascular disease, hypertension, Abdominal aortic aneursym, partial left nephrectomy due to cancer, gout, who was admitted on 1/19/18 due to shortness of breath and prodcutive cough for 4 days. He was found to have O2 Sats of 85% on RA at [facility]. Work up done on 1/19/18 revealed, CMP significant for BUN 57, Creat 7.48, Alb 3.1. CBC revealed, Hb 8.0, WBC 6.3 and Plts 240. Lactic acid 1.1, N terminal pro-BNP was 72563. Procalitonin was 0.6. Chest x-rays on 1/19/18 was chronic-appearing interstitial densities within the lungs. Blunting of the costophrenic angles, likely representing small pleural effusions with adjacent atelectasis. CT Chest on 1/19/18 revealed, moderate bilateral pleural effusions, new since 9/8/2017. Pulmonary emphysematous/fibrotic changes. Vitals on admission revealed, temp 100.3F.        1. COPD with Acute hypoxic respiratory failure: continue 4L via NC and wean as tolerated. Contnue scheduled duonebs and alb nebs prn. Continue IV solu-medrol. Continue fluticasone-vilanterol inh. For Robitussin DM prn.     2. Acute Influenza A infection: Influenza by PCR on 1/19/18 was positive. Continue Tamilfu.       3. End-stage kidney disease: pt gets hemodialysis Mon/Wed/Fri as an outpt. Appreciate Nephrology input.      4. Atrial fibrillation: HR ranges 60-90. Continue plavix, warfarin and diltiazem XR. INR was 2.6 on 1/19/18. ECHO on 1/4/18 revealed, LVEF 55-60%, mild MR, mild concentric LVH.   LA is moderately, RA is normal.     5. Hypertension: SBP is in the 110s to 150s. Continue diltiazem 240 mg daily, losartan   50 mg daily, metoprolol 25 mg daily     6. Glaucoma: Continue eyedrops.     7. Type 2 Diabetes mellitus:  BG was 209 this am. HbA1C was 6.3% on 12/19/17. Continue lantus qhs. For medium dose insulin aspart  sliding scale prn.      8. Hyperlipidemia: Continue lovastatin.    9. BPH: continue Hytrin.     10. Insomnia: for trazodone prn.       DVT Prophylaxis: Ambulate every shift  Code Status: Full Code    Disposition: Expected discharge in 1 day.      Interval History   The pt reported that he did not sleep well last night. He is coughing less sputum today.   No complaints of shortness of breath/chest pain.    -Data reviewed today: I reviewed all new labs and imaging results over the last 24 hours. I personally reviewed no images or EKG's today.    Physical Exam   Temp: 98  F (36.7  C) (Simultaneous filing. User may not have seen previous data.) Temp src: Oral BP: 131/67 Pulse: 70 Heart Rate: 67 Resp: 16 SpO2: 99 % O2 Device: Nasal cannula Oxygen Delivery: 4 LPM  Vitals:    01/21/18 0600 01/22/18 0600   Weight: 90.3 kg (199 lb 1.2 oz) 92.2 kg (203 lb 4.2 oz)     Vital Signs with Ranges  Temp:  [97.9  F (36.6  C)-98.6  F (37  C)] 98  F (36.7  C)  Pulse:  [66-93] 70  Heart Rate:  [66-97] 67  Resp:  [16] 16  BP: (118-158)/(61-82) 131/67  SpO2:  [95 %-100 %] 99 %  I/O last 3 completed shifts:  In: 1170 [P.O.:1170]  Out: 325 [Urine:325]    Constitutional: Elderly white male, awake, cooperative, no apparent distress, O2 Sats 99% on 4L via NC  Respiratory: BS vesicular bilaterally, but absent in both bases, few exp wheezes, no crackles  Cardiovascular: S1 and S2, reg, no murmur noted  GI: Soft, non-distended, non-tender, no masses, BS present+  Skin/Integumen: No rashes  Extremities: Bilat pedal edema 1+    Medications     - MEDICATION INSTRUCTIONS -       - MEDICATION INSTRUCTIONS -       Warfarin Therapy Reminder         sodium chloride 0.9%  250 mL Intravenous Once in dialysis     sodium chloride 0.9%  1,000-2,000 mL Hemodialysis Machine Once     gelatin absorbable  1 each Topical During Hemodialysis (from stock)     - MEDICATION INSTRUCTIONS -   Does not apply Once     epoetin seda (EPOGEN,PROCRIT) inj ESRD  10,000 Units  Intravenous Once     lidocaine (buffered or not buffered)  0.5 mL Intradermal Once     lidocaine (buffered or not buffered)  0.5 mL Intradermal Once     warfarin  4 mg Oral ONCE at 18:00     oseltamivir  30 mg Oral Once per day on Mon Wed Fri     fluticasone-vilanterol  1 puff Inhalation Daily     insulin glargine  15 Units Subcutaneous At Bedtime     clopidogrel  75 mg Oral Daily     diltiazem  240 mg Oral Daily     latanoprost  1 drop Both Eyes At Bedtime     losartan  25 mg Oral Daily     lovastatin  40 mg Oral At Bedtime     metoprolol succinate  25 mg Oral Daily     terazosin  10 mg Oral At Bedtime     ipratropium - albuterol 0.5 mg/2.5 mg/3 mL  3 mL Nebulization Q4H While awake     methylPREDNISolone  40 mg Intravenous Q24H     dorzolamide  1 drop Both Eyes BID     timolol  1 drop Both Eyes BID     - MEDICATION INSTRUCTIONS for Dialysis Patients -   Does not apply See Admin Instructions     insulin aspart  1-7 Units Subcutaneous TID AC     insulin aspart  1-5 Units Subcutaneous At Bedtime     sodium chloride (PF)  3 mL Intracatheter Q8H       Data     Recent Labs  Lab 01/22/18  0807 01/21/18  0756 01/20/18  1345 01/20/18  1310 01/20/18  1011 01/19/18  2145 01/19/18 2025   WBC 7.8  --   --   --   --   --  6.3   HGB 8.6*  --  7.6*  --   --   --  8.0*   MCV 94  --   --   --   --   --  97     --   --   --   --   --  240   INR 1.34* 1.90*  --  3.01*  --   --  2.60*     --  134  --   --  136 Canceled, Test credited   POTASSIUM 4.9  --  4.7  --   --  5.0 Canceled, Test credited   CHLORIDE 97  --  97  --   --  99 Canceled, Test credited   CO2 27  --  26  --   --  28 Canceled, Test credited   BUN 78*  --  68*  --   --  57* Canceled, Test credited   CR 6.93*  --  7.65*  --   --  7.48* Canceled, Test credited   ANIONGAP 10  --  11  --   --  9 Canceled, Test credited   MELISSA 8.0*  --  8.1*  --   --  8.2* Canceled, Test credited   *  --  265*  --   --  212* Canceled, Test credited   ALBUMIN 3.2*  --   --    --   --  3.1* Canceled, Test credited   PROTTOTAL  --   --   --   --   --  6.8 Canceled, Test credited   BILITOTAL  --   --   --   --   --  0.4 Canceled, Test credited   ALKPHOS  --   --   --   --   --  66 Canceled, Test credited   ALT  --   --   --   --   --  25 Canceled, Test credited   AST  --   --   --   --   --  16 Canceled, Test credited   TROPI  --   --   --   --  0.068*  --   --        No results found for this or any previous visit (from the past 24 hour(s)).

## 2018-01-24 ENCOUNTER — CARE COORDINATION (OUTPATIENT)
Dept: CARE COORDINATION | Facility: CLINIC | Age: 83
End: 2018-01-24

## 2018-01-24 VITALS
BODY MASS INDEX: 30.28 KG/M2 | DIASTOLIC BLOOD PRESSURE: 94 MMHG | HEART RATE: 106 BPM | TEMPERATURE: 98.2 F | RESPIRATION RATE: 16 BRPM | WEIGHT: 193.34 LBS | SYSTOLIC BLOOD PRESSURE: 148 MMHG | OXYGEN SATURATION: 96 %

## 2018-01-24 DIAGNOSIS — E11.21 TYPE 2 DIABETES MELLITUS WITH DIABETIC NEPHROPATHY, UNSPECIFIED LONG TERM INSULIN USE STATUS: ICD-10-CM

## 2018-01-24 LAB
GLUCOSE BLDC GLUCOMTR-MCNC: 216 MG/DL (ref 70–99)
GLUCOSE BLDC GLUCOMTR-MCNC: 246 MG/DL (ref 70–99)
INR PPP: 1.63 (ref 0.86–1.14)

## 2018-01-24 PROCEDURE — A9270 NON-COVERED ITEM OR SERVICE: HCPCS | Mod: GY | Performed by: INTERNAL MEDICINE

## 2018-01-24 PROCEDURE — 63400005 ZZH RX 634: Performed by: INTERNAL MEDICINE

## 2018-01-24 PROCEDURE — 94640 AIRWAY INHALATION TREATMENT: CPT | Mod: 76

## 2018-01-24 PROCEDURE — 25000128 H RX IP 250 OP 636: Performed by: INTERNAL MEDICINE

## 2018-01-24 PROCEDURE — 94640 AIRWAY INHALATION TREATMENT: CPT

## 2018-01-24 PROCEDURE — 25000125 ZZHC RX 250: Performed by: INTERNAL MEDICINE

## 2018-01-24 PROCEDURE — 40000275 ZZH STATISTIC RCP TIME EA 10 MIN

## 2018-01-24 PROCEDURE — 99239 HOSP IP/OBS DSCHRG MGMT >30: CPT | Performed by: INTERNAL MEDICINE

## 2018-01-24 PROCEDURE — 00000146 ZZHCL STATISTIC GLUCOSE BY METER IP

## 2018-01-24 PROCEDURE — 85610 PROTHROMBIN TIME: CPT | Performed by: INTERNAL MEDICINE

## 2018-01-24 PROCEDURE — 90937 HEMODIALYSIS REPEATED EVAL: CPT

## 2018-01-24 PROCEDURE — 25000132 ZZH RX MED GY IP 250 OP 250 PS 637: Mod: GY | Performed by: INTERNAL MEDICINE

## 2018-01-24 RX ORDER — WARFARIN SODIUM 2 MG/1
4 TABLET ORAL
Status: DISCONTINUED | OUTPATIENT
Start: 2018-01-24 | End: 2018-01-24 | Stop reason: HOSPADM

## 2018-01-24 RX ORDER — PREDNISONE 10 MG/1
TABLET ORAL
Qty: 12 TABLET | Refills: 0 | Status: SHIPPED | OUTPATIENT
Start: 2018-01-24 | End: 2018-01-29

## 2018-01-24 RX ORDER — ALBUMIN, HUMAN INJ 5% 5 %
250 SOLUTION INTRAVENOUS
Status: DISCONTINUED | OUTPATIENT
Start: 2018-01-24 | End: 2018-01-24

## 2018-01-24 RX ORDER — INSULIN GLARGINE 100 [IU]/ML
INJECTION, SOLUTION SUBCUTANEOUS
Qty: 3 ML | Refills: 11
Start: 2018-01-24 | End: 2018-01-30

## 2018-01-24 RX ADMIN — DILTIAZEM HYDROCHLORIDE 240 MG: 240 CAPSULE, EXTENDED RELEASE ORAL at 13:20

## 2018-01-24 RX ADMIN — METOPROLOL SUCCINATE 25 MG: 25 TABLET, EXTENDED RELEASE ORAL at 13:20

## 2018-01-24 RX ADMIN — SODIUM CHLORIDE 1000 ML: 9 INJECTION, SOLUTION INTRAVENOUS at 08:29

## 2018-01-24 RX ADMIN — PREDNISONE 40 MG: 20 TABLET ORAL at 13:20

## 2018-01-24 RX ADMIN — Medication: at 08:30

## 2018-01-24 RX ADMIN — CLOPIDOGREL 75 MG: 75 TABLET, FILM COATED ORAL at 13:20

## 2018-01-24 RX ADMIN — LOSARTAN POTASSIUM 25 MG: 25 TABLET ORAL at 13:20

## 2018-01-24 RX ADMIN — IPRATROPIUM BROMIDE AND ALBUTEROL SULFATE 3 ML: .5; 3 SOLUTION RESPIRATORY (INHALATION) at 11:37

## 2018-01-24 RX ADMIN — INSULIN ASPART 2 UNITS: 100 INJECTION, SOLUTION INTRAVENOUS; SUBCUTANEOUS at 08:15

## 2018-01-24 RX ADMIN — TIMOLOL MALEATE 1 DROP: 5 SOLUTION OPHTHALMIC at 10:25

## 2018-01-24 RX ADMIN — DORZOLAMIDE HYDROCHLORIDE 1 DROP: 20 SOLUTION/ DROPS OPHTHALMIC at 10:25

## 2018-01-24 RX ADMIN — FLUTICASONE FUROATE AND VILANTEROL TRIFENATATE 1 PUFF: 100; 25 POWDER RESPIRATORY (INHALATION) at 10:25

## 2018-01-24 RX ADMIN — EPOETIN ALFA 3000 UNITS: 3000 SOLUTION INTRAVENOUS; SUBCUTANEOUS at 10:05

## 2018-01-24 RX ADMIN — OSELTAMIVIR PHOSPHATE 30 MG: 30 CAPSULE ORAL at 13:20

## 2018-01-24 RX ADMIN — EPOETIN ALFA 3400 UNITS: 10000 SOLUTION INTRAVENOUS; SUBCUTANEOUS at 10:03

## 2018-01-24 RX ADMIN — SODIUM CHLORIDE 250 ML: 9 INJECTION, SOLUTION INTRAVENOUS at 08:29

## 2018-01-24 RX ADMIN — IPRATROPIUM BROMIDE AND ALBUTEROL SULFATE 3 ML: .5; 3 SOLUTION RESPIRATORY (INHALATION) at 07:18

## 2018-01-24 NOTE — PLAN OF CARE
Problem: Patient Care Overview  Goal: Plan of Care/Patient Progress Review  Outcome: No Change  A & O, VSS 2 L via NC. BS +, flatus +. LS diminished w/ expiratory wheeze. Denies pain. Ambulates w/ assist of 1, cane. Tele a-fib w/ CVR. Trace edema +1 throughout. Bruit and thrill present in AV fistula.

## 2018-01-24 NOTE — PROGRESS NOTES
Clinic Care Coordination Contact    Situation: Patient chart reviewed by care coordinator.    Background: Patient currently hospitalized at [facility]. RN CC received CTS as follows:  Transition Communication Hand-off for Care Transitions to Next Level of Care Provider  Name: Griffin Walton  MRN #: [id number]  Primary Care Provider: Stefanie Louis  Primary Clinic: [address] [facility] [address]  Primary Care Clinic Name: [facility]  Reason for Hospitalization:  hypoxia, chf  Acute respiratory failure with hypoxia (H)  Acute respiratory failure with hypoxia (H)  Admit Date/Time: 1/20/2018  2:37 AM  Discharge Date: 1/24/2018  Payor Source: Payor: MEDICARE / Plan: MEDICARE / Product Type: Medicare /   Readmission Assessment Measure (AGUILA) Risk Score/category: Average  Reason for Communication Hand-off Referral: Admission diagnoses: COPD  Discharge Plan: discharge to home with spouse help  Concern for non-adherence with plan of care: N  Discharge Needs Assessment:  Needs        Most Recent Value     # of Referrals Placed by CTS Scheduled Follow-up appointments, Communication hand-offs to next level of Care Providers      Already enrolled in Tele-monitoring program and name of program:  No  Follow-up specialty is recommended: No    Follow-up plan:  Future Appointments  Date Time Provider Department Center   1/25/2018 9:30 AM [facility]AG [facility]   1/30/2018 10:20 AM Stefanie Louis APRN CNP [facility]   1/31/2018 3:00 PM MarchMac MD [facility] PSA CLIN   2/27/2018 10:00 AM Geraldo Ware MD [facility]   5/15/2018 1:00 PM  PFL A [facility]   5/15/2018 2:00 PM Perlman, David Morris, MD [facility]   Any outstanding tests or procedures:    Key Recommendations:  Home today with follow up. Hospitalized for COPD exacerbation and influenza A. Home on steroid taper.     Assessment: Per chart review, plan is foe TERRI today but he remains hospitalized at this  time.    Plan/Recommendations: RN CC will f/u tomorrow.    Guillermo VERDUGO,RN- BC  Clinic Care Coordinator  [facility]  Phone: [phone]

## 2018-01-24 NOTE — PROGRESS NOTES
Inpatient Dialysis Progress Note        Assessment and Plan:     Griffin Walton is a 83 year old male with ESRD who was admitted on 1/20/2018.       # Influenza A. On Tamiflu. He is improving.      # Exacerbation of chronic lung disease ? On methylpred.       # ESRD.  He has HD MWF in [facility] under Dr. Keene.  YECENIA AVF, 3 H. TW 92.3 kg, 15 ga,  mL/min.  No heparin as he has allergy.      # Anemia:  On EPO 6400 units.  HGB was 8.5 last week.        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     # Atria fibrillation                         -dilt/metoprolo/coumadin     Plan.   # Goal UF ~ 2.3 liters  # Epogen 6400 units         Interval History:     Patient is getting hemodialysis. Overall, he continues to improve. No new complaints.         Dialysis Parameters:     Vitals:    01/21/18 0600 01/22/18 0600 01/24/18 0500   Weight: 90.3 kg (199 lb 1.2 oz) 92.2 kg (203 lb 4.2 oz) 87.7 kg (193 lb 5.5 oz)     I/O last 3 completed shifts:  In: 480 [P.O.:480]  Out: 185 [Urine:185]  Temp:  [98.2  F (36.8  C)-98.9  F (37.2  C)] 98.2  F (36.8  C)  Pulse:  [] 61  Heart Rate:  [] 106  Resp:  [16] 16  BP: (115-155)/(59-87) 135/80  SpO2:  [92 %-96 %] 93 %    Current Weight: 87.7  Dry Weight: 92.3?  Dialysis Temp: 36.5  C  Access Device: AVF  Access Site: left arm  Dialyzer: revaclear  Dialysis Bath: 2K  Sodium Profile: none  UF Goal: ~ 2.3  liters  Blood Flow Rate (mL/min): 400  Total Treatment Time (hrs): 3  Heparin: none    Review of Systems:          Medications:     EPO dose: 6400 units       Physical Exam:     Vitals were reviewed  Patient Vitals for the past 24 hrs:   BP Temp Temp src Pulse Heart Rate Resp SpO2 Weight   01/24/18 0930 135/80 - - - 106 - - -   01/24/18 0915 115/65 - - - 92 - - -   01/24/18 0900 115/69 - - - 110 - - -   01/24/18 0845 143/65 - - - 92 - - -   01/24/18 0830 136/66 - - - 98 - - -   01/24/18 0815 149/73 - - - 100 - - -   01/24/18 0800 152/81 - - - 98 - - -   01/24/18  0730 149/81 98.2  F (36.8  C) Oral - 95 16 93 % -   18 0500 - - - - - - - 87.7 kg (193 lb 5.5 oz)   18 0000 136/61 98.4  F (36.9  C) Oral - 63 16 95 % -   188 129/59 98.3  F (36.8  C) Oral 61 67 16 92 % -   18 1921 - - - - - - 96 % -   18 1547 150/70 98.7  F (37.1  C) - 93 98 16 93 % -   18 1153 155/87 98.9  F (37.2  C) Oral 102 - 16 92 % -   18 1044 - - - - - - 94 % -       Temperatures:  Current - Temp: 98.2  F (36.8  C); Max - Temp  Av.5  F (36.9  C)  Min: 98.2  F (36.8  C)  Max: 98.9  F (37.2  C)  Respiration range: Resp  Av  Min: 16  Max: 16  Pulse range: Pulse  Av.3  Min: 61  Max: 102  Blood pressure range: Systolic (24hrs), Av , Min:115 , Max:155   ; Diastolic (24hrs), Av, Min:59, Max:87    Pulse oximetry range: SpO2  Av.6 %  Min: 92 %  Max: 96 %  I/O last 3 completed shifts:  In: 480 [P.O.:480]  Out: 185 [Urine:185]    Intake/Output Summary (Last 24 hours) at 18 0947  Last data filed at 18 0000   Gross per 24 hour   Intake              240 ml   Output              185 ml   Net               55 ml       GENERAL APPEARANCE: NAD  HEENT:  Eyes/ears/nose/neck grossly normal  RESP: Good airflow anteriorly. No wheezes.  CV: irregular,  nl S1/S2, no m/r/g   ABDOMEN: obese, soft, NT  EXTREMITIES/SKIN: no rashes/lesions on observed skin; trace edema  Neuro: eyes close, answering questions.      Data:     Recent Labs   Lab Test  18   0807  18   1345   NA  134  134   POTASSIUM  4.9  4.7   CHLORIDE  97  97   CO2  27  26   ANIONGAP  10  11   GLC  205*  265*   BUN  78*  68*   CR  6.93*  7.65*   MELISSA  8.0*  8.1*       Hemoglobin   Date Value Ref Range Status   2018 8.6 (L) 13.3 - 17.7 g/dL Final              Oscar Calderon Taylor MD

## 2018-01-24 NOTE — PROGRESS NOTES
Potassium   Date Value Ref Range Status   01/22/2018 4.9 3.4 - 5.3 mmol/L Final   ]  Lab Results   Component Value Date    HGB 8.6 01/22/2018     Weight: 87.7 kg (193 lb 5.5 oz)    DIALYSIS PROCEDURE NOTE    Patient dialyzed for 3 hrs. on a 2 K bath with a net fluid removal of  2L.  A BFR of 400 ml/min was obtained via a LUAF using 15guage needles and all connections were secured. The patient was seen by Dr. Taylor during the treatment.  Total heparin received during the treatment: 0 units. Sites held x 12 min then  pressure drsgs applied.  Meds  given:epogen Complications: none.  Procedure and ESRD teaching done and questions answered. See flowsheet in EPIC for further details.  Hepatitis B labs verified on machine log from previous patient.  Medical aseptic prep utilized  Patient dialyzed at bedside.   Water alarm in place and used.  [facility] consent form signed yes  Incapacitated dialysis nurse policy reviewed with Caitlin Natarajan RN pre-treatment.

## 2018-01-24 NOTE — PLAN OF CARE
Problem: Patient Care Overview  Goal: Plan of Care/Patient Progress Review  Outcome: No Change  A/Ox4. VSS on 2L NC. Tele A-fib w/ CVR. Up w/ assist of 1, cane. Pt denies pain. LS diminished. IS encouraged. BLE edema, +1. Tolerating dialysis diet. Hemodialysis tomorrow (MWF).

## 2018-01-24 NOTE — DISCHARGE SUMMARY
[facility]    Discharge Summary  Hospitalist    Date of Admission:  1/20/2018  Date of Discharge:  1/24/2018  Discharging Provider: Ozzie Small MD    Discharge Diagnoses   Acute COPD exacerbation  Influenza A  Acute hypoxic respiratory insufficiency, improved  Moderate bilateral pleural effusions    History of Present Illness   Griffin Walton is an 83 year old male with PMH of ESRD on HD, baseline reduced hearing/vision, DM2, PVD, HTN, admitted with hypoxia and low grade fever 100.3F and found to have influenza A and COPD exacerbation. A CT chest revealed moderate bilateral pleural effusions new since 9/2017, emphysematous changes, but not evidence of PNA. During this admission patient underwent regular HD and was treated for COPD exacerbation with nebulizers and steroids. He received renal dosing Tamiflu this admission. At discharge patient was able to wean back to his home dose of O2 and also ambulate near his baseline functional status. Given his slightly prolonged recovery (5 days inpatient), he will be discharged on a short 6 day taper of steroids with f/u with PCP in approx 7 days.    ESRD on HD: Continue MWF inpatient HD    Afib: Resume Warfarin, Plavix    DM2: Increase PTA Basaglar to 20 units qHS while on steroids, then resume PTA Basaglar 15 units at bedtime on 1/30/18    Bilateral pleural effusions: Likely a combination of influenza A and ESRD, I expect this to improve.  - Consider follow up CXR in 2-3 weeks, will defer to PCP    Hospital Course   Griffin Walton was admitted on 1/20/2018.  The following problems were addressed during his hospitalization:    Active Problems:    Acute respiratory failure with hypoxia (H)      Ozzie Small MD    Significant Results and Procedures   None    Pending Results   These results will be followed up by PCP  Unresulted Labs Ordered in the Past 30 Days of this Admission     Date and Time Order Name Status Description     1/19/2018 2022 Blood culture Preliminary     1/19/2018 2022 Blood culture Preliminary           Code Status   Full Code       Primary Care Physician   Stefanie Louis    Physical Exam   Temp: 98.2  F (36.8  C) Temp src: Oral BP: 122/73 Pulse: 61 Heart Rate: 102 Resp: 16 SpO2: 93 % O2 Device: Nasal cannula Oxygen Delivery: 2 LPM  Vitals:    01/21/18 0600 01/22/18 0600 01/24/18 0500   Weight: 90.3 kg (199 lb 1.2 oz) 92.2 kg (203 lb 4.2 oz) 87.7 kg (193 lb 5.5 oz)     Vital Signs with Ranges  Temp:  [98.2  F (36.8  C)-98.9  F (37.2  C)] 98.2  F (36.8  C)  Pulse:  [] 61  Heart Rate:  [] 102  Resp:  [16] 16  BP: (115-155)/(59-87) 122/73  SpO2:  [92 %-96 %] 93 %  I/O last 3 completed shifts:  In: 480 [P.O.:480]  Out: 185 [Urine:185]    Constitutional: Male in NAD  Eyes: PERRL, nonicteric, normal ocular movements  HEENT: Normocephalic, atraumatic, oral mucosa moist  Respiratory: Scattered wheezing with mildly decreased air flow bilaterally  Cardiovascular: RRR, normal S1/2, no m/r/g  GI: No organomegaly, normoactive bowel sounds, nontender, nondistended  Skin: No rashes or scars  Musculoskeletal: Normal strength in UE and LE, moves all extremities  Neurologic: A&Ox3  Psychiatric: Appropriate affect and mood    Discharge Disposition   Discharged to home  Condition at discharge: Stable    Consultations This Hospital Stay   PHARMACY TO DOSE WARFARIN  NEPHROLOGY IP CONSULT    Time Spent on this Encounter   I, Ozzie Small, personally saw the patient today and spent greater than 30 minutes discharging this patient.    Discharge Orders     Reason for your hospital stay   You were hospitalized for the flu and COPD exacerbation     Follow-up and recommended labs and tests    Follow up with primary care provider, Stefanie Louis, within 7 days for hospital follow up.     Activity   Your activity upon discharge: activity as tolerated     Full Code     Diet   Follow this diet upon discharge: Orders Placed This  Encounter     Combination Diet Dialysis Diet; 2000-2400 Calories: High Consistent CHO (4-7 CHO units/meal)       Discharge Medications   Current Discharge Medication List      START taking these medications    Details   predniSONE (DELTASONE) 10 MG tablet Take 3 tablets (30 mg) daily for two days, then 2 tablets for two days, then one tablet for 2 days.  Qty: 12 tablet, Refills: 0    Associated Diagnoses: Chronic obstructive pulmonary disease, unspecified COPD type (H)         CONTINUE these medications which have NOT CHANGED    Details   LOSARTAN POTASSIUM PO Take 25 mg by mouth daily      B Complex-C-Folic Acid (DIALYVITE PO) Take 1 tablet by mouth daily      BASAGLAR 100 UNIT/ML injection Inject 15 Units Subcutaneous At Bedtime  Qty: 3 mL, Refills: 11    Comments: Do not refill until patient calls  Associated Diagnoses: Controlled type 2 diabetes mellitus with diabetic nephropathy, with long-term current use of insulin (H)      diltiazem (CARDIZEM CD) 240 MG 24 hr capsule Take 1 capsule (240 mg) by mouth daily  Qty: 90 capsule, Refills: 3    Comments: Profile Rx: patient will contact pharmacy when needed  Associated Diagnoses: Paroxysmal atrial fibrillation (H)      metoprolol (TOPROL XL) 25 MG 24 hr tablet Take 1 tablet (25 mg) by mouth daily  Qty: 90 tablet, Refills: 3    Comments: Profile Rx: patient will contact pharmacy when needed  Associated Diagnoses: Essential hypertension      lovastatin (MEVACOR) 40 MG tablet Profile Rx: patient will contact pharmacy when needed  TAKE 1 AND 1/2 TABLETS BY MOUTH ONCE A DAY WITH DINNER. 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  Qty: 3 Inhaler, Refills: 3    Associated Diagnoses: Chronic obstructive pulmonary disease, unspecified COPD type (H)      insulin aspart (NOVOLOG FLEXPEN) 100 UNIT/ML injection 6 units before breakfast, 6 units before lunch, 4 units before dinner  Qty: 15 mL, Refills: 11    Associated Diagnoses: Controlled type 2 diabetes mellitus with diabetic nephropathy, with long-term current use of insulin (H)      bumetanide (BUMEX) 1 MG tablet TAKE 1 TABLET (1 MG) BY MOUTH 2 TIMES DAILY  Qty: 180 tablet, Refills: 2    Associated Diagnoses: Benign essential hypertension      latanoprost (XALATAN) 0.005 % ophthalmic solution Place 1 drop into both eyes At Bedtime      Acetaminophen (TYLENOL PO) Take 650 mg by mouth nightly as needed      dorzolamide-timolol (COSOPT) 2-0.5 % ophthalmic solution Place 1 drop into both eyes 2 times daily      warfarin (COUMADIN) 2 MG tablet Take 4 mg daily as directed by the Anticoagulation Clinic  Qty: 30 tablet      terazosin (HYTRIN) 10 MG capsule Profile Rx: patient will contact pharmacy when needed  TAKE 1 CAPSULE (10 MG) BY MOUTH AT BEDTIME  Qty: 90 capsule, Refills: 3    Associated Diagnoses: Benign non-nodular prostatic hyperplasia with lower urinary tract symptoms      insulin pen needle (NOVOFINE) 30G X 8 MM Use 3 daily or as directed.  Qty: 300 each, Refills: 3    Associated Diagnoses: Type 2 diabetes mellitus with diabetic nephropathy, unspecified long term insulin use status (H)      blood glucose monitoring (ONE TOUCH ULTRA) test strip test 2 times daily  Profile Rx:  patient will contact pharmacy when needed  Qty: 60 each, Refills: 11    Associated Diagnoses: Type 2 diabetes mellitus with diabetic nephropathy, with long-term current use of insulin (H)      blood glucose monitoring (NO BRAND SPECIFIED) meter device kit Use to test blood sugar 3 times daily or as directed.  Qty: 1 kit, Refills: 0    Associated Diagnoses: Controlled type 2 diabetes mellitus with diabetic nephropathy, with long-term current use of insulin (H)      !! order for DME Equipment being ordered: Oxygen- HOME and portable. Georgie Coulter CMA Medical Assistant Signed  Service date: 05/24/2016 10:48 AM       O2 Sat on Room air at rest:84%  O2 sat on room air with walk: 82-91%  O2 Sat on 1L of oxygen with walk 91-93%  O2 Sat on 2 L of Oxygen with walk 91-96%  O2 Sat on 1 L O2 at rest 94%  Qty: 2 each, Refills: prn    Associated Diagnoses: Cough; Hypoxia; Chronic obstructive pulmonary disease, unspecified COPD type (H); Chronic obstructive pulmonary disease with acute exacerbation (H); 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); Type 2 diabetes mellitus with diabetic nephropathy (H)      !! order for DME Nebulizer machine Qty #1  Qty: 1 Units, Refills: 0    Associated Diagnoses: Fever, unspecified; Cough; Chronic obstructive pulmonary disease, unspecified COPD type (H); Acute bronchospasm      !! ORDER FOR DME Equipment being ordered: Walker with wheels and brakes, and a seat  Qty: 1 each, Refills: 0    Associated Diagnoses: ESRD (end stage renal disease) on dialysis (H); Cerebral embolism with cerebral infarction (H)       !! - Potential duplicate medications found. Please discuss with provider.        Allergies   Allergies   Allergen Reactions     Hydralazine Shortness Of Breath and Swelling     Aspirin Swelling     Heparin Flush Other (See Comments)     thrombocytopenia     Monosodium Glutamate Swelling     Data   Most Recent 3 CBC's:  Recent Labs   Lab Test  01/22/18   0807   01/20/18   1345  01/19/18 2025 12/22/17   1425   WBC  7.8   --   6.3  9.8   HGB  8.6*  7.6*  8.0*  8.1*   MCV  94   --   97  93   PLT  237   --   240  209      Most Recent 3 BMP's:  Recent Labs   Lab Test  01/22/18   0807  01/20/18   1345  01/19/18   2145   NA  134  134  136   POTASSIUM  4.9  4.7  5.0   CHLORIDE  97  97  99   CO2  27  26  28   BUN  78*  68*  57*   CR  6.93*  7.65*  7.48*   ANIONGAP  10  11  9   MELISSA  8.0*  8.1*  8.2*   GLC  205*  265*  212*     Most Recent 2 LFT's:  Recent Labs   Lab Test  01/19/18 2145 01/19/18 2025   AST  16  Canceled, Test credited   ALT  25  Canceled, Test credited   ALKPHOS  66  Canceled, Test credited   BILITOTAL  0.4  Canceled, Test credited     Most Recent INR's and Anticoagulation Dosing History:  Anticoagulation Dose History     Recent Dosing and Labs Latest Ref Rng & Units 1/11/2018 1/19/2018 1/20/2018 1/21/2018 1/22/2018 1/23/2018 1/24/2018    Warfarin 2 mg - - - - 2 mg 4 mg - -    Warfarin 2.5 mg - - - - - - 2.5 mg -    INR 0.86 - 1.14 - 2.60(H) 3.01(H) 1.90(H) 1.34(H) 1.51(H) 1.63(H)    INR 0.86 - 1.14 2.3(A) - - - - - -        Most Recent 3 Troponin's:  Recent Labs   Lab Test  01/20/18   1011  12/22/17   1425  11/14/17   0540   TROPI  0.068*  0.027  0.137*     Most Recent Cholesterol Panel:  Recent Labs   Lab Test  07/11/17   0858   CHOL  105   LDL  44   HDL  50   TRIG  56     Most Recent 6 Bacteria Isolates From Any Culture (See EPIC Reports for Culture Details):  Recent Labs   Lab Test  01/19/18   2145  01/19/18 2025 11/14/17   0725  11/14/17   0612  11/14/17   0540  01/10/14   1145   CULT  No growth after 4 days  No growth after 4 days  No growth  <10,000 colonies/mL  mixed urogenital kiera    Susceptibility testing not routinely done  No growth  No growth     Most Recent TSH, T4 and A1c Labs:  Recent Labs   Lab Test  12/19/17   1203  08/08/17   1059   02/29/12   1543   TSH   --   2.11   < >  5.32*   T4   --    --    --   1.30   A1C  6.3*   --    < >    --     < > = values in this interval not displayed.     Results for orders placed or performed during the hospital encounter of 01/19/18   Chest CT w/o contrast    Narrative    CT CHEST WITHOUT CONTRAST 1/19/2018 10:08 PM     HISTORY: COPD with hypoxia. On Coumadin. History of pneumonia. Non  contrast due to poor renal function.    Radiation dose for this scan was reduced using automated exposure  control, adjustment of the mA and/or kV according to patient size, or  iterative reconstruction technique.    FINDINGS: Bilateral moderate-sized pleural effusions are noted with  adjacent parenchymal opacity likely representing relaxation  atelectasis. Emphysematous and fibrotic changes are present throughout  both lungs. No pneumothorax. Aortic and coronary artery calcifications  are noted. Nonenlarged mediastinal lymph nodes are noted. The ave are  suboptimally evaluated without contrast enhancement. Density within  the gallbladder could represent gallstones within a contracted  thick-walled gallbladder. Left renal atrophy is noted. The right  kidney is not included on the scan. On the distal margin of the scan,  there may be an aortic aneurysm.      Impression    IMPRESSION:  1. Moderate bilateral pleural effusions, new since 9/8/2017.  2. Pulmonary emphysematous/fibrotic changes, similar in appearance to  the prior scan.  3. Aortic and coronary artery calcification.  4. Density centrally within the gallbladder which could represent  stones within a contracted thick-walled gallbladder.  5. Suspected abdominal aortic aneurysm only partly included on the  distal edge of the scan.  6. Left renal atrophy. The right kidney is not included on the scan.    AIRAM GRAY MD

## 2018-01-24 NOTE — PLAN OF CARE
Problem: Patient Care Overview  Goal: Plan of Care/Patient Progress Review  Outcome: Adequate for Discharge Date Met: 01/24/18  A&O. VSS. 2L NC at baseline, d/c with home O2. Denies pain. Discussed discharge instructions and medications with pt and pt's spouse, verbalized understanding. Pt discharged to home with belongings and medicatioons. Transportation provided by spouse.

## 2018-01-25 ENCOUNTER — ANTICOAGULATION THERAPY VISIT (OUTPATIENT)
Dept: ANTICOAGULATION | Facility: CLINIC | Age: 83
End: 2018-01-25
Payer: MEDICARE

## 2018-01-25 DIAGNOSIS — I63.40 CEREBRAL INFARCTION DUE TO EMBOLISM OF CEREBRAL ARTERY (H): ICD-10-CM

## 2018-01-25 DIAGNOSIS — I48.91 ATRIAL FIBRILLATION, NEW ONSET (H): ICD-10-CM

## 2018-01-25 DIAGNOSIS — Z79.01 LONG-TERM (CURRENT) USE OF ANTICOAGULANTS: ICD-10-CM

## 2018-01-25 LAB — INR POINT OF CARE: 1.6 (ref 0.86–1.14)

## 2018-01-25 PROCEDURE — 36416 COLLJ CAPILLARY BLOOD SPEC: CPT

## 2018-01-25 PROCEDURE — 99207 ZZC NO CHARGE NURSE ONLY: CPT

## 2018-01-25 PROCEDURE — 85610 PROTHROMBIN TIME: CPT | Mod: QW

## 2018-01-25 RX ORDER — WARFARIN SODIUM 2 MG/1
TABLET ORAL
Qty: 170 TABLET | Refills: 0 | Status: SHIPPED | OUTPATIENT
Start: 2018-01-25 | End: 2018-02-21

## 2018-01-25 NOTE — TELEPHONE ENCOUNTER
Requested Prescriptions   Pending Prescriptions Disp Refills     NOVOFINE 30G X 8 MM insulin pen needle [Pharmacy Med Name: NovoFine Miscellaneous 30G X 8 MM]  Last Written Prescription Date:  12/19/2017  Last Fill Quantity: 300,  # refills: 3   [place] [facility] or [facility] prescribing provider:  01/19/2018   Future Office Visit:    Next 5 appointments (look out 90 days)     Jan 30, 2018 10:20 AM CST   [facility]    [address] [address]   [phone]            Jan 31, 2018  3:00 PM CST   Return Visit with Mac Perla MD   [facility]    [address] [address]   [phone]            Feb 27, 2018 10:00 AM CST   Return Visit with Geraldo Ware MD   [facility]    [address] [address]   [phone]                  100 each 1     Sig: USE TO INJECT INSULIN THREE TIMES DAILY OR AS DIRECTED    Diabetic Supplies Protocol Passed    1/24/2018  6:25 PM       Passed - Patient is 18 years of age or older       Passed - Patient has had appt within past 6 mos    IV to PO - Antibiotics     None                    Dillon ALFARO (R)

## 2018-01-25 NOTE — MR AVS SNAPSHOT
Griffin LYNCH Isabelle   1/25/2018 9:30 AM   Anticoagulation Therapy Visit    Description:  83 year old male   Provider:  [facility]   Department:  [facility]           INR as of 1/25/2018     Today's INR 1.6!      Anticoagulation Summary as of 1/25/2018     INR goal 2.0-3.0   Today's INR 1.6!   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    If you need to cancel and/or reschedule your appointment please call one of the following numbers:  [facility] - [phone]  [place] - [phone]  [place] - [phone]  [facility] [phone]  [place] - [phone]            January 2018 Details    Sun Mon Tue Wed Thu Fri Sat      1               2               3               4               5               6                 7               8               9               10               11               12               13                 14               15               16               17               18               19               20                 21               22               23               24               25      8 mg   See details      26      4 mg         27      4 mg           28      2 mg         29      4 mg         30            31                   Date Details   01/25 This INR check       Date of next INR:  1/30/2018         How to take your warfarin dose     To take:  2 mg Take 1 of the 2  mg tablets.    To take:  4 mg Take 2 of the 2 mg tablets.    To take:  8 mg Take 4 of the 2 mg tablets.

## 2018-01-25 NOTE — PROGRESS NOTES
ANTICOAGULATION FOLLOW-UP CLINIC VISIT    Patient Name:  Griffin Walton  Date:  1/25/2018  Contact Type:  Face to Face, accompanied by his wife    SUBJECTIVE:     Patient Findings     Positives Change in medications (prednisone taper for 6 days), Hospital admission, Activity level change (patient came in wheelchair today)    Comments Patient went into the ED on 1/19/18 and was admitted to the hospital for acute COPD exacerbation, influenza A, acute hypoxic respiratory insufficiency, improved, moderate bilateral pleural effusions. Discharged on 1/23/18 to home.     Patient is feeling better now, verified he took 4mg yesterday. INR is low due to 2 missed doses while in the ED/hospital. Plan to increase dose today but adjust weekly total so it is close to his usual maintenance dose by his next INR check on Tuesday. He has a PCP appointment that same day.            OBJECTIVE    INR Protime   Date Value Ref Range Status   01/25/2018 1.6 (A) 0.86 - 1.14 Final       ASSESSMENT / PLAN  INR assessment SUB    Recheck INR In: 5 DAYS    INR Location Clinic      Anticoagulation Summary as of 1/25/2018     INR goal 2.0-3.0   Today's INR 1.6!   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 [facility] MWF 8-12pm in [place]. Pt not new to warfarin (has been on approx 1 yr), pt cannot see well enough to read      Anticoagulation Care Providers     Provider Role Specialty  Phone number    Stefanie Louis, APRN CNP Responsible Nurse Practitioner [phone]            See the Encounter Report to view Anticoagulation Flowsheet and Dosing Calendar (Go to Encounters tab in chart review, and find the Anticoagulation Therapy Visit)        Sandra Keller RN

## 2018-01-25 NOTE — PROGRESS NOTES
Clinic Care Coordination Contact  [facility]/Voicemail    Referral Source: CTS  Clinical Data: Care Coordinator Outreach  Outreach attempted x 1.  Left message on voicemail with call back information and requested return call.  Plan: Care Coordinator mailed out care coordination introduction letter on 12/27/18. Care Coordinator will try to reach patient again in 1-2 business days.  Guillermo VERDUGO,RN- BC  Clinic Care Coordinator  [facility]  Phone: [phone]

## 2018-01-26 ENCOUNTER — MYC MEDICAL ADVICE (OUTPATIENT)
Dept: FAMILY MEDICINE | Facility: CLINIC | Age: 83
End: 2018-01-26

## 2018-01-26 LAB
BACTERIA SPEC CULT: NO GROWTH
BACTERIA SPEC CULT: NO GROWTH
Lab: NORMAL
Lab: NORMAL
SPECIMEN SOURCE: NORMAL
SPECIMEN SOURCE: NORMAL

## 2018-01-26 PROCEDURE — G0180 MD CERTIFICATION HHA PATIENT: HCPCS | Performed by: FAMILY MEDICINE

## 2018-01-26 NOTE — PROGRESS NOTES
Clinic Care Coordination Contact  [facility]/Voicemail    Referral Source: [facility]  Clinical Data: Care Coordinator Outreach  Outreach attempted x 2.  Left message on voicemail with call back information and requested return call.  Note patient has dialysis M/W/F, will attempt next call on non-dialysis day.  Plan: Care Coordinator mailed out care coordination introduction letter on 12/27/18. Care Coordinator will try to reach patient again in 3-5 business days.  Guillermo VERDUGO,RN- BC  Clinic Care Coordinator  [facility]  Phone: [phone]

## 2018-01-30 ENCOUNTER — OFFICE VISIT (OUTPATIENT)
Dept: FAMILY MEDICINE | Facility: CLINIC | Age: 83
End: 2018-01-30
Payer: MEDICARE

## 2018-01-30 ENCOUNTER — ANTICOAGULATION THERAPY VISIT (OUTPATIENT)
Dept: ANTICOAGULATION | Facility: CLINIC | Age: 83
End: 2018-01-30
Payer: MEDICARE

## 2018-01-30 VITALS
SYSTOLIC BLOOD PRESSURE: 135 MMHG | TEMPERATURE: 97.1 F | OXYGEN SATURATION: 98 % | DIASTOLIC BLOOD PRESSURE: 70 MMHG | WEIGHT: 201 LBS | BODY MASS INDEX: 31.48 KG/M2 | HEART RATE: 83 BPM

## 2018-01-30 DIAGNOSIS — J10.1 INFLUENZA A: ICD-10-CM

## 2018-01-30 DIAGNOSIS — J44.1 COPD EXACERBATION (H): ICD-10-CM

## 2018-01-30 DIAGNOSIS — Z99.2 CKD (CHRONIC KIDNEY DISEASE) STAGE V REQUIRING CHRONIC DIALYSIS (H): ICD-10-CM

## 2018-01-30 DIAGNOSIS — E11.21 CONTROLLED TYPE 2 DIABETES MELLITUS WITH DIABETIC NEPHROPATHY, WITH LONG-TERM CURRENT USE OF INSULIN (H): ICD-10-CM

## 2018-01-30 DIAGNOSIS — N18.6 CKD (CHRONIC KIDNEY DISEASE) STAGE V REQUIRING CHRONIC DIALYSIS (H): ICD-10-CM

## 2018-01-30 DIAGNOSIS — Z79.4 CONTROLLED TYPE 2 DIABETES MELLITUS WITH DIABETIC NEPHROPATHY, WITH LONG-TERM CURRENT USE OF INSULIN (H): ICD-10-CM

## 2018-01-30 DIAGNOSIS — I48.91 ATRIAL FIBRILLATION, NEW ONSET (H): ICD-10-CM

## 2018-01-30 DIAGNOSIS — Z09 HOSPITAL DISCHARGE FOLLOW-UP: Primary | ICD-10-CM

## 2018-01-30 DIAGNOSIS — Z79.01 LONG-TERM (CURRENT) USE OF ANTICOAGULANTS: ICD-10-CM

## 2018-01-30 DIAGNOSIS — I63.40 CEREBRAL INFARCTION DUE TO EMBOLISM OF CEREBRAL ARTERY (H): ICD-10-CM

## 2018-01-30 DIAGNOSIS — J90 BILATERAL PLEURAL EFFUSION: ICD-10-CM

## 2018-01-30 DIAGNOSIS — M62.81 GENERALIZED MUSCLE WEAKNESS: ICD-10-CM

## 2018-01-30 LAB — INR POINT OF CARE: 3.7 (ref 0.86–1.14)

## 2018-01-30 PROCEDURE — 99495 TRANSJ CARE MGMT MOD F2F 14D: CPT | Performed by: NURSE PRACTITIONER

## 2018-01-30 PROCEDURE — 85610 PROTHROMBIN TIME: CPT | Mod: QW

## 2018-01-30 PROCEDURE — 99207 ZZC NO CHARGE NURSE ONLY: CPT

## 2018-01-30 PROCEDURE — 36416 COLLJ CAPILLARY BLOOD SPEC: CPT

## 2018-01-30 RX ORDER — INSULIN GLARGINE 100 [IU]/ML
INJECTION, SOLUTION SUBCUTANEOUS
Qty: 3 ML | Refills: 11 | Status: SHIPPED | OUTPATIENT
Start: 2018-01-30 | End: 2018-02-01

## 2018-01-30 RX ORDER — BENZONATATE 200 MG/1
200 CAPSULE ORAL 3 TIMES DAILY PRN
Qty: 21 CAPSULE | Refills: 0 | Status: SHIPPED | OUTPATIENT
Start: 2018-01-30 | End: 2018-03-23

## 2018-01-30 NOTE — PROGRESS NOTES
Clinic Care Coordination Contact  OUTREACH    Referral Information:  Referral Source: CTS  Reason for Contact: Post hospital f/u  Care Conference: No     Universal Utilization:   ED Visits in last year: 3  Hospital visits in last year: 1  Last PCP appointment: 01/30/18     Clinical Concerns:  Current Medical Concerns: Patient hospitalized at [facility] 1/20-1/24 with Acute Respiratory Failure with Hypoxia, Influenza, COPD Exacerbation. He is a dialysis patient and has resumed this on M/W/F. Today he followed up with PCP. The following is noted:  Patient overall feeling better however reports that he has felt weak and shaky and continues to cough. Using 2-3 liters of oxygen which is his baseline- O2 sats are 98%. Denies shortness of breath. Has been taking only 11 units insulin vs 20- therefore has been having higher BS levels since also taking Prednisone- recently stopped prednisone today.   ASSESSMENT/PLAN:   1. Hospital discharge follow-up  Patient admitted for Influenza A and COPD exacerbation  Blood cultures NGTD   2. COPD exacerbation (H)  - improved after completing prednisone today-  - patient still continues to cough- therefore will prescribe Tessalon- patient is at baseline for oxygen supplement   - XR Chest 2 Views; Future  - benzonatate (TESSALON) 200 MG capsule; Take 1 capsule (200 mg) by mouth 3 times daily as needed for cough  Dispense: 21 capsule; Refill: 0  3. Influenza A  resolved  4. Bilateral pleural effusion  - XR Chest 2 Views; Future- repeat in 3 weeks  5. Controlled type 2 diabetes mellitus with diabetic nephropathy, with long-term current use of insulin (H)  Patient did not increase insulin dose while on prednisone- therefore he has had higher BS- now that patient is off of Prednisone - will continue to use baseline dose of 15 units/day  - BASAGLAR 100 UNIT/ML injection; , Inject 15 Units Subcutaneous At Bedtime  Dispense: 3 mL; Refill: 11  6. CHRONIC KIDNEY DISEASE  Tessalon perles ok to take   -  continue with HD   7. Muscle weakness  - likely due to recent illness and side effect from taking Prednisone- should improve over time     Current Behavioral Concerns: None    Education Provided to patient: role of CC, return for worsening symptoms  Clinical Pathway Name: None    Medication Management:  Patient reports he will be increasing Basaglar Insulin to 15 units at HS as instructed. He will be getting Tessalon Perls from pharmacy.    Functional Status:  Mobility Status: Independent  Equipment Currently Used at Home: oxygen  Transportation: Self      Psychosocial:  Current living arrangement: I live in a private home with spouse  Financial/Insurance:No concerns     Resources and Interventions:  Current Resources: DME )Oxygen), Outpatient Dialysis ([facility])    Advanced Care Plans/Directives on file: No  Patient/Caregiver understanding:  States the flu took a lot out of him but he is recovering slowly. Has been going to dialysis and will go again tomorrow as per schedule. Using oxygen at 2- 3 liters, which is baseline. Denies increased difficulty breathing. Denied any ongoing CC needs (as he had done 1 month ago). States he will call if he should desire assistance.  Frequency of Care Coordination: PRN  Upcoming appointment: 01/31/18 (this is cardiology)     Plan: Will close to active CC as he denies needs/concerns and does not want f/u (feels all is well).    Guillermo ENCINASN,RN- BC  Clinic Care Coordinator  [facility]  Phone: [phone]

## 2018-01-30 NOTE — MR AVS SNAPSHOT
After Visit Summary   1/30/2018    Griffin Walton    MRN: [id number]           Patient Information     Date Of Birth          5/18/1934        Visit Information        Provider Department      1/30/2018 10:20 AM Stefanie Louis APRN CNP [facility]        Today's Diagnoses     Hospital discharge follow-up    -  1    COPD exacerbation (H)        Influenza A        Controlled type 2 diabetes mellitus with diabetic nephropathy, with long-term current use of insulin (H)        Bilateral pleural effusion          Care Instructions    1. Take 25 mg of Losartan  2. Take 15 units of Basaglar insulin at bedtime  3. Schedule chest x-ray in 3 weeks.   4. Use Tessalon Perles as needed for cough          Thank you for choosing [facility] ([facility]).  You may be receiving a survey in the mail from [employer] regarding your visit today.  Please take a few minutes to complete and return the survey to let us know how we are doing.      If you have questions or concerns, please contact us via [employer] or you can contact your care team at [phone].    Our Clinic hours are:  Monday 6:40 am  to 7:00 pm  Tuesday -Friday 6:40 am to 5:00 pm    The [place] outpatient lab hours are:  Monday - Friday 6:10 am to 4:45 pm  Saturdays 7:00 am to 11:00 am  Appointments are required, call [phone]    If you have clinical questions after hours or would like to schedule an appointment,  call the clinic at [phone].          Follow-ups after your visit        Your next 10 appointments already scheduled     Jan 30, 2018 11:30 AM CST   Anticoagulation Visit with [place] ANTI COAG   [facility]    [address] [address]   [phone]            Jan 31, 2018  3:00 PM CST   Return Visit with Mac Perla MD   [facility] ([facility])    [address] [address]   [phone]             Feb 27, 2018 10:00 AM CST   Return Visit with Geraldo Ware MD   [facility]    [address] [address]   [phone]            May 15, 2018  1:00 PM CDT   SIX MINUTE WALK with UC PFL A, UC PFL 6 MINUTE WALK 1   [facility] Pulmonary Function Testing ([facility])    [address]  3rd Floor  [place] [id number] [phone]            May 15, 2018  2:00 PM CDT   (Arrive by 1:45 PM)   Return Interstitial Lung with David Morris Perlman, MD   [facility] ([facility])    [address]  [address]  [place] [id number] [phone]              Future tests that were ordered for you today     Open Future Orders        Priority Expected Expires Ordered    XR Chest 2 Views Routine 2/28/2018 1/30/2019 1/30/2018            Who to contact     If you have questions or need follow up information about today's clinic visit or your schedule please contact [facility] directly at [phone].  Normal or non-critical lab and imaging results will be communicated to you by MyChart, letter or phone within 4 business days after the clinic has received the results. If you do not hear from us within 7 days, please contact the clinic through [employer] or phone. If you have a critical or abnormal lab result, we will notify you by phone as soon as possible.  Submit refill requests through [employer] or call your pharmacy and they will forward the refill request to us. Please allow 3 business days for your refill to be completed.          Additional Information About Your Visit        [employer] Information     [employer] gives you secure access to your electronic health record. If you see a primary care provider, you can also send messages to your care team and make appointments. If you have questions, please call your primary care clinic.  If you do not  have a primary care provider, please call [phone] and they will assist you.        Care EveryWhere ID     This is your Care EveryWhere ID. This could be used by other organizations to access your [place] medical records  [id number]        Your Vitals Were     Pulse Temperature Pulse Oximetry BMI (Body Mass Index)          83 97.1  F (36.2  C) (Tympanic) 98% 31.48 kg/m2         Blood Pressure from Last 3 Encounters:   01/30/18 140/68   01/24/18 (!) 148/94   01/20/18 143/80    Weight from Last 3 Encounters:   01/30/18 201 lb (91.2 kg)   01/24/18 193 lb 5.5 oz (87.7 kg)   01/19/18 209 lb (94.8 kg)                 Today's Medication Changes          These changes are accurate as of 1/30/18 10:41 AM.  If you have any questions, ask your nurse or doctor.               Start taking these medicines.        Dose/Directions    benzonatate 200 MG capsule   Commonly known as:  TESSALON   Used for:  COPD exacerbation (H)   Started by:  Stefanie Louis APRN CNP        Dose:  200 mg   Take 1 capsule (200 mg) by mouth 3 times daily as needed for cough   Quantity:  21 capsule   Refills:  0         These medicines have changed or have updated prescriptions.        Dose/Directions    BASAGLAR 100 UNIT/ML injection   This may have changed:  additional instructions   Used for:  Controlled type 2 diabetes mellitus with diabetic nephropathy, with long-term current use of insulin (H)   Changed by:  Stefanie Louis APRN CNP        , Inject 15 Units Subcutaneous At Bedtime   Quantity:  3 mL   Refills:  11            Where to get your medicines      These medications were sent to [id number] - [place], [place] - [address]  [address] [address]     Phone:  [phone]     BASAGLAR 100 UNIT/ML injection    benzonatate 200 MG capsule                Primary Care Provider Office Phone # Fax #    DANNA Lopez [phone] [address]  [address]        Equal Access to Services     [facility] CampusJANESSA : Hadii camila rosales roberto Gamb[place], wawendyda patriciakattyha, bre jeremiahpbzack patten, renzo correa. So [facility] [phone].    ATENCIÓN: Si habla español, tiene a louis disposición servicios gratuitos de asistencia lingüística. Llame al [phone].    We comply with applicable federal civil rights laws and [place] laws. We do not discriminate on the basis of race, color, national origin, age, disability, sex, sexual orientation, or gender identity.            Thank you!     Thank you for choosing [facility]  for your care. Our goal is always to provide you with excellent care. Hearing back from our patients is one way we can continue to improve our services. Please take a few minutes to complete the written survey that you may receive in the mail after your visit with us. Thank you!             Your Updated Medication List - Protect others around you: Learn how to safely use, store and throw away your medicines at www.disposemymeds.org.          This list is accurate as of 1/30/18 10:41 AM.  Always use your most recent med list.                   Brand Name Dispense Instructions for use Diagnosis    ADVAIR DISKUS 100-50 MCG/DOSE diskus inhaler   Generic drug:  fluticasone-salmeterol     3 Inhaler    inhale 1 puff into the lungs every 12 hours.    Chronic obstructive pulmonary disease, unspecified COPD type (H)       albuterol (2.5 MG/3ML) 0.083% neb solution     63 vial    TAKE 1 VIAL (2.5 MG) BY NEBULIZATION EVERY 6 HOURS AS NEEDED FOR SHORTNESS OF BREATH / DYSPNEA OR WHEEZING    Chronic obstructive pulmonary disease, unspecified COPD type (H)       BASAGLAR 100 UNIT/ML injection     3 mL    , Inject 15 Units Subcutaneous At Bedtime    Controlled type 2 diabetes mellitus with diabetic nephropathy, with long-term current use of insulin (H)       benzonatate 200 MG capsule    TESSALON    21 capsule    Take 1 capsule (200 mg) by  mouth 3 times daily as needed for cough    COPD exacerbation (H)       blood glucose monitoring meter device kit    no brand specified    1 kit    Use to test blood sugar 3 times daily or as directed.    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)       DIALYVITE PO      Take 1 tablet by mouth daily        diltiazem 240 MG 24 hr capsule    CARDIZEM CD    90 capsule    Take 1 capsule (240 mg) by mouth daily    Paroxysmal atrial fibrillation (H)       dorzolamide-timolol 2-0.5 % ophthalmic solution    COSOPT     Place 1 drop into both eyes 2 times daily        insulin aspart 100 UNIT/ML injection    NovoLOG FLEXPEN    15 mL    6 units before breakfast, 6 units before lunch, 4 units before dinner    Controlled type 2 diabetes mellitus with diabetic nephropathy, with long-term current use of insulin (H)       insulin pen needle 30G X 8 MM    NOVOFINE    300 each    Use 3 daily or as directed.    Type 2 diabetes mellitus with diabetic nephropathy, unspecified long term insulin use status (H)       latanoprost 0.005 % ophthalmic solution    XALATAN     Place 1 drop into both eyes At Bedtime        LOSARTAN POTASSIUM PO      Take 25 mg by mouth daily        lovastatin 40 MG tablet    MEVACOR    135 tablet    Profile Rx: patient will contact pharmacy when needed TAKE 1 AND 1/2 TABLETS BY MOUTH ONCE A DAY WITH DINNER. NEED FASTING LAB WORK     Hyperlipidemia LDL goal <130       metoprolol succinate 25 MG 24 hr tablet    TOPROL XL    90 tablet    Take 1 tablet (25 mg) by mouth daily    Essential hypertension       order for DME     1 each    Equipment being ordered: Walker with wheels and brakes, and a seat    ESRD (end stage renal disease) on dialysis (H), Cerebral embolism with cerebral infarction (H)       order for DME     1 Units    Nebulizer machine Qty #1    Fever, unspecified, Cough, Chronic obstructive pulmonary disease, unspecified COPD type (H), Acute bronchospasm       order for DME     2 each    Equipment being ordered: Oxygen- HOME and portable. 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), Type 2 diabetes mellitus with diabetic nephropathy (H)       predniSONE 10 MG tablet    DELTASONE    12 tablet    Take 3 tablets (30 mg) daily for two days, then 2 tablets for two days, then one tablet for 2 days.    Chronic obstructive pulmonary disease, unspecified COPD type (H)       TART CHERRY ADVANCED Caps      Take 1 capsule by mouth daily        terazosin 10 MG capsule    HYTRIN    90 capsule    Profile Rx: patient will contact pharmacy when needed TAKE 1 CAPSULE (10 MG) BY MOUTH AT BEDTIME    Benign non-nodular prostatic hyperplasia with lower urinary tract symptoms       TYLENOL PO      Take 650 mg by mouth nightly as needed        warfarin 2 MG tablet    COUMADIN    170 tablet    Take 4 mg daily as directed by the Anticoagulation Clinic    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), Atrial  fibrillation, new onset (H), Long-term (current) use of anticoagulants

## 2018-01-30 NOTE — PROGRESS NOTES
ANTICOAGULATION FOLLOW-UP CLINIC VISIT    Patient Name:  Griffin Walton  Date:  1/30/2018  Contact Type:  Face to Face    SUBJECTIVE:     Patient Findings     Positives Change in medications (finished prednisone yesterday. losartan decreased)    Comments Pt still recovering from 5 day hospital stay for pleural effusions, COPD and influenza A. Pt states he is not feeling a whole lot better. Still coughing a lot at night and not sleeping that great. He is on oxygen but has been for the past year or more. Will temporarily reduce dosing by 14% over the next week since current maintenance dose is causing INR to be supra therapeutic.           OBJECTIVE    INR Protime   Date Value Ref Range Status   01/30/2018 3.7 (A) 0.86 - 1.14 Final       ASSESSMENT / PLAN  INR assessment SUPRA    Recheck INR In: 8 DAYS    INR Location Clinic      Anticoagulation Summary as of 1/30/2018     INR goal 2.0-3.0   Today's INR 3.7!   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 [facility] MWF 8-12pm in [place]. Pt not new to warfarin (has been on approx 1 yr), pt cannot see well enough to read      Anticoagulation Care Providers     Provider Role Specialty Phone number    Stefanie Louis, DNANA CNP Responsible Nurse Practitioner [phone]            See the Encounter Report to view Anticoagulation Flowsheet and Dosing Calendar (Go to Encounters tab in chart review, and find the  Anticoagulation Therapy Visit)        Lissa Bello RN

## 2018-01-30 NOTE — PROGRESS NOTES
SUBJECTIVE:   Griffin Walton is a 83 year old male who presents to clinic today for the following health issues:          Hospital Follow-up Visit:    Hospital/Nursing Home/IP Rehab Facility: [facility]  Date of Admission: 1/20/2018  Date of Discharge: 1/24/2018  Reason(s) for Admission:   Acute respiratory failure with Hypoxia  Influenza A  COPD exacerbation            Problems taking medications regularly:  None       Medication changes since discharge: None       Problems adhering to non-medication therapy:  None    Summary of hospitalization:  [facility] discharge summary reviewed  Diagnostic Tests/Treatments reviewed.  Follow up needed: none  Other Healthcare Providers Involved in Patient s Care:         None  Update since discharge: improved.    Post Discharge Medication Reconciliation: discharge medications reconciled, continue medications without change.  Plan of care communicated with patient and family     Coding guidelines for this visit:  Type of Medical   Decision Making Face-to-Face Visit       within 7 Days of discharge Face-to-Face Visit        within 14 days of discharge   Moderate Complexity [id number]   High Complexity [id number]          Patient overall feeling better however reports that he has felt weak and shaky and continues to cough. Using 2-3 liters of oxygen which is his baseline- O2 sats are 98%. Denies shortness of breath. Has been taking only 11 units insulin vs 20- therefore has been having higher BS levels since also taking Prednisone- recently stopped prednisone today.     -------------------------------------    Problem list and histories reviewed & adjusted, as indicated.  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)     Anemia     Hyperlipidemia LDL goal <70     Anxiety disorder due to medical condition     Cerebral embolism with cerebral infarction (H)     zAdvanced directives, counseling/discussion     Health Care Home     Seborrheic keratosis     Leg edema, right     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)     Thrombocytopenia, primary (H)     Hypotension     Glaucoma     Controlled type 2 diabetes mellitus with diabetic nephropathy, with long-term current use of insulin (H)     Chronic obstructive pulmonary disease, unspecified COPD type (H)     Legally blind in right eye, as defined in [place]     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)     Atrial fibrillation, new onset (H)     Long-term (current) use of anticoagulants [Z79.01]     Moderate persistent asthma without complication     Acute respiratory failure with hypoxia (H)     Past Surgical History:   Procedure Laterality Date     ABDOMEN SURGERY  2005    aortic aneurysm     CATARACT IOL, RT/LT  2/4/08    left eye - phacoemulsification w/ posterior chamber lens implantation combined w/ glaucoma filtering procedure     CREATE FISTULA ARTERIOVENOUS UPPER EXTREMITY  1/3/2012    Procedure:CREATE FISTULA ARTERIOVENOUS UPPER EXTREMITY; LEFT UPPER ARM ARTERIOVENOUS FISTULA; Surgeon:JENA PEARSON; Location:[facility]     SURGICAL HISTORY OF -   2/13/2004    Right knee arthroscopy     SURGICAL HISTORY OF -       Vocal cord biopsy-benign     SURGICAL HISTORY OF -   3/3/2005    AAA, left partial nephrectomy       Social History   Substance Use Topics     Smoking status: Former Smoker     Packs/day: 1.00     Years: 55.00     Types: Cigarettes     Quit date: 1/1/2005     Smokeless tobacco: Never Used     Alcohol use No     Family History   Problem Relation Age of Onset     CANCER Father      Asophageal      CANCER Brother      Throat      Other Cancer Sister      Lung             Reviewed and updated as needed this visit by clinical staff       Reviewed and updated as needed this visit by Provider         ROS:  Constitutional, HEENT, cardiovascular, pulmonary, GI, , musculoskeletal, neuro, skin, endocrine and psych systems are negative, except as otherwise noted.    OBJECTIVE:     /70  Pulse 83  Temp 97.1  F (36.2  C) (Tympanic)  Wt 201 lb (91.2 kg)  SpO2 98%  BMI 31.48 kg/m2  Body mass index is 31.48 kg/(m^2).  GENERAL: alert, no distress, elderly and seated in wheelchair  NECK: no adenopathy, no asymmetry, masses, or scars and thyroid normal to palpation  RESP: rhonchi throughout  CV: regular rate and rhythm, normal S1 S2, no S3 or S4, no murmur, click or rub,   MS: no gross musculoskeletal defects noted, no edema    Diagnostic Test Results:  none     ASSESSMENT/PLAN:     1. Hospital discharge follow-up  Patient admitted for Influenza A and COPD exacerbation  Blood cultures NGTD     2. COPD exacerbation (H)  - improved after completing prednisone today-  - patient still continues to cough- therefore will prescribe Tessalon- patient is at baseline for oxygen supplement   - XR Chest 2 Views; Future  - benzonatate (TESSALON) 200 MG capsule; Take 1 capsule (200 mg) by mouth 3 times daily as needed for cough  Dispense: 21 capsule; Refill: 0    3. Influenza A  resolved    4. Bilateral pleural effusion    - XR Chest 2 Views; Future- repeat in 3 weeks    5. Controlled type 2 diabetes mellitus with diabetic nephropathy, with long-term current use of insulin (H)  Patient did not increase insulin dose while on prednisone- therefore he has had higher BS- now that patient is off of Prednisone - will continue to use baseline dose of 15 units/day  - BASAGLAR 100 UNIT/ML injection; , Inject 15 Units Subcutaneous At Bedtime  Dispense: 3 mL; Refill: 11    6. CHRONIC KIDNEY DISEASE  Tessalon perles ok to take   - continue with HD     7. Muscle weakness  - likely due to recent illness and  side effect from taking Prednisone- should improve over time        DANNA Lopez CNP  [facility]

## 2018-01-30 NOTE — MR AVS SNAPSHOT
Griffin LYNCH Isabelle   1/30/2018 11:30 AM   Anticoagulation Therapy Visit    Description:  83 year old male   Provider:  [facility]   Department:  [facility]           INR as of 1/30/2018     Today's INR 3.7!      Anticoagulation Summary as of 1/30/2018     INR goal 2.0-3.0   Today's INR 3.7!   Full instructions 1/30: 2 mg; 2/3: 2 mg; Otherwise 4 mg every day   Next INR check 2/7/2018    Indications   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) [I63.40]  Atrial fibrillation  new onset (H) [I48.91]  Long-term (current) use of anticoagulants [Z79.01] [Z79.01]         Description     Take 2 mg (1 full tablet) today and Saturday; otherwise take 4 mg (2 tablets) all other days until next INR.      Your next Anticoagulation Clinic appointment(s)     Jan 30, 2018 11:30 AM CST   [facility] with [facility]   [facility]    [address] [address] [phone]            Feb 07, 2018  1:00 PM CST   Anticoagulation Visit with [facility]   [facility]    [address] [address]   [phone]              Contact Numbers     Please call [phone] with any problems or questions regarding your therapy.    If you need to cancel and/or reschedule your appointment please call one of the following numbers:  [facility] - [phone]  [place] - [phone]  [place] - [phone]  [facility] [phone]  [place] - [phone]            January 2018 Details    Sun Mon Tue Wed Thu Fri Sat      1               2               3               4               5               6                 7               8               9               10               11               12               13                 14               15               16               17               18               19               20                 21               22               23                24               25               26               27                 28               29               30      2 mg   See details      31      4 mg             Date Details   01/30 This INR check               How to take your warfarin dose     To take:  2 mg Take 1 of the 2 mg tablets.    To take:  4 mg Take 2 of the 2 mg tablets.           February 2018 Details    Sun Mon Tue Wed Thu Fri Sat         1      4 mg         2      4 mg         3      2 mg           4      4 mg         5      4 mg         6      4 mg         7            8               9               10                 11               12               13               14               15               16               17                 18               19               20               21               22               23               24                 25               26               27               28                   Date Details   No additional details    Date of next INR:  2/7/2018         How to take your warfarin dose     To take:  2 mg Take 1 of the 2 mg tablets.    To take:  4 mg Take 2 of the 2 mg tablets.

## 2018-01-30 NOTE — PATIENT INSTRUCTIONS
1. Take 25 mg of Losartan  2. Take 15 units of Basaglar insulin at bedtime  3. Schedule chest x-ray in 3 weeks.   4. Use Tessalon Perles as needed for cough          Thank you for choosing [facility].  You may be receiving a survey in the mail from [employer] regarding your visit today.  Please take a few minutes to complete and return the survey to let us know how we are doing.      If you have questions or concerns, please contact us via [employer] or you can contact your care team at [phone].    Our Clinic hours are:  Monday 6:40 am  to 7:00 pm  Tuesday -Friday 6:40 am to 5:00 pm    The [place] outpatient lab hours are:  Monday - Friday 6:10 am to 4:45 pm  Saturdays 7:00 am to 11:00 am  Appointments are required, call [phone]    If you have clinical questions after hours or would like to schedule an appointment,  call the clinic at [phone].

## 2018-01-30 NOTE — NURSING NOTE
"Initial /70  Pulse 83  Temp 97.1  F (36.2  C) (Tympanic)  Wt 201 lb (91.2 kg)  SpO2 98%  BMI 31.48 kg/m2 Estimated body mass index is 31.48 kg/(m^2) as calculated from the following:    Height as of 1/19/18: 5' 7\" (1.702 m).    Weight as of this encounter: 201 lb (91.2 kg). .    Georgie Coulter    "

## 2018-01-30 NOTE — NURSING NOTE
"Chief Complaint   Patient presents with     [facility] F/U     [facility], 1/20/2018  to 1/24/2018 - Acute respiratory failure with hypoxia       Initial /68 (BP Location: Right arm, Patient Position: Chair, Cuff Size: Adult Large)  Pulse 83  Temp 97.1  F (36.2  C) (Tympanic)  Wt 201 lb (91.2 kg)  SpO2 98%  BMI 31.48 kg/m2 Estimated body mass index is 31.48 kg/(m^2) as calculated from the following:    Height as of 1/19/18: 5' 7\" (1.702 m).    Weight as of this encounter: 201 lb (91.2 kg).  Medication Reconciliation: complete   Rosy Felix CMA (AAMA)   (aka: Nicole Felix)      "

## 2018-01-31 ENCOUNTER — OFFICE VISIT (OUTPATIENT)
Dept: CARDIOLOGY | Facility: CLINIC | Age: 83
End: 2018-01-31
Attending: INTERNAL MEDICINE
Payer: MEDICARE

## 2018-01-31 VITALS
OXYGEN SATURATION: 100 % | BODY MASS INDEX: 31.48 KG/M2 | DIASTOLIC BLOOD PRESSURE: 62 MMHG | HEART RATE: 98 BPM | WEIGHT: 201 LBS | SYSTOLIC BLOOD PRESSURE: 140 MMHG

## 2018-01-31 DIAGNOSIS — I10 HYPERTENSION, UNSPECIFIED TYPE: Primary | ICD-10-CM

## 2018-01-31 DIAGNOSIS — I71.40 ABDOMINAL AORTIC ANEURYSM (AAA) WITHOUT RUPTURE (H): ICD-10-CM

## 2018-01-31 DIAGNOSIS — I35.0 NONRHEUMATIC AORTIC VALVE STENOSIS: ICD-10-CM

## 2018-01-31 PROCEDURE — 99214 OFFICE O/P EST MOD 30 MIN: CPT | Performed by: INTERNAL MEDICINE

## 2018-01-31 RX ORDER — METOPROLOL SUCCINATE 50 MG/1
50 TABLET, EXTENDED RELEASE ORAL DAILY
Qty: 90 TABLET | Refills: 3 | Status: SHIPPED | OUTPATIENT
Start: 2018-01-31

## 2018-01-31 NOTE — TELEPHONE ENCOUNTER
Requested Prescriptions   Signed Prescriptions Disp Refills     NOVOFINE 30G X 8 MM insulin pen needle 100 each 6     Sig: USE TO INJECT INSULIN THREE TIMES DAILY OR AS DIRECTED    Diabetic Supplies Protocol Passed    1/25/2018  9:54 AM       Passed - Patient is 18 years of age or older       Passed - Patient has had appt within past 6 mos    IV to PO - Antibiotics     None                    Bhavana MOSS Rn

## 2018-01-31 NOTE — PROGRESS NOTES
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     Type II or unspecified type diabetes mellitus without mention of complication, not stated as uncontrolled      Unspecified disorder of kidney and ureter        CURRENT MEDICATIONS:  Current Outpatient Prescriptions   Medication Sig Dispense Refill     NOVOFINE 30G X 8 MM insulin pen needle USE TO INJECT INSULIN THREE TIMES DAILY OR AS DIRECTED 100 each 6     BASAGLAR 100 UNIT/ML injection , Inject 15 Units Subcutaneous At Bedtime 3 mL 11     benzonatate (TESSALON) 200 MG capsule Take 1 capsule (200 mg) by mouth 3 times daily as needed for cough 21 capsule 0     warfarin (COUMADIN) 2 MG tablet Take 4 mg daily as directed by the Anticoagulation Clinic 170 tablet 0     LOSARTAN POTASSIUM PO Take 25 mg by mouth daily       B Complex-C-Folic Acid (DIALYVITE PO) Take 1 tablet by mouth daily       diltiazem (CARDIZEM CD) 240 MG 24 hr capsule Take 1 capsule (240 mg) by mouth daily 90 capsule 3     metoprolol (TOPROL XL) 25 MG 24 hr tablet Take 1 tablet (25 mg) by mouth daily 90 tablet 3     terazosin (HYTRIN) 10 MG capsule Profile Rx: patient will contact pharmacy when needed  TAKE 1 CAPSULE (10 MG) BY MOUTH AT BEDTIME 90 capsule 3     lovastatin (MEVACOR) 40 MG tablet Profile Rx: patient will contact pharmacy when needed  TAKE 1 AND 1/2 TABLETS BY MOUTH ONCE A DAY WITH DINNER. NEED FASTING LAB WORK 135 tablet 3     clopidogrel (PLAVIX) 75 MG tablet Take 1  tablet (75 mg) by mouth daily 90 tablet 3     insulin pen needle (NOVOFINE) 30G X 8 MM Use 3 daily or as directed. 300 each 3     Misc Natural Products (TART CHERRY ADVANCED) CAPS Take 1 capsule by mouth daily       albuterol (2.5 MG/3ML) 0.083% neb solution TAKE 1 VIAL (2.5 MG) BY NEBULIZATION EVERY 6 HOURS AS NEEDED FOR SHORTNESS OF BREATH / DYSPNEA OR WHEEZING 63 vial 11     ADVAIR DISKUS 100-50 MCG/DOSE diskus inhaler inhale 1 puff into the lungs every 12 hours. 3 Inhaler 3     insulin aspart (NOVOLOG FLEXPEN) 100 UNIT/ML injection 6 units before breakfast, 6 units before lunch, 4 units before dinner 15 mL 11     bumetanide (BUMEX) 1 MG tablet TAKE 1 TABLET (1 MG) BY MOUTH 2 TIMES DAILY 180 tablet 2     blood glucose monitoring (ONE TOUCH ULTRA) test strip test 2 times daily  Profile Rx: patient will contact pharmacy when needed 60 each 11     latanoprost (XALATAN) 0.005 % ophthalmic solution Place 1 drop into both eyes At Bedtime       blood glucose monitoring (NO BRAND SPECIFIED) meter device kit Use to test blood sugar 3 times daily or as directed. 1 kit 0     order for DME Equipment being ordered: Oxygen- HOME and portable. Georgie Coulter CMA Medical Assistant Signed  Service date: 05/24/2016 10:48 AM       O2 Sat on Room air at rest:84%  O2 sat on room air with walk: 82-91%  O2 Sat on 1L of oxygen with walk 91-93%  O2 Sat on 2 L of Oxygen with walk 91-96%  O2 Sat on 1 L O2 at rest 94% 2 each prn     order for DME Nebulizer machine Qty #1 1 Units 0     Acetaminophen (TYLENOL PO) Take 650 mg by mouth nightly as needed       dorzolamide-timolol (COSOPT) 2-0.5 % ophthalmic solution Place 1 drop into both eyes 2 times daily       ORDER FOR DME Equipment being ordered: Walker with wheels and brakes, and a seat 1 each 0       PAST SURGICAL HISTORY:  Past Surgical History:   Procedure Laterality Date     ABDOMEN SURGERY  2005    aortic aneurysm     CATARACT IOL, RT/LT  2/4/08    left eye - phacoemulsification w/  posterior chamber lens implantation combined w/ glaucoma filtering procedure     CREATE FISTULA ARTERIOVENOUS UPPER EXTREMITY  1/3/2012    Procedure:CREATE FISTULA ARTERIOVENOUS UPPER EXTREMITY; LEFT UPPER ARM ARTERIOVENOUS FISTULA; Surgeon:JENA PEARSON; Location[facility]     SURGICAL HISTORY OF -   2/13/2004    Right knee arthroscopy     SURGICAL HISTORY OF -       Vocal cord biopsy-benign     SURGICAL HISTORY OF -   3/3/2005    AAA, left partial nephrectomy       ALLERGIES     Allergies   Allergen Reactions     Hydralazine Shortness Of Breath and Swelling     Aspirin Swelling     Heparin Flush Other (See Comments)     thrombocytopenia     Monosodium Glutamate Swelling       FAMILY HISTORY:  Family History   Problem Relation Age of Onset     CANCER Father      Asophageal      CANCER Brother      Throat      Other Cancer Sister      Lung        SOCIAL HISTORY:  Social History     Social History     Marital status:      Spouse name: N/A     Number of children: N/A     Years of education: N/A     Occupational History     Paper  Retired     Social History Main Topics     Smoking status: Former Smoker     Packs/day: 1.00     Years: 55.00     Types: Cigarettes     Quit date: 1/1/2005     Smokeless tobacco: Never Used     Alcohol use No     Drug use: No     Sexual activity: Not Currently     Other Topics Concern     Parent/Sibling W/ Cabg, Mi Or Angioplasty Before 65f 55m? No     Social History Narrative       ROS:   Constitutional: No fever, chills, or sweats. No weight gain/loss   ENT: No visual disturbance, ear ache, epistaxis, sore throat  Allergies/Immunologic: Negative.   Respiratory: No cough, hemoptysia  Cardiovascular: As per HPI  GI: No nausea, vomiting, hematemesis, melena, or hematochezia  : No urinary frequency, dysuria, or hematuria  Integument: Negative  Psychiatric: Negative  Neuro: Negative  Endocrinology: Negative   Musculoskeletal: Negative    EXAM:  /62  Pulse 98  Wt 91.2  kg (201 lb)  SpO2 100%  BMI 31.48 kg/m2  In general, the patient is a pleasant male in no apparent distress.        Labs:  LIPID RESULTS:  Lab Results   Component Value Date    CHOL 105 07/11/2017    HDL 50 07/11/2017    LDL 44 07/11/2017    TRIG 56 07/11/2017    CHOLHDLRATIO 2.4 02/10/2015    NHDL 55 07/11/2017       LIVER ENZYME RESULTS:  Lab Results   Component Value Date    AST 16 01/19/2018    ALT 25 01/19/2018       CBC RESULTS:  Lab Results   Component Value Date    WBC 7.8 01/22/2018    RBC 2.92 (L) 01/22/2018    HGB 8.6 (L) 01/22/2018    HCT 27.3 (L) 01/22/2018    MCV 94 01/22/2018    MCH 29.5 01/22/2018    MCHC 31.5 01/22/2018    RDW 16.4 (H) 01/22/2018     01/22/2018       BMP RESULTS:  Lab Results   Component Value Date     01/22/2018    POTASSIUM 4.9 01/22/2018    CHLORIDE 97 01/22/2018    CO2 27 01/22/2018    ANIONGAP 10 01/22/2018     (H) 01/22/2018    BUN 78 (H) 01/22/2018    CR 6.93 (H) 01/22/2018    GFRESTIMATED 8 (L) 01/22/2018    GFRESTBLACK 9 (L) 01/22/2018    MELISSA 8.0 (L) 01/22/2018        A1C RESULTS:  Lab Results   Component Value Date    A1C 6.3 (H) 12/19/2017       INR RESULTS:  Lab Results   Component Value Date    INR 3.7 (A) 01/30/2018    INR 1.6 (A) 01/25/2018    INR 1.63 (H) 01/24/2018    INR 1.51 (H) 01/23/2018       ROXANA Perla MD     CC  Patient Care Team:  Stefanie Louis APRN CNP as PCP - General (Nurse Practitioner)  Rafael eKene MD as MD (Nephrology)  Darion Arshad MD as MD (Ophthalmology)  Perlman, David Morris, MD as MD (Pulmonary)  Mac Perla MD as MD (Cardiology)  Jessica Corral MD as MD (Hematology & Oncology)  Geraldo Ware MD as MD (Dermatology)  [facility] MARCH, SHAMANE KIMARA

## 2018-01-31 NOTE — MR AVS SNAPSHOT
After Visit Summary   1/31/2018    Griffin Walton    MRN: [id number]           Patient Information     Date Of Birth          5/18/1934        Visit Information        Provider Department      1/31/2018 3:00 PM March, Mac Hyde MD [facility]        Today's Diagnoses     Hypertension, unspecified type    -  1    Nonrheumatic aortic valve stenosis        Abdominal aortic aneurysm (AAA) without rupture (H)           Follow-ups after your visit        Additional Services     Follow-Up with Cardiologist                 Your next 10 appointments already scheduled     Feb 07, 2018  1:00 PM CST   Anticoagulation Visit with [place] ANTI COAG   [facility]    [address] [address]   [phone]            Feb 27, 2018 10:00 AM CST   Return Visit with Geraldo Ware MD   [facility]    [address] [address]   [phone]            May 15, 2018  1:00 PM CDT   SIX MINUTE WALK with UC PFL A, UC PFL 6 MINUTE WALK 1   [facility] Pulmonary Function Testing ([facility])    [address]  3rd Floor  [facility] [address]   [phone]            May 15, 2018  2:00 PM CDT   (Arrive by 1:45 PM)   Return Interstitial Lung with David Morris Perlman, MD   [facility] ([facility])    [address]  [address] [place] [id number] [phone]              Future tests that were ordered for you today     Open Future Orders        Priority Expected Expires Ordered    CTA Angiogram Abdomen Routine 7/28/2018 1/31/2019 1/31/2018    Follow-Up with Cardiologist Routine 7/30/2018 1/31/2019 1/31/2018    Echocardiogram Routine 7/30/2018 1/31/2019 1/31/2018    XR Chest 2 Views Routine 2/28/2018 1/30/2019 1/30/2018            Who to contact     If  you have questions or need follow up information about today's clinic visit or your schedule please contact [facility] directly at [phone].  Normal or non-critical lab and imaging results will be communicated to you by MyChart, letter or phone within 4 business days after the clinic has received the results. If you do not hear from us within 7 days, please contact the clinic through Quirkyhart or phone. If you have a critical or abnormal lab result, we will notify you by phone as soon as possible.  Submit refill requests through C[employer]ang or call your pharmacy and they will forward the refill request to us. Please allow 3 business days for your refill to be completed.          Additional Information About Your Visit        Coupang Information     [employer]ang gives you secure access to your electronic health record. If you see a primary care provider, you can also send messages to your care team and make appointments. If you have questions, please call your primary care clinic.  If you do not have a primary care provider, please call [phone] and they will assist you.        Care EveryWhere ID     This is your Care EveryWhere ID. This could be used by other organizations to access your [place] medical records  [id number]        Your Vitals Were     Pulse Pulse Oximetry BMI (Body Mass Index)             98 100% 31.48 kg/m2          Blood Pressure from Last 3 Encounters:   01/31/18 140/62   01/30/18 135/70   01/24/18 (!) 148/94    Weight from Last 3 Encounters:   01/31/18 91.2 kg (201 lb)   01/30/18 91.2 kg (201 lb)   01/24/18 87.7 kg (193 lb 5.5 oz)              We Performed the Following     Follow-Up with Cardiologist          Today's Medication Changes          These changes are accurate as of 1/31/18  3:21 PM.  If you have any questions, ask your nurse or doctor.               These medicines have changed or have updated prescriptions.        Dose/Directions     metoprolol succinate 50 MG 24 hr tablet   Commonly known as:  TOPROL-XL   This may have changed:    - medication strength  - how much to take   Used for:  Hypertension, unspecified type   Changed by:  Mac Perla MD        Dose:  50 mg   Take 1 tablet (50 mg) by mouth daily   Quantity:  90 tablet   Refills:  3            Where to get your medicines      These medications were sent to [id number] - [place], [place] - [address]  [address] [address]     Phone:  [phone]     metoprolol succinate 50 MG 24 hr tablet                Primary Care Provider Office Phone # Fax #    Stefanie Louis, APRN [facility] [phone] [address] [address]        Equal Access to Services     ANUSHKA BILL : Guy mayo Somichael, waaxda luqadaha, qaybta kaalmada adeegyada, renzo palacio . [facility] [phone].    ATENCIÓN: Si habla español, tiene a louis disposición servicios gratuitos de asistencia lingüística. [facility] [phone].    We comply with applicable federal civil rights laws and [place] laws. We do not discriminate on the basis of race, color, national origin, age, disability, sex, sexual orientation, or gender identity.            Thank you!     Thank you for choosing [facility]  for your care. Our goal is always to provide you with excellent care. Hearing back from our patients is one way we can continue to improve our services. Please take a few minutes to complete the written survey that you may receive in the mail after your visit with us. Thank you!             Your Updated Medication List - Protect others around you: Learn how to safely use, store and throw away your medicines at www.disposemymeds.org.          This list is accurate as of 1/31/18  3:21 PM.  Always use your most recent med list.                   Brand Name Dispense Instructions for  use Diagnosis    ADVAIR DISKUS 100-50 MCG/DOSE diskus inhaler   Generic drug:  fluticasone-salmeterol     3 Inhaler    inhale 1 puff into the lungs every 12 hours.    Chronic obstructive pulmonary disease, unspecified COPD type (H)       albuterol (2.5 MG/3ML) 0.083% neb solution     63 vial    TAKE 1 VIAL (2.5 MG) BY NEBULIZATION EVERY 6 HOURS AS NEEDED FOR SHORTNESS OF BREATH / DYSPNEA OR WHEEZING    Chronic obstructive pulmonary disease, unspecified COPD type (H)       BASAGLAR 100 UNIT/ML injection     3 mL    , Inject 15 Units Subcutaneous At Bedtime    Controlled type 2 diabetes mellitus with diabetic nephropathy, with long-term current use of insulin (H)       benzonatate 200 MG capsule    TESSALON    21 capsule    Take 1 capsule (200 mg) by mouth 3 times daily as needed for cough    COPD exacerbation (H)       blood glucose monitoring meter device kit    no brand specified    1 kit    Use to test blood sugar 3 times daily or as directed.    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)       DIALYVITE PO      Take 1 tablet by mouth daily        diltiazem 240 MG 24 hr capsule    CARDIZEM CD    90 capsule    Take 1 capsule (240 mg) by mouth daily    Paroxysmal atrial fibrillation (H)       dorzolamide-timolol 2-0.5 % ophthalmic solution    COSOPT     Place 1 drop into both eyes 2 times daily        insulin aspart 100 UNIT/ML  injection    NovoLOG FLEXPEN    15 mL    6 units before breakfast, 6 units before lunch, 4 units before dinner    Controlled type 2 diabetes mellitus with diabetic nephropathy, with long-term current use of insulin (H)       * insulin pen needle 30G X 8 MM    NOVOFINE    300 each    Use 3 daily or as directed.    Type 2 diabetes mellitus with diabetic nephropathy, unspecified long term insulin use status (H)       * NOVOFINE 30G X 8 MM   Generic drug:  insulin pen needle     100 each    USE TO INJECT INSULIN THREE TIMES DAILY OR AS DIRECTED    Type 2 diabetes mellitus with diabetic nephropathy, unspecified long term insulin use status (H)       latanoprost 0.005 % ophthalmic solution    XALATAN     Place 1 drop into both eyes At Bedtime        LOSARTAN POTASSIUM PO      Take 25 mg by mouth daily        lovastatin 40 MG tablet    MEVACOR    135 tablet    Profile Rx: patient will contact pharmacy when needed TAKE 1 AND 1/2 TABLETS BY MOUTH ONCE A DAY WITH DINNER. NEED FASTING LAB WORK    Hyperlipidemia LDL goal <130       metoprolol succinate 50 MG 24 hr tablet    TOPROL-XL    90 tablet    Take 1 tablet (50 mg) by mouth daily    Hypertension, unspecified type       order for DME     1 each    Equipment being ordered: Walker with wheels and brakes, and a seat    ESRD (end stage renal disease) on dialysis (H), Cerebral embolism with cerebral infarction (H)       order for DME     1 Units    Nebulizer machine Qty #1    Fever, unspecified, Cough, Chronic obstructive pulmonary disease, unspecified COPD type (H), Acute bronchospasm       order for DME     2 each    Equipment being ordered: Oxygen- HOME and portable. Georgie Coulter CMA Medical Assistant Signed  Service date: 05/24/2016 10:48 AM     O2 Sat on Room air at rest:84% O2 sat on room air with walk: 82-91% O2 Sat on 1L of oxygen with walk 91-93% O2 Sat on 2 L of Oxygen with walk 91-96% O2 Sat on 1 L O2 at rest 94%    Cough, Hypoxia, Chronic obstructive pulmonary  disease, unspecified COPD type (H), Chronic obstructive pulmonary disease with acute exacerbation (H), 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), Type 2 diabetes mellitus with diabetic nephropathy (H)       TART CHERRY ADVANCED Caps      Take 1 capsule by mouth daily        terazosin 10 MG capsule    HYTRIN    90 capsule    Profile Rx: patient will contact pharmacy when needed TAKE 1 CAPSULE (10 MG) BY MOUTH AT BEDTIME    Benign non-nodular prostatic hyperplasia with lower urinary tract symptoms       TYLENOL PO      Take 650 mg by mouth nightly as needed        warfarin 2 MG tablet    COUMADIN    170 tablet    Take 4 mg daily as directed by the Anticoagulation Clinic    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 Read the directions carefully, and ask your doctor or other care provider to review them with you.

## 2018-01-31 NOTE — LETTER
1/31/2018      Stefanie Louis, APRN CNP  [address] [address]      RE: Rajinderabdias Collinsjulieta       Dear Colleague,    I had the pleasure of seeing Griffin Walton in the [facility].    Service Date: 01/31/2018      HISTORY OF PRESENT ILLNESS:  Mr. Walton is a very pleasant 83-year-old gentleman.  He is here today with his wife.  I met him in 11/2017.  He has a past medical history significant for abdominal aortic aneurysm, end-stage renal disease on dialysis, diabetes type 2 times 10 years, COPD on 4 liters of oxygen, hypertension, hyperlipidemia, history of CVA, history of peripheral vascular disease with bifem bypass, quit smoking 30 years ago but notably no known coronary artery disease.  He also has aortic valve stenosis with a mean gradient of 25 and a normal ejection fraction of 55%-60% when last checked on echo 01/04/2018.      Mr. Walton, since I last saw him, has had a rough winter.  Unfortunately, he was admitted with COPD exacerbation, pneumonia and influenza type A from 01/20 to 01/24 at [facility].  He is recovering from that now and feeling somewhat better.  He also had AFib with RVR.  He is on chronic anticoagulation with Coumadin, which he continues.  When I last saw him, we discussed starting him on Toprol, which he did and he has continued and tolerating that without issue.  We also discussed doing an ultrasound of the abdomen to evaluate his aneurysm, which had not been seen since CT when it measured 4.4 back in 2015.  He is here a bit early today for followup of those things.  He did undergo an ultrasound on 01/04/2018 that showed infrarenal aneurysm measuring 3.3 cm; however, they noted on the CT it measured 4.4 and they felt that their imaging was not ideal and CT would be a better modality.  Blood pressure was a bit up today on initial check, but he reports racing from the car and trying to get here on time being the etiology.  He reports  his blood pressure usually being in the 140s.      IMPRESSION, REPORT, PLAN:   1.  Aortic valve stenosis with a mean gradient of 25 and preserved ejection fraction.   2.  Grade II diastolic dysfunction with preserved EF.   3.  End-stage renal disease on dialysis.   4.  COPD and chronic 4 liters of oxygen.   5.  History of nicotine dependence, quit 30 years ago.   6.  History of stroke in , with negative carotid ultrasound at less than 50% bilaterally in 2015.   7.  Abdominal aortic aneurysm repair in , with last CT evaluation in  measuring 4.4 cm.   8.  Peripheral vascular disease, status post bifem bypass with no claudication symptoms.   9.  Atrial fibrillation on chronic anticoagulation with Coumadin.   10.  Hypertension.   11.  Hyperlipidemia.      DISCUSSION:  It was a pleasure to see Mr. Ledesma in followup.  Clinically, he has had a bit of a rough winter, but he is recovering now.  His aortic valve was stable when rechecked, as was his ejection fraction.  Unfortunately, the ultrasound of his aneurysm was not able to properly see it, so we will have to do a CT when we see him back in followup.  He had a slightly increased blood pressure in the 140s, higher than I would like, so I upped his Toprol from 25 to 50, and I will have this followed as well.  He understands and is in agreement with the plan as outlined.  All questions were answered.  It was a pleasure to see him.  Please do not hesitate to contact me with any questions or concerns.         BELKYS SORIANO MD             D: 2018   T: 2018   MT: VEE      Name:     JULI LEDESMA   MRN:      [id number]        Account:      [id number]   :      1934           Service Date: 2018      Document: [id number]         Outpatient Encounter Prescriptions as of 2018   Medication Sig Dispense Refill     NOVOFINE 30G X 8 MM insulin pen needle USE TO INJECT INSULIN THREE TIMES DAILY OR AS DIRECTED 100 each 6      metoprolol succinate (TOPROL-XL) 50 MG 24 hr tablet Take 1 tablet (50 mg) by mouth daily 90 tablet 3     benzonatate (TESSALON) 200 MG capsule Take 1 capsule (200 mg) by mouth 3 times daily as needed for cough 21 capsule 0     [DISCONTINUED] BASAGLAR 100 UNIT/ML injection , Inject 15 Units Subcutaneous At Bedtime 3 mL 11     warfarin (COUMADIN) 2 MG tablet Take 4 mg daily as directed by the Anticoagulation Clinic 170 tablet 0     LOSARTAN POTASSIUM PO Take 25 mg by mouth daily       B Complex-C-Folic Acid (DIALYVITE PO) Take 1 tablet by mouth daily       diltiazem (CARDIZEM CD) 240 MG 24 hr capsule Take 1 capsule (240 mg) by mouth daily 90 capsule 3     terazosin (HYTRIN) 10 MG capsule Profile Rx: patient will contact pharmacy when needed  TAKE 1 CAPSULE (10 MG) BY MOUTH AT BEDTIME 90 capsule 3     lovastatin (MEVACOR) 40 MG tablet Profile Rx: patient will contact pharmacy when needed  TAKE 1 AND 1/2 TABLETS BY MOUTH ONCE A DAY WITH DINNER. NEED FASTING LAB WORK 135 tablet 3     clopidogrel (PLAVIX) 75 MG tablet Take 1 tablet (75 mg) by mouth daily 90 tablet 3     insulin pen needle (NOVOFINE) 30G X 8 MM Use 3 daily or as directed. 300 each 3     Misc Natural Products (TART CHERRY ADVANCED) CAPS Take 1 capsule by mouth daily       albuterol (2.5 MG/3ML) 0.083% neb solution TAKE 1 VIAL (2.5 MG) BY NEBULIZATION EVERY 6 HOURS AS NEEDED FOR SHORTNESS OF BREATH / DYSPNEA OR WHEEZING 63 vial 11     ADVAIR DISKUS 100-50 MCG/DOSE diskus inhaler inhale 1 puff into the lungs every 12 hours. 3 Inhaler 3     insulin aspart (NOVOLOG FLEXPEN) 100 UNIT/ML injection 6 units before breakfast, 6 units before lunch, 4 units before dinner 15 mL 11     [DISCONTINUED] bumetanide (BUMEX) 1 MG tablet TAKE 1 TABLET (1 MG) BY MOUTH 2 TIMES DAILY 180 tablet 2     blood glucose monitoring (ONE TOUCH ULTRA) test strip test 2 times daily  Profile Rx: patient will contact pharmacy when needed 60 each 11     latanoprost (XALATAN) 0.005 %  ophthalmic solution Place 1 drop into both eyes At Bedtime       blood glucose monitoring (NO BRAND SPECIFIED) meter device kit Use to test blood sugar 3 times daily or as directed. 1 kit 0     order for DME Equipment being ordered: Oxygen- HOME and portable. Georgie Coulter [facility] Medical Assistant Signed  Service date: 05/24/2016 10:48 AM       O2 Sat on Room air at rest:84%  O2 sat on room air with walk: 82-91%  O2 Sat on 1L of oxygen with walk 91-93%  O2 Sat on 2 L of Oxygen with walk 91-96%  O2 Sat on 1 L O2 at rest 94% 2 each prn     order for DME Nebulizer machine Qty #1 1 Units 0     Acetaminophen (TYLENOL PO) Take 650 mg by mouth nightly as needed       dorzolamide-timolol (COSOPT) 2-0.5 % ophthalmic solution Place 1 drop into both eyes 2 times daily       ORDER FOR DME Equipment being ordered: Walker with wheels and brakes, and a seat 1 each 0     [DISCONTINUED] metoprolol (TOPROL XL) 25 MG 24 hr tablet Take 1 tablet (25 mg) by mouth daily 90 tablet 3     No facility-administered encounter medications on file as of 1/31/2018.        Again, thank you for allowing me to participate in the care of your patient.      Sincerely,    Mac Perla MD     [facility]

## 2018-02-01 ENCOUNTER — TELEPHONE (OUTPATIENT)
Dept: FAMILY MEDICINE | Facility: CLINIC | Age: 83
End: 2018-02-01

## 2018-02-01 DIAGNOSIS — Z79.4 CONTROLLED TYPE 2 DIABETES MELLITUS WITH DIABETIC NEPHROPATHY, WITH LONG-TERM CURRENT USE OF INSULIN (H): ICD-10-CM

## 2018-02-01 DIAGNOSIS — E11.21 CONTROLLED TYPE 2 DIABETES MELLITUS WITH DIABETIC NEPHROPATHY, WITH LONG-TERM CURRENT USE OF INSULIN (H): ICD-10-CM

## 2018-02-01 RX ORDER — INSULIN GLARGINE 100 [IU]/ML
INJECTION, SOLUTION SUBCUTANEOUS
Qty: 15 ML | Refills: 0 | Status: SHIPPED | OUTPATIENT
Start: 2018-02-01 | End: 2018-06-25

## 2018-02-01 NOTE — PROGRESS NOTES
Service Date: 01/31/2018      HISTORY OF PRESENT ILLNESS:  Mr. Walton is a very pleasant 83-year-old gentleman.  He is here today with his wife.  I met him in 11/2017.  He has a past medical history significant for abdominal aortic aneurysm, end-stage renal disease on dialysis, diabetes type 2 times 10 years, COPD on 4 liters of oxygen, hypertension, hyperlipidemia, history of CVA, history of peripheral vascular disease with bifem bypass, quit smoking 30 years ago but notably no known coronary artery disease.  He also has aortic valve stenosis with a mean gradient of 25 and a normal ejection fraction of 55%-60% when last checked on echo 01/04/2018.      Mr. Walton, since I last saw him, has had a rough winter.  Unfortunately, he was admitted with COPD exacerbation, pneumonia and influenza type A from 01/20 to 01/24 at [facility].  He is recovering from that now and feeling somewhat better.  He also had AFib with RVR.  He is on chronic anticoagulation with Coumadin, which he continues.  When I last saw him, we discussed starting him on Toprol, which he did and he has continued and tolerating that without issue.  We also discussed doing an ultrasound of the abdomen to evaluate his aneurysm, which had not been seen since CT when it measured 4.4 back in 2015.  He is here a bit early today for followup of those things.  He did undergo an ultrasound on 01/04/2018 that showed infrarenal aneurysm measuring 3.3 cm; however, they noted on the CT it measured 4.4 and they felt that their imaging was not ideal and CT would be a better modality.  Blood pressure was a bit up today on initial check, but he reports racing from the car and trying to get here on time being the etiology.  He reports his blood pressure usually being in the 140s.      IMPRESSION, REPORT, PLAN:   1.  Aortic valve stenosis with a mean gradient of 25 and preserved ejection fraction.   2.  Grade II diastolic dysfunction with preserved EF.   3.   End-stage renal disease on dialysis.   4.  COPD and chronic 4 liters of oxygen.   5.  History of nicotine dependence, quit 30 years ago.   6.  History of stroke in , with negative carotid ultrasound at less than 50% bilaterally in 2015.   7.  Abdominal aortic aneurysm repair in , with last CT evaluation in  measuring 4.4 cm.   8.  Peripheral vascular disease, status post bifem bypass with no claudication symptoms.   9.  Atrial fibrillation on chronic anticoagulation with Coumadin.   10.  Hypertension.   11.  Hyperlipidemia.      DISCUSSION:  It was a pleasure to see Mr. Ledesma in followup.  Clinically, he has had a bit of a rough winter, but he is recovering now.  His aortic valve was stable when rechecked, as was his ejection fraction.  Unfortunately, the ultrasound of his aneurysm was not able to properly see it, so we will have to do a CT when we see him back in followup.  He had a slightly increased blood pressure in the 140s, higher than I would like, so I upped his Toprol from 25 to 50, and I will have this followed as well.  He understands and is in agreement with the plan as outlined.  All questions were answered.  It was a pleasure to see him.  Please do not hesitate to contact me with any questions or concerns.         BELKYS SORIANO MD             D: 2018   T: 2018   MT: VEE      Name:     JULI LEDESMA   MRN:      [id number]        Account:      [id number]   :      1934           Service Date: 2018      Document: [id number]

## 2018-02-07 ENCOUNTER — ANTICOAGULATION THERAPY VISIT (OUTPATIENT)
Dept: ANTICOAGULATION | Facility: CLINIC | Age: 83
End: 2018-02-07
Payer: MEDICARE

## 2018-02-07 DIAGNOSIS — I63.40 CEREBRAL INFARCTION DUE TO EMBOLISM OF CEREBRAL ARTERY (H): ICD-10-CM

## 2018-02-07 DIAGNOSIS — I48.91 ATRIAL FIBRILLATION, NEW ONSET (H): ICD-10-CM

## 2018-02-07 DIAGNOSIS — Z79.01 LONG-TERM (CURRENT) USE OF ANTICOAGULANTS: ICD-10-CM

## 2018-02-07 LAB — INR POINT OF CARE: 3.4 (ref 0.86–1.14)

## 2018-02-07 PROCEDURE — 36416 COLLJ CAPILLARY BLOOD SPEC: CPT

## 2018-02-07 PROCEDURE — 85610 PROTHROMBIN TIME: CPT | Mod: QW

## 2018-02-07 PROCEDURE — 99207 ZZC NO CHARGE NURSE ONLY: CPT

## 2018-02-07 NOTE — PROGRESS NOTES
ANTICOAGULATION FOLLOW-UP CLINIC VISIT    Patient Name:  Griffin Walton  Date:  2/7/2018  Contact Type:  Face to Face    SUBJECTIVE:     Patient Findings     Positives Change in medications (toprol increased one week ago)    Comments Patient feeling a little better since last visit but still has a lingering cough. Pt still recovering from 5 day hospital stay for pleural effusions, COPD and influenza A. Will temporarily decrease warfarin dosing further. He had 26 mg/week this last week so will try 24 mg/week now. Recheck in two weeks after dialysis. No bleeding concerns or other changes.           OBJECTIVE    INR Protime   Date Value Ref Range Status   02/07/2018 3.4 (A) 0.86 - 1.14 Final       ASSESSMENT / PLAN  INR assessment SUPRA    Recheck INR In: 2 WEEKS    INR Location Clinic      Anticoagulation Summary as of 2/7/2018     INR goal 2.0-3.0   Today's INR 3.4!   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 [facility] MWF 8-12pm in [place]. Pt not new to warfarin (has been on approx 1 yr), pt cannot see well enough to read      Anticoagulation Care Providers     Provider Role Specialty Phone number    Stefanie Louis, APRN CNP Responsible Nurse Practitioner [phone]            See the Encounter Report to view Anticoagulation Flowsheet and Dosing Calendar (Go to Encounters tab in chart review, and find the Anticoagulation  Therapy Visit)        Lissa Bello RN

## 2018-02-07 NOTE — MR AVS SNAPSHOT
Griffin LYNCH Isabelle   2/7/2018 1:00 PM   Anticoagulation Therapy Visit    Description:  83 year old male   Provider:  [place] [facility]   Department:  [facility]           INR as of 2/7/2018     Today's INR 3.4!      Anticoagulation Summary as of 2/7/2018     INR goal 2.0-3.0   Today's INR 3.4!   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    If you need to cancel and/or reschedule your appointment please call one of the following numbers:  [facility] - [phone]  [place] - [phone]  [facility] [phone]  [facility] [phone]  [place] - [phone]            February 2018 Details    Sun Mon Tue Wed Thu Fri Sat         1               2               3                 4               5               6               7      2 mg   See details      8      4 mg         9      4 mg         10      2 mg           11      4 mg         12      4 mg         13      4 mg         14      2 mg         15      4 mg         16      4 mg         17      2 mg           18      4 mg         19      4 mg         20      4 mg         21            22               23               24                 25               26               27               28                   Date Details   02/07 This INR check       Date of next INR:  2/21/2018         How to take your warfarin dose     To take:  2 mg Take 1  of the 2 mg tablets.    To take:  4 mg Take 2 of the 2 mg tablets.

## 2018-02-09 ENCOUNTER — TRANSFERRED RECORDS (OUTPATIENT)
Dept: HEALTH INFORMATION MANAGEMENT | Facility: CLINIC | Age: 83
End: 2018-02-09

## 2018-02-10 ENCOUNTER — MYC MEDICAL ADVICE (OUTPATIENT)
Dept: FAMILY MEDICINE | Facility: CLINIC | Age: 83
End: 2018-02-10

## 2018-02-12 ENCOUNTER — OFFICE VISIT (OUTPATIENT)
Dept: FAMILY MEDICINE | Facility: CLINIC | Age: 83
End: 2018-02-12
Payer: MEDICARE

## 2018-02-12 ENCOUNTER — RADIANT APPOINTMENT (OUTPATIENT)
Dept: GENERAL RADIOLOGY | Facility: CLINIC | Age: 83
End: 2018-02-12
Attending: NURSE PRACTITIONER
Payer: MEDICARE

## 2018-02-12 VITALS
SYSTOLIC BLOOD PRESSURE: 129 MMHG | OXYGEN SATURATION: 98 % | BODY MASS INDEX: 31.79 KG/M2 | TEMPERATURE: 97.5 F | HEART RATE: 82 BPM | DIASTOLIC BLOOD PRESSURE: 68 MMHG | WEIGHT: 203 LBS

## 2018-02-12 DIAGNOSIS — Z99.2 CKD (CHRONIC KIDNEY DISEASE) STAGE V REQUIRING CHRONIC DIALYSIS (H): ICD-10-CM

## 2018-02-12 DIAGNOSIS — J90 BILATERAL PLEURAL EFFUSION: ICD-10-CM

## 2018-02-12 DIAGNOSIS — N18.6 CKD (CHRONIC KIDNEY DISEASE) STAGE V REQUIRING CHRONIC DIALYSIS (H): ICD-10-CM

## 2018-02-12 DIAGNOSIS — I51.89 DIASTOLIC DYSFUNCTION: ICD-10-CM

## 2018-02-12 DIAGNOSIS — I10 BENIGN ESSENTIAL HYPERTENSION: ICD-10-CM

## 2018-02-12 DIAGNOSIS — R60.0 PERIPHERAL EDEMA: ICD-10-CM

## 2018-02-12 DIAGNOSIS — J90 BILATERAL PLEURAL EFFUSION: Primary | ICD-10-CM

## 2018-02-12 PROCEDURE — 99214 OFFICE O/P EST MOD 30 MIN: CPT | Performed by: NURSE PRACTITIONER

## 2018-02-12 PROCEDURE — 71046 X-RAY EXAM CHEST 2 VIEWS: CPT | Mod: FY

## 2018-02-12 RX ORDER — BUMETANIDE 1 MG/1
TABLET ORAL
Qty: 180 TABLET | Refills: 2 | COMMUNITY
Start: 2018-02-12 | End: 2018-07-23

## 2018-02-12 NOTE — PROGRESS NOTES
SUBJECTIVE:   Griffin Walton is a 83 year old male who presents to clinic today for the following health issues:  He has a past medical history significant for abdominal aortic aneurysm, end-stage renal disease on dialysis, diabetes type 2 times 10 years, COPD on 4 liters of oxygen, hypertension, hyperlipidemia, history of CVA, atrial fibrillation, Hypertension, HLD,  history of peripheral vascular disease with bifem bypass, quit smoking 30 years ago but notably no known coronary artery disease.  He also has aortic valve stenosis and Grade II diastolic dysfunction with preserved EF.        Was referred by Nephrology to have a chest X ray because of a rattling she heard, states he has had a cough for a very long time.    Patient was admitted to hospital and discharge from 1/20-18-1/24/18 for COPD exacerbation, Influenza A and pneumonia. Chest x-ray at that time showed bilateral pleural effusions- was recommended to have follow up chest x-ray in 2-3 weeks. Has chronic cough- no fevers, shortness of breath is chronic- he has not had to increase oxygen use.     C/O bilateral foot edema- started within the last week.     ECHO 1/2018:  mean gradient of 25 and a normal ejection fraction of 55%-60% when last checked on echo 01/04/2018.       Problem list and histories reviewed & adjusted, as indicated.  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)     Anemia     Hyperlipidemia LDL goal <70     Anxiety disorder due to medical condition     Cerebral embolism with cerebral infarction (H)     zAdvanced directives, counseling/discussion     Health Care Home     Seborrheic keratosis     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)     Thrombocytopenia, primary (H)     Glaucoma     Controlled type 2 diabetes mellitus with diabetic nephropathy, with long-term current use of insulin (H)     Chronic obstructive pulmonary disease, unspecified COPD type (H)     Legally blind in right eye, as defined in [place]     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)     Atrial fibrillation, new onset (H)     Long-term (current) use of anticoagulants [Z79.01]     Moderate persistent asthma without complication     Acute respiratory failure with hypoxia (H)     Past Surgical History:   Procedure Laterality Date     ABDOMEN SURGERY  2005    aortic aneurysm     CATARACT IOL, RT/LT  2/4/08    left eye - phacoemulsification w/ posterior chamber lens implantation combined w/ glaucoma filtering procedure     CREATE FISTULA ARTERIOVENOUS UPPER EXTREMITY  1/3/2012    Procedure:CREATE FISTULA ARTERIOVENOUS UPPER EXTREMITY; LEFT UPPER ARM ARTERIOVENOUS FISTULA; Surgeon:JENA PEARSON; [facility]     SURGICAL HISTORY OF -   2/13/2004    Right knee arthroscopy     SURGICAL HISTORY OF -       Vocal cord biopsy-benign     SURGICAL HISTORY OF -   3/3/2005    AAA, left partial nephrectomy       Social History   Substance Use Topics     Smoking status: Former Smoker     Packs/day: 1.00     Years: 55.00     Types: Cigarettes     Quit date: 1/1/2005     Smokeless tobacco: Never Used     Alcohol use No     Family History   Problem Relation Age of Onset     CANCER Father      Asophageal      CANCER Brother      Throat      Other Cancer Sister      Lung            Reviewed and updated as needed this visit by clinical staff       Reviewed and updated as needed this visit by Provider         ROS:  Constitutional, HEENT, cardiovascular, pulmonary, gi and gu systems are negative, except as otherwise noted.    OBJECTIVE:     /68 (BP Location: Left arm, Patient Position: Chair, Cuff Size: Adult Large)  Pulse 82  Temp 97.5  F (36.4  C)  (Tympanic)  Wt 203 lb (92.1 kg)  SpO2 98%  BMI 31.79 kg/m2  Body mass index is 31.79 kg/(m^2).  GENERAL: alert, no distress and elderly  RESP: lungs clear to auscultation - no rales, rhonchi or wheezes  CV: regular rates and rhythm, normal S1 S2, no S3 or S4, no murmur, click or rub, peripheral pulses strong and 1+ bilateral lower extremity pitting edema     MS: no gross musculoskeletal defects noted, no edema    Diagnostic Test Results:  none     ASSESSMENT/PLAN:       1. Bilateral pleural effusion  Patient was discharge from hospital 3 weeks ago  - recommended to have follow up chest x-ray - r/u pheumonia  - XR Chest 2 Views; Future    2. Benign essential hypertension  Stable- controlled since cardiology increased Toprol   -   3. Peripheral edema  ? Cardiac  bumetanide (BUMEX) 1 MG tablet; Take 2 tablets in the morning and 1 tablet early afternoon  Dispense: 180 tablet; Refill: 2  - order for DME; Equipment being ordered: Compression socks 15-20 mmHg  Dispense: 1 Device; Refill: 0  - will need to keep eye on kidney function with increasing medication   - follow up in 3 days  - monitor weight at dialysis   - low sodium diet     4. 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)  - followed by Nephrology - dialysis three times a week     5. Diastolic dysfunction  Recent ECHO reviewed from 3 weeks ago  Continue current medications      Follow up in 3 days.     DANNA [facility]

## 2018-02-12 NOTE — MR AVS SNAPSHOT
After Visit Summary   2/12/2018    Griffin Walton    MRN: [id number]           Patient Information     Date Of Birth          5/18/1934        Visit Information        Provider Department      2/12/2018 2:40 PM Stefanie Louis APRN CNP [facility]        Today's Diagnoses     Bilateral pleural effusion    -  1    Benign essential hypertension        Peripheral edema          Care Instructions    1. Take 2 tablets of Bumex in the morning and 1 tablet in the afternoon  2. Chest x-ray today  3. Wear compression socks during the day- take off at night- elevate feet when sitting  4. Follow up on Thursday           Thank you for choosing [facility].  You may be receiving a survey in the mail from [employer] regarding your visit today.  Please take a few minutes to complete and return the survey to let us know how we are doing.      If you have questions or concerns, please contact us via [employer] or you can contact your care team at [phone].    Our Clinic hours are:  Monday 6:40 am  to 7:00 pm  Tuesday -Friday 6:40 am to 5:00 pm    The [place] outpatient lab hours are:  Monday - Friday 6:10 am to 4:45 pm  Saturdays 7:00 am to 11:00 am  Appointments are required, call [phone]    If you have clinical questions after hours or would like to schedule an appointment,  call the clinic at [phone].          Follow-ups after your visit        Your next 10 appointments already scheduled     Feb 21, 2018  1:00 PM CST   Anticoagulation Visit with [place] [facility]   [facility]    [address] [address]   [phone]            Feb 27, 2018 10:00 AM CST   Return Visit with Geraldo Ware MD   [facility] ([facility])    [address] [address]   [phone]            May 15, 2018  1:00 PM CDT   SIX MINUTE WALK with UC PFL A, UC PFL 6 MINUTE WALK 1   [facility]  Pulmonary Function Testing ([facility])    [address]  3rd Floor  [facility] [id number] [phone]            May 15, 2018  2:00 PM CDT   (Arrive by 1:45 PM)   Return Interstitial Lung with David Morris Perlman, MD   [facility] ([facility])    [address]  [address]  [facility] [id number] [phone]              Future tests that were ordered for you today     Open Future Orders        Priority Expected Expires Ordered    XR Chest 2 Views Routine 2/12/2018 2/12/2019 2/12/2018            Who to contact     If you have questions or need follow up information about today's clinic visit or your schedule please contact [facility] directly at [phone].  Normal or non-critical lab and imaging results will be communicated to you by Ne[employer]etrixhart, letter or phone within 4 business days after the clinic has received the results. If you do not hear from us within 7 days, please contact the clinic through [employer] or phone. If you have a critical or abnormal lab result, we will notify you by phone as soon as possible.  Submit refill requests through [employer] or call your pharmacy and they will forward the refill request to us. Please allow 3 business days for your refill to be completed.          Additional Information About Your Visit        [employer] Ad[employer] Information     [employer] gives you secure access to your electronic health record. If you see a primary care provider, you can also send messages to your care team and make appointments. If you have questions, please call your primary care clinic.  If you do not have a primary care provider, please call [phone] and they will assist you.        Care EveryWhere ID     This is your Care EveryWhere ID. This could be used by other organizations to access your [facility] medical records  [id number]        Your Vitals Were     Pulse Temperature Pulse Oximetry  BMI (Body Mass Index)          82 97.5  F (36.4  C) (Tympanic) 98% 31.79 kg/m2         Blood Pressure from Last 3 Encounters:   02/12/18 129/68   01/31/18 140/62   01/30/18 135/70    Weight from Last 3 Encounters:   02/12/18 203 lb (92.1 kg)   01/31/18 201 lb (91.2 kg)   01/30/18 201 lb (91.2 kg)                 Today's Medication Changes          These changes are accurate as of 2/12/18  3:05 PM.  If you have any questions, ask your nurse or doctor.               These medicines have changed or have updated prescriptions.        Dose/Directions    bumetanide 1 MG tablet   Commonly known as:  BUMEX   This may have changed:  See the new instructions.   Used for:  Benign essential hypertension        Take 2 tablets in the morning and 1 tablet early afternoon   Quantity:  180 tablet   Refills:  2       * order for DME   This may have changed:  Another medication with the same name was added. Make sure you understand how and when to take each.   Used for:  Fever, unspecified, Cough, Chronic obstructive pulmonary disease, unspecified COPD type (H), Acute bronchospasm        Nebulizer machine Qty #1   Quantity:  1 Units   Refills:  0       * order for DME   This may have changed:  You were already taking a medication with the same name, and this prescription was added. Make sure you understand how and when to take each.   Used for:  Peripheral edema        Equipment being ordered: Compression socks 15-20 mmHg   Quantity:  1 Device   Refills:  0       * Notice:  This list has 2 medication(s) that are the same as other medications prescribed for you. Read the directions carefully, and ask your doctor or other care provider to review them with you.         Where to get your medicines      Some of these will need a paper prescription and others can be bought over the counter.  Ask your nurse if you have questions.     Bring a paper prescription for each of these medications     order for DME                Primary Care  Provider Office Phone # Fax #    DANNA Loepz [phone] [address] [address]        Equal Access to Services     ANUSHKA BILL : Hadii aad ku hadcoletteo Soomaali, waaxda luqadaha, qaybta kaalmada adeegyada, renzo huertamele merayifan gottlieb hakeem correa. So [facility] [phone].    ATENCIÓN: Si habla español, tiene a louis disposición servicios gratuitos de asistencia lingüística. Llame al [phone].    We comply with applicable federal civil rights laws and [place] laws. We do not discriminate on the basis of race, color, national origin, age, disability, sex, sexual orientation, or gender identity.            Thank you!     Thank you for choosing [facility]  for your care. Our goal is always to provide you with excellent care. Hearing back from our patients is one way we can continue to improve our services. Please take a few minutes to complete the written survey that you may receive in the mail after your visit with us. Thank you!             Your Updated Medication List - Protect others around you: Learn how to safely use, store and throw away your medicines at www.disposemymeds.org.          This list is accurate as of 2/12/18  3:05 PM.  Always use your most recent med list.                   Brand Name Dispense Instructions for use Diagnosis    ADVAIR DISKUS 100-50 MCG/DOSE diskus inhaler   Generic drug:  fluticasone-salmeterol     3 Inhaler    inhale 1 puff into the lungs every 12 hours.    Chronic obstructive pulmonary disease, unspecified COPD type (H)       albuterol (2.5 MG/3ML) 0.083% neb solution     63 vial    TAKE 1 VIAL (2.5 MG) BY NEBULIZATION EVERY 6 HOURS AS NEEDED FOR SHORTNESS OF BREATH / DYSPNEA OR WHEEZING    Chronic obstructive pulmonary disease, unspecified COPD type (H)       BASAGLAR 100 UNIT/ML injection     15 mL    , Inject 15 Units Subcutaneous At Bedtime    Controlled type 2 diabetes mellitus with diabetic nephropathy, with  long-term current use of insulin (H)       benzonatate 200 MG capsule    TESSALON    21 capsule    Take 1 capsule (200 mg) by mouth 3 times daily as needed for cough    COPD exacerbation (H)       blood glucose monitoring meter device kit    no brand specified    1 kit    Use to test blood sugar 3 times daily or as directed.    Controlled type 2 diabetes mellitus with diabetic nephropathy, with long-term current use of insulin (H)       blood glucose monitoring test strip    ONETOUCH ULTRA    60 each    test 2 times daily Profile Rx: patient will contact pharmacy when needed    Type 2 diabetes mellitus with diabetic nephropathy, with long-term current use of insulin (H)       bumetanide 1 MG tablet    BUMEX    180 tablet    Take 2 tablets in the morning and 1 tablet early afternoon    Benign essential hypertension       clopidogrel 75 MG tablet    PLAVIX    90 tablet    Take 1 tablet (75 mg) by mouth daily    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)       DIALYVITE PO      Take 1 tablet by mouth daily        diltiazem 240 MG 24 hr capsule    CARDIZEM CD    90 capsule    Take 1 capsule (240 mg) by mouth daily    Paroxysmal atrial fibrillation (H)       dorzolamide-timolol 2-0.5 % ophthalmic solution    COSOPT     Place 1 drop into both eyes 2 times daily        insulin aspart 100 UNIT/ML injection    NovoLOG FLEXPEN    15 mL    6 units before breakfast, 6 units before lunch, 4 units before dinner    Controlled type 2 diabetes mellitus with diabetic nephropathy, with long-term current use of insulin (H)       * insulin pen needle 30G X 8 MM    NOVOFINE    300 each    Use 3 daily or as directed.    Type 2 diabetes mellitus with diabetic nephropathy, unspecified long term insulin use status (H)       * NOVOFINE 30G X 8 MM   Generic drug:  insulin pen needle     100 each    USE TO INJECT INSULIN THREE TIMES DAILY OR AS DIRECTED    Type 2 diabetes mellitus with diabetic nephropathy, unspecified long term  insulin use status (H)       latanoprost 0.005 % ophthalmic solution    XALATAN     Place 1 drop into both eyes At Bedtime        LOSARTAN POTASSIUM PO      Take 25 mg by mouth daily        lovastatin 40 MG tablet    MEVACOR    135 tablet    Profile Rx: patient will contact pharmacy when needed TAKE 1 AND 1/2 TABLETS BY MOUTH ONCE A DAY WITH DINNER. NEED FASTING LAB WORK    Hyperlipidemia LDL goal <130       metoprolol succinate 50 MG 24 hr tablet    TOPROL-XL    90 tablet    Take 1 tablet (50 mg) by mouth daily    Hypertension, unspecified type       order for DME     1 each    Equipment being ordered: Walker with wheels and brakes, and a seat    ESRD (end stage renal disease) on dialysis (H), Cerebral embolism with cerebral infarction (H)       * order for DME     1 Units    Nebulizer machine Qty #1    Fever, unspecified, Cough, Chronic obstructive pulmonary disease, unspecified COPD type (H), Acute bronchospasm       order for DME     2 each    Equipment being ordered: Oxygen- HOME and portable. 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), Type 2 diabetes mellitus with diabetic nephropathy (H)       * order for DME     1 Device    Equipment being ordered: Compression socks 15-20 mmHg    Peripheral edema       TART CHERRY ADVANCED Caps      Take 1 capsule by mouth daily        terazosin 10 MG capsule    HYTRIN    90 capsule    Profile Rx: patient will contact pharmacy when needed TAKE 1 CAPSULE (10 MG) BY MOUTH AT BEDTIME    Benign non-nodular prostatic hyperplasia with lower  urinary tract symptoms       TYLENOL PO      Take 650 mg by mouth nightly as needed        warfarin 2 MG tablet    COUMADIN    170 tablet    Take 4 mg daily as directed by the Anticoagulation Clinic    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 Read the directions carefully, and ask your doctor or other care provider to review them with you.

## 2018-02-12 NOTE — PATIENT INSTRUCTIONS
1. Take 2 tablets of Bumex in the morning and 1 tablet in the afternoon  2. Chest x-ray today  3. Wear compression socks during the day- take off at night- elevate feet when sitting  4. Follow up on Thursday           Thank you for choosing [facility].  You may be receiving a survey in the mail from [employer] regarding your visit today.  Please take a few minutes to complete and return the survey to let us know how we are doing.      If you have questions or concerns, please contact us via [employer] or you can contact your care team at [phone].    Our Clinic hours are:  Monday 6:40 am  to 7:00 pm  Tuesday -Friday 6:40 am to 5:00 pm    The [place] outpatient lab hours are:  Monday - Friday 6:10 am to 4:45 pm  Saturdays 7:00 am to 11:00 am  Appointments are required, call [phone]    If you have clinical questions after hours or would like to schedule an appointment,  call the clinic at [phone].

## 2018-02-15 ENCOUNTER — TELEPHONE (OUTPATIENT)
Dept: FAMILY MEDICINE | Facility: CLINIC | Age: 83
End: 2018-02-15

## 2018-02-15 ENCOUNTER — OFFICE VISIT (OUTPATIENT)
Dept: FAMILY MEDICINE | Facility: CLINIC | Age: 83
End: 2018-02-15
Payer: MEDICARE

## 2018-02-15 VITALS
TEMPERATURE: 97.9 F | DIASTOLIC BLOOD PRESSURE: 78 MMHG | WEIGHT: 201.2 LBS | BODY MASS INDEX: 31.58 KG/M2 | SYSTOLIC BLOOD PRESSURE: 149 MMHG | OXYGEN SATURATION: 97 % | HEIGHT: 67 IN | HEART RATE: 96 BPM | RESPIRATION RATE: 24 BRPM

## 2018-02-15 DIAGNOSIS — J90 BILATERAL PLEURAL EFFUSION: ICD-10-CM

## 2018-02-15 DIAGNOSIS — R60.0 LOCALIZED EDEMA: ICD-10-CM

## 2018-02-15 DIAGNOSIS — Z99.2 CKD (CHRONIC KIDNEY DISEASE) STAGE V REQUIRING CHRONIC DIALYSIS (H): ICD-10-CM

## 2018-02-15 DIAGNOSIS — N18.6 CKD (CHRONIC KIDNEY DISEASE) STAGE V REQUIRING CHRONIC DIALYSIS (H): ICD-10-CM

## 2018-02-15 DIAGNOSIS — J44.9 CHRONIC OBSTRUCTIVE PULMONARY DISEASE, UNSPECIFIED COPD TYPE (H): Primary | ICD-10-CM

## 2018-02-15 PROCEDURE — 99214 OFFICE O/P EST MOD 30 MIN: CPT | Performed by: NURSE PRACTITIONER

## 2018-02-15 NOTE — NURSING NOTE
"Chief Complaint   Patient presents with     Edema     Follow up pleural effusion and edema, was seen 2/12/18.  States edema seems improved and breathing a little better.       Initial /78 (BP Location: Right arm, Patient Position: Chair, Cuff Size: Adult Large)  Pulse 96  Temp 97.9  F (36.6  C) (Tympanic)  Resp 24  Ht 5' 7\" (1.702 m)  Wt 201 lb 3.2 oz (91.3 kg)  SpO2 97%  BMI 31.51 kg/m2 Estimated body mass index is 31.51 kg/(m^2) as calculated from the following:    Height as of this encounter: 5' 7\" (1.702 m).    Weight as of this encounter: 201 lb 3.2 oz (91.3 kg).  Medication Reconciliation: complete  "

## 2018-02-15 NOTE — PATIENT INSTRUCTIONS
1. Continue taking Bumetanide 2 tablets in the morning and 1 tablet in the afternoon      Thank you for choosing [facility].  You may be receiving a survey in the mail from [employer] regarding your visit today.  Please take a few minutes to complete and return the survey to let us know how we are doing.      If you have questions or concerns, please contact us via [employer] or you can contact your care team at [phone].    Our Clinic hours are:  Monday 6:40 am  to 7:00 pm  Tuesday -Friday 6:40 am to 5:00 pm    The [place] outpatient lab hours are:  Monday - Friday 6:10 am to 4:45 pm  Saturdays 7:00 am to 11:00 am  Appointments are required, call [phone]    If you have clinical questions after hours or would like to schedule an appointment,  call the clinic at [phone].

## 2018-02-15 NOTE — TELEPHONE ENCOUNTER
Form from [facility]-Plan of Care. Orders were signed, faxed and sent to scanning.    [facility]

## 2018-02-15 NOTE — PROGRESS NOTES
SUBJECTIVE:   Griffin Walton is a 83 year old male who presents to clinic today for the following health issues:    He has a past medical history significant for abdominal aortic aneurysm, end-stage renal disease on dialysis, diabetes type 2 times 10 years, COPD on 4 liters of oxygen, hypertension, hyperlipidemia, history of CVA, atrial fibrillation, Hypertension, HLD,  history of peripheral vascular disease with bifem bypass, quit smoking 30 years ago but notably no known coronary artery disease.  He also has aortic valve stenosis and Grade II diastolic dysfunction with preserved EF.            Patient was admitted to hospital and discharge from 1/20-18-1/24/18 for COPD exacerbation, Influenza A and pneumonia. Chest x-ray at that time showed bilateral pleural effusions- was recommended to have follow up chest x-ray in 2-3 weeks. Has chronic cough- no fevers, shortness of breath is chronic- he has not had to increase oxygen use.      C/O bilateral foot edema- started within the last week.      ECHO 1/2018:  mean gradient of 25 and a normal ejection fraction of 55%-60% when last checked on echo 01/04/2018.          Chief Complaint   Patient presents with     Edema     Follow up pleural effusion and edema, was seen 2/12/18.  States edema seems improved and breathing a little better.   patient is here today for 3 day follow up for cough and swelling in feet. Chest x-ray was repeated 3 days ago- which showed mild left pleural effusion and minimal right pleural effusion which was decreased. Patient states that he continues to have chronic cough. His swelling in his feet are much better after increasing his Bumex by 1 tablet daily- his weight is back to baseline at 201 lbs (previously was 203 lbs).       -------------------------------------    Problem list and histories reviewed & adjusted, as indicated.  Additional history: as documented    Patient Active Problem List   Diagnosis     Essential Hypertension, Benign      Gouty arthropathy     Disorder of bursae and tendons in shoulder region     Malignant neoplasm of kidney excluding renal pelvis (H)     Abdominal aortic aneurysm (H)     Proteinuria     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)     Anemia     Hyperlipidemia LDL goal <70     Anxiety disorder due to medical condition     Cerebral embolism with cerebral infarction (H)     zAdvanced directives, counseling/discussion     Health Care Home     Seborrheic keratosis     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)     Thrombocytopenia, primary (H)     Glaucoma     Controlled type 2 diabetes mellitus with diabetic nephropathy, with long-term current use of insulin (H)     Chronic obstructive pulmonary disease, unspecified COPD type (H)     Legally blind in right eye, as defined in [place]     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)     Atrial fibrillation, new onset (H)     Long-term (current) use of anticoagulants [Z79.01]     Moderate persistent asthma without complication     Acute respiratory failure with hypoxia (H)     Past Surgical History:   Procedure Laterality Date     ABDOMEN SURGERY  2005    aortic aneurysm     CATARACT IOL, RT/LT  2/4/08    left eye - phacoemulsification w/ posterior chamber lens implantation combined w/ glaucoma filtering procedure     CREATE FISTULA ARTERIOVENOUS UPPER EXTREMITY  1/3/2012    Procedure:CREATE FISTULA ARTERIOVENOUS UPPER EXTREMITY; LEFT UPPER ARM ARTERIOVENOUS FISTULA; Surgeon:JENA PERASON; Location:[facility]     SURGICAL HISTORY OF -   2/13/2004    Right knee arthroscopy     SURGICAL HISTORY OF -       Vocal cord biopsy-benign     SURGICAL HISTORY OF -   3/3/2005    AAA, left partial nephrectomy       Social History   Substance Use Topics     Smoking status: Former Smoker     Packs/day: 1.00     Years: 55.00     Types: Cigarettes     Quit date: 1/1/2005     Smokeless tobacco: Never Used     Alcohol use No     Family History   Problem  "Relation Age of Onset     CANCER Father      Asophageal      CANCER Brother      Throat      Other Cancer Sister      Lung            Reviewed and updated as needed this visit by clinical staff  Tobacco  Allergies  Meds  Med Hx  Surg Hx  Fam Hx  Soc Hx      Reviewed and updated as needed this visit by Provider         ROS:  Constitutional, HEENT, cardiovascular, pulmonary, gi and gu systems are negative, except as otherwise noted.    OBJECTIVE:     /78 (BP Location: Right arm, Patient Position: Chair, Cuff Size: Adult Large)  Pulse 96  Temp 97.9  F (36.6  C) (Tympanic)  Resp 24  Ht 5' 7\" (1.702 m)  Wt 201 lb 3.2 oz (91.3 kg)  SpO2 97%  BMI 31.51 kg/m2  Body mass index is 31.51 kg/(m^2).  GENERAL: healthy, alert and no distress  RESP: lungs clear to auscultation - no rales, rhonchi or wheezes  CV: regular rate and rhythm, normal S1 S2, no S3 or S4, systolic murmur, click or rub, no peripheral edema and peripheral pulses strong  MS: no gross musculoskeletal defects noted, no edema    Diagnostic Test Results:  none     ASSESSMENT/PLAN:       1. Localized edema  Greatly improved- will continue with current dose of Bumetanide -     2. Bilateral pleural effusion  Per chest x-ray improving     3. Chronic obstructive pulmonary disease, unspecified COPD type (H)  Stable- continue current therapy     4. 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)  Patient having dialysis three times a week   Need to monitor BMP with increasing Bumetanide     DANNA Lopez [facility]  "

## 2018-02-15 NOTE — MR AVS SNAPSHOT
After Visit Summary   2/15/2018    Griffin Walton    MRN: [id number]           Patient Information     Date Of Birth          5/18/1934        Visit Information        Provider Department      2/15/2018 10:20 AM Stefanie Louis APRN CNP [facility]        Care Instructions    1. Continue taking Bumetanide 2 tablets in the morning and 1 tablet in the afternoon      Thank you for choosing [facility].  You may be receiving a survey in the mail from [facility] regarding your visit today.  Please take a few minutes to complete and return the survey to let us know how we are doing.      If you have questions or concerns, please contact us via [employer] or you can contact your care team at [phone].    Our Clinic hours are:  Monday 6:40 am  to 7:00 pm  Tuesday -Friday 6:40 am to 5:00 pm    The [place] outpatient lab hours are:  Monday - Friday 6:10 am to 4:45 pm  Saturdays 7:00 am to 11:00 am  Appointments are required, call [phone]    If you have clinical questions after hours or would like to schedule an appointment,  call the clinic at [phone].            Follow-ups after your visit        Your next 10 appointments already scheduled     Feb 21, 2018  1:00 PM CST   Anticoagulation Visit with [place] [facility]   [facility]    [address] [address]   [phone]            Feb 27, 2018 10:00 AM CST   Return Visit with Geraldo Ware MD   [facility] ([facility])    [address] [address]   [phone]            May 15, 2018  1:00 PM CDT   SIX MINUTE WALK with  PFL A,  PFL 6 MINUTE WALK 1   [facility] Pulmonary Function Testing ([facility])    [address]  [address] [facility] [address] [phone]            May 15, 2018  2:00 PM CDT   (Arrive by 1:45 PM)   Return Interstitial Lung with Rafael Espinoza  "Perlman, MD   [facility] ([facility])    [address]  [address] [place] [address] [phone]              Who to contact     If you have questions or need follow up information about today's clinic visit or your schedule please contact [facility] directly at [phone].  Normal or non-critical lab and imaging results will be communicated to you by [employer], letter or phone within 4 business days after the clinic has received the results. If you do not hear from us within 7 days, please contact the clinic through [employer] or phone. If you have a critical or abnormal lab result, we will notify you by phone as soon as possible.  Submit refill requests through [employer] or call your pharmacy and they will forward the refill request to us. Please allow 3 business days for your refill to be completed.          Additional Information About Your Visit        [employer] Information     [employer] gives you secure access to your electronic health record. If you see a primary care provider, you can also send messages to your care team and make appointments. If you have questions, please call your primary care clinic.  If you do not have a primary care provider, please call [phone] and they will assist you.        Care EveryWhere ID     This is your Care EveryWhere ID. This could be used by other organizations to access your [facility] medical records  [id number]        Your Vitals Were     Pulse Temperature Respirations Height Pulse Oximetry BMI (Body Mass Index)    96 97.9  F (36.6  C) (Tympanic) 24 5' 7\" (1.702 m) 97% 31.51 kg/m2       Blood Pressure from Last 3 Encounters:   02/15/18 149/78   02/12/18 129/68   01/31/18 140/62    Weight from Last 3 Encounters:   02/15/18 201 lb 3.2 oz (91.3 kg)   02/12/18 203 lb (92.1 kg)   01/31/18 201 lb (91.2 kg)              Today, you had the following     No orders found for display       Primary " Care Provider Office Phone # Fax #    DANNA Lopez [phone] [address] [address]        Equal Access to Services     ANUSHKA BILL : Hadii aad ku hadcoletteo Soomaali, waaxda luqadaha, qaybta kaalmada adeegyada, renzo huertamele merayifan gottlieb hakeem correa. So [facility] [phone].    ATENCIÓN: Si habla español, tiene a louis disposición servicios gratuitos de asistencia lingüística. Llame al [phone].    We comply with applicable federal civil rights laws and [place] laws. We do not discriminate on the basis of race, color, national origin, age, disability, sex, sexual orientation, or gender identity.            Thank you!     Thank you for choosing [facility]  for your care. Our goal is always to provide you with excellent care. Hearing back from our patients is one way we can continue to improve our services. Please take a few minutes to complete the written survey that you may receive in the mail after your visit with us. Thank you!             Your Updated Medication List - Protect others around you: Learn how to safely use, store and throw away your medicines at www.disposemymeds.org.          This list is accurate as of 2/15/18 10:47 AM.  Always use your most recent med list.                   Brand Name Dispense Instructions for use Diagnosis    ADVAIR DISKUS 100-50 MCG/DOSE diskus inhaler   Generic drug:  fluticasone-salmeterol     3 Inhaler    inhale 1 puff into the lungs every 12 hours.    Chronic obstructive pulmonary disease, unspecified COPD type (H)       albuterol (2.5 MG/3ML) 0.083% neb solution     63 vial    TAKE 1 VIAL (2.5 MG) BY NEBULIZATION EVERY 6 HOURS AS NEEDED FOR SHORTNESS OF BREATH / DYSPNEA OR WHEEZING    Chronic obstructive pulmonary disease, unspecified COPD type (H)       BASAGLAR 100 UNIT/ML injection     15 mL    , Inject 15 Units Subcutaneous At Bedtime    Controlled type 2 diabetes mellitus with diabetic nephropathy, with  long-term current use of insulin (H)       benzonatate 200 MG capsule    TESSALON    21 capsule    Take 1 capsule (200 mg) by mouth 3 times daily as needed for cough    COPD exacerbation (H)       blood glucose monitoring meter device kit    no brand specified    1 kit    Use to test blood sugar 3 times daily or as directed.    Controlled type 2 diabetes mellitus with diabetic nephropathy, with long-term current use of insulin (H)       blood glucose monitoring test strip    ONETOUCH ULTRA    60 each    test 2 times daily Profile Rx: patient will contact pharmacy when needed    Type 2 diabetes mellitus with diabetic nephropathy, with long-term current use of insulin (H)       bumetanide 1 MG tablet    BUMEX    180 tablet    Take 2 tablets in the morning and 1 tablet early afternoon    Benign essential hypertension       clopidogrel 75 MG tablet    PLAVIX    90 tablet    Take 1 tablet (75 mg) by mouth daily    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)       DIALYVITE PO      Take 1 tablet by mouth daily        diltiazem 240 MG 24 hr capsule    CARDIZEM CD    90 capsule    Take 1 capsule (240 mg) by mouth daily    Paroxysmal atrial fibrillation (H)       dorzolamide-timolol 2-0.5 % ophthalmic solution    COSOPT     Place 1 drop into both eyes 2 times daily        insulin aspart 100 UNIT/ML injection    NovoLOG FLEXPEN    15 mL    6 units before breakfast, 6 units before lunch, 4 units before dinner    Controlled type 2 diabetes mellitus with diabetic nephropathy, with long-term current use of insulin (H)       * insulin pen needle 30G X 8 MM    NOVOFINE    300 each    Use 3 daily or as directed.    Type 2 diabetes mellitus with diabetic nephropathy, unspecified long term insulin use status (H)       * NOVOFINE 30G X 8 MM   Generic drug:  insulin pen needle     100 each    USE TO INJECT INSULIN THREE TIMES DAILY OR AS DIRECTED    Type 2 diabetes mellitus with diabetic nephropathy, unspecified long term  insulin use status (H)       latanoprost 0.005 % ophthalmic solution    XALATAN     Place 1 drop into both eyes At Bedtime        LOSARTAN POTASSIUM PO      Take 25 mg by mouth daily        lovastatin 40 MG tablet    MEVACOR    135 tablet    Profile Rx: patient will contact pharmacy when needed TAKE 1 AND 1/2 TABLETS BY MOUTH ONCE A DAY WITH DINNER. NEED FASTING LAB WORK    Hyperlipidemia LDL goal <130       metoprolol succinate 50 MG 24 hr tablet    TOPROL-XL    90 tablet    Take 1 tablet (50 mg) by mouth daily    Hypertension, unspecified type       order for DME     1 each    Equipment being ordered: Walker with wheels and brakes, and a seat    ESRD (end stage renal disease) on dialysis (H), Cerebral embolism with cerebral infarction (H)       * order for DME     1 Units    Nebulizer machine Qty #1    Fever, unspecified, Cough, Chronic obstructive pulmonary disease, unspecified COPD type (H), Acute bronchospasm       order for DME     2 each    Equipment being ordered: Oxygen- HOME and portable. 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), Type 2 diabetes mellitus with diabetic nephropathy (H)       * order for DME     1 Device    Equipment being ordered: Compression socks 15-20 mmHg    Peripheral edema       TART CHERRY ADVANCED Caps      Take 1 capsule by mouth daily        terazosin 10 MG capsule    HYTRIN    90 capsule    Profile Rx: patient will contact pharmacy when needed TAKE 1 CAPSULE (10 MG) BY MOUTH AT BEDTIME    Benign non-nodular prostatic hyperplasia with lower  urinary tract symptoms       TYLENOL PO      Take 650 mg by mouth nightly as needed        warfarin 2 MG tablet    COUMADIN    170 tablet    Take 4 mg daily as directed by the Anticoagulation Clinic    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 Read the directions carefully, and ask your doctor or other care provider to review them with you.

## 2018-02-21 ENCOUNTER — ANTICOAGULATION THERAPY VISIT (OUTPATIENT)
Dept: ANTICOAGULATION | Facility: CLINIC | Age: 83
End: 2018-02-21
Payer: MEDICARE

## 2018-02-21 DIAGNOSIS — I63.40 CEREBRAL INFARCTION DUE TO EMBOLISM OF CEREBRAL ARTERY (H): ICD-10-CM

## 2018-02-21 DIAGNOSIS — I48.91 ATRIAL FIBRILLATION, NEW ONSET (H): ICD-10-CM

## 2018-02-21 DIAGNOSIS — Z79.01 LONG-TERM (CURRENT) USE OF ANTICOAGULANTS: ICD-10-CM

## 2018-02-21 LAB — INR POINT OF CARE: 2.3 (ref 0.86–1.14)

## 2018-02-21 PROCEDURE — 36416 COLLJ CAPILLARY BLOOD SPEC: CPT

## 2018-02-21 PROCEDURE — 99207 ZZC NO CHARGE NURSE ONLY: CPT

## 2018-02-21 PROCEDURE — 85610 PROTHROMBIN TIME: CPT | Mod: QW

## 2018-02-21 RX ORDER — WARFARIN SODIUM 2 MG/1
TABLET ORAL
Qty: 170 TABLET | Refills: 0 | COMMUNITY
Start: 2018-02-21

## 2018-02-21 NOTE — PROGRESS NOTES
ANTICOAGULATION FOLLOW-UP CLINIC VISIT    Patient Name:  Griffin Walton  Date:  2/21/2018  Contact Type:  Face to Face    SUBJECTIVE:     Patient Findings     Positives Change in medications (change in bumex dosing), Other complaints (shakiness)    Comments Pt complains of shakiness off and on. His hands, arms and upper body definitely are shaky during assessment and finger poke today. Per pt and his wife, PCP and other docs don't know why this is happening but could be from prior prednisone use, which he has been off of for awhile now. Writer suggested asking if a referral to neurology would be suggested by PCP. No other changes or concerns. Pt will continue current warfarin dosing, which is keeping him in range.             OBJECTIVE    INR Protime   Date Value Ref Range Status   02/21/2018 2.3 (A) 0.86 - 1.14 Final       ASSESSMENT / PLAN  INR assessment THER    Recheck INR In: 3 WEEKS    INR Location Clinic      Anticoagulation Summary as of 2/21/2018     INR goal 2.0-3.0   Today's INR 2.3   Maintenance plan 2 mg (2 mg x 1) on Wed, Sat; 4 mg (2 mg x 2) all other days   Full instructions 2 mg on Wed, Sat; 4 mg all other days   Weekly total 24 mg   Plan last modified Lissa Bello RN (2/21/2018)   Next INR check 3/14/2018   Priority INR   Target end date Indefinite    Indications   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) [I63.40]  Atrial fibrillation  new onset (H) [I48.91]  Long-term (current) use of anticoagulants [Z79.01] [Z79.01]         Anticoagulation Episode Summary     INR check location     Preferred lab     Send INR reminders to [facility]    Comments * 2mg tablets. [facility] MWF 8-12pm in [place]. Pt not new to warfarin (has been on approx 1 yr), pt cannot see well enough to read      Anticoagulation Care Providers     Provider Role Specialty Phone number    Stefanie Louis APRN CNP Responsible Nurse Practitioner [phone]            See the Encounter  Report to view Anticoagulation Flowsheet and Dosing Calendar (Go to Encounters tab in chart review, and find the Anticoagulation Therapy Visit)        Lissa Bello RN

## 2018-02-21 NOTE — MR AVS SNAPSHOT
Griffin LYNCH Isabelle   2/21/2018 1:00 PM   Anticoagulation Therapy Visit    Description:  83 year old male   Provider:  [place] [facility]   Department:  [facility]           INR as of 2/21/2018     Today's INR 2.3      Anticoagulation Summary as of 2/21/2018     INR goal 2.0-3.0   Today's INR 2.3   Full instructions 2 mg on Wed, Sat; 4 mg all other days   Next INR check 3/14/2018    Indications   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) [I63.40]  Atrial fibrillation  new onset (H) [I48.91]  Long-term (current) use of anticoagulants [Z79.01] [Z79.01]         Your next Anticoagulation Clinic appointment(s)     Mar 14, 2018  1:00 PM CDT   Anticoagulation Visit with [place] [facility]   [facility]    [address] [address] [phone]              Contact Numbers     Please call [phone] with any problems or questions regarding your therapy.    If you need to cancel and/or reschedule your appointment please call one of the following numbers:  [facility] [phone]  [place] - [phone]  [facility] [phone]  [facility] [phone]  [place] - [phone]            February 2018 Details    Sun Mon Tue Wed Thu Fri Sat         1               2               3                 4               5               6               7               8               9               10                 11               12               13               14               15               16               17                 18               19               20               21      2 mg   See details      22      4 mg         23      4 mg         24      2 mg           25      4 mg         26      4 mg         27      4 mg         28      2 mg             Date Details   02/21 This INR check               How to take your warfarin dose     To take:  2 mg Take 1 of the 2 mg tablets.    To take:  4 mg Take 2 of the 2 mg tablets.           March  2018 Details    Sun Mon Tue Wed Thu Fri Sat         1      4 mg         2      4 mg         3      2 mg           4      4 mg         5      4 mg         6      4 mg         7      2 mg         8      4 mg         9      4 mg         10      2 mg           11      4 mg         12      4 mg         13      4 mg         14            15               16               17                 18               19               20               21               22               23               24                 25               26               27               28               29               30               31                Date Details   No additional details    Date of next INR:  3/14/2018         How to take your warfarin dose     To take:  2 mg Take 1 of the 2 mg tablets.    To take:  4 mg Take 2 of the 2 mg tablets.

## 2018-02-27 ENCOUNTER — OFFICE VISIT (OUTPATIENT)
Dept: DERMATOLOGY | Facility: CLINIC | Age: 83
End: 2018-02-27
Payer: MEDICARE

## 2018-02-27 VITALS
HEART RATE: 80 BPM | DIASTOLIC BLOOD PRESSURE: 61 MMHG | WEIGHT: 201.28 LBS | BODY MASS INDEX: 31.59 KG/M2 | RESPIRATION RATE: 20 BRPM | HEIGHT: 67 IN | TEMPERATURE: 98.6 F | SYSTOLIC BLOOD PRESSURE: 113 MMHG

## 2018-02-27 DIAGNOSIS — Z85.828 HISTORY OF SKIN CANCER: ICD-10-CM

## 2018-02-27 DIAGNOSIS — L81.4 LENTIGO: ICD-10-CM

## 2018-02-27 DIAGNOSIS — L82.1 SK (SEBORRHEIC KERATOSIS): ICD-10-CM

## 2018-02-27 DIAGNOSIS — L30.0 NUMMULAR DERMATITIS: Primary | ICD-10-CM

## 2018-02-27 PROCEDURE — 99213 OFFICE O/P EST LOW 20 MIN: CPT | Performed by: DERMATOLOGY

## 2018-02-27 RX ORDER — FLUOCINONIDE 0.5 MG/G
CREAM TOPICAL
Qty: 120 G | Refills: 1 | Status: SHIPPED | OUTPATIENT
Start: 2018-02-27 | End: 2018-06-12

## 2018-02-27 NOTE — MR AVS SNAPSHOT
After Visit Summary   2/27/2018    Griffin Walton    MRN: [id number]           Patient Information     Date Of Birth          5/18/1934        Visit Information        Provider Department      2/27/2018 10:00 AM Geraldo Ware MD [facility]        Today's Diagnoses     Nummular dermatitis    -  1    Lentigo        SK (seborrheic keratosis)        History of skin cancer          Care Instructions    Proper skin care from Dr. Ware- [facility]     Eliminate harsh soaps, i.e. Dial, Zest, [place];   Use mild soaps such as Cetaphil or Dove Sensitive Skin   Avoid hot or cold showers   After showering, lightly dry off.    Aggressive use of a moisturizer (including Vanicream, Cetaphil, Aquaphor or Cerave)   We recommend using a tub that needs to be scooped out, not a pump. This has more of an oil base. It will hold moisture in your skin much better than a water base moisturizer. The ones recommended are non- pore clogging.       If you have any questions call [phone] and follow the prompts to Dr. Ware's office.             Follow-ups after your visit        Your next 10 appointments already scheduled     Mar 14, 2018  1:00 PM CDT   Anticoagulation Visit with [place] ANTI COAG   [facility]    [address] [place] [address] [phone]            May 15, 2018  1:00 PM CDT   SIX MINUTE WALK with UC PFL A, UC PFL 6 MINUTE WALK 1   [facility] Pulmonary Function Testing ([facility])    [address]  3rd Floor  [facility] [address] [phone]            May 15, 2018  2:00 PM CDT   (Arrive by 1:45 PM)   Return Interstitial Lung with David Morris Perlman, MD   [facility] ([facility])    [address] [place] [address]  [address] [place] [address] [phone]              Who to contact     If you have questions or  "need follow up information about today's clinic visit or your schedule please contact [facility] directly at [phone].  Normal or non-critical lab and imaging results will be communicated to you by Wochachahart, letter or phone within 4 business days after the clinic has received the results. If you do not hear from us within 7 days, please contact the clinic through Zazzlet or phone. If you have a critical or abnormal lab result, we will notify you by phone as soon as possible.  Submit refill requests through [employer] or call your pharmacy and they will forward the refill request to us. Please allow 3 business days for your refill to be completed.          Additional Information About Your Visit        [employer] Star Information     [employer] gives you secure access to your electronic health record. If you see a primary care provider, you can also send messages to your care team and make appointments. If you have questions, please call your primary care clinic.  If you do not have a primary care provider, please call [phone] and they will assist you.        Care EveryWhere ID     This is your Care EveryWhere ID. This could be used by other organizations to access your [facility] medical records  [id number]        Your Vitals Were     Pulse Temperature Respirations Height BMI (Body Mass Index)       80 98.6  F (37  C) (Tympanic) 20 1.702 m (5' 7\") 31.52 kg/m2        Blood Pressure from Last 3 Encounters:   02/27/18 113/61   02/15/18 149/78   02/12/18 129/68    Weight from Last 3 Encounters:   02/27/18 91.3 kg (201 lb 4.5 oz)   02/15/18 91.3 kg (201 lb 3.2 oz)   02/12/18 92.1 kg (203 lb)              Today, you had the following     No orders found for display         Today's Medication Changes          These changes are accurate as of 2/27/18 10:28 AM.  If you have any questions, ask your nurse or doctor.               Start taking these medicines.        Dose/Directions    fluocinonide 0.05 % cream "   Commonly known as:  LIDEX   Used for:  Nummular dermatitis, Lentigo, SK (seborrheic keratosis), History of skin cancer   Started by:  Geraldo Ware MD        Apply sparingly to affected area twice daily as needed.  Do not apply to face.   Quantity:  120 g   Refills:  1            Where to get your medicines      These medications were sent to [id number] - [place], [place] - [address]  [address] [address]     Phone:  [phone]     fluocinonide 0.05 % cream                Primary Care Provider Office Phone # Fax #    DANNA Lopez [phone] [address] [address]        Equal Access to Services     ANUSHKA BILL : Hadii aad ku hadasho Sodaphneali, waaxda luqadaha, qaybta kaalmada adeegyada, waxkarina correa. So [facility] [phone].    ATENCIÓN: Si habla español, tiene a louis disposición servicios gratuitos de asistencia lingüística. Llame al [phone].    We comply with applicable federal civil rights laws and [place] laws. We do not discriminate on the basis of race, color, national origin, age, disability, sex, sexual orientation, or gender identity.            Thank you!     Thank you for choosing [facility]  for your care. Our goal is always to provide you with excellent care. Hearing back from our patients is one way we can continue to improve our services. Please take a few minutes to complete the written survey that you may receive in the mail after your visit with us. Thank you!             Your Updated Medication List - Protect others around you: Learn how to safely use, store and throw away your medicines at www.disposemymeds.org.          This list is accurate as of 2/27/18 10:28 AM.  Always use your most recent med list.                   Brand Name Dispense Instructions for use Diagnosis    ADVAIR DISKUS 100-50 MCG/DOSE diskus inhaler   Generic drug:   fluticasone-salmeterol     3 Inhaler    inhale 1 puff into the lungs every 12 hours.    Chronic obstructive pulmonary disease, unspecified COPD type (H)       albuterol (2.5 MG/3ML) 0.083% neb solution     63 vial    TAKE 1 VIAL (2.5 MG) BY NEBULIZATION EVERY 6 HOURS AS NEEDED FOR SHORTNESS OF BREATH / DYSPNEA OR WHEEZING    Chronic obstructive pulmonary disease, unspecified COPD type (H)       BASAGLAR 100 UNIT/ML injection     15 mL    , Inject 15 Units Subcutaneous At Bedtime    Controlled type 2 diabetes mellitus with diabetic nephropathy, with long-term current use of insulin (H)       benzonatate 200 MG capsule    TESSALON    21 capsule    Take 1 capsule (200 mg) by mouth 3 times daily as needed for cough    COPD exacerbation (H)       blood glucose monitoring meter device kit    no brand specified    1 kit    Use to test blood sugar 3 times daily or as directed.    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  Do not apply to face.    Nummular dermatitis, Lentigo, SK (seborrheic keratosis), History of skin cancer       insulin aspart 100 UNIT/ML injection    NovoLOG FLEXPEN    15 mL    6 units before breakfast, 6 units before lunch, 4 units before dinner    Controlled type 2 diabetes mellitus with diabetic nephropathy, with long-term current use of insulin (H)       * insulin pen needle 30G X 8 MM    NOVOFINE    300 each    Use 3 daily or as directed.    Type 2 diabetes mellitus with diabetic nephropathy, unspecified long term insulin use status (H)       * NOVOFINE 30G X 8 MM   Generic drug:  insulin pen needle     100 each    USE TO INJECT INSULIN THREE TIMES DAILY OR AS DIRECTED    Type 2 diabetes mellitus with diabetic nephropathy, unspecified long term insulin use status (H)       latanoprost 0.005 % ophthalmic solution    XALATAN     Place 1 drop into both eyes At Bedtime        LOSARTAN POTASSIUM PO      Take 25 mg by mouth daily        lovastatin 40 MG tablet    MEVACOR    135 tablet    Profile Rx: patient will contact pharmacy when needed TAKE 1 AND 1/2 TABLETS BY MOUTH ONCE A DAY WITH DINNER. NEED FASTING LAB WORK    Hyperlipidemia LDL goal <130       metoprolol succinate 50 MG 24 hr tablet    TOPROL-XL    90 tablet    Take 1 tablet (50 mg) by mouth daily    Hypertension, unspecified type       order for DME     1 each    Equipment being ordered: Walker with wheels and brakes, and a seat    ESRD (end stage renal disease) on dialysis (H), Cerebral embolism with cerebral infarction (H)       * order for DME     1 Units    Nebulizer machine Qty #1    Fever, unspecified, Cough, Chronic obstructive pulmonary disease, unspecified COPD type (H), Acute bronchospasm       order for DME     2 each    Equipment being ordered: Oxygen- HOME and portable. Georgie Coulter CMA Medical Assistant Signed  Service date: 05/24/2016 10:48 AM     O2 Sat on Room air at rest:84% O2 sat on room air with walk: 82-91% O2 Sat on  1L of oxygen with walk 91-93% O2 Sat on 2 L of Oxygen with walk 91-96% O2 Sat on 1 L O2 at rest 94%    Cough, Hypoxia, Chronic obstructive pulmonary disease, unspecified COPD type (H), Chronic obstructive pulmonary disease with acute exacerbation (H), 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), Type 2 diabetes mellitus with diabetic nephropathy (H)       * order for DME     1 Device    Equipment being ordered: Compression socks 15-20 mmHg    Peripheral edema       TART CHERRY ADVANCED Caps      Take 1 capsule by mouth daily        terazosin 10 MG capsule    HYTRIN    90 capsule    Profile Rx: patient will contact pharmacy when needed TAKE 1 CAPSULE (10 MG) BY MOUTH AT BEDTIME    Benign non-nodular prostatic hyperplasia with lower urinary tract symptoms       TYLENOL PO      Take 650 mg by mouth nightly as needed        warfarin 2 MG tablet    COUMADIN    170 tablet    Take 2 mg Wed/Sat and 4 mg all other days or as directed by the Anticoagulation Clinic    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 Read the directions carefully, and ask your doctor or other care provider to review them with you.

## 2018-02-27 NOTE — NURSING NOTE
"Initial /61 (BP Location: Right arm, Patient Position: Chair, Cuff Size: Adult Large)  Pulse 80  Temp 98.6  F (37  C) (Tympanic)  Resp 20  Ht 1.702 m (5' 7\")  Wt 91.3 kg (201 lb 4.5 oz)  BMI 31.52 kg/m2 Estimated body mass index is 31.52 kg/(m^2) as calculated from the following:    Height as of this encounter: 1.702 m (5' 7\").    Weight as of this encounter: 91.3 kg (201 lb 4.5 oz). .    Laura JOSEPH, CMA    "

## 2018-02-27 NOTE — LETTER
2/27/2018         RE: Griffin Walton  [address] [facility] [address]        Dear Colleague,    Thank you for referring your patient, Griffin Walton, to the [facility]. Please see a copy of my visit note below.    Griffin Walton is a 83 year old year old male patient here today for rash on lfet leg.  Wife putting neosproin on it.   .  Patient states this has been present for itching.  .  Patient reports the following symptoms:  itching .  Patient reports the following previous treatments none.  Patient reports the following modifying factors none.  Associated symptoms: none.  Patient has no other skin complaints today.  Remainder of the HPI, Meds, PMH, Allergies, FH, and SH was reviewed in chart.      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     Type II or unspecified type diabetes mellitus without mention of complication, not stated as uncontrolled      Unspecified disorder of kidney and ureter        Past Surgical History:   Procedure Laterality Date     ABDOMEN SURGERY  2005    aortic aneurysm     CATARACT IOL, RT/LT  2/4/08    left eye - phacoemulsification w/ posterior chamber lens implantation combined w/ glaucoma filtering procedure     CREATE FISTULA ARTERIOVENOUS UPPER EXTREMITY  1/3/2012    Procedure:CREATE FISTULA ARTERIOVENOUS UPPER EXTREMITY; LEFT UPPER ARM ARTERIOVENOUS FISTULA; Surgeon:JENA PEARSON; Location:[facility]     SURGICAL HISTORY OF -   2/13/2004    Right knee arthroscopy      SURGICAL HISTORY OF -       Vocal cord biopsy-benign     SURGICAL HISTORY OF -   3/3/2005    AAA, left partial nephrectomy        Family History   Problem Relation Age of Onset     CANCER Father      Asophageal      CANCER Brother      Throat      Other Cancer Sister      Lung        Social History     Social History     Marital status:      Spouse name: N/A     Number of children: N/A     Years of education: N/A     Occupational History     Paper  Retired     Social History Main Topics     Smoking status: Former Smoker     Packs/day: 1.00     Years: 55.00     Types: Cigarettes     Quit date: 1/1/2005     Smokeless tobacco: Never Used     Alcohol use No     Drug use: No     Sexual activity: Not Currently     Other Topics Concern     Parent/Sibling W/ Cabg, Mi Or Angioplasty Before 65f 55m? No     Social History Narrative       Outpatient Encounter Prescriptions as of 2/27/2018   Medication Sig Dispense Refill     fluocinonide (LIDEX) 0.05 % cream Apply sparingly to affected area twice daily as needed.  Do not apply to face. 120 g 1     warfarin (COUMADIN) 2 MG tablet Take 2 mg Wed/Sat and 4 mg all other days or as directed by the Anticoagulation Clinic 170 tablet 0     bumetanide (BUMEX) 1 MG tablet Take 2 tablets in the morning and 1 tablet early afternoon 180 tablet 2     order for DME Equipment being ordered: Compression socks 15-20 mmHg 1 Device 0     BASAGLAR 100 UNIT/ML injection , Inject 15 Units Subcutaneous At Bedtime 15 mL 0     NOVOFINE 30G X 8 MM insulin pen needle USE TO INJECT INSULIN THREE TIMES DAILY OR AS DIRECTED 100 each 6     metoprolol succinate (TOPROL-XL) 50 MG 24 hr tablet Take 1 tablet (50 mg) by mouth daily 90 tablet 3     benzonatate (TESSALON) 200 MG capsule Take 1 capsule (200 mg) by mouth 3 times daily as needed for cough 21 capsule 0     LOSARTAN POTASSIUM PO Take 25 mg by mouth daily       B Complex-C-Folic Acid (DIALYVITE PO) Take 1 tablet by mouth daily        diltiazem (CARDIZEM CD) 240 MG 24 hr capsule Take 1 capsule (240 mg) by mouth daily 90 capsule 3     terazosin (HYTRIN) 10 MG capsule Profile Rx: patient will contact pharmacy when needed  TAKE 1 CAPSULE (10 MG) BY MOUTH AT BEDTIME 90 capsule 3     lovastatin (MEVACOR) 40 MG tablet Profile Rx: patient will contact pharmacy when needed  TAKE 1 AND 1/2 TABLETS BY MOUTH ONCE A DAY WITH DINNER. NEED FASTING LAB WORK 135 tablet 3     clopidogrel (PLAVIX) 75 MG tablet Take 1 tablet (75 mg) by mouth daily 90 tablet 3     insulin pen needle (NOVOFINE) 30G X 8 MM Use 3 daily or as directed. 300 each 3     Misc Natural Products (TART CHERRY ADVANCED) CAPS Take 1 capsule by mouth daily       albuterol (2.5 MG/3ML) 0.083% neb solution TAKE 1 VIAL (2.5 MG) BY NEBULIZATION EVERY 6 HOURS AS NEEDED FOR SHORTNESS OF BREATH / DYSPNEA OR WHEEZING 63 vial 11     ADVAIR DISKUS 100-50 MCG/DOSE diskus inhaler inhale 1 puff into the lungs every 12 hours. 3 Inhaler 3     insulin aspart (NOVOLOG FLEXPEN) 100 UNIT/ML injection 6 units before breakfast, 6 units before lunch, 4 units before dinner 15 mL 11     blood glucose monitoring (ONE TOUCH ULTRA) test strip test 2 times daily  Profile Rx: patient will contact pharmacy when needed 60 each 11     latanoprost (XALATAN) 0.005 % ophthalmic solution Place 1 drop into both eyes At Bedtime       blood glucose monitoring (NO BRAND SPECIFIED) meter device kit Use to test blood sugar 3 times daily or as directed. 1 kit 0     order for DME Equipment being ordered: Oxygen- HOME and portable. Georgie Coulter CMA Medical Assistant Signed  Service date: 05/24/2016 10:48 AM       O2 Sat on Room air at rest:84%  O2 sat on room air with walk: 82-91%  O2 Sat on 1L of oxygen with walk 91-93%  O2 Sat on 2 L of Oxygen with walk 91-96%  O2 Sat on 1 L O2 at rest 94% 2 each prn     order for DME Nebulizer machine Qty #1 1 Units 0     Acetaminophen (TYLENOL PO) Take 650 mg by mouth nightly as needed        "dorzolamide-timolol (COSOPT) 2-0.5 % ophthalmic solution Place 1 drop into both eyes 2 times daily       ORDER FOR DME Equipment being ordered: Walker with wheels and brakes, and a seat 1 each 0     No facility-administered encounter medications on file as of 2/27/2018.              Review Of Systems  Skin: As above  Eyes: negative  Ears/Nose/Throat: negative  Respiratory: No shortness of breath, dyspnea on exertion, cough, or hemoptysis  Cardiovascular: negative  Gastrointestinal: negative  Genitourinary: negative  Musculoskeletal: negative  Neurologic: negative  Psychiatric: negative  Hematologic/Lymphatic/Immunologic: negative  Endocrine: negative      O:   NAD, WDWN, Alert & Oriented, Mood & Affect wnl, Vitals stable   Here today alone   /61 (BP Location: Right arm, Patient Position: Chair, Cuff Size: Adult Large)  Pulse 80  Temp 98.6  F (37  C) (Tympanic)  Resp 20  Ht 1.702 m (5' 7\")  Wt 91.3 kg (201 lb 4.5 oz)  BMI 31.52 kg/m2   General appearance normal   Vitals stable   Alert, oriented and in no acute distress     Stuck on papules and brown macules on trunk and ext   l leg nummular eczemaotus plaque         The remainder of expanded problem focused exam was unremarkable; the following areas were examined:  scalp/hair, conjunctiva/lids, face, neck, lips, chest, digits/nails, RUE, LUE.      Eyes: Conjunctivae/lids:Normal     ENT: Lips, buccal mucosa, tongue: normal    MSK:Normal    Cardiovascular: peripheral edema none    Pulm: Breathing Normal    Lymph Nodes: No Head and Neck Lymphadenopathy     Neuro/Psych: Orientation:Normal; Mood/Affect:Normal      A/P:  1. Nummular derm  Lidex prn  Stop neopsorin  Skin care discussed with patient  2. Seborrheic keratosis, letnigo, hxof non-melanoma skin cancer   BENIGN LESIONS DISCUSSED WITH PATIENT:  I discussed the specifics of tumor, prognosis, and genetics of benign lesions.  I explained that treatment of these lesions would be purely cosmetic and not " medically neccessary.  I discussed with patient different removal options including excision, cautery and /or laser.      Nature and genetics of benign skin lesions dicussed with patient.  Signs and Symptoms of skin cancer discussed with patient.  Patient encouraged to perform monthly skin exams.  UV precautions reviewed with patient.  Skin care regimen reviewed with patient: Eliminate harsh soaps, i.e. Dial, zest, irsih spring; Mild soaps such as Cetaphil or Dove sensitive skin, avoid hot or cold showers, aggressive use of emollients including vanicream, cetaphil or cerave discussed with patient.    Risks of non-melanoma skin cancer discussed with patient   Return to clinic 3 months      Again, thank you for allowing me to participate in the care of your patient.        Sincerely,        Geraldo Ware MD

## 2018-02-27 NOTE — PROGRESS NOTES
Griffin Walton is a 83 year old year old male patient here today for rash on lfet leg.  Wife putting neosproin on it.   .  Patient states this has been present for itching.  .  Patient reports the following symptoms:  itching .  Patient reports the following previous treatments none.  Patient reports the following modifying factors none.  Associated symptoms: none.  Patient has no other skin complaints today.  Remainder of the HPI, Meds, PMH, Allergies, FH, and SH was reviewed in chart.      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     Type II or unspecified type diabetes mellitus without mention of complication, not stated as uncontrolled      Unspecified disorder of kidney and ureter        Past Surgical History:   Procedure Laterality Date     ABDOMEN SURGERY  2005    aortic aneurysm     CATARACT IOL, RT/LT  2/4/08    left eye - phacoemulsification w/ posterior chamber lens implantation combined w/ glaucoma filtering procedure     CREATE FISTULA ARTERIOVENOUS UPPER EXTREMITY  1/3/2012    Procedure:CREATE FISTULA ARTERIOVENOUS UPPER EXTREMITY; LEFT UPPER ARM ARTERIOVENOUS FISTULA; Surgeon:JENA PEARSON; Location:[facility]     SURGICAL HISTORY OF -   2/13/2004    Right knee arthroscopy     SURGICAL HISTORY OF -       Vocal cord biopsy-benign     SURGICAL HISTORY OF -   3/3/2005    AAA, left partial nephrectomy        Family History   Problem Relation Age of Onset     CANCER Father      Asophageal      CANCER Brother      Throat      Other Cancer  Sister      Lung        Social History     Social History     Marital status:      Spouse name: N/A     Number of children: N/A     Years of education: N/A     Occupational History     Paper  Retired     Social History Main Topics     Smoking status: Former Smoker     Packs/day: 1.00     Years: 55.00     Types: Cigarettes     Quit date: 1/1/2005     Smokeless tobacco: Never Used     Alcohol use No     Drug use: No     Sexual activity: Not Currently     Other Topics Concern     Parent/Sibling W/ Cabg, Mi Or Angioplasty Before 65f 55m? No     Social History Narrative       Outpatient Encounter Prescriptions as of 2/27/2018   Medication Sig Dispense Refill     fluocinonide (LIDEX) 0.05 % cream Apply sparingly to affected area twice daily as needed.  Do not apply to face. 120 g 1     warfarin (COUMADIN) 2 MG tablet Take 2 mg Wed/Sat and 4 mg all other days or as directed by the Anticoagulation Clinic 170 tablet 0     bumetanide (BUMEX) 1 MG tablet Take 2 tablets in the morning and 1 tablet early afternoon 180 tablet 2     order for DME Equipment being ordered: Compression socks 15-20 mmHg 1 Device 0     BASAGLAR 100 UNIT/ML injection , Inject 15 Units Subcutaneous At Bedtime 15 mL 0     NOVOFINE 30G X 8 MM insulin pen needle USE TO INJECT INSULIN THREE TIMES DAILY OR AS DIRECTED 100 each 6     metoprolol succinate (TOPROL-XL) 50 MG 24 hr tablet Take 1 tablet (50 mg) by mouth daily 90 tablet 3     benzonatate (TESSALON) 200 MG capsule Take 1 capsule (200 mg) by mouth 3 times daily as needed for cough 21 capsule 0     LOSARTAN POTASSIUM PO Take 25 mg by mouth daily       B Complex-C-Folic Acid (DIALYVITE PO) Take 1 tablet by mouth daily       diltiazem (CARDIZEM CD) 240 MG 24 hr capsule Take 1 capsule (240 mg) by mouth daily 90 capsule 3     terazosin (HYTRIN) 10 MG capsule Profile Rx: patient will contact pharmacy when needed  TAKE 1 CAPSULE (10 MG) BY MOUTH AT BEDTIME 90 capsule 3     lovastatin  (MEVACOR) 40 MG tablet Profile Rx: patient will contact pharmacy when needed  TAKE 1 AND 1/2 TABLETS BY MOUTH ONCE A DAY WITH DINNER. NEED FASTING LAB WORK 135 tablet 3     clopidogrel (PLAVIX) 75 MG tablet Take 1 tablet (75 mg) by mouth daily 90 tablet 3     insulin pen needle (NOVOFINE) 30G X 8 MM Use 3 daily or as directed. 300 each 3     Misc Natural Products (TART CHERRY ADVANCED) CAPS Take 1 capsule by mouth daily       albuterol (2.5 MG/3ML) 0.083% neb solution TAKE 1 VIAL (2.5 MG) BY NEBULIZATION EVERY 6 HOURS AS NEEDED FOR SHORTNESS OF BREATH / DYSPNEA OR WHEEZING 63 vial 11     ADVAIR DISKUS 100-50 MCG/DOSE diskus inhaler inhale 1 puff into the lungs every 12 hours. 3 Inhaler 3     insulin aspart (NOVOLOG FLEXPEN) 100 UNIT/ML injection 6 units before breakfast, 6 units before lunch, 4 units before dinner 15 mL 11     blood glucose monitoring (ONE TOUCH ULTRA) test strip test 2 times daily  Profile Rx: patient will contact pharmacy when needed 60 each 11     latanoprost (XALATAN) 0.005 % ophthalmic solution Place 1 drop into both eyes At Bedtime       blood glucose monitoring (NO BRAND SPECIFIED) meter device kit Use to test blood sugar 3 times daily or as directed. 1 kit 0     order for DME Equipment being ordered: Oxygen- HOME and portable. Georgie Coulter [facility] Medical Assistant Signed  Service date: 05/24/2016 10:48 AM       O2 Sat on Room air at rest:84%  O2 sat on room air with walk: 82-91%  O2 Sat on 1L of oxygen with walk 91-93%  O2 Sat on 2 L of Oxygen with walk 91-96%  O2 Sat on 1 L O2 at rest 94% 2 each prn     order for DME Nebulizer machine Qty #1 1 Units 0     Acetaminophen (TYLENOL PO) Take 650 mg by mouth nightly as needed       dorzolamide-timolol (COSOPT) 2-0.5 % ophthalmic solution Place 1 drop into both eyes 2 times daily       ORDER FOR DME Equipment being ordered: Walker with wheels and brakes, and a seat 1 each 0     No facility-administered encounter medications on file as of  "2/27/2018.              Review Of Systems  Skin: As above  Eyes: negative  Ears/Nose/Throat: negative  Respiratory: No shortness of breath, dyspnea on exertion, cough, or hemoptysis  Cardiovascular: negative  Gastrointestinal: negative  Genitourinary: negative  Musculoskeletal: negative  Neurologic: negative  Psychiatric: negative  Hematologic/Lymphatic/Immunologic: negative  Endocrine: negative      O:   NAD, WDWN, Alert & Oriented, Mood & Affect wnl, Vitals stable   Here today alone   /61 (BP Location: Right arm, Patient Position: Chair, Cuff Size: Adult Large)  Pulse 80  Temp 98.6  F (37  C) (Tympanic)  Resp 20  Ht 1.702 m (5' 7\")  Wt 91.3 kg (201 lb 4.5 oz)  BMI 31.52 kg/m2   General appearance normal   Vitals stable   Alert, oriented and in no acute distress     Stuck on papules and brown macules on trunk and ext   l leg nummular eczemaotus plaque         The remainder of expanded problem focused exam was unremarkable; the following areas were examined:  scalp/hair, conjunctiva/lids, face, neck, lips, chest, digits/nails, RUE, LUE.      Eyes: Conjunctivae/lids:Normal     ENT: Lips, buccal mucosa, tongue: normal    MSK:Normal    Cardiovascular: peripheral edema none    Pulm: Breathing Normal    Lymph Nodes: No Head and Neck Lymphadenopathy     Neuro/Psych: Orientation:Normal; Mood/Affect:Normal      A/P:  1. Nummular derm  Lidex prn  Stop neopsorin  Skin care discussed with patient  2. Seborrheic keratosis, letnigo, hxof non-melanoma skin cancer   BENIGN LESIONS DISCUSSED WITH PATIENT:  I discussed the specifics of tumor, prognosis, and genetics of benign lesions.  I explained that treatment of these lesions would be purely cosmetic and not medically neccessary.  I discussed with patient different removal options including excision, cautery and /or laser.      Nature and genetics of benign skin lesions dicussed with patient.  Signs and Symptoms of skin cancer discussed with patient.  Patient " encouraged to perform monthly skin exams.  UV precautions reviewed with patient.  Skin care regimen reviewed with patient: Eliminate harsh soaps, i.e. Dial, zest, irsih spring; Mild soaps such as Cetaphil or Dove sensitive skin, avoid hot or cold showers, aggressive use of emollients including vanicream, cetaphil or cerave discussed with patient.    Risks of non-melanoma skin cancer discussed with patient   Return to clinic 3 months

## 2018-02-27 NOTE — PATIENT INSTRUCTIONS
Proper skin care from Dr. Ware- [facility]     Eliminate harsh soaps, i.e. Dial, Zest, Elsy Spring;   Use mild soaps such as Cetaphil or Dove Sensitive Skin   Avoid hot or cold showers   After showering, lightly dry off.    Aggressive use of a moisturizer (including Vanicream, Cetaphil, Aquaphor or Cerave)   We recommend using a tub that needs to be scooped out, not a pump. This has more of an oil base. It will hold moisture in your skin much better than a water base moisturizer. The ones recommended are non- pore clogging.       If you have any questions call [phone] and follow the prompts to Dr. Ware's office.

## 2018-03-14 ENCOUNTER — ANTICOAGULATION THERAPY VISIT (OUTPATIENT)
Dept: ANTICOAGULATION | Facility: CLINIC | Age: 83
End: 2018-03-14
Payer: MEDICARE

## 2018-03-14 DIAGNOSIS — I48.91 ATRIAL FIBRILLATION, NEW ONSET (H): ICD-10-CM

## 2018-03-14 DIAGNOSIS — I63.40 CEREBRAL INFARCTION DUE TO EMBOLISM OF CEREBRAL ARTERY (H): ICD-10-CM

## 2018-03-14 DIAGNOSIS — Z79.01 LONG-TERM (CURRENT) USE OF ANTICOAGULANTS: ICD-10-CM

## 2018-03-14 LAB — INR POINT OF CARE: 1.6 (ref 0.86–1.14)

## 2018-03-14 PROCEDURE — 36416 COLLJ CAPILLARY BLOOD SPEC: CPT

## 2018-03-14 PROCEDURE — 85610 PROTHROMBIN TIME: CPT | Mod: QW

## 2018-03-14 PROCEDURE — 99207 ZZC NO CHARGE NURSE ONLY: CPT

## 2018-03-14 NOTE — MR AVS SNAPSHOT
Griffin LYNCH Isabelle   3/14/2018 1:00 PM   Anticoagulation Therapy Visit    Description:  83 year old male   Provider:  [facility]   Department:  [facility]           INR as of 3/14/2018     Today's INR 1.6!      Anticoagulation Summary as of 3/14/2018     INR goal 2.0-3.0   Today's INR 1.6!   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 Take 4 mg all other days.      Your next Anticoagulation Clinic appointment(s)     Mar 26, 2018  1:15 PM CDT   [facility]agulation Visit with [facility]   [facility]    [address] [address] [phone]              Contact Numbers     Please call [phone] with any problems or questions regarding your therapy.    If you need to cancel and/or reschedule your appointment please call one of the following numbers:  [facility] [phone]  [place] - [phone]  [place] - [phone]  [facility] [phone]  [place] - [phone]            March 2018 Details    Sun Mon Tue Wed Thu Fri Sat         1               2               3                 4               5               6               7               8               9               10                 11               12               13               14      4 mg   See details      15      4 mg         16      4 mg         17      2 mg           18      4 mg         19      4 mg         20      4 mg         21      4 mg         22      4 mg         23      4 mg         24      2 mg           25      4 mg         26            27               28               29               30               31                 Date Details   03/14 This INR check       Date of next INR:  3/26/2018         How to take your warfarin dose     To take:  2 mg Take 1 of the 2 mg tablets.    To take:  4 mg Take 2 of the 2 mg tablets.

## 2018-03-14 NOTE — PROGRESS NOTES
ANTICOAGULATION FOLLOW-UP CLINIC VISIT    Patient Name:  Griffin Walton  Date:  3/14/2018  Contact Type:  Face to Face    SUBJECTIVE:     Patient Findings     Positives Change in diet/appetite (had a half boost about one week ago), Other complaints (patient bled more during dialysis today than ususal), Unexplained INR or factor level change    Comments Patient and wife deny any missed doses and state they took dosing appropriately. No increase in greens (other than one half of a boost almost one week ago). No changes in medications or OTC supplements. No change in activity level (patient is on oxygen and uses a wheelchair for ACC visits). Due to hx of cerebral infarction and unsure why INR so low. Writer will increase weekly dose from 24 mg to 26 mg for the next two weeks. If INR in range at next visit, will need to make this the new maintenance dose.             OBJECTIVE    INR Protime   Date Value Ref Range Status   03/14/2018 1.6 (A) 0.86 - 1.14 Final       ASSESSMENT / PLAN  INR assessment SUB    Recheck INR In: 2 WEEKS    INR Location Clinic      Anticoagulation Summary as of 3/14/2018     INR goal 2.0-3.0   Today's INR 1.6!   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 [facility] MWF 8-12pm in [place]. Pt not new to warfarin (has been on approx 1 yr), pt cannot see well enough to read       Anticoagulation Care Providers     Provider Role Specialty Phone number    Stefanie Louis APRN CNP Responsible Nurse Practitioner [phone]            See the Encounter Report to view Anticoagulation Flowsheet and Dosing Calendar (Go to Encounters tab in chart review, and find the Anticoagulation Therapy Visit)        Lissa Bello RN

## 2018-03-15 ENCOUNTER — CARE COORDINATION (OUTPATIENT)
Dept: CARE COORDINATION | Facility: CLINIC | Age: 83
End: 2018-03-15

## 2018-03-15 NOTE — PROGRESS NOTES
Clinic Care Coordination Contact  [facility] Care Coordination Outreach    Patient was enrolled in [facility] upon Discharged from: Hospital    Program Type: COPD    Program Length: 90 days post discharge    Clinical Data:  Outreach Type: Variance follow up call    Patient indicated nurse call request. Patient did not know he did request call and everything is ok at home. No concerns reported.     Action: Actions: Stable/No Action Required       Plan: RN CC will continue to monitor patients responses. Will plan to outreach as needed and with any new variances or concerns.     Kody Davila RN  Clinic Care Coordinator  [phone] or [phone]

## 2018-03-23 ENCOUNTER — HOSPITAL ENCOUNTER (EMERGENCY)
Facility: CLINIC | Age: 83
Discharge: HOME | End: 2018-03-23
Attending: EMERGENCY MEDICINE | Admitting: EMERGENCY MEDICINE
Payer: MEDICARE

## 2018-03-23 ENCOUNTER — APPOINTMENT (OUTPATIENT)
Dept: GENERAL RADIOLOGY | Facility: CLINIC | Age: 83
End: 2018-03-23
Attending: EMERGENCY MEDICINE
Payer: MEDICARE

## 2018-03-23 VITALS
HEART RATE: 96 BPM | RESPIRATION RATE: 28 BRPM | OXYGEN SATURATION: 96 % | TEMPERATURE: 99.3 F | DIASTOLIC BLOOD PRESSURE: 82 MMHG | SYSTOLIC BLOOD PRESSURE: 130 MMHG

## 2018-03-23 DIAGNOSIS — N18.6 CKD (CHRONIC KIDNEY DISEASE) STAGE V REQUIRING CHRONIC DIALYSIS (H): ICD-10-CM

## 2018-03-23 DIAGNOSIS — Z99.2 CKD (CHRONIC KIDNEY DISEASE) STAGE V REQUIRING CHRONIC DIALYSIS (H): ICD-10-CM

## 2018-03-23 DIAGNOSIS — Z79.01 LONG-TERM (CURRENT) USE OF ANTICOAGULANTS: ICD-10-CM

## 2018-03-23 DIAGNOSIS — Z79.4 CONTROLLED TYPE 2 DIABETES MELLITUS WITH DIABETIC NEPHROPATHY, WITH LONG-TERM CURRENT USE OF INSULIN (H): ICD-10-CM

## 2018-03-23 DIAGNOSIS — R50.9 FEVER, UNSPECIFIED FEVER CAUSE: ICD-10-CM

## 2018-03-23 DIAGNOSIS — E11.21 CONTROLLED TYPE 2 DIABETES MELLITUS WITH DIABETIC NEPHROPATHY, WITH LONG-TERM CURRENT USE OF INSULIN (H): ICD-10-CM

## 2018-03-23 DIAGNOSIS — J45.40 MODERATE PERSISTENT ASTHMA WITHOUT COMPLICATION: ICD-10-CM

## 2018-03-23 LAB
ALBUMIN SERPL-MCNC: 3.4 G/DL (ref 3.4–5)
ALBUMIN UR-MCNC: >499 MG/DL
ALP SERPL-CCNC: 81 U/L (ref 40–150)
ALT SERPL W P-5'-P-CCNC: 23 U/L (ref 0–70)
ANION GAP SERPL CALCULATED.3IONS-SCNC: 8 MMOL/L (ref 3–14)
APPEARANCE UR: ABNORMAL
AST SERPL W P-5'-P-CCNC: 15 U/L (ref 0–45)
BACTERIA #/AREA URNS HPF: ABNORMAL /HPF
BASOPHILS # BLD AUTO: 0 10E9/L (ref 0–0.2)
BASOPHILS NFR BLD AUTO: 0.4 %
BILIRUB SERPL-MCNC: 0.4 MG/DL (ref 0.2–1.3)
BILIRUB UR QL STRIP: NEGATIVE
BUN SERPL-MCNC: 30 MG/DL (ref 7–30)
CALCIUM SERPL-MCNC: 8.3 MG/DL (ref 8.5–10.1)
CHLORIDE SERPL-SCNC: 95 MMOL/L (ref 94–109)
CO2 SERPL-SCNC: 29 MMOL/L (ref 20–32)
COLOR UR AUTO: YELLOW
CREAT SERPL-MCNC: 3.95 MG/DL (ref 0.66–1.25)
DIFFERENTIAL METHOD BLD: ABNORMAL
EOSINOPHIL # BLD AUTO: 0.1 10E9/L (ref 0–0.7)
EOSINOPHIL NFR BLD AUTO: 0.9 %
ERYTHROCYTE [DISTWIDTH] IN BLOOD BY AUTOMATED COUNT: 15.9 % (ref 10–15)
FLUAV+FLUBV AG SPEC QL: NEGATIVE
FLUAV+FLUBV AG SPEC QL: NEGATIVE
GFR SERPL CREATININE-BSD FRML MDRD: 15 ML/MIN/1.7M2
GLUCOSE SERPL-MCNC: 169 MG/DL (ref 70–99)
GLUCOSE UR STRIP-MCNC: 50 MG/DL
HCT VFR BLD AUTO: 33.8 % (ref 40–53)
HGB BLD-MCNC: 10.7 G/DL (ref 13.3–17.7)
HGB UR QL STRIP: ABNORMAL
HYALINE CASTS #/AREA URNS LPF: 3 /LPF (ref 0–2)
IMM GRANULOCYTES # BLD: 0 10E9/L (ref 0–0.4)
IMM GRANULOCYTES NFR BLD: 0.2 %
INR PPP: 1.54 (ref 0.86–1.14)
KETONES UR STRIP-MCNC: NEGATIVE MG/DL
LACTATE BLD-SCNC: 1.1 MMOL/L (ref 0.7–2)
LEUKOCYTE ESTERASE UR QL STRIP: NEGATIVE
LYMPHOCYTES # BLD AUTO: 0.7 10E9/L (ref 0.8–5.3)
LYMPHOCYTES NFR BLD AUTO: 8.6 %
MCH RBC QN AUTO: 28.6 PG (ref 26.5–33)
MCHC RBC AUTO-ENTMCNC: 31.7 G/DL (ref 31.5–36.5)
MCV RBC AUTO: 90 FL (ref 78–100)
MONOCYTES # BLD AUTO: 1 10E9/L (ref 0–1.3)
MONOCYTES NFR BLD AUTO: 12.3 %
MUCOUS THREADS #/AREA URNS LPF: PRESENT /LPF
NEUTROPHILS # BLD AUTO: 6.4 10E9/L (ref 1.6–8.3)
NEUTROPHILS NFR BLD AUTO: 77.6 %
NITRATE UR QL: NEGATIVE
PH UR STRIP: 7 PH (ref 5–7)
PLATELET # BLD AUTO: 224 10E9/L (ref 150–450)
POTASSIUM SERPL-SCNC: 4.3 MMOL/L (ref 3.4–5.3)
PROCALCITONIN SERPL-MCNC: 1.33 NG/ML
PROT SERPL-MCNC: 7.5 G/DL (ref 6.8–8.8)
RBC # BLD AUTO: 3.74 10E12/L (ref 4.4–5.9)
RBC #/AREA URNS AUTO: 16 /HPF (ref 0–2)
SODIUM SERPL-SCNC: 132 MMOL/L (ref 133–144)
SOURCE: ABNORMAL
SP GR UR STRIP: 1.02 (ref 1–1.03)
SPECIMEN SOURCE: NORMAL
SQUAMOUS #/AREA URNS AUTO: <1 /HPF (ref 0–1)
TRANS CELLS #/AREA URNS HPF: 1 /HPF (ref 0–1)
UROBILINOGEN UR STRIP-MCNC: 0 MG/DL (ref 0–2)
WBC # BLD AUTO: 8.2 10E9/L (ref 4–11)
WBC #/AREA URNS AUTO: 5 /HPF (ref 0–5)

## 2018-03-23 PROCEDURE — 99285 EMERGENCY DEPT VISIT HI MDM: CPT | Mod: 25 | Performed by: EMERGENCY MEDICINE

## 2018-03-23 PROCEDURE — 85025 COMPLETE CBC W/AUTO DIFF WBC: CPT | Performed by: EMERGENCY MEDICINE

## 2018-03-23 PROCEDURE — 85610 PROTHROMBIN TIME: CPT | Performed by: EMERGENCY MEDICINE

## 2018-03-23 PROCEDURE — 81001 URINALYSIS AUTO W/SCOPE: CPT | Performed by: EMERGENCY MEDICINE

## 2018-03-23 PROCEDURE — A9270 NON-COVERED ITEM OR SERVICE: HCPCS | Mod: GY | Performed by: EMERGENCY MEDICINE

## 2018-03-23 PROCEDURE — 93010 ELECTROCARDIOGRAM REPORT: CPT | Mod: Z6 | Performed by: EMERGENCY MEDICINE

## 2018-03-23 PROCEDURE — 87086 URINE CULTURE/COLONY COUNT: CPT | Performed by: EMERGENCY MEDICINE

## 2018-03-23 PROCEDURE — 83605 ASSAY OF LACTIC ACID: CPT | Performed by: EMERGENCY MEDICINE

## 2018-03-23 PROCEDURE — 93005 ELECTROCARDIOGRAM TRACING: CPT | Performed by: EMERGENCY MEDICINE

## 2018-03-23 PROCEDURE — 87040 BLOOD CULTURE FOR BACTERIA: CPT | Performed by: EMERGENCY MEDICINE

## 2018-03-23 PROCEDURE — 84145 PROCALCITONIN (PCT): CPT | Performed by: EMERGENCY MEDICINE

## 2018-03-23 PROCEDURE — 71046 X-RAY EXAM CHEST 2 VIEWS: CPT

## 2018-03-23 PROCEDURE — 87804 INFLUENZA ASSAY W/OPTIC: CPT | Performed by: EMERGENCY MEDICINE

## 2018-03-23 PROCEDURE — 80053 COMPREHEN METABOLIC PANEL: CPT | Performed by: EMERGENCY MEDICINE

## 2018-03-23 PROCEDURE — 25000132 ZZH RX MED GY IP 250 OP 250 PS 637: Mod: GY | Performed by: EMERGENCY MEDICINE

## 2018-03-23 RX ORDER — TIMOLOL MALEATE 5 MG/ML
1 SOLUTION/ DROPS OPHTHALMIC 2 TIMES DAILY
COMMUNITY

## 2018-03-23 RX ORDER — ACETAMINOPHEN 500 MG
1000 TABLET ORAL ONCE
Status: COMPLETED | OUTPATIENT
Start: 2018-03-23 | End: 2018-03-23

## 2018-03-23 RX ORDER — BRIMONIDINE TARTRATE 2 MG/ML
1 SOLUTION/ DROPS OPHTHALMIC 3 TIMES DAILY
COMMUNITY

## 2018-03-23 RX ORDER — LIDOCAINE 40 MG/G
CREAM TOPICAL
Status: DISCONTINUED | OUTPATIENT
Start: 2018-03-23 | End: 2018-03-23 | Stop reason: HOSPADM

## 2018-03-23 RX ORDER — PILOCARPINE HYDROCHLORIDE 20 MG/ML
1 SOLUTION/ DROPS OPHTHALMIC 2 TIMES DAILY
COMMUNITY

## 2018-03-23 RX ADMIN — ACETAMINOPHEN 1000 MG: 500 TABLET ORAL at 18:21

## 2018-03-23 ASSESSMENT — ENCOUNTER SYMPTOMS
ABDOMINAL PAIN: 0
FEVER: 1
SHORTNESS OF BREATH: 0
CHILLS: 1

## 2018-03-23 NOTE — ED PROVIDER NOTES
History     Chief Complaint   Patient presents with     Fever     HPI  Griffin Walton is a 83 year old male who has past medical history significant for multiple medical issues including history of stroke, atrial fibrillation anticoagulated on Coumadin, end-stage renal disease on dialysis Monday, Wednesday, Friday, hypertension, hyperlipidemia, presenting to the emergency department with complaints of shakiness.  Patient states that after dialysis, and actually at the end of his dialysis session he began feeling shakiness.  This is persisted throughout the day.  He called 911 this afternoon, and had a checkup by police, and called a second time later this evening and EMS arrived, and brought patient to the emergency department.  I had initial discussion with EMS.  They report patient has some confusion, however this appears to be at baseline.  [place] scale negative.    Patient denies any pain.  He does feel jittery/shaky.  Denies any increase in swelling.  Denies any difficulty during recent dialysis sessions with the exception of the shakiness which occurred at the end of today's session.  Denies cough, colds, or fevers recently.  Patient does have COPD, with chronic respiratory failure on 2 L nasal cannula oxygen.  He has not had changes in oxygen requirements.    Problem List:    Patient Active Problem List    Diagnosis Date Noted     Cerebral embolism with cerebral infarction (H) 02/17/2012     Priority: High     1/12/12 presented with dizziness, ataxia and disorientation.  Dx with acute infarct in the left occipital lobe and posterior left temporal lobe without hemorrhage on head CT. Also had right homonymous hemianopsia. MRI 1/12- 1. Acute infarction of the left occipital lobe in left PCA distribution. No evidence of hemorrhage. Likely occlusion of the left posterior cerebral artery at its P2 segment.  Severe chronic small vessel ischemic disease.  Neck vessels are grossly patent. However cannot  exclude 50% stenosis of the left ICA.       Malignant neoplasm of kidney excluding renal pelvis (H) 11/03/2007     Priority: High     Renal cell cancer.  S/p partial left nephrectomy.  MRI abd 7/07 - showed no recurrence of cancer  Followed by Dr. Carrion, urology, in [place], yearly  Problem list name updated by automated process. Provider to review       Localized edema 02/15/2018     Priority: Medium     Acute respiratory failure with hypoxia (H) 01/20/2018     Priority: Medium     Moderate persistent asthma without complication 01/19/2018     Priority: Medium     Long-term (current) use of anticoagulants [Z79.01] 11/28/2017     Priority: Medium     Atrial fibrillation, new onset (H) 11/27/2017     Priority: Medium     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) 11/20/2017     Priority: Medium     Legally blind in right eye, as defined in [place] 08/31/2017     Priority: Medium     Controlled type 2 diabetes mellitus with diabetic nephropathy, with long-term current use of insulin (H) 11/29/2016     Priority: Medium     Chronic obstructive pulmonary disease, unspecified COPD type (H) 11/29/2016     Priority: Medium     Glaucoma 02/04/2015     Priority: Medium     Thrombocytopenia, primary (H) 01/16/2014     Priority: Medium     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   Questions or concerns outside clinic hours   I will call the 24 hour nurse line at   [phone] or 577[facility].   Need to schedule an appointment   I will call the 24 hour scheduling team at [phone] or my clinic directly at  "[phone].   Same day treatment     I will call my clinic first, nurse line if after hours, urgent care and express care if needed.   Clinic care coordination services (regular clinic hours)   I will call a clinic care coordinator directly:     Snow Ribera RN  [phone]    Ame Coe, SW:    [phone]    Or call my clinic at [phone] and ask to speak with care coordination.   Crisis Services: Behavioral or Mental Health  [facility] 24 Hour Phone Line  [phone]    [facility] 24 Hour Crisis Services  [phone]    [facility] (Behavioral Health Providers) Network [phone]    [facility]   [phone]     Emergency treatment -- Immediately    CAll 911              Anxiety disorder due to medical condition 02/17/2012     Priority: Medium     S/p CVA in 1/12  Did not want to take citalopram due to fear of side effects, but seems to be improving       Hyperlipidemia LDL goal <70 10/31/2010     Priority: Medium     CHOL      137   1/13/2012  HDL       35   1/13/2012  LDL       85   1/13/2012  TRIG       84   1/13/2012  CHOLHDLRATIO      3.9   1/13/2012  Lovastatin 60mg         Anemia 09/10/2010     Priority: Medium     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) 05/06/2010     Priority: Medium     Fistula placed fall 2011, on dialysis since 5/12       Disorder of bursae and tendons in shoulder region 05/03/2007     Priority: Medium     Problem list name updated by automated process. Provider to review       Essential Hypertension, Benign 04/06/2005     Priority: Medium     Controlled  Nifedipine does cause fatigue       zAdvanced directives, counseling/discussion 02/29/2012     Priority: Low     Patient does not have an Advance/Health Care Directive (HCD), given \"What is Advance Care Planning?\" flyer and requests blank HCD form.    Jane Martinez  February 29, 2012         Proteinuria 05/06/2010     Priority: Low     Abdominal aortic aneurysm (H) " 11/03/2007     Priority: Low     S/p AAA repair 2005  Follows with Dr. Pearson.  Will see him in f/u fall 2008  Problem list name updated by automated process. Provider to review       Gouty arthropathy 04/25/2006     Priority: Low     Frequent attacks   Discussed allopurinol will hold off for now  Problem list name updated by automated process. Provider to review and confirm  Imo Update utility          Past Medical History:    Past Medical History:   Diagnosis Date     Abdominal aneurysm without mention of rupture      Atherosclerosis of renal artery (H)      Basal cell carcinoma      Essential hypertension, benign      Gout, unspecified      Malignant neoplasm of kidney, except pelvis 2005     Other and unspecified hyperlipidemia      Other peripheral vascular disease(443.89) 2005     Type II or unspecified type diabetes mellitus without mention of complication, not stated as uncontrolled      Unspecified disorder of kidney and ureter        Past Surgical History:    Past Surgical History:   Procedure Laterality Date     ABDOMEN SURGERY  2005    aortic aneurysm     CATARACT IOL, RT/LT  2/4/08    left eye - phacoemulsification w/ posterior chamber lens implantation combined w/ glaucoma filtering procedure     CREATE FISTULA ARTERIOVENOUS UPPER EXTREMITY  1/3/2012    Procedure:CREATE FISTULA ARTERIOVENOUS UPPER EXTREMITY; LEFT UPPER ARM ARTERIOVENOUS FISTULA; Surgeon:JENA PEARSON; Location:[facility]     SURGICAL HISTORY OF -   2/13/2004    Right knee arthroscopy     SURGICAL HISTORY OF -       Vocal cord biopsy-benign     SURGICAL HISTORY OF -   3/3/2005    AAA, left partial nephrectomy       Family History:    Family History   Problem Relation Age of Onset     CANCER Father      Asophageal      CANCER Brother      Throat      Other Cancer Sister      Lung        Social History:  Marital Status:   [2]  Social History   Substance Use Topics     Smoking status: Former Smoker     Packs/day: 1.00     Years: 55.00      Types: Cigarettes     Quit date: 1/1/2005     Smokeless tobacco: Never Used     Alcohol use No        Medications:      pilocarpine (PILOCAR) 2 % ophthalmic solution   brimonidine (ALPHAGAN) 0.2 % ophthalmic solution   timolol (TIMOPTIC) 0.5 % ophthalmic solution   warfarin (COUMADIN) 2 MG tablet   bumetanide (BUMEX) 1 MG tablet   BASAGLAR 100 UNIT/ML injection   NOVOFINE 30G X 8 MM insulin pen needle   metoprolol succinate (TOPROL-XL) 50 MG 24 hr tablet   LOSARTAN POTASSIUM PO   B Complex-C-Folic Acid (DIALYVITE PO)   diltiazem (CARDIZEM CD) 240 MG 24 hr capsule   terazosin (HYTRIN) 10 MG capsule   lovastatin (MEVACOR) 40 MG tablet   clopidogrel (PLAVIX) 75 MG tablet   Misc Natural Products (TART CHERRY ADVANCED) CAPS   albuterol (2.5 MG/3ML) 0.083% neb solution   ADVAIR DISKUS 100-50 MCG/DOSE diskus inhaler   insulin aspart (NOVOLOG FLEXPEN) 100 UNIT/ML injection   blood glucose monitoring (ONE TOUCH ULTRA) test strip   latanoprost (XALATAN) 0.005 % ophthalmic solution   blood glucose monitoring (NO BRAND SPECIFIED) meter device kit   Acetaminophen (TYLENOL PO)   fluocinonide (LIDEX) 0.05 % cream   order for DME   order for DME   order for DME   ORDER FOR DME         Review of Systems   Constitutional: Positive for chills and fever.   Respiratory: Negative for shortness of breath.    Cardiovascular: Negative for chest pain.   Gastrointestinal: Negative for abdominal pain.   All other systems reviewed and are negative.      Physical Exam   BP: 158/76  Pulse: 96  Heart Rate: 92  Temp: 101  F (38.3  C)  Resp: 20  SpO2: 96 %      Physical Exam  /82  Pulse 96  Temp 99.3  F (37.4  C) (Oral)  Resp 28  SpO2 96%  General: alert, interactive, in no apparent distress  Head: atraumatic  Nose: no rhinorrhea or epistaxis  Ears: no external auditory canal discharge or bleeding.    Eyes: Sclera nonicteric. Conjunctiva noninjected.    Mouth: no tonsillar erythema, edema, or exudate  Neck: supple, no palp LAD  Lungs:  CTAB  CV: tachycardic, S1/S2; peripheral pulses palpable and symmetric  Abdomen: soft, nt, nd, no guarding or rebound. Positive bowel sounds  Extremities: no cyanosis or edema.  LUE AV fistula with palpable thrill  Skin: no rash or diaphoresis  Neuro:   strength 5/5 in UE and LEs bilaterally, sensation intact to light touch in UE and LEs bilaterally;       ED Course     ED Course     Procedures               EKG Interpretation:      Interpreted by Rafael Elise  Time reviewed: 1750  Symptoms at time of EKG: fever   Rhythm: atrial fibrillation - controlled  Rate: Normal  Axis: Normal  Ectopy: none  Conduction: normal  ST Segments/ T Waves: No acute ischemic changes  Comparison to prior: Unchanged    Clinical Impression: atrial fibrillation (chronic)                Critical Care time:  none         No fluid given secondary to normal BP and patient with ESRD on dialysis.           Results for orders placed or performed during the hospital encounter of 03/23/18 (from the past 24 hour(s))   CBC with platelets differential   Result Value Ref Range    WBC 8.2 4.0 - 11.0 10e9/L    RBC Count 3.74 (L) 4.4 - 5.9 10e12/L    Hemoglobin 10.7 (L) 13.3 - 17.7 g/dL    Hematocrit 33.8 (L) 40.0 - 53.0 %    MCV 90 78 - 100 fl    MCH 28.6 26.5 - 33.0 pg    MCHC 31.7 31.5 - 36.5 g/dL    RDW 15.9 (H) 10.0 - 15.0 %    Platelet Count 224 150 - 450 10e9/L    Diff Method Automated Method     % Neutrophils 77.6 %    % Lymphocytes 8.6 %    % Monocytes 12.3 %    % Eosinophils 0.9 %    % Basophils 0.4 %    % Immature Granulocytes 0.2 %    Absolute Neutrophil 6.4 1.6 - 8.3 10e9/L    Absolute Lymphocytes 0.7 (L) 0.8 - 5.3 10e9/L    Absolute Monocytes 1.0 0.0 - 1.3 10e9/L    Absolute Eosinophils 0.1 0.0 - 0.7 10e9/L    Absolute Basophils 0.0 0.0 - 0.2 10e9/L    Abs Immature Granulocytes 0.0 0 - 0.4 10e9/L   Comprehensive metabolic panel   Result Value Ref Range    Sodium 132 (L) 133 - 144 mmol/L    Potassium 4.3 3.4 - 5.3 mmol/L    Chloride 95  94 - 109 mmol/L    Carbon Dioxide 29 20 - 32 mmol/L    Anion Gap 8 3 - 14 mmol/L    Glucose 169 (H) 70 - 99 mg/dL    Urea Nitrogen 30 7 - 30 mg/dL    Creatinine 3.95 (H) 0.66 - 1.25 mg/dL    GFR Estimate 15 (L) >60 mL/min/1.7m2    GFR Estimate If Black 18 (L) >60 mL/min/1.7m2    Calcium 8.3 (L) 8.5 - 10.1 mg/dL    Bilirubin Total 0.4 0.2 - 1.3 mg/dL    Albumin 3.4 3.4 - 5.0 g/dL    Protein Total 7.5 6.8 - 8.8 g/dL    Alkaline Phosphatase 81 40 - 150 U/L    ALT 23 0 - 70 U/L    AST 15 0 - 45 U/L   Lactic acid whole blood   Result Value Ref Range    Lactic Acid 1.1 0.7 - 2.0 mmol/L   Blood culture   Result Value Ref Range    Specimen Description Blood Right Arm     Special Requests Aerobic and anaerobic bottles received     Culture Micro No growth after 1 hour    INR   Result Value Ref Range    INR 1.54 (H) 0.86 - 1.14   UA with Microscopic   Result Value Ref Range    Color Urine Yellow     Appearance Urine Slightly Cloudy     Glucose Urine 50 (A) NEG^Negative mg/dL    Bilirubin Urine Negative NEG^Negative    Ketones Urine Negative NEG^Negative mg/dL    Specific Gravity Urine 1.017 1.003 - 1.035    Blood Urine Small (A) NEG^Negative    pH Urine 7.0 5.0 - 7.0 pH    Protein Albumin Urine >499 (A) NEG^Negative mg/dL    Urobilinogen mg/dL 0.0 0.0 - 2.0 mg/dL    Nitrite Urine Negative NEG^Negative    Leukocyte Esterase Urine Negative NEG^Negative    Source Catheter     WBC Urine 5 0 - 5 /HPF    RBC Urine 16 (H) 0 - 2 /HPF    Bacteria Urine Few (A) NEG^Negative /HPF    Squamous Epithelial /HPF Urine <1 0 - 1 /HPF    Transitional Epi 1 0 - 1 /HPF    Mucous Urine Present (A) NEG^Negative /LPF    Hyaline Casts 3 (H) 0 - 2 /LPF   Influenza A/B antigen   Result Value Ref Range    Influenza A/B Agn Specimen Nasal     Influenza A Negative NEG^Negative    Influenza B Negative NEG^Negative   XR Chest 2 Views    Narrative    CHEST TWO VIEWS   3/23/2018 6:55 PM     INDICATION: Fever.    COMPARISON: 2/12/2018. Chest CT 1/19/2018.       Impression    IMPRESSION: Cardiomegaly and mild stable interstitial prominence. A  component of this could be due to congestive heart failure, though  there is chronic interstitial changes and fibrosis on prior CT as  well. Small left pleural effusion has decreased. There are still  probably tiny pleural effusions. No new focal airspace disease.    SHERIE MUNSON MD       Medications   lidocaine 1 % 1 mL (not administered)   lidocaine (LMX4) kit (not administered)   sodium chloride (PF) 0.9% PF flush 3 mL (not administered)   sodium chloride (PF) 0.9% PF flush 3 mL (not administered)   acetaminophen (TYLENOL) tablet 1,000 mg (1,000 mg Oral Given 3/23/18 1821)       Assessments & Plan (with Medical Decision Making)  83 year old male, with past medical history significant for multiple medical issues including end-stage renal disease on dialysis Monday, Wednesday, Friday, chronic respiratory failure on 2 L nasal cannula oxygen, hypertension, hyperlipidemia, presenting to the emergency department with EMS after patient has had chills after his dialysis session today.  Upon arrival, patient is noted to be febrile at 101  oral.  Borderline tachycardic.  No fluids administered secondary to patient with end-stage renal disease on dialysis, and normal blood pressures upon arrival.  Concern is for sepsis with unknown source.  He does state that he makes small amounts of urine.  Denies any significant respiratory symptoms.  Blood cultures, lactate, and multiple blood tests ordered upon arrival.    Multiple laboratory tests performed, in no acute findings.  No obvious cause of patient's elevated temperature.  CBC is normal, with normal white blood cell count, and does have slight anemia of chronic kidney disease, approximately at baseline.  Urinalysis without signs of infection.  He denies urinary symptoms.  There are a few bacteria, however do not feel that this represents bladder infection.  Urine culture will be  performed.  CMP with findings consistent with his end-stage renal disease and diabetes.  Normal potassium, and normal LFTs.  Patient without any signs of infiltrate on x-ray.    At this point, exact cause of fever is unknown.  Patient was treated with Tylenol, no further recurrence of his chills.  I feel the chills and shakiness was secondary to the elevated fever.  However, likely viral etiology versus unknown cause at the end of dialysis.  Patient feels well, and requesting discharge home.  Encouraged to continue with Tylenol if elevated temperature, and be seen if worsening symptoms, or ongoing fevers.  Cultures pending.  Follow-up with primary care provider if continued fevers next week.     I have reviewed the nursing notes.    I have reviewed the findings, diagnosis, plan and need for follow up with the patient.       New Prescriptions    No medications on file       Final diagnoses:   Fever, unspecified fever cause   Moderate persistent asthma without complication   Long-term (current) use of anticoagulants [Z79.01]   Controlled type 2 diabetes mellitus with diabetic nephropathy, with long-term current use of insulin (H)   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)       3/23/2018   [facility] EMERGENCY DEPARTMENT     Rafael Elise MD  03/23/18 1942

## 2018-03-23 NOTE — ED AVS SNAPSHOT
[facility] Emergency Department    [address] [address]    Phone:  [phone]    Fax:  [phone]                                       Griffin Walton   MRN: [id number]    Department:  [facility] Emergency Department   Date of Visit:  3/23/2018           After Visit Summary Signature Page     I have received my discharge instructions, and my questions have been answered. I have discussed any challenges I see with this plan with the nurse or doctor.    ..........................................................................................................................................  Patient/Patient Representative Signature      ..........................................................................................................................................  Patient Representative Print Name and Relationship to Patient    ..................................................               ................................................  Date                                            Time    ..........................................................................................................................................  Reviewed by Signature/Title    ...................................................              ..............................................  Date                                                            Time

## 2018-03-23 NOTE — ED AVS SNAPSHOT
[facility] Emergency Department    [address] [address]    Phone:  [phone]    Fax:  [phone]                                       Griffin Walton   MRN: [id number]    Department:  [facility] Emergency Department   Date of Visit:  3/23/2018           Patient Information     Date Of Birth          5/18/1934        Your diagnoses for this visit were:     Fever, unspecified fever cause     Moderate persistent asthma without complication     Long-term (current) use of anticoagulants [Z79.01]     Controlled type 2 diabetes mellitus with diabetic nephropathy, with long-term current use of insulin (H)     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        Discharge Instructions       Tylenol as needed for fever or chills.   Return if fever continues and not controlled by tylenol or other concerning symptoms develop.        Febrile Illness, Uncertain Cause (Adult)  You have a fever, but the cause is not certain. A fever is a natural reaction of the body to an illness such as infection due to a virus or bacteria. In most cases, the temperature itself is not harmful. It actually helps the body fight infections. A fever does not need to be treated unless you feel very uncomfortable.  Sometimes a fever can be an early sign of a more serious infection, so make sure to follow up if your condition worsens.  Home care  Unless given other instructions by your healthcare provider, follow these guidelines when caring for yourself at home.  General care    If your symptoms are severe, rest at home for the first 2 to 3 days. When you resume activity, don't let yourself get too tired.    Do not smoke. Also avoid being exposed to secondhand smoke.    Your appetite may be poor, so a light diet is fine. Avoid dehydration by drinking 6 to 8 glasses of fluids per day (such as water, soft drinks, sports drinks, juices, tea, or  soup). Extra fluids will help loosen secretions in the nose and lungs.  Medicines    You can take acetaminophen or ibuprofen for pain, unless you were given a different fever-reducing/pain medicine to use. (Note: If you have chronic liver or kidney disease or have ever had a stomach ulcer or gastrointestinal bleeding, talk with your healthcare provider before using these medicines. Also talk to your provider if you are taking medicine to prevent blood clots.) Aspirin should never be given to anyone younger than 18 years of age who is ill with a viral infection or fever. It may cause severe liver or brain damage.    If you were given antibiotics, take them until they are used up, or your healthcare provider tells you to stop. It is important to finish the antibiotics even though you feel better. This is to make sure the infection has cleared. Be aware that antibiotics are not usually given for a fever with an unknown cause.    Over-the-counter medicines will not shorten the duration of the illness. However, they may be helpful for the following symptoms: cough, sore throat, or nasal and sinus congestion. Ask your pharmacist for product suggestions. (Note: Do not use decongestants if you have high blood pressure.)  Follow-up care  Follow up with your healthcare provider, or as advised.    If a culture was done, you will be notified if your treatment needs to be changed. You can call as directed for the results.    If X-rays, a CT, or an ultrasound were done, a specialist will review them. You will be notified of any findings that may affect your care.  Call 911  Contact emergency services right away if any of these occur:    Trouble breathing or swallowing, or wheezing    Chest pain    Confusion    Extreme drowsiness or trouble awakening    Fainting or loss of consciousness    Rapid heart rate    Low blood pressure    Vomiting blood, or large amounts of blood in stool    Seizure  When to seek medical advice  Call your  healthcare provider right away if any of these occur:    Cough with lots of colored sputum (mucus) or blood in your sputum    Severe headache    Face, neck, throat, or ear pain    Feeling drowsy    Abdominal pain    Repeated vomiting or diarrhea    Joint pain or a new rash    Burning when urinating    Fever of 100.4 F (38 C) or higher, that does not get better after taking fever-reducing medicine    Feeling weak or dizzy  Date Last Reviewed: 7/30/2015 2000-2017 [employer]. [address] [place] [address]. All rights reserved. This information is not intended as a substitute for professional medical care. Always follow your healthcare professional's instructions.          Your next 10 appointments already scheduled     Mar 26, 2018  1:15 PM CDT   Anticoagulation Visit with [place] ANTI COAG   [facility]    [address] [address]   [phone]            May 15, 2018  1:00 PM CDT   SIX MINUTE WALK with UC PFL A, UC PFL 6 MINUTE WALK 1   [facility] Pulmonary Function Testing ([facility])    [address]  3rd Floor  [facility] [address] [phone]            May 15, 2018  2:00 PM CDT   (Arrive by 1:45 PM)   Return Interstitial Lung with David Morris Perlman, MD   [facility] ([facility])    [address] [place] [address]  [address] [place] [id number] [phone]              24 Hour Appointment Hotline       To make an appointment at any [facility], call [phone] ([phone]). If you don't have a family doctor or clinic, we will help you find one. [facility] are conveniently located to serve the needs of you and your family.             Review of your medicines      Our records show that you are taking the medicines listed below. If these are incorrect, please call your family doctor or clinic.        Dose /  Directions Last dose taken    ADVAIR DISKUS 100-50 MCG/DOSE diskus inhaler   Quantity:  3 Inhaler   Generic drug:  fluticasone-salmeterol        inhale 1 puff into the lungs every 12 hours.   Refills:  3        albuterol (2.5 MG/3ML) 0.083% neb solution   Quantity:  63 vial        TAKE 1 VIAL (2.5 MG) BY NEBULIZATION EVERY 6 HOURS AS NEEDED FOR SHORTNESS OF BREATH / DYSPNEA OR WHEEZING   Refills:  11        BASAGLAR 100 UNIT/ML injection   Quantity:  15 mL        , Inject 15 Units Subcutaneous At Bedtime   Refills:  0        blood glucose monitoring meter device kit   Commonly known as:  no brand specified   Quantity:  1 kit        Use to test blood sugar 3 times daily or as directed.   Refills:  0        blood glucose monitoring test strip   Commonly known as:  ONETOUCH ULTRA   Quantity:  60 each        test 2 times daily Profile Rx: patient will contact pharmacy when needed   Refills:  11        brimonidine 0.2 % ophthalmic solution   Commonly known as:  ALPHAGAN   Dose:  1 drop        Place 1 drop into both eyes 3 times daily   Refills:  0        bumetanide 1 MG tablet   Commonly known as:  BUMEX   Quantity:  180 tablet        Take 2 tablets in the morning and 1 tablet early afternoon   Refills:  2        clopidogrel 75 MG tablet   Commonly known as:  PLAVIX   Dose:  75 mg   Quantity:  90 tablet        Take 1 tablet (75 mg) by mouth daily   Refills:  3        DIALYVITE PO   Dose:  1 tablet        Take 1 tablet by mouth daily   Refills:  0        diltiazem 240 MG 24 hr capsule   Commonly known as:  CARDIZEM CD   Dose:  240 mg   Quantity:  90 capsule        Take 1 capsule (240 mg) by mouth daily   Refills:  3        fluocinonide 0.05 % cream   Commonly known as:  LIDEX   Quantity:  120 g        Apply sparingly to affected area twice daily as needed.  Do not apply to face.   Refills:  1        insulin aspart 100 UNIT/ML injection   Commonly known as:  NovoLOG FLEXPEN   Quantity:  15 mL        6 units before  breakfast, 6 units before lunch, 4 units before dinner   Refills:  11        latanoprost 0.005 % ophthalmic solution   Commonly known as:  XALATAN   Dose:  1 drop        Place 1 drop into both eyes At Bedtime   Refills:  0        LOSARTAN POTASSIUM PO   Dose:  50 mg        Take 50 mg by mouth daily   Refills:  0        lovastatin 40 MG tablet   Commonly known as:  MEVACOR   Quantity:  135 tablet        Profile Rx: patient will contact pharmacy when needed TAKE 1 AND 1/2 TABLETS BY MOUTH ONCE A DAY WITH DINNER. NEED FASTING LAB WORK   Refills:  3        metoprolol succinate 50 MG 24 hr tablet   Commonly known as:  TOPROL-XL   Dose:  50 mg   Quantity:  90 tablet        Take 1 tablet (50 mg) by mouth daily   Refills:  3        NOVOFINE 30G X 8 MM   Quantity:  100 each   Generic drug:  insulin pen needle        USE TO INJECT INSULIN THREE TIMES DAILY OR AS DIRECTED   Refills:  6        order for DME   Quantity:  1 each        Equipment being ordered: Walker with wheels and brakes, and a seat   Refills:  0        * order for DME   Quantity:  1 Units        Nebulizer machine Qty #1   Refills:  0        order for DME   Quantity:  2 each        Equipment being ordered: Oxygen- HOME and portable. Georgie Coulter [facility] Medical Assistant Signed  Service date: 05/24/2016 10:48 AM     O2 Sat on Room air at rest:84% O2 sat on room air with walk: 82-91% O2 Sat on 1L of oxygen with walk 91-93% O2 Sat on 2 L of Oxygen with walk 91-96% O2 Sat on 1 L O2 at rest 94%   Refills:  prn        * order for DME   Quantity:  1 Device        Equipment being ordered: Compression socks 15-20 mmHg   Refills:  0        pilocarpine 2 % ophthalmic solution   Commonly known as:  PILOCAR   Dose:  1 drop        Place 1 drop into both eyes 2 times daily   Refills:  0        TART CHERRY ADVANCED Caps   Dose:  1 capsule        Take 1 capsule by mouth daily   Refills:  0        terazosin 10 MG capsule   Commonly known as:  HYTRIN   Quantity:  90 capsule         Profile Rx: patient will contact pharmacy when needed TAKE 1 CAPSULE (10 MG) BY MOUTH AT BEDTIME   Refills:  3        timolol 0.5 % ophthalmic solution   Commonly known as:  TIMOPTIC   Dose:  1 drop        Place 1 drop into both eyes 2 times daily   Refills:  0        TYLENOL PO   Dose:  650 mg        Take 650 mg by mouth nightly as needed   Refills:  0        warfarin 2 MG tablet   Commonly known as:  COUMADIN   Quantity:  170 tablet        Take 2 mg Wed/Sat and 4 mg all other days or as directed by the Anticoagulation Clinic   Refills:  0        * Notice:  This list has 2 medication(s) that are the same as other medications prescribed for you. Read the directions carefully, and ask your doctor or other care provider to review them with you.            Procedures and tests performed during your visit     Procedure/Test Number of Times Performed    Blood culture 2    CBC with platelets differential 1    Comprehensive metabolic panel 1    EKG 12-lead, tracing only 1    INR 1    Influenza A/B antigen 1    Lactic acid whole blood 1    Procalcitonin 1    UA with Microscopic 1    Urine Culture Aerobic Bacterial 1    XR Chest 2 Views 1      Orders Needing Specimen Collection     None      Pending Results     Date and Time Order Name Status Description    3/23/2018 1730 Procalcitonin In process     3/23/2018 1730 Blood culture In process     3/23/2018 1730 Blood culture Preliminary     3/23/2018 1730 Urine Culture Aerobic Bacterial In process             Pending Culture Results     Date and Time Order Name Status Description    3/23/2018 1730 Blood culture In process     3/23/2018 1730 Blood culture Preliminary     3/23/2018 1730 Urine Culture Aerobic Bacterial In process             Pending Results Instructions     If you had any lab results that were not finalized at the time of your Discharge, you can call the ED Lab Result RN at [phone]. You will be contacted by this team for any positive Lab results or changes  in treatment. The nurses are available 7 days a week from 10A to 6:30P.  You can leave a message 24 hours per day and they will return your call.        Test Results From Your Hospital Stay        3/23/2018  6:28 PM      Component Results     Component Value Ref Range & Units Status    WBC 8.2 4.0 - 11.0 10e9/L Final    RBC Count 3.74 (L) 4.4 - 5.9 10e12/L Final    Hemoglobin 10.7 (L) 13.3 - 17.7 g/dL Final    Hematocrit 33.8 (L) 40.0 - 53.0 % Final    MCV 90 78 - 100 fl Final    MCH 28.6 26.5 - 33.0 pg Final    MCHC 31.7 31.5 - 36.5 g/dL Final    RDW 15.9 (H) 10.0 - 15.0 % Final    Platelet Count 224 150 - 450 10e9/L Final    Diff Method Automated Method  Final    % Neutrophils 77.6 % Final    % Lymphocytes 8.6 % Final    % Monocytes 12.3 % Final    % Eosinophils 0.9 % Final    % Basophils 0.4 % Final    % Immature Granulocytes 0.2 % Final    Absolute Neutrophil 6.4 1.6 - 8.3 10e9/L Final    Absolute Lymphocytes 0.7 (L) 0.8 - 5.3 10e9/L Final    Absolute Monocytes 1.0 0.0 - 1.3 10e9/L Final    Absolute Eosinophils 0.1 0.0 - 0.7 10e9/L Final    Absolute Basophils 0.0 0.0 - 0.2 10e9/L Final    Abs Immature Granulocytes 0.0 0 - 0.4 10e9/L Final         3/23/2018  6:21 PM      Component Results     Component Value Ref Range & Units Status    Sodium 132 (L) 133 - 144 mmol/L Final    Potassium 4.3 3.4 - 5.3 mmol/L Final    Chloride 95 94 - 109 mmol/L Final    Carbon Dioxide 29 20 - 32 mmol/L Final    Anion Gap 8 3 - 14 mmol/L Final    Glucose 169 (H) 70 - 99 mg/dL Final    Urea Nitrogen 30 7 - 30 mg/dL Final    Creatinine 3.95 (H) 0.66 - 1.25 mg/dL Final    GFR Estimate 15 (L) >60 mL/min/1.7m2 Final    Non  GFR Calc    GFR Estimate If Black 18 (L) >60 mL/min/1.7m2 Final    African American GFR Calc    Calcium 8.3 (L) 8.5 - 10.1 mg/dL Final    Bilirubin Total 0.4 0.2 - 1.3 mg/dL Final    Albumin 3.4 3.4 - 5.0 g/dL Final    Protein Total 7.5 6.8 - 8.8 g/dL Final    Alkaline Phosphatase 81 40 - 150 U/L Final     ALT 23 0 - 70 U/L Final    AST 15 0 - 45 U/L Final         3/23/2018  6:24 PM      Component Results     Component Value Ref Range & Units Status    Lactic Acid 1.1 0.7 - 2.0 mmol/L Final         3/23/2018  6:41 PM      Component Results     Component Value Ref Range & Units Status    Color Urine Yellow  Final    Appearance Urine Slightly Cloudy  Final    Glucose Urine 50 (A) NEG^Negative mg/dL Final    Bilirubin Urine Negative NEG^Negative Final    Ketones Urine Negative NEG^Negative mg/dL Final    Specific Gravity Urine 1.017 1.003 - 1.035 Final    Blood Urine Small (A) NEG^Negative Final    pH Urine 7.0 5.0 - 7.0 pH Final    Protein Albumin Urine >499 (A) NEG^Negative mg/dL Final    Urobilinogen mg/dL 0.0 0.0 - 2.0 mg/dL Final    Nitrite Urine Negative NEG^Negative Final    Leukocyte Esterase Urine Negative NEG^Negative Final    Source Catheter  Final    WBC Urine 5 0 - 5 /HPF Final    RBC Urine 16 (H) 0 - 2 /HPF Final    Bacteria Urine Few (A) NEG^Negative /HPF Final    Squamous Epithelial /HPF Urine <1 0 - 1 /HPF Final    Transitional Epi 1 0 - 1 /HPF Final    Mucous Urine Present (A) NEG^Negative /LPF Final    Hyaline Casts 3 (H) 0 - 2 /LPF Final         3/23/2018  6:20 PM         3/23/2018  7:32 PM      Component Results     Component    Specimen Description    Blood Right Arm    Special Requests    Aerobic and anaerobic bottles received    Culture Micro    No growth after 1 hour         3/23/2018  6:41 PM         3/23/2018  6:09 PM      Component Results     Component Value Ref Range & Units Status    INR 1.54 (H) 0.86 - 1.14 Final         3/23/2018  5:57 PM         3/23/2018  7:16 PM      Narrative     CHEST TWO VIEWS   3/23/2018 6:55 PM     INDICATION: Fever.    COMPARISON: 2/12/2018. Chest CT 1/19/2018.        Impression     IMPRESSION: Cardiomegaly and mild stable interstitial prominence. A  component of this could be due to congestive heart failure, though  there is chronic interstitial changes and  fibrosis on prior CT as  well. Small left pleural effusion has decreased. There are still  probably tiny pleural effusions. No new focal airspace disease.    SHERIE MUNSON MD         3/23/2018  7:03 PM      Component Results     Component Value Ref Range & Units Status    Influenza A/B Agn Specimen Nasal  Final    Influenza A Negative NEG^Negative Final    Influenza B Negative NEG^Negative Final    Test results must be correlated with clinical data. If necessary, results   should be confirmed by a molecular assay or viral culture.                  Thank you for choosing [facility]       Thank you for choosing [facility] for your care. Our goal is always to provide you with excellent care. Hearing back from our patients is one way we can continue to improve our services. Please take a few minutes to complete the written survey that you may receive in the mail after you visit with us. Thank you!        [employer] Information     [employer] gives you secure access to your electronic health record. If you see a primary care provider, you can also send messages to your care team and make appointments. If you have questions, please call your primary care clinic.  If you do not have a primary care provider, please call [phone] and they will assist you.        Care EveryWhere ID     This is your Care EveryWhere ID. This could be used by other organizations to access your [facility] medical records  [id number]        Equal Access to Services     ANUSHKA BILL : Guy Gamboa, vera gauthier, bre patten, renzo correa. So [facility] [phone].    ATENCIÓN: Si habla español, tiene a louis disposición servicios gratuitos de asistencia lingüística. Llame al [phone].    We comply with applicable federal civil rights laws and [place] laws. We do not discriminate on the basis of race, color, national origin, age, disability, sex, sexual orientation, or gender  identity.            After Visit Summary       This is your record. Keep this with you and show to your community pharmacist(s) and doctor(s) at your next visit.

## 2018-03-24 LAB
BACTERIA SPEC CULT: NO GROWTH
SPECIMEN SOURCE: NORMAL

## 2018-03-24 NOTE — DISCHARGE INSTRUCTIONS
Tylenol as needed for fever or chills.   Return if fever continues and not controlled by tylenol or other concerning symptoms develop.        Febrile Illness, Uncertain Cause (Adult)  You have a fever, but the cause is not certain. A fever is a natural reaction of the body to an illness such as infection due to a virus or bacteria. In most cases, the temperature itself is not harmful. It actually helps the body fight infections. A fever does not need to be treated unless you feel very uncomfortable.  Sometimes a fever can be an early sign of a more serious infection, so make sure to follow up if your condition worsens.  Home care  Unless given other instructions by your healthcare provider, follow these guidelines when caring for yourself at home.  General care    If your symptoms are severe, rest at home for the first 2 to 3 days. When you resume activity, don't let yourself get too tired.    Do not smoke. Also avoid being exposed to secondhand smoke.    Your appetite may be poor, so a light diet is fine. Avoid dehydration by drinking 6 to 8 glasses of fluids per day (such as water, soft drinks, sports drinks, juices, tea, or soup). Extra fluids will help loosen secretions in the nose and lungs.  Medicines    You can take acetaminophen or ibuprofen for pain, unless you were given a different fever-reducing/pain medicine to use. (Note: If you have chronic liver or kidney disease or have ever had a stomach ulcer or gastrointestinal bleeding, talk with your healthcare provider before using these medicines. Also talk to your provider if you are taking medicine to prevent blood clots.) Aspirin should never be given to anyone younger than 18 years of age who is ill with a viral infection or fever. It may cause severe liver or brain damage.    If you were given antibiotics, take them until they are used up, or your healthcare provider tells you to stop. It is important to finish the antibiotics even though you feel  better. This is to make sure the infection has cleared. Be aware that antibiotics are not usually given for a fever with an unknown cause.    Over-the-counter medicines will not shorten the duration of the illness. However, they may be helpful for the following symptoms: cough, sore throat, or nasal and sinus congestion. Ask your pharmacist for product suggestions. (Note: Do not use decongestants if you have high blood pressure.)  Follow-up care  Follow up with your healthcare provider, or as advised.    If a culture was done, you will be notified if your treatment needs to be changed. You can call as directed for the results.    If X-rays, a CT, or an ultrasound were done, a specialist will review them. You will be notified of any findings that may affect your care.  Call 911  Contact emergency services right away if any of these occur:    Trouble breathing or swallowing, or wheezing    Chest pain    Confusion    Extreme drowsiness or trouble awakening    Fainting or loss of consciousness    Rapid heart rate    Low blood pressure    Vomiting blood, or large amounts of blood in stool    Seizure  When to seek medical advice  Call your healthcare provider right away if any of these occur:    Cough with lots of colored sputum (mucus) or blood in your sputum    Severe headache    Face, neck, throat, or ear pain    Feeling drowsy    Abdominal pain    Repeated vomiting or diarrhea    Joint pain or a new rash    Burning when urinating    Fever of 100.4 F (38 C) or higher, that does not get better after taking fever-reducing medicine    Feeling weak or dizzy  Date Last Reviewed: 7/30/2015 2000-2017 [employer]. [address] [place] [address], [place] [address]. All rights reserved. This information is not intended as a substitute for professional medical care. Always follow your healthcare professional's instructions.

## 2018-03-26 ENCOUNTER — PATIENT OUTREACH (OUTPATIENT)
Dept: CARE COORDINATION | Facility: CLINIC | Age: 83
End: 2018-03-26

## 2018-03-26 DIAGNOSIS — Z76.89 HEALTH CARE HOME: ICD-10-CM

## 2018-03-26 NOTE — PROGRESS NOTES
Clinic Care Coordination Contact    OUTREACH    Referral Information:            Chief Complaint   Patient presents with     Clinic Care Coordination - Post Hospital     Care Team        Universal Utilization:   Utilization    Last refreshed: 3/25/2018  4:32 AM:  No Show Count (past year) 0       Last refreshed: 3/25/2018  4:32 AM:  ED visits 4       Last refreshed: 3/25/2018  4:32 AM:  Hospital admissions 1          Current as of: 3/25/2018  4:32 AM             Clinical Concerns:  Current Medical Concerns:  Patient is currently at dialysis. Patient's wife reports fever is down and controlled by tylenol. No other s/s to report. Patient is using walker around the home right now to avoid any falls.  No other concerns reported at this time.    Current Behavioral Concerns:  none    Education Provided to patient: Encouraged follow up appointment with PCP.        Plan: 1) Patient will continue to follow treatment plan as directed and follow up with PCP with concerns ongoing.   2) Care Coordination to remain available for future needs. Care Coordination will continue to monitor Tele-health system and follow up as variances are identified.       Kody Davila RN  Clinic Care Coordinator  [phone] or [phone]

## 2018-03-27 ENCOUNTER — ALLIED HEALTH/NURSE VISIT (OUTPATIENT)
Dept: FAMILY MEDICINE | Facility: CLINIC | Age: 83
End: 2018-03-27
Payer: MEDICARE

## 2018-03-27 ENCOUNTER — ANTICOAGULATION THERAPY VISIT (OUTPATIENT)
Dept: ANTICOAGULATION | Facility: CLINIC | Age: 83
End: 2018-03-27
Payer: MEDICARE

## 2018-03-27 ENCOUNTER — MYC MEDICAL ADVICE (OUTPATIENT)
Dept: FAMILY MEDICINE | Facility: CLINIC | Age: 83
End: 2018-03-27

## 2018-03-27 DIAGNOSIS — I63.40 CEREBRAL INFARCTION DUE TO EMBOLISM OF CEREBRAL ARTERY (H): ICD-10-CM

## 2018-03-27 DIAGNOSIS — Z79.01 LONG-TERM (CURRENT) USE OF ANTICOAGULANTS: ICD-10-CM

## 2018-03-27 DIAGNOSIS — I48.91 ATRIAL FIBRILLATION, NEW ONSET (H): ICD-10-CM

## 2018-03-27 DIAGNOSIS — E11.21 CONTROLLED TYPE 2 DIABETES MELLITUS WITH DIABETIC NEPHROPATHY, WITH LONG-TERM CURRENT USE OF INSULIN (H): Primary | ICD-10-CM

## 2018-03-27 DIAGNOSIS — Z79.4 CONTROLLED TYPE 2 DIABETES MELLITUS WITH DIABETIC NEPHROPATHY, WITH LONG-TERM CURRENT USE OF INSULIN (H): ICD-10-CM

## 2018-03-27 DIAGNOSIS — E11.21 CONTROLLED TYPE 2 DIABETES MELLITUS WITH DIABETIC NEPHROPATHY, WITH LONG-TERM CURRENT USE OF INSULIN (H): ICD-10-CM

## 2018-03-27 DIAGNOSIS — Z79.4 CONTROLLED TYPE 2 DIABETES MELLITUS WITH DIABETIC NEPHROPATHY, WITH LONG-TERM CURRENT USE OF INSULIN (H): Primary | ICD-10-CM

## 2018-03-27 LAB — INR POINT OF CARE: 1.3 (ref 0.86–1.14)

## 2018-03-27 PROCEDURE — 99207 ZZC NO CHARGE NURSE ONLY: CPT

## 2018-03-27 PROCEDURE — 85610 PROTHROMBIN TIME: CPT | Mod: QW

## 2018-03-27 PROCEDURE — 36416 COLLJ CAPILLARY BLOOD SPEC: CPT

## 2018-03-27 NOTE — PROGRESS NOTES
ANTICOAGULATION FOLLOW-UP CLINIC VISIT    Patient Name:  Griffin Walton  Date:  3/27/2018  Contact Type:  Face to Face    SUBJECTIVE:     Patient Findings     Positives Missed doses (3-25-18)    Comments Patient noticed today that he had missed all his medications on Sunday. He was in ED on 3-23-18 following dialysis for a fever and shakiness. His INR was checked and he was still low but had not missed any doses up until then. This means last dose increase of ~8% did not affect INR. He has not been eating more vit K than usual. No medication or health changes. No change in activity. Will give a one time double dose increase today and increase maintenance dose another 10.5%. Will recheck on Friday, just to make sure INR is increasing, but patient should also check around 4-6 to make sure new maintenance dose is enough.           OBJECTIVE    INR Protime   Date Value Ref Range Status   03/27/2018 1.3 (A) 0.86 - 1.14 Final       ASSESSMENT / PLAN  INR assessment SUB missed dose   Recheck INR In: 3 DAYS    INR Location Clinic      Anticoagulation Summary as of 3/27/2018     INR goal 2.0-3.0   Today's INR 1.3!   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 [facility] MWF 8-12pm in [place]. Pt not new to warfarin (has been on approx 1 yr), pt cannot see well enough to read      Anticoagulation Care Providers     Provider  Role Specialty Phone number    Heriberto, DANNA Saba CNP Responsible Nurse Practitioner [phone]            See the Encounter Report to view Anticoagulation Flowsheet and Dosing Calendar (Go to Encounters tab in chart review, and find the Anticoagulation Therapy Visit)        Lissa Bello RN

## 2018-03-27 NOTE — NURSING NOTE
Patient presents to clinic with his wife to see if he can get more than one pen refill at a time on the Basaglar. I contacted the pharmacy while the patient was still here and they were still filling off of the old script that was sent 1/31/2018 for 3 ml dispense rather than the one that was requested on 2/1/2018 for 15 ml. Patient will pick that up at the pharmacy tomorrow.    Parvin Naik RN

## 2018-03-27 NOTE — MR AVS SNAPSHOT
Griffin LYNCH Isabelle   3/27/2018 1:15 PM   Anticoagulation Therapy Visit    Description:  83 year old male   Provider:  [place] [facility]   Department:  [facility]           INR as of 3/27/2018     Today's INR 1.3!      Anticoagulation Summary as of 3/27/2018     INR goal 2.0-3.0   Today's INR 1.3!   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 Then start taking 5 mg (2.5 tabs) Fridays and 4 mg (2 tabs) all other days.      Your next Anticoagulation Clinic appointment(s)     Mar 30, 2018  1:30 PM CDT   [place] Visit with [place] [facility]   [facility] ([facility]    [address] [address] [phone]              Contact Numbers     Please call [phone] with any problems or questions regarding your therapy.    If you need to cancel and/or reschedule your appointment please call one of the following numbers:  [facility] - [phone]  [place] - [phone]  [place] - [phone]  [facility] [phone]  [place] - [phone]            March 2018 Details    Sun Mon Tue Wed Thu Fri Sat         1               2               3                 4               5               6               7               8               9               10                 11               12               13               14               15               16               17                 18               19               20               21               22               23               24                 25               26               27      8 mg   See details      28      4 mg         29      4 mg         30            31                Date Details    03/27 This INR check       Date of next INR:  3/30/2018         How to take your warfarin dose     To take:  4 mg Take 2 of the 2 mg tablets.    To take:  5 mg Take 2.5 of the 2 mg tablets.    To take:  8 mg Take 4 of the 2 mg tablets.

## 2018-03-27 NOTE — MR AVS SNAPSHOT
After Visit Summary   3/27/2018    Griffin Walton    MRN: [id number]           Patient Information     Date Of Birth          5/18/1934        Visit Information        Provider Department      3/27/2018 1:45 PM ANTHONY PULIDO/DAVID RN [facility]        Today's Diagnoses     Controlled type 2 diabetes mellitus with diabetic nephropathy, with long-term current use of insulin (H)    -  1       Follow-ups after your visit        Your next 10 appointments already scheduled     Mar 30, 2018  1:30 PM CDT   [place] Visit with [place] [facility] [facility]   [facility])    [address] [address]   [phone]            May 15, 2018  1:00 PM CDT   SIX MINUTE WALK with UC PFL A,  PFL 6 MINUTE WALK 1   [facility] Pulmonary Function Testing ([facility])    [address]  3rd Floor  [facility] [id number] [phone]            May 15, 2018  2:00 PM CDT   (Arrive by 1:45 PM)   Return Interstitial Lung with David Morris Perlman, MD   [facility] ([facility])    [address]  [address] [place] [id number] [phone]              Who to contact     If you have questions or need follow up information about today's clinic visit or your schedule please contact [facility] directly at [phone].  Normal or non-critical lab and imaging results will be communicated to you by MyChart, letter or phone within 4 business days after the clinic has received the results. If you do not hear from us within 7 days, please contact the clinic through MyChart or phone. If you have a critical or abnormal lab result, we will notify you by phone as soon as possible.  Submit refill requests through [employer] or call your pharmacy and they will forward the refill request to us. Please allow 3 business days for your refill to be completed.           Additional Information About Your Visit        MyChart Information     [employer] gives you secure access to your electronic health record. If you see a primary care provider, you can also send messages to your care team and make appointments. If you have questions, please call your primary care clinic.  If you do not have a primary care provider, please call [phone] and they will assist you.        Care EveryWhere ID     This is your Care EveryWhere ID. This could be used by other organizations to access your [facility] medical records  [id number]         Blood Pressure from Last 3 Encounters:   03/23/18 130/82   02/27/18 113/61   02/15/18 149/78    Weight from Last 3 Encounters:   02/27/18 201 lb 4.5 oz (91.3 kg)   02/15/18 201 lb 3.2 oz (91.3 kg)   02/12/18 203 lb (92.1 kg)              Today, you had the following     No orders found for display       Primary Care Provider Office Phone # Fax #    Stefanie Louis, APRN [facility] [phone] [address] [address]        Equal Access to Services     ANUSHKA BILL : Hadii aad ku hadasho Soomaali, waaxda luqadaha, qaybta kaalmada adeyifanyada, renzo palacio . So [facility] [phone].    ATENCIÓN: Si habla español, tiene a louis disposición servicios gratuitos de asistencia lingüística. Llame al [phone].    We comply with applicable federal civil rights laws and [place] laws. We do not discriminate on the basis of race, color, national origin, age, disability, sex, sexual orientation, or gender identity.            Thank you!     Thank you for choosing [facility]  for your care. Our goal is always to provide you with excellent care. Hearing back from our patients is one way we can continue to improve our services. Please take a few minutes to complete the written survey that you may receive in the mail after your visit with us. Thank you!             Your Updated Medication List - Protect others  around you: Learn how to safely use, store and throw away your medicines at www.disposemymeds.org.          This list is accurate as of 3/27/18  2:26 PM.  Always use your most recent med list.                   Brand Name Dispense Instructions for use Diagnosis    ADVAIR DISKUS 100-50 MCG/DOSE diskus inhaler   Generic drug:  fluticasone-salmeterol     3 Inhaler    inhale 1 puff into the lungs every 12 hours.    Chronic obstructive pulmonary disease, unspecified COPD type (H)       albuterol (2.5 MG/3ML) 0.083% neb solution     63 vial    TAKE 1 VIAL (2.5 MG) BY NEBULIZATION EVERY 6 HOURS AS NEEDED FOR SHORTNESS OF BREATH / DYSPNEA OR WHEEZING    Chronic obstructive pulmonary disease, unspecified COPD type (H)       BASAGLAR 100 UNIT/ML injection     15 mL    , Inject 15 Units Subcutaneous At Bedtime    Controlled type 2 diabetes mellitus with diabetic nephropathy, with long-term current use of insulin (H)       blood glucose monitoring meter device kit    no brand specified    1 kit    Use to test blood sugar 3 times daily or as directed.    Controlled type 2 diabetes mellitus with diabetic nephropathy, with long-term current use of insulin (H)       blood glucose monitoring test strip    ONETOUCH ULTRA    60 each    test 2 times daily Profile Rx: patient will contact pharmacy when needed    Type 2 diabetes mellitus with diabetic nephropathy, with long-term current use of insulin (H)       brimonidine 0.2 % ophthalmic solution    ALPHAGAN     Place 1 drop into both eyes 3 times daily        bumetanide 1 MG tablet    BUMEX    180 tablet    Take 2 tablets in the morning and 1 tablet early afternoon    Benign essential hypertension       clopidogrel 75 MG tablet    PLAVIX    90 tablet    Take 1 tablet (75 mg) by mouth daily    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)       DIALYVITE PO      Take 1 tablet by mouth daily        diltiazem 240 MG 24 hr capsule    CARDIZEM CD    90 capsule    Take 1  capsule (240 mg) by mouth daily    Paroxysmal atrial fibrillation (H)       fluocinonide 0.05 % cream    LIDEX    120 g    Apply sparingly to affected area twice daily as needed.  Do not apply to face.    Nummular dermatitis, Lentigo, SK (seborrheic keratosis), History of skin cancer       insulin aspart 100 UNIT/ML injection    NovoLOG FLEXPEN    15 mL    6 units before breakfast, 6 units before lunch, 4 units before dinner    Controlled type 2 diabetes mellitus with diabetic nephropathy, with long-term current use of insulin (H)       latanoprost 0.005 % ophthalmic solution    XALATAN     Place 1 drop into both eyes At Bedtime        LOSARTAN POTASSIUM PO      Take 50 mg by mouth daily        lovastatin 40 MG tablet    MEVACOR    135 tablet    Profile Rx: patient will contact pharmacy when needed TAKE 1 AND 1/2 TABLETS BY MOUTH ONCE A DAY WITH DINNER. NEED FASTING LAB WORK    Hyperlipidemia LDL goal <130       metoprolol succinate 50 MG 24 hr tablet    TOPROL-XL    90 tablet    Take 1 tablet (50 mg) by mouth daily    Hypertension, unspecified type       NOVOFINE 30G X 8 MM   Generic drug:  insulin pen needle     100 each    USE TO INJECT INSULIN THREE TIMES DAILY OR AS DIRECTED    Type 2 diabetes mellitus with diabetic nephropathy, unspecified long term insulin use status (H)       order for DME     1 each    Equipment being ordered: Walker with wheels and brakes, and a seat    ESRD (end stage renal disease) on dialysis (H), Cerebral embolism with cerebral infarction (H)       * order for DME     1 Units    Nebulizer machine Qty #1    Fever, unspecified, Cough, Chronic obstructive pulmonary disease, unspecified COPD type (H), Acute bronchospasm       order for DME     2 each    Equipment being ordered: Oxygen- HOME and portable. Georgie Coulter CMA Medical Assistant Signed  Service date: 05/24/2016 10:48 AM     O2 Sat on Room air at rest:84% O2 sat on room air with walk: 82-91% O2 Sat on 1L of oxygen with walk  91-93% O2 Sat on 2 L of Oxygen with walk 91-96% O2 Sat on 1 L O2 at rest 94%    Cough, Hypoxia, Chronic obstructive pulmonary disease, unspecified COPD type (H), Chronic obstructive pulmonary disease with acute exacerbation (H), 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), Type 2 diabetes mellitus with diabetic nephropathy (H)       * order for DME     1 Device    Equipment being ordered: Compression socks 15-20 mmHg    Peripheral edema       pilocarpine 2 % ophthalmic solution    PILOCAR     Place 1 drop into both eyes 2 times daily        TART CHERRY ADVANCED Caps      Take 1 capsule by mouth daily        terazosin 10 MG capsule    HYTRIN    90 capsule    Profile Rx: patient will contact pharmacy when needed TAKE 1 CAPSULE (10 MG) BY MOUTH AT BEDTIME    Benign non-nodular prostatic hyperplasia with lower urinary tract symptoms       timolol 0.5 % ophthalmic solution    TIMOPTIC     Place 1 drop into both eyes 2 times daily        TYLENOL PO      Take 650 mg by mouth nightly as needed        warfarin 2 MG tablet    COUMADIN    170 tablet    Take 5 mg Fridays and 4 mg all other days or as directed by the Anticoagulation Clinic    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 Read the directions carefully, and ask your doctor or other care provider to review them with you.

## 2018-03-30 ENCOUNTER — ANTICOAGULATION THERAPY VISIT (OUTPATIENT)
Dept: ANTICOAGULATION | Facility: CLINIC | Age: 83
End: 2018-03-30
Payer: MEDICARE

## 2018-03-30 DIAGNOSIS — Z79.01 LONG-TERM (CURRENT) USE OF ANTICOAGULANTS: ICD-10-CM

## 2018-03-30 DIAGNOSIS — I63.40 CEREBRAL INFARCTION DUE TO EMBOLISM OF CEREBRAL ARTERY (H): ICD-10-CM

## 2018-03-30 DIAGNOSIS — I48.91 ATRIAL FIBRILLATION, NEW ONSET (H): ICD-10-CM

## 2018-03-30 LAB — INR POINT OF CARE: 1.7 (ref 0.86–1.14)

## 2018-03-30 PROCEDURE — 99207 ZZC NO CHARGE NURSE ONLY: CPT

## 2018-03-30 PROCEDURE — 36416 COLLJ CAPILLARY BLOOD SPEC: CPT

## 2018-03-30 PROCEDURE — 85610 PROTHROMBIN TIME: CPT | Mod: QW

## 2018-03-30 NOTE — MR AVS SNAPSHOT
Griffin LYNCH Isabelle   3/30/2018 1:30 PM   Anticoagulation Therapy Visit    Description:  83 year old male   Provider:  [place] [facility]   [facility]  [facility]           INR as of 3/30/2018     Today's INR 1.7!      Anticoagulation Summary as of 3/30/2018     INR goal 2.0-3.0   Today's INR 1.7!   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) [I63.40]  Atrial fibrillation  new onset (H) [I48.91]  Long-term (current) use of anticoagulants [Z79.01] [Z79.01]         Your next Anticoagulation Clinic appointment(s)     Mar 30, 2018  1:30 PM CDT   Anticoagulation Visit with [place] [facility] [place]   [facility]    [address] [address]   [phone]            Apr 02, 2018  1:00 PM CDT   Anticoagulation Visit with [place] [facility] [facility]   [facility]    [address] [address]   [phone]              Contact Numbers     Please call [phone] with any problems or questions regarding your therapy.    If you need to cancel and/or reschedule your appointment please call one of the following numbers:  [facility] - [phone]  [place] - [phone]  [place] - [phone]  [facility] [phone]  [place] - [phone]            March 2018 Details    Sun Mon Tue Wed Thu Fri Sat         1               2               3                 4               5               6               7               8               9               10                 11               12               13               14               15               16               17                 18               19               20               21               22               23               24                 25               26               27               28               29                30      5 mg   See details      31      5 mg          Date Details   03/30 This INR check               How to take your warfarin dose     To take:  5 mg Take 2.5 of the 2 mg tablets.           April 2018 Details    Sun Mon Tue Wed Thu Fri Sat     1      5 mg         2            3               4               5               6               7                 8               9               10               11               12               13               14                 15               16               17               18               19               20               21                 22               23               24               25               26               27               28                 29               30                     Date Details   No additional details    Date of next INR:  4/2/2018         How to take your warfarin dose     To take:  4 mg Take 2 of the 2 mg tablets.    To take:  5 mg Take 2.5 of the 2 mg tablets.

## 2018-03-30 NOTE — PROGRESS NOTES
ANTICOAGULATION FOLLOW-UP CLINIC VISIT    Patient Name:  Griffin Walton  Date:  3/30/2018  Contact Type:  Face to Face/Accompanied by his wife    SUBJECTIVE:     Patient Findings     Positives Unexplained INR or factor level change    Comments INR has improved but still remains subtherapeutic. I instructed him to take 5 mg Friday, Saturday, and Sunday. Patient will recheck after Dialysis on Monday. I provided a list of Vitamin K rich foods that the spouse has requested so they know what foods to avoid at this time.             OBJECTIVE    INR Protime   Date Value Ref Range Status   03/30/2018 1.7 (A) 0.86 - 1.14 Final       ASSESSMENT / PLAN  INR assessment SUB    Recheck INR In: 4 DAYS    INR Location Clinic      Anticoagulation Summary as of 3/30/2018     INR goal 2.0-3.0   Today's INR 1.7!   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 [facility] MWF 8-12pm in [place]. Pt not new to warfarin (has been on approx 1 yr), pt cannot see well enough to read      Anticoagulation Care Providers     Provider Role Specialty Phone number    Heriberto, Stefanie Guillen, APRN CNP Responsible Nurse Practitioner [phone]            See the Encounter Report to view Anticoagulation Flowsheet and Dosing Calendar (Go to Encounters tab in chart review, and find the Anticoagulation Therapy Visit)        Monica Campoverde RN

## 2018-04-02 ENCOUNTER — ANTICOAGULATION THERAPY VISIT (OUTPATIENT)
Dept: ANTICOAGULATION | Facility: CLINIC | Age: 83
End: 2018-04-02
Payer: MEDICARE

## 2018-04-02 DIAGNOSIS — I48.91 ATRIAL FIBRILLATION, NEW ONSET (H): ICD-10-CM

## 2018-04-02 DIAGNOSIS — I63.40 CEREBRAL INFARCTION DUE TO EMBOLISM OF CEREBRAL ARTERY (H): ICD-10-CM

## 2018-04-02 DIAGNOSIS — Z79.01 LONG-TERM (CURRENT) USE OF ANTICOAGULANTS: ICD-10-CM

## 2018-04-02 LAB — INR POINT OF CARE: 2.1 (ref 0.86–1.14)

## 2018-04-02 PROCEDURE — 99207 ZZC NO CHARGE NURSE ONLY: CPT

## 2018-04-02 PROCEDURE — 36416 COLLJ CAPILLARY BLOOD SPEC: CPT

## 2018-04-02 PROCEDURE — 85610 PROTHROMBIN TIME: CPT | Mod: QW

## 2018-04-02 NOTE — PROGRESS NOTES
ANTICOAGULATION FOLLOW-UP CLINIC VISIT    Patient Name:  Griffin Walton  Date:  4/2/2018  Contact Type:  Face to Face/accompanied by spouse    SUBJECTIVE:     Patient Findings     Positives Unexplained INR or factor level change    Comments Pt's INR in goal range today, but still unclear why pt's INR has been subtherapeutic. Spouse denies changes in diet, medications, activity or health.  Writer will increase pt's dose slightly over maintenance does and recheck in 1 week.  Maintenance dose may need to be adjusted upward if pt's INR decreases again.           OBJECTIVE    INR Protime   Date Value Ref Range Status   04/02/2018 2.1 (A) 0.86 - 1.14 Final       ASSESSMENT / PLAN  INR assessment THER    Recheck INR In: 1 WEEK    INR Location Clinic      Anticoagulation Summary as of 4/2/2018     INR goal 2.0-3.0   Today's INR 2.1   Maintenance plan 5 mg (2 mg x 2.5) on Fri; 4 mg (2 mg x 2) all other days   Full instructions 4/2: 6 mg; 4/6: 4 mg; Otherwise 5 mg on Fri; 4 mg all other days   Weekly total 29 mg   Plan last modified Lissa Bello, RN (3/27/2018)   Next INR check 4/9/2018   Priority INR   Target end date Indefinite    Indications   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 [facility] MWF 8-12pm in [place]. Pt not new to warfarin (has been on approx 1 yr), pt cannot see well enough to read      Anticoagulation Care Providers     Provider Role Specialty Phone number    Stefanie Louis, APRN CNP Responsible Nurse Practitioner [phone]            See the Encounter Report to view Anticoagulation Flowsheet and Dosing Calendar (Go to Encounters tab in chart review, and find the Anticoagulation Therapy  Visit)        Nikki Harry RN

## 2018-04-02 NOTE — MR AVS SNAPSHOT
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    If you need to cancel and/or reschedule your appointment please call one of the following numbers:  [facility] - [phone]  [place] - [phone]  [facility] [phone]  [facility] [phone]  [place] - [phone]            April 2018 Details    Sun Mon Tue Wed Thu Fri Sat     1               2      6 mg   See details      3      4 mg         4      4 mg         5      4 mg         6      4 mg         7      4 mg           8      4 mg         9            10               11               12               13               14                 15               16               17               18               19               20               21                 22               23               24               25               26               27               28                 29               30                     Date Details   04/02 This INR check       Date of next INR:  4/9/2018         How to take your warfarin dose     To take:  4 mg Take 2 of the 2  mg tablets.    To take:  6 mg Take 3 of the 2 mg tablets.

## 2018-04-09 ENCOUNTER — ANTICOAGULATION THERAPY VISIT (OUTPATIENT)
Dept: ANTICOAGULATION | Facility: CLINIC | Age: 83
End: 2018-04-09
Payer: MEDICARE

## 2018-04-09 DIAGNOSIS — Z79.01 LONG-TERM (CURRENT) USE OF ANTICOAGULANTS: ICD-10-CM

## 2018-04-09 DIAGNOSIS — I63.40 CEREBRAL INFARCTION DUE TO EMBOLISM OF CEREBRAL ARTERY (H): ICD-10-CM

## 2018-04-09 DIAGNOSIS — I48.91 ATRIAL FIBRILLATION, NEW ONSET (H): ICD-10-CM

## 2018-04-09 LAB — INR POINT OF CARE: 2.4 (ref 0.86–1.14)

## 2018-04-09 PROCEDURE — 36416 COLLJ CAPILLARY BLOOD SPEC: CPT

## 2018-04-09 PROCEDURE — 85610 PROTHROMBIN TIME: CPT | Mod: QW

## 2018-04-09 PROCEDURE — 99207 ZZC NO CHARGE NURSE ONLY: CPT

## 2018-04-09 NOTE — PROGRESS NOTES
ANTICOAGULATION FOLLOW-UP CLINIC VISIT    Patient Name:  Griffin Walton  Date:  4/9/2018  Contact Type:  Face to Face w/ wife    SUBJECTIVE:     Patient Findings     Positives No Problem Findings    Comments No changes in medications, activity, or diet noted. No bleeding or increased bruising noted -pt does have bruising on his hand, this has been consistent, no worsening. Took warfarin as prescribed. Education provided for pt to continue post-dialysis INRs for consistency.  Patient is to continue 30mg/weekly maintenance warfarin plan, and check INR 2 weeks.  Patient's wife verbalizes understanding and agrees to plan. No further questions or concerns.           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 [facility] MWF 8-12pm in [place] (post-dialysis INRs). Pt not new to warfarin (has been on approx 1 yr), pt cannot see well enough to read      Anticoagulation Care Providers     Provider Role Specialty Phone number    Stefanie Louis APRN CNP Responsible Nurse Practitioner [phone]            See the Encounter Report to view Anticoagulation Flowsheet and Dosing  Calendar (Go to Encounters tab in chart review, and find the Anticoagulation Therapy Visit)        Rosalind Miner RN

## 2018-04-09 NOTE — MR AVS SNAPSHOT
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) [I63.40]  Atrial fibrillation  new onset (H) [I48.91]  Long-term (current) use of anticoagulants [Z79.01] [Z79.01]         Your next Anticoagulation Clinic appointment(s)     Apr 09, 2018  1:15 PM CDT   [facility] with [place] [facility]   [facility]    [address] [address]   [phone]            Apr 23, 2018  1:15 PM CDT   Anticoagulation Visit with [place] [facility]   [facility]    [address] [address]   [phone]              Contact Numbers     Please call [phone] with any problems or questions regarding your therapy.    If you need to cancel and/or reschedule your appointment please call one of the following numbers:  [facility] - [phone]  [place] - [phone]  [place] - [phone]  [place] - [phone]  [place] - [phone]            April 2018 Details    Sun Mon Tue Wed Thu Fri Sat     1               2               3               4               5               6               7                 8               9      6 mg   See details      10      4 mg         11      4 mg         12      4 mg         13      4 mg         14      4 mg           15      4 mg         16      6 mg         17      4 mg         18      4 mg         19      4 mg         20      4 mg         21      4 mg           22      4 mg         23            24               25               26               27               28                  29 30                     Date Details   04/09 This INR check       Date of next INR:  4/23/2018         How to take your warfarin dose     To take:  4 mg Take 2 of the 2 mg tablets.    To take:  6 mg Take 3 of the 2 mg tablets.

## 2018-04-10 ENCOUNTER — OFFICE VISIT (OUTPATIENT)
Dept: FAMILY MEDICINE | Facility: CLINIC | Age: 83
End: 2018-04-10
Payer: MEDICARE

## 2018-04-10 VITALS
TEMPERATURE: 97.2 F | BODY MASS INDEX: 31.79 KG/M2 | SYSTOLIC BLOOD PRESSURE: 123 MMHG | OXYGEN SATURATION: 97 % | DIASTOLIC BLOOD PRESSURE: 70 MMHG | WEIGHT: 203 LBS | HEART RATE: 78 BPM

## 2018-04-10 DIAGNOSIS — R26.89 BALANCE PROBLEMS: ICD-10-CM

## 2018-04-10 DIAGNOSIS — I63.40 CEREBRAL INFARCTION DUE TO EMBOLISM OF CEREBRAL ARTERY (H): ICD-10-CM
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
## 2018-04-10 DIAGNOSIS — H54.40 BLINDNESS OF RIGHT EYE: ICD-10-CM

## 2018-04-10 DIAGNOSIS — G25.0 ESSENTIAL TREMOR: ICD-10-CM

## 2018-04-10 DIAGNOSIS — M62.81 GENERALIZED MUSCLE WEAKNESS: Primary | ICD-10-CM

## 2018-04-10 DIAGNOSIS — N18.6 CKD (CHRONIC KIDNEY DISEASE) STAGE V REQUIRING CHRONIC DIALYSIS (H): ICD-10-CM

## 2018-04-10 DIAGNOSIS — Z99.2 CKD (CHRONIC KIDNEY DISEASE) STAGE V REQUIRING CHRONIC DIALYSIS (H): ICD-10-CM

## 2018-04-10 PROCEDURE — 99214 OFFICE O/P EST MOD 30 MIN: CPT | Performed by: NURSE PRACTITIONER

## 2018-04-10 NOTE — MR AVS SNAPSHOT
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 Schedule an appointment with Neurology  2. Home care will be giving you a call to come out into your home to assess the safety  3. Care Coordinator will be giving you a call           Follow-ups after your visit        Additional Services     CARE COORDINATION REFERRAL       Services are provided by a care coordinator for people with complex needs such as; medical, social, or  financial issues.  The care coordinator works with the patient and their primary care provider to determine health goals, obtain resources, achieve outcomes, and develop care plans that help coordinate a patient's care.     REFERRAL REASON:   Complex Medical Concerns/Education: Chronic diagnosis - chronic kidney disease stage 5 on dialysis/weakness/balance problems. Thinking of selling home and moving into assisted living    Provide additional details for Care Coordination to best meet Patient's current needs: as stated above    Clinic Staff has discussed Care Coordination Referral with the Patient/Caregiver: yes            HOME CARE NURSING REFERRAL       **Order classes of: [facility], [facility] and [facility] will route to the Home Care and Hospice Referral Pool.  Home Care or Hospice will then contact the patient to schedule their appointment.**    If you do not hear from Home Care and Hospice, or you would like to call to schedule, please call  the referring place of service that your provider has listed below.  ______________________________________________________________________    Your provider has referred you to: [facility]: [facility] [phone]   [email or web address]    Extended Emergency Contact Information  Primary Emergency Contact: Yas Ledesma (Justine)  Address: [address] [place] [address] [facility]  Home Phone: [phone]  Work Phone: [phone]  Mobile Phone: [phone]  Relation: Spouse  Secondary Emergency Contact: Willam LEDESMA           [facility] [place] Phone: [phone]  Work Phone: [phone]  Mobile Phone: [phone]  Relation: Son    Patient Anticipated Discharge Date:    RN, PT, HHA to begin 24 - 48 hours after discharge.  PLEASE EVALUATE AND TREAT (Evaluation timeline is 24 - 48 hrs. Please call if there is need for a variance to this timeline).    REASON FOR REFERRAL: Fall Risk Assessment: Member indicated has fallen in last 12 months   and is worried about legal blindness in right eye- balance and strength problems- history of chronic kidney disease- on dialysis , history of CVA.     ADDITIONAL SERVICES NEEDED: [facility]    OTHER PERTINENT INFORMATION: Patient was last seen by provider on 4/10/18 for balance and strength problems.    Current Outpatient Prescriptions:  pilocarpine (PILOCAR) 2 % ophthalmic solution, Place 1 drop into both eyes 2 times daily, Disp: , Rfl:   brimonidine (ALPHAGAN) 0.2 % ophthalmic solution, Place 1 drop into both eyes 3 times daily, Disp: , Rfl:   timolol (TIMOPTIC) 0.5 % ophthalmic solution, Place 1 drop into both eyes 2 times daily, Disp: , Rfl:   warfarin (COUMADIN) 2 MG tablet, Take 5 mg Fridays and 4 mg all other days or as directed by the Anticoagulation Clinic, Disp: 170 tablet, Rfl: 0  bumetanide (BUMEX) 1 MG tablet, Take 2 tablets in  the morning and 1 tablet early afternoon, Disp: 180 tablet, Rfl: 2  BASAGLAR 100 UNIT/ML injection, , Inject 15 Units Subcutaneous At Bedtime, Disp: 15 mL, Rfl: 0  NOVOFINE 30G X 8 MM insulin pen needle, USE TO INJECT INSULIN THREE TIMES DAILY OR AS DIRECTED, Disp: 100 each, Rfl: 6  metoprolol succinate (TOPROL-XL) 50 MG 24 hr tablet, Take 1 tablet (50 mg) by mouth daily, Disp: 90 tablet, Rfl: 3  LOSARTAN POTASSIUM PO, Take 50 mg by mouth daily , Disp: , Rfl:   B Complex-C-Folic Acid (DIALYVITE PO), Take 1 tablet by mouth daily, Disp: , Rfl:   diltiazem (CARDIZEM CD) 240 MG 24 hr capsule, Take 1 capsule (240 mg) by mouth daily, Disp: 90 capsule, Rfl: 3  terazosin (HYTRIN) 10 MG capsule, Profile Rx: patient will contact pharmacy when needed  TAKE 1 CAPSULE (10 MG) BY MOUTH AT BEDTIME, Disp: 90 capsule, Rfl: 3  lovastatin (MEVACOR) 40 MG tablet, Profile Rx: patient will contact pharmacy when needed  TAKE 1 AND 1/2 TABLETS BY MOUTH ONCE A DAY WITH DINNER. NEED FASTING LAB WORK, Disp: 135 tablet, Rfl: 3  clopidogrel (PLAVIX) 75 MG tablet, Take 1 tablet (75 mg) by mouth daily, Disp: 90 tablet, Rfl: 3  ADVAIR DISKUS 100-50 MCG/DOSE diskus inhaler, inhale 1 puff into the lungs every 12 hours., Disp: 3 Inhaler, Rfl: 3  insulin aspart (NOVOLOG FLEXPEN) 100 UNIT/ML injection, 6 units before breakfast, 6 units before lunch, 4 units before dinner, Disp: 15 mL, Rfl: 11  blood glucose monitoring (ONE TOUCH ULTRA) test strip, test 2 times daily  Profile Rx: patient will contact pharmacy when needed, Disp: 60 each, Rfl: 11  latanoprost (XALATAN) 0.005 % ophthalmic solution, Place 1 drop into both eyes At Bedtime, Disp: , Rfl:   blood glucose monitoring (NO BRAND SPECIFIED) meter device kit, Use to test blood sugar 3 times daily or as directed., Disp: 1 kit, Rfl: 0  order for DME, Equipment being ordered: Oxygen- HOME and portable. Georgie Coulter CMA Medical Assistant Signed  Service date: 05/24/2016 10:48 AM       O2 Sat on Room  air at rest:84%  O2 sat on room air with walk: 82-91%  O2 Sat on 1L of oxygen with walk 91-93%  O2 Sat on 2 L of Oxygen with walk 91-96%  O2 Sat on 1 L O2 at rest 94%, Disp: 2 each, Rfl: prn  fluocinonide (LIDEX) 0.05 % cream, Apply sparingly to affected area twice daily as needed.  Do not apply to face., Disp: 120 g, Rfl: 1  order for DME, Equipment being ordered: Compression socks 15-20 mmHg, Disp: 1 Device, Rfl: 0  Misc Natural Products (TART CHERRY ADVANCED) CAPS, Take 1 capsule by mouth daily, Disp: , Rfl:   albuterol (2.5 MG/3ML) 0.083% neb solution, TAKE 1 VIAL (2.5 MG) BY NEBULIZATION EVERY 6 HOURS AS NEEDED FOR SHORTNESS OF BREATH / DYSPNEA OR WHEEZING, Disp: 63 vial, Rfl: 11  order for DME, Nebulizer machine Qty #1, Disp: 1 Units, Rfl: 0  Acetaminophen (TYLENOL PO), Take 650 mg by mouth nightly as needed, Disp: , Rfl:   ORDER FOR DME, Equipment being ordered: Walker with wheels and brakes, and a seat, Disp: 1 each, Rfl: 0      Patient Active Problem List:     Essential Hypertension, Benign     Gouty arthropathy     Disorder of bursae and tendons in shoulder region     Malignant neoplasm of kidney excluding renal pelvis (H)     Abdominal aortic aneurysm (H)     Proteinuria     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)     Anemia     Hyperlipidemia LDL goal <70     Anxiety disorder due to medical condition     Cerebral embolism with cerebral infarction (H)     zAdvanced directives, counseling/discussion     Health Care Home     Seborrheic keratosis     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)     Thrombocytopenia, primary (H)     Glaucoma     Controlled type 2 diabetes mellitus with diabetic nephropathy, with long-term current use of insulin (H)     Chronic obstructive pulmonary disease, unspecified COPD type (H)     Legally blind in right eye, as defined in [place]     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    This encounter with the patient was in whole, or in part, for the following medical condition, which is the primary reason for Home Health Care: fall risk assessment, generalized weakness, balance problems. Right eye blindness, history of CVA.    I certify that, based on my findings, the following services are medically necessary Home Health Services: Occupational Therapy and Physical Therapy    My clinical findings support the need for the above services because: Occupational Therapy Services are needed to assess and treat cognitive ability and address ADL safety due to impairment in weakness, balance. and Physical Therapy Services are needed to assess and treat the following functional impairments: .    Further, I certify that my clinical findings support that this patient is homebound (i.e. absences from home require considerable and taxing effort and are for medical reasons or [employer] services or infrequently or of short duration when for other reasons) because: Requires assistance of another person or specialized equipment to access medical services because patient: Is unable to exit home safely on own due to: risk for falls..    Based on the above findings, I certify that this patient is confined to the home and needs intermittent skilled nursing care, physical therapy and/or speech therapy.  The patient is under my care, and I have initiated the establishment of the plan of care.  This patient will be followed  by a physician who will periodically review the plan of care.    Physician/Provider to provide follow up care: Stefanie Louis    Responsible MIRTHA certified Physician at time of discharge:     Please be aware that coverage of these services is subject to the terms and limitations of your health insurance plan.  Call member services at your health plan with any benefit or coverage questions.            NEUROLOGY ADULT REFERRAL       Your provider has referred you for the following:   Consult at [facility]: [facility] [phone]   [email or web address]    Please be aware that coverage of these services is subject to the terms and limitations of your health insurance plan.  Call member services at your health plan with any benefit or coverage questions.      Please bring the following with you to your appointment:    (1) Any X-Rays, CTs or MRIs which have been performed.  Contact the facility where they were done to arrange for  prior to your scheduled appointment.    (2) List of current medications  (3) This referral request   (4) Any documents/labs given to you for this referral                  Your next 10 appointments already scheduled     Apr 23, 2018  1:15 PM CDT   Antico[facility] with [place] ANTI COAG   [facility]    [address] [address] [phone]            May 15, 2018  1:00 PM CDT   SIX MINUTE WALK with  PFL A,  PFL 6 MINUTE WALK 1   [facility] Pulmonary Function Testing ([facility])    [address]  3[address]  [facility] [address] [phone]            May 15, 2018  2:00 PM CDT   (Arrive by 1:45 PM)   Return Interstitial Lung with David Morris Perlman, MD   [facility] ([facility])    [address]  [address]  [facility] [address] [phone]              Who to contact      If you have questions or need follow up information about today's clinic visit or your schedule please contact [facility] directly at [phone].  Normal or non-critical lab and imaging results will be communicated to you by MyChart, letter or phone within 4 business days after the clinic has received the results. If you do not hear from us within 7 days, please contact the clinic through [employer] or phone. If you have a critical or abnormal lab result, we will notify you by phone as soon as possible.  Submit refill requests through [employer] or call your pharmacy and they will forward the refill request to us. Please allow 3 business days for your refill to be completed.          Additional Information About Your Visit        [employer] Information     [employer] gives you secure access to your electronic health record. If you see a primary care provider, you can also send messages to your care team and make appointments. If you have questions, please call your primary care clinic.  If you do not have a primary care provider, please call [phone] and they will assist you.        Care EveryWhere ID     This is your Care EveryWhere ID. This could be used by other organizations to access your [facility] medical records  [id number]        Your Vitals Were     Pulse Temperature Pulse Oximetry BMI (Body Mass Index)          78 97.2  F (36.2  C) (Tympanic) 97% 31.79 kg/m2         Blood Pressure from Last 3 Encounters:   04/10/18 123/70   03/23/18 130/82   02/27/18 113/61    Weight from Last 3 Encounters:   04/10/18 203 lb (92.1 kg)   02/27/18 201 lb 4.5 oz (91.3 kg)   02/15/18 201 lb 3.2 oz (91.3 kg)              We Performed the Following     CARE COORDINATION REFERRAL     HOME CARE NURSING REFERRAL     NEUROLOGY ADULT REFERRAL        Primary Care Provider Office Phone # Fax #    DANNA Lopez [facility] [phone] [address] [facility] [address]        Equal Access to Services      ANUSHKA BILL : Hadii aad ku roberto Gamboa, waaxda luqadaha, qaybta kaalmada sandor, renzo stephen deannamele simmonskennedyseth palacio . [facility] [phone].    ATENCIÓN: Si habla español, tiene a louis disposición servicios gratuitos de asistencia lingüística. Llame al [phone].    We comply with applicable federal civil rights laws and [place] laws. We do not discriminate on the basis of race, color, national origin, age, disability, sex, sexual orientation, or gender identity.            Thank you!     Thank you for choosing [facility]  for your care. Our goal is always to provide you with excellent care. Hearing back from our patients is one way we can continue to improve our services. Please take a few minutes to complete the written survey that you may receive in the mail after your visit with us. Thank you!             Your Updated Medication List - Protect others around you: Learn how to safely use, store and throw away your medicines at www.disposemymeds.org.          This list is accurate as of 4/10/18 10:39 AM.  Always use your most recent med list.                   Brand Name Dispense Instructions for use Diagnosis    ADVAIR DISKUS 100-50 MCG/DOSE diskus inhaler   Generic drug:  fluticasone-salmeterol     3 Inhaler    inhale 1 puff into the lungs every 12 hours.    Chronic obstructive pulmonary disease, unspecified COPD type (H)       albuterol (2.5 MG/3ML) 0.083% neb solution     63 vial    TAKE 1 VIAL (2.5 MG) BY NEBULIZATION EVERY 6 HOURS AS NEEDED FOR SHORTNESS OF BREATH / DYSPNEA OR WHEEZING    Chronic obstructive pulmonary disease, unspecified COPD type (H)       BASAGLAR 100 UNIT/ML injection     15 mL    , Inject 15 Units Subcutaneous At Bedtime    Controlled type 2 diabetes mellitus with diabetic nephropathy, with long-term current use of insulin (H)       blood glucose monitoring meter device kit    no brand specified    1 kit    Use to test blood sugar 3 times daily or as  directed.    Controlled type 2 diabetes mellitus with diabetic nephropathy, with long-term current use of insulin (H)       blood glucose monitoring test strip    ONETOUCH ULTRA    60 each    test 2 times daily Profile Rx: patient will contact pharmacy when needed    Type 2 diabetes mellitus with diabetic nephropathy, with long-term current use of insulin (H)       brimonidine 0.2 % ophthalmic solution    ALPHAGAN     Place 1 drop into both eyes 3 times daily        bumetanide 1 MG tablet    BUMEX    180 tablet    Take 2 tablets in the morning and 1 tablet early afternoon    Benign essential hypertension       clopidogrel 75 MG tablet    PLAVIX    90 tablet    Take 1 tablet (75 mg) by mouth daily    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)       DIALYVITE PO      Take 1 tablet by mouth daily        diltiazem 240 MG 24 hr capsule    CARDIZEM CD    90 capsule    Take 1 capsule (240 mg) by mouth daily    Paroxysmal atrial fibrillation (H)       fluocinonide 0.05 % cream    LIDEX    120 g    Apply sparingly to affected area twice daily as needed.  Do not apply to face.    Nummular dermatitis, [place], SK (seborrheic keratosis), History of skin cancer       insulin aspart 100 UNIT/ML injection    NovoLOG FLEXPEN    15 mL    6 units before breakfast, 6 units before lunch, 4 units before dinner    Controlled type 2 diabetes mellitus with diabetic nephropathy, with long-term current use of insulin (H)       latanoprost 0.005 % ophthalmic solution    XALATAN     Place 1 drop into both eyes At Bedtime        LOSARTAN POTASSIUM PO      Take 50 mg by mouth daily        lovastatin 40 MG tablet    MEVACOR    135 tablet    Profile Rx: patient will contact pharmacy when needed TAKE 1 AND 1/2 TABLETS BY MOUTH ONCE A DAY WITH DINNER. NEED FASTING LAB WORK    Hyperlipidemia LDL goal <130       metoprolol succinate 50 MG 24 hr tablet    TOPROL-XL    90 tablet    Take 1 tablet (50 mg) by mouth daily    Hypertension,  unspecified type       NOVOFINE 30G X 8 MM   Generic drug:  insulin pen needle     100 each    USE TO INJECT INSULIN THREE TIMES DAILY OR AS DIRECTED    Type 2 diabetes mellitus with diabetic nephropathy, unspecified long term insulin use status (H)       order for DME     1 each    Equipment being ordered: Walker with wheels and brakes, and a seat    ESRD (end stage renal disease) on dialysis (H), Cerebral embolism with cerebral infarction (H)       * order for DME     1 Units    Nebulizer machine Qty #1    Fever, unspecified, Cough, Chronic obstructive pulmonary disease, unspecified COPD type (H), Acute bronchospasm       order for DME     2 each    Equipment being ordered: Oxygen- HOME and portable. 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), Type 2 diabetes mellitus with diabetic nephropathy (H)       * order for DME     1 Device    Equipment being ordered: Compression socks 15-20 mmHg    Peripheral edema       pilocarpine 2 % ophthalmic solution    PILOCAR     Place 1 drop into both eyes 2 times daily        TART CHERRY ADVANCED Caps      Take 1 capsule by mouth daily        terazosin 10 MG capsule    HYTRIN    90 capsule    Profile Rx: patient will contact pharmacy when needed TAKE 1 CAPSULE (10 MG) BY MOUTH AT BEDTIME    Benign non-nodular prostatic hyperplasia with lower urinary tract symptoms       timolol 0.5 % ophthalmic solution    TIMOPTIC     Place 1 drop into both eyes 2 times daily        TYLENOL PO      Take 650 mg by mouth nightly as needed        warfarin 2 MG  tablet    COUMADIN    170 tablet    Take 5 mg Fridays and 4 mg all other days or as directed by the Anticoagulation Clinic    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 Read the directions carefully, and ask your doctor or other care provider to review them with you.

## 2018-04-10 NOTE — NURSING NOTE
"Initial /70 (BP Location: Left arm, Patient Position: Chair, Cuff Size: Adult Large)  Pulse 78  Temp 97.2  F (36.2  C) (Tympanic)  Wt 203 lb (92.1 kg)  SpO2 97%  BMI 31.79 kg/m2 Estimated body mass index is 31.79 kg/(m^2) as calculated from the following:    Height as of 2/27/18: 5' 7\" (1.702 m).    Weight as of this encounter: 203 lb (92.1 kg). .    Georgie Coulter    "

## 2018-04-10 NOTE — PROGRESS NOTES
"  SUBJECTIVE:   Griffin Walton is a 83 year old male who presents to clinic today for the following health issues:    past medical history significant for multiple medical issues including history of stroke, atrial fibrillation anticoagulated on Coumadin, end-stage renal disease on dialysis Monday, Wednesday, Friday, hypertension, hyperlipidemia    Shaking of the limbs, hands and legs since can not walk far or stand without collapsing and near falls. Happening more after Dialysis, spoke to Dr Lovelace about it      Duration: 6 months off/on- felt like symptoms started after hospitalization Nov. 2017 due to pneumonia- received in home physical therapy to help with strength. \"Whole body feels shaky\".     History of CVA 11 yrs ago- no residual weakness. Suffered right eye blindness.     Has been using a cane/walker for the past year to help with balance- no longer feels like he can walk any amount of distance due to his weakness in legs.     Description (location/character/radiation): weakness, legs buckling    Intensity:  severe    Accompanying signs and symptoms: shaking    History (similar episodes/previous evaluation): Was seen in the ER once    Precipitating or alleviating factors: has to rely on a wheelchair- has walker and cane at home    Therapies tried and outcome: None    Patient recently went to ER via 911 due to shakiness- unable to get up by himself from toilet.        -------------------------------------    Problem list and histories reviewed & adjusted, as indicated.  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     Cerebral embolism with cerebral infarction (H)     zAdvanced directives, counseling/discussion     Health Care Home     Seborrheic keratosis     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)     Thrombocytopenia, primary (H)     Glaucoma     Controlled type 2 diabetes mellitus with diabetic nephropathy, with long-term current use of insulin (H)     Chronic obstructive pulmonary disease, unspecified COPD type (H)     Legally blind in right eye, as defined in [place]     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)     Atrial fibrillation, new onset (H)     Long-term (current) use of anticoagulants [Z79.01]     Moderate persistent asthma without complication     Acute respiratory failure with hypoxia (H)     Localized edema     Past Surgical History:   Procedure Laterality Date     ABDOMEN SURGERY  2005    aortic aneurysm     CATARACT IOL, RT/LT  2/4/08    left eye - phacoemulsification w/ posterior chamber lens implantation combined w/ glaucoma filtering procedure     CREATE FISTULA ARTERIOVENOUS UPPER EXTREMITY  1/3/2012    Procedure:CREATE FISTULA ARTERIOVENOUS UPPER EXTREMITY; LEFT UPPER ARM ARTERIOVENOUS FISTULA; Surgeon:JENA PEARSON; [facility]     SURGICAL HISTORY OF -   2/13/2004    Right knee arthroscopy     SURGICAL HISTORY OF -       Vocal cord biopsy-benign     SURGICAL HISTORY OF -   3/3/2005    AAA, left partial nephrectomy       Social History   Substance Use Topics     Smoking status: Former Smoker     Packs/day: 1.00     Years: 55.00     Types: Cigarettes     Quit date: 1/1/2005     Smokeless tobacco: Never Used     Alcohol use No     Family History   Problem Relation Age of Onset     CANCER Father      Asophageal      CANCER Brother      Throat      Other Cancer Sister      Lung            Reviewed and updated as needed this visit by clinical staff       Reviewed and updated as needed this visit by Provider         ROS:  Constitutional, HEENT, cardiovascular,  pulmonary, GI, , musculoskeletal, neuro, skin, endocrine and psych systems are negative, except as otherwise noted.    OBJECTIVE:     /70 (BP Location: Left arm, Patient Position: Chair, Cuff Size: Adult Large)  Pulse 78  Temp 97.2  F (36.2  C) (Tympanic)  Wt 203 lb (92.1 kg)  SpO2 97%  BMI 31.79 kg/m2  Body mass index is 31.79 kg/(m^2).  GENERAL: alert, no distress and elderly- seated in wheelchair  RESP: lungs clear to auscultation - no rales, rhonchi or wheezes  CV: regular rate and rhythm, normal S1 S2, no S3 or S4, no murmur, click or rub, no peripheral edema and peripheral pulses strong  MS: no gross musculoskeletal defects noted, no edema  NEURO: Normal strength and tone, mentation intact and speech normal  Faint tremor of bilateral hands  - patient able to ambulate in exam room without assistance     Diagnostic Test Results:  none     ASSESSMENT/PLAN:       1. Generalized muscle weakness  Likely multifactorial since symptoms gradual onset over 6 months.   - history of CVA, COPD- requiring multiple hospitalizations; chronic kidney disease on dialysis which can further cause muscle weakness.   - NEUROLOGY ADULT REFERRAL  - CARE COORDINATION REFERRAL- patient inquiring about assisted living arrangements   - consider physical therapy however patient recently completed physical therapy program     2. Balance problems  Likely multifactorial since symptoms gradual onset over 6 months.   - history of CVA, COPD- requiring multiple hospitalizations; chronic kidney disease on dialysis which can further cause muscle weakness.   - NEUROLOGY ADULT REFERRAL  - CARE COORDINATION REFERRAL    3. Blindness of right eye  Increases fall risk  - HOME CARE NURSING REFERRAL- fall risk assessment  - discussed eliminating floor rugs- utilizing walker/cane within home    4. 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)    - CARE COORDINATION REFERRAL  - HOME CARE NURSING REFERRAL    5. CKD (chronic kidney  disease) stage V requiring chronic dialysis (H)  Followed by Nephrology - getting dialysis three times a week  - CARE COORDINATION REFERRAL  - HOME CARE NURSING REFERRAL    6. Fall, initial encounter  - patient reports 2 falls in the past year- most recent a few weeks ago  - discussed eliminating floor rugs- utilizing walker/cane within home  - HOME CARE NURSING REFERRAL    7. Essential tremor  - NEUROLOGY ADULT REFERRAL      Follow up in 3 months    DANNA Lopez CNP  [facility]

## 2018-04-10 NOTE — PATIENT INSTRUCTIONS
1. Schedule an appointment with Neurology  2. Home care will be giving you a call to come out into your home to assess the safety  3. Care Coordinator will be giving you a call

## 2018-04-11 ENCOUNTER — PATIENT OUTREACH (OUTPATIENT)
Dept: CARE COORDINATION | Facility: CLINIC | Age: 83
End: 2018-04-11

## 2018-04-11 NOTE — LETTER
[place]  [address] [place] [address]  [phone]      April 12, 2018    Griffin Walton  [address]  [facility] [address]      Dear Griffin Fletcher,    I am a clinic care coordinator- who works with Stefanie Louis, at the [facility] and I want to thank you both for speaking with me on the phone today regarding your current housing needs & thoughts for the future.    As we discussed, I am enclosing some information on Assisted Living Facilities in this geographic area for you to review for comparison pricing, styles, and services.  I am also including information on any transportation assistance programs I could find in this area, however, limited.  I emailed the rose that be at [employer] yesterday after our phone call and have not heard back from them yet to find out if they will cross the county line into [facility].  As soon as I get a reply, I will notify you.       I strongly encourage you to contact the [facility] Line and request additional assistance with long term care planning and legal advise in regards to selling your current home, moving into an assisted living facility, as they are truly the experts in this area and can offer the best advise for you.    Please feel free to contact me at [phone], with any questions or concerns. We at [facility] are focused on providing you with the highest-quality healthcare experience possible and that all starts with you.     Sincerely,     Snow Stringer    Enclosed:  I have enclosed an Access Care plan which has a list of all of your medical providers and phone numbers.

## 2018-04-11 NOTE — LETTER
Health Care Home - Access Care Plan    About Me:  Patient Name:  Griffin Walton    YOB: 1934  Age: 83 year old   [place] MRN:  [id number] Telephone Information:     Home Phone [phone]   Mobile [phone]       Address:    [address] [facility] [address] Email address:  [email or web address]      Emergency Contact(s)  Name Relationship Lgl Grd Work Phone Home Phone Mobile Phone   1. CALLIE,SHILPIIT* Spouse No [phone]   2. CALLIE MARGARITO Son No [phone]             My Access Plan  Medical Emergency 911   Questions or concerns during clinic hours Primary Clinic Line, I will call the clinic directly: [facility] - [phone]   24 Hour Appointment Line [phone] or  [phone]) (toll free)   24 Hour Nurse Line [phone] (toll free)   Questions or concerns outside clinic hours 24 Hour Appointment Line, I will call the after-hours on-call line:   [facility] [phone] or [phone]) (toll-free)   Preferred Urgent Care [facility], [phone]   Preferred [facility] [place], [place]  [phone]   Preferred [facility] [address] [facility]     Behavioral Health Crisis Line The National Suicide Prevention Lifeline at 1-214.128.6743 or 917           My Care Team Members  Patient Care Team       Relationship Specialty Notifications Start End    HeribertoStefanie APRN CNP PCP - General Nurse Practitioner  1/8/14     Phone: [phone] Fax: [phone] [address] [facility] [address]    Rafael Keene MD MD Nephrology  10/26/17     Phone: [phone] Fax: [phone]         [facility] [address] [facility] [place] [address]    Darion Arshad MD MD Ophthalmology  10/26/17     Phone: [phone] Fax: [phone]         [address]  [place] [address] [facility] [address]    Perlman, David Morris, MD MD Pulmonary Admissions 12/27/17     Phone: [phone] Pager: [phone] Fax: [phone]        [address] [facility] [address] [facility] [address]    March, Mac Hyde MD MD Cardiology Admissions 12/27/17     Phone: [phone] Fax: [phone] [address] [facility] [address] [facility] [address]    Jessica Corral MD MD Hematology & Oncology Admissions 12/27/17     Phone: [phone] Fax: [phone]         [address] [facility] [address]    Geraldo Ware MD MD Dermatology Admissions 12/27/17     Phone: [phone] Pager: [phone] Fax: [phone]        [address] [facility] [address]    [facility] [place] [place]     12/27/17     Phone: [phone] Fax: [phone] [address] [place] [address]    Kyara Redman L    Admissions 12/27/17     Snow Stringer LSW Lead Care Coordinator Primary Care - [facility]  4/11/18     Phone: [phone] Fax: [phone]               My Medical and Care Information  Problem List   Patient Active Problem List   Diagnosis     Essential Hypertension, Benign     Gouty arthropathy     Disorder of bursae and tendons in shoulder region     Malignant neoplasm of kidney excluding renal pelvis (H)     Abdominal aortic aneurysm (H)     Proteinuria     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)     Anemia     Hyperlipidemia LDL goal <70     Anxiety disorder due to medical condition     Cerebral embolism with cerebral infarction (H)     zAdvanced directives, counseling/discussion     Health Care Home     Seborrheic keratosis     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)     Thrombocytopenia, primary (H)     Glaucoma     Controlled type 2 diabetes mellitus with diabetic nephropathy, with long-term current use of insulin (H)     Chronic obstructive pulmonary disease, unspecified COPD type (H)     Legally blind in right eye, as defined in [place]     Cerebral  infarction due to embolism of cerebral artery (H)     Atrial fibrillation, new onset (H)     Long-term (current) use of anticoagulants [Z79.01]     Moderate persistent asthma without complication     Acute respiratory failure with hypoxia (H)     Localized edema      Current Medications and Allergies:  See printed Medication Report

## 2018-04-12 ENCOUNTER — TELEPHONE (OUTPATIENT)
Dept: CARE COORDINATION | Facility: CLINIC | Age: 83
End: 2018-04-12

## 2018-04-12 ENCOUNTER — TELEPHONE (OUTPATIENT)
Dept: FAMILY MEDICINE | Facility: CLINIC | Age: 83
End: 2018-04-12

## 2018-04-12 NOTE — TELEPHONE ENCOUNTER
Spoke with Clau, [phone], from , and relayed message below.  Also recommended she mention to pt the MTM Pharmacist if desired.    Alisa LI RN

## 2018-04-12 NOTE — TELEPHONE ENCOUNTER
Reason for call:  Patient reporting a symptom    Symptom or request: FAUSTINA Olguin with  Homecare asking with Med interactions that she needs PCP to be ware of:   1.  Contraindication - Diltiazem & Lovastatin   2.  Contraindication - Lovastatin & Erythromycin eye cream  3.  Interaction - Diltiazem & Erythromycin eye cream  4.  Interaction - Warfarin & Erythromycin Eye Cream  5.  Duplicate Therapy - Warfarin & Plavix    Please call Clau @ [phone] and advise     Duration (how long have symptoms been present): today    Have you been treated for this before? Yes    Additional comments:     Phone Number patient can be reached at:  Home number on file [phone] (home)    Best Time:  any    Can we leave a detailed message on this number:  YES    Call taken on 4/12/2018 at 11:29 AM by Slime Bennett

## 2018-04-12 NOTE — TELEPHONE ENCOUNTER
[facility] now requests orders and shares plan of care/discharge summaries for some patients through [employer].  Please REPLY TO THIS MESSAGE in order to give authorization for orders when needed.  This is considered a verbal order, you will still receive a faxed copy of orders for signature.  Thank you for your assistance in improving collaboration for our patients.    ORDER  PT 2x/wk x3. Strength, balance and endurance    OT eval. ALDs, vision assistance/edu, safety

## 2018-04-12 NOTE — PROGRESS NOTES
"Clinic Care Coordination Contact    OUTREACH    Referral Information:  \"Complex Medical Concerns/Education: Chronic diagnosis - chronic kidney disease stage 5 on dialysis/weakness/balance problems. Thinking of selling home and moving into assisted living\"      PARKER placed call and introduced self, title and role.  PARKER spoke with both pt and his wife, Justine, as each were on a separate phone in different rooms of their home.  Justine was taking notes during our conversation.    Referral Source: PCP    Primary Diagnosis: COPD in addition to complex medical needs, has several diagnosis that meets criteria for care coordination.    Chief Complaint   Patient presents with     Clinic Care Coordination - Initial     social work     Universal Utilization:   Utilization    Last refreshed: 4/12/2018  9:07 AM:  No Show Count (past year) 1       Last refreshed: 4/12/2018  9:07 AM:  ED visits 4       Last refreshed: 4/12/2018  9:07 AM:  Hospital admissions 1          Current as of: 4/12/2018  9:07 AM           Clinical Concerns:  Current Medical Concerns:  Pt with weakness, balance difficulties.  PCP has placed Home Care orders to assist with this issue.      Current Behavioral Concerns: Anxiety due to medical issues.      Education Provided to patient: Pt and wife wanted to discuss the possibility of selling their home and moving into an intermediate.  SW had to explain that this writer is not an , nor a realtor, but could provide education on intermediate services and programs in the geographical area.    Clinical Pathway Name: COPD    Health Maintenance Reviewed: Due/Overdue.  Pt will address with PCP at next appointment    Medication Management:  Pt states that he and Justine manage his medications together each week.     Functional Status:  Pt is independent with a device, however, is becoming weak, so they are looking for resources for their future.  [place] [facility] [place] however, they do not have a garage " and Justine does not want to deal with her car in the winter.     Equipment Currently Used at Home: oxygen, shower chair    Transportation means: The couple owns a vehicle and Justine drives as the pt no longer feels comfortable driving.  Justine would like transportation resources and discussion was had that if there was transportation services to/from their current home in [place] to pt's medical appointments, they may not need to move.  The couple currently live in a 1 level town home, so they could bring in private pay Home Care services as needed and with transportation services assisting with rides to medical appointments, they would be set.    Psychosocial:  [place]  Patient and spouse.  No stairs.      Financial/Insurance: Pt and Justine share that they own their home, have more than $6K in assets each in the bank and after discussion with them, they do not qualify for the [place] Choice Assessment or any County services due to income/asset levels.    Pt has [place] for his Medicare Supp[place].     Resources and Interventions:  Current Resources: Dialysis Services at [facility].  Home Care services via [facility] to begin this week, RN, PT and OT    Advanced Care Plans/Directives on file:: No  Referrals Placed: Assisted Living, Community Resources    Goals        General    Transportation (pt-stated)     Notes - Note created  4/12/2018  8:57 AM by Snow Stringer LSW    Goal Statement: Pt and wife would like to learn about and utilize alternative transportation resources in the [place] area as Justine is the only  in the household.  Measure of Success:  Pt will learn of all resources available in this area, if any.  Supportive Steps to Achieve: SW, pt and spouse will contact resources to investigate transportation resources.  Barriers: Pt no longer drives, thus leaving his wife as his sole source of transportation.  Strengths: Pt and wife  are very resourceful and determined to learn about services in their area.  Date to Achieve By: 6 months          Patient/Caregiver understanding: Pt and wife understand that this writer will mail them information on the Assisted Living Facilities and transportation companies in this geographic area for them to review.  SW strongly encourages the couple to discuss their situation with their , family and , or with a housing expert at the [facility].  Both pt and wife agreed, expressed verbal understanding and acknowledged that this writer is not a housing expert, but merely was offering information and resources for their review.    Outreach Frequency: monthly  Future Appointments              In 1 week [facility]    In 1 month  PFL 6 MINUTE WALK 1;  PFL A [facility] Pulmonary Function Testing, [facility]    In 1 month Perlman, David Morris, MD [facility], [facility]        Plan: SW to mail them resources and remain available to questions.  Sw to follow for clinic care coordination as needed.

## 2018-04-12 NOTE — TELEPHONE ENCOUNTER
Aware of his medications and possible side effects as patient is followed by multiple specialists including cardiology and nephrology.   If patient is concerned about interactions he will need to schedule appointment with  to cardiology and specialist who prescribed these medications

## 2018-04-12 NOTE — TELEPHONE ENCOUNTER
Left message for lCau Physical Therapist ([phone]) from  to return call to clinic.    Alisa LI RN

## 2018-04-19 ENCOUNTER — TELEPHONE (OUTPATIENT)
Dept: CARE COORDINATION | Facility: CLINIC | Age: 83
End: 2018-04-19

## 2018-04-20 NOTE — TELEPHONE ENCOUNTER
[facility] now requests orders and shares plan of care/discharge summaries for some patients through [employer].  Please REPLY TO THIS MESSAGE in order to give authorization for orders when needed.  This is considered a verbal order, you will still receive a faxed copy of orders for signature.  Thank you for your assistance in improving collaboration for our patients.    ORDER    Requesting occupational therapy treatment starting week of 4/22/18 2x per week x 3 weeks to determine appropriate environmental set up and equipment needs for visual deficits and to educate on breathing techniques in order to improve safety and independence in the home.     Thank you,  Clemencia Ye MA, OTR/L

## 2018-04-23 ENCOUNTER — ANTICOAGULATION THERAPY VISIT (OUTPATIENT)
Dept: ANTICOAGULATION | Facility: CLINIC | Age: 83
End: 2018-04-23
Payer: MEDICARE

## 2018-04-23 DIAGNOSIS — I48.91 ATRIAL FIBRILLATION, NEW ONSET (H): ICD-10-CM

## 2018-04-23 DIAGNOSIS — Z79.01 LONG-TERM (CURRENT) USE OF ANTICOAGULANTS: ICD-10-CM

## 2018-04-23 DIAGNOSIS — I63.40 CEREBRAL INFARCTION DUE TO EMBOLISM OF CEREBRAL ARTERY (H): ICD-10-CM

## 2018-04-23 LAB — INR POINT OF CARE: 3.3 (ref 0.86–1.14)

## 2018-04-23 PROCEDURE — 85610 PROTHROMBIN TIME: CPT | Mod: QW

## 2018-04-23 PROCEDURE — 36416 COLLJ CAPILLARY BLOOD SPEC: CPT

## 2018-04-23 PROCEDURE — 99207 ZZC NO CHARGE NURSE ONLY: CPT

## 2018-04-23 NOTE — MR AVS SNAPSHOT
Griffin LYNCH Isabelle   4/23/2018 1:15 PM   Anticoagulation Therapy Visit    Description:  83 year old male   Provider:  [facility]   Department:  [facility]           INR as of 4/23/2018     Today's INR 3.3!      Anticoagulation Summary as of 4/23/2018     INR goal 2.0-3.0   Today's INR 3.3!   Full instructions 6 mg on Mon; 4 mg all other days   Next INR check 5/7/2018    Indications   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) [I63.40]  Atrial fibrillation  new onset (H) [I48.91]  Long-term (current) use of anticoagulants [Z79.01] [Z79.01]         Instructions    Eat a salad, cabbage, or something containing vitamin K today. Then resume your normal 3-4 servings of vitamin K containing foods spread throughout the rest of the week.         Your next Anticoagulation Clinic appointment(s)     May 07, 2018  1:15 PM CDT   Anticoagulation Visit with [facility]   [facility]    [address] [facility] [address] [phone]              Contact Numbers     Please call [phone] with any problems or questions regarding your therapy.    If you need to cancel and/or reschedule your appointment please call one of the following numbers:  [facility] - [phone]  [place] - [phone]  [place] - [phone]  [place] - [phone]  [place] - [phone]            April 2018 Details    Sun Mon Tue Wed Thu Fri Sat     1               2               3               4               5               6               7                 8               9               10               11               12               13               14                 15               16               17               18               19               20               21                 22               23      6 mg   See details      24      4 mg         25      4 mg         26      4 mg         27      4 mg         28      4 mg           29      4 mg          30      6 mg               Date Details   04/23 This INR check               How to take your warfarin dose     To take:  4 mg Take 2 of the 2 mg tablets.    To take:  6 mg Take 3 of the 2 mg tablets.           May 2018 Details    Sun Mon Tue Wed Thu Fri Sat       1      4 mg         2      4 mg         3      4 mg         4      4 mg         5      4 mg           6      4 mg         7            8               9               10               11               12                 13               14               15               16               17               18               19                 20               21               22               23               24               25               26                 27               28               29               30               31                  Date Details   No additional details    Date of next INR:  5/7/2018         How to take your warfarin dose     To take:  4 mg Take 2 of the 2 mg tablets.    To take:  6 mg Take 3 of the 2 mg tablets.

## 2018-04-23 NOTE — PROGRESS NOTES
ANTICOAGULATION FOLLOW-UP CLINIC VISIT    Patient Name:  Griffin Walton  Date:  4/23/2018  Contact Type:  Face to Face accompanied w/ wife    SUBJECTIVE:     Patient Findings     Positives Change in diet/appetite (Less lettuce this last week, did have coleslaw. May have had less vitamin K)    Comments No changes in medications, activity, or diet noted. No bleeding or increased bruising noted. Took warfarin as prescribed.  Patient is to continue maintenance warfarin plan, and check INR in 2 weeks.  Eat a salad, cabbage, or something containing vitamin K today. Then resume your normal 3-4 servings of vitamin K containing foods spread throughout the rest of the week.  Patient verbalizes understanding and agrees to plan. No further questions or concerns.           OBJECTIVE    INR Protime   Date Value Ref Range Status   04/23/2018 3.3 (A) 0.86 - 1.14 Final       ASSESSMENT / PLAN  INR assessment SUPRA    Recheck INR In: 2 WEEKS    INR Location Clinic      Anticoagulation Summary as of 4/23/2018     INR goal 2.0-3.0   Today's INR 3.3!   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 [facility] MWF 8-12pm in [place] (post-dialysis INRs). Pt not new to warfarin (has been on approx 1 yr), pt cannot see well enough to read      Anticoagulation Care Providers     Provider Role Specialty Phone number    Stefanie Louis  DANNA Guillen CNP Responsible Nurse Practitioner [phone]            See the Encounter Report to view Anticoagulation Flowsheet and Dosing Calendar (Go to Encounters tab in chart review, and find the Anticoagulation Therapy Visit)        Rosalind Miner RN

## 2018-04-23 NOTE — PATIENT INSTRUCTIONS
Eat a salad, cabbage, or something containing vitamin K today. Then resume your normal 3-4 servings of vitamin K containing foods spread throughout the rest of the week.

## 2018-05-07 ENCOUNTER — ANTICOAGULATION THERAPY VISIT (OUTPATIENT)
Dept: ANTICOAGULATION | Facility: CLINIC | Age: 83
End: 2018-05-07
Payer: MEDICARE

## 2018-05-07 DIAGNOSIS — I63.40 CEREBRAL INFARCTION DUE TO EMBOLISM OF CEREBRAL ARTERY (H): ICD-10-CM

## 2018-05-07 DIAGNOSIS — I48.91 ATRIAL FIBRILLATION, NEW ONSET (H): ICD-10-CM

## 2018-05-07 DIAGNOSIS — Z79.01 LONG-TERM (CURRENT) USE OF ANTICOAGULANTS: ICD-10-CM

## 2018-05-07 LAB — INR POINT OF CARE: 2.5 (ref 0.86–1.14)

## 2018-05-07 PROCEDURE — 36416 COLLJ CAPILLARY BLOOD SPEC: CPT

## 2018-05-07 PROCEDURE — 85610 PROTHROMBIN TIME: CPT | Mod: QW

## 2018-05-07 PROCEDURE — 99207 ZZC NO CHARGE NURSE ONLY: CPT

## 2018-05-07 NOTE — MR AVS SNAPSHOT
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    If you need to cancel and/or reschedule your appointment please call one of the following numbers:  [facility] [phone]  [place] - [phone]  [facility] [phone]  [facility] [phone]  [place] - [phone]            May 2018 Details    Sun Mon Tue Wed Thu Fri Sat       1               2               3               4               5                 6               7      6 mg   See details      8      4 mg         9      4 mg         10      4 mg         11      4 mg         12      4 mg           13      4 mg         14      6 mg         15      4 mg         16      4 mg         17      4 mg         18      4 mg         19      4 mg           20      4 mg         21      6 mg         22      4 mg         23      4 mg         24      4 mg         25      4 mg         26      4 mg           27      4 mg         28      6 mg         29      4 mg         30            31                  Date Details   05/07 This INR check       Date of next INR:  5/30/2018         How to take your  warfarin dose     To take:  4 mg Take 2 of the 2 mg tablets.    To take:  6 mg Take 3 of the 2 mg tablets.

## 2018-05-07 NOTE — PROGRESS NOTES
ANTICOAGULATION FOLLOW-UP CLINIC VISIT    Patient Name:  Griffin Walton  Date:  5/7/2018  Contact Type:  Face to Face accompanied w/ wife    SUBJECTIVE:     Patient Findings     Positives No Problem Findings    Comments No changes in medications, activity, or diet noted. No bleeding or increased bruising noted. Took warfarin as prescribed.  Patient is to continue maintenance warfarin plan, and check INR in 3 weeks.  Patient verbalizes understanding and agrees to plan. No further questions or concerns.           OBJECTIVE    INR Protime   Date Value Ref Range Status   05/07/2018 2.5 (A) 0.86 - 1.14 Final       ASSESSMENT / PLAN  INR assessment THER    Recheck INR In: 3 WEEKS    INR Location Clinic      Anticoagulation Summary as of 5/7/2018     INR goal 2.0-3.0   Today's INR 2.5   Maintenance plan 6 mg (2 mg x 3) on Mon; 4 mg (2 mg x 2) all other days   Full instructions 6 mg on Mon; 4 mg all other days   Weekly total 30 mg   No change documented Rosalind Miner RN   Plan last modified Rosalind Miner RN (4/9/2018)   Next INR check 5/30/2018   Priority INR   Target end date Indefinite    Indications   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 Pt not new to warfarin (has been on approx 1 yr), pt cannot see well enough to read      Anticoagulation Care Providers     Provider Role Specialty Phone number    Stefanie Louis, DANNA CNP Responsible Nurse Practitioner [phone]            See the Encounter Report to view Anticoagulation Flowsheet and Dosing Calendar (Go to Encounters tab in chart review, and find the Anticoagulation Therapy Visit)        Rosalind Miner RN

## 2018-05-07 NOTE — TELEPHONE ENCOUNTER
"Occupational Therapy Evaluation    Patient:  Nathalie Rodriguez "Maile"  Date of Therapy Visit:  2018  Referring Physician:  Dr Dariela Leonardo  Diagnosis: Parkinson's disease   MRN : [id number]  Referral Orders:  Eval and treat     Start Time: 400pm  End Time:  500pm  Total Time:  60 mins    Certification period from to 18  Visit # 1 of 20      HISTORY/OCCUPATIONAL PROFILE/ Medical and Therapy History:  Subjective:  Patient is a 47 year old  Right hand dominant female who presents for occupational therapy today,2018 with a diagnosis of Parkinson's disease.  She states she has a light tremor in her for the past 3 years.  She feels that her hand is "slower" on the left.   She is also complaining of loss of strength in her left hand.  She lives locally and went to OT one time about a month ago but did not want to go back to therapy because she did not feel she was given "useful information".  She does the "[employer]" for Parkinson's every day.  She has had a trial of botox injections to her arm and her foot. Her father had parkinson's disease.    Date of onset:  3-4 years ago  Location of injury: left hand     Current History of Injury /Mechanism of Injury:  unknown  Rehabilitation Expectation/Goals: Patient's goals for therapy are to be able to  -increase strength  Pain:   During no work/At Rest:     0 out of 10   While working/ With Activity:  0 out of 10   Sleepin out of 10      Previous Medical Management/ Past Medical History/Physical Systems Review:    She has a history of hashimoto's and luisito barr  Past Medical History:   Diagnosis Date    Allergy     Hypothyroidism     Sleep apnea      Review of patient's allergies indicates:   Allergen Reactions    Codeine     Hydrocodone     Sulfa (sulfonamide antibiotics)        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  Rafael Cummins     ambulating without assistance   Current FunctionalStatus:  Independent with all ADLs and ambulating without assistance   A typical day includes:  Exercise 7 days per week   Exercise routine prior to onset : cardio 30 mins, ana lilia 2 x week, modesta chi 2 x week, weight training 2 x week   Home/Living environment :  Lives with , 10 year old daughter and puppy   Limitation of Functional Status:   ADLs (difficulty with the following tasks):    - Feeding: toss salad    - Meal Prep:  i    - Bathing: washing hair    - Dressing: i    - Grooming: clip in hair       Self Care / ADL:     IADLs: (difficulty with the following tasks):    - managing finances/ writing: i    - driving/handling transportation: i    - shopping: i    - using phone: i    - computer: [employer]     - managing medications:  i    - housework & basic home maintenance: i    Leisure: birdwatcher binoculars; stained glass; drawing designs for stained glass > precision on right causes increased triggering on left    Environmental Concerns/ Fall Risk:  None   Barriers to Learning:  None   Cultural/Spiritual : None  Developmental/Education: None  Nutritional Deficit: None   Abuse/Neglect : None    Language: None   Hearing/Vision Deficit: nearsighted    Objective:  Edema/ Girth/Volume: Pre-Treatment Girth (cm):     Date: 5/8/18  Left 5/8/18  Right   Wrist 14.8 15.4   mcps 18.8 19.3     Observations:    Sensation Test:  Sensation grossly intact to light touch all dermatomal regions at the time of this evaluation.  However, she does complain of occasionally waking up with numbness in hands.   Stereognosis:  Intact  [place]-Sudhakar Testing:  n/a  Sensitivity: Patient denies numbness & tingling; Temp, touch and pressure sense are intact bilaterally    Palpation:  Skin Condition: unremarkable    Range of Motion: AROM    Manual Muscle Test:  Brachioradialis: 5/5  Brachialis: 5/5  Biceps: 5/5  Triceps: 5/5  Wrist Extension: 5/5  Wrist Flexion: 5/5  Supinator:  5/5  Pronator Teres: 5/5  FDP: 5/5  EPL: 5/5  Dorsal Interossei: 4/5       Special Testing:  Cozen's Test: -  Phalen's Test: -  Durkan's Test: -  Tinel's @ cubital tunnel: -  Tinel's @ carpal tunnel: -  Valgus Instability: -  Varus Instability: -    Coordination:  not impaired for FMC in left  in ADL/IADL's; however, pt does complain of left hand tremoring    9 hole peg test; results in seconds   Right Left    5/8/18 5/8/18   seconds 15.83 17.83     Endurance: not impaired for light ADL/IADL's w/ use of left    Strength: (MARCOS Dynamometer in lbs.), Average 3 trials, Position II             RIGHT           LEFT     Date:   5/8/18 5/8/18     Gross  II Elbow flex 80,80,80# 65,64,62#     Gross  II Elbow ext 84,84,84# 65,66,65#     Lateral Pinch 23,23,22# 20,20,19#     Palmar Pinch/ 3JC 23,26,24# 16,17,16#     Tip Pinch 15,15,15# 9,9,10#     Treatment/ Patient and family/caregiver learning and education:     OT eval performed today and instruction in written HEP including putty strengthening exercises  Patient did require any verbal  cues to perform exercises correctly.  THERAPEUTIC EXERCISES x 15 mins: to increase ROM, increase strength and increase functional use   -issued turquoise putty x 2 with specific exercises related to muscle weakness x 15 reps each    Assessment:   Profile and History Assessment of Occupational Performance Level of Clinical Decision Making Complexity Score   Occupational Profile:   Nathalie Rodriguez is a 47 y.o. female who lives with their family and is currently on disability. Nathalie Rodriguez has difficulty with  Placing hair clip in her hair but independent with everything else  affecting his/her daily functional abilities. His/her main goal for therapy is to avoid losing any more strength.     Medical and Therapy History Review:   Past Medical History:   Diagnosis Date    Allergy     Hypothyroidism     Sleep apnea                   Performance  Deficits    Physical:   Strength  Pinch Strength    Cognitive:  No Deficits    Psychosocial:    No Deficits     Clinical Decision Making:  low    Assessment Process:  Problem-Focused Assessments    Modification/Need for Assistance:  Not Necessary    Intervention Selection:  Limited Treatment Options       low  Based on PMHX, co morbidities , data from assessments and functional level of assistance required with task and clinical presentation directly impacting function.       Medical necessity is demonstrated by the following IMPAIRMENTS/PROBLEMS:  Patient Lack of Knowledge/Awareness in Home Program  Decreased  strength  Decreased pinch strength    Maile presents to occupational therapy with an OT diagnosis of Parkinson's disease and presents with the above listed problem areas.   Based on objective measures, Maile has some  and pinch strength deficits. She is independent with all her functional activities and 9 hole peg test was WNLs on non-dominant left side. We reviewed a detailed home program to address these areas and patient was able to independently demonstrate these while in therapy today.  At this time, Maile does not want to attend therapy but prefers to be educated on exercises that will help her with strengthening of her hand.  She will be discharged with her home program today.    GOALS:  Short Therm Goals: (2 weeks)    STG: Patient will be independent in home exercise and self care program  MET      Pt's spiritual, cultural and educational needs considered and patient is agreeable to plan of care and goals as stated above.     There are no Anticipated Barriers for Occupational  therapy      Plan:     Patient will be discharged today with her HEP.          I certify the need for these services furnished under this plan of treatment and while under my care    ____________________________________                        ______________  Physician/Referring Practitioner                   Date of  Signature

## 2018-05-16 ENCOUNTER — MEDICAL CORRESPONDENCE (OUTPATIENT)
Dept: HEALTH INFORMATION MANAGEMENT | Facility: CLINIC | Age: 83
End: 2018-05-16

## 2018-05-16 PROCEDURE — G0180 MD CERTIFICATION HHA PATIENT: HCPCS | Performed by: FAMILY MEDICINE

## 2018-05-19 ENCOUNTER — HOSPITAL ENCOUNTER (EMERGENCY)
Facility: CLINIC | Age: 83
Discharge: HOME | End: 2018-05-19
Attending: EMERGENCY MEDICINE | Admitting: EMERGENCY MEDICINE
Payer: MEDICARE

## 2018-05-19 ENCOUNTER — APPOINTMENT (OUTPATIENT)
Dept: CT IMAGING | Facility: CLINIC | Age: 83
End: 2018-05-19
Attending: EMERGENCY MEDICINE
Payer: MEDICARE

## 2018-05-19 VITALS
HEART RATE: 77 BPM | RESPIRATION RATE: 18 BRPM | OXYGEN SATURATION: 98 % | SYSTOLIC BLOOD PRESSURE: 151 MMHG | TEMPERATURE: 97.7 F | DIASTOLIC BLOOD PRESSURE: 76 MMHG

## 2018-05-19 DIAGNOSIS — R10.13 ABDOMINAL PAIN, EPIGASTRIC: ICD-10-CM

## 2018-05-19 DIAGNOSIS — R11.2 NON-INTRACTABLE VOMITING WITH NAUSEA, UNSPECIFIED VOMITING TYPE: ICD-10-CM

## 2018-05-19 LAB
ALBUMIN SERPL-MCNC: 3.4 G/DL (ref 3.4–5)
ALP SERPL-CCNC: 103 U/L (ref 40–150)
ALT SERPL W P-5'-P-CCNC: 21 U/L (ref 0–70)
ANION GAP SERPL CALCULATED.3IONS-SCNC: 7 MMOL/L (ref 3–14)
AST SERPL W P-5'-P-CCNC: 13 U/L (ref 0–45)
BASOPHILS # BLD AUTO: 0 10E9/L (ref 0–0.2)
BASOPHILS NFR BLD AUTO: 0.5 %
BILIRUB SERPL-MCNC: 0.3 MG/DL (ref 0.2–1.3)
BUN SERPL-MCNC: 41 MG/DL (ref 7–30)
CALCIUM SERPL-MCNC: 9.1 MG/DL (ref 8.5–10.1)
CHLORIDE SERPL-SCNC: 98 MMOL/L (ref 94–109)
CO2 SERPL-SCNC: 31 MMOL/L (ref 20–32)
CREAT SERPL-MCNC: 5.13 MG/DL (ref 0.66–1.25)
DIFFERENTIAL METHOD BLD: ABNORMAL
EOSINOPHIL # BLD AUTO: 0.2 10E9/L (ref 0–0.7)
EOSINOPHIL NFR BLD AUTO: 2.9 %
ERYTHROCYTE [DISTWIDTH] IN BLOOD BY AUTOMATED COUNT: 16.6 % (ref 10–15)
GFR SERPL CREATININE-BSD FRML MDRD: 11 ML/MIN/1.7M2
GLUCOSE SERPL-MCNC: 221 MG/DL (ref 70–99)
HCT VFR BLD AUTO: 33.7 % (ref 40–53)
HGB BLD-MCNC: 10.6 G/DL (ref 13.3–17.7)
IMM GRANULOCYTES # BLD: 0 10E9/L (ref 0–0.4)
IMM GRANULOCYTES NFR BLD: 0.4 %
INR PPP: 2.38 (ref 0.86–1.14)
LIPASE SERPL-CCNC: 374 U/L (ref 73–393)
LYMPHOCYTES # BLD AUTO: 1 10E9/L (ref 0.8–5.3)
LYMPHOCYTES NFR BLD AUTO: 11.8 %
MCH RBC QN AUTO: 28.3 PG (ref 26.5–33)
MCHC RBC AUTO-ENTMCNC: 31.5 G/DL (ref 31.5–36.5)
MCV RBC AUTO: 90 FL (ref 78–100)
MONOCYTES # BLD AUTO: 1 10E9/L (ref 0–1.3)
MONOCYTES NFR BLD AUTO: 12 %
NEUTROPHILS # BLD AUTO: 5.8 10E9/L (ref 1.6–8.3)
NEUTROPHILS NFR BLD AUTO: 72.4 %
PLATELET # BLD AUTO: 212 10E9/L (ref 150–450)
POTASSIUM SERPL-SCNC: 4.3 MMOL/L (ref 3.4–5.3)
PROT SERPL-MCNC: 7.7 G/DL (ref 6.8–8.8)
RBC # BLD AUTO: 3.75 10E12/L (ref 4.4–5.9)
SODIUM SERPL-SCNC: 136 MMOL/L (ref 133–144)
TROPONIN I SERPL-MCNC: 0.02 UG/L (ref 0–0.04)
TROPONIN I SERPL-MCNC: <0.015 UG/L (ref 0–0.04)
WBC # BLD AUTO: 8 10E9/L (ref 4–11)

## 2018-05-19 PROCEDURE — 96361 HYDRATE IV INFUSION ADD-ON: CPT

## 2018-05-19 PROCEDURE — 85025 COMPLETE CBC W/AUTO DIFF WBC: CPT | Performed by: EMERGENCY MEDICINE

## 2018-05-19 PROCEDURE — 93005 ELECTROCARDIOGRAM TRACING: CPT

## 2018-05-19 PROCEDURE — 99285 EMERGENCY DEPT VISIT HI MDM: CPT | Mod: 25

## 2018-05-19 PROCEDURE — 85610 PROTHROMBIN TIME: CPT | Performed by: EMERGENCY MEDICINE

## 2018-05-19 PROCEDURE — 25000128 H RX IP 250 OP 636: Performed by: EMERGENCY MEDICINE

## 2018-05-19 PROCEDURE — 76705 ECHO EXAM OF ABDOMEN: CPT

## 2018-05-19 PROCEDURE — 76705 ECHO EXAM OF ABDOMEN: CPT | Mod: 26 | Performed by: EMERGENCY MEDICINE

## 2018-05-19 PROCEDURE — 74176 CT ABD & PELVIS W/O CONTRAST: CPT

## 2018-05-19 PROCEDURE — 80053 COMPREHEN METABOLIC PANEL: CPT | Performed by: EMERGENCY MEDICINE

## 2018-05-19 PROCEDURE — 96374 THER/PROPH/DIAG INJ IV PUSH: CPT

## 2018-05-19 PROCEDURE — 99285 EMERGENCY DEPT VISIT HI MDM: CPT | Mod: 25 | Performed by: EMERGENCY MEDICINE

## 2018-05-19 PROCEDURE — 84484 ASSAY OF TROPONIN QUANT: CPT | Mod: 91 | Performed by: EMERGENCY MEDICINE

## 2018-05-19 PROCEDURE — 83690 ASSAY OF LIPASE: CPT | Performed by: EMERGENCY MEDICINE

## 2018-05-19 PROCEDURE — 93010 ELECTROCARDIOGRAM REPORT: CPT | Mod: Z6 | Performed by: EMERGENCY MEDICINE

## 2018-05-19 RX ORDER — HYDROMORPHONE HYDROCHLORIDE 1 MG/ML
0.5 INJECTION, SOLUTION INTRAMUSCULAR; INTRAVENOUS; SUBCUTANEOUS
Status: DISCONTINUED | OUTPATIENT
Start: 2018-05-19 | End: 2018-05-19 | Stop reason: HOSPADM

## 2018-05-19 RX ORDER — ONDANSETRON 4 MG/1
4 TABLET, ORALLY DISINTEGRATING ORAL EVERY 8 HOURS PRN
Qty: 10 TABLET | Refills: 0 | Status: SHIPPED | OUTPATIENT
Start: 2018-05-19 | End: 2018-05-22

## 2018-05-19 RX ORDER — ONDANSETRON 2 MG/ML
4 INJECTION INTRAMUSCULAR; INTRAVENOUS ONCE
Status: COMPLETED | OUTPATIENT
Start: 2018-05-19 | End: 2018-05-19

## 2018-05-19 RX ADMIN — SODIUM CHLORIDE, POTASSIUM CHLORIDE, SODIUM LACTATE AND CALCIUM CHLORIDE 250 ML: 600; 310; 30; 20 INJECTION, SOLUTION INTRAVENOUS at 01:43

## 2018-05-19 RX ADMIN — ONDANSETRON 4 MG: 2 INJECTION INTRAMUSCULAR; INTRAVENOUS at 01:47

## 2018-05-19 NOTE — ED AVS SNAPSHOT
[facility] Emergency Department    [address] [address]    Phone:  [phone]    Fax:  [phone]                                       Griffin Walton   MRN: [id number]    Department:  [facility] Emergency Department   Date of Visit:  5/19/2018           After Visit Summary Signature Page     I have received my discharge instructions, and my questions have been answered. I have discussed any challenges I see with this plan with the nurse or doctor.    ..........................................................................................................................................  Patient/Patient Representative Signature      ..........................................................................................................................................  Patient Representative Print Name and Relationship to Patient    ..................................................               ................................................  Date                                            Time    ..........................................................................................................................................  Reviewed by Signature/Title    ...................................................              ..............................................  Date                                                            Time

## 2018-05-19 NOTE — PROGRESS NOTES
Pt brought in by EMT after 3 hours of nausea and vommitting. Pt did have dialysis yesterday, 5/18/18. 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 Allergies to aspirin and MSG. Fistula (L) arm.

## 2018-05-19 NOTE — DISCHARGE INSTRUCTIONS
Return to the emergency department if you have worsening symptoms, fever, repeated vomiting, chest pain, or other concerns.  Otherwise follow-up in primary care.

## 2018-05-19 NOTE — ED PROVIDER NOTES
History   Vomiting    HPI  Griffin Walton is a 84 year old male who presents for vomiting.  History obtained from the patient, EMS, and the patient's wife.  The patient was celebrating his birthday out at dinner last night.  He was feeling well and went to bed but then woke about 3 hours prior to arrival with multiple episodes of nonbloody and nonbilious emesis.  This is accompanied by epigastric abdominal pain, described as pain, rated as moderate in severity, nonradiating.  He denies chest pain or difficulty breathing.  He has had chills but no fevers.  Denies headache, rash, focal numbness or weakness.  He has a history of prior stroke and is on clopidogrel and warfarin for this.  EMS gave him intramuscular ondansetron for symptoms.  He has a history of dialysis and a lot dialyzes Monday, Wednesday, and Friday.  He reports a normal run of dialysis yesterday without complication.  He also reports chronic lung disease and is on 2 L of oxygen via nasal cannula all the time for this.    Problem List:    Patient Active Problem List    Diagnosis Date Noted     Cerebral embolism with cerebral infarction (H) 02/17/2012     Priority: High     1/12/12 presented with dizziness, ataxia and disorientation.  Dx with acute infarct in the left occipital lobe and posterior left temporal lobe without hemorrhage on head CT. Also had right homonymous hemianopsia. MRI 1/12- 1. Acute infarction of the left occipital lobe in left PCA distribution. No evidence of hemorrhage. Likely occlusion of the left posterior cerebral artery at its P2 segment.  Severe chronic small vessel ischemic disease.  Neck vessels are grossly patent. However cannot exclude 50% stenosis of the left ICA.       Malignant neoplasm of kidney excluding renal pelvis (H) 11/03/2007     Priority: High     Renal cell cancer.  S/p partial left nephrectomy.  MRI abd 7/07 - showed no recurrence of cancer  Followed by Dr. Carrion, urology, in [place], yearly  Problem list  name updated by automated process. Provider to review       Localized edema 02/15/2018     Priority: Medium     Acute respiratory failure with hypoxia (H) 01/20/2018     Priority: Medium     Moderate persistent asthma without complication 01/19/2018     Priority: Medium     Long-term (current) use of anticoagulants [Z79.01] 11/28/2017     Priority: Medium     Atrial fibrillation, new onset (H) 11/27/2017     Priority: Medium     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) 11/20/2017     Priority: Medium     Legally blind in right eye, as defined in [place] 08/31/2017     Priority: Medium     Controlled type 2 diabetes mellitus with diabetic nephropathy, with long-term current use of insulin (H) 11/29/2016     Priority: Medium     Chronic obstructive pulmonary disease, unspecified COPD type (H) 11/29/2016     Priority: Medium     Glaucoma 02/04/2015     Priority: Medium     Thrombocytopenia, primary (H) 01/16/2014     Priority: Medium     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     Priority: Medium     Seborrheic keratosis 07/26/2013     Priority: Medium     Imo Update utility       Health Care Home 04/09/2013     Priority: Medium     *See Letters for [facility] Care Plan: My Access Plan         Anxiety disorder due to medical condition 02/17/2012     Priority: Medium     S/p CVA in 1/12  Did not want to take citalopram due to fear of side effects, but seems to be improving       Hyperlipidemia LDL goal <70 10/31/2010     Priority: Medium     CHOL      137   1/13/2012  HDL       35   1/13/2012  LDL       85   1/13/2012  TRIG       84   1/13/2012  CHOLHDLRATIO      3.9   1/13/2012  Lovastatin 60mg         Anemia 09/10/2010     Priority: Medium     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 Provider to review       Essential Hypertension, Benign 04/06/2005     Priority: Medium     Controlled  Nifedipine does cause fatigue       zAdvanced directives, counseling/discussion 02/29/2012     Priority: Low     Patient does not have an Advance/Health Care Directive (HCD), given \"What is Advance Care Planning?\" flyer and requests blank HCD form.    Jane Martinez  February 29, 2012         Proteinuria 05/06/2010     Priority: Low     Abdominal aortic aneurysm (H) 11/03/2007     Priority: Low     S/p AAA repair 2005  Follows with Dr. Pearson.  Will see him in f/u fall 2008  Problem list name updated by automated process. Provider to review       Gouty arthropathy 04/25/2006     Priority: Low     Frequent attacks   Discussed allopurinol will hold off for now  Problem list name updated by automated process. Provider to review and confirm  Imo Update utility          Past Medical History:    Past Medical History:   Diagnosis Date     Abdominal aneurysm without mention of rupture      Atherosclerosis of renal artery (H)      Basal cell carcinoma      Cerebral infarction (H)      Essential hypertension, benign      Gout, unspecified      Malignant neoplasm of kidney, except pelvis 2005     Other and unspecified hyperlipidemia      Other peripheral vascular disease(443.89) 2005     Type II or unspecified type diabetes mellitus without mention of complication, not stated as uncontrolled      Unspecified disorder of kidney and ureter        Past Surgical History:    Past Surgical History:   Procedure Laterality Date     ABDOMEN SURGERY  2005    aortic aneurysm     CATARACT IOL, RT/LT  2/4/08    left eye - phacoemulsification w/ posterior chamber lens implantation combined w/ glaucoma filtering procedure     CREATE FISTULA ARTERIOVENOUS UPPER EXTREMITY  1/3/2012    Procedure:CREATE FISTULA ARTERIOVENOUS UPPER EXTREMITY; LEFT UPPER ARM ARTERIOVENOUS FISTULA; Surgeon:JENA PEARSON; " Location:[facility]     SURGICAL HISTORY OF -   2/13/2004    Right knee arthroscopy     SURGICAL HISTORY OF -       Vocal cord biopsy-benign     SURGICAL HISTORY OF -   3/3/2005    AAA, left partial nephrectomy       Family History:    Family History   Problem Relation Age of Onset     CANCER Father      Asophageal      CANCER Brother      Throat      Other Cancer Sister      Lung        Social History:  Marital Status:   [2]  Social History   Substance Use Topics     Smoking status: Former Smoker     Packs/day: 1.00     Years: 55.00     Types: Cigarettes     Quit date: 1/1/2005     Smokeless tobacco: Never Used     Alcohol use No        Medications:      ondansetron (ZOFRAN ODT) 4 MG ODT tab   Acetaminophen (TYLENOL PO)   ADVAIR DISKUS 100-50 MCG/DOSE diskus inhaler   albuterol (2.5 MG/3ML) 0.083% neb solution   B Complex-C-Folic Acid (DIALYVITE PO)   BASAGLAR 100 UNIT/ML injection   blood glucose monitoring (NO BRAND SPECIFIED) meter device kit   blood glucose monitoring (ONE TOUCH ULTRA) test strip   brimonidine (ALPHAGAN) 0.2 % ophthalmic solution   bumetanide (BUMEX) 1 MG tablet   clopidogrel (PLAVIX) 75 MG tablet   diltiazem (CARDIZEM CD) 240 MG 24 hr capsule   fluocinonide (LIDEX) 0.05 % cream   insulin aspart (NOVOLOG FLEXPEN) 100 UNIT/ML injection   latanoprost (XALATAN) 0.005 % ophthalmic solution   LOSARTAN POTASSIUM PO   lovastatin (MEVACOR) 40 MG tablet   metoprolol succinate (TOPROL-XL) 50 MG 24 hr tablet   Misc Natural Products (TART CHERRY ADVANCED) CAPS   NOVOFINE 30G X 8 MM insulin pen needle   ORDER FOR DME   order for DME   order for DME   order for DME   pilocarpine (PILOCAR) 2 % ophthalmic solution   terazosin (HYTRIN) 10 MG capsule   timolol (TIMOPTIC) 0.5 % ophthalmic solution   warfarin (COUMADIN) 2 MG tablet         Review of Systems  Pertinent positives and negatives listed in the HPI, all other systems reviewed and are negative.    Physical Exam   BP: 155/75  Pulse: 77  Heart Rate:  74  Temp: 97.7  F (36.5  C)  Resp: 20  SpO2: 97 %      Physical Exam   Constitutional: He is oriented to person, place, and time. He appears well-developed and well-nourished. He appears distressed.   HENT:   Head: Normocephalic and atraumatic.   Right Ear: External ear normal.   Left Ear: External ear normal.   Nose: Nose normal.   Eyes: Conjunctivae are normal. No scleral icterus.   Neck: Normal range of motion.   Cardiovascular: Normal rate and regular rhythm.    Pulmonary/Chest: Effort normal. No stridor. No respiratory distress.   Abdominal: Soft. He exhibits no distension. There is tenderness in the epigastric area. There is no rigidity, no rebound and no guarding.   Neurological: He is alert and oriented to person, place, and time.   Skin: Skin is warm and dry. He is not diaphoretic.   Psychiatric: He has a normal mood and affect. His behavior is normal.   Nursing note and vitals reviewed.      ED Course     ED Course     Procedures    Results for orders placed during the hospital encounter of 05/19/18   POC US ABDOMEN LIMITED    [facility] Procedure Note      Limited Bedside ED Gallbladder  Ultrasound:    PROCEDURE: PERFORMED BY: Dr. Dillon Leo  INDICATIONS:  RUQ/Epigastric Pain and Nausea and Vomiting  PROBE:  Low frequency convex probe  BODY LOCATION: Abdomen  FINDINGS:   An ultrasound of the gallbladder was performed using longitudinal and transverse views.  Gallstone(s):  Present  Gallbladder sludge:  Absent  Sonographic Braun's sign:  Absent  Gallbladder wall thickening (greater than 4 mm):  Absent  Pericholecystic fluid: Absent  Common bile duct (dilated if internal diameter greater than 6 mm): 3 mm   INTERPRETATION:  The gallbladder evaluation is normal with no gallstones/sludge, no sonographic Braun s sign, no GB wall thickening, no pericholecystic fluid, and without evidence of cholelithiasis or cholecystitis.  IMAGE DOCUMENTATION: Images were archived to PACs system.                EKG Interpretation:      Interpreted by Dillon Leo  Time reviewed: 6931  Symptoms at time of EKG: Abdominal pain   Rhythm: atrial fibrillation - controlled  Rate: 77  Axis: Normal  Ectopy: none  Conduction: normal  ST Segments/ T Waves: No acute ischemic changes  Q Waves: none  Comparison to prior: Unchanged from prior    Clinical Impression: no acute changes    Critical Care time:  none               Results for orders placed or performed during the hospital encounter of 05/19/18 (from the past 24 hour(s))   Troponin I   Result Value Ref Range    Troponin I ES 0.016 0.000 - 0.045 ug/L   CBC with platelets differential   Result Value Ref Range    WBC 8.0 4.0 - 11.0 10e9/L    RBC Count 3.75 (L) 4.4 - 5.9 10e12/L    Hemoglobin 10.6 (L) 13.3 - 17.7 g/dL    Hematocrit 33.7 (L) 40.0 - 53.0 %    MCV 90 78 - 100 fl    MCH 28.3 26.5 - 33.0 pg    MCHC 31.5 31.5 - 36.5 g/dL    RDW 16.6 (H) 10.0 - 15.0 %    Platelet Count 212 150 - 450 10e9/L    Diff Method Automated Method     % Neutrophils 72.4 %    % Lymphocytes 11.8 %    % Monocytes 12.0 %    % Eosinophils 2.9 %    % Basophils 0.5 %    % Immature Granulocytes 0.4 %    Absolute Neutrophil 5.8 1.6 - 8.3 10e9/L    Absolute Lymphocytes 1.0 0.8 - 5.3 10e9/L    Absolute Monocytes 1.0 0.0 - 1.3 10e9/L    Absolute Eosinophils 0.2 0.0 - 0.7 10e9/L    Absolute Basophils 0.0 0.0 - 0.2 10e9/L    Abs Immature Granulocytes 0.0 0 - 0.4 10e9/L   Comprehensive metabolic panel   Result Value Ref Range    Sodium 136 133 - 144 mmol/L    Potassium 4.3 3.4 - 5.3 mmol/L    Chloride 98 94 - 109 mmol/L    Carbon Dioxide 31 20 - 32 mmol/L    Anion Gap 7 3 - 14 mmol/L    Glucose 221 (H) 70 - 99 mg/dL    Urea Nitrogen 41 (H) 7 - 30 mg/dL    Creatinine 5.13 (H) 0.66 - 1.25 mg/dL    GFR Estimate 11 (L) >60 mL/min/1.7m2    GFR Estimate If Black 13 (L) >60 mL/min/1.7m2    Calcium 9.1 8.5 - 10.1 mg/dL    Bilirubin Total 0.3 0.2 - 1.3 mg/dL    Albumin 3.4 3.4 - 5.0 g/dL    Protein  Total 7.7 6.8 - 8.8 g/dL    Alkaline Phosphatase 103 40 - 150 U/L    ALT 21 0 - 70 U/L    AST 13 0 - 45 U/L   Lipase   Result Value Ref Range    Lipase 374 73 - 393 U/L   INR   Result Value Ref Range    INR 2.38 (H) 0.86 - 1.14   CT Abdomen Pelvis w/o Contrast    Narrative    CT ABDOMEN PELVIS W/O CONTRAST 5/19/2018 3:08 AM    HISTORY: Abdominal pain.    TECHNIQUE: CT imaging of the abdomen and pelvis is performed without  IV contrast.  Abdominal organs, pelvic organs, bowel, aorta,  retroperitoneum, and abdominal wall are also assessed to the limits of  no IV contrast.  Radiation dose for this scan was reduced using  automated exposure control, adjustment of the mA and/or kV according  to patient size, or iterative reconstruction technique.    COMPARISON: May 14, 2015    FINDINGS:    Liver: Normal.  Gallbladder: Cholelithiasis.  Pancreas:Normal.  Spleen:Normal.  Adrenals:Normal.  Ascites:None.    Kidneys/ureters: Bilateral renal atrophy, slightly worsened compared  to prior exam. No hydronephrosis.  Bladder:Normal.  Pelvic free fluid:None.    Bowels:Unremarkable.  No evidence of obstruction.  Appendix:Normal.    Abdominal or pelvic lymphadenopathy:None.    Miscellaneous findings: Focal saccular abdominal aortic aneurysm, not  significantly changed compared to prior examination, measuring 4.5 cm  in maximal diameter.      Impression    IMPRESSION:    1. Cholelithiasis.  2. Bilateral renal atrophy.  3. Stable saccular aortic aneurysm.    RATNA AGUILAR MD   POC US ABDOMEN LIMITED    Impression    [facility] Procedure Note      Limited Bedside ED Gallbladder  Ultrasound:    PROCEDURE: PERFORMED BY: Dr. Dillon Leo  INDICATIONS:  RUQ/Epigastric Pain and Nausea and Vomiting  PROBE:  Low frequency convex probe  BODY LOCATION: Abdomen  FINDINGS:   An ultrasound of the gallbladder was performed using longitudinal and transverse views.  Gallstone(s):  Present  Gallbladder sludge:  Absent  Sonographic Braun's  sign:  Absent  Gallbladder wall thickening (greater than 4 mm):  Absent  Pericholecystic fluid: Absent  Common bile duct (dilated if internal diameter greater than 6 mm): 3 mm   INTERPRETATION:  The gallbladder evaluation is normal with no gallstones/sludge, no sonographic Braun s sign, no GB wall thickening, no pericholecystic fluid, and without evidence of cholelithiasis or cholecystitis.  IMAGE DOCUMENTATION: Images were archived to PACs system.     Troponin I   Result Value Ref Range    Troponin I ES <0.015 0.000 - 0.045 ug/L       Medications   HYDROmorphone (PF) (DILAUDID) injection 0.5 mg (not administered)   lactated ringers BOLUS 250 mL (0 mLs Intravenous Stopped 5/19/18 1096)   ondansetron (ZOFRAN) injection 4 mg (4 mg Intravenous Given 5/19/18 0147)       Assessments & Plan (with Medical Decision Making)   84-year-old male who presents for abdominal pain with nausea and vomiting.  Temperature 36.5 C, blood pressure 155/75, heart 77, SPO2 is 98% on 2 L via nasal cannula.  EKG shows atrial fibrillation without signs of ischemia.  Troponin is normal.  White blood cell count 8.  Hemoglobin is 10.6 which is consistent with his prior levels.  Electrolytes are within normal limits.  LFTs normal, unlikely hepatitis.  Lipase is normal, unlikely pancreatitis.  CT of his abdomen/pelvis is obtained, images reviewed independently as well as radiology read reviewed, positive for cholelithiasis but no signs of small bowel obstruction, diverticulitis, or other acute inflammation of the abdomen.  Bedside ultrasound to better look at his gallbladder is obtained by myself as above, it is positive for cholelithiasis without signs of cholecystitis.  He was given intravenous ondansetron, IV fluids, and hydromorphone with resolution of symptoms.  Repeat checks of his abdomen are benign.  He is now tolerating oral intake.  Repeat troponin is normal.  On recheck he is still feeling well, tolerating oral intake and at this point  is safe to discharge home with instructions to return if he has worsening symptoms or other concerns, otherwise follow-up in clinic.  Likely food poisoning as cause of symptoms.  The patient and his wife are in agreement with this plan.    I have reviewed the nursing notes.    I have reviewed the findings, diagnosis, plan and need for follow up with the patient.       New Prescriptions    ONDANSETRON (ZOFRAN ODT) 4 MG ODT TAB    Take 1 tablet (4 mg) by mouth every 8 hours as needed for nausea       Final diagnoses:   Abdominal pain, epigastric   Non-intractable vomiting with nausea, unspecified vomiting type       5/19/2018   [facility] EMERGENCY DEPARTMENT     Dillon Leo MD  05/19/18 2651

## 2018-05-19 NOTE — ED AVS SNAPSHOT
[facility] Emergency Department    [address]    [facility] [address]    Phone:  [phone]    Fax:  [phone]                                       Griffin Walton   MRN: [id number]    Department:  [facility] Emergency Department   Date of Visit:  5/19/2018           Patient Information     Date Of Birth          5/18/1934        Your diagnoses for this visit were:     Abdominal pain, epigastric     Non-intractable vomiting with nausea, unspecified vomiting type        You were seen by Dillon Leo MD.        Discharge Instructions       Return to the emergency department if you have worsening symptoms, fever, repeated vomiting, chest pain, or other concerns.  Otherwise follow-up in primary care.    Your next 10 appointments already scheduled     May 30, 2018  1:15 PM CDT   [facility]    [address] [address] [phone]              24 Hour Appointment Hotline       To make an appointment at any [facility], call [phone]). If you don't have a family doctor or clinic, we will help you find one. [facility] are conveniently located to serve the needs of you and your family.             Review of your medicines      START taking        Dose / Directions Last dose taken    ondansetron 4 MG ODT tab   Commonly known as:  ZOFRAN ODT   Dose:  4 mg   Quantity:  10 tablet        Take 1 tablet (4 mg) by mouth every 8 hours as needed for nausea   Refills:  0          Our records show that you are taking the medicines listed below. If these are incorrect, please call your family doctor or clinic.        Dose / Directions Last dose taken    ADVAIR DISKUS 100-50 MCG/DOSE diskus inhaler   Quantity:  3 Inhaler   Generic drug:  fluticasone-salmeterol        inhale 1 puff into the lungs every 12 hours.   Refills:  3        albuterol (2.5 MG/3ML) 0.083% neb  solution   Quantity:  63 vial        TAKE 1 VIAL (2.5 MG) BY NEBULIZATION EVERY 6 HOURS AS NEEDED FOR SHORTNESS OF BREATH / DYSPNEA OR WHEEZING   Refills:  11        BASAGLAR 100 UNIT/ML injection   Quantity:  15 mL        , Inject 15 Units Subcutaneous At Bedtime   Refills:  0        blood glucose monitoring meter device kit   Commonly known as:  no brand specified   Quantity:  1 kit        Use to test blood sugar 3 times daily or as directed.   Refills:  0        blood glucose monitoring test strip   Commonly known as:  ONETOUCH ULTRA   Quantity:  60 each        test 2 times daily Profile Rx: patient will contact pharmacy when needed   Refills:  11        brimonidine 0.2 % ophthalmic solution   Commonly known as:  ALPHAGAN   Dose:  1 drop        Place 1 drop into both eyes 3 times daily   Refills:  0        bumetanide 1 MG tablet   Commonly known as:  BUMEX   Quantity:  180 tablet        Take 2 tablets in the morning and 1 tablet early afternoon   Refills:  2        clopidogrel 75 MG tablet   Commonly known as:  PLAVIX   Dose:  75 mg   Quantity:  90 tablet        Take 1 tablet (75 mg) by mouth daily   Refills:  3        DIALYVITE PO   Dose:  1 tablet        Take 1 tablet by mouth daily   Refills:  0        diltiazem 240 MG 24 hr capsule   Commonly known as:  CARDIZEM CD   Dose:  240 mg   Quantity:  90 capsule        Take 1 capsule (240 mg) by mouth daily   Refills:  3        fluocinonide 0.05 % cream   Commonly known as:  LIDEX   Quantity:  120 g        Apply sparingly to affected area twice daily as needed.  Do not apply to face.   Refills:  1        insulin aspart 100 UNIT/ML injection   Commonly known as:  NovoLOG FLEXPEN   Quantity:  15 mL        6 units before breakfast, 6 units before lunch, 4 units before dinner   Refills:  11        latanoprost 0.005 % ophthalmic solution   Commonly known as:  XALATAN   Dose:  1 drop        Place 1 drop into both eyes At Bedtime   Refills:  0        LOSARTAN POTASSIUM PO    Dose:  50 mg        Take 50 mg by mouth daily   Refills:  0        lovastatin 40 MG tablet   Commonly known as:  MEVACOR   Quantity:  135 tablet        Profile Rx: patient will contact pharmacy when needed TAKE 1 AND 1/2 TABLETS BY MOUTH ONCE A DAY WITH DINNER. NEED FASTING LAB WORK   Refills:  3        metoprolol succinate 50 MG 24 hr tablet   Commonly known as:  TOPROL-XL   Dose:  50 mg   Quantity:  90 tablet        Take 1 tablet (50 mg) by mouth daily   Refills:  3        NOVOFINE 30G X 8 MM   Quantity:  100 each   Generic drug:  insulin pen needle        USE TO INJECT INSULIN THREE TIMES DAILY OR AS DIRECTED   Refills:  6        order for DME   Quantity:  1 each        Equipment being ordered: Walker with wheels and brakes, and a seat   Refills:  0        * order for DME   Quantity:  1 Units        Nebulizer machine Qty #1   Refills:  0        order for DME   Quantity:  2 each        Equipment being ordered: Oxygen- HOME and portable. Georgie Coulter CMA Medical Assistant Signed  Service date: 05/24/2016 10:48 AM     O2 Sat on Room air at rest:84% O2 sat on room air with walk: 82-91% O2 Sat on 1L of oxygen with walk 91-93% O2 Sat on 2 L of Oxygen with walk 91-96% O2 Sat on 1 L O2 at rest 94%   Refills:  prn        * order for DME   Quantity:  1 Device        Equipment being ordered: Compression socks 15-20 mmHg   Refills:  0        pilocarpine 2 % ophthalmic solution   Commonly known as:  PILOCAR   Dose:  1 drop        Place 1 drop into both eyes 2 times daily   Refills:  0        TART CHERRY ADVANCED Caps   Dose:  1 capsule        Take 1 capsule by mouth daily   Refills:  0        terazosin 10 MG capsule   Commonly known as:  HYTRIN   Quantity:  90 capsule        Profile Rx: patient will contact pharmacy when needed TAKE 1 CAPSULE (10 MG) BY MOUTH AT BEDTIME   Refills:  3        timolol 0.5 % ophthalmic solution   Commonly known as:  TIMOPTIC   Dose:  1 drop        Place 1 drop into both eyes 2 times daily    Refills:  0        TYLENOL PO   Dose:  650 mg        Take 650 mg by mouth nightly as needed   Refills:  0        warfarin 2 MG tablet   Commonly known as:  COUMADIN   Quantity:  170 tablet        Take 5 mg Fridays and 4 mg all other days or as directed by the Anticoagulation Clinic   Refills:  0        * Notice:  This list has 2 medication(s) that are the same as other medications prescribed for you. Read the directions carefully, and ask your doctor or other care provider to review them with you.            Prescriptions were sent or printed at these locations (1 Prescription)                   [id number] - [place], [place] - [address]   [address] [address]    Telephone:  [phone]   Fax:  [phone]   Hours:                  Printed at Department/Unit printer (1 of 1)         ondansetron (ZOFRAN ODT) 4 MG ODT tab                Procedures and tests performed during your visit     Procedure/Test Number of Times Performed    CBC with platelets differential 1    CT Abdomen Pelvis w/o Contrast 1    Comprehensive metabolic panel 1    EKG 12-lead, tracing only 1    INR 1    Lipase 1    POC US ABDOMEN LIMITED 1    Troponin I 2      Orders Needing Specimen Collection     None      Pending Results     No orders found from 5/17/2018 to 5/20/2018.            Pending Culture Results     No orders found from 5/17/2018 to 5/20/2018.            Pending Results Instructions     If you had any lab results that were not finalized at the time of your Discharge, you can call the ED Lab Result RN at [phone]. You will be contacted by this team for any positive Lab results or changes in treatment. The nurses are available 7 days a week from 10A to 6:30P.  You can leave a message 24 hours per day and they will return your call.        Test Results From Your Hospital Stay        5/19/2018  2:24 AM      Component Results     Component Value Ref Range & Units Status    Troponin  I ES 0.016 0.000 - 0.045 ug/L Final    The 99th percentile for upper reference range is 0.045 ug/L.  Troponin values   in the range of 0.045 - 0.120 ug/L may be associated with risks of adverse   clinical events.           5/19/2018  2:00 AM      Component Results     Component Value Ref Range & Units Status    WBC 8.0 4.0 - 11.0 10e9/L Final    RBC Count 3.75 (L) 4.4 - 5.9 10e12/L Final    Hemoglobin 10.6 (L) 13.3 - 17.7 g/dL Final    Hematocrit 33.7 (L) 40.0 - 53.0 % Final    MCV 90 78 - 100 fl Final    MCH 28.3 26.5 - 33.0 pg Final    MCHC 31.5 31.5 - 36.5 g/dL Final    RDW 16.6 (H) 10.0 - 15.0 % Final    Platelet Count 212 150 - 450 10e9/L Final    Diff Method Automated Method  Final    % Neutrophils 72.4 % Final    % Lymphocytes 11.8 % Final    % Monocytes 12.0 % Final    % Eosinophils 2.9 % Final    % Basophils 0.5 % Final    % Immature Granulocytes 0.4 % Final    Absolute Neutrophil 5.8 1.6 - 8.3 10e9/L Final    Absolute Lymphocytes 1.0 0.8 - 5.3 10e9/L Final    Absolute Monocytes 1.0 0.0 - 1.3 10e9/L Final    Absolute Eosinophils 0.2 0.0 - 0.7 10e9/L Final    Absolute Basophils 0.0 0.0 - 0.2 10e9/L Final    Abs Immature Granulocytes 0.0 0 - 0.4 10e9/L Final         5/19/2018  2:24 AM      Component Results     Component Value Ref Range & Units Status    Sodium 136 133 - 144 mmol/L Final    Potassium 4.3 3.4 - 5.3 mmol/L Final    Chloride 98 94 - 109 mmol/L Final    Carbon Dioxide 31 20 - 32 mmol/L Final    Anion Gap 7 3 - 14 mmol/L Final    Glucose 221 (H) 70 - 99 mg/dL Final    Urea Nitrogen 41 (H) 7 - 30 mg/dL Final    Creatinine 5.13 (H) 0.66 - 1.25 mg/dL Final    GFR Estimate 11 (L) >60 mL/min/1.7m2 Final    Non  GFR Calc    GFR Estimate If Black 13 (L) >60 mL/min/1.7m2 Final    African American GFR Calc    Calcium 9.1 8.5 - 10.1 mg/dL Final    Bilirubin Total 0.3 0.2 - 1.3 mg/dL Final    Albumin 3.4 3.4 - 5.0 g/dL Final    Protein Total 7.7 6.8 - 8.8 g/dL Final    Alkaline Phosphatase  103 40 - 150 U/L Final    ALT 21 0 - 70 U/L Final    AST 13 0 - 45 U/L Final         5/19/2018  2:24 AM      Component Results     Component Value Ref Range & Units Status    Lipase 374 73 - 393 U/L Final         5/19/2018  3:14 AM      Narrative     CT ABDOMEN PELVIS W/O CONTRAST 5/19/2018 3:08 AM    HISTORY: Abdominal pain.    TECHNIQUE: CT imaging of the abdomen and pelvis is performed without  IV contrast.  Abdominal organs, pelvic organs, bowel, aorta,  retroperitoneum, and abdominal wall are also assessed to the limits of  no IV contrast.  Radiation dose for this scan was reduced using  automated exposure control, adjustment of the mA and/or kV according  to patient size, or iterative reconstruction technique.    COMPARISON: May 14, 2015    FINDINGS:    Liver: Normal.  Gallbladder: Cholelithiasis.  Pancreas:Normal.  Spleen:Normal.  Adrenals:Normal.  Ascites:None.    Kidneys/ureters: Bilateral renal atrophy, slightly worsened compared  to prior exam. No hydronephrosis.  Bladder:Normal.  Pelvic free fluid:None.    Bowels:Unremarkable.  No evidence of obstruction.  Appendix:Normal.    Abdominal or pelvic lymphadenopathy:None.    Miscellaneous findings: Focal saccular abdominal aortic aneurysm, not  significantly changed compared to prior examination, measuring 4.5 cm  in maximal diameter.        Impression     IMPRESSION:    1. Cholelithiasis.  2. Bilateral renal atrophy.  3. Stable saccular aortic aneurysm.    RATNA AGUILAR MD         5/19/2018  3:37 AM      Impression     [facility] Procedure Note      Limited Bedside ED Gallbladder  Ultrasound:    PROCEDURE: PERFORMED BY: Dr. Dillon Leo  INDICATIONS:  RUQ/Epigastric Pain and Nausea and Vomiting  PROBE:  Low frequency convex probe  BODY LOCATION: Abdomen  FINDINGS:   An ultrasound of the gallbladder was performed using longitudinal and transverse views.  Gallstone(s):  Present  Gallbladder sludge:  Absent  Sonographic Braun's sign:   Absent  Gallbladder wall thickening (greater than 4 mm):  Absent  Pericholecystic fluid: Absent  Common bile duct (dilated if internal diameter greater than 6 mm): 3 mm   INTERPRETATION:  The gallbladder evaluation is normal with no gallstones/sludge, no sonographic Braun s sign, no GB wall thickening, no pericholecystic fluid, and without evidence of cholelithiasis or cholecystitis.  IMAGE DOCUMENTATION: Images were archived to PACs system.           5/19/2018  4:39 AM      Component Results     Component Value Ref Range & Units Status    INR 2.38 (H) 0.86 - 1.14 Final         5/19/2018  4:48 AM      Component Results     Component Value Ref Range & Units Status    Troponin I ES <0.015 0.000 - 0.045 ug/L Final    The 99th percentile for upper reference range is 0.045 ug/L.  Troponin values   in the range of 0.045 - 0.120 ug/L may be associated with risks of adverse   clinical events.                  Thank you for choosing [facility]       Thank you for choosing [facility] for your care. Our goal is always to provide you with excellent care. Hearing back from our patients is one way we can continue to improve our services. Please take a few minutes to complete the written survey that you may receive in the mail after you visit with us. Thank you!        [employer] Information     [employer] gives you secure access to your electronic health record. If you see a primary care provider, you can also send messages to your care team and make appointments. If you have questions, please call your primary care clinic.  If you do not have a primary care provider, please call [phone] and they will assist you.        Care EveryWhere ID     This is your Care EveryWhere ID. This could be used by other organizations to access your [facility] medical records  [id number]        Equal Access to Services     ANUSHKA BILL AH: Guy Gamboa, vera gauthier, ernzo morales  ah. So [facility] [phone].    ATENCIÓN: Si habla español, tiene a louis disposición servicios gratuitos de asistencia lingüística. Llame al [phone].    We comply with applicable federal civil rights laws and [place] laws. We do not discriminate on the basis of race, color, national origin, age, disability, sex, sexual orientation, or gender identity.            After Visit Summary       This is your record. Keep this with you and show to your community pharmacist(s) and doctor(s) at your next visit.

## 2018-05-21 ENCOUNTER — PATIENT OUTREACH (OUTPATIENT)
Dept: CARE COORDINATION | Facility: CLINIC | Age: 83
End: 2018-05-21

## 2018-05-21 NOTE — PROGRESS NOTES
Clinic Care Coordination Contact  [facility]       Clinical Data: Care Coordinator Outreach  Outreach attempted x 1, busy and unable to leave a message.    Plan:  Care Coordinator will try to reach patient again in 1-2 business days.  DONN Castro, Clinic Care Coordinator 5/21/2018   9:42 AM  [phone]

## 2018-05-22 NOTE — PROGRESS NOTES
Clinic Care Coordination Contact    Clinic Care Coordination Contact  OUTREACH    Referral Information:  ED Follow UP    Primary Diagnosis: COPD (complex medical needs, meets several of these dx)    Chief Complaint   Patient presents with     Clinic Care Coordination - Post Hospital     social work   Universal Utilization: Clinic Utilization  Difficulty keeping appointments:: No  Utilization    Last refreshed: 5/21/2018  9:54 AM:  No Show Count (past year) 1       Last refreshed: 5/21/2018  9:54 AM:  ED visits 5       Last refreshed: 5/21/2018  9:54 AM:  Hospital admissions 1          Current as of: 5/21/2018  9:54 AM         Clinical Concerns:  Current Medical Concerns:  Pt came to the ED due to N/V.  States he is feeling much better today.      Current Behavioral Concerns: Pt with anxiety due to his medical conditions.      Education Provided to patient: Pt and his wife state that they are moving to an Assisted Living Facility, but will be living in the Independent Apartments.  Pt and wife state they can add on services as needed, when needed.  Both are very excited for their new living environment, but sad as they will be changing PCP to [facility] [place].     Chronic pain (GOAL):: No    Health Maintenance Reviewed: Due/Overdue:   ASTHMA ACTION PLAN Q1 YR  5/18/1939       ASTHMA CONTROL TEST Q6 MOS  5/18/1939       EYE EXAM Q1 YEAR  10/1/2015       COPD ACTION PLAN Q1 YR  2/4/2016       ADVANCE DIRECTIVE PLANNING Q5 YRS  2/28/2017       Clinical Pathway: None    Medication Management:  Pt and his wife set up medications weekly.  Wife, Justine, is good about making sure pt is taking medications as prescribed.     Functional Status:  Dependent ADLs:: Ambulation-no assistive device  Mobility Status: Independent w/Device  Fallen 2 or more times in the past year?: No  Any fall with injury in the past year?: No    Living Situation:  Pt and wife will be moving from their current home to an [facility],  [facility], in [place], [place].  Pt will also change his dialysis to [facility] and this writer was able to assist the pt and wife in finding a [facility] clinic in [place], that is just minutes away from their new home.         Diet/Exercise/Sleep:  Diet:: Diabetic diet  Inadequate nutrition (GOAL):: No  Food Insecurity: No  Tube Feeding: No  Exercise:: Unable to exercise  Inadequate activity/exercise (GOAL):: No  Significant changes in sleep pattern (GOAL): No    Transportation:  Transportation concerns (GOAL):: Yes  Transportation means:: Family, Regular car.  Justine is excited because now there are other transportation options via their [facility] facility     Psychosocial:  Financial/Insurance: Social Security, Pensions, and both have Medicare & [place]BS of [place] insurance  Financial/Insurance concerns (GOAL):: No    Resources and Interventions:  Current Resources:  Pt and wife are moving into an JULIET by Kalpana 15, 2018 and changing their PCP to a [facility] provider.      Advance Care Plan/Directive  Advanced Care Plans/Directives on file:: No    Goals        General    Transportation (pt-stated)     Notes - Note created  4/12/2018  8:57 AM by Snow Stringer LSW    Goal Statement: Pt and wife would like to learn about and utilize alternative transportation resources in the [facility] as Justine is the only  in the household.  Measure of Success:  Pt will learn of all resources available in this area, if any.  Supportive Steps to Achieve: SW, pt and spouse will contact resources to investigate transportation resources.  Barriers: Pt no longer drives, thus leaving his wife as his sole source of transportation.  Strengths: Pt and wife are very resourceful and determined to learn about services in their area.  Date to Achieve By: 6 months            Patient/Caregiver understanding: Pt and wife are aware that this writer is available to them for assistance with resources and  questions prior to their move.    Outreach Frequency: monthly  Future Appointments              In 1 week [facility]          Plan: [facility] will close the pt to clinic care coordination after Kalpana 15, 2018 as he will no longer be part of  PCP services.    Snow Brito  Social Work Care Coordinator  [facility]  [phone]

## 2018-05-30 ENCOUNTER — ANTICOAGULATION THERAPY VISIT (OUTPATIENT)
Dept: ANTICOAGULATION | Facility: CLINIC | Age: 83
End: 2018-05-30
Payer: MEDICARE

## 2018-05-30 DIAGNOSIS — I63.40 CEREBRAL INFARCTION DUE TO EMBOLISM OF CEREBRAL ARTERY (H): ICD-10-CM

## 2018-05-30 DIAGNOSIS — I48.91 ATRIAL FIBRILLATION, NEW ONSET (H): ICD-10-CM

## 2018-05-30 DIAGNOSIS — Z79.01 LONG-TERM (CURRENT) USE OF ANTICOAGULANTS: ICD-10-CM

## 2018-05-30 LAB — INR POINT OF CARE: 2.7 (ref 0.86–1.14)

## 2018-05-30 PROCEDURE — 85610 PROTHROMBIN TIME: CPT | Mod: QW

## 2018-05-30 PROCEDURE — 36416 COLLJ CAPILLARY BLOOD SPEC: CPT

## 2018-05-30 PROCEDURE — 99207 ZZC NO CHARGE NURSE ONLY: CPT

## 2018-05-30 NOTE — PROGRESS NOTES
ANTICOAGULATION FOLLOW-UP CLINIC VISIT    Patient Name:  Griffin Walton  Date:  5/30/2018  Contact Type:  Face to Face    SUBJECTIVE:     Patient Findings     Positives No Problem Findings    Comments Patient and wife deny changes. They are moving to the [place] area and may be interested in an [facility] home Monitor. Writer gave the brochure to look over and decide. Patient advised to continue the same warfarin maintenance dose, INR therapeutic.              OBJECTIVE    INR Protime   Date Value Ref Range Status   05/30/2018 2.7 (A) 0.86 - 1.14 Final       ASSESSMENT / PLAN  INR assessment THER    Recheck INR In: 2 WEEKS    INR Location Clinic      Anticoagulation Summary as of 5/30/2018     INR goal 2.0-3.0   Today's INR 2.7   Warfarin maintenance plan 6 mg (2 mg x 3) on Mon; 4 mg (2 mg x 2) all other days   Full warfarin instructions 6 mg on Mon; 4 mg all other days   Weekly warfarin total 30 mg   No change documented Cate Baig RN   Plan last modified Rosalind Miner RN (4/9/2018)   Next INR check 6/11/2018   Priority INR   Target end date Indefinite    Indications   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 Pt not new to warfarin (has been on approx 1 yr), pt cannot see well enough to read      Anticoagulation Care Providers     Provider Role Specialty Phone number    HeribertoStefanie carlton, APRN CNP Responsible Nurse Practitioner [phone]            See the Encounter Report to view Anticoagulation Flowsheet and Dosing Calendar (Go to Encounters tab in chart review, and find the Anticoagulation Therapy Visit)        Cate Baig, FAUSTINA

## 2018-05-30 NOTE — MR AVS SNAPSHOT
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    If you need to cancel and/or reschedule your appointment please call one of the following numbers:  [facility] [phone]  [facility] [phone]  [facility] [phone]  \[facility] [place] [facility]\"" [phone]  [place] - [phone]            May 2018 Details    Sun Mon Tue Wed Thu Fri Sat       1               2               3               4               5                 6               7               8               9               10               11               12                 13               14               15               16               17               18               19                 20               21               22               23               24               25               26                 27               28               29               30      4 mg   See details      31      4 mg            Date Details   05/30 This INR check               How to take your warfarin dose     To take:  4 mg Take 2 of the 2 mg tablets.           June 2018 Details    Sun Mon  Tue Wed Thu Fri Sat          1      4 mg         2      4 mg           3      4 mg         4      6 mg         5      4 mg         6      4 mg         7      4 mg         8      4 mg         9      4 mg           10      4 mg         11            12               13               14               15               16                 17               18               19               20               21               22               23                 24               25               26               27               28               29               30                Date Details   No additional details    Date of next INR:  6/11/2018         How to take your warfarin dose     To take:  4 mg Take 2 of the 2 mg tablets.    To take:  6 mg Take 3 of the 2 mg tablets.

## 2018-06-08 ENCOUNTER — TELEPHONE (OUTPATIENT)
Dept: FAMILY MEDICINE | Facility: CLINIC | Age: 83
End: 2018-06-08

## 2018-06-08 NOTE — TELEPHONE ENCOUNTER
Patient has pre op schedule with provider on 6/12/18  Called [facility] left message for them to return call to clinic today    Bhavana MOSS Rn

## 2018-06-08 NOTE — TELEPHONE ENCOUNTER
Will need pre-op then, bring information from eye clinic what actually will be done.     DANNA Abarca CNP

## 2018-06-08 NOTE — TELEPHONE ENCOUNTER
Patient's wife calling to get the ok for pt to stop his coumadin on Sunday.   Patient has a procedure scheduled for Thursday, June 14th.   Can this be sent to someone as Stefanie is out of the office and they need an answer by Sunday.    Routing to provider.    Bhavana MOSS Rn

## 2018-06-08 NOTE — TELEPHONE ENCOUNTER
Reason for Call:  Other medication question    Detailed comments: pt's wife calling to get the ok for pt to stop his coumadin on Sunday. Pt has a procedure scheduled for Thursday June 14th. Can this be sent to someone as Stefanie is out of the office and they need an answer by Sunday.    Phone Number Patient can be reached at: Home number on file [phone] (home)    Best Time: any    Can we leave a detailed message on this number? YES    Call taken on 6/8/2018 at 1:29 PM by Ruby Ball

## 2018-06-08 NOTE — TELEPHONE ENCOUNTER
Notified wife of patient, Justine, that clinic has attempted to contact [facility] twice and left a message, no return phone call from [facility] today  Advised patient should continue taking Warfarin as prescribe by his prescribing physician, there are no new orders to stop taking warfarin before surgery 6/14/18  Advised patient keep pre op appt with LUCIANO Louis CNP 6/12/18 and discuss Warfarin with her at appt  She verbalized understanding and agreed with this plan    Bhavana MOSS Rn

## 2018-06-08 NOTE — TELEPHONE ENCOUNTER
Wife of patient reports:  Surgery for Glaucoma 6/14/18, they will put a channel in or do laser, the Glaucoma specialist has not decided yet  Eye physician said to stop taking coumadin 5 days before surgery, patient needs PCP ok on this  Contact [phone] [facility] with ok to stop coumadin     Routing to provider.    Bhavana MOSS Rn

## 2018-06-11 NOTE — TELEPHONE ENCOUNTER
"Notified wife Yas. \"ok, we will talk to her tomorrow then, thanks, \"    Christine Oliver RNC    "

## 2018-06-11 NOTE — TELEPHONE ENCOUNTER
We typically don't recommend stopping Coumadin for eye surgeries like cataract and trabeculectomy. He might need his INR check before surgery, this can be discussed during pre-op.     DANNA Abarca CNP

## 2018-06-11 NOTE — TELEPHONE ENCOUNTER
Please review message below. Patient was calling in regards to stopping warfarin and bridging instructions if needed. Patient has surgery on 06/14/18 [facility] for Trabeculectomy. Patient is scheduled for preop 06/12/18.      Geeta CHEN RN

## 2018-06-11 NOTE — TELEPHONE ENCOUNTER
[facility] called us back and the Surgery this patient will be having on Thursday is Trabeculectomy it is in regards to Glaucoma.  Susan Baumann  [facility]  [facility]

## 2018-06-12 ENCOUNTER — OFFICE VISIT (OUTPATIENT)
Dept: FAMILY MEDICINE | Facility: CLINIC | Age: 83
End: 2018-06-12
Payer: MEDICARE

## 2018-06-12 VITALS
HEART RATE: 90 BPM | OXYGEN SATURATION: 99 % | SYSTOLIC BLOOD PRESSURE: 112 MMHG | WEIGHT: 198 LBS | TEMPERATURE: 98.5 F | DIASTOLIC BLOOD PRESSURE: 71 MMHG | BODY MASS INDEX: 31.01 KG/M2

## 2018-06-12 DIAGNOSIS — E78.5 HYPERLIPIDEMIA LDL GOAL <70: ICD-10-CM

## 2018-06-12 DIAGNOSIS — I48.0 PAROXYSMAL ATRIAL FIBRILLATION (H): ICD-10-CM

## 2018-06-12 DIAGNOSIS — E11.21 TYPE 2 DIABETES MELLITUS WITH DIABETIC NEPHROPATHY, UNSPECIFIED LONG TERM INSULIN USE STATUS: ICD-10-CM

## 2018-06-12 DIAGNOSIS — H40.9 GLAUCOMA OF LEFT EYE, UNSPECIFIED GLAUCOMA TYPE: ICD-10-CM

## 2018-06-12 DIAGNOSIS — I10 ESSENTIAL HYPERTENSION: ICD-10-CM

## 2018-06-12 DIAGNOSIS — J44.9 CHRONIC OBSTRUCTIVE PULMONARY DISEASE, UNSPECIFIED COPD TYPE (H): ICD-10-CM

## 2018-06-12 DIAGNOSIS — N18.6 CKD (CHRONIC KIDNEY DISEASE) STAGE V REQUIRING CHRONIC DIALYSIS (H): ICD-10-CM

## 2018-06-12 DIAGNOSIS — Z01.818 PREOP GENERAL PHYSICAL EXAM: Primary | ICD-10-CM

## 2018-06-12 DIAGNOSIS — Z86.73 HISTORY OF CVA (CEREBROVASCULAR ACCIDENT): ICD-10-CM

## 2018-06-12 DIAGNOSIS — I51.89 DIASTOLIC DYSFUNCTION: ICD-10-CM

## 2018-06-12 DIAGNOSIS — Z99.2 CKD (CHRONIC KIDNEY DISEASE) STAGE V REQUIRING CHRONIC DIALYSIS (H): ICD-10-CM

## 2018-06-12 PROCEDURE — 99215 OFFICE O/P EST HI 40 MIN: CPT | Performed by: FAMILY MEDICINE

## 2018-06-12 NOTE — MR AVS SNAPSHOT
After Visit Summary   6/12/2018    Griffin Walton    MRN: [id number]           Patient Information     Date Of Birth          5/18/1934        Visit Information        Provider Department      6/12/2018 11:00 AM Eric Rodrigues MD [facility]        Today's Diagnoses     Preop general physical exam    -  1    Glaucoma of left eye, unspecified glaucoma type        Type 2 diabetes mellitus with diabetic nephropathy, unspecified long term insulin use status (H)        Essential hypertension        Chronic obstructive pulmonary disease, unspecified COPD type (H)        Paroxysmal atrial fibrillation (H)        Diastolic dysfunction        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  Your  recommendations will be available to your surgeon through [employer].  We will notify you of any abnormality with today's tests if present.  Please follow up at your convenience after the surgery for your adult well-exam and chronic condition management.    You may take Losartan, Diltiazem and Metoprolol XL on morning of surgery if they are scheduled to be taken then. Take only with a small sip of water.    Basaglar: 12 units subcutaneous night before surgery as you will not be eating midnight until after surgery.    Novolog: do not administer this on morning of surgery as you will not be eating then.    All other scheduled medication may be held on morning of surgery, and resumed when you are allowed to eat.     Before Your Surgery      Call your surgeon if there is any change in your health. This includes signs of a cold or flu (such as a sore throat, runny nose, cough, rash or fever).    Do not smoke, drink alcohol or take over the counter medicine (unless your surgeon or primary care doctor tells you to)  for the 24 hours before and after surgery.    If you take prescribed drugs: Follow your doctor s orders about which medicines to take and which to stop until after surgery.    Eating and drinking prior to surgery: follow the instructions from your surgeon    Take a shower or bath the night before surgery. Use the soap your surgeon gave you to gently clean your skin. If you do not have soap from your surgeon, use your regular soap. Do not shave or scrub the surgery site.  Wear clean pajamas and have clean sheets on your bed.           Follow-ups after your visit        Follow-up notes from your care team     Return if symptoms worsen or fail to improve.      Your next 10 appointments already scheduled     Jun 13, 2018  2:15 PM CDT   Anticoagulation Visit with [place] [facility]   [facility]    [address] [address] [phone]              Who to contact     If you have questions or need follow up information about today's clinic visit or your schedule please contact [facility] directly at [phone].  Normal or non-critical lab and imaging results will be communicated to you by [facility], letter or phone within 4 business days after the clinic has received the results. If you do not hear from us within 7 days, please contact the clinic through [facility] or phone. If you have a critical or abnormal lab result, we will notify you by phone as soon as possible.  Submit refill requests through [employer] or call your pharmacy and they will forward the refill request to us. Please allow 3 business days for your refill to be completed.          Additional Information About Your Visit        [facility] Information     [employer] gives you secure access to your electronic health record. If you see a primary care provider, you can also send messages to your care team and make appointments. If you have questions, please call your primary care clinic.  If you do  not have a primary care provider, please call [phone] and they will assist you.        Care EveryWhere ID     This is your Care EveryWhere ID. This could be used by other organizations to access your [facility] medical records  [id number]        Your Vitals Were     Pulse Temperature Pulse Oximetry BMI (Body Mass Index)          90 98.5  F (36.9  C) (Tympanic) 99% 31.01 kg/m2         Blood Pressure from Last 3 Encounters:   06/12/18 112/71   05/19/18 151/76   04/10/18 123/70    Weight from Last 3 Encounters:   06/12/18 198 lb (89.8 kg)   04/10/18 203 lb (92.1 kg)   02/27/18 201 lb 4.5 oz (91.3 kg)              Today, you had the following     No orders found for display       Primary Care Provider Office Phone # Fax #    DANNA Lopez [facility] [phone] [address] [address]        Goals        General    Transportation (pt-stated)     Notes - Note created  4/12/2018  8:57 AM by Snow Stringer LSW    Goal Statement: Pt and wife would like to learn about and utilize alternative transportation resources in the [place] area as Justine is the only  in the household.  Measure of Success:  Pt will learn of all resources available in this area, if any.  Supportive Steps to Achieve: SW, pt and spouse will contact resources to investigate transportation resources.  Barriers: Pt no longer drives, thus leaving his wife as his sole source of transportation.  Strengths: Pt and wife are very resourceful and determined to learn about services in their area.  Date to Achieve By: 6 months          Equal Access to Services     [facility] Center[facility]ANESSA AH: Hadii camila Gamboa, waaxda luqadaha, qaybta kaalmarenzo gallo. So [facility] [phone].    ATENCIÓN: Si habla español, tiene a louis disposición servicios gratuitos de asistencia lingüística. Llame al [phone].    We comply with applicable federal civil rights laws and [place] laws.  We do not discriminate on the basis of race, color, national origin, age, disability, sex, sexual orientation, or gender identity.            Thank you!     Thank you for choosing [facility]  for your care. Our goal is always to provide you with excellent care. Hearing back from our patients is one way we can continue to improve our services. Please take a few minutes to complete the written survey that you may receive in the mail after your visit with us. Thank you!             Your Updated Medication List - Protect others around you: Learn how to safely use, store and throw away your medicines at www.disposemymeds.org.          This list is accurate as of 6/12/18 11:52 AM.  Always use your most recent med list.                   Brand Name Dispense Instructions for use Diagnosis    ADVAIR DISKUS 100-50 MCG/DOSE diskus inhaler   Generic drug:  fluticasone-salmeterol     3 Inhaler    inhale 1 puff into the lungs every 12 hours.    Chronic obstructive pulmonary disease, unspecified COPD type (H)       albuterol (2.5 MG/3ML) 0.083% neb solution     63 vial    TAKE 1 VIAL (2.5 MG) BY NEBULIZATION EVERY 6 HOURS AS NEEDED FOR SHORTNESS OF BREATH / DYSPNEA OR WHEEZING    Chronic obstructive pulmonary disease, unspecified COPD type (H)       BASAGLAR 100 UNIT/ML injection     15 mL    , Inject 15 Units Subcutaneous At Bedtime    Controlled type 2 diabetes mellitus with diabetic nephropathy, with long-term current use of insulin (H)       blood glucose monitoring meter device kit    no brand specified    1 kit    Use to test blood sugar 3 times daily or as directed.    Controlled type 2 diabetes mellitus with diabetic nephropathy, with long-term current use of insulin (H)       blood glucose monitoring test strip    ONETOUCH ULTRA    60 each    test 2 times daily Profile Rx: patient will contact pharmacy when needed    Type 2 diabetes mellitus with diabetic nephropathy, with long-term current use of insulin  (H)       brimonidine 0.2 % ophthalmic solution    ALPHAGAN     Place 1 drop into both eyes 3 times daily        bumetanide 1 MG tablet    BUMEX    180 tablet    Take 2 tablets in the morning and 1 tablet early afternoon    Benign essential hypertension       clopidogrel 75 MG tablet    PLAVIX    90 tablet    Take 1 tablet (75 mg) by mouth daily    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)       DIALYVITE PO      Take 1 tablet by mouth daily        diltiazem 240 MG 24 hr capsule    CARDIZEM CD    90 capsule    Take 1 capsule (240 mg) by mouth daily    Paroxysmal atrial fibrillation (H)       insulin aspart 100 UNIT/ML injection    NovoLOG FLEXPEN    15 mL    6 units before breakfast, 6 units before lunch, 4 units before dinner    Controlled type 2 diabetes mellitus with diabetic nephropathy, with long-term current use of insulin (H)       latanoprost 0.005 % ophthalmic solution    XALATAN     Place 1 drop into both eyes At Bedtime        LOSARTAN POTASSIUM PO      Take 50 mg by mouth daily        lovastatin 40 MG tablet    MEVACOR    135 tablet    Profile Rx: patient will contact pharmacy when needed TAKE 1 AND 1/2 TABLETS BY MOUTH ONCE A DAY WITH DINNER. NEED FASTING LAB WORK    Hyperlipidemia LDL goal <130       metoprolol succinate 50 MG 24 hr tablet    TOPROL-XL    90 tablet    Take 1 tablet (50 mg) by mouth daily    Hypertension, unspecified type       NOVOFINE 30G X 8 MM   Generic drug:  insulin pen needle     100 each    USE TO INJECT INSULIN THREE TIMES DAILY OR AS DIRECTED    Type 2 diabetes mellitus with diabetic nephropathy, unspecified long term insulin use status (H)       order for DME     1 each    Equipment being ordered: Walker with wheels and brakes, and a seat    ESRD (end stage renal disease) on dialysis (H), Cerebral embolism with cerebral infarction (H)       * order for DME     1 Units    Nebulizer machine Qty #1    Fever, unspecified, Cough, Chronic obstructive pulmonary  disease, unspecified COPD type (H), Acute bronchospasm       order for DME     2 each    Equipment being ordered: Oxygen- HOME and portable. 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), Type 2 diabetes mellitus with diabetic nephropathy (H)       * order for DME     1 Device    Equipment being ordered: Compression socks 15-20 mmHg    Peripheral edema       pilocarpine 2 % ophthalmic solution    PILOCAR     Place 1 drop into both eyes 2 times daily        TART CHERRY ADVANCED Caps      Take 1 capsule by mouth daily        terazosin 10 MG capsule    HYTRIN    90 capsule    Profile Rx: patient will contact pharmacy when needed TAKE 1 CAPSULE (10 MG) BY MOUTH AT BEDTIME    Benign non-nodular prostatic hyperplasia with lower urinary tract symptoms       timolol 0.5 % ophthalmic solution    TIMOPTIC     Place 1 drop into both eyes 2 times daily        TYLENOL PO      Take 650 mg by mouth nightly as needed        warfarin 2 MG tablet    COUMADIN    170 tablet    Take 5 mg Fridays and 4 mg all other days or as directed by the Anticoagulation Clinic    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 Read the directions carefully, and ask your doctor or other care provider to review them with you.

## 2018-06-12 NOTE — PROGRESS NOTES
[facility]  [address] [address]  [phone]  Dept: [phone]    PRE-OP EVALUATION:  Today's date: 2018    Griffin Walton (: 1934) presents for pre-operative evaluation assessment as requested by Dr. Pollack.  He requires evaluation and anesthesia risk assessment prior to undergoing surgery/procedure for treatment of glaucoma .    Fax number for surgical facility: [phone]  Primary Physician: Stefanie Louis  Type of Anesthesia Anticipated: to be determined    Patient has a Health Care Directive or Living Will:  YES Pt has, not on file at clinic.     Preop Questions 2018   Who is doing your surgery? Dr. Riedel   What are you having done? glaucoma  surgery   Date of Surgery/Procedure: 18   Facility or Hospital where procedure/surgery will be performed: [place]  [facility]   1.  Do you have a history of Heart attack, stroke, stent, coronary bypass surgery, or other heart surgery? No   2.  Do you ever have any pain or discomfort in your chest? No   3.  Do you have a history of  Heart Failure? No   4.   Are you troubled by shortness of breath when:  walking on a level surface, or up a slight hill, or at night? YES - Pt uses oxygen.   5.  Do you currently have a cold, bronchitis or other respiratory infection? No   6.  Do you have a cough, shortness of breath, or wheezing? No   7.  Do you sometimes get pains in the calves of your legs when you walk? No   8. Do you or anyone in your family have previous history of blood clots? No   9.  Do you or does anyone in your family have a serious bleeding problem such as prolonged bleeding following surgeries or cuts? No   10. Have you ever had problems with anemia or been told to take iron pills? No   11. Have you had any abnormal blood loss such as black, tarry or bloody stools? No   12. Have you ever had a blood transfusion? No   13. Have you or any of your relatives ever had problems with  anesthesia? No   14. Do you have sleep apnea, excessive snoring or daytime drowsiness? No   15. Do you have any prosthetic heart valves? No   16. Do you have prosthetic joints? No         HPI:     HPI related to upcoming procedure: Patient has left eye glaucoma. Hence, planned surgery. Reviewed above with patient.      A-FIB - Patient has a longstanding history of chronic A-fib currently on rate control. Current treatment regimen includes Warfarin for stroke prevention and denies significant symptoms of lightheadedness, palpitations or dyspnea.                                                                                                                                                                             .  CHF - Patient has a longstanding history of moderate-severe CHF. Exacerbating conditions include hypertension, COPD, dystolic dysfunction and atrial fibrilation. Currently the patient's condition is stable per patient and spouse. Current treatment regimen includes Angiotensin 2 receptor blocker, beta blocker and calcium channel blocker. The patient denies chest pain, edema, orthopnea, SOB or recent weight gain. Last Echocardiogram 1/2018 and reviewed by cardiology then - stable mod-severe valvular aortic stenosis and stable LV function with EF 55-60% , EKG 5/2018 - atrial fibrillation, rate controlled.                                                                                                                                                                               .  COPD - Patient has a longstanding history of moderate-severe COPD . Patient has been doing well overall noting stable dyspnea on exertion per patient and spouse with no change in endurance/ambulation and continues on medication regimen consisting of see below without adverse reactions or side effects.                                                                                                         .  DIABETES - Patient has a  longstanding history of DiabetesType Type II . Patient is being treated with diet and insulin injections and denies significant side effects. Control has been good. Complicating factors include but are not limited to: see below                                                                                                                       .  HYPERLIPIDEMIA - Patient has a long history of significant Hyperlipidemia requiring medication for treatment with recent unknown control. Patient reports no problems or side effects with the medication.                                                                                                                                                       .  HYPERTENSION - Patient has longstanding history of HTN , currently denies any symptoms referable to elevated blood pressure. Specifically denies chest pain, palpitations, dyspnea, orthopnea, PND or peripheral edema. Blood pressure readings have been in normal range. Current medication regimen is as listed below. Patient denies any side effects of medication.                                                                                                                                                                                          .  HYPOTHYROIDISM - Patient has a longstanding history of chronic Hypothyroidism. Patient has been doing well, noting no tremor, insomnia, hair loss or changes in skin texture. Continues to take medications as directed, without adverse reactions or side effects. Last TSH   Lab Results   Component Value Date    TSH 2.11 08/08/2017   .                                                                                                                                                                                                                        .  RENAL INSUFFICIENCY - Patient has a longstanding history of severe chronic renal insufficiency. Last Cr 5.13 on 5/19/2018 - this is around his  baseline. Patient denies flud retention, acute dyspnea, confusion or urinary difficulty. Patient I son dialysis.                                                                                                                                                                        .    MEDICAL HISTORY:     Patient Active Problem List    Diagnosis Date Noted     Cerebral embolism with cerebral infarction (H) 02/17/2012     Priority: High     1/12/12 presented with dizziness, ataxia and disorientation.  Dx with acute infarct in the left occipital lobe and posterior left temporal lobe without hemorrhage on head CT. Also had right homonymous hemianopsia. MRI 1/12- 1. Acute infarction of the left occipital lobe in left PCA distribution. No evidence of hemorrhage. Likely occlusion of the left posterior cerebral artery at its P2 segment.  Severe chronic small vessel ischemic disease.  Neck vessels are grossly patent. However cannot exclude 50% stenosis of the left ICA.       Malignant neoplasm of kidney excluding renal pelvis (H) 11/03/2007     Priority: High     Renal cell cancer.  S/p partial left nephrectomy.  MRI abd 7/07 - showed no recurrence of cancer  Followed by Dr. Carrion, urology, in [place], yearly  Problem list name updated by automated process. Provider to review       Localized edema 02/15/2018     Priority: Medium     Acute respiratory failure with hypoxia (H) 01/20/2018     Priority: Medium     Moderate persistent asthma without complication 01/19/2018     Priority: Medium     Long-term (current) use of anticoagulants [Z79.01] 11/28/2017     Priority: Medium     Atrial fibrillation, new onset (H) 11/27/2017     Priority: Medium     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) 11/20/2017     Priority: Medium     Legally blind in right eye, as defined in [place] 08/31/2017     Priority: Medium     Controlled type 2 diabetes mellitus with diabetic nephropathy, with long-term current use of insulin (H)  "11/29/2016     Priority: Medium     Chronic obstructive pulmonary disease, unspecified COPD type (H) 11/29/2016     Priority: Medium     Glaucoma 02/04/2015     Priority: Medium     Thrombocytopenia, primary (H) 01/16/2014     Priority: Medium     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     Priority: Medium     Seborrheic keratosis 07/26/2013     Priority: Medium     Imo Update utility       Health Care Home 04/09/2013     Priority: Medium     *See Letters for [facility] Care Plan: My Access Plan         Anxiety disorder due to medical condition 02/17/2012     Priority: Medium     S/p CVA in 1/12  Did not want to take citalopram due to fear of side effects, but seems to be improving       Hyperlipidemia LDL goal <70 10/31/2010     Priority: Medium     CHOL      137   1/13/2012  HDL       35   1/13/2012  LDL       85   1/13/2012  TRIG       84   1/13/2012  CHOLHDLRATIO      3.9   1/13/2012  Lovastatin 60mg         Anemia 09/10/2010     Priority: Medium     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) 05/06/2010     Priority: Medium     Fistula placed fall 2011, on dialysis since 5/12       Disorder of bursae and tendons in shoulder region 05/03/2007     Priority: Medium     Problem list name updated by automated process. Provider to review       Essential Hypertension, Benign 04/06/2005     Priority: Medium     Controlled  Nifedipine does cause fatigue       zAdvanced directives, counseling/discussion 02/29/2012     Priority: Low     Patient does not have an Advance/Health Care Directive (HCD), given \"What is Advance Care Planning?\" flyer and requests blank HCD form.    Jane Martinez  February 29, 2012         Proteinuria 05/06/2010     Priority: Low     Abdominal aortic aneurysm (H) 11/03/2007     Priority: Low     S/p AAA repair 2005  Follows with Dr. Tinsley.  Will see him in f/u fall 2008  Problem list name updated by automated process. Provider to review       Gouty arthropathy " 04/25/2006     Priority: Low     Frequent attacks   Discussed allopurinol will hold off for now  Problem list name updated by automated process. Provider to review and confirm  Imo Update utility        Past Medical History:   Diagnosis Date     Abdominal aneurysm without mention of rupture     s/p open repair 2005     Atherosclerosis of renal artery (H)      Basal cell carcinoma      Cerebral infarction (H)      Essential hypertension, benign      Gout, unspecified      Malignant neoplasm of kidney, except pelvis 2005    papillary carcinoma, s/p partial left nephrectomy     Other and unspecified hyperlipidemia      Other peripheral vascular disease(443.89) 2005    s/p aorto-right common femoral; left external iliac bypass with graft.     Type II or unspecified type diabetes mellitus without mention of complication, not stated as uncontrolled      Unspecified disorder of kidney and ureter      Past Surgical History:   Procedure Laterality Date     ABDOMEN SURGERY  2005    aortic aneurysm     CATARACT IOL, RT/LT  2/4/08    left eye - phacoemulsification w/ posterior chamber lens implantation combined w/ glaucoma filtering procedure     CREATE FISTULA ARTERIOVENOUS UPPER EXTREMITY  1/3/2012    Procedure:CREATE FISTULA ARTERIOVENOUS UPPER EXTREMITY; LEFT UPPER ARM ARTERIOVENOUS FISTULA; Surgeon:JENA EPARSON; Location:[facility]     SURGICAL HISTORY OF -   2/13/2004    Right knee arthroscopy     SURGICAL HISTORY OF -       Vocal cord biopsy-benign     SURGICAL HISTORY OF -   3/3/2005    AAA, left partial nephrectomy     Current Outpatient Prescriptions   Medication Sig Dispense Refill     Acetaminophen (TYLENOL PO) Take 650 mg by mouth nightly as needed       ADVAIR DISKUS 100-50 MCG/DOSE diskus inhaler inhale 1 puff into the lungs every 12 hours. 3 Inhaler 3     albuterol (2.5 MG/3ML) 0.083% neb solution TAKE 1 VIAL (2.5 MG) BY NEBULIZATION EVERY 6 HOURS AS NEEDED FOR SHORTNESS OF BREATH / DYSPNEA OR WHEEZING 63 vial 11      B Complex-C-Folic Acid (DIALYVITE PO) Take 1 tablet by mouth daily       BASAGLAR 100 UNIT/ML injection , Inject 15 Units Subcutaneous At Bedtime 15 mL 0     brimonidine (ALPHAGAN) 0.2 % ophthalmic solution Place 1 drop into both eyes 3 times daily       bumetanide (BUMEX) 1 MG tablet Take 2 tablets in the morning and 1 tablet early afternoon 180 tablet 2     clopidogrel (PLAVIX) 75 MG tablet Take 1 tablet (75 mg) by mouth daily 90 tablet 3     diltiazem (CARDIZEM CD) 240 MG 24 hr capsule Take 1 capsule (240 mg) by mouth daily 90 capsule 3     latanoprost (XALATAN) 0.005 % ophthalmic solution Place 1 drop into both eyes At Bedtime       LOSARTAN POTASSIUM PO Take 50 mg by mouth daily        lovastatin (MEVACOR) 40 MG tablet Profile Rx: patient will contact pharmacy when needed  TAKE 1 AND 1/2 TABLETS BY MOUTH ONCE A DAY WITH DINNER. NEED FASTING LAB WORK 135 tablet 3     metoprolol succinate (TOPROL-XL) 50 MG 24 hr tablet Take 1 tablet (50 mg) by mouth daily 90 tablet 3     Misc Natural Products (TART CHERRY ADVANCED) CAPS Take 1 capsule by mouth daily       pilocarpine (PILOCAR) 2 % ophthalmic solution Place 1 drop into both eyes 2 times daily       terazosin (HYTRIN) 10 MG capsule Profile Rx: patient will contact pharmacy when needed  TAKE 1 CAPSULE (10 MG) BY MOUTH AT BEDTIME 90 capsule 3     timolol (TIMOPTIC) 0.5 % ophthalmic solution Place 1 drop into both eyes 2 times daily       warfarin (COUMADIN) 2 MG tablet Take 5 mg Fridays and 4 mg all other days or as directed by the Anticoagulation Clinic 170 tablet 0     blood glucose monitoring (NO BRAND SPECIFIED) meter device kit Use to test blood sugar 3 times daily or as directed. 1 kit 0     blood glucose monitoring (ONE TOUCH ULTRA) test strip test 2 times daily  Profile Rx: patient will contact pharmacy when needed 60 each 11     insulin aspart (NOVOLOG FLEXPEN) 100 UNIT/ML injection 6 units before breakfast, 6 units before lunch, 4 units before dinner 15  mL 11     NOVOFINE 30G X 8 MM insulin pen needle USE TO INJECT INSULIN THREE TIMES DAILY OR AS DIRECTED 100 each 6     order for DME Equipment being ordered: Compression socks 15-20 mmHg 1 Device 0     order for DME Equipment being ordered: Oxygen- HOME and portable. Georgie Coulter CMA Medical Assistant Signed  Service date: 05/24/2016 10:48 AM       O2 Sat on Room air at rest:84%  O2 sat on room air with walk: 82-91%  O2 Sat on 1L of oxygen with walk 91-93%  O2 Sat on 2 L of Oxygen with walk 91-96%  O2 Sat on 1 L O2 at rest 94% 2 each prn     order for DME Nebulizer machine Qty #1 1 Units 0     ORDER FOR DME Equipment being ordered: Walker with wheels and brakes, and a seat 1 each 0     OTC products: None, except as noted above    Allergies   Allergen Reactions     Hydralazine Shortness Of Breath and Swelling     Aspirin Swelling     Heparin Flush Other (See Comments)     thrombocytopenia     Monosodium Glutamate Swelling      Latex Allergy: NO    Social History   Substance Use Topics     Smoking status: Former Smoker     Packs/day: 1.00     Years: 55.00     Types: Cigarettes     Quit date: 1/1/2005     Smokeless tobacco: Never Used     Alcohol use No     History   Drug Use No       REVIEW OF SYSTEMS:   CONSTITUTIONAL: NEGATIVE for fever, chills, change in weight  INTEGUMENTARY/SKIN: NEGATIVE for worrisome rashes, moles or lesions  EYES: blind both eyes  ENT/MOUTH: NEGATIVE for ear, mouth and throat problems  RESP: NEGATIVE for significant cough or SOB  CV: NEGATIVE for chest pain, palpitations or peripheral edema  GI: NEGATIVE for nausea, abdominal pain, heartburn, or change in bowel habits  : NEGATIVE for frequency, dysuria, or hematuria  MUSCULOSKELETAL: NEGATIVE for significant arthralgias or myalgia  NEURO: NEGATIVE for weakness, dizziness or paresthesias  ENDOCRINE: NEGATIVE for temperature intolerance, skin/hair changes  HEME: NEGATIVE for bleeding problems  PSYCHIATRIC: NEGATIVE for changes in mood or  affect    EXAM:   /71  Pulse 90  Temp 98.5  F (36.9  C) (Tympanic)  Wt 198 lb (89.8 kg)  SpO2 99%  BMI 31.01 kg/m2  GENERAL APPEARANCE:  alert and no distress; ambulatory w/o assist  EYES: blind  HENT: ear canals and TM's normal, nose and mouth without ulcers or lesions, oropharynx clear and oral mucous membranes moist  NECK: no adenopathy, no asymmetry, masses, or scars and thyroid normal to palpation  RESP: lungs clear to auscultation - no rales, rhonchi or wheezes  CV: regular rates and rhythm, normal S1 S2, no S3 or S4, no murmur, click or rub, no peripheral edema and peripheral pulses strong  ABDOMEN: soft, nontender, no hepatosplenomegaly, no masses and bowel sounds normal  MS: no musculoskeletal defects are noted and gait is age appropriate without ataxia  SKIN: good turgor, no rash/jaundice/ecchymosis  NEURO: Normal strength and tone, sensory exam grossly normal, mentation intact and speech normal    DIAGNOSTICS:   No labs or EKG required for low risk surgery (cataract, skin procedure, breast biopsy, etc)    Recent Labs   Lab Test 05/30/18 05/19/18   0138   03/23/18   1745   12/19/17   1203   11/29/16   1040   HGB   --   10.6*   --   10.7*   < >   --    < >   --    PLT   --   212   --   224   < >   --    < >   --    INR  2.7*  2.38*   < >  1.54*   < >   --    < >   --    NA   --   136   --   132*   < >  138   < >   --    POTASSIUM   --   4.3   --   4.3   < >  4.2   < >   --    CR   --   5.13*   --   3.95*   < >  5.44*   < >   --    A1C   --    --    --    --    --   6.3*   --   5.6    < > = values in this interval not displayed.        IMPRESSION:   Reason for surgery/procedure: left glaucoma  Diagnosis/reason for consult: preop evaluation, multiple chronic medical conditions.    The proposed surgical procedure is considered LOW risk.    REVISED CARDIAC RISK INDEX  The patient has the following serious cardiovascular risks for perioperative complications such as (MI, PE, VFib and 3  AV  Block):  Congestive Heart Failure (pulmonary edema, PND, s3 noa, CXR with pulmonary congestion, basilar rales)  Cerebrovascular Disease (TIA or CVA)  Diabetes Mellitus (on Insulin)  Serum Creatinine >2.0 mg/dl  INTERPRETATION: 3 risks: Class IV (high risk - >11% complication rate)    The patient has the following additional risks for perioperative complications:  Oxygen dependent lung disease      ICD-10-CM    1. Preop general physical exam Z01.818    2. Glaucoma of left eye, unspecified glaucoma type H40.9    3. Type 2 diabetes mellitus with diabetic nephropathy, unspecified long term insulin use status (H) E11.21    4. Essential hypertension I10    5. Chronic obstructive pulmonary disease, unspecified COPD type (H) J44.9    6. Paroxysmal atrial fibrillation (H) I48.0    7. Diastolic dysfunction I51.9    8. 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) N18.6     Z99.2    9. History of CVA (cerebrovascular accident) Z86.73    10. Hyperlipidemia LDL goal <70 E78.5        RECOMMENDATIONS:     --Consult hospital rounder / IM to assist post-op medical management  Routine DVT precautions recommended.    Cardiovascular Risk  Patient is already on a Beta Blocker. Continue Betablocker therapy after surgery, using Beta blocker order set as necessary for NPO status.      Pulmonary Risk  Incentive spirometry post op  Respiratory Therapy (Respiratory Care IP Consult)  post op  NG tube decompression if abdominal distension or significant vomiting   Maximize COPD treatment keep current regimen.       --Patient is to take all scheduled medications on the day of surgery EXCEPT for modifications listed below.    Diabetes Medication Use  -----Take 80% of long acting insulin (e.g. Lantus, NPH) while NPO (fasting)  -----Hold short acting insulin (e.g. Novolog, Humalog) while NPO (fasting)      Anticoagulant or Antiplatelet Medication Use  WARFARIN: Bleeding risk is low for this procedure and warfarin should be  continued in the perioperative period        ACE Inhibitor or Angiotensin Receptor Blocker (ARB) Use  Ace inhibitor or Angiotensin Receptor Blocker (ARB) and will continue this medication due to the higher risk of uncontrolled perioerative hypertension (e.g. neurosurgical procedure)      APPROVAL GIVEN to proceed with proposed procedure, without further diagnostic evaluation       Signed Electronically by: Eric Rodrigues MD    Copy of this evaluation report is provided to requesting physician.    [place] Preop Guidelines    Revised Cardiac Risk Index

## 2018-06-13 ENCOUNTER — ANTICOAGULATION THERAPY VISIT (OUTPATIENT)
Dept: ANTICOAGULATION | Facility: CLINIC | Age: 83
End: 2018-06-13
Payer: MEDICARE

## 2018-06-13 DIAGNOSIS — I63.40 CEREBRAL INFARCTION DUE TO EMBOLISM OF CEREBRAL ARTERY (H): ICD-10-CM

## 2018-06-13 DIAGNOSIS — Z79.01 LONG-TERM (CURRENT) USE OF ANTICOAGULANTS: ICD-10-CM

## 2018-06-13 DIAGNOSIS — I48.91 ATRIAL FIBRILLATION, NEW ONSET (H): ICD-10-CM

## 2018-06-13 LAB — INR POINT OF CARE: 2 (ref 0.86–1.14)

## 2018-06-13 PROCEDURE — 99207 ZZC NO CHARGE NURSE ONLY: CPT

## 2018-06-13 PROCEDURE — 36416 COLLJ CAPILLARY BLOOD SPEC: CPT

## 2018-06-13 PROCEDURE — 85610 PROTHROMBIN TIME: CPT | Mod: QW

## 2018-06-13 ASSESSMENT — ASTHMA QUESTIONNAIRES: ACT_TOTALSCORE: 20

## 2018-06-13 NOTE — PROGRESS NOTES
ANTICOAGULATION FOLLOW-UP CLINIC VISIT    Patient Name:  Griffin Walton  Date:  6/13/2018  Contact Type:  Face to Face    SUBJECTIVE:     Patient Findings     Positives No Problem Findings    Comments Patient and his wife and moving to [facility] and will seek a new PCP and coumadin clinic with the [facility].           OBJECTIVE    INR Protime   Date Value Ref Range Status   06/13/2018 2.0 (A) 0.86 - 1.14 Final       ASSESSMENT / PLAN  INR assessment THER      Anticoagulation Summary as of 6/13/2018     INR goal 2.0-3.0   Today's INR 2.0   Warfarin maintenance plan 6 mg (2 mg x 3) on Mon; 4 mg (2 mg x 2) all other days   Full warfarin instructions 6 mg on Mon; 4 mg all other days   Weekly warfarin total 30 mg   Plan last modified Rosalind Miner RN (4/9/2018)   Next INR check    Target end date Indefinite    Indications   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) [I63.40]  Atrial fibrillation  new onset (H) [I48.91]  Long-term (current) use of anticoagulants [Z79.01] [Z79.01]         Anticoagulation Episode Summary     INR check location     Preferred lab     Resolved date 6/13/2018    Resolved reason Patient moved    Send INR reminders to [facility] POOL    Comments * [facility] MWF 8-12pm in [place] (post-dialysis INRs). Pt not new to warfarin (has been on approx 1 yr), pt cannot see well enough to read      Anticoagulation Care Providers     Provider Role Specialty Phone number    Heriberto, Stefanie Mindy, APRN CNP Responsible Nurse Practitioner [phone]            See the Encounter Report to view Anticoagulation Flowsheet and Dosing Calendar (Go to Encounters tab in chart review, and find the Anticoagulation Therapy Visit)        Cate Baig RN

## 2018-06-13 NOTE — MR AVS SNAPSHOT
Griffin LYNCH Isabelle   6/13/2018 2:15 PM   Anticoagulation Therapy Visit    Description:  84 year old male   Provider:  [facility]   Department:  [facility]           INR as of 6/13/2018     Today's INR 2.0      Anticoagulation Summary as of 6/13/2018     INR goal 2.0-3.0   Today's INR 2.0   Full warfarin instructions 6 mg on Mon; 4 mg all other days   Next INR check 6/29/2018    Indications   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) [I63.40]  Atrial fibrillation  new onset (H) [I48.91]  Long-term (current) use of anticoagulants [Z79.01] [Z79.01]         Contact Numbers     Please call [phone] with any problems or questions regarding your therapy.    If you need to cancel and/or reschedule your appointment please call one of the following numbers:  [facility] - [phone]  [place] - [phone]  [place] - [phone]  [place] - [phone]  [place] - [phone]            June 2018 Details    Sun Mon Tue Wed Thu Fri Sat          1               2                 3               4               5               6               7               8               9                 10               11               12               13      4 mg   See details      14      4 mg         15      4 mg         16      4 mg           17      4 mg         18      6 mg         19      4 mg         20      4 mg         21      4 mg         22      4 mg         23      4 mg           24      4 mg         25      6 mg         26      4 mg         27      4 mg         28      4 mg         29            30                Date Details   06/13 This INR check       Date of next INR:  6/29/2018         How to take your warfarin dose     To take:  4 mg Take 2 of the 2 mg tablets.    To take:  6 mg Take 3 of the 2 mg tablets.

## 2018-06-27 ENCOUNTER — COMMUNICATION - HEALTHEAST (OUTPATIENT)
Dept: ANTICOAGULATION | Facility: CLINIC | Age: 83
End: 2018-06-27

## 2018-06-27 ENCOUNTER — OFFICE VISIT - HEALTHEAST (OUTPATIENT)
Dept: FAMILY MEDICINE | Facility: CLINIC | Age: 83
End: 2018-06-27

## 2018-06-27 DIAGNOSIS — I48.20 CHRONIC ATRIAL FIBRILLATION (H): ICD-10-CM

## 2018-06-27 LAB — INR PPP: 1.2 (ref 0.9–1.1)

## 2018-07-03 ENCOUNTER — COMMUNICATION - HEALTHEAST (OUTPATIENT)
Dept: ANTICOAGULATION | Facility: CLINIC | Age: 83
End: 2018-07-03

## 2018-07-03 ENCOUNTER — AMBULATORY - HEALTHEAST (OUTPATIENT)
Dept: LAB | Facility: CLINIC | Age: 83
End: 2018-07-03

## 2018-07-03 DIAGNOSIS — I48.20 CHRONIC ATRIAL FIBRILLATION (H): ICD-10-CM

## 2018-07-03 LAB — INR PPP: 2.2 (ref 0.9–1.1)

## 2018-07-04 ENCOUNTER — TRANSFERRED RECORDS (OUTPATIENT)
Dept: HEALTH INFORMATION MANAGEMENT | Facility: CLINIC | Age: 83
End: 2018-07-04

## 2018-07-17 ENCOUNTER — AMBULATORY - HEALTHEAST (OUTPATIENT)
Dept: LAB | Facility: CLINIC | Age: 83
End: 2018-07-17

## 2018-07-17 ENCOUNTER — OFFICE VISIT - HEALTHEAST (OUTPATIENT)
Dept: FAMILY MEDICINE | Facility: CLINIC | Age: 83
End: 2018-07-17

## 2018-07-17 ENCOUNTER — COMMUNICATION - HEALTHEAST (OUTPATIENT)
Dept: ANTICOAGULATION | Facility: CLINIC | Age: 83
End: 2018-07-17

## 2018-07-17 DIAGNOSIS — I48.20 CHRONIC ATRIAL FIBRILLATION (H): ICD-10-CM

## 2018-07-17 DIAGNOSIS — L98.9 SKIN LESION OF HAND: ICD-10-CM

## 2018-07-17 LAB — INR PPP: 3.4 (ref 0.9–1.1)

## 2018-07-23 ENCOUNTER — MYC MEDICAL ADVICE (OUTPATIENT)
Dept: FAMILY MEDICINE | Facility: CLINIC | Age: 83
End: 2018-07-23

## 2018-07-23 DIAGNOSIS — I10 BENIGN ESSENTIAL HYPERTENSION: ICD-10-CM

## 2018-07-23 RX ORDER — BUMETANIDE 1 MG/1
TABLET ORAL
Qty: 180 TABLET | Refills: 2 | Status: SHIPPED | OUTPATIENT
Start: 2018-07-23

## 2018-07-23 RX ORDER — BUMETANIDE 1 MG/1
TABLET ORAL
Qty: 180 TABLET | Refills: 2 | Status: CANCELLED | OUTPATIENT
Start: 2018-07-23

## 2018-07-23 NOTE — TELEPHONE ENCOUNTER
Prescription approved per [facility] Refill Protocol.  Neeta IRELAND RN

## 2018-07-23 NOTE — TELEPHONE ENCOUNTER
"Requested Prescriptions   Pending Prescriptions Disp Refills     bumetanide (BUMEX) 1 MG tablet  Last Written Prescription Date:  02/12/18  Last Fill Quantity: 180,  # refills: 2   Last office visit: 6/12/2018 with prescribing provider:  06/12/18   Future Office Visit:     180 tablet 2     Sig: Take 2 tablets in the morning and 1 tablet early afternoon    Diuretics (Including Combos) Protocol Failed    7/23/2018  3:18 PM       Failed - Normal serum creatinine on file in past 12 months    Recent Labs   Lab Test  05/19/18   0138   CR  5.13*          Passed - Blood pressure under 140/90 in past 12 months    BP Readings from Last 3 Encounters:   06/12/18 112/71   05/19/18 151/76   04/10/18 123/70          Passed - Recent (12 mo) or future (30 days) visit within the authorizing provider's specialty    Patient had office visit in the last 12 months or has a visit in the next 30 days with authorizing provider or within the authorizing provider's specialty.  See \"Patient Info\" tab in inbasket, or \"Choose Columns\" in Meds & Orders section of the refill encounter.         Passed - Patient is age 18 or older       Passed - Normal serum potassium on file in past 12 months    Recent Labs   Lab Test  05/19/18   0138   POTASSIUM  4.3          Passed - Normal serum sodium on file in past 12 months    Recent Labs   Lab Test  05/19/18   0138   NA  136             "

## 2018-07-24 ENCOUNTER — COMMUNICATION - HEALTHEAST (OUTPATIENT)
Dept: ANTICOAGULATION | Facility: CLINIC | Age: 83
End: 2018-07-24

## 2018-07-24 ENCOUNTER — COMMUNICATION - HEALTHEAST (OUTPATIENT)
Dept: SCHEDULING | Facility: CLINIC | Age: 83
End: 2018-07-24

## 2018-07-24 ENCOUNTER — OFFICE VISIT - HEALTHEAST (OUTPATIENT)
Dept: FAMILY MEDICINE | Facility: CLINIC | Age: 83
End: 2018-07-24

## 2018-07-24 DIAGNOSIS — H40.9 GLAUCOMA: ICD-10-CM

## 2018-07-24 DIAGNOSIS — N18.6 ESRD (END STAGE RENAL DISEASE) ON DIALYSIS (H): ICD-10-CM

## 2018-07-24 DIAGNOSIS — Z79.4 CONTROLLED TYPE 2 DIABETES MELLITUS WITH DIABETIC NEPHROPATHY, WITH LONG-TERM CURRENT USE OF INSULIN (H): ICD-10-CM

## 2018-07-24 DIAGNOSIS — I48.20 CHRONIC ATRIAL FIBRILLATION (H): ICD-10-CM

## 2018-07-24 DIAGNOSIS — E11.9 DIABETES MELLITUS (H): ICD-10-CM

## 2018-07-24 DIAGNOSIS — E11.21 CONTROLLED TYPE 2 DIABETES MELLITUS WITH DIABETIC NEPHROPATHY, WITH LONG-TERM CURRENT USE OF INSULIN (H): ICD-10-CM

## 2018-07-24 DIAGNOSIS — I10 BENIGN ESSENTIAL HYPERTENSION: ICD-10-CM

## 2018-07-24 DIAGNOSIS — Z99.2 ESRD (END STAGE RENAL DISEASE) ON DIALYSIS (H): ICD-10-CM

## 2018-07-24 DIAGNOSIS — J44.9 CHRONIC OBSTRUCTIVE PULMONARY DISEASE, UNSPECIFIED COPD TYPE (H): ICD-10-CM

## 2018-07-24 DIAGNOSIS — D64.9 ANEMIA: ICD-10-CM

## 2018-07-24 LAB
ALBUMIN SERPL-MCNC: 3.4 G/DL (ref 3.5–5)
ALP SERPL-CCNC: 73 U/L (ref 45–120)
ALT SERPL W P-5'-P-CCNC: 17 U/L (ref 0–45)
ANION GAP SERPL CALCULATED.3IONS-SCNC: 11 MMOL/L (ref 5–18)
AST SERPL W P-5'-P-CCNC: 14 U/L (ref 0–40)
BILIRUB SERPL-MCNC: 0.5 MG/DL (ref 0–1)
BUN SERPL-MCNC: 50 MG/DL (ref 8–28)
CALCIUM SERPL-MCNC: 9.1 MG/DL (ref 8.5–10.5)
CHLORIDE BLD-SCNC: 100 MMOL/L (ref 98–107)
CO2 SERPL-SCNC: 29 MMOL/L (ref 22–31)
CREAT SERPL-MCNC: 6.72 MG/DL (ref 0.7–1.3)
GFR SERPL CREATININE-BSD FRML MDRD: 8 ML/MIN/1.73M2
GLUCOSE BLD-MCNC: 141 MG/DL (ref 70–125)
HBA1C MFR BLD: 6.5 % (ref 3.5–6)
INR PPP: 3.5 (ref 0.9–1.1)
POTASSIUM BLD-SCNC: 4.6 MMOL/L (ref 3.5–5)
PROT SERPL-MCNC: 6.7 G/DL (ref 6–8)
SODIUM SERPL-SCNC: 140 MMOL/L (ref 136–145)

## 2018-07-24 ASSESSMENT — MIFFLIN-ST. JEOR: SCORE: 1539.68

## 2018-07-27 ENCOUNTER — AMBULATORY - HEALTHEAST (OUTPATIENT)
Dept: FAMILY MEDICINE | Facility: CLINIC | Age: 83
End: 2018-07-27

## 2018-08-02 ENCOUNTER — COMMUNICATION - HEALTHEAST (OUTPATIENT)
Dept: CARE COORDINATION | Facility: CLINIC | Age: 83
End: 2018-08-02

## 2018-08-02 ENCOUNTER — COMMUNICATION - HEALTHEAST (OUTPATIENT)
Dept: FAMILY MEDICINE | Facility: CLINIC | Age: 83
End: 2018-08-02

## 2018-08-02 DIAGNOSIS — I48.20 CHRONIC ATRIAL FIBRILLATION (H): ICD-10-CM

## 2018-08-03 ENCOUNTER — COMMUNICATION - HEALTHEAST (OUTPATIENT)
Dept: FAMILY MEDICINE | Facility: CLINIC | Age: 83
End: 2018-08-03

## 2018-08-03 ENCOUNTER — COMMUNICATION - HEALTHEAST (OUTPATIENT)
Dept: PHARMACY | Facility: CLINIC | Age: 83
End: 2018-08-03

## 2018-08-03 DIAGNOSIS — I48.20 CHRONIC ATRIAL FIBRILLATION (H): ICD-10-CM

## 2018-08-03 DIAGNOSIS — Z99.2 ESRD (END STAGE RENAL DISEASE) ON DIALYSIS (H): ICD-10-CM

## 2018-08-03 DIAGNOSIS — I63.9 ACUTE ISCHEMIC STROKE (H): ICD-10-CM

## 2018-08-03 DIAGNOSIS — I10 BENIGN ESSENTIAL HYPERTENSION: ICD-10-CM

## 2018-08-03 DIAGNOSIS — Z79.4 CONTROLLED TYPE 2 DIABETES MELLITUS WITH DIABETIC NEPHROPATHY, WITH LONG-TERM CURRENT USE OF INSULIN (H): ICD-10-CM

## 2018-08-03 DIAGNOSIS — J44.9 CHRONIC OBSTRUCTIVE PULMONARY DISEASE, UNSPECIFIED COPD TYPE (H): ICD-10-CM

## 2018-08-03 DIAGNOSIS — E11.21 CONTROLLED TYPE 2 DIABETES MELLITUS WITH DIABETIC NEPHROPATHY, WITH LONG-TERM CURRENT USE OF INSULIN (H): ICD-10-CM

## 2018-08-03 DIAGNOSIS — N18.6 ESRD (END STAGE RENAL DISEASE) ON DIALYSIS (H): ICD-10-CM

## 2018-08-03 LAB — INR PPP: 1.8 (ref 0.9–1.1)

## 2018-08-06 ENCOUNTER — COMMUNICATION - HEALTHEAST (OUTPATIENT)
Dept: FAMILY MEDICINE | Facility: CLINIC | Age: 83
End: 2018-08-06

## 2018-08-06 DIAGNOSIS — I48.20 CHRONIC ATRIAL FIBRILLATION (H): ICD-10-CM

## 2018-08-09 ENCOUNTER — OFFICE VISIT - HEALTHEAST (OUTPATIENT)
Dept: FAMILY MEDICINE | Facility: CLINIC | Age: 83
End: 2018-08-09

## 2018-08-09 ENCOUNTER — COMMUNICATION - HEALTHEAST (OUTPATIENT)
Dept: ANTICOAGULATION | Facility: CLINIC | Age: 83
End: 2018-08-09

## 2018-08-09 DIAGNOSIS — Z79.4 CONTROLLED TYPE 2 DIABETES MELLITUS WITH DIABETIC NEPHROPATHY, WITH LONG-TERM CURRENT USE OF INSULIN (H): ICD-10-CM

## 2018-08-09 DIAGNOSIS — I63.9 ACUTE ISCHEMIC STROKE (H): ICD-10-CM

## 2018-08-09 DIAGNOSIS — I48.20 CHRONIC ATRIAL FIBRILLATION (H): ICD-10-CM

## 2018-08-09 DIAGNOSIS — G47.00 INSOMNIA: ICD-10-CM

## 2018-08-09 DIAGNOSIS — I10 HYPERTENSION, UNSPECIFIED TYPE: ICD-10-CM

## 2018-08-09 DIAGNOSIS — Z99.2 ESRD (END STAGE RENAL DISEASE) ON DIALYSIS (H): ICD-10-CM

## 2018-08-09 DIAGNOSIS — J44.9 CHRONIC OBSTRUCTIVE PULMONARY DISEASE, UNSPECIFIED COPD TYPE (H): ICD-10-CM

## 2018-08-09 DIAGNOSIS — N18.6 ESRD (END STAGE RENAL DISEASE) ON DIALYSIS (H): ICD-10-CM

## 2018-08-09 DIAGNOSIS — E11.21 CONTROLLED TYPE 2 DIABETES MELLITUS WITH DIABETIC NEPHROPATHY, WITH LONG-TERM CURRENT USE OF INSULIN (H): ICD-10-CM

## 2018-08-09 LAB — INR PPP: 1.8 (ref 0.9–1.1)

## 2018-08-16 ENCOUNTER — AMBULATORY - HEALTHEAST (OUTPATIENT)
Dept: FAMILY MEDICINE | Facility: CLINIC | Age: 83
End: 2018-08-16

## 2018-08-16 ENCOUNTER — COMMUNICATION - HEALTHEAST (OUTPATIENT)
Dept: ANTICOAGULATION | Facility: CLINIC | Age: 83
End: 2018-08-16

## 2018-08-16 ENCOUNTER — AMBULATORY - HEALTHEAST (OUTPATIENT)
Dept: LAB | Facility: CLINIC | Age: 83
End: 2018-08-16

## 2018-08-16 DIAGNOSIS — I48.20 CHRONIC ATRIAL FIBRILLATION (H): ICD-10-CM

## 2018-08-16 DIAGNOSIS — J44.9 CHRONIC OBSTRUCTIVE PULMONARY DISEASE, UNSPECIFIED COPD TYPE (H): ICD-10-CM

## 2018-08-16 LAB — INR PPP: 1.2 (ref 0.9–1.1)

## 2018-08-17 ENCOUNTER — COMMUNICATION - HEALTHEAST (OUTPATIENT)
Dept: FAMILY MEDICINE | Facility: CLINIC | Age: 83
End: 2018-08-17

## 2018-08-21 DIAGNOSIS — Z79.4 TYPE 2 DIABETES MELLITUS WITH DIABETIC NEPHROPATHY, WITH LONG-TERM CURRENT USE OF INSULIN (H): ICD-10-CM

## 2018-08-21 DIAGNOSIS — E11.21 TYPE 2 DIABETES MELLITUS WITH DIABETIC NEPHROPATHY, WITH LONG-TERM CURRENT USE OF INSULIN (H): ICD-10-CM

## 2018-08-21 NOTE — TELEPHONE ENCOUNTER
"Requested Prescriptions   Pending Prescriptions Disp Refills     ONETOUCH ULTRA test strip [Pharmacy Med Name: OneTouch Ultra Blue In Vitro Strip] 50 each 10     Sig: TEST 2 TIMES DAILY    Diabetic Supplies Protocol Passed    8/21/2018  2:54 PM       Passed - Patient is 18 years of age or older       Passed - Recent (6 mo) or future (30 days) visit within the authorizing provider's specialty    Patient had office visit in the last 6 months or has a visit in the next 30 days with authorizing provider.  See \"Patient Info\" tab in inbasket, or \"Choose Columns\" in Meds & Orders section of the refill encounter.            Last Written Prescription Date:  8/21/18  Last Fill Quantity: 50,  # refills: 10   Last office visit: 6/12/2018 with prescribing provider:  Lilia   Future Office Visit:      "

## 2018-08-23 ENCOUNTER — COMMUNICATION - HEALTHEAST (OUTPATIENT)
Dept: ANTICOAGULATION | Facility: CLINIC | Age: 83
End: 2018-08-23

## 2018-08-23 ENCOUNTER — AMBULATORY - HEALTHEAST (OUTPATIENT)
Dept: LAB | Facility: CLINIC | Age: 83
End: 2018-08-23

## 2018-08-23 DIAGNOSIS — I48.20 CHRONIC ATRIAL FIBRILLATION (H): ICD-10-CM

## 2018-08-23 LAB — INR PPP: 2.3 (ref 0.9–1.1)

## 2018-08-30 ENCOUNTER — AMBULATORY - HEALTHEAST (OUTPATIENT)
Dept: LAB | Facility: CLINIC | Age: 83
End: 2018-08-30

## 2018-08-30 ENCOUNTER — COMMUNICATION - HEALTHEAST (OUTPATIENT)
Dept: ANTICOAGULATION | Facility: CLINIC | Age: 83
End: 2018-08-30

## 2018-08-30 DIAGNOSIS — I48.20 CHRONIC ATRIAL FIBRILLATION (H): ICD-10-CM

## 2018-08-30 LAB — INR PPP: 2.9 (ref 0.9–1.1)

## 2018-09-06 ENCOUNTER — COMMUNICATION - HEALTHEAST (OUTPATIENT)
Dept: ANTICOAGULATION | Facility: CLINIC | Age: 83
End: 2018-09-06

## 2018-09-06 ENCOUNTER — AMBULATORY - HEALTHEAST (OUTPATIENT)
Dept: LAB | Facility: CLINIC | Age: 83
End: 2018-09-06

## 2018-09-06 DIAGNOSIS — I48.20 CHRONIC ATRIAL FIBRILLATION (H): ICD-10-CM

## 2018-09-06 LAB — INR PPP: 4.2 (ref 0.9–1.1)

## 2018-09-10 ENCOUNTER — COMMUNICATION - HEALTHEAST (OUTPATIENT)
Dept: FAMILY MEDICINE | Facility: CLINIC | Age: 83
End: 2018-09-10

## 2018-09-13 ENCOUNTER — COMMUNICATION - HEALTHEAST (OUTPATIENT)
Dept: ANTICOAGULATION | Facility: CLINIC | Age: 83
End: 2018-09-13

## 2018-09-13 ENCOUNTER — AMBULATORY - HEALTHEAST (OUTPATIENT)
Dept: LAB | Facility: CLINIC | Age: 83
End: 2018-09-13

## 2018-09-13 DIAGNOSIS — I48.20 CHRONIC ATRIAL FIBRILLATION (H): ICD-10-CM

## 2018-09-13 LAB — INR PPP: 4.8 (ref 0.9–1.1)

## 2018-09-17 ENCOUNTER — COMMUNICATION - HEALTHEAST (OUTPATIENT)
Dept: FAMILY MEDICINE | Facility: CLINIC | Age: 83
End: 2018-09-17

## 2018-09-17 DIAGNOSIS — I10 ESSENTIAL HYPERTENSION: ICD-10-CM

## 2018-09-19 ENCOUNTER — COMMUNICATION - HEALTHEAST (OUTPATIENT)
Dept: FAMILY MEDICINE | Facility: CLINIC | Age: 83
End: 2018-09-19

## 2018-09-20 ENCOUNTER — AMBULATORY - HEALTHEAST (OUTPATIENT)
Dept: LAB | Facility: CLINIC | Age: 83
End: 2018-09-20

## 2018-09-20 ENCOUNTER — COMMUNICATION - HEALTHEAST (OUTPATIENT)
Dept: ANTICOAGULATION | Facility: CLINIC | Age: 83
End: 2018-09-20

## 2018-09-20 ENCOUNTER — COMMUNICATION - HEALTHEAST (OUTPATIENT)
Dept: LAB | Facility: CLINIC | Age: 83
End: 2018-09-20

## 2018-09-20 DIAGNOSIS — I48.20 CHRONIC ATRIAL FIBRILLATION (H): ICD-10-CM

## 2018-09-20 LAB — INR PPP: 3 (ref 0.9–1.1)

## 2018-09-21 ENCOUNTER — COMMUNICATION - HEALTHEAST (OUTPATIENT)
Dept: FAMILY MEDICINE | Facility: CLINIC | Age: 83
End: 2018-09-21

## 2018-09-21 DIAGNOSIS — I10 ESSENTIAL HYPERTENSION: ICD-10-CM

## 2018-09-24 ENCOUNTER — COMMUNICATION - HEALTHEAST (OUTPATIENT)
Dept: FAMILY MEDICINE | Facility: CLINIC | Age: 83
End: 2018-09-24

## 2018-09-24 DIAGNOSIS — E11.21 CONTROLLED TYPE 2 DIABETES MELLITUS WITH DIABETIC NEPHROPATHY, WITH LONG-TERM CURRENT USE OF INSULIN (H): ICD-10-CM

## 2018-09-24 DIAGNOSIS — I48.20 CHRONIC ATRIAL FIBRILLATION (H): ICD-10-CM

## 2018-09-24 DIAGNOSIS — Z79.4 CONTROLLED TYPE 2 DIABETES MELLITUS WITH DIABETIC NEPHROPATHY, WITH LONG-TERM CURRENT USE OF INSULIN (H): ICD-10-CM

## 2018-09-25 ENCOUNTER — COMMUNICATION - HEALTHEAST (OUTPATIENT)
Dept: FAMILY MEDICINE | Facility: CLINIC | Age: 83
End: 2018-09-25

## 2018-09-27 ENCOUNTER — COMMUNICATION - HEALTHEAST (OUTPATIENT)
Dept: ANTICOAGULATION | Facility: CLINIC | Age: 83
End: 2018-09-27

## 2018-09-27 ENCOUNTER — AMBULATORY - HEALTHEAST (OUTPATIENT)
Dept: LAB | Facility: CLINIC | Age: 83
End: 2018-09-27

## 2018-09-27 DIAGNOSIS — I48.20 CHRONIC ATRIAL FIBRILLATION (H): ICD-10-CM

## 2018-09-27 LAB — INR PPP: 3.6 (ref 0.9–1.1)

## 2018-09-28 ENCOUNTER — AMBULATORY - HEALTHEAST (OUTPATIENT)
Dept: CARDIOLOGY | Facility: CLINIC | Age: 83
End: 2018-09-28

## 2018-10-04 ENCOUNTER — OFFICE VISIT - HEALTHEAST (OUTPATIENT)
Dept: CARDIOLOGY | Facility: CLINIC | Age: 83
End: 2018-10-04

## 2018-10-04 DIAGNOSIS — I71.40 ABDOMINAL AORTIC ANEURYSM (AAA) WITHOUT RUPTURE (H): ICD-10-CM

## 2018-10-04 DIAGNOSIS — E11.21 CONTROLLED TYPE 2 DIABETES MELLITUS WITH DIABETIC NEPHROPATHY, WITH LONG-TERM CURRENT USE OF INSULIN (H): ICD-10-CM

## 2018-10-04 DIAGNOSIS — I10 BENIGN ESSENTIAL HYPERTENSION: ICD-10-CM

## 2018-10-04 DIAGNOSIS — I48.20 CHRONIC ATRIAL FIBRILLATION (H): ICD-10-CM

## 2018-10-04 DIAGNOSIS — Z79.4 CONTROLLED TYPE 2 DIABETES MELLITUS WITH DIABETIC NEPHROPATHY, WITH LONG-TERM CURRENT USE OF INSULIN (H): ICD-10-CM

## 2018-10-04 DIAGNOSIS — I63.9 ACUTE ISCHEMIC STROKE (H): ICD-10-CM

## 2018-10-04 DIAGNOSIS — I35.0 NONRHEUMATIC AORTIC VALVE STENOSIS: ICD-10-CM

## 2018-10-04 ASSESSMENT — MIFFLIN-ST. JEOR: SCORE: 1545.36

## 2018-10-05 ENCOUNTER — COMMUNICATION - HEALTHEAST (OUTPATIENT)
Dept: FAMILY MEDICINE | Facility: CLINIC | Age: 83
End: 2018-10-05

## 2018-10-05 DIAGNOSIS — I48.20 CHRONIC ATRIAL FIBRILLATION (H): ICD-10-CM

## 2018-10-09 ENCOUNTER — AMBULATORY - HEALTHEAST (OUTPATIENT)
Dept: LAB | Facility: CLINIC | Age: 83
End: 2018-10-09

## 2018-10-09 ENCOUNTER — COMMUNICATION - HEALTHEAST (OUTPATIENT)
Dept: ANTICOAGULATION | Facility: CLINIC | Age: 83
End: 2018-10-09

## 2018-10-09 DIAGNOSIS — I48.20 CHRONIC ATRIAL FIBRILLATION (H): ICD-10-CM

## 2018-10-09 LAB — INR PPP: 1.9 (ref 0.9–1.1)

## 2018-10-18 ENCOUNTER — HOSPITAL ENCOUNTER (OUTPATIENT)
Dept: CARDIOLOGY | Facility: HOSPITAL | Age: 83
Discharge: HOME | End: 2018-10-18
Attending: INTERNAL MEDICINE

## 2018-10-18 DIAGNOSIS — I35.0 NONRHEUMATIC AORTIC VALVE STENOSIS: ICD-10-CM

## 2018-10-18 ASSESSMENT — MIFFLIN-ST. JEOR: SCORE: 1545.36

## 2018-10-19 ENCOUNTER — AMBULATORY - HEALTHEAST (OUTPATIENT)
Dept: CARDIOLOGY | Facility: CLINIC | Age: 83
End: 2018-10-19

## 2018-10-19 DIAGNOSIS — I35.0 NONRHEUMATIC AORTIC (VALVE) STENOSIS: ICD-10-CM

## 2018-10-19 LAB
AORTIC ROOT: 3.6 CM
AORTIC VALVE MEAN VELOCITY: 223 CM/S
AR DECEL SLOPE: 1760 MM/S2
AR PEAK VELOCITY: 356 CM/S
AV DIMENSIONLESS INDEX VTI: 0.3
AV MEAN GRADIENT: 22 MMHG
AV PEAK GRADIENT: 38.2 MMHG
AV REGURGITANT PEAK GRADIENT: 50.7 MMHG
AV REGURGITATION PRESSURE HALF TIME: 591 MS
AV VALVE AREA: 1 CM2
BSA FOR ECHO PROCEDURE: 2.07 M2
CV BLOOD PRESSURE: NORMAL MMHG
CV ECHO HEIGHT: 67 IN
CV ECHO WEIGHT: 201 LBS
DOP CALC AO PEAK VEL: 309 CM/S
DOP CALC AO VTI: 76 CM
DOP CALC LVOT AREA: 3.14 CM2
DOP CALC LVOT DIAMETER: 2 CM
DOP CALC LVOT STROKE VOLUME: 75 CM3
DOP CALCLVOT PEAK VEL VTI: 23.9 CM
ECHO EJECTION FRACTION ESTIMATED: 65 %
EJECTION FRACTION: 57 % (ref 55–75)
FRACTIONAL SHORTENING: 25.5 % (ref 28–44)
INTERVENTRICULAR SEPTUM IN END DIASTOLE: 1.7 CM (ref 0.6–1)
IVS/PW RATIO: 1.3
LA AREA 1: 21.8 CM2
LA AREA 2: 26.8 CM2
LEFT ATRIUM LENGTH: 6.22 CM
LEFT ATRIUM SIZE: 4.7 CM
LEFT ATRIUM TO AORTIC ROOT RATIO: 1.31 NO UNITS
LEFT ATRIUM VOLUME INDEX: 38.6 ML/M2
LEFT ATRIUM VOLUME: 79.8 ML
LEFT VENTRICLE CARDIAC INDEX: 2.4 L/MIN/M2
LEFT VENTRICLE CARDIAC OUTPUT: 5 L/MIN
LEFT VENTRICLE DIASTOLIC VOLUME INDEX: 60.9 CM3/M2 (ref 34–74)
LEFT VENTRICLE DIASTOLIC VOLUME: 126 CM3 (ref 62–150)
LEFT VENTRICLE HEART RATE: 67 BPM
LEFT VENTRICLE MASS INDEX: 142.1 G/M2
LEFT VENTRICLE SYSTOLIC VOLUME INDEX: 26.1 CM3/M2 (ref 11–31)
LEFT VENTRICLE SYSTOLIC VOLUME: 54 CM3 (ref 21–61)
LEFT VENTRICULAR INTERNAL DIMENSION IN DIASTOLE: 4.7 CM (ref 4.2–5.8)
LEFT VENTRICULAR INTERNAL DIMENSION IN SYSTOLE: 3.5 CM (ref 2.5–4)
LEFT VENTRICULAR MASS: 294.1 G
LEFT VENTRICULAR OUTFLOW TRACT MEAN GRADIENT: 3 MMHG
LEFT VENTRICULAR OUTFLOW TRACT MEAN VELOCITY: 76.3 CM/S
LEFT VENTRICULAR POSTERIOR WALL IN END DIASTOLE: 1.3 CM (ref 0.6–1)
LV STROKE VOLUME INDEX: 36.3 ML/M2
MITRAL REGURGITANT VELOCITY TIME INTEGRAL: 165 CM
MR FLOW: 13 CM3
MR MEAN GRADIENT: 74 MMHG
MR MEAN VELOCITY: 412 CM/S
MR PEAK GRADIENT: 105.3 MMHG
MR PISA EROA: 0.1 CM2
MR PISA RADIUS: 0.4 CM
MR PISA VN NYQUIST: 38.5 CM/S
MV AVERAGE E/E' RATIO: 21.1 CM/S
MV DECELERATION TIME: 211 MS
MV E'TISSUE VEL-LAT: 8.29 CM/S
MV E'TISSUE VEL-MED: 5.07 CM/S
MV LATERAL E/E' RATIO: 17
MV MEDIAL E/E' RATIO: 27.8
MV PEAK E VELOCITY: 141 CM/S
MV REGURGITANT VOLUME: 12.4 CC
NUC REST DIASTOLIC VOLUME INDEX: 3216 LBS
NUC REST SYSTOLIC VOLUME INDEX: 67 IN
PISA MR PEAK VEL: 513 CM/S
PR MAX PG: 8 MMHG
PR PEAK VELOCITY: 151 CM/S
TRICUSPID REGURGITATION PEAK PRESSURE GRADIENT: 30.7 MMHG
TRICUSPID VALVE ANULAR PLANE SYSTOLIC EXCURSION: 2.1 CM
TRICUSPID VALVE PEAK REGURGITANT VELOCITY: 277 CM/S

## 2018-10-23 ENCOUNTER — COMMUNICATION - HEALTHEAST (OUTPATIENT)
Dept: ANTICOAGULATION | Facility: CLINIC | Age: 83
End: 2018-10-23

## 2018-10-23 ENCOUNTER — AMBULATORY - HEALTHEAST (OUTPATIENT)
Dept: LAB | Facility: CLINIC | Age: 83
End: 2018-10-23

## 2018-10-23 DIAGNOSIS — I48.20 CHRONIC ATRIAL FIBRILLATION (H): ICD-10-CM

## 2018-10-23 LAB — INR PPP: 1.7 (ref 0.9–1.1)

## 2018-10-30 ENCOUNTER — COMMUNICATION - HEALTHEAST (OUTPATIENT)
Dept: ANTICOAGULATION | Facility: CLINIC | Age: 83
End: 2018-10-30

## 2018-10-30 ENCOUNTER — AMBULATORY - HEALTHEAST (OUTPATIENT)
Dept: LAB | Facility: CLINIC | Age: 83
End: 2018-10-30

## 2018-10-30 DIAGNOSIS — I48.20 CHRONIC ATRIAL FIBRILLATION (H): ICD-10-CM

## 2018-10-30 LAB — INR PPP: 1.9 (ref 0.9–1.1)

## 2018-11-06 ENCOUNTER — COMMUNICATION - HEALTHEAST (OUTPATIENT)
Dept: FAMILY MEDICINE | Facility: CLINIC | Age: 83
End: 2018-11-06

## 2018-11-07 ENCOUNTER — TELEPHONE (OUTPATIENT)
Dept: FAMILY MEDICINE | Facility: CLINIC | Age: 83
End: 2018-11-07

## 2018-11-07 DIAGNOSIS — J44.9 CHRONIC OBSTRUCTIVE PULMONARY DISEASE, UNSPECIFIED COPD TYPE (H): ICD-10-CM

## 2018-11-07 NOTE — LETTER
Griffin Walton  [address]  [place] [address]          Dear Griffin,    We have attempted to reach you by phone several times.  We received a refill request for albuterol nebulizer solution for you.  We have refilled this for you one time.  It is unclear to us if we are your primary care providers or is [facility]?  Please let us know so next time we know how to handle your prescription refill request.        Thank you,            Your [facility] Team/Dana ALVARADO RN

## 2018-11-07 NOTE — TELEPHONE ENCOUNTER
Sent [employer] requesting clarification if seeing our provider or [facility]    Shana Barrett, HUANGN, RN

## 2018-11-07 NOTE — TELEPHONE ENCOUNTER
"Requested Prescriptions   Pending Prescriptions Disp Refills     albuterol (2.5 MG/3ML) 0.083% neb solution [Pharmacy Med Name: Albuterol Sulfate Inhalation Nebulization Solution (2.5 MG/3ML) 0.083%]  Last Written Prescription Date:  10/02/17  Last Fill Quantity: 63vials,  # refills: 11   Last office visit: 6/12/2018 with prescribing provider:  06/12/18   Future Office Visit:     150 mL 5     Sig: TAKE 1 VIAL (2.5 MG) BY NEBULIZATION EVERY 6 HOURS AS NEEDED FOR SHORTNESS OF BREATH / DYSPNEA OR WHEEZING    Asthma Maintenance Inhalers - Anticholinergics Passed    11/7/2018  7:34 AM       Passed - Patient is age 12 years or older       Passed - Asthma control assessment score within normal limits in last 6 months    Please review ACT score.   ACT Total Scores 6/12/2018   ACT TOTAL SCORE (Goal Greater than or Equal to 20) 20   In the past 12 months, how many times did you visit the emergency room for your asthma without being admitted to the hospital? 0   In the past 12 months, how many times were you hospitalized overnight because of your asthma? 0            Passed - Recent (6 mo) or future (30 days) visit within the authorizing provider's specialty    Patient had office visit in the last 6 months or has a visit in the next 30 days with authorizing provider or within the authorizing provider's specialty.  See \"Patient Info\" tab in inbasket, or \"Choose Columns\" in Meds & Orders section of the refill encounter.              "

## 2018-11-08 ENCOUNTER — COMMUNICATION - HEALTHEAST (OUTPATIENT)
Dept: ANTICOAGULATION | Facility: CLINIC | Age: 83
End: 2018-11-08

## 2018-11-08 ENCOUNTER — AMBULATORY - HEALTHEAST (OUTPATIENT)
Dept: LAB | Facility: CLINIC | Age: 83
End: 2018-11-08

## 2018-11-08 DIAGNOSIS — I48.20 CHRONIC ATRIAL FIBRILLATION (H): ICD-10-CM

## 2018-11-08 LAB — INR PPP: 2 (ref 0.9–1.1)

## 2018-11-09 NOTE — TELEPHONE ENCOUNTER
3rd attempt: Is patient [facility] or [facility]?     Left message for patient to return call to clinic.   Geeta Valencia RN

## 2018-11-12 RX ORDER — ALBUTEROL SULFATE 0.83 MG/ML
SOLUTION RESPIRATORY (INHALATION)
Qty: 150 ML | Refills: 0 | Status: SHIPPED | OUTPATIENT
Start: 2018-11-12

## 2018-11-12 NOTE — TELEPHONE ENCOUNTER
Left message for the patient to call the clinic. I have refilled one time   I have mailed a letter also.  Dana ALVARADO RN

## 2018-11-13 ENCOUNTER — COMMUNICATION - HEALTHEAST (OUTPATIENT)
Dept: FAMILY MEDICINE | Facility: CLINIC | Age: 83
End: 2018-11-13

## 2018-11-15 ENCOUNTER — COMMUNICATION - HEALTHEAST (OUTPATIENT)
Dept: FAMILY MEDICINE | Facility: CLINIC | Age: 83
End: 2018-11-15

## 2018-11-15 DIAGNOSIS — I48.20 CHRONIC ATRIAL FIBRILLATION (H): ICD-10-CM

## 2018-11-20 ENCOUNTER — COMMUNICATION - HEALTHEAST (OUTPATIENT)
Dept: ANTICOAGULATION | Facility: CLINIC | Age: 83
End: 2018-11-20

## 2018-11-20 ENCOUNTER — AMBULATORY - HEALTHEAST (OUTPATIENT)
Dept: LAB | Facility: CLINIC | Age: 83
End: 2018-11-20

## 2018-11-20 DIAGNOSIS — I48.20 CHRONIC ATRIAL FIBRILLATION (H): ICD-10-CM

## 2018-11-20 LAB — INR PPP: 2 (ref 0.9–1.1)

## 2018-12-05 ENCOUNTER — COMMUNICATION - HEALTHEAST (OUTPATIENT)
Dept: FAMILY MEDICINE | Facility: CLINIC | Age: 83
End: 2018-12-05

## 2018-12-10 ENCOUNTER — COMMUNICATION - HEALTHEAST (OUTPATIENT)
Dept: FAMILY MEDICINE | Facility: CLINIC | Age: 83
End: 2018-12-10

## 2018-12-10 DIAGNOSIS — I48.20 CHRONIC ATRIAL FIBRILLATION (H): ICD-10-CM

## 2018-12-17 ENCOUNTER — RECORDS - HEALTHEAST (OUTPATIENT)
Dept: ADMINISTRATIVE | Facility: OTHER | Age: 83
End: 2018-12-17

## 2018-12-18 ENCOUNTER — COMMUNICATION - HEALTHEAST (OUTPATIENT)
Dept: ANTICOAGULATION | Facility: CLINIC | Age: 83
End: 2018-12-18

## 2018-12-18 ENCOUNTER — AMBULATORY - HEALTHEAST (OUTPATIENT)
Dept: LAB | Facility: CLINIC | Age: 83
End: 2018-12-18

## 2018-12-18 DIAGNOSIS — I48.20 CHRONIC ATRIAL FIBRILLATION (H): ICD-10-CM

## 2018-12-18 LAB — INR PPP: 2.4 (ref 0.9–1.1)

## 2018-12-20 ENCOUNTER — TRANSFERRED RECORDS (OUTPATIENT)
Dept: HEALTH INFORMATION MANAGEMENT | Facility: CLINIC | Age: 83
End: 2018-12-20

## 2018-12-21 ENCOUNTER — TELEPHONE (OUTPATIENT)
Dept: FAMILY MEDICINE | Facility: CLINIC | Age: 83
End: 2018-12-21

## 2019-01-01 ENCOUNTER — COMMUNICATION - HEALTHEAST (OUTPATIENT)
Dept: ANTICOAGULATION | Facility: CLINIC | Age: 84
End: 2019-01-01

## 2019-01-01 ENCOUNTER — OFFICE VISIT - HEALTHEAST (OUTPATIENT)
Dept: CARDIOLOGY | Facility: CLINIC | Age: 84
End: 2019-01-01

## 2019-01-01 ENCOUNTER — COMMUNICATION - HEALTHEAST (OUTPATIENT)
Dept: RESPIRATORY THERAPY | Facility: HOSPITAL | Age: 84
End: 2019-01-01

## 2019-01-01 ENCOUNTER — TELEPHONE (OUTPATIENT)
Dept: FAMILY MEDICINE | Facility: CLINIC | Age: 84
End: 2019-01-01

## 2019-01-01 ENCOUNTER — COMMUNICATION - HEALTHEAST (OUTPATIENT)
Dept: CARDIOLOGY | Facility: CLINIC | Age: 84
End: 2019-01-01

## 2019-01-01 ENCOUNTER — AMBULATORY - HEALTHEAST (OUTPATIENT)
Dept: CARDIOLOGY | Facility: CLINIC | Age: 84
End: 2019-01-01

## 2019-01-01 ENCOUNTER — COMMUNICATION - HEALTHEAST (OUTPATIENT)
Dept: FAMILY MEDICINE | Facility: CLINIC | Age: 84
End: 2019-01-01

## 2019-01-01 ENCOUNTER — AMBULATORY - HEALTHEAST (OUTPATIENT)
Dept: LAB | Facility: CLINIC | Age: 84
End: 2019-01-01

## 2019-01-01 ENCOUNTER — COMMUNICATION - HEALTHEAST (OUTPATIENT)
Dept: INTERVENTIONAL RADIOLOGY/VASCULAR | Facility: CLINIC | Age: 84
End: 2019-01-01

## 2019-01-01 ENCOUNTER — COMMUNICATION - HEALTHEAST (OUTPATIENT)
Dept: INTERNAL MEDICINE | Facility: CLINIC | Age: 84
End: 2019-01-01

## 2019-01-01 ENCOUNTER — OFFICE VISIT - HEALTHEAST (OUTPATIENT)
Dept: INTERNAL MEDICINE | Facility: CLINIC | Age: 84
End: 2019-01-01

## 2019-01-01 ENCOUNTER — OFFICE VISIT - HEALTHEAST (OUTPATIENT)
Dept: FAMILY MEDICINE | Facility: CLINIC | Age: 84
End: 2019-01-01

## 2019-01-01 ENCOUNTER — RECORDS - HEALTHEAST (OUTPATIENT)
Dept: ADMINISTRATIVE | Facility: OTHER | Age: 84
End: 2019-01-01

## 2019-01-01 ENCOUNTER — COMMUNICATION - HEALTHEAST (OUTPATIENT)
Dept: SCHEDULING | Facility: CLINIC | Age: 84
End: 2019-01-01

## 2019-01-01 ENCOUNTER — COMMUNICATION - HEALTHEAST (OUTPATIENT)
Dept: CARE COORDINATION | Facility: CLINIC | Age: 84
End: 2019-01-01

## 2019-01-01 DIAGNOSIS — I48.20 CHRONIC ATRIAL FIBRILLATION (H): ICD-10-CM

## 2019-01-01 DIAGNOSIS — I63.9 ACUTE ISCHEMIC STROKE (H): ICD-10-CM

## 2019-01-01 DIAGNOSIS — I10 PRIMARY HYPERTENSION: ICD-10-CM

## 2019-01-01 DIAGNOSIS — E11.22 TYPE 2 DIABETES MELLITUS WITH CHRONIC KIDNEY DISEASE ON CHRONIC DIALYSIS, WITH LONG-TERM CURRENT USE OF INSULIN (H): ICD-10-CM

## 2019-01-01 DIAGNOSIS — N18.6 ESRD (END STAGE RENAL DISEASE) ON DIALYSIS (H): ICD-10-CM

## 2019-01-01 DIAGNOSIS — I50.21 ACUTE SYSTOLIC CONGESTIVE HEART FAILURE (H): ICD-10-CM

## 2019-01-01 DIAGNOSIS — Z99.2 TYPE 2 DIABETES MELLITUS WITH CHRONIC KIDNEY DISEASE ON CHRONIC DIALYSIS, WITH LONG-TERM CURRENT USE OF INSULIN (H): ICD-10-CM

## 2019-01-01 DIAGNOSIS — Z99.81 ON HOME OXYGEN THERAPY: ICD-10-CM

## 2019-01-01 DIAGNOSIS — Z66 DNR (DO NOT RESUSCITATE): ICD-10-CM

## 2019-01-01 DIAGNOSIS — I50.43 ACUTE ON CHRONIC COMBINED SYSTOLIC AND DIASTOLIC CONGESTIVE HEART FAILURE (H): ICD-10-CM

## 2019-01-01 DIAGNOSIS — N18.6 TYPE 2 DIABETES MELLITUS WITH CHRONIC KIDNEY DISEASE ON CHRONIC DIALYSIS, WITH LONG-TERM CURRENT USE OF INSULIN (H): ICD-10-CM

## 2019-01-01 DIAGNOSIS — Z99.2 ESRD (END STAGE RENAL DISEASE) ON DIALYSIS (H): ICD-10-CM

## 2019-01-01 DIAGNOSIS — I48.91 ATRIAL FIBRILLATION WITH RAPID VENTRICULAR RESPONSE (H): ICD-10-CM

## 2019-01-01 DIAGNOSIS — Z95.2 S/P TAVR (TRANSCATHETER AORTIC VALVE REPLACEMENT): ICD-10-CM

## 2019-01-01 DIAGNOSIS — I48.91 ATRIAL FIBRILLATION WITH RVR (H): ICD-10-CM

## 2019-01-01 DIAGNOSIS — Z79.4 CONTROLLED TYPE 2 DIABETES MELLITUS WITH DIABETIC NEPHROPATHY, WITH LONG-TERM CURRENT USE OF INSULIN (H): ICD-10-CM

## 2019-01-01 DIAGNOSIS — Z79.4 TYPE 2 DIABETES MELLITUS WITH CHRONIC KIDNEY DISEASE ON CHRONIC DIALYSIS, WITH LONG-TERM CURRENT USE OF INSULIN (H): ICD-10-CM

## 2019-01-01 DIAGNOSIS — I50.31 ACUTE DIASTOLIC CONGESTIVE HEART FAILURE (H): ICD-10-CM

## 2019-01-01 DIAGNOSIS — I25.10 CAD (CORONARY ARTERY DISEASE): ICD-10-CM

## 2019-01-01 DIAGNOSIS — I25.10 CORONARY ARTERY DISEASE INVOLVING NATIVE CORONARY ARTERY OF NATIVE HEART WITHOUT ANGINA PECTORIS: ICD-10-CM

## 2019-01-01 DIAGNOSIS — H54.8 LEGALLY BLIND IN RIGHT EYE, AS DEFINED IN USA: ICD-10-CM

## 2019-01-01 DIAGNOSIS — I35.0 NONRHEUMATIC AORTIC VALVE STENOSIS: ICD-10-CM

## 2019-01-01 DIAGNOSIS — I35.0 SEVERE AORTIC STENOSIS: ICD-10-CM

## 2019-01-01 DIAGNOSIS — E11.21 CONTROLLED TYPE 2 DIABETES MELLITUS WITH DIABETIC NEPHROPATHY, WITH LONG-TERM CURRENT USE OF INSULIN (H): ICD-10-CM

## 2019-01-01 DIAGNOSIS — J44.9 CHRONIC OBSTRUCTIVE PULMONARY DISEASE, UNSPECIFIED COPD TYPE (H): ICD-10-CM

## 2019-01-01 DIAGNOSIS — I42.9 SECONDARY CARDIOMYOPATHY (H): ICD-10-CM

## 2019-01-01 DIAGNOSIS — Z99.2 DIALYSIS PATIENT (H): ICD-10-CM

## 2019-01-01 LAB
ANION GAP SERPL CALCULATED.3IONS-SCNC: 11 MMOL/L (ref 5–18)
BUN SERPL-MCNC: 46 MG/DL (ref 8–28)
CALCIUM SERPL-MCNC: 9.5 MG/DL (ref 8.5–10.5)
CHLORIDE BLD-SCNC: 96 MMOL/L (ref 98–107)
CO2 SERPL-SCNC: 31 MMOL/L (ref 22–31)
CREAT SERPL-MCNC: 6.6 MG/DL (ref 0.7–1.3)
GFR SERPL CREATININE-BSD FRML MDRD: 8 ML/MIN/1.73M2
GLUCOSE BLD-MCNC: 69 MG/DL (ref 70–125)
HBA1C MFR BLD: 6.4 % (ref 3.5–6)
INR PPP: 1.8 (ref 0.9–1.1)
INR PPP: 1.9 (ref 0.9–1.1)
INR PPP: 2 (ref 0.9–1.1)
INR PPP: 2.4 (ref 0.9–1.1)
INR PPP: 2.4 (ref 0.9–1.1)
INR PPP: 2.5 (ref 0.9–1.1)
INR PPP: 2.5 (ref 0.9–1.1)
INR PPP: 3 (ref 0.9–1.1)
INR PPP: 3.1 (ref 0.9–1.1)
INR PPP: 3.3 (ref 0.9–1.1)
INR PPP: 3.3 (ref 0.9–1.1)
INR PPP: 3.4 (ref 0.9–1.1)
POC INR - HE - HISTORICAL: 2.1 (ref 0.9–1.1)
POC INR - HE - HISTORICAL: 4 (ref 0.9–1.1)
POTASSIUM BLD-SCNC: 4.9 MMOL/L (ref 3.5–5)
SODIUM SERPL-SCNC: 138 MMOL/L (ref 136–145)

## 2019-01-01 RX ORDER — INSULIN GLARGINE 100 [IU]/ML
INJECTION, SOLUTION SUBCUTANEOUS
Qty: 15 ML | Refills: 5 | OUTPATIENT
Start: 2019-01-01

## 2019-01-01 ASSESSMENT — MIFFLIN-ST. JEOR
SCORE: 1520.41
SCORE: 1521.77

## 2019-01-15 ENCOUNTER — COMMUNICATION - HEALTHEAST (OUTPATIENT)
Dept: FAMILY MEDICINE | Facility: CLINIC | Age: 84
End: 2019-01-15

## 2019-01-17 ENCOUNTER — HOME CARE/HOSPICE - HEALTHEAST (OUTPATIENT)
Dept: HOME HEALTH SERVICES | Facility: HOME HEALTH | Age: 84
End: 2019-01-17

## 2019-01-18 ENCOUNTER — COMMUNICATION - HEALTHEAST (OUTPATIENT)
Dept: RESPIRATORY THERAPY | Facility: HOSPITAL | Age: 84
End: 2019-01-18

## 2019-01-18 ENCOUNTER — COMMUNICATION - HEALTHEAST (OUTPATIENT)
Dept: HOME HEALTH SERVICES | Facility: HOME HEALTH | Age: 84
End: 2019-01-18

## 2019-01-18 ENCOUNTER — COMMUNICATION - HEALTHEAST (OUTPATIENT)
Dept: ANTICOAGULATION | Facility: CLINIC | Age: 84
End: 2019-01-18

## 2019-01-18 DIAGNOSIS — I48.20 CHRONIC ATRIAL FIBRILLATION (H): ICD-10-CM

## 2019-01-21 ENCOUNTER — COMMUNICATION - HEALTHEAST (OUTPATIENT)
Dept: CARE COORDINATION | Facility: CLINIC | Age: 84
End: 2019-01-21

## 2019-01-22 ENCOUNTER — OFFICE VISIT - HEALTHEAST (OUTPATIENT)
Dept: FAMILY MEDICINE | Facility: CLINIC | Age: 84
End: 2019-01-22

## 2019-01-22 ENCOUNTER — COMMUNICATION - HEALTHEAST (OUTPATIENT)
Dept: PHARMACY | Facility: CLINIC | Age: 84
End: 2019-01-22

## 2019-01-22 ENCOUNTER — COMMUNICATION - HEALTHEAST (OUTPATIENT)
Dept: SCHEDULING | Facility: CLINIC | Age: 84
End: 2019-01-22

## 2019-01-22 ENCOUNTER — COMMUNICATION - HEALTHEAST (OUTPATIENT)
Dept: ANTICOAGULATION | Facility: CLINIC | Age: 84
End: 2019-01-22

## 2019-01-22 DIAGNOSIS — R25.1 SHAKING: ICD-10-CM

## 2019-01-22 DIAGNOSIS — Z79.4 CONTROLLED TYPE 2 DIABETES MELLITUS WITH DIABETIC NEPHROPATHY, WITH LONG-TERM CURRENT USE OF INSULIN (H): ICD-10-CM

## 2019-01-22 DIAGNOSIS — I63.9 ACUTE ISCHEMIC STROKE (H): ICD-10-CM

## 2019-01-22 DIAGNOSIS — J44.9 CHRONIC OBSTRUCTIVE PULMONARY DISEASE, UNSPECIFIED COPD TYPE (H): ICD-10-CM

## 2019-01-22 DIAGNOSIS — I48.20 CHRONIC ATRIAL FIBRILLATION (H): ICD-10-CM

## 2019-01-22 DIAGNOSIS — I10 HYPERTENSION, UNSPECIFIED TYPE: ICD-10-CM

## 2019-01-22 DIAGNOSIS — J44.1 COPD EXACERBATION (H): ICD-10-CM

## 2019-01-22 DIAGNOSIS — Z79.01 LONG TERM CURRENT USE OF ANTICOAGULANT THERAPY: ICD-10-CM

## 2019-01-22 DIAGNOSIS — N18.6 ESRD (END STAGE RENAL DISEASE) ON DIALYSIS (H): ICD-10-CM

## 2019-01-22 DIAGNOSIS — Z99.2 ESRD (END STAGE RENAL DISEASE) ON DIALYSIS (H): ICD-10-CM

## 2019-01-22 DIAGNOSIS — E11.21 CONTROLLED TYPE 2 DIABETES MELLITUS WITH DIABETIC NEPHROPATHY, WITH LONG-TERM CURRENT USE OF INSULIN (H): ICD-10-CM

## 2019-01-22 DIAGNOSIS — I35.0 NONRHEUMATIC AORTIC VALVE STENOSIS: ICD-10-CM

## 2019-01-22 LAB
ALBUMIN SERPL-MCNC: 3 G/DL (ref 3.5–5)
ALP SERPL-CCNC: 77 U/L (ref 45–120)
ALT SERPL W P-5'-P-CCNC: 35 U/L (ref 0–45)
ANION GAP SERPL CALCULATED.3IONS-SCNC: 15 MMOL/L (ref 5–18)
AST SERPL W P-5'-P-CCNC: 17 U/L (ref 0–40)
BASOPHILS # BLD AUTO: 0 THOU/UL (ref 0–0.2)
BASOPHILS NFR BLD AUTO: 0 % (ref 0–2)
BILIRUB SERPL-MCNC: 0.5 MG/DL (ref 0–1)
BUN SERPL-MCNC: 71 MG/DL (ref 8–28)
CALCIUM SERPL-MCNC: 8.2 MG/DL (ref 8.5–10.5)
CHLORIDE BLD-SCNC: 93 MMOL/L (ref 98–107)
CO2 SERPL-SCNC: 26 MMOL/L (ref 22–31)
CREAT SERPL-MCNC: 6.49 MG/DL (ref 0.7–1.3)
EOSINOPHIL # BLD AUTO: 0.4 THOU/UL (ref 0–0.4)
EOSINOPHIL NFR BLD AUTO: 4 % (ref 0–6)
ERYTHROCYTE [DISTWIDTH] IN BLOOD BY AUTOMATED COUNT: 17.1 % (ref 11–14.5)
GFR SERPL CREATININE-BSD FRML MDRD: 8 ML/MIN/1.73M2
GLUCOSE BLD-MCNC: 188 MG/DL (ref 70–125)
HCT VFR BLD AUTO: 34.3 % (ref 40–54)
HGB BLD-MCNC: 10.8 G/DL (ref 14–18)
INR PPP: 3.1 (ref 0.9–1.1)
LYMPHOCYTES # BLD AUTO: 0.9 THOU/UL (ref 0.8–4.4)
LYMPHOCYTES NFR BLD AUTO: 7 % (ref 20–40)
MCH RBC QN AUTO: 28.3 PG (ref 27–34)
MCHC RBC AUTO-ENTMCNC: 31.5 G/DL (ref 32–36)
MCV RBC AUTO: 90 FL (ref 80–100)
MONOCYTES # BLD AUTO: 1.3 THOU/UL (ref 0–0.9)
MONOCYTES NFR BLD AUTO: 10 % (ref 2–10)
NEUTROPHILS # BLD AUTO: 9.6 THOU/UL (ref 2–7.7)
NEUTROPHILS NFR BLD AUTO: 79 % (ref 50–70)
PLATELET # BLD AUTO: 234 THOU/UL (ref 140–440)
PMV BLD AUTO: 9.8 FL (ref 8.5–12.5)
POTASSIUM BLD-SCNC: 5.4 MMOL/L (ref 3.5–5)
PROT SERPL-MCNC: 6.3 G/DL (ref 6–8)
RBC # BLD AUTO: 3.82 MILL/UL (ref 4.4–6.2)
SODIUM SERPL-SCNC: 134 MMOL/L (ref 136–145)
WBC: 12.5 THOU/UL (ref 4–11)

## 2019-01-23 ENCOUNTER — COMMUNICATION - HEALTHEAST (OUTPATIENT)
Dept: FAMILY MEDICINE | Facility: CLINIC | Age: 84
End: 2019-01-23

## 2019-01-23 ENCOUNTER — COMMUNICATION - HEALTHEAST (OUTPATIENT)
Dept: HOME HEALTH SERVICES | Facility: HOME HEALTH | Age: 84
End: 2019-01-23

## 2019-01-24 ENCOUNTER — COMMUNICATION - HEALTHEAST (OUTPATIENT)
Dept: RESPIRATORY THERAPY | Facility: HOSPITAL | Age: 84
End: 2019-01-24

## 2019-01-26 ENCOUNTER — COMMUNICATION - HEALTHEAST (OUTPATIENT)
Dept: HOME HEALTH SERVICES | Facility: HOME HEALTH | Age: 84
End: 2019-01-26

## 2019-01-26 ENCOUNTER — HOME CARE/HOSPICE - HEALTHEAST (OUTPATIENT)
Dept: HOME HEALTH SERVICES | Facility: HOME HEALTH | Age: 84
End: 2019-01-26

## 2019-01-27 ENCOUNTER — RECORDS - HEALTHEAST (OUTPATIENT)
Dept: SCHEDULING | Facility: CLINIC | Age: 84
End: 2019-01-27

## 2019-01-27 ENCOUNTER — COMMUNICATION - HEALTHEAST (OUTPATIENT)
Dept: SCHEDULING | Facility: CLINIC | Age: 84
End: 2019-01-27

## 2019-01-27 ENCOUNTER — COMMUNICATION - HEALTHEAST (OUTPATIENT)
Dept: HOME HEALTH SERVICES | Facility: HOME HEALTH | Age: 84
End: 2019-01-27

## 2019-01-28 ENCOUNTER — HOME CARE/HOSPICE - HEALTHEAST (OUTPATIENT)
Dept: HOME HEALTH SERVICES | Facility: HOME HEALTH | Age: 84
End: 2019-01-28

## 2019-01-28 ENCOUNTER — AMBULATORY - HEALTHEAST (OUTPATIENT)
Dept: FAMILY MEDICINE | Facility: CLINIC | Age: 84
End: 2019-01-28

## 2019-01-29 ENCOUNTER — HOME CARE/HOSPICE - HEALTHEAST (OUTPATIENT)
Dept: HOME HEALTH SERVICES | Facility: HOME HEALTH | Age: 84
End: 2019-01-29

## 2019-01-31 ENCOUNTER — COMMUNICATION - HEALTHEAST (OUTPATIENT)
Dept: ANTICOAGULATION | Facility: CLINIC | Age: 84
End: 2019-01-31

## 2019-01-31 ENCOUNTER — COMMUNICATION - HEALTHEAST (OUTPATIENT)
Dept: HOME HEALTH SERVICES | Facility: HOME HEALTH | Age: 84
End: 2019-01-31

## 2019-01-31 ENCOUNTER — COMMUNICATION - HEALTHEAST (OUTPATIENT)
Dept: RESPIRATORY THERAPY | Facility: HOSPITAL | Age: 84
End: 2019-01-31

## 2019-01-31 ENCOUNTER — COMMUNICATION - HEALTHEAST (OUTPATIENT)
Dept: FAMILY MEDICINE | Facility: CLINIC | Age: 84
End: 2019-01-31

## 2019-01-31 ENCOUNTER — COMMUNICATION - HEALTHEAST (OUTPATIENT)
Dept: CARE COORDINATION | Facility: CLINIC | Age: 84
End: 2019-01-31

## 2019-01-31 DIAGNOSIS — I48.20 CHRONIC ATRIAL FIBRILLATION (H): ICD-10-CM

## 2019-02-01 ENCOUNTER — COMMUNICATION - HEALTHEAST (OUTPATIENT)
Dept: PHARMACY | Facility: CLINIC | Age: 84
End: 2019-02-01

## 2019-02-02 ENCOUNTER — COMMUNICATION - HEALTHEAST (OUTPATIENT)
Dept: HOME HEALTH SERVICES | Facility: HOME HEALTH | Age: 84
End: 2019-02-02

## 2019-02-02 ENCOUNTER — HOME CARE/HOSPICE - HEALTHEAST (OUTPATIENT)
Dept: HOME HEALTH SERVICES | Facility: HOME HEALTH | Age: 84
End: 2019-02-02

## 2019-02-03 ENCOUNTER — COMMUNICATION - HEALTHEAST (OUTPATIENT)
Dept: FAMILY MEDICINE | Facility: CLINIC | Age: 84
End: 2019-02-03

## 2019-02-03 ENCOUNTER — HOME CARE/HOSPICE - HEALTHEAST (OUTPATIENT)
Dept: HOME HEALTH SERVICES | Facility: HOME HEALTH | Age: 84
End: 2019-02-03

## 2019-02-04 ENCOUNTER — COMMUNICATION - HEALTHEAST (OUTPATIENT)
Dept: RESPIRATORY THERAPY | Facility: HOSPITAL | Age: 84
End: 2019-02-04

## 2019-02-04 ENCOUNTER — COMMUNICATION - HEALTHEAST (OUTPATIENT)
Dept: HOME HEALTH SERVICES | Facility: HOME HEALTH | Age: 84
End: 2019-02-04

## 2019-02-05 ENCOUNTER — HOME CARE/HOSPICE - HEALTHEAST (OUTPATIENT)
Dept: HOME HEALTH SERVICES | Facility: HOME HEALTH | Age: 84
End: 2019-02-05

## 2019-02-05 ENCOUNTER — COMMUNICATION - HEALTHEAST (OUTPATIENT)
Dept: FAMILY MEDICINE | Facility: CLINIC | Age: 84
End: 2019-02-05

## 2019-02-05 ENCOUNTER — COMMUNICATION - HEALTHEAST (OUTPATIENT)
Dept: HOME HEALTH SERVICES | Facility: HOME HEALTH | Age: 84
End: 2019-02-05

## 2019-02-05 ENCOUNTER — COMMUNICATION - HEALTHEAST (OUTPATIENT)
Dept: SCHEDULING | Facility: CLINIC | Age: 84
End: 2019-02-05

## 2019-02-05 DIAGNOSIS — I48.20 CHRONIC ATRIAL FIBRILLATION (H): ICD-10-CM

## 2019-02-05 LAB — INR PPP: 1.6 (ref 0.9–1.1)

## 2019-02-06 ENCOUNTER — COMMUNICATION - HEALTHEAST (OUTPATIENT)
Dept: RESPIRATORY THERAPY | Facility: HOSPITAL | Age: 84
End: 2019-02-06

## 2019-02-06 ENCOUNTER — HOME CARE/HOSPICE - HEALTHEAST (OUTPATIENT)
Dept: HOME HEALTH SERVICES | Facility: HOME HEALTH | Age: 84
End: 2019-02-06

## 2019-02-07 ENCOUNTER — AMBULATORY - HEALTHEAST (OUTPATIENT)
Dept: CARDIOLOGY | Facility: CLINIC | Age: 84
End: 2019-02-07

## 2019-02-07 ENCOUNTER — OFFICE VISIT - HEALTHEAST (OUTPATIENT)
Dept: CARDIOLOGY | Facility: CLINIC | Age: 84
End: 2019-02-07

## 2019-02-07 DIAGNOSIS — I35.0 NONRHEUMATIC AORTIC VALVE STENOSIS: ICD-10-CM

## 2019-02-07 DIAGNOSIS — I35.0 SEVERE AORTIC STENOSIS: ICD-10-CM

## 2019-02-07 LAB
ANION GAP SERPL CALCULATED.3IONS-SCNC: 10 MMOL/L (ref 5–18)
BASOPHILS # BLD AUTO: 0 THOU/UL (ref 0–0.2)
BASOPHILS NFR BLD AUTO: 0 % (ref 0–2)
BUN SERPL-MCNC: 66 MG/DL (ref 8–28)
CALCIUM SERPL-MCNC: 8.4 MG/DL (ref 8.5–10.5)
CHLORIDE BLD-SCNC: 97 MMOL/L (ref 98–107)
CO2 SERPL-SCNC: 30 MMOL/L (ref 22–31)
CREAT SERPL-MCNC: 7.17 MG/DL (ref 0.7–1.3)
EOSINOPHIL # BLD AUTO: 0.4 THOU/UL (ref 0–0.4)
EOSINOPHIL NFR BLD AUTO: 3 % (ref 0–6)
ERYTHROCYTE [DISTWIDTH] IN BLOOD BY AUTOMATED COUNT: 17.2 % (ref 11–14.5)
GFR SERPL CREATININE-BSD FRML MDRD: 7 ML/MIN/1.73M2
GLUCOSE BLD-MCNC: 107 MG/DL (ref 70–125)
HCT VFR BLD AUTO: 33.1 % (ref 40–54)
HGB BLD-MCNC: 10.5 G/DL (ref 14–18)
LYMPHOCYTES # BLD AUTO: 1.3 THOU/UL (ref 0.8–4.4)
LYMPHOCYTES NFR BLD AUTO: 10 % (ref 20–40)
MCH RBC QN AUTO: 28.4 PG (ref 27–34)
MCHC RBC AUTO-ENTMCNC: 31.7 G/DL (ref 32–36)
MCV RBC AUTO: 90 FL (ref 80–100)
MONOCYTES # BLD AUTO: 1 THOU/UL (ref 0–0.9)
MONOCYTES NFR BLD AUTO: 8 % (ref 2–10)
NEUTROPHILS # BLD AUTO: 9.4 THOU/UL (ref 2–7.7)
NEUTROPHILS NFR BLD AUTO: 79 % (ref 50–70)
PLATELET # BLD AUTO: 167 THOU/UL (ref 140–440)
PMV BLD AUTO: 9.4 FL (ref 8.5–12.5)
POTASSIUM BLD-SCNC: 4.7 MMOL/L (ref 3.5–5)
RBC # BLD AUTO: 3.7 MILL/UL (ref 4.4–6.2)
SODIUM SERPL-SCNC: 137 MMOL/L (ref 136–145)
WBC: 12.6 THOU/UL (ref 4–11)

## 2019-02-07 ASSESSMENT — MIFFLIN-ST. JEOR
SCORE: 1556.24
SCORE: 1556.24

## 2019-02-09 ENCOUNTER — HOME CARE/HOSPICE - HEALTHEAST (OUTPATIENT)
Dept: HOME HEALTH SERVICES | Facility: HOME HEALTH | Age: 84
End: 2019-02-09

## 2019-02-09 LAB — PF4 HEPARIN CMPLX AB SER QL: NEGATIVE

## 2019-02-10 ENCOUNTER — COMMUNICATION - HEALTHEAST (OUTPATIENT)
Dept: FAMILY MEDICINE | Facility: CLINIC | Age: 84
End: 2019-02-10

## 2019-02-10 ENCOUNTER — AMBULATORY - HEALTHEAST (OUTPATIENT)
Dept: OTHER | Facility: CLINIC | Age: 84
End: 2019-02-10

## 2019-02-11 ENCOUNTER — AMBULATORY - HEALTHEAST (OUTPATIENT)
Dept: CARDIOLOGY | Facility: CLINIC | Age: 84
End: 2019-02-11

## 2019-02-11 ENCOUNTER — COMMUNICATION - HEALTHEAST (OUTPATIENT)
Dept: FAMILY MEDICINE | Facility: CLINIC | Age: 84
End: 2019-02-11

## 2019-02-11 ENCOUNTER — AMBULATORY - HEALTHEAST (OUTPATIENT)
Dept: OTHER | Facility: CLINIC | Age: 84
End: 2019-02-11

## 2019-02-11 DIAGNOSIS — I35.0 SEVERE AORTIC STENOSIS: ICD-10-CM

## 2019-02-11 DIAGNOSIS — Z91.041 CONTRAST MEDIA ALLERGY: ICD-10-CM

## 2019-02-11 DIAGNOSIS — J44.9 CHRONIC OBSTRUCTIVE PULMONARY DISEASE, UNSPECIFIED COPD TYPE (H): ICD-10-CM

## 2019-02-12 ENCOUNTER — COMMUNICATION - HEALTHEAST (OUTPATIENT)
Dept: RESPIRATORY THERAPY | Facility: HOSPITAL | Age: 84
End: 2019-02-12

## 2019-02-12 ENCOUNTER — OFFICE VISIT - HEALTHEAST (OUTPATIENT)
Dept: FAMILY MEDICINE | Facility: CLINIC | Age: 84
End: 2019-02-12

## 2019-02-12 ENCOUNTER — HOME CARE/HOSPICE - HEALTHEAST (OUTPATIENT)
Dept: HOME HEALTH SERVICES | Facility: HOME HEALTH | Age: 84
End: 2019-02-12

## 2019-02-12 DIAGNOSIS — Z79.01 LONG TERM CURRENT USE OF ANTICOAGULANT THERAPY: ICD-10-CM

## 2019-02-12 DIAGNOSIS — N18.6 ESRD (END STAGE RENAL DISEASE) ON DIALYSIS (H): ICD-10-CM

## 2019-02-12 DIAGNOSIS — Z79.4 CONTROLLED TYPE 2 DIABETES MELLITUS WITH DIABETIC NEPHROPATHY, WITH LONG-TERM CURRENT USE OF INSULIN (H): ICD-10-CM

## 2019-02-12 DIAGNOSIS — I48.20 CHRONIC ATRIAL FIBRILLATION (H): ICD-10-CM

## 2019-02-12 DIAGNOSIS — Z99.2 ESRD (END STAGE RENAL DISEASE) ON DIALYSIS (H): ICD-10-CM

## 2019-02-12 DIAGNOSIS — E11.21 CONTROLLED TYPE 2 DIABETES MELLITUS WITH DIABETIC NEPHROPATHY, WITH LONG-TERM CURRENT USE OF INSULIN (H): ICD-10-CM

## 2019-02-12 DIAGNOSIS — I10 BENIGN ESSENTIAL HYPERTENSION: ICD-10-CM

## 2019-02-12 DIAGNOSIS — J44.9 CHRONIC OBSTRUCTIVE PULMONARY DISEASE, UNSPECIFIED COPD TYPE (H): ICD-10-CM

## 2019-02-13 ENCOUNTER — COMMUNICATION - HEALTHEAST (OUTPATIENT)
Dept: FAMILY MEDICINE | Facility: CLINIC | Age: 84
End: 2019-02-13

## 2019-02-14 ENCOUNTER — COMMUNICATION - HEALTHEAST (OUTPATIENT)
Dept: HOME HEALTH SERVICES | Facility: HOME HEALTH | Age: 84
End: 2019-02-14

## 2019-02-14 ENCOUNTER — COMMUNICATION - HEALTHEAST (OUTPATIENT)
Dept: SCHEDULING | Facility: CLINIC | Age: 84
End: 2019-02-14

## 2019-02-14 ENCOUNTER — COMMUNICATION - HEALTHEAST (OUTPATIENT)
Dept: FAMILY MEDICINE | Facility: CLINIC | Age: 84
End: 2019-02-14

## 2019-02-14 ENCOUNTER — HOME CARE/HOSPICE - HEALTHEAST (OUTPATIENT)
Dept: HOME HEALTH SERVICES | Facility: HOME HEALTH | Age: 84
End: 2019-02-14

## 2019-02-14 ENCOUNTER — HOSPITAL ENCOUNTER (OUTPATIENT)
Dept: CT IMAGING | Facility: CLINIC | Age: 84
Discharge: HOME | End: 2019-02-14
Attending: INTERNAL MEDICINE

## 2019-02-14 DIAGNOSIS — I48.20 CHRONIC ATRIAL FIBRILLATION (H): ICD-10-CM

## 2019-02-14 DIAGNOSIS — I35.0 SEVERE AORTIC STENOSIS: ICD-10-CM

## 2019-02-14 LAB — INR PPP: 3.6 (ref 0.9–1.1)

## 2019-02-16 ENCOUNTER — COMMUNICATION - HEALTHEAST (OUTPATIENT)
Dept: SCHEDULING | Facility: CLINIC | Age: 84
End: 2019-02-16

## 2019-02-16 ENCOUNTER — HOME CARE/HOSPICE - HEALTHEAST (OUTPATIENT)
Dept: HOME HEALTH SERVICES | Facility: HOME HEALTH | Age: 84
End: 2019-02-16

## 2019-02-17 ENCOUNTER — COMMUNICATION - HEALTHEAST (OUTPATIENT)
Dept: SCHEDULING | Facility: CLINIC | Age: 84
End: 2019-02-17

## 2019-02-18 ENCOUNTER — HOME CARE/HOSPICE - HEALTHEAST (OUTPATIENT)
Dept: HOME HEALTH SERVICES | Facility: HOME HEALTH | Age: 84
End: 2019-02-18

## 2019-02-20 ENCOUNTER — COMMUNICATION - HEALTHEAST (OUTPATIENT)
Dept: FAMILY MEDICINE | Facility: CLINIC | Age: 84
End: 2019-02-20

## 2019-02-22 ENCOUNTER — COMMUNICATION - HEALTHEAST (OUTPATIENT)
Dept: FAMILY MEDICINE | Facility: CLINIC | Age: 84
End: 2019-02-22

## 2019-02-27 ASSESSMENT — MIFFLIN-ST. JEOR: SCORE: 1505.44

## 2019-02-28 ENCOUNTER — ANESTHESIA - HEALTHEAST (OUTPATIENT)
Dept: CARDIOLOGY | Facility: CLINIC | Age: 84
End: 2019-02-28

## 2019-03-01 ENCOUNTER — SURGERY - HEALTHEAST (OUTPATIENT)
Dept: CARDIOLOGY | Facility: CLINIC | Age: 84
End: 2019-03-01

## 2019-03-01 ASSESSMENT — MIFFLIN-ST. JEOR: SCORE: 1513.15

## 2019-03-02 ASSESSMENT — MIFFLIN-ST. JEOR: SCORE: 1513.15

## 2019-03-05 ENCOUNTER — COMMUNICATION - HEALTHEAST (OUTPATIENT)
Dept: HOME HEALTH SERVICES | Facility: HOME HEALTH | Age: 84
End: 2019-03-05

## 2019-03-05 ENCOUNTER — COMMUNICATION - HEALTHEAST (OUTPATIENT)
Dept: ANTICOAGULATION | Facility: CLINIC | Age: 84
End: 2019-03-05

## 2019-03-05 ENCOUNTER — COMMUNICATION - HEALTHEAST (OUTPATIENT)
Dept: CARDIOLOGY | Facility: CLINIC | Age: 84
End: 2019-03-05

## 2019-03-05 DIAGNOSIS — I48.20 CHRONIC ATRIAL FIBRILLATION (H): ICD-10-CM

## 2019-03-06 ENCOUNTER — COMMUNICATION - HEALTHEAST (OUTPATIENT)
Dept: FAMILY MEDICINE | Facility: CLINIC | Age: 84
End: 2019-03-06

## 2019-03-07 ENCOUNTER — COMMUNICATION - HEALTHEAST (OUTPATIENT)
Dept: CARDIOLOGY | Facility: CLINIC | Age: 84
End: 2019-03-07

## 2019-03-07 ENCOUNTER — COMMUNICATION - HEALTHEAST (OUTPATIENT)
Dept: FAMILY MEDICINE | Facility: CLINIC | Age: 84
End: 2019-03-07

## 2019-03-07 ENCOUNTER — AMBULATORY - HEALTHEAST (OUTPATIENT)
Dept: OTHER | Facility: CLINIC | Age: 84
End: 2019-03-07

## 2019-03-07 ENCOUNTER — DOCUMENTATION ONLY (OUTPATIENT)
Dept: OTHER | Facility: CLINIC | Age: 84
End: 2019-03-07

## 2019-03-07 ENCOUNTER — HOME CARE/HOSPICE - HEALTHEAST (OUTPATIENT)
Dept: HOME HEALTH SERVICES | Facility: HOME HEALTH | Age: 84
End: 2019-03-07

## 2019-03-07 DIAGNOSIS — I48.20 CHRONIC ATRIAL FIBRILLATION (H): ICD-10-CM

## 2019-03-07 LAB — INR PPP: 1.8 (ref 0.9–1.1)

## 2019-03-08 ENCOUNTER — HOME CARE/HOSPICE - HEALTHEAST (OUTPATIENT)
Dept: HOME HEALTH SERVICES | Facility: HOME HEALTH | Age: 84
End: 2019-03-08

## 2019-03-08 ENCOUNTER — COMMUNICATION - HEALTHEAST (OUTPATIENT)
Dept: FAMILY MEDICINE | Facility: CLINIC | Age: 84
End: 2019-03-08

## 2019-03-08 ENCOUNTER — RECORDS - HEALTHEAST (OUTPATIENT)
Dept: ADMINISTRATIVE | Facility: OTHER | Age: 84
End: 2019-03-08

## 2019-03-08 ENCOUNTER — COMMUNICATION - HEALTHEAST (OUTPATIENT)
Dept: HOME HEALTH SERVICES | Facility: HOME HEALTH | Age: 84
End: 2019-03-08

## 2019-03-09 ENCOUNTER — RECORDS - HEALTHEAST (OUTPATIENT)
Dept: LAB | Facility: HOSPITAL | Age: 84
End: 2019-03-09

## 2019-03-09 LAB
ALBUMIN SERPL-MCNC: 2.5 G/DL (ref 3.5–5)
ALP SERPL-CCNC: 73 U/L (ref 45–120)
ALT SERPL W P-5'-P-CCNC: 29 U/L (ref 0–45)
AST SERPL W P-5'-P-CCNC: 16 U/L (ref 0–40)
BILIRUB DIRECT SERPL-MCNC: 0.2 MG/DL
BILIRUB SERPL-MCNC: 0.6 MG/DL (ref 0–1)
CK SERPL-CCNC: 23 U/L (ref 30–190)
PROT SERPL-MCNC: 6 G/DL (ref 6–8)
TSH SERPL DL<=0.005 MIU/L-ACNC: 8.04 UIU/ML (ref 0.3–5)
VIT B12 SERPL-MCNC: 1211 PG/ML (ref 213–816)

## 2019-03-11 ENCOUNTER — HOME CARE/HOSPICE - HEALTHEAST (OUTPATIENT)
Dept: HOME HEALTH SERVICES | Facility: HOME HEALTH | Age: 84
End: 2019-03-11

## 2019-03-12 ENCOUNTER — HOME CARE/HOSPICE - HEALTHEAST (OUTPATIENT)
Dept: HOME HEALTH SERVICES | Facility: HOME HEALTH | Age: 84
End: 2019-03-12

## 2019-03-17 ENCOUNTER — HOME CARE/HOSPICE - HEALTHEAST (OUTPATIENT)
Dept: HOME HEALTH SERVICES | Facility: HOME HEALTH | Age: 84
End: 2019-03-17

## 2019-03-17 ENCOUNTER — COMMUNICATION - HEALTHEAST (OUTPATIENT)
Dept: CARE COORDINATION | Facility: CLINIC | Age: 84
End: 2019-03-17

## 2019-03-18 ENCOUNTER — COMMUNICATION - HEALTHEAST (OUTPATIENT)
Dept: CARE COORDINATION | Facility: CLINIC | Age: 84
End: 2019-03-18

## 2019-03-18 ENCOUNTER — AMBULATORY - HEALTHEAST (OUTPATIENT)
Dept: CARDIOLOGY | Facility: CLINIC | Age: 84
End: 2019-03-18

## 2019-03-18 ENCOUNTER — HOME CARE/HOSPICE - HEALTHEAST (OUTPATIENT)
Dept: HOME HEALTH SERVICES | Facility: HOME HEALTH | Age: 84
End: 2019-03-18

## 2019-03-18 ENCOUNTER — COMMUNICATION - HEALTHEAST (OUTPATIENT)
Dept: FAMILY MEDICINE | Facility: CLINIC | Age: 84
End: 2019-03-18

## 2019-03-18 DIAGNOSIS — I35.0 SEVERE AORTIC STENOSIS: ICD-10-CM

## 2019-03-18 DIAGNOSIS — I48.20 CHRONIC ATRIAL FIBRILLATION (H): ICD-10-CM

## 2019-03-18 LAB — INR PPP: 1.9 (ref 0.9–1.1)

## 2019-03-19 ENCOUNTER — COMMUNICATION - HEALTHEAST (OUTPATIENT)
Dept: SCHEDULING | Facility: CLINIC | Age: 84
End: 2019-03-19

## 2019-03-19 ENCOUNTER — OFFICE VISIT - HEALTHEAST (OUTPATIENT)
Dept: FAMILY MEDICINE | Facility: CLINIC | Age: 84
End: 2019-03-19

## 2019-03-19 DIAGNOSIS — R09.89 RESPIRATORY COMPROMISE: ICD-10-CM

## 2019-03-19 DIAGNOSIS — I35.0 NONRHEUMATIC AORTIC VALVE STENOSIS: ICD-10-CM

## 2019-03-19 DIAGNOSIS — I48.91 ATRIAL FIBRILLATION WITH RVR (H): ICD-10-CM

## 2019-03-19 DIAGNOSIS — Z99.81 ON HOME OXYGEN THERAPY: ICD-10-CM

## 2019-03-19 DIAGNOSIS — I25.10 CORONARY ARTERY DISEASE INVOLVING NATIVE CORONARY ARTERY OF NATIVE HEART WITHOUT ANGINA PECTORIS: ICD-10-CM

## 2019-03-19 DIAGNOSIS — Z79.01 LONG TERM CURRENT USE OF ANTICOAGULANT THERAPY: ICD-10-CM

## 2019-03-19 DIAGNOSIS — J96.21 ACUTE ON CHRONIC RESPIRATORY FAILURE WITH HYPOXIA (H): ICD-10-CM

## 2019-03-19 DIAGNOSIS — I48.20 CHRONIC ATRIAL FIBRILLATION (H): ICD-10-CM

## 2019-03-19 DIAGNOSIS — Z99.2 DIALYSIS PATIENT (H): ICD-10-CM

## 2019-03-19 DIAGNOSIS — J44.1 COPD EXACERBATION (H): ICD-10-CM

## 2019-03-19 DIAGNOSIS — I35.0 SEVERE CALCIFIC AORTIC STENOSIS: ICD-10-CM

## 2019-03-19 LAB
ANION GAP SERPL CALCULATED.3IONS-SCNC: 12 MMOL/L (ref 5–18)
BASOPHILS # BLD AUTO: 0.1 THOU/UL (ref 0–0.2)
BASOPHILS NFR BLD AUTO: 1 % (ref 0–2)
BUN SERPL-MCNC: 41 MG/DL (ref 8–28)
CALCIUM SERPL-MCNC: 8.3 MG/DL (ref 8.5–10.5)
CHLORIDE BLD-SCNC: 96 MMOL/L (ref 98–107)
CO2 SERPL-SCNC: 28 MMOL/L (ref 22–31)
CREAT SERPL-MCNC: 6.33 MG/DL (ref 0.7–1.3)
EOSINOPHIL # BLD AUTO: 0.5 THOU/UL (ref 0–0.4)
EOSINOPHIL NFR BLD AUTO: 5 % (ref 0–6)
ERYTHROCYTE [DISTWIDTH] IN BLOOD BY AUTOMATED COUNT: 15.5 % (ref 11–14.5)
GFR SERPL CREATININE-BSD FRML MDRD: 8 ML/MIN/1.73M2
GLUCOSE BLD-MCNC: 101 MG/DL (ref 70–125)
HCT VFR BLD AUTO: 27.2 % (ref 40–54)
HGB BLD-MCNC: 9 G/DL (ref 14–18)
LYMPHOCYTES # BLD AUTO: 2.1 THOU/UL (ref 0.8–4.4)
LYMPHOCYTES NFR BLD AUTO: 20 % (ref 20–40)
MCH RBC QN AUTO: 28.9 PG (ref 27–34)
MCHC RBC AUTO-ENTMCNC: 33.2 G/DL (ref 32–36)
MCV RBC AUTO: 87 FL (ref 80–100)
MONOCYTES # BLD AUTO: 0.9 THOU/UL (ref 0–0.9)
MONOCYTES NFR BLD AUTO: 9 % (ref 2–10)
NEUTROPHILS # BLD AUTO: 6.8 THOU/UL (ref 2–7.7)
NEUTROPHILS NFR BLD AUTO: 66 % (ref 50–70)
PLATELET # BLD AUTO: 272 THOU/UL (ref 140–440)
PMV BLD AUTO: 7.3 FL (ref 7–10)
POTASSIUM BLD-SCNC: 5.2 MMOL/L (ref 3.5–5)
RBC # BLD AUTO: 3.12 MILL/UL (ref 4.4–6.2)
SODIUM SERPL-SCNC: 136 MMOL/L (ref 136–145)
WBC: 10.4 THOU/UL (ref 4–11)

## 2019-03-20 ENCOUNTER — COMMUNICATION - HEALTHEAST (OUTPATIENT)
Dept: FAMILY MEDICINE | Facility: CLINIC | Age: 84
End: 2019-03-20

## 2019-03-21 ENCOUNTER — HOME CARE/HOSPICE - HEALTHEAST (OUTPATIENT)
Dept: HOME HEALTH SERVICES | Facility: HOME HEALTH | Age: 84
End: 2019-03-21

## 2019-03-21 ENCOUNTER — COMMUNICATION - HEALTHEAST (OUTPATIENT)
Dept: FAMILY MEDICINE | Facility: CLINIC | Age: 84
End: 2019-03-21

## 2019-03-21 DIAGNOSIS — I48.20 CHRONIC ATRIAL FIBRILLATION (H): ICD-10-CM

## 2019-03-21 LAB — INR PPP: 2.7 (ref 0.9–1.1)

## 2019-03-25 ENCOUNTER — COMMUNICATION - HEALTHEAST (OUTPATIENT)
Dept: FAMILY MEDICINE | Facility: CLINIC | Age: 84
End: 2019-03-25

## 2019-03-25 ENCOUNTER — COMMUNICATION - HEALTHEAST (OUTPATIENT)
Dept: PHYSICAL THERAPY | Facility: CLINIC | Age: 84
End: 2019-03-25

## 2019-03-25 ENCOUNTER — RECORDS - HEALTHEAST (OUTPATIENT)
Dept: ADMINISTRATIVE | Facility: OTHER | Age: 84
End: 2019-03-25

## 2019-03-25 ENCOUNTER — HOME CARE/HOSPICE - HEALTHEAST (OUTPATIENT)
Dept: HOME HEALTH SERVICES | Facility: HOME HEALTH | Age: 84
End: 2019-03-25

## 2019-03-25 ENCOUNTER — COMMUNICATION - HEALTHEAST (OUTPATIENT)
Dept: CARDIOLOGY | Facility: CLINIC | Age: 84
End: 2019-03-25

## 2019-03-25 DIAGNOSIS — I35.0 SEVERE AORTIC STENOSIS: ICD-10-CM

## 2019-03-25 DIAGNOSIS — Z91.041 CONTRAST MEDIA ALLERGY: ICD-10-CM

## 2019-03-25 DIAGNOSIS — I10 ESSENTIAL HYPERTENSION: ICD-10-CM

## 2019-03-26 ENCOUNTER — HOME CARE/HOSPICE - HEALTHEAST (OUTPATIENT)
Dept: HOME HEALTH SERVICES | Facility: HOME HEALTH | Age: 84
End: 2019-03-26

## 2019-03-26 ENCOUNTER — HOSPITAL ENCOUNTER (OUTPATIENT)
Dept: RADIOLOGY | Facility: HOSPITAL | Age: 84
Discharge: HOME | End: 2019-03-26
Attending: INTERNAL MEDICINE

## 2019-03-26 ENCOUNTER — COMMUNICATION - HEALTHEAST (OUTPATIENT)
Dept: FAMILY MEDICINE | Facility: CLINIC | Age: 84
End: 2019-03-26

## 2019-03-26 DIAGNOSIS — I48.20 CHRONIC ATRIAL FIBRILLATION (H): ICD-10-CM

## 2019-03-26 DIAGNOSIS — I35.0 SEVERE AORTIC STENOSIS: ICD-10-CM

## 2019-03-26 LAB — INR PPP: 2.9 (ref 0.9–1.1)

## 2019-03-28 ENCOUNTER — HOME CARE/HOSPICE - HEALTHEAST (OUTPATIENT)
Dept: HOME HEALTH SERVICES | Facility: HOME HEALTH | Age: 84
End: 2019-03-28

## 2019-03-29 ENCOUNTER — SURGERY - HEALTHEAST (OUTPATIENT)
Dept: CARDIOLOGY | Facility: CLINIC | Age: 84
End: 2019-03-29

## 2019-03-29 ENCOUNTER — COMMUNICATION - HEALTHEAST (OUTPATIENT)
Dept: RESPIRATORY THERAPY | Facility: HOSPITAL | Age: 84
End: 2019-03-29

## 2019-03-29 ENCOUNTER — AMBULATORY - HEALTHEAST (OUTPATIENT)
Dept: CARDIOLOGY | Facility: CLINIC | Age: 84
End: 2019-03-29

## 2019-03-29 ENCOUNTER — COMMUNICATION - HEALTHEAST (OUTPATIENT)
Dept: CARDIOLOGY | Facility: CLINIC | Age: 84
End: 2019-03-29

## 2019-03-29 DIAGNOSIS — I35.0 SEVERE AORTIC STENOSIS: ICD-10-CM

## 2019-03-29 DIAGNOSIS — I50.9 HEART FAILURE (H): ICD-10-CM

## 2019-04-01 ENCOUNTER — AMBULATORY - HEALTHEAST (OUTPATIENT)
Dept: CARDIOLOGY | Facility: CLINIC | Age: 84
End: 2019-04-01

## 2019-04-01 ENCOUNTER — ANESTHESIA - HEALTHEAST (OUTPATIENT)
Dept: CARDIOLOGY | Facility: CLINIC | Age: 84
End: 2019-04-01

## 2019-04-01 ENCOUNTER — COMMUNICATION - HEALTHEAST (OUTPATIENT)
Dept: ANTICOAGULATION | Facility: CLINIC | Age: 84
End: 2019-04-01

## 2019-04-01 ENCOUNTER — COMMUNICATION - HEALTHEAST (OUTPATIENT)
Dept: CARDIOLOGY | Facility: CLINIC | Age: 84
End: 2019-04-01

## 2019-04-01 DIAGNOSIS — I50.9 HEART FAILURE (H): ICD-10-CM

## 2019-04-01 DIAGNOSIS — I35.0 SEVERE AORTIC STENOSIS: ICD-10-CM

## 2019-04-01 DIAGNOSIS — I48.20 CHRONIC ATRIAL FIBRILLATION (H): ICD-10-CM

## 2019-04-01 DIAGNOSIS — I35.0 NONRHEUMATIC AORTIC VALVE STENOSIS: ICD-10-CM

## 2019-04-01 LAB
ABO/RH(D): NORMAL
ALBUMIN SERPL-MCNC: 2.4 G/DL (ref 3.5–5)
ALP SERPL-CCNC: 74 U/L (ref 45–120)
ALT SERPL W P-5'-P-CCNC: 24 U/L (ref 0–45)
ANION GAP SERPL CALCULATED.3IONS-SCNC: 14 MMOL/L (ref 5–18)
ANTIBODY SCREEN: NEGATIVE
AST SERPL W P-5'-P-CCNC: 14 U/L (ref 0–40)
BASOPHILS # BLD AUTO: 0.1 THOU/UL (ref 0–0.2)
BASOPHILS NFR BLD AUTO: 0 % (ref 0–2)
BILIRUB SERPL-MCNC: 0.4 MG/DL (ref 0–1)
BNP SERPL-MCNC: 1839 PG/ML (ref 0–93)
BUN SERPL-MCNC: 56 MG/DL (ref 8–28)
CALCIUM SERPL-MCNC: 8.9 MG/DL (ref 8.5–10.5)
CHLORIDE BLD-SCNC: 94 MMOL/L (ref 98–107)
CO2 SERPL-SCNC: 26 MMOL/L (ref 22–31)
CREAT SERPL-MCNC: 8.87 MG/DL (ref 0.7–1.3)
EOSINOPHIL # BLD AUTO: 0.5 THOU/UL (ref 0–0.4)
EOSINOPHIL NFR BLD AUTO: 4 % (ref 0–6)
ERYTHROCYTE [DISTWIDTH] IN BLOOD BY AUTOMATED COUNT: 17.4 % (ref 11–14.5)
GFR SERPL CREATININE-BSD FRML MDRD: 6 ML/MIN/1.73M2
GLUCOSE BLD-MCNC: 100 MG/DL (ref 70–125)
HCT VFR BLD AUTO: 25.4 % (ref 40–54)
HGB BLD-MCNC: 8 G/DL (ref 14–18)
INR PPP: 1.97 (ref 0.9–1.1)
LYMPHOCYTES # BLD AUTO: 1.2 THOU/UL (ref 0.8–4.4)
LYMPHOCYTES NFR BLD AUTO: 10 % (ref 20–40)
MAGNESIUM SERPL-MCNC: 1.5 MG/DL (ref 1.8–2.6)
MCH RBC QN AUTO: 28.7 PG (ref 27–34)
MCHC RBC AUTO-ENTMCNC: 31.5 G/DL (ref 32–36)
MCV RBC AUTO: 91 FL (ref 80–100)
MONOCYTES # BLD AUTO: 0.9 THOU/UL (ref 0–0.9)
MONOCYTES NFR BLD AUTO: 7 % (ref 2–10)
NEUTROPHILS # BLD AUTO: 9.7 THOU/UL (ref 2–7.7)
NEUTROPHILS NFR BLD AUTO: 79 % (ref 50–70)
PLATELET # BLD AUTO: 239 THOU/UL (ref 140–440)
PMV BLD AUTO: 9.1 FL (ref 8.5–12.5)
POTASSIUM BLD-SCNC: 4.4 MMOL/L (ref 3.5–5)
PROT SERPL-MCNC: 6.9 G/DL (ref 6–8)
RBC # BLD AUTO: 2.79 MILL/UL (ref 4.4–6.2)
SODIUM SERPL-SCNC: 134 MMOL/L (ref 136–145)
WBC: 12.4 THOU/UL (ref 4–11)

## 2019-04-01 ASSESSMENT — MIFFLIN-ST. JEOR: SCORE: 1488.65

## 2019-04-02 ENCOUNTER — AMBULATORY - HEALTHEAST (OUTPATIENT)
Dept: CARDIOLOGY | Facility: CLINIC | Age: 84
End: 2019-04-02

## 2019-04-02 ENCOUNTER — SURGERY - HEALTHEAST (OUTPATIENT)
Dept: CARDIOLOGY | Facility: CLINIC | Age: 84
End: 2019-04-02

## 2019-04-02 DIAGNOSIS — Z95.2 S/P TAVR (TRANSCATHETER AORTIC VALVE REPLACEMENT): ICD-10-CM

## 2019-04-02 LAB
ATRIAL RATE - MUSE: 94 BPM
BACTERIA SPEC CULT: NORMAL
DIASTOLIC BLOOD PRESSURE - MUSE: NORMAL MMHG
INTERPRETATION ECG - MUSE: NORMAL
P AXIS - MUSE: NORMAL DEGREES
PR INTERVAL - MUSE: NORMAL MS
QRS DURATION - MUSE: 84 MS
QT - MUSE: 364 MS
QTC - MUSE: 438 MS
R AXIS - MUSE: 12 DEGREES
SYSTOLIC BLOOD PRESSURE - MUSE: NORMAL MMHG
T AXIS - MUSE: 37 DEGREES
VENTRICULAR RATE- MUSE: 87 BPM

## 2019-04-02 ASSESSMENT — MIFFLIN-ST. JEOR: SCORE: 1484.97

## 2019-04-03 ENCOUNTER — HOME CARE/HOSPICE - HEALTHEAST (OUTPATIENT)
Dept: HOME HEALTH SERVICES | Facility: HOME HEALTH | Age: 84
End: 2019-04-03

## 2019-04-03 ASSESSMENT — MIFFLIN-ST. JEOR: SCORE: 1483.21

## 2019-04-04 ENCOUNTER — COMMUNICATION - HEALTHEAST (OUTPATIENT)
Dept: ANTICOAGULATION | Facility: CLINIC | Age: 84
End: 2019-04-04

## 2019-04-04 DIAGNOSIS — I48.20 CHRONIC ATRIAL FIBRILLATION (H): ICD-10-CM

## 2019-04-04 ASSESSMENT — MIFFLIN-ST. JEOR: SCORE: 1416.99

## 2019-04-05 ENCOUNTER — COMMUNICATION - HEALTHEAST (OUTPATIENT)
Dept: FAMILY MEDICINE | Facility: CLINIC | Age: 84
End: 2019-04-05

## 2019-04-05 LAB
BLD PROD TYP BPU: NORMAL
BLD PROD TYP BPU: NORMAL
BLOOD EXPIRATION DATE: NORMAL
BLOOD EXPIRATION DATE: NORMAL
BLOOD TYPE: 7300
BLOOD TYPE: 7300
CODING SYSTEM: NORMAL
CODING SYSTEM: NORMAL
COMPONENT (HISTORICAL CONVERSION): NORMAL
COMPONENT (HISTORICAL CONVERSION): NORMAL
CROSSMATCH: NORMAL
CROSSMATCH: NORMAL
STATUS (HISTORICAL CONVERSION): NORMAL
STATUS (HISTORICAL CONVERSION): NORMAL
UNIT ABO/RH (HISTORICAL CONVERSION): NORMAL
UNIT ABO/RH (HISTORICAL CONVERSION): NORMAL
UNIT NUMBER: NORMAL
UNIT NUMBER: NORMAL

## 2019-04-06 ENCOUNTER — COMMUNICATION - HEALTHEAST (OUTPATIENT)
Dept: HOME HEALTH SERVICES | Facility: HOME HEALTH | Age: 84
End: 2019-04-06

## 2019-04-06 ENCOUNTER — HOME CARE/HOSPICE - HEALTHEAST (OUTPATIENT)
Dept: HOME HEALTH SERVICES | Facility: HOME HEALTH | Age: 84
End: 2019-04-06

## 2019-04-08 ENCOUNTER — HOME CARE/HOSPICE - HEALTHEAST (OUTPATIENT)
Dept: HOME HEALTH SERVICES | Facility: HOME HEALTH | Age: 84
End: 2019-04-08

## 2019-04-08 ENCOUNTER — COMMUNICATION - HEALTHEAST (OUTPATIENT)
Dept: CARDIOLOGY | Facility: CLINIC | Age: 84
End: 2019-04-08

## 2019-04-09 ENCOUNTER — HOME CARE/HOSPICE - HEALTHEAST (OUTPATIENT)
Dept: HOME HEALTH SERVICES | Facility: HOME HEALTH | Age: 84
End: 2019-04-09

## 2019-04-09 ENCOUNTER — COMMUNICATION - HEALTHEAST (OUTPATIENT)
Dept: HOME HEALTH SERVICES | Facility: HOME HEALTH | Age: 84
End: 2019-04-09

## 2019-04-09 ENCOUNTER — AMBULATORY - HEALTHEAST (OUTPATIENT)
Dept: MULTI SPECIALTY CLINIC | Facility: CLINIC | Age: 84
End: 2019-04-09

## 2019-04-09 ENCOUNTER — COMMUNICATION - HEALTHEAST (OUTPATIENT)
Dept: FAMILY MEDICINE | Facility: CLINIC | Age: 84
End: 2019-04-09

## 2019-04-09 DIAGNOSIS — I48.20 CHRONIC ATRIAL FIBRILLATION (H): ICD-10-CM

## 2019-04-09 LAB — INR PPP: 1.2 (ref 0.9–1.1)

## 2019-04-11 ENCOUNTER — COMMUNICATION - HEALTHEAST (OUTPATIENT)
Dept: HOME HEALTH SERVICES | Facility: HOME HEALTH | Age: 84
End: 2019-04-11

## 2019-04-12 ENCOUNTER — HOME CARE/HOSPICE - HEALTHEAST (OUTPATIENT)
Dept: HOME HEALTH SERVICES | Facility: HOME HEALTH | Age: 84
End: 2019-04-12

## 2019-04-12 ENCOUNTER — COMMUNICATION - HEALTHEAST (OUTPATIENT)
Dept: FAMILY MEDICINE | Facility: CLINIC | Age: 84
End: 2019-04-12

## 2019-04-12 DIAGNOSIS — I48.20 CHRONIC ATRIAL FIBRILLATION (H): ICD-10-CM

## 2019-04-12 LAB — INR PPP: 1.4 (ref 0.9–1.1)

## 2019-04-16 ENCOUNTER — OFFICE VISIT - HEALTHEAST (OUTPATIENT)
Dept: CARDIOLOGY | Facility: CLINIC | Age: 84
End: 2019-04-16

## 2019-04-16 ENCOUNTER — HOME CARE/HOSPICE - HEALTHEAST (OUTPATIENT)
Dept: HOME HEALTH SERVICES | Facility: HOME HEALTH | Age: 84
End: 2019-04-16

## 2019-04-16 DIAGNOSIS — N18.6 ESRD (END STAGE RENAL DISEASE) ON DIALYSIS (H): ICD-10-CM

## 2019-04-16 DIAGNOSIS — I25.10 CORONARY ARTERY DISEASE INVOLVING NATIVE CORONARY ARTERY OF NATIVE HEART WITHOUT ANGINA PECTORIS: ICD-10-CM

## 2019-04-16 DIAGNOSIS — Z95.2 S/P TAVR (TRANSCATHETER AORTIC VALVE REPLACEMENT): ICD-10-CM

## 2019-04-16 DIAGNOSIS — I48.20 CHRONIC ATRIAL FIBRILLATION (H): ICD-10-CM

## 2019-04-16 DIAGNOSIS — I35.0 SEVERE AORTIC STENOSIS: ICD-10-CM

## 2019-04-16 DIAGNOSIS — Z99.2 ESRD (END STAGE RENAL DISEASE) ON DIALYSIS (H): ICD-10-CM

## 2019-04-16 ASSESSMENT — MIFFLIN-ST. JEOR: SCORE: 1505.89

## 2019-04-17 ENCOUNTER — COMMUNICATION - HEALTHEAST (OUTPATIENT)
Dept: SCHEDULING | Facility: CLINIC | Age: 84
End: 2019-04-17

## 2019-04-17 ENCOUNTER — OFFICE VISIT - HEALTHEAST (OUTPATIENT)
Dept: FAMILY MEDICINE | Facility: CLINIC | Age: 84
End: 2019-04-17

## 2019-04-17 ENCOUNTER — COMMUNICATION - HEALTHEAST (OUTPATIENT)
Dept: ANTICOAGULATION | Facility: CLINIC | Age: 84
End: 2019-04-17

## 2019-04-17 DIAGNOSIS — Z79.4 TYPE 2 DIABETES MELLITUS WITH CHRONIC KIDNEY DISEASE ON CHRONIC DIALYSIS, WITH LONG-TERM CURRENT USE OF INSULIN (H): ICD-10-CM

## 2019-04-17 DIAGNOSIS — I50.31 ACUTE DIASTOLIC CONGESTIVE HEART FAILURE (H): ICD-10-CM

## 2019-04-17 DIAGNOSIS — N18.6 ESRD (END STAGE RENAL DISEASE) ON DIALYSIS (H): ICD-10-CM

## 2019-04-17 DIAGNOSIS — R09.02 HYPOXIA: ICD-10-CM

## 2019-04-17 DIAGNOSIS — I25.10 CORONARY ARTERY DISEASE INVOLVING NATIVE CORONARY ARTERY OF NATIVE HEART WITHOUT ANGINA PECTORIS: ICD-10-CM

## 2019-04-17 DIAGNOSIS — E11.22 TYPE 2 DIABETES MELLITUS WITH CHRONIC KIDNEY DISEASE ON CHRONIC DIALYSIS, WITH LONG-TERM CURRENT USE OF INSULIN (H): ICD-10-CM

## 2019-04-17 DIAGNOSIS — D69.49: ICD-10-CM

## 2019-04-17 DIAGNOSIS — C64.2 MALIGNANT NEOPLASM OF LEFT KIDNEY EXCLUDING RENAL PELVIS (H): ICD-10-CM

## 2019-04-17 DIAGNOSIS — J44.9 CHRONIC OBSTRUCTIVE PULMONARY DISEASE, UNSPECIFIED COPD TYPE (H): ICD-10-CM

## 2019-04-17 DIAGNOSIS — Z99.81 ON HOME OXYGEN THERAPY: ICD-10-CM

## 2019-04-17 DIAGNOSIS — I48.91 ATRIAL FIBRILLATION WITH RVR (H): ICD-10-CM

## 2019-04-17 DIAGNOSIS — N18.6 TYPE 2 DIABETES MELLITUS WITH CHRONIC KIDNEY DISEASE ON CHRONIC DIALYSIS, WITH LONG-TERM CURRENT USE OF INSULIN (H): ICD-10-CM

## 2019-04-17 DIAGNOSIS — I48.20 CHRONIC ATRIAL FIBRILLATION (H): ICD-10-CM

## 2019-04-17 DIAGNOSIS — Z95.2 S/P TAVR (TRANSCATHETER AORTIC VALVE REPLACEMENT): ICD-10-CM

## 2019-04-17 DIAGNOSIS — Z99.2 ESRD (END STAGE RENAL DISEASE) ON DIALYSIS (H): ICD-10-CM

## 2019-04-17 DIAGNOSIS — Z99.2 TYPE 2 DIABETES MELLITUS WITH CHRONIC KIDNEY DISEASE ON CHRONIC DIALYSIS, WITH LONG-TERM CURRENT USE OF INSULIN (H): ICD-10-CM

## 2019-04-17 LAB
ANION GAP SERPL CALCULATED.3IONS-SCNC: 11 MMOL/L (ref 5–18)
BUN SERPL-MCNC: 42 MG/DL (ref 8–28)
CALCIUM SERPL-MCNC: 8.5 MG/DL (ref 8.5–10.5)
CHLORIDE BLD-SCNC: 96 MMOL/L (ref 98–107)
CHOLEST SERPL-MCNC: 142 MG/DL
CO2 SERPL-SCNC: 30 MMOL/L (ref 22–31)
CREAT SERPL-MCNC: 7.08 MG/DL (ref 0.7–1.3)
FASTING STATUS PATIENT QL REPORTED: NO
GFR SERPL CREATININE-BSD FRML MDRD: 7 ML/MIN/1.73M2
GLUCOSE BLD-MCNC: 141 MG/DL (ref 70–125)
HBA1C MFR BLD: 5.6 % (ref 3.5–6)
HDLC SERPL-MCNC: 57 MG/DL
INR PPP: 2 (ref 0.9–1.1)
LDLC SERPL CALC-MCNC: 74 MG/DL
POTASSIUM BLD-SCNC: 4 MMOL/L (ref 3.5–5)
SODIUM SERPL-SCNC: 137 MMOL/L (ref 136–145)
TRIGL SERPL-MCNC: 57 MG/DL

## 2019-04-18 ENCOUNTER — HOME CARE/HOSPICE - HEALTHEAST (OUTPATIENT)
Dept: HOME HEALTH SERVICES | Facility: HOME HEALTH | Age: 84
End: 2019-04-18

## 2019-04-18 ENCOUNTER — AMBULATORY - HEALTHEAST (OUTPATIENT)
Dept: LAB | Facility: CLINIC | Age: 84
End: 2019-04-18

## 2019-04-18 DIAGNOSIS — E11.22 TYPE 2 DIABETES MELLITUS WITH CHRONIC KIDNEY DISEASE ON CHRONIC DIALYSIS, WITH LONG-TERM CURRENT USE OF INSULIN (H): ICD-10-CM

## 2019-04-18 DIAGNOSIS — Z79.4 TYPE 2 DIABETES MELLITUS WITH CHRONIC KIDNEY DISEASE ON CHRONIC DIALYSIS, WITH LONG-TERM CURRENT USE OF INSULIN (H): ICD-10-CM

## 2019-04-18 DIAGNOSIS — Z99.2 TYPE 2 DIABETES MELLITUS WITH CHRONIC KIDNEY DISEASE ON CHRONIC DIALYSIS, WITH LONG-TERM CURRENT USE OF INSULIN (H): ICD-10-CM

## 2019-04-18 DIAGNOSIS — N18.6 TYPE 2 DIABETES MELLITUS WITH CHRONIC KIDNEY DISEASE ON CHRONIC DIALYSIS, WITH LONG-TERM CURRENT USE OF INSULIN (H): ICD-10-CM

## 2019-04-18 LAB
CREAT UR-MCNC: 53.9 MG/DL
MICROALBUMIN UR-MCNC: 44.7 MG/DL (ref 0–1.99)
MICROALBUMIN/CREAT UR: 829.3 MG/G

## 2019-04-22 ENCOUNTER — COMMUNICATION - HEALTHEAST (OUTPATIENT)
Dept: FAMILY MEDICINE | Facility: CLINIC | Age: 84
End: 2019-04-22

## 2019-04-22 DIAGNOSIS — I48.20 CHRONIC ATRIAL FIBRILLATION (H): ICD-10-CM

## 2019-04-23 ENCOUNTER — COMMUNICATION - HEALTHEAST (OUTPATIENT)
Dept: FAMILY MEDICINE | Facility: CLINIC | Age: 84
End: 2019-04-23

## 2019-04-23 ENCOUNTER — HOME CARE/HOSPICE - HEALTHEAST (OUTPATIENT)
Dept: HOME HEALTH SERVICES | Facility: HOME HEALTH | Age: 84
End: 2019-04-23

## 2019-04-23 ENCOUNTER — COMMUNICATION - HEALTHEAST (OUTPATIENT)
Dept: CARDIOLOGY | Facility: CLINIC | Age: 84
End: 2019-04-23

## 2019-04-23 DIAGNOSIS — I48.20 CHRONIC ATRIAL FIBRILLATION (H): ICD-10-CM

## 2019-04-23 LAB — INR PPP: 1.7 (ref 0.9–1.1)

## 2019-04-24 ENCOUNTER — HOME CARE/HOSPICE - HEALTHEAST (OUTPATIENT)
Dept: HOME HEALTH SERVICES | Facility: HOME HEALTH | Age: 84
End: 2019-04-24

## 2019-04-25 ENCOUNTER — HOME CARE/HOSPICE - HEALTHEAST (OUTPATIENT)
Dept: HOME HEALTH SERVICES | Facility: HOME HEALTH | Age: 84
End: 2019-04-25

## 2019-04-25 ENCOUNTER — COMMUNICATION - HEALTHEAST (OUTPATIENT)
Dept: HOME HEALTH SERVICES | Facility: HOME HEALTH | Age: 84
End: 2019-04-25

## 2019-04-26 ENCOUNTER — HOME CARE/HOSPICE - HEALTHEAST (OUTPATIENT)
Dept: HOME HEALTH SERVICES | Facility: HOME HEALTH | Age: 84
End: 2019-04-26

## 2019-04-26 ENCOUNTER — COMMUNICATION - HEALTHEAST (OUTPATIENT)
Dept: FAMILY MEDICINE | Facility: CLINIC | Age: 84
End: 2019-04-26

## 2019-04-26 ENCOUNTER — COMMUNICATION - HEALTHEAST (OUTPATIENT)
Dept: HOME HEALTH SERVICES | Facility: HOME HEALTH | Age: 84
End: 2019-04-26

## 2019-04-26 DIAGNOSIS — I48.20 CHRONIC ATRIAL FIBRILLATION (H): ICD-10-CM

## 2019-04-26 LAB — INR PPP: 1.7 (ref 0.9–1.1)

## 2019-04-29 ENCOUNTER — AMBULATORY - HEALTHEAST (OUTPATIENT)
Dept: CARDIOLOGY | Facility: CLINIC | Age: 84
End: 2019-04-29

## 2019-04-29 ENCOUNTER — COMMUNICATION - HEALTHEAST (OUTPATIENT)
Dept: RESPIRATORY THERAPY | Facility: HOSPITAL | Age: 84
End: 2019-04-29

## 2019-04-29 DIAGNOSIS — Z95.2 S/P TAVR (TRANSCATHETER AORTIC VALVE REPLACEMENT): ICD-10-CM

## 2019-04-30 ENCOUNTER — HOSPITAL ENCOUNTER (OUTPATIENT)
Dept: CARDIOLOGY | Facility: HOSPITAL | Age: 84
Discharge: HOME | End: 2019-04-30

## 2019-04-30 ENCOUNTER — HOME CARE/HOSPICE - HEALTHEAST (OUTPATIENT)
Dept: HOME HEALTH SERVICES | Facility: HOME HEALTH | Age: 84
End: 2019-04-30

## 2019-04-30 ENCOUNTER — COMMUNICATION - HEALTHEAST (OUTPATIENT)
Dept: FAMILY MEDICINE | Facility: CLINIC | Age: 84
End: 2019-04-30

## 2019-04-30 DIAGNOSIS — I48.20 CHRONIC ATRIAL FIBRILLATION (H): ICD-10-CM

## 2019-04-30 DIAGNOSIS — I35.0 SEVERE AORTIC STENOSIS: ICD-10-CM

## 2019-04-30 LAB
AORTIC ROOT: 3.2 CM
AORTIC VALVE MEAN VELOCITY: 119 CM/S
ASCENDING AORTA: 3.4 CM
AV DIMENSIONLESS INDEX VTI: 0.6
AV MEAN GRADIENT: 7 MMHG
AV PEAK GRADIENT: 13.8 MMHG
AV VALVE AREA: 1.5 CM2
AV VELOCITY RATIO: 0.4
BSA FOR ECHO PROCEDURE: 2.02 M2
CV BLOOD PRESSURE: ABNORMAL MMHG
CV ECHO HEIGHT: 68 IN
CV ECHO WEIGHT: 188 LBS
DOP CALC AO PEAK VEL: 186 CM/S
DOP CALC AO VTI: 27.9 CM
DOP CALC LVOT AREA: 2.54 CM2
DOP CALC LVOT DIAMETER: 1.8 CM
DOP CALC LVOT PEAK VEL: 76.5 CM/S
DOP CALC LVOT STROKE VOLUME: 40.9 CM3
DOP CALCLVOT PEAK VEL VTI: 16.1 CM
ECHO EJECTION FRACTION ESTIMATED: 55 %
EJECTION FRACTION: 46 % (ref 55–75)
FRACTIONAL SHORTENING: 20.9 % (ref 28–44)
INR PPP: 2.5 (ref 0.9–1.1)
INTERVENTRICULAR SEPTUM IN END DIASTOLE: 1.61 CM (ref 0.6–1)
IVS/PW RATIO: 1
LA AREA 1: 23 CM2
LA AREA 2: 33 CM2
LEFT ATRIUM LENGTH: 6.3 CM
LEFT ATRIUM SIZE: 4.3 CM
LEFT ATRIUM VOLUME INDEX: 50.7 ML/M2
LEFT ATRIUM VOLUME: 102.4 ML
LEFT VENTRICLE CARDIAC INDEX: 1.7 L/MIN/M2
LEFT VENTRICLE CARDIAC OUTPUT: 3.5 L/MIN
LEFT VENTRICLE DIASTOLIC VOLUME INDEX: 43.7 CM3/M2 (ref 34–74)
LEFT VENTRICLE DIASTOLIC VOLUME: 88.2 CM3 (ref 62–150)
LEFT VENTRICLE HEART RATE: 85 BPM
LEFT VENTRICLE MASS INDEX: 137.6 G/M2
LEFT VENTRICLE SYSTOLIC VOLUME INDEX: 23.4 CM3/M2 (ref 11–31)
LEFT VENTRICLE SYSTOLIC VOLUME: 47.3 CM3 (ref 21–61)
LEFT VENTRICULAR INTERNAL DIMENSION IN DIASTOLE: 4.22 CM (ref 4.2–5.8)
LEFT VENTRICULAR INTERNAL DIMENSION IN SYSTOLE: 3.34 CM (ref 2.5–4)
LEFT VENTRICULAR MASS: 278 G
LEFT VENTRICULAR OUTFLOW TRACT MEAN GRADIENT: 1 MMHG
LEFT VENTRICULAR OUTFLOW TRACT MEAN VELOCITY: 46.3 CM/S
LEFT VENTRICULAR OUTFLOW TRACT PEAK GRADIENT: 2 MMHG
LEFT VENTRICULAR POSTERIOR WALL IN END DIASTOLE: 1.59 CM (ref 0.6–1)
LV STROKE VOLUME INDEX: 20.3 ML/M2
MITRAL VALVE DECELERATION SLOPE: 7450 MM/S2
MITRAL VALVE E/A RATIO: 243.5
MITRAL VALVE PRESSURE HALF-TIME: 60 MS
MV AVERAGE E/E' RATIO: 23.4 CM/S
MV DECELERATION TIME: 203 MS
MV E'TISSUE VEL-LAT: 8.41 CM/S
MV E'TISSUE VEL-MED: 4.35 CM/S
MV LATERAL E/E' RATIO: 17.7
MV MEDIAL E/E' RATIO: 34.3
MV PEAK A VELOCITY: 0.61 CM/S
MV PEAK E VELOCITY: 149 CM/S
MV VALVE AREA PRESSURE 1/2 METHOD: 3.7 CM2
NUC REST DIASTOLIC VOLUME INDEX: 3008 LBS
NUC REST SYSTOLIC VOLUME INDEX: 68 IN
PR MAX PG: 14 MMHG
PR PEAK VELOCITY: 189 CM/S
TRICUSPID REGURGITATION PEAK PRESSURE GRADIENT: 5.7 MMHG
TRICUSPID VALVE ANULAR PLANE SYSTOLIC EXCURSION: 1.9 CM
TRICUSPID VALVE PEAK REGURGITANT VELOCITY: 119 CM/S

## 2019-04-30 ASSESSMENT — MIFFLIN-ST. JEOR: SCORE: 1502.26

## 2019-05-02 ENCOUNTER — HOME CARE/HOSPICE - HEALTHEAST (OUTPATIENT)
Dept: HOME HEALTH SERVICES | Facility: HOME HEALTH | Age: 84
End: 2019-05-02

## 2019-05-02 ENCOUNTER — OFFICE VISIT - HEALTHEAST (OUTPATIENT)
Dept: CARDIOLOGY | Facility: CLINIC | Age: 84
End: 2019-05-02

## 2019-05-02 DIAGNOSIS — Z95.2 S/P TAVR (TRANSCATHETER AORTIC VALVE REPLACEMENT): ICD-10-CM

## 2019-05-02 LAB
ANION GAP SERPL CALCULATED.3IONS-SCNC: 10 MMOL/L (ref 5–18)
ATRIAL RATE - MUSE: 68 BPM
BUN SERPL-MCNC: 33 MG/DL (ref 8–28)
CALCIUM SERPL-MCNC: 9.3 MG/DL (ref 8.5–10.5)
CHLORIDE BLD-SCNC: 95 MMOL/L (ref 98–107)
CO2 SERPL-SCNC: 30 MMOL/L (ref 22–31)
CREAT SERPL-MCNC: 5.93 MG/DL (ref 0.7–1.3)
DIASTOLIC BLOOD PRESSURE - MUSE: NORMAL MMHG
ERYTHROCYTE [DISTWIDTH] IN BLOOD BY AUTOMATED COUNT: 17.5 % (ref 11–14.5)
GFR SERPL CREATININE-BSD FRML MDRD: 9 ML/MIN/1.73M2
GLUCOSE BLD-MCNC: 96 MG/DL (ref 70–125)
HCT VFR BLD AUTO: 31 % (ref 40–54)
HGB BLD-MCNC: 9.6 G/DL (ref 14–18)
INTERPRETATION ECG - MUSE: NORMAL
MCH RBC QN AUTO: 29.3 PG (ref 27–34)
MCHC RBC AUTO-ENTMCNC: 31 G/DL (ref 32–36)
MCV RBC AUTO: 95 FL (ref 80–100)
P AXIS - MUSE: NORMAL DEGREES
PLATELET # BLD AUTO: 134 THOU/UL (ref 140–440)
PMV BLD AUTO: 10 FL (ref 8.5–12.5)
POTASSIUM BLD-SCNC: 4.5 MMOL/L (ref 3.5–5)
PR INTERVAL - MUSE: NORMAL MS
QRS DURATION - MUSE: 106 MS
QT - MUSE: 400 MS
QTC - MUSE: 452 MS
R AXIS - MUSE: -26 DEGREES
RBC # BLD AUTO: 3.28 MILL/UL (ref 4.4–6.2)
SODIUM SERPL-SCNC: 135 MMOL/L (ref 136–145)
SYSTOLIC BLOOD PRESSURE - MUSE: NORMAL MMHG
T AXIS - MUSE: 49 DEGREES
VENTRICULAR RATE- MUSE: 77 BPM
WBC: 8.6 THOU/UL (ref 4–11)

## 2019-05-02 ASSESSMENT — MIFFLIN-ST. JEOR: SCORE: 1502.26

## 2019-05-03 ENCOUNTER — HOME CARE/HOSPICE - HEALTHEAST (OUTPATIENT)
Dept: HOME HEALTH SERVICES | Facility: HOME HEALTH | Age: 84
End: 2019-05-03

## 2019-05-03 ENCOUNTER — COMMUNICATION - HEALTHEAST (OUTPATIENT)
Dept: FAMILY MEDICINE | Facility: CLINIC | Age: 84
End: 2019-05-03

## 2019-05-03 DIAGNOSIS — I48.20 CHRONIC ATRIAL FIBRILLATION (H): ICD-10-CM

## 2019-05-03 LAB — INR PPP: 2.3 (ref 0.9–1.1)

## 2019-05-06 ENCOUNTER — COMMUNICATION - HEALTHEAST (OUTPATIENT)
Dept: HOME HEALTH SERVICES | Facility: HOME HEALTH | Age: 84
End: 2019-05-06

## 2019-05-07 ENCOUNTER — HOME CARE/HOSPICE - HEALTHEAST (OUTPATIENT)
Dept: HOME HEALTH SERVICES | Facility: HOME HEALTH | Age: 84
End: 2019-05-07

## 2019-05-07 ENCOUNTER — COMMUNICATION - HEALTHEAST (OUTPATIENT)
Dept: CARDIOLOGY | Facility: CLINIC | Age: 84
End: 2019-05-07

## 2019-05-10 ENCOUNTER — HOME CARE/HOSPICE - HEALTHEAST (OUTPATIENT)
Dept: HOME HEALTH SERVICES | Facility: HOME HEALTH | Age: 84
End: 2019-05-10

## 2019-05-10 ENCOUNTER — AMBULATORY - HEALTHEAST (OUTPATIENT)
Dept: LAB | Facility: CLINIC | Age: 84
End: 2019-05-10

## 2019-05-10 ENCOUNTER — COMMUNICATION - HEALTHEAST (OUTPATIENT)
Dept: ANTICOAGULATION | Facility: CLINIC | Age: 84
End: 2019-05-10

## 2019-05-10 DIAGNOSIS — I48.20 CHRONIC ATRIAL FIBRILLATION (H): ICD-10-CM

## 2019-05-10 LAB — INR PPP: 2 (ref 0.9–1.1)

## 2019-05-14 ENCOUNTER — HOME CARE/HOSPICE - HEALTHEAST (OUTPATIENT)
Dept: HOME HEALTH SERVICES | Facility: HOME HEALTH | Age: 84
End: 2019-05-14

## 2019-05-15 ENCOUNTER — COMMUNICATION - HEALTHEAST (OUTPATIENT)
Dept: ANTICOAGULATION | Facility: CLINIC | Age: 84
End: 2019-05-15

## 2019-05-15 DIAGNOSIS — I48.20 CHRONIC ATRIAL FIBRILLATION (H): ICD-10-CM

## 2019-05-15 DIAGNOSIS — I63.9 ACUTE ISCHEMIC STROKE (H): ICD-10-CM

## 2019-05-17 ENCOUNTER — COMMUNICATION - HEALTHEAST (OUTPATIENT)
Dept: FAMILY MEDICINE | Facility: CLINIC | Age: 84
End: 2019-05-17

## 2019-05-17 ENCOUNTER — HOME CARE/HOSPICE - HEALTHEAST (OUTPATIENT)
Dept: HOME HEALTH SERVICES | Facility: HOME HEALTH | Age: 84
End: 2019-05-17

## 2019-05-17 DIAGNOSIS — I48.20 CHRONIC ATRIAL FIBRILLATION (H): ICD-10-CM

## 2019-05-17 LAB — INR PPP: 1.9 (ref 0.9–1.1)

## 2019-06-17 PROBLEM — Z79.01 LONG TERM CURRENT USE OF ANTICOAGULANT THERAPY: Status: ACTIVE | Noted: 2017-11-28

## 2019-07-19 NOTE — TELEPHONE ENCOUNTER
"Requested Prescriptions   Pending Prescriptions Disp Refills     insulin glargine (BASAGLAR KWIKPEN) 100 UNIT/ML pen [Pharmacy Med Name: Basaglar KwikPen Subcutaneous Solution Pen-injector 100 UNIT/ML] 15 mL 5     Sig: INJECT 15 UNITS SUBCUTANEOUSLY AT BEDTIME       Long Acting Insulin Protocol Failed - 7/19/2019  7:02 AM        Failed - Blood pressure less than 140/90 in past 6 months     BP Readings from Last 3 Encounters:   06/12/18 112/71   05/19/18 151/76   04/10/18 123/70                 Failed - LDL on file in past 12 months     Recent Labs   Lab Test 07/11/17  0858   LDL 44             Failed - Microalbumin on file in past 12 months     No lab results found.          Failed - Serum creatinine on file in past 12 months     Recent Labs   Lab Test 05/19/18  0138   CR 5.13*             Failed - HgbA1C in past 3 or 6 months     If HgbA1C is 8 or greater, it needs to be on file within the past 3 months.  If less than 8, must be on file within the past 6 months.     Recent Labs   Lab Test 12/19/17  1203   A1C 6.3*             Failed - Recent (6 mo) or future (30 days) visit within the authorizing provider's specialty     Patient had office visit in the last 6 months or has a visit in the next 30 days with authorizing provider or within the authorizing provider's specialty.  See \"Patient Info\" tab in inbasket, or \"Choose Columns\" in Meds & Orders section of the refill encounter.            Passed - Medication is active on med list        Passed - Patient is age 18 or older        Last Written Prescription Date:  8/22/17  Last Fill Quantity: 15,  # refills: 11   Last office visit: 6/12/2018 with prescribing provider:  Heriberto   Future Office Visit:      "

## 2019-07-19 NOTE — TELEPHONE ENCOUNTER
Left message on answering machine for patient to call back.    According to the chart patient has established care at [facility].    Thank you    Dalia RAYA RN

## 2020-01-01 ENCOUNTER — COMMUNICATION - HEALTHEAST (OUTPATIENT)
Dept: SCHEDULING | Facility: CLINIC | Age: 85
End: 2020-01-01

## 2020-01-01 ENCOUNTER — COMMUNICATION - HEALTHEAST (OUTPATIENT)
Dept: ANTICOAGULATION | Facility: CLINIC | Age: 85
End: 2020-01-01

## 2020-01-01 ENCOUNTER — OFFICE VISIT - HEALTHEAST (OUTPATIENT)
Dept: CARDIOLOGY | Facility: CLINIC | Age: 85
End: 2020-01-01

## 2020-01-01 ENCOUNTER — AMBULATORY - HEALTHEAST (OUTPATIENT)
Dept: LAB | Facility: CLINIC | Age: 85
End: 2020-01-01

## 2020-01-01 ENCOUNTER — COMMUNICATION - HEALTHEAST (OUTPATIENT)
Dept: FAMILY MEDICINE | Facility: CLINIC | Age: 85
End: 2020-01-01

## 2020-01-01 ENCOUNTER — COMMUNICATION - HEALTHEAST (OUTPATIENT)
Dept: CARE COORDINATION | Facility: CLINIC | Age: 85
End: 2020-01-01

## 2020-01-01 ENCOUNTER — COMMUNICATION - HEALTHEAST (OUTPATIENT)
Dept: RESPIRATORY THERAPY | Facility: HOSPITAL | Age: 85
End: 2020-01-01

## 2020-01-01 ENCOUNTER — PATIENT OUTREACH (OUTPATIENT)
Dept: CARE COORDINATION | Facility: CLINIC | Age: 85
End: 2020-01-01

## 2020-01-01 ENCOUNTER — RECORDS - HEALTHEAST (OUTPATIENT)
Dept: ADMINISTRATIVE | Facility: OTHER | Age: 85
End: 2020-01-01

## 2020-01-01 ENCOUNTER — HOSPITAL ENCOUNTER (OUTPATIENT)
Dept: NUCLEAR MEDICINE | Facility: HOSPITAL | Age: 85
Discharge: HOME | End: 2020-02-11
Attending: INTERNAL MEDICINE

## 2020-01-01 ENCOUNTER — OFFICE VISIT - HEALTHEAST (OUTPATIENT)
Dept: FAMILY MEDICINE | Facility: CLINIC | Age: 85
End: 2020-01-01

## 2020-01-01 ENCOUNTER — HOSPITAL ENCOUNTER (OUTPATIENT)
Dept: CARDIOLOGY | Facility: HOSPITAL | Age: 85
Discharge: HOME | End: 2020-02-11
Attending: INTERNAL MEDICINE

## 2020-01-01 ENCOUNTER — COMMUNICATION - HEALTHEAST (OUTPATIENT)
Dept: CARDIOLOGY | Facility: CLINIC | Age: 85
End: 2020-01-01

## 2020-01-01 ENCOUNTER — OFFICE VISIT - HEALTHEAST (OUTPATIENT)
Dept: INTERNAL MEDICINE | Facility: CLINIC | Age: 85
End: 2020-01-01

## 2020-01-01 DIAGNOSIS — Z99.2 TYPE 2 DIABETES MELLITUS WITH CHRONIC KIDNEY DISEASE ON CHRONIC DIALYSIS, WITH LONG-TERM CURRENT USE OF INSULIN (H): ICD-10-CM

## 2020-01-01 DIAGNOSIS — I48.20 CHRONIC ATRIAL FIBRILLATION (H): ICD-10-CM

## 2020-01-01 DIAGNOSIS — I25.10 CORONARY ARTERY DISEASE INVOLVING NATIVE CORONARY ARTERY OF NATIVE HEART WITHOUT ANGINA PECTORIS: ICD-10-CM

## 2020-01-01 DIAGNOSIS — I63.49 CEREBRAL INFARCTION DUE TO EMBOLISM OF OTHER CEREBRAL ARTERY (H): ICD-10-CM

## 2020-01-01 DIAGNOSIS — I50.21 ACUTE SYSTOLIC CONGESTIVE HEART FAILURE (H): ICD-10-CM

## 2020-01-01 DIAGNOSIS — H54.8 LEGALLY BLIND IN RIGHT EYE, AS DEFINED IN USA: ICD-10-CM

## 2020-01-01 DIAGNOSIS — I48.91 ATRIAL FIBRILLATION WITH RAPID VENTRICULAR RESPONSE (H): ICD-10-CM

## 2020-01-01 DIAGNOSIS — Z79.4 TYPE 2 DIABETES MELLITUS WITH CHRONIC KIDNEY DISEASE ON CHRONIC DIALYSIS, WITH LONG-TERM CURRENT USE OF INSULIN (H): ICD-10-CM

## 2020-01-01 DIAGNOSIS — N18.6 TYPE 2 DIABETES MELLITUS WITH CHRONIC KIDNEY DISEASE ON CHRONIC DIALYSIS, WITH LONG-TERM CURRENT USE OF INSULIN (H): ICD-10-CM

## 2020-01-01 DIAGNOSIS — I63.9 ACUTE ISCHEMIC STROKE (H): ICD-10-CM

## 2020-01-01 DIAGNOSIS — I35.0 NONRHEUMATIC AORTIC VALVE STENOSIS: ICD-10-CM

## 2020-01-01 DIAGNOSIS — I42.9 SECONDARY CARDIOMYOPATHY (H): ICD-10-CM

## 2020-01-01 DIAGNOSIS — Z99.2 ESRD (END STAGE RENAL DISEASE) ON DIALYSIS (H): ICD-10-CM

## 2020-01-01 DIAGNOSIS — G47.00 INSOMNIA, UNSPECIFIED TYPE: ICD-10-CM

## 2020-01-01 DIAGNOSIS — J44.9 CHRONIC OBSTRUCTIVE PULMONARY DISEASE, UNSPECIFIED COPD TYPE (H): ICD-10-CM

## 2020-01-01 DIAGNOSIS — E11.22 TYPE 2 DIABETES MELLITUS WITH CHRONIC KIDNEY DISEASE ON CHRONIC DIALYSIS, WITH LONG-TERM CURRENT USE OF INSULIN (H): ICD-10-CM

## 2020-01-01 DIAGNOSIS — N18.6 ESRD (END STAGE RENAL DISEASE) ON DIALYSIS (H): ICD-10-CM

## 2020-01-01 DIAGNOSIS — J96.11 CHRONIC RESPIRATORY FAILURE WITH HYPOXIA (H): ICD-10-CM

## 2020-01-01 DIAGNOSIS — I50.32 CHRONIC DIASTOLIC CONGESTIVE HEART FAILURE (H): ICD-10-CM

## 2020-01-01 DIAGNOSIS — I10 PRIMARY HYPERTENSION: ICD-10-CM

## 2020-01-01 DIAGNOSIS — I48.91 A-FIB (H): Primary | ICD-10-CM

## 2020-01-01 DIAGNOSIS — Z95.2 S/P TAVR (TRANSCATHETER AORTIC VALVE REPLACEMENT): ICD-10-CM

## 2020-01-01 DIAGNOSIS — I50.31 ACUTE DIASTOLIC CONGESTIVE HEART FAILURE (H): ICD-10-CM

## 2020-01-01 DIAGNOSIS — Z99.2 DIALYSIS PATIENT (H): ICD-10-CM

## 2020-01-01 DIAGNOSIS — Z99.81 ON HOME OXYGEN THERAPY: ICD-10-CM

## 2020-01-01 DIAGNOSIS — K21.9 GASTROESOPHAGEAL REFLUX DISEASE WITHOUT ESOPHAGITIS: ICD-10-CM

## 2020-01-01 DIAGNOSIS — Z79.01 LONG TERM CURRENT USE OF ANTICOAGULANT THERAPY: ICD-10-CM

## 2020-01-01 DIAGNOSIS — I10 BENIGN ESSENTIAL HYPERTENSION: ICD-10-CM

## 2020-01-01 DIAGNOSIS — R91.8 INFILTRATE OF RIGHT LUNG PRESENT ON CHEST X-RAY: ICD-10-CM

## 2020-01-01 DIAGNOSIS — I50.43 ACUTE ON CHRONIC COMBINED SYSTOLIC AND DIASTOLIC CONGESTIVE HEART FAILURE (H): ICD-10-CM

## 2020-01-01 LAB
ANION GAP SERPL CALCULATED.3IONS-SCNC: 13 MMOL/L (ref 5–18)
BASOPHILS # BLD AUTO: 0 THOU/UL (ref 0–0.2)
BASOPHILS NFR BLD AUTO: 1 % (ref 0–2)
BUN SERPL-MCNC: 39 MG/DL (ref 8–28)
CALCIUM SERPL-MCNC: 8.9 MG/DL (ref 8.5–10.5)
CHLORIDE BLD-SCNC: 97 MMOL/L (ref 98–107)
CO2 SERPL-SCNC: 29 MMOL/L (ref 22–31)
CREAT SERPL-MCNC: 6.46 MG/DL (ref 0.7–1.3)
CV STRESS CURRENT BP HE: NORMAL
CV STRESS CURRENT HR HE: 102
CV STRESS CURRENT HR HE: 103
CV STRESS CURRENT HR HE: 104
CV STRESS CURRENT HR HE: 105
CV STRESS CURRENT HR HE: 106
CV STRESS CURRENT HR HE: 110
CV STRESS CURRENT HR HE: 111
CV STRESS CURRENT HR HE: 121
CV STRESS CURRENT HR HE: 96
CV STRESS CURRENT HR HE: 96
CV STRESS CURRENT HR HE: 97
CV STRESS CURRENT HR HE: 99
CV STRESS DEVIATION TIME HE: NORMAL
CV STRESS ECHO PERCENT HR HE: NORMAL
CV STRESS EXERCISE STAGE HE: NORMAL
CV STRESS FINAL RESTING BP HE: NORMAL
CV STRESS FINAL RESTING HR HE: 103
CV STRESS MAX HR HE: 126
CV STRESS MAX TREADMILL GRADE HE: 0
CV STRESS MAX TREADMILL SPEED HE: 0
CV STRESS PEAK DIA BP HE: NORMAL
CV STRESS PEAK SYS BP HE: NORMAL
CV STRESS PHASE HE: NORMAL
CV STRESS PROTOCOL HE: NORMAL
CV STRESS RESTING PT POSITION HE: NORMAL
CV STRESS ST DEVIATION AMOUNT HE: NORMAL
CV STRESS ST DEVIATION ELEVATION HE: NORMAL
CV STRESS ST EVELATION AMOUNT HE: NORMAL
CV STRESS TEST TYPE HE: NORMAL
CV STRESS TOTAL STAGE TIME MIN 1 HE: NORMAL
EOSINOPHIL # BLD AUTO: 0.3 THOU/UL (ref 0–0.4)
EOSINOPHIL NFR BLD AUTO: 5 % (ref 0–6)
ERYTHROCYTE [DISTWIDTH] IN BLOOD BY AUTOMATED COUNT: 14.3 % (ref 11–14.5)
GFR SERPL CREATININE-BSD FRML MDRD: 8 ML/MIN/1.73M2
GLUCOSE BLD-MCNC: 96 MG/DL (ref 70–125)
HBA1C MFR BLD: 5.7 % (ref 3.5–6)
HCT VFR BLD AUTO: 29.8 % (ref 40–54)
HGB BLD-MCNC: 9.7 G/DL (ref 14–18)
INR PPP: 1.9 (ref 0.9–1.1)
INR PPP: 2 (ref 0.9–1.1)
INR PPP: 2.4 (ref 0.9–1.1)
INR PPP: 2.4 (ref 0.9–1.1)
INR PPP: 2.6 (ref 0.9–1.1)
INR PPP: 3 (ref 0.9–1.1)
LYMPHOCYTES # BLD AUTO: 1.1 THOU/UL (ref 0.8–4.4)
LYMPHOCYTES NFR BLD AUTO: 18 % (ref 20–40)
MCH RBC QN AUTO: 29.7 PG (ref 27–34)
MCHC RBC AUTO-ENTMCNC: 32.6 G/DL (ref 32–36)
MCV RBC AUTO: 91 FL (ref 80–100)
MONOCYTES # BLD AUTO: 0.7 THOU/UL (ref 0–0.9)
MONOCYTES NFR BLD AUTO: 12 % (ref 2–10)
NEUTROPHILS # BLD AUTO: 4 THOU/UL (ref 2–7.7)
NEUTROPHILS NFR BLD AUTO: 65 % (ref 50–70)
NUC STRESS EJECTION FRACTION: 49 %
PLATELET # BLD AUTO: 158 THOU/UL (ref 140–440)
PMV BLD AUTO: 7.4 FL (ref 7–10)
POTASSIUM BLD-SCNC: 3.9 MMOL/L (ref 3.5–5)
RATE PRESSURE PRODUCT: NORMAL
RBC # BLD AUTO: 3.28 MILL/UL (ref 4.4–6.2)
SODIUM SERPL-SCNC: 139 MMOL/L (ref 136–145)
STRESS ECHO BASELINE DIASTOLIC HE: 84
STRESS ECHO BASELINE HR: 92
STRESS ECHO BASELINE SYSTOLIC BP: 138
STRESS ECHO CALCULATED PERCENT HR: 93 %
STRESS ECHO LAST STRESS DIASTOLIC BP: 70
STRESS ECHO LAST STRESS HR: 110
STRESS ECHO LAST STRESS SYSTOLIC BP: 126
STRESS ECHO TARGET HR: 135
WBC: 6.2 THOU/UL (ref 4–11)

## 2020-01-01 ASSESSMENT — MIFFLIN-ST. JEOR: SCORE: 1515.87

## 2020-05-11 NOTE — PROGRESS NOTES
Clinic Care Coordination Contact    Situation: Patient chart reviewed by care coordinator.    Background: Patient on external hospital discharge list and prompting care coordinator to review chart due to elevated AGUILA.    Assessment: Patient was admitted at [facility] from 5/4-5/8/20 for diagnosis of Atrial fibrillation.  He is actively established with Dr. Lazo of [facility].    Plan/Recommendations: PCP updated on care team to reflect active PCP; no care coordination outreach at this time as patient has non-[facility] PCP.    Maddy Borden, HUANGN, RN   [facility]  - [facility] Care Coordinator

## 2020-05-22 ENCOUNTER — COMMUNICATION - HEALTHEAST (OUTPATIENT)
Dept: FAMILY MEDICINE | Facility: CLINIC | Age: 85
End: 2020-05-22

## 2021-05-26 VITALS
OXYGEN SATURATION: 94 % | RESPIRATION RATE: 20 BRPM | HEART RATE: 87 BPM | DIASTOLIC BLOOD PRESSURE: 88 MMHG | SYSTOLIC BLOOD PRESSURE: 138 MMHG | TEMPERATURE: 98.2 F

## 2021-05-27 VITALS — OXYGEN SATURATION: 92 % | HEART RATE: 81 BPM | SYSTOLIC BLOOD PRESSURE: 128 MMHG | DIASTOLIC BLOOD PRESSURE: 66 MMHG

## 2021-05-27 NOTE — ANESTHESIA CARE TRANSFER NOTE
Last vitals:   Vitals:    04/02/19 1012   BP: 111/80   Pulse: 79   Resp: 19   Temp: 36  C (96.8  F)   SpO2: 98%     Patient's level of consciousness is drowsy  Spontaneous respirations: yes  Maintains airway independently: yes  Dentition unchanged: yes  Oropharynx: oropharynx clear of all foreign objects    QCDR Measures:  ASA# 20 - Surgical Safety Checklist: WHO surgical safety checklist completed prior to induction    PQRS# 430 - Adult PONV Prevention: 4558F - Pt received => 2 anti-emetic agents (different classes) preop & intraop  ASA# 8 - Peds PONV Prevention: NA - Not pediatric patient, not GA or 2 or more risk factors NOT present  PQRS# 424 - Valerie-op Temp Management: 4559F - At least one body temp DOCUMENTED => 35.5C or 95.9F within required timeframe  PQRS# 426 - PACU Transfer Protocol: - Transfer of care checklist used  ASA# 14 - Acute Post-op Pain: ASA14B - Patient did NOT experience pain >= 7 out of 10

## 2021-05-27 NOTE — TELEPHONE ENCOUNTER
ANTICOAGULATION  MANAGEMENT- Home Care/Care Facility Result    Assessment     Today's INR result of 1.4 is Subtherapeutic (goal INR of 2.0-3.0)        Warfarin taken as previously instructed    No new diet changes affecting INR    No new medication/supplements affecting INR    Continues to tolerate warfarin with no reported s/s of bleeding or thromboembolism     Previous INR was Subtherapeutic    Plan:     Spoke with [facility] nurse Nayeli discussed INR result and instructed:     Warfarin Dosing Instructions: Take 4 mg today; 2 mg on Sat, Sun, Mon.  Next INR to be drawn: Tues 4/16.     Education provided: importance of taking warfarin as instructed    Nayeli verbalizes understanding and agrees to warfarin dosing plan.   ?   Kj Mishra RN    Subjective/Objective:      Griffin Walton, a 84 y.o. male is established on warfarin.     Home care/care facility RN's report of Griffin INR, recent warfarin dosing, diet changes, medication changes, and symptoms is documented below.    Additional findings: verbally confirmed home dose with Nayeli and updated on anticoagulation calendar    Anticoagulation Episode Summary     Current INR goal:   2.0-3.0   TTR:   48.6 % (7.6 mo)   Next INR check:   4/16/2019   INR from last check:   1.40! (4/12/2019)   Weekly max warfarin dose:      Target end date:      INR check location:      Preferred lab:      Send INR reminders to:   ANTICOAGULATION POOL B (MPW,HUG,STW,RVL,OAK,RLN)    Indications    Chronic atrial fibrillation (H) [I48.2]           Comments:            Anticoagulation Care Providers     Provider Role Specialty Phone number    Taiwo Mejias MD Referring Family Medicine [phone]

## 2021-05-27 NOTE — TELEPHONE ENCOUNTER
ANTICOAGULATION  MANAGEMENT- Home Care/Care Facility Result    Assessment     Today's INR result of 1.2 is Subtherapeutic (goal INR of 2.0-3.0)        Warfarin taken as previously instructed - Hospital discharged pt on 1mg daily of warfarin which is significantly less than previous maintenance dose    No new diet changes affecting INR    No new medication/supplements affecting INR    Continues to tolerate warfarin with no reported s/s of bleeding or thromboembolism     Previous INR was Subtherapeutic    Plan:     Spoke with [facility], home care nurse discussed INR result and instructed:     Warfarin Dosing Instructions: Take 4 mg today only, take 2 mg Wed, Thu    Next INR to be drawn: Fri 4/12    Education provided: target INR goal and significance of current INR result and importance of notifying clinic for changes in medications    Elsy verbalizes understanding and agrees to warfarin dosing plan.   ?   Sabrina Pickard RN    Subjective/Objective:      Griffin Walton, a 84 y.o. male is established on warfarin.     Home care/care facility RN's report of Griffin INR, recent warfarin dosing, diet changes, medication changes, and symptoms is documented below.    Additional findings: none    Anticoagulation Episode Summary     Current INR goal:   2.0-3.0   TTR:   49.3 % (7.5 mo)   Next INR check:   4/12/2019   INR from last check:   1.20! (4/9/2019)   Weekly max warfarin dose:      Target end date:      INR check location:      Preferred lab:      Send INR reminders to:   ANTICOAGULATION POOL B (MPW,HUG,STW,RVL,OAK,RLN)    Indications    Chronic atrial fibrillation (H) [I48.2]           Comments:            Anticoagulation Care Providers     Provider Role Specialty Phone number    Tawio Mejias MD Referring Family Medicine [phone]

## 2021-05-27 NOTE — TELEPHONE ENCOUNTER
----- Message from Jessica Verdiny sent at 4/8/2019 10:56 AM CDT -----  Contact: JOLIE DELGADO  General phone call:    Caller: pts wife yuli  Primary cardiologist: dr yusuf  Detailed reason for call: pt was scheduled to see dr yusuf on 5/6 but can't make it because that is his dialysis date. Dr Yusuf has no other availability at  until June, which is not released yet, they are wondering if it is OK to wait that long  New or active symptoms? n/a  Best phone number: [phone]  Best time to contact: any  Ok to leave a detailed message? yes  Device? no    Additional Info:

## 2021-05-27 NOTE — TELEPHONE ENCOUNTER
ANTICOAGULATION  MANAGEMENT    Assessment     Today's INR result of 1.97 is Subtherapeutic (goal INR of 2.0-3.0)        Warfarin recently held as instructed which may be affecting INR - holding for upcoming surgery tomorrow    No new diet changes affecting INR    No new medication/supplements affecting INR    Continues to tolerate warfarin with no reported s/s of bleeding or thromboembolism     Previous INR was Therapeutic    Plan:     Spoke with Yas, spouse, regarding INR result and instructed:     Warfarin Dosing Instructions:  Continue holding warfarin tonight, then continue current warfarin dose 4 mg daily on Mon, Wed, Fri; and 2 mg daily rest of week  (0 % change)  Resume warfarin after surgery per surgeon recommendations    Instructed patient to follow up no later than: 1 week after resuming warfarin    Education provided: target INR goal and significance of current INR result, importance of following up for INR monitoring at instructed interval, importance of taking warfarin as instructed and importance of notifying clinic for changes in medications    Yas verbalizes understanding and agrees to warfarin dosing plan.    Instructed to call the [facility] for any changes, questions or concerns. (#[phone])   ?   Sabrina Pickard RN    Subjective/Objective:      Griffin Walton, a 84 y.o. male is on warfarin.     Griffin reports:     Home warfarin dose: verbally confirmed home dose with Yas and updated on anticoagulation calendar     Missed doses: No     Medication changes:  No     S/S of bleeding or thromboembolism:  No     New Injury or illness:  No     Changes in diet or alcohol consumption:  No     Upcoming surgery, procedure or cardioversion:  Yes: surgery tomorrow, TAVR    Anticoagulation Episode Summary     Current INR goal:   2.0-3.0   TTR:   49.5 % (7.4 mo)   Next INR check:   4/9/2019   INR from last check:   1.97! (4/1/2019)   Weekly max warfarin dose:      Target end date:      INR check  location:      Preferred lab:      Send INR reminders to:   ANTICOAGULATION POOL B (MPW,HUG,STW,RVL,OAK,RLN)    Indications    Chronic atrial fibrillation (H) [I48.2]           Comments:            Anticoagulation Care Providers     Provider Role Specialty Phone number    Taiwo Mejias MD Referring Family Medicine [phone]

## 2021-05-27 NOTE — PROGRESS NOTES
Contacted pt from nursing triage.  Prior to dialysis and had that later today.  Will continue to closely monitor.  DM well controlled

## 2021-05-27 NOTE — TELEPHONE ENCOUNTER
Called back Yas to address her concerns. Pt is s/p TAVR- he has multiple folllow up appts between PCP, Dr. Patel and Liliana this month and next. Informed Yas that Dr. Yusuf is just a routine 6 month follow up and it would be acceptable to wait until June if that works best for their schedule and driving. She verbalized understanding. Scheduling will call back to arrange when June is released. -[facility]

## 2021-05-27 NOTE — TELEPHONE ENCOUNTER
Request for Orders    Who s Requesting: Home Care Physical Therapist    Orders being requested: eval completed on 3/25/19 + 2w1 for strengthening HEP, safety with gait and transfers, balance training    *Pt is scheduled for TAVR on Tues 4/2 and declined visit on Monday 4/1, so only requesting orders through end of this week. Will resume PT and request further orders as applicable following this surgery and his return home.    Where to send Orders: please reply ok to this encounter asap

## 2021-05-27 NOTE — TELEPHONE ENCOUNTER
Plan:  Take last dose of warfarin on Weds 3/27  Pre-op HP completed at PCP last week  Pre-op CXR this week  Pre-TAVR appointment Mon 4/1 7:50 AM  Dialysis Mon 4/1 at 10:00 AM  TAVR Tues 4/2 5:30 AM arrival  Pre-treat for contrast allergy per protocol. Pt will take prednisone 50 mg PM before TAVR. Will order additional medications per protocol.

## 2021-05-27 NOTE — PROGRESS NOTES
Reviewed lab results with Dr. Patel. Noted elevated WBC in Feb with negative blood cultures. Normal CXR last week. Plan to obtain UA today and repeat labs tomorrow morning.     Pt unable to give urine sample. He was sent home with a cup and will drop off sample at [facility] today. Notified Dr. Patel.      --------------------------------------------------------------------------------------------------------------------      Reason for today s visit: Pre-op TAVR RN Visit    Patient scheduled for Transcatheter Aortic Valve Replacement (TAVR) on Tues 4/2/19  arrival time: 0530 for 1st case  at [facility] with Dr. Patel and Dr. Allen    Referring provider: Dr. Silva  Primary Cardiologist: Dr. Silva  PCP: Dr. Lazo    Two CT surgery consults completed:  Dr. Ford (date 3/15/19)  Dr. Boggs (date 2/12/19)  Dental Clearance obtained: yes, scanned in chart      Tests completed today:  Pre-procedure labs drawn: CMP, CBC, INR, BNP, Mag, Type and Screen  MRSA nasal cultures  EKG  Chest X ray -if not completed within 30 days  Bactroban administered in bilateral nares  UA- pending      STS score: 22%  Frailty: 3      Patient instructed on Incentive Spirometer (IS)  Instructions reviewed with patient and family. Patient verbalizes understanding and able to demonstrate proper use of IS.      Patient instructed on medications:   Last dose of warfarin on 3/27  Loading dose of Plavix: continue plavix 75 mg daily  Loading dose of ASA: no, ASA allergy, but ok for heparin during TAVR per hematology      Patient instructed on skin prep:  Patient sent home with luzma wipe skin prep along with written instructions. Skin prep is to be done evening before procedure.      Advanced Directive:  Does the patient have an advanced directive: yes, on file. Will be full code for TAVR. Discussed with patient/Family.      Surgery and anesthesia consents  Dr. Patel reviewed risks of TAVR surgery/NENA and signed  consents  Consents sent to [facility]  Anesthesia will sign anesthesia consent am of surgery      Pre and post procedure education was also reviewed with the patient and family. No further questions and ready to proceed with surgery as planned.    Instructed to come to the main entrance of [facility] at 0530 AM on Tues 4/2    All questions were answered to patient and family by Dr. Patel and Jaya Beckman RN    Wife and son present at the time of appointment.

## 2021-05-27 NOTE — TELEPHONE ENCOUNTER
"RN Triage Care Connection Notification of Critical Lab Result(s)  Result(s):   Creatinine 7.08  Drawn 4/17/19, 0740     Lab: [facility] [facility]  Name of : Pedro    Chart Reviewed  OV today with  Dr. Lazo  Reason for visit: follow up heart surgery 4.2.19 and INR    Patient called with results/ RN Triage related to lab result  Spoke with patient.   Pt states he is feeling   Pt had dialysis after lab appointment today  Had minor stomach problem this morning \"queasy\", took Tums and it went away.      On-call provider: [place]   Paged Time: 1723   2nd Page: 1743  3rd Page: 1754  4th Page: 1825    Recommendations and/or Telephone Orders  No new orders.  Dr Lazo will continue watch labs.     Contact Patient with Provider Recommendations/Orders and Protocol Care Advice as appropriate   Verbalizes understanding and agrees to plan of care. States no further questions. Encouraged to call back as needed.     Bebe Man, RN, Care Connection Nurse Triage          Reason for Disposition    Nursing judgment or information in reference    Protocols used: NO GUIDELINE [id number]      "

## 2021-05-27 NOTE — TELEPHONE ENCOUNTER
INR result is 1.2  INR   Date Value Ref Range Status   04/04/2019 1.49 (H) 0.90 - 1.10 Final       Will the patient be seen, or did they already see, MD or CNP today? Yes Eye Doctor today 4/9    Most Recent Warfarin dose day/week  Sunday Monday Tuesday Wednesday Thursday Friday Saturday        1 mg 1 mg     Sunday Monday Tuesday Wednesday Thursday Friday Saturday   1 mg 1 mg            Has the patient missed any doses of Coumadin, Warfarin, Jantoven in the past 7 days? No    Has the patients medications changed since the last visit? No    Has the patient experienced any bleeding recently? No    Has the patient experienced any injuries or illness recently? No    Has the patient experienced any 'new' shortness of breath, severe headaches, or changes in vision recently? No    Has the patient had any changes in their diet, or alcohol consumption? No    Is the patient here today to prepare for any type of upcoming surgery, procedure, or for a cardioversion procedure? No    What phone number can we reach the patient at today? [phone] Elsy.

## 2021-05-27 NOTE — TELEPHONE ENCOUNTER
ANTICOAGULATION  MANAGEMENT: Discharge Continuity of Care Review    Hospital admission on  4/2 to 4/4 for s/p TAVR.    Discharge disposition: Home    INR Results:       Recent labs: (last 7 days)     04/01/19  0822 04/02/19  0615 04/02/19  1521 04/03/19  1309 04/04/19  0557   INR 1.97* 1.61* 1.65* 1.90* 1.49*       Warfarin inpatient management: Less warfarin administered than maintenance regimen    Warfarin discharge instructions: decrease dose to: 1 mg daily       Medication Changes Affecting Anticoagulation: No    Additional Factors Affecting Anticoagulation: Yes: post TAVR    Plan     Agree with discharge plan for follow up on Monday, per Yas, spouse    Spoke with Yas, Spouse    Anticoagulation calendar updated    Sabrina Pickard RN

## 2021-05-27 NOTE — TELEPHONE ENCOUNTER
Resumption of care homecare visit completed 4/6/19.   Requesting orders as follows:     Skilled nursing 2x week x 3 weeks plus 3 PRN for INR, warfarin management, respiratory and incision assessment and education.      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     Patient and wife requesting homecare nursing do in home INR on Tuesday, not Monday as pt has dialysis MWF most of the day.     Patient is using 3L O2 currently to maintain comfort, please advise if may update medlist to 2-3L continuous O2.       Thank you.

## 2021-05-27 NOTE — TELEPHONE ENCOUNTER
Request for Orders    Who s Requesting: Home Care Occupational Therapist    Orders being requested: OT  2 visit(s) every 1 week(s) for 4 week(s) and 2 prn for functional mobility, low vision strategies, self care strategies.    Where to send Orders: [facility], response through Epic preferred. Please call if you have questions.    Thank you!  Susan Morales, OTR/L  [phone]

## 2021-05-27 NOTE — PATIENT INSTRUCTIONS - HE
Griffin Walton,    It was a pleasure to see you today at the [facility].     My recommendations after this visit include:    - Stop the Amiodarone    - record heart rates morning and night for about a week and I'll call you to check in next week     You should followup with Dr. Patel on May 2 (with echo prior on 4/30/2019)      Liliana Garcia PA-C, MPAS      If you have questions or concerns, please call using the numbers below:    [facility]  [phone]

## 2021-05-27 NOTE — ANESTHESIA POSTPROCEDURE EVALUATION
Patient: Griffin Walton  Transcatheter Aortic Valve Replacement - subclavian - 530AM ADMIT, GEN ANES - WHOLE CASE, NO DEV, H&P - 3/19, SANJEEV - ALEXANDRU ARRIETA MD - FLAVIO, OR Transcatheter Aortic Valve Replacement - subclavian  Anesthesia type: general    Patient location: ICU  Last vitals:   Vitals:    04/02/19 1700   BP: 120/58   Pulse: 66   Resp: 17   Temp:    SpO2: 100%     Post vital signs: stable  Level of consciousness: awake and responds to simple questions  Post-anesthesia pain: pain controlled  Post-anesthesia nausea and vomiting: no  Pulmonary: unassisted, return to baseline  Cardiovascular: stable and blood pressure at baseline  Hydration: adequate  Anesthetic events: no    QCDR Measures:  ASA# 11 - Valerie-op Cardiac Arrest: ASA11B - Patient did NOT experience unanticipated cardiac arrest  ASA# 12 - Valerie-op Mortality Rate: ASA12B - Patient did NOT die  ASA# 13 - PACU Re-Intubation Rate: ASA13B - Patient did NOT require a new airway mgmt  ASA# 10 - Composite Anes Safety: ASA10A - No serious adverse event    Additional Notes:

## 2021-05-27 NOTE — TELEPHONE ENCOUNTER
Request for Orders    Orders being requested: Comprehensive MSW eval with focus on community resources and caregiver support.    Where to send Orders: [facility], response through Epic preferred. Please call if you have questions.    Thank you!  Susan Morales, OTR/L  [phone]

## 2021-05-27 NOTE — TELEPHONE ENCOUNTER
ANTICOAGULATION  MANAGEMENT    Assessment     Today's INR result of 2.0 is Therapeutic (goal INR of 2.0-3.0)        Missed dose(s) may be affecting INR - missed yesterday    No new diet changes affecting INR    Interaction between amiodarone and warfarin may be affecting INR - stopped yesterday    Continues to tolerate warfarin with no reported s/s of bleeding or thromboembolism     Previous INR was Subtherapeutic    Plan:     Spoke with Yas regarding INR result and instructed:     Warfarin Dosing Instructions:  Take 4 mg today and Sat; Take 2 mg Thu, Fri, Sun, Mon    Instructed patient to follow up no later than: Tue 4/23    Education provided: target INR goal and significance of current INR result, importance of following up for INR monitoring at instructed interval, importance of taking warfarin as instructed and importance of notifying clinic for changes in medications    Yas verbalizes understanding and agrees to warfarin dosing plan.    Instructed to call the [facility] for any changes, questions or concerns. (#[phone])   ?   Sabrina Pickard RN    Subjective/Objective:      Griffin Walton, a 84 y.o. male is on warfarin.     Griffin reports:     Home warfarin dose: verbally confirmed home dose with Yas and updated on anticoagulation calendar     Missed doses: Yes, yesterday     Medication changes:  No     S/S of bleeding or thromboembolism:  No     New Injury or illness:  No     Changes in diet or alcohol consumption:  No     Upcoming surgery, procedure or cardioversion:  No    Anticoagulation Episode Summary     Current INR goal:   2.0-3.0   TTR:   47.5 % (7.7 mo)   Next INR check:   4/23/2019   INR from last check:   2.00 (4/17/2019)   Weekly max warfarin dose:      Target end date:      INR check location:      Preferred lab:      Send INR reminders to:   ANTICOAGULATION POOL B (MPW,HUG,STW,RVL,OAK,RLN)    Indications    Chronic atrial fibrillation (H) [I48.2]           Comments:             Anticoagulation Care Providers     Provider Role Specialty Phone number    Taiwo Mejias MD Referring Family Medicine [phone]

## 2021-05-27 NOTE — ANESTHESIA PREPROCEDURE EVALUATION
Anesthesia Evaluation      Patient summary reviewed   No history of anesthetic complications     Airway   Mallampati: III  Neck ROM: full   Pulmonary - normal exam   (+) pneumonia, COPD, asthma  shortness of breath, rhonchi, a smoker                         Cardiovascular   Exercise tolerance: < 4 METS  (+) hypertension, valvular problems/murmurs AS, CAD, CHF, ,     Rhythm: regular  Rate: normal,    murmur      Neuro/Psych    (+) CVA ,     Endo/Other    (+) diabetes mellitus, obesity,      GI/Hepatic/Renal    (+)   chronic renal disease CRI and ESRD,           Dental    (+) poor dentition and chipped              Limited echocardiogram for procedure.    Study done in the Cath Lab for balloon valvuloplasty of the aortic valve.    Normal left ventricular size. Moderate left ventricular hypertrophy.    Left ventricular systolic function appears lower limits of normal. Left ventricular ejection fraction estimated probably 55%.    Normal right ventricular size and systolic function.    Mean gradient across the aortic valve approximately 39 mmHg preprocedure. Approximately 24 mmHg post procedure.  Mild aortic regurgitation preprocedure. Moderate aortic regurgitation post procedure.           Anesthesia Plan  Planned anesthetic: general endotracheal  Etomidate induction (please have esmolol drawn up)    surg to utilize subclavian approach.  NENA proble placement for cards    PONV ppx    AS precautions  ASA 4   Induction: intravenous   Anesthetic plan and risks discussed with: patient  Anesthesia plan special considerations: increased risk of difficult airway, antiemetics, CVP line, arterial catheterization, IV therapy two IVs,   Post-op plan: routine recovery  DNR/DNI status   DNR/DNI status discussed with patient.

## 2021-05-27 NOTE — TELEPHONE ENCOUNTER
ANTICOAGULATION  MANAGEMENT- Home Care/Care Facility Result    Assessment     Today's INR result of 2.9 is Therapeutic (goal INR of 2.0-3.0)        Warfarin taken as previously instructed    No new diet changes affecting INR    No new medication/supplements affecting INR    Continues to tolerate warfarin with no reported s/s of bleeding or thromboembolism     Previous INR was Therapeutic    Plan:     Spoke with papo, home care nurse, discussed INR result and instructed:     Warfarin Dosing Instructions: Continue current warfarin dose 4 mg daily on Mon, wed, Fri; and 2 mg daily rest of week  (0 % change)  Start holding warfarin 3/28 for upcoming TAVR on 4/2, resume warfarin per surgeon's instructions    Next INR to be drawn: 1 week after resuming warfarin    Education provided: target INR goal and significance of current INR result, importance of following up for INR monitoring at instructed interval, importance of taking warfarin as instructed and importance of notifying clinic of upcoming surgeries and procedures 2 weeks in advance    Papo verbalizes understanding and agrees to warfarin dosing plan.   ?   Sabrina Pickard RN    Subjective/Objective:      Griffin CRIS Walton, a 84 y.o. male is established on warfarin.     Home care/care facility RN's report of Rajinderabdias INR, recent warfarin dosing, diet changes, medication changes, and symptoms is documented below.    Additional findings: none    Anticoagulation Episode Summary     Current INR goal:   2.0-3.0   TTR:   48.1 % (7.2 mo)   Next INR check:   4/9/2019   INR from last check:   2.90 (3/26/2019)   Weekly max warfarin dose:      Target end date:      INR check location:      Preferred lab:      Send INR reminders to:   ANTICOAGULATION POOL B (MPW,HUG,STW,RVL,OAK,RLN)    Indications    Chronic atrial fibrillation (H) [I48.2]           Comments:            Anticoagulation Care Providers     Provider Role Specialty Phone number    Taiwo Mejias MD  [facility] [phone]

## 2021-05-27 NOTE — TELEPHONE ENCOUNTER
INR result is 1.4  INR   Date Value Ref Range Status   04/09/2019 1.20 (!) 0.9 - 1.1 Final       Will the patient be seen, or did they already see, MD or CNP today? No, will have dialyses today     Most Recent Warfarin dose day/week    Unsure of dosing for earlier in week, nurse is having issues with Internet connection.    Sunday Monday Tuesday Wednesday Thursday Friday Saturday       unsure unsure unsure     Sunday Monday Tuesday Wednesday Thursday Friday Saturday   unsure unsure 4 2 2         Has the patient missed any doses of Coumadin, Warfarin, Jantoven in the past 7 days? No    Has the patients medications changed since the last visit? No    Has the patient experienced any bleeding recently? No    Has the patient experienced any injuries or illness recently? No    Has the patient experienced any 'new' shortness of breath, severe headaches, or changes in vision recently? Yes, did report shortness of breath, however currently on oxygen and should be under control.    Has the patient had any changes in their diet, or alcohol consumption? No    Is the patient here today to prepare for any type of upcoming surgery, procedure, or for a cardioversion procedure? No    What phone number can we reach the patient at today?FAUSTINA Licea [facility] [phone]

## 2021-05-27 NOTE — TELEPHONE ENCOUNTER
Refill Approved    Rx renewed per Medication Renewal Policy. Medication was last renewed on 9/21/18.    Jo Ruiz, [facility] Triage/Med Refill 3/25/2019     Requested Prescriptions   Pending Prescriptions Disp Refills     losartan (COZAAR) 50 MG tablet [Pharmacy Med Name: Losartan Potassium Oral Tablet 50 MG] 180 tablet 0     Sig: Take 2 tablets (100 mg total) by mouth daily.    Angiotensin Receptor Blocker Protocol Passed - 3/25/2019  7:01 AM       Passed - PCP or prescribing provider visit in past 12 months      Last office visit with prescriber/PCP: 3/19/2019 Clau Lazo MD OR same dept: 3/19/2019 Clau Lazo MD OR same specialty: 3/19/2019 Clau Lazo MD  Last physical: Visit date not found Last MTM visit: Visit date not found   Next visit within 3 mo: Visit date not found  Next physical within 3 mo: Visit date not found  Prescriber OR PCP: Clau Lazo MD  Last diagnosis associated with med order: 1. Essential hypertension  - losartan (COZAAR) 50 MG tablet [Pharmacy Med Name: Losartan Potassium Oral Tablet 50 MG]; Take 2 tablets (100 mg total) by mouth daily.  Dispense: 180 tablet; Refill: 0    If protocol passes may refill for 12 months if within 3 months of last provider visit (or a total of 15 months).            Passed - Serum potassium within the past 12 months    Lab Results   Component Value Date    Potassium 5.2 (H) 03/19/2019            Passed - Blood pressure filed in past 12 months    BP Readings from Last 1 Encounters:   03/21/19 97/57            Passed - Serum creatinine within the past 12 months    Creatinine   Date Value Ref Range Status   03/19/2019 6.33 (HH) 0.70 - 1.30 mg/dL Final

## 2021-05-27 NOTE — TELEPHONE ENCOUNTER
INR result is 2.9  INR   Date Value Ref Range Status   03/21/2019 2.70 (!) 0.9 - 1.1 Final       Will the patient be seen, or did they already see, MD or CNP today? No    Most Recent Warfarin dose day/week  Sunday Monday Tuesday Wednesday Thursday Friday Saturday       2 4 2     Sunday Monday Tuesday Wednesday Thursday Friday Saturday   2 4            Has the patient missed any doses of Coumadin, Warfarin, Jantoven in the past 7 days? No    Has the patients medications changed since the last visit? No    Has the patient experienced any bleeding recently? No    Has the patient experienced any injuries or illness recently? No    Has the patient experienced any 'new' shortness of breath, severe headaches, or changes in vision recently? No    Has the patient had any changes in their diet, or alcohol consumption? No    Is the patient here today to prepare for any type of upcoming surgery, procedure, or for a cardioversion procedure? Yes, 4/2 a transcatheter aortic valve replacement with cardiology.    What phone number can we reach the patient at today? Trish MEZA home care nurse [phone].

## 2021-05-27 NOTE — PROGRESS NOTES
Family Medicine Office Visit  [facility]  Patient Name: Griffin Walton  Patient Age: 84 y.o.  YOB: 1934  MRN: [id number]    Date of Visit: 2019  Reason for Office Visit:   Chief Complaint   Patient presents with     Follow Up     heart surgery 4.2.19      INR           Assessment / Plan / Medical Decision Makin. Type 2 diabetes mellitus with chronic kidney disease on chronic dialysis, with long-term current use of insulin (H)  Repeat labs today - recently seen by ophthalmology  - Basic metabolic panel  - Hemoglobin A1c  - Lipid panel  - Microalbumin, Random Urine  - Diabetes foot exam    2. S/P TAVR (transcatheter aortic valve replacement)  Seen by cardiology and stable from their standpoint.  Continue to monitor    3. 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 On home oxygen therapy  On 2L continuous and improving.    5. Coronary artery disease involving native coronary artery of native heart without angina pectoris  On plavix and ASA - continue with that    6. Acute diastolic congestive heart failure (H)  Stable - weights unchanged and continue to monitor for symptoms    7. Hypoxia  Stable.      8. Chronic obstructive pulmonary disease, unspecified COPD type (H)  Sent in incruse again and stop the spiriva due to cost.  Continue with advair and duoneb as needed    9.  AF  Stable - stopped amiodarone yesterday.  Rate controlled.  On coumadin and will check INR today          Health Maintenance Review  Health Maintenance   Topic Date Due     DIABETES FOLLOW-UP  1934     ASTHMA FOLLOW-UP  1934     DIABETES FOOT EXAM  1944     DIABETES OPHTHALMOLOGY EXAM  1944     DIABETES URINE MICROALBUMIN  1944     ZOSTER VACCINES (1 of 2) 1984     FALL RISK ASSESSMENT  2019     DIABETES HEMOGLOBIN A1C  2019     ASTHMA CONTROL TEST  2020     ADVANCE DIRECTIVES  DISCUSSED WITH PATIENT  03/07/2024     TD 18+ HE  02/04/2025     PNEUMOCOCCAL POLYSACCHARIDE VACCINE AGE 65 AND OVER  Completed     INFLUENZA VACCINE RULE BASED  Completed     PNEUMOCOCCAL CONJUGATE VACCINE FOR ADULTS (PCV13 OR PREVNAR)  Completed         I am having Griffin Walton maintain his dorzolamide-timolol, vit B comp no.3-folic-C-biotin, cholecalciferol (vitamin D3), OXYGEN-AIR DELIVERY SYSTEMS MISC, blood glucose test, prednisoLONE acetate, ULTICARE PEN NEEDLE, insulin aspart U-100, pravastatin, cyclopentolate, terazosin, diltiazem, insulin glargine, acetaminophen, clopidogrel, fluticasone propion-salmeterol, ipratropium-albuterol, SPIRIVA WITH HANDIHALER, and warfarin.      HPI:  Griffin Walton is a 84 y.o. year old who presents to the office today for follow up on DM and s/p TAVR.  Breathing getting better, still having to gasp at times.  Breathing getting easier he says.  No problems with dialysis.  No chest pain.  Still fatigued but improving.  Seeing ophthalmology and seen them last week.  Oxygen down to 2L and improving.  Accidentally picked up the spiriva again but due to cost would like to go on the incruse.  Feels like breathing is improving.  AF stable - stopped amiodarone and no problems.  Still on diltiazem.  No changes in thirst, appetite or urination.  Hx of RCC with partial left nephrectomy.  Hx of CVA in 2012        Review of Systems- pertinent positive in bold:  Constitutional: Fever, chills, night sweats, fainting, weight change, fatigue, seizures, dizziness, sleeping difficulties, loud snoring/pauses in breathing  Eyes: change in vision, blurred or double vision, redness/eye pain  Ears, nose, mouth, throat: change in hearing, ear pain, hoarseness, difficulty swallowing, sores in the mouth or throat  Respiratory: shortness of breath, cough, bloody sputum, wheezing  Cardiovascular: chest pain, palpitations   Gastrointestinal: abdominal pain, heartburn/indigestion, nausea/vomiting,  change in appetite, change in bowel habits, constipation or diarrhea, rectal bleeding/dark stools, difficulty swallowing  Urinary: painful urination, frequent urination, urinary urgency/incontinence, blood in urine/dark urine, nocturia  Musculoskeletal: backache/back pain (new or increasing), weakness, joint pain/stiffness (new or increasing), muscle cramps, swelling of hands, feet, ankles, leg pain/redness  Skin: change in moles/freckles, rash, nodules  Hematologic/lymphatic: swollen lymph glands, abnormal bruising/bleeding  Endocrine: excessive thirst/urination, cold or heat intolerance  Neurologic/emotional: worrisome memory change, numbness/tingling, anxiety, mood swings      Current Scheduled Meds:  Outpatient Encounter Medications as of 4/17/2019   Medication Sig Dispense Refill     acetaminophen (TYLENOL) 325 MG tablet Take 2 tablets (650 mg total) by mouth every 4 (four) hours as needed.  0     blood glucose test strips test 2 times daily  Profile Rx: patient will contact pharmacy when needed       cholecalciferol, vitamin D3, 1,000 unit tablet Take 1,000 Units by mouth daily.       clopidogrel (PLAVIX) 75 mg tablet Take 1 tablet (75 mg total) by mouth daily. 30 tablet 0     cyclopentolate (CYCLOGYL) 1 % ophthalmic solution Administer 1 drop to the right eye 2 (two) times a day.       diltiazem (CARDIZEM CD) 120 MG 24 hr capsule Take 1 capsule (120 mg total) by mouth daily. 30 capsule 0     dorzolamide-timolol (COSOPT) 22.3-6.8 mg/mL ophthalmic solution Administer 1 drop to the right eye 2 (two) times a day.        fluticasone-salmeterol (ADVAIR DISKUS) 100-50 mcg/dose DISKUS Inhale 1 puff 2 (two) times a day. 2 puff 0     insulin aspart U-100 (NOVOLOG FLEXPEN U-100 INSULIN) 100 unit/mL injection pen Inject 4-6 units under the skin 3 times daily before meals as directed 15 mL 3     OXYGEN-AIR DELIVERY SYSTEMS MISC 2-3 L/min into each nostril continuous. Indications: SOB, low O2 sats       pravastatin  "(PRAVACHOL) 40 MG tablet Take 1 tablet (40 mg total) by mouth every evening. 90 tablet 1     prednisoLONE acetate (PRED-FORTE) 1 % ophthalmic suspension Administer 1 drop to the right eye 2 (two) times a day.  1     SPIRIVA WITH HANDIHALER 18 mcg inhalation capsule Place 18 mcg into inhaler and inhale at bedtime. 2 puffs        2     terazosin (HYTRIN) 10 MG capsule Take 10 mg by mouth at bedtime.       vit B comp no.3-folic-C-biotin (NEPHRO-ANGEL) 1- mg-mg-mcg Tab tablet Take 1 tablet by mouth at bedtime.       warfarin (COUMADIN/JANTOVEN) 2 MG tablet Take 0.5 tablets (1 mg total) by mouth daily. Adjust dose based on INR results as directed. 30 tablet 0     insulin glargine (BASAGLAR KWIKPEN) 100 unit/mL (3 mL) pen Inject 7 Units under the skin at bedtime. 5 adj dose pen 0     ipratropium-albuterol (DUO-NEB) 0.5-2.5 mg/3 mL nebulizer Take 3 mL by nebulization every 4 (four) hours as needed. 60 vial 0     ULTICARE PEN NEEDLE 31 gauge x 5/16\" Ndle Inject 1 each under the skin 4 (four) times a day. 400 each 3     Facility-Administered Encounter Medications as of 4/17/2019   Medication Dose Route Frequency Provider Last Rate Last Dose     acetaminophen tablet 500 mg (TYLENOL)  500 mg Oral Q4H PRN Harry Patel MD         morphine injection 1-2 mg  1-2 mg Intravenous Q3H PRHarry Crisostomo MD         ondansetron injection 4 mg (ZOFRAN)  4 mg Intravenous Q4H PRHarry Crisostomo MD   4 mg at 04/02/19 0911    Or     ondansetron tablet 8 mg (ZOFRAN)  8 mg Oral Q8H PRHarry Crisostomo MD         oxyCODONE immediate release tablet 5-10 mg (ROXICODONE)  5-10 mg Oral Q4H PRHarry Crisostomo MD         Past Medical History:   Diagnosis Date     Acute respiratory failure (H) 11/14/2017     Asthma      CHF (congestive heart failure) (H)      Chronic kidney disease      COPD (chronic obstructive pulmonary disease) (H)      Diabetes mellitus (H)      ESRD (end stage renal disease) on dialysis (H) 11/14/2017 "     HCAP (healthcare-associated pneumonia) 2017     Hypertension      Pulmonary congestion 2017     Renal cell carcinoma (H)      Past Surgical History:   Procedure Laterality Date     ABDOMINAL AORTIC ANEURYSM REPAIR  2007     aneursym       CV CORONARY ANGIOGRAM N/A 3/1/2019    Procedure: CORONARY ANGIOGRAM;  Surgeon: Harry Patel MD;  Location: [facility] Cath Lab;  Service: Cardiology     CV OTHER APPROACH TRANSCATHETER (TAVR) N/A 2019    Procedure: Transcatheter Aortic Valve Replacement - subclavian;  Surgeon: Harry Patel MD;  Location: [facility] Cath Lab;  Service: Cardiology     PARTIAL NEPHRECTOMY       Social History     Tobacco Use     Smoking status: Former Smoker     Packs/day: 1.00     Years: 55.00     Pack years: 55.00     Types: Cigarettes     Last attempt to quit: 2005     Years since quittin.2     Smokeless tobacco: Never Used   Substance Use Topics     Alcohol use: No     Drug use: No       Objective / Physical Examination:  Vitals:    19 0708   BP: 128/72   Patient Site: Right Arm   Patient Position: Sitting   Cuff Size: Adult Regular   Pulse: 72   SpO2: 96%     Wt Readings from Last 3 Encounters:   19 188 lb 12.8 oz (85.6 kg)   19 169 lb 3.2 oz (76.7 kg)   19 185 lb (83.9 kg)     BP Readings from Last 3 Encounters:   19 128/72   19 104/60   19 122/62     There is no height or weight on file to calculate BMI.   Results for orders placed or performed in visit on 19   INR   Result Value Ref Range    INR 1.40 (!) 0.9 - 1.1           General Appearance: Alert and oriented, cooperative, affect appropriate, speech clear, in no apparent distress  Lungs: Clear to auscultation bilaterally. Normal inspiratory and expiratory effort  Cardiovascular: Regular rate,irregular rhythm  Abdomen: Bowel sounds active all four quadrants. Soft, non-tender. No hepatomegaly or splenomegaly. No bruits detected.   Extremities: Pulses 2+  and equal throughout. No edema. Strength equal throughout.  Integumentary: Warm and dry. Without suspicious looking lesions  Neuro: Alert and oriented, follows commands appropriately.   DM FOOT EXAM: wnl, DP/PT pulses intact, protective sensation intact, no sores/ulcerations, normal toe nails      Orders Placed This Encounter   Procedures     Basic metabolic panel     Hemoglobin A1c     Lipid panel     Microalbumin, Random Urine     Diabetes foot exam   Followup: Return in 3 months (on 7/17/2019). earlier if needed.    Total time spent with patient was 45 min with >50% of time spent in face-to-face counseling regarding the above plan       Clau Lazo MD

## 2021-05-27 NOTE — TELEPHONE ENCOUNTER
COPD educator note    Talked with Ed's wife today. He was at dialysis when I called. she states he is doing well. His breathing has been really good. She had no questions at this time. We will call again next month.    RADHA Alcala

## 2021-05-28 ASSESSMENT — ASTHMA QUESTIONNAIRES: ACT_TOTALSCORE: 21

## 2021-05-28 NOTE — TELEPHONE ENCOUNTER
INR result is 1.9  INR   Date Value Ref Range Status   05/10/2019 2.00 (H) 0.90 - 1.10 Final       Will the patient be seen, or did they already see, MD or CNP today? No    Most Recent Warfarin dose day/week  Sunday Monday Tuesday Wednesday Thursday Friday Saturday        4 4     Sunday Monday Tuesday Wednesday Thursday Friday Saturday   2 4 4 2 4         Has the patient missed any doses of Coumadin, Warfarin, Jantoven in the past 7 days? No    Has the patients medications changed since the last visit? No    Has the patient experienced any bleeding recently? No    Has the patient experienced any injuries or illness recently? No    Has the patient experienced any 'new' shortness of breath, severe headaches, or changes in vision recently? No    Has the patient had any changes in their diet, or alcohol consumption? No    Is the patient here today to prepare for any type of upcoming surgery, procedure, or for a cardioversion procedure? No    What phone number can we reach the patient at today? [facility] home care nurse Trish [phone].

## 2021-05-28 NOTE — TELEPHONE ENCOUNTER
ANTICOAGULATION  MANAGEMENT    Assessment     Today's INR result of 2.0 is Therapeutic (goal INR of 2.0-3.0)        Warfarin taken as previously instructed    No new diet changes affecting INR    Note: Potential interaction between Amiodarone (discontinued 4/16/19) and warfarin which may affect subsequent INRs    Continues to tolerate warfarin with no reported s/s of bleeding or thromboembolism     Previous INR was Therapeutic    Plan:     Left a detailed message for Edabdias regarding INR result and instructed:     Warfarin Dosing Instructions:  Continue current warfarin dose 2 mg daily on Sun/Wed; and 4 mg daily rest of week  (0 % change)    Instructed patient to follow up no later than: one week    Education provided: importance of therapeutic range    Instructed to call the [facility] for any changes, questions or concerns. (#[phone])   ?   Sarita Chacon RN    Subjective/Objective:      Rajinderabdias LYNCH Isabelle, a 84 y.o. male is on warfarin.     Griffin reports:     Home warfarin dose: as updated on anticoagulation calendar per template     Missed doses: No     Medication changes:  Note Amiodarone discontinued on 4/16     S/S of bleeding or thromboembolism:  No     New Injury or illness:  No     Changes in diet or alcohol consumption:  No     Upcoming surgery, procedure or cardioversion:  No    Anticoagulation Episode Summary     Current INR goal:   2.0-3.0   TTR:   48.3 % (8.5 mo)   Next INR check:   5/17/2019   INR from last check:   2.00 (5/10/2019)   Weekly max warfarin dose:      Target end date:      INR check location:      Preferred lab:      Send INR reminders to:   ANTICOAGULATION POOL B (MPW,HUG,STW,RVL,OAK,RLN)    Indications    Chronic atrial fibrillation (H) [I48.2]           Comments:            Anticoagulation Care Providers     Provider Role Specialty Phone number    Taiwo Mejias MD Referring Family Medicine [phone]

## 2021-05-28 NOTE — TELEPHONE ENCOUNTER
ANTICOAGULATION  MANAGEMENT- Home Care/Care Facility Result    Assessment     Today's INR result of 1.7 is Subtherapeutic (goal INR of 2.0-3.0)        Warfarin taken as previously instructed    No new diet changes affecting INR    No new medication/supplements affecting INR    Continues to tolerate warfarin with no reported s/s of bleeding or thromboembolism     Previous INR was Therapeutic    Plan:     Spoke with papo discussed INR result and instructed:     Warfarin Dosing Instructions: Take 4 mg Tue, Thu' 2 mg Wed    Next INR to be drawn: Fri 4/26    Education provided: target INR goal and significance of current INR result, importance of following up for INR monitoring at instructed interval, importance of taking warfarin as instructed and importance of notifying clinic for changes in medications    Papo verbalizes understanding and agrees to warfarin dosing plan.   ?   Sabrina Pickard RN    Subjective/Objective:      Griffin Walton, a 84 y.o. male is established on warfarin.     Home care/care facility RN's report of Griffin INR, recent warfarin dosing, diet changes, medication changes, and symptoms is documented below.    Additional findings: none    Anticoagulation Episode Summary     Current INR goal:   2.0-3.0   TTR:   46.4 % (7.9 mo)   Next INR check:   4/26/2019   INR from last check:   1.70! (4/23/2019)   Weekly max warfarin dose:      Target end date:      INR check location:      Preferred lab:      Send INR reminders to:   ANTICOAGULATION POOL B (MPW,HUG,STW,RVL,OAK,RLN)    Indications    Chronic atrial fibrillation (H) [I48.2]           Comments:            Anticoagulation Care Providers     Provider Role Specialty Phone number    Taiwo Mejias MD Referring Family Medicine [phone]

## 2021-05-28 NOTE — TELEPHONE ENCOUNTER
"----- Message from Jessica Schultz sent at 5/7/2019  8:29 AM CDT -----  Contact: ED  General phone call:    Caller: ED  Primary cardiologist: DR SMALL  Detailed reason for call: pt said that dr small gave him a medication after he got his replacement heart valve because he was having \"sensations\". But the medication gave him headaches, dizziness and nausea. Wants to know what to do  New or active symptoms? active  Best phone number: [phone]  Best time to contact: any  Ok to leave a detailed message? yes  Device? no    Additional Info:   "

## 2021-05-28 NOTE — TELEPHONE ENCOUNTER
Dr. Patel,    Pt called and stated he stopped taking isosorbide d/t HA and nausea/upset stomach. Pt only took it for 2 days. Since his clinic visit pt has not experienced any chest discomfort.     Any recommendations?    Thanks,  Tracey

## 2021-05-28 NOTE — TELEPHONE ENCOUNTER
Medication Question or Clarification  Who is calling: Other: Patient's wife  What medication are you calling about? (include dose and sig) diltiazem  Who prescribed the medication?: hospitalist due last admit.  What is your question/concern?: The patient had his dose decreased to 120 mg daily from 240 mg daily. He is requesting a new prescription for this dose.   Pharmacy: [id number]  Okay to leave a detailed message?: Yes  Site CMT - Please call the pharmacy to obtain any additional needed information.

## 2021-05-28 NOTE — TELEPHONE ENCOUNTER
----- Message from Tracey Braun RN sent at 4/16/2019  3:10 PM CDT -----  Regarding: Call pt 4/23 to get update on HR's      ----- Message -----  From: Liliana Garcia PA-C  Sent: 4/16/2019   2:51 PM  To: Tracey Braun RN  Subject: HRs                                              I saw this sharona today - I stopped his Amio since he is chronic A. Fib and there is no chance we'll get him back in sinus.     But I would like to check on his HRs in about a week to decide whether to increase his diltiazem.     Can you remind me to call him on April 23?    Liliana

## 2021-05-28 NOTE — TELEPHONE ENCOUNTER
Called pt regarding post-TAVR f/u. Pt states everything is going well. Pt denies lightheadedness/dizziness, syncope, palpitations, chest pain, swelling. Pt state he has very slight ongoing shortness of breath with exertion that lasts just a few seconds, but this is not new for him and is unchanged. Pt amiodarone was stopped and pts HRs (taken about 8am and 830pm daily):  4/16: 88, 102  4/17: 76, 119  4/18: 104, 93  4/19: 81, 83  4/20: 70, 91  4/21: 86, 71  4/22: 71, 73  4/23: 84    Informed pt and pts wife that Liliana will review HRs and we will call them back. Patient and pts wife verbalizes understanding and agrees with plan. No further questions or concerns at this time.

## 2021-05-28 NOTE — PROGRESS NOTES
"Date:  August 12  Start Time:  12:10 PM  End Time:   1:10 PM  Billie Frank, YOB: 1959  Psychoeducational/Skills Based Group  2 members attended group today    Group Focus: Goal of group was to introduce skills and psychoeducation related to topic of PHP day.   Group members were provided instruction and opportunities to practice presented skills.  LPC answered questions as they arose and shared how presented skills can be utilized on a daily basis to increase emotional wellness.      About the Group: Session 3: Trauma (Day 1) Psycho-Ed/Skills Based Group Summary  Topic of curriculum was “Trauma”. LPC began group by briefly reviewing description of trauma from group 1. LPC provided psychoeducation about the body’s response to trauma using a Fight or Flight handout. LPC introduced two techniques used in the context of trauma to help a person to stay in the present moment and cope with triggers. The first technique is called “Grounding” and was described using a worksheet from [employer]. The second is a technique called “EFT tapping” and was illustrated using a YouTube video and paired with a worksheet on tapping points. LPC then led group members through an open discussion on their experience with the two techniques and usefulness in managing triggers. LPC ended group with a grounding meditation from [email or web address].    Yusra Hall MD was onsite when services were rendered.      Patient  was an active group participant.  Assessment/observation of group participation/presentation: Pt was open and engaged throughout group and expressed interest in topic of trauma. 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 She found it beneficial at the time. During container exercise pt described an object with many details and a handle that she can \"carry around\" with her at times if she needs it. When " [facility] Note    Assessment:    Griffin Walton is a 84 y.o. old male with AS s/p TAVR 4/19, CAD s.p roto-PCI to LAD 2/19, AAA s/p repair in '05, AF, HTN, HL, DM, CVA, HFPEF, COPD, LVH, ESRD here for f/u of his AS.            Plan:  # AS - s/p TAVR w/ a #26S3 mean gradient down to 7 no significant AI  - remarkable functional improvement   - continue clopidogrel/warfarin    # CAD - 2V-obstructive CAD s/p Roto PCI to LAD w/ EESx1; RCA not pursued due to procedural timing, good L-R collaterals  - did have a couple of episodes of atypical CP  - continue clopidogrel for at least 12 months in view of new stent but ideally indefinitely, add Imdur and if no recurrence of CP, would continue to manage RCA disease medically    # AF - continue diltiazem/warfarin    Fu w/  in 6 mos and us in 1 year    MARLO SMALL  [facility]  [phone]  ______________________________________________________________________    Subjective:  CC: AS    I had the opportunity to see Griffin Walton at the [facility]. Griffin Walton is a 84 y.o. male with a known history of AS s/p TAVR 4/19, CAD s.p roto-PCI to LAD 2/19, AAA s/p repair in '05, AF, HTN, HL, DM, CVA, HFPEF, COPD, LVH, ESRD here for f/u of his AS.     He has don remarkably well since the TAVR, is now able to walk at least a city block at a time (previously down to just a few steps), no SOB/orthopnea/PND/edema/syncope/pre-syncope/N/V/D/F/C/wt.changes.    He has had a few episodes of chest tightness, lasting a couple of seconds at a time no radiation to arm or jaw. Never had these symptoms with exertion.     Able to tolerate hemodialysis now, no new re-admissions for ADHF. EKG w/ AF at 80, IVCD.     ______________________________________________________________________      Review of Systems:   As noted in HPI, all others reviewed and are negative      Problem List:  Patient Active Problem List   Diagnosis     Pneumonia      Acute on chronic respiratory failure with hypoxia (H)     HCAP (healthcare-associated pneumonia)     Pulmonary congestion     ESRD (end stage renal disease) on dialysis (H)     Chronic atrial fibrillation (H)     Anemia     Cerebral embolism with cerebral infarction (H)     Chronic obstructive pulmonary disease, unspecified COPD type (H)     Type 2 diabetes mellitus with chronic kidney disease on chronic dialysis, with long-term current use of insulin (H)     Benign essential hypertension     Glaucoma     Malignant neoplasm of kidney excluding renal pelvis (H)     Long term current use of anticoagulant therapy     Moderate persistent asthma without complication     Primary open angle glaucoma     Seborrheic keratosis     Thrombocytopenia, primary (H)     Legally blind in right eye, as defined in [place]     Acute ischemic stroke (H)     Nonrheumatic aortic valve stenosis     COPD exacerbation (H)     SOB (shortness of breath)     Elevated INR     Chronic pulmonary edema     Primary hypertension     COPD (chronic obstructive pulmonary disease) (H)     Hyponatremia     Encounter for palliative care     Goals of care, counseling/discussion     Acute on chronic diastolic CHF (congestive heart failure) (H)     LVH (left ventricular hypertrophy) due to hypertensive disease, with heart failure (H)     Hypoxia     Acute diastolic congestive heart failure (H)     Fever, unspecified fever cause     Coronary artery disease involving native coronary artery of native heart without angina pectoris     Atrial fibrillation with RVR (H)     Cerebrovascular disease     DNR (do not resuscitate)     On home oxygen therapy     Respiratory compromise     Dialysis patient (H)     Severe aortic stenosis     S/P TAVR (transcatheter aortic valve replacement)     Medical History:  Past Medical History:   Diagnosis Date     Acute respiratory failure (H) 11/14/2017     Asthma      CHF (congestive heart failure) (H)      Chronic kidney disease   asked about a thought, feeling or problem she needs to put in her container and leave at [facility] until tomorrow, she had difficulty imagining letting go. She had insight that she often ruminates on her concerns as a way of having control. She stated that to have to put concerns into a container forces her to have to acknowledged them which she often doesn't want to to. Therapist referenced back to earlier Madalyn Huff quote around letting go of protectors that are guarding against her true self, and encouraged her to let go of attachments to her concerns a little bit at a time. She expressed understanding.  Treatment plan areas addressed: Depression, Anxiety and Trauma  Visit Diagnosis:    1. Recurrent major depressive disorder, in partial remission (CMS/HCC)      Plan: Continue with [facility] , Patient to F/U with treatment goals as outlined in treatment plan and Patient to F/U with local ED or call 911 should SI/HI arise.      Kathleen Lee, KAIDEN       COPD (chronic obstructive pulmonary disease) (H)      Diabetes mellitus (H)      ESRD (end stage renal disease) on dialysis (H) 2017     HCAP (healthcare-associated pneumonia) 2017     Hypertension      Pulmonary congestion 2017     Renal cell carcinoma (H)      Surgical History:  Past Surgical History:   Procedure Laterality Date     ABDOMINAL AORTIC ANEURYSM REPAIR       aneursym       CV CORONARY ANGIOGRAM N/A 3/1/2019    Procedure: CORONARY ANGIOGRAM;  Surgeon: Harry Patel MD;  Location: [facility] Cath Lab;  Service: Cardiology     CV OTHER APPROACH TRANSCATHETER (TAVR) N/A 2019    Procedure: Transcatheter Aortic Valve Replacement - subclavian;  Surgeon: Harry Patel MD;  Location: [facility] Cath Lab;  Service: Cardiology     PARTIAL NEPHRECTOMY       Social History:  Social History     Socioeconomic History     Marital status:      Spouse name: Not on file     Number of children: Not on file     Years of education: Not on file     Highest education level: Not on file   Occupational History     Not on file   Social Needs     Financial resource strain: Not on file     Food insecurity:     Worry: Not on file     Inability: Not on file     Transportation needs:     Medical: Not on file     Non-medical: Not on file   Tobacco Use     Smoking status: Former Smoker     Packs/day: 1.00     Years: 55.00     Pack years: 55.00     Types: Cigarettes     Last attempt to quit: 2005     Years since quittin.3     Smokeless tobacco: Never Used   Substance and Sexual Activity     Alcohol use: No     Drug use: No     Sexual activity: Never   Lifestyle     Physical activity:     Days per week: Not on file     Minutes per session: Not on file     Stress: Not on file   Relationships     Social connections:     Talks on phone: Not on file     Gets together: Not on file     Attends Restorationism service: Not on file     Active member of club or organization: Not on file      Attends meetings of clubs or organizations: Not on file     Relationship status: Not on file     Intimate partner violence:     Fear of current or ex partner: Not on file     Emotionally abused: Not on file     Physically abused: Not on file     Forced sexual activity: Not on file   Other Topics Concern     Not on file   Social History Narrative     Not on file           Family History:  Family History   Problem Relation Age of Onset     Hypertension Mother      Cancer Father      COPD Sister      Cancer Brother          Allergies:  Allergies   Allergen Reactions     Aspirin Angioedema     Tongue and lip swelling     Monosodium Glutamate Angioedema     Tongue and lip swelling     Dye      Contrast allergy     Heparin Unknown     Does not use heparin for dialysis; on low intensity coumadin     Hydralazine Unknown       Medications:  Current Outpatient Medications   Medication Sig Dispense Refill     acetaminophen (TYLENOL) 325 MG tablet Take 2 tablets (650 mg total) by mouth every 4 (four) hours as needed.  0     blood glucose test strips test 2 times daily  Profile Rx: patient will contact pharmacy when needed       cholecalciferol, vitamin D3, 1,000 unit tablet Take 1,000 Units by mouth daily.       clopidogrel (PLAVIX) 75 mg tablet Take 1 tablet (75 mg total) by mouth daily. 30 tablet 0     cyclopentolate (CYCLOGYL) 1 % ophthalmic solution Administer 1 drop to the right eye 2 (two) times a day.       diltiazem (CARDIZEM CD) 120 MG 24 hr capsule Take 1 capsule (120 mg total) by mouth daily. 30 capsule 3     dorzolamide-timolol (COSOPT) 22.3-6.8 mg/mL ophthalmic solution Administer 1 drop to the right eye 2 (two) times a day.        fluticasone-salmeterol (ADVAIR DISKUS) 100-50 mcg/dose DISKUS Inhale 1 puff 2 (two) times a day. 2 puff 0     insulin aspart U-100 (NOVOLOG FLEXPEN U-100 INSULIN) 100 unit/mL injection pen Inject 4-6 units under the skin 3 times daily before meals as directed 15 mL 3     insulin glargine  "(BASAGLAR KWIKPEN) 100 unit/mL (3 mL) pen Inject 7 Units under the skin at bedtime. 5 adj dose pen 0     ipratropium-albuterol (DUO-NEB) 0.5-2.5 mg/3 mL nebulizer Take 3 mL by nebulization every 4 (four) hours as needed. 60 vial 0     OXYGEN-AIR DELIVERY SYSTEMS MISC 2-3 L/min into each nostril continuous. Indications: SOB, low O2 sats       pravastatin (PRAVACHOL) 40 MG tablet Take 1 tablet (40 mg total) by mouth every evening. 90 tablet 1     prednisoLONE acetate (PRED-FORTE) 1 % ophthalmic suspension Administer 1 drop to the right eye 2 (two) times a day.  1     terazosin (HYTRIN) 10 MG capsule Take 10 mg by mouth at bedtime.       ULTICARE PEN NEEDLE 31 gauge x 5/16\" Ndle Inject 1 each under the skin 4 (four) times a day. 400 each 3     umeclidinium (INCRUSE ELLIPTA) 62.5 mcg/actuation DsDv inhaler Inhale 1 puff daily. 1 each 5     vit B comp no.3-folic-C-biotin (NEPHRO-ANGEL) 1- mg-mg-mcg Tab tablet Take 1 tablet by mouth at bedtime.       warfarin (COUMADIN/JANTOVEN) 2 MG tablet Take 0.5 tablets (1 mg total) by mouth daily. Adjust dose based on INR results as directed. 30 tablet 0     No current facility-administered medications for this visit.      Facility-Administered Medications Ordered in Other Visits   Medication Dose Route Frequency Provider Last Rate Last Dose     acetaminophen tablet 500 mg (TYLENOL)  500 mg Oral Q4H PRN Harry Patel MD         morphine injection 1-2 mg  1-2 mg Intravenous Q3H PRN Harry Patel MD         ondansetron injection 4 mg (ZOFRAN)  4 mg Intravenous Q4H PRN Harry Patel MD   4 mg at 04/02/19 0911    Or     ondansetron tablet 8 mg (ZOFRAN)  8 mg Oral Q8H PRN Harry Patel MD         oxyCODONE immediate release tablet 5-10 mg (ROXICODONE)  5-10 mg Oral Q4H PRN Harry Patel MD           Objective:   Vital signs:  /60 (Patient Site: Right Arm, Patient Position: Sitting, Cuff Size: Adult Regular)   Pulse 80   Resp 20   Ht 5' 8\" (1.727 m)  "  Wt 188 lb (85.3 kg)   BMI 28.59 kg/m        Physical Exam:    GENERAL APPEARANCE: Alert, cooperative and in no acute distress.   HEENT: No scleral icterus. Oral mucuos membranes pink and moist.   NECK: JVP 7. No Hepatojugular reflux. Thyroid not visualized. No lymphadenopathy   CHEST: clear to auscultation   CARDIOVASCULAR: S1, S2 without murmur ,clicks or rubs. Brachial, radial and posterior tibial pulses are intact and symetric. No carotid bruits noted. No edema  ABDOMEN: Nontender. BS+. No bruits.   SKIN: No Xanthelasma   Musculoskeletal: No cyanosis, clubbing or swelling.      Lab Results:  LIPIDS:  Lab Results   Component Value Date    CHOL 142 04/17/2019    CHOL 107 08/01/2018     Lab Results   Component Value Date    HDL 57 04/17/2019    HDL 43 08/01/2018     Lab Results   Component Value Date    LDLCALC 74 04/17/2019    LDLCALC 56 08/01/2018     Lab Results   Component Value Date    TRIG 57 04/17/2019    TRIG 41 08/01/2018     No components found for: CHOLHDL    BMP:  Lab Results   Component Value Date    CREATININE 7.08 (HH) 04/17/2019    BUN 42 (H) 04/17/2019     04/17/2019    K 4.0 04/17/2019    CL 96 (L) 04/17/2019    CO2 30 04/17/2019         [facility]

## 2021-05-28 NOTE — TELEPHONE ENCOUNTER
INR result is 2.3  INR   Date Value Ref Range Status   04/30/2019 2.50 (!) 0.9 - 1.1 Final       Will the patient be seen, or did they already see, MD or CNP today? No    Most Recent Warfarin dose day/week  Sunday Monday Tuesday Wednesday Thursday Friday Saturday        4 4     Sunday Monday Tuesday Wednesday Thursday Friday Saturday   2 4 4 2 4         Has the patient missed any doses of Coumadin, Warfarin, Jantoven in the past 7 days? No    Has the patients medications changed since the last visit? No    Has the patient experienced any bleeding recently? No    Has the patient experienced any injuries or illness recently? No    Has the patient experienced any 'new' shortness of breath, severe headaches, or changes in vision recently? No    Has the patient had any changes in their diet, or alcohol consumption? No    Is the patient here today to prepare for any type of upcoming surgery, procedure, or for a cardioversion procedure? No    What phone number can we reach the patient at today? Please contact Trish with [facility] at [phone].

## 2021-05-28 NOTE — TELEPHONE ENCOUNTER
From: Harry Patel MD  Sent: 5/9/2019   9:30 AM  To: Tracey Braun RN    Let's keep it off then.    Thx!    Harry

## 2021-05-28 NOTE — TELEPHONE ENCOUNTER
Monthly follow up phone call for the COPD Program. Spoke with Mancera wife today for ongoing COPD Education. She says he is doing well. No questions or concerns. Left call back number so patient can call if need be.

## 2021-05-28 NOTE — TELEPHONE ENCOUNTER
Recommend dressing changes twice a day, keeping the area dry, bactroban or neosporin on the area and then follow up in office next week

## 2021-05-28 NOTE — TELEPHONE ENCOUNTER
INR result is 2.5  INR   Date Value Ref Range Status   04/26/2019 1.70 (!) 0.9 - 1.1 Final       Will the patient be seen, or did they already see, MD or CNP today? No    Most Recent Warfarin dose day/week  Sunday Monday Tuesday Wednesday Thursday Friday Saturday        4 4     Sunday Monday Tuesday Wednesday Thursday Friday Saturday   2 4            Has the patient missed any doses of Coumadin, Warfarin, Jantoven in the past 7 days? No    Has the patients medications changed since the last visit? No    Has the patient experienced any bleeding recently? No    Has the patient experienced any injuries or illness recently? No    Has the patient experienced any 'new' shortness of breath, severe headaches, or changes in vision recently? No    Has the patient had any changes in their diet, or alcohol consumption? No    Is the patient here today to prepare for any type of upcoming surgery, procedure, or for a cardioversion procedure? No    What phone number can we reach the patient at today? Trish MEZA home care nurse [phone].

## 2021-05-28 NOTE — TELEPHONE ENCOUNTER
HRs look good for now, especially since he is asymptomatic.      Did they mention any BP readings?    Liliana Garcia PA-C, MPAS  Structural Heart Program   [facility]

## 2021-05-28 NOTE — TELEPHONE ENCOUNTER
Can I please get another order for 1 Social Work visit every 30 days to follow up to discuss community resources and dc planning.   Please respond to this message and I can put in Epic.  Thank you.

## 2021-05-28 NOTE — TELEPHONE ENCOUNTER
ANTICOAGULATION  MANAGEMENT- Home Care/Care Facility Result    Assessment     Today's INR result of 1.70 is Subtherapeutic (goal INR of 2.0-3.0)        Warfarin taken as previously instructed    No new diet changes affecting INR    No new medication/supplements affecting INR    Continues to tolerate warfarin with no reported s/s of bleeding or thromboembolism     Previous INR was Subtherapeutic at 1.70 on 4/23/19.    S/p TAVR on 4/2/19 r/t severe aortic stenosis.    Plan:     Spoke with Sandra WEEMS from [facility] discussed INR result and instructed:    1.  Adjust warfarin dose based on INR results.   2.  RN visits are 2x/wk for two week.    Warfarin Dosing Instructions:   - take 4mg warfarin dose on 4/26, 27, 29.   - take 2mg warfarin dose on 4/28    Next INR to be drawn:  Scheduled on Tues. 4/30/19.    Education provided: importance of consistent vitamin K intake and target INR goal and significance of current INR result    FAUSTINA Flores verbalizes understanding and agrees to warfarin dosing plan.   ?   Ruthy Phelps RN    Subjective/Objective:      Griffin Walton, a 84 y.o. male is established on warfarin.     Home care/care facility RN's report of Griffin INR, recent warfarin dosing, diet changes, medication changes, and symptoms is documented below.    Additional findings: verbally confirmed home dose with FAUSTINA Flores and updated on anticoagulation calendar    Anticoagulation Episode Summary     Current INR goal:   2.0-3.0   TTR:   45.8 % (8 mo)   Next INR check:   4/30/2019   INR from last check:   1.70! (4/26/2019)   Weekly max warfarin dose:      Target end date:      INR check location:      Preferred lab:      Send INR reminders to:   ANTICOAGULATION POOL B (MPW,HUG,STW,RVL,OAK,RLN)    Indications    Chronic atrial fibrillation (H) [I48.2]           Comments:            Anticoagulation Care Providers     Provider Role Specialty Phone number    Taiwo Mejias MD Referring Family Medicine  [phone]

## 2021-05-28 NOTE — TELEPHONE ENCOUNTER
Med pended, chart already showed patient with correct sig of:      diltiazem (CARDIZEM CD) 120 MG 24 hr capsule  Take 1 capsule (120 mg total) by mouth daily., Starting Sat 3/16/2019, Normal

## 2021-05-28 NOTE — PATIENT INSTRUCTIONS - [facility]
- Your aortic valve ss functioning well, and your improvement has been remarkable  - continue clopidogrel/warfarin  - let's  add Imdur and if no recurrence of chest pain, would continue to manage remaining disease medically  - follow up w/  in 6 mos and us in 1 year

## 2021-05-28 NOTE — TELEPHONE ENCOUNTER
Can I please have an order for Social Work 1 visit every 14 days for community resources and dc planning     Please respond to this message in Hyperspace and I can put the order in.    Thank you

## 2021-05-28 NOTE — TELEPHONE ENCOUNTER
INR result is   1.7    Will the patient be seen, or did they already see, MD or CNP today? No    Most Recent Warfarin dose day/week  Sunday Monday Tuesday Wednesday Thursday Friday Saturday Sunday Monday Tuesday Wednesday Thursday Friday Saturday     4 2 4         Has the patient missed any doses of Coumadin, Warfarin, Jantoven in the past 7 days? No    Has the patients medications changed since the last visit? No    Has the patient experienced any bleeding recently? Patient bumped their arm and there was just a little bit of blood, but that is all the patient has had.    Has the patient experienced any injuries or illness recently? No    Has the patient experienced any 'new' shortness of breath, severe headaches, or changes in vision recently? No    Has the patient had any changes in their diet, or alcohol consumption? No     Is the patient here today to prepare for any type of upcoming surgery, procedure, or for a cardioversion procedure? No    What phone number can we reach the patient at today? [phone].

## 2021-05-28 NOTE — TELEPHONE ENCOUNTER
INR result is 1.7  INR   Date Value Ref Range Status   04/17/2019 2.00 (H) 0.90 - 1.10 Final       Will the patient be seen, or did they already see, MD or CNP today? No    Most Recent Warfarin dose day/week  Sunday Monday Tuesday Wednesday Thursday Friday Saturday     2 4 2 2 4     Sunday Monday Tuesday Wednesday Thursday Friday Saturday   2 2            Has the patient missed any doses of Coumadin, Warfarin, Jantoven in the past 7 days? No    Has the patients medications changed since the last visit? No    Has the patient experienced any bleeding recently? No    Has the patient experienced any injuries or illness recently? No    Has the patient experienced any 'new' shortness of breath, severe headaches, or changes in vision recently? No    Has the patient had any changes in their diet, or alcohol consumption? No    Is the patient here today to prepare for any type of upcoming surgery, procedure, or for a cardioversion procedure? No    What phone number can we reach the patient at today? Trish [phone]

## 2021-05-28 NOTE — TELEPHONE ENCOUNTER
Anticoagulation Annual Referral Renewal Review    Griffin Walton's chart reviewed for annual renewal of referral to anticoagulation monitoring.        Criteria for anticoagulation nurse and/or pharmacist renewal met   Warfarin indication: Atrial Fibrillation and Stroke Yes, per indication   Current with INR monitoring/compliant Yes Yes   Date of last office visit 4/17/19 Yes, had office visit within last year   Time in Therapeutic Range (TTR) 64.24 % Yes, TTR > 60%       Griffin Walton met all criteria for anticoagulation management program initiated renewal.  New INR standing orders and anticoagulation referral renewal placed.      Sabrina Pickard, RN  2:16 PM

## 2021-05-28 NOTE — TELEPHONE ENCOUNTER
Called pt and discussed recommendations. Pt will monitor symptoms and call if they return or worsen. Patient verbalizes understanding and agrees with plan. No further questions or concerns at this time.

## 2021-05-28 NOTE — TELEPHONE ENCOUNTER
ANTICOAGULATION  MANAGEMENT- Home Care/Care Facility Result    Assessment     Today's INR result of 1.9 is Subtherapeutic (goal INR of 2.0-3.0)        Warfarin taken as previously instructed    No new diet changes affecting INR    No new medication/supplements affecting INR    Continues to tolerate warfarin with no reported s/s of bleeding or thromboembolism     Previous INR was Therapeutic    Plan:     Spoke with [facility] nurse Trish discussed INR result and instructed:     Warfarin Dosing Instructions: Change warfarin dose to 2 mg daily on Wed; and 4 mg daily rest of week  (8 % change)    Next INR to be drawn: 1 week. Home care is discharging today so appt is made for 5/23 at [facility].     Education provided: importance of taking warfarin as instructed    Trish verbalizes understanding and agrees to warfarin dosing plan.   ?   Kj Mishra RN    Subjective/Objective:      Griffin Walton, a 84 y.o. male is established on warfarin.     Home care/care facility RN's report of Griffin INR, recent warfarin dosing, diet changes, medication changes, and symptoms is documented below.    Additional findings: verbally confirmed home dose with Trish and updated on anticoagulation calendar    Anticoagulation Episode Summary     Current INR goal:   2.0-3.0   TTR:   47.1 % (8.7 mo)   Next INR check:   5/24/2019   INR from last check:   1.90! (5/17/2019)   Weekly max warfarin dose:      Target end date:      INR check location:      Preferred lab:      Send INR reminders to:   ANTICOAGULATION POOL B (W,HUG,STW,RVL,OAK,RLN)    Indications    Chronic atrial fibrillation (H) [I48.2]           Comments:            Anticoagulation Care Providers     Provider Role Specialty Phone number    Taiwo Mejias MD Referring Family Medicine [phone]

## 2021-05-28 NOTE — TELEPHONE ENCOUNTER
ANTICOAGULATION  MANAGEMENT- Home Care/Care Facility Result    Assessment     Today's INR result of 2.3 is Therapeutic (goal INR of 2.0-3.0)        Warfarin taken as previously instructed    No new diet changes affecting INR    No new medication/supplements affecting INR    Continues to tolerate warfarin with no reported s/s of bleeding or thromboembolism     Previous INR was Therapeutic    Plan:     Spoke with Papo discussed INR result and instructed:     Warfarin Dosing Instructions: Change warfarin dose to 2 mg daily on Sun, Wed; and 4 mg daily rest of week  (0 % change)    Next INR to be drawn: 1 week    Education provided: target INR goal and significance of current INR result and importance of notifying clinic for changes in medications    Diogoirina verbalizes understanding and agrees to warfarin dosing plan.   ?   Sabrina Pickard RN    Subjective/Objective:      Griffin Walton, a 84 y.o. male is established on warfarin.     Home care/care facility RN's report of Griffni INR, recent warfarin dosing, diet changes, medication changes, and symptoms is documented below.    Additional findings: none    Anticoagulation Episode Summary     Current INR goal:   2.0-3.0   TTR:   46.7 % (8.3 mo)   Next INR check:   5/10/2019   INR from last check:   2.30 (5/3/2019)   Weekly max warfarin dose:      Target end date:      INR check location:      Preferred lab:      Send INR reminders to:   ANTICOAGULATION POOL B (MPW,MILDREDG,STW,RVL,OAK,RLN)    Indications    Chronic atrial fibrillation (H) [I48.2]           Comments:            Anticoagulation Care Providers     Provider Role Specialty Phone number    Taiwo Mejias MD Referring Family Medicine [phone]

## 2021-05-28 NOTE — TELEPHONE ENCOUNTER
Pt has skin tear on right lower arm measuring 1 cm x 0.5cm in a slightly triangle shape. Skin unable to approximate back. No s/s of infection. Pt wife is managing cleaning and covering. Pt stated that he bumped his arm walking thru door.

## 2021-05-28 NOTE — TELEPHONE ENCOUNTER
ANTICOAGULATION  MANAGEMENT- Home Care/Care Facility Result    Assessment     Today's INR result of 2.5 is Therapeutic (goal INR of 2.0-3.0)        Warfarin taken as previously instructed    No new diet changes affecting INR    No new medication/supplements affecting INR    Continues to tolerate warfarin with no reported s/s of bleeding or thromboembolism     Previous INR was Subtherapeutic    Plan:     Spoke with [facility] nurse Trish discussed INR result and instructed:     Warfarin Dosing Instructions: Take 4 mg on Tues and Thur; 2 mg on Wed.    Next INR to be drawn: Fri 5/3.     Education provided: importance of taking warfarin as instructed    Trish verbalizes understanding and agrees to warfarin dosing plan.   ?   Kj Mishra RN    Subjective/Objective:      Griffin Walton, a 84 y.o. male is established on warfarin.     Home care/care facility RN's report of Griffin INR, recent warfarin dosing, diet changes, medication changes, and symptoms is documented below.    Additional findings: verbally confirmed home dose with Trish and updated on anticoagulation calendar    Anticoagulation Episode Summary     Current INR goal:   2.0-3.0   TTR:   46.1 % (8.2 mo)   Next INR check:   5/3/2019   INR from last check:   2.50 (4/30/2019)   Weekly max warfarin dose:      Target end date:      INR check location:      Preferred lab:      Send INR reminders to:   ANTICOAGULATION POOL B (MPW,MILDREDG,STW,RVL,OAK,RLN)    Indications    Chronic atrial fibrillation (H) [I48.2]           Comments:            Anticoagulation Care Providers     Provider Role Specialty Phone number    Taiwo Mejias MD Referring Family Medicine [phone]

## 2021-05-29 NOTE — TELEPHONE ENCOUNTER
ANTICOAGULATION  MANAGEMENT    Assessment     Today's INR result of 3.1 is Supratherapeutic (goal INR of 2.0-3.0)        Warfarin taken as previously instructed    Decreased greens/vitamin K intake may be affecting INR - reports they can eat more greens    No new medication/supplements affecting INR    Continues to tolerate warfarin with no reported s/s of bleeding or thromboembolism     Previous INR was Therapeutic    Plan:     Spoke with Yas regarding INR result and instructed:     Warfarin Dosing Instructions:  Continue current warfarin dose 4 mg daily   (0 % change)    Instructed patient to follow up no later than: 2 weeks    Education provided: importance of consistent vitamin K intake, target INR goal and significance of current INR result, importance of following up for INR monitoring at instructed interval, importance of taking warfarin as instructed and importance of notifying clinic for changes in medications    Yas verbalizes understanding and agrees to warfarin dosing plan.    Instructed to call the [facility] for any changes, questions or concerns. (#[phone])   ?   Sabrina Pickard RN    Subjective/Objective:      Griffin Walton, a 85 y.o. male is on warfarin.     Griffin reports:     Home warfarin dose: verbally confirmed home dose with Yas and updated on anticoagulation calendar     Missed doses: No     Medication changes:  No     S/S of bleeding or thromboembolism:  No     New Injury or illness:  No     Changes in diet or alcohol consumption:  Yes: less greens, they report they can add more into their diet     Upcoming surgery, procedure or cardioversion:  No    Anticoagulation Episode Summary     Current INR goal:   2.0-3.0   TTR:   45.4 % (9.8 mo)   Next INR check:   7/2/2019   INR from last check:   3.10! (6/18/2019)   Weekly max warfarin dose:      Target end date:      INR check location:      Preferred lab:      Send INR reminders to:   TERES[facility] PALACIOS    Indications    Chronic  atrial fibrillation (H) [I48.2]           Comments:            Anticoagulation Care Providers     Provider Role Specialty Phone number    Clau Lazo MD [facility] Medicine [phone]

## 2021-05-29 NOTE — TELEPHONE ENCOUNTER
ANTICOAGULATION  MANAGEMENT    Assessment     Today's INR result of 3.0 is Therapeutic (goal INR of 2.0-3.0)        Warfarin taken as previously instructed    No new diet changes affecting INR    No new medication/supplements affecting INR    Continues to tolerate warfarin with no reported s/s of bleeding or thromboembolism     Previous INR was Subtherapeutic    Plan:     Spoke with Yas, spouse, regarding INR result and instructed:     Warfarin Dosing Instructions:  Continue current warfarin dose 4 mg daily   (0 % change)    Instructed patient to follow up no later than: 2 weeks    Education provided: importance of consistent vitamin K intake, impact of vitamin K foods on INR, importance of following up for INR monitoring at instructed interval, importance of taking warfarin as instructed, Strategies to improve compliance (pillbox, alarm, calendar, etc) and importance of notifying clinic for changes in medications    Yas verbalizes understanding and agrees to warfarin dosing plan.    Instructed to call the [facility] for any changes, questions or concerns. (#[phone])   ?   Sabrina Pickard RN    Subjective/Objective:      Griffin Walton, a 85 y.o. male is on warfarin.     Griffin reports:     Home warfarin dose: verbally confirmed home dose with Yas and updated on anticoagulation calendar     Missed doses: No     Medication changes:  No     S/S of bleeding or thromboembolism:  No     New Injury or illness:  No     Changes in diet or alcohol consumption:  No     Upcoming surgery, procedure or cardioversion:  No    Anticoagulation Episode Summary     Current INR goal:   2.0-3.0   TTR:   47.6 % (9.3 mo)   Next INR check:   6/18/2019   INR from last check:   3.00 (6/4/2019)   Weekly max warfarin dose:      Target end date:      INR check location:      Preferred lab:      Send INR reminders to:   TERESITA PALACIOS    Indications    Chronic atrial fibrillation (H) [I48.2]           Comments:             Anticoagulation Care Providers     Provider Role Specialty Phone number    Clau Lazo MD [facility] Family Medicine [phone]

## 2021-05-29 NOTE — TELEPHONE ENCOUNTER
COPD educator note    Talked with Ed's wife today. She states he is doing well. He was at dialysis. She had no questions at this time. We will call again next month.    RADHA Alcala

## 2021-05-29 NOTE — TELEPHONE ENCOUNTER
ANTICOAGULATION  MANAGEMENT    Assessment     Today's INR result of 1.8 is Subtherapeutic (goal INR of 2.0-3.0)        Warfarin taken as previously instructed    No new diet changes affecting INR    No new medication/supplements affecting INR    Continues to tolerate warfarin with no reported s/s of bleeding or thromboembolism     Previous INR was Subtherapeutic    Plan:     Spoke with Yas, spouse, regarding INR result and instructed:     Warfarin Dosing Instructions:  Take a one time boost dose of 6 mg today only then change warfarin dose to 4 mg daily  (7.7 % change)    Instructed patient to follow up no later than: 7-10 days    Education provided: target INR goal and significance of current INR result, importance of following up for INR monitoring at instructed interval, importance of taking warfarin as instructed and importance of notifying clinic for changes in medications    Yas verbalizes understanding and agrees to warfarin dosing plan.    Instructed to call the [facility] for any changes, questions or concerns. (#[phone])   ?   Sabrina Pickard RN    Subjective/Objective:      Griffin Walton, a 85 y.o. male is on warfarin.     Griffin reports:     Home warfarin dose: verbally confirmed home dose with Yas and updated on anticoagulation calendar     Missed doses: No     Medication changes:  No     S/S of bleeding or thromboembolism:  No     New Injury or illness:  No     Changes in diet or alcohol consumption:  No     Upcoming surgery, procedure or cardioversion:  No    Anticoagulation Episode Summary     Current INR goal:   2.0-3.0   TTR:   46.0 % (8.9 mo)   Next INR check:   6/3/2019   INR from last check:   1.80! (5/23/2019)   Weekly max warfarin dose:      Target end date:      INR check location:      Preferred lab:      Send INR reminders to:   ANTICOAGULATION POOL B (MPW,HUG,STW,RVL,OAK,RLN)    Indications    Chronic atrial fibrillation (H) [I48.2]           Comments:             Anticoagulation Care Providers     Provider Role Specialty Phone number    Taiwo Mejias MD Referring Family Medicine [phone]

## 2021-05-30 NOTE — TELEPHONE ENCOUNTER
RN cannot approve Refill Request    RN can NOT refill this medication med is not covered by policy/route to provider.       Jo Ruiz, [facility] Triage/Med Refill 7/22/2019    Requested Prescriptions   Pending Prescriptions Disp Refills     warfarin (COUMADIN/JANTOVEN) 2 MG tablet [Pharmacy Med Name: Warfarin Sodium Oral Tablet 2 MG] 60 tablet 4     Sig: Take 2 tablets (4 mg total) by mouth daily.       Warfarin Refill Protocol  Failed - 7/22/2019  7:01 AM        Failed -  Route to appropriate pool/provider     Last Anticoagulation Summary:   Anticoagulation Episode Summary     Current INR goal:   2.0-3.0   TTR:   44.8 % (10.8 mo)   Next INR check:   8/8/2019   INR from last check:   2.00 (7/18/2019)   Weekly max warfarin dose:      Target end date:      INR check location:      Preferred lab:      [facility]    Indications    Chronic atrial fibrillation (H) [I48.2]           Comments:            Anticoagulation Care Providers     Provider Role Specialty Phone number    Clua Lazo MD [facility] Family Medicine [phone]                Passed - Provider visit in last year     Last office visit with prescriber/PCP: 7/18/2019 Clau Lazo MD OR same dept: 7/18/2019 Clau Lazo MD OR same specialty: 7/18/2019 Clau Lazo MD  Last physical: Visit date not found Last MTM visit: Visit date not found    Next appt within 3 mo: Visit date not found Next physical within 3 mo: Visit date not found  Prescriber OR PCP: Clau Lazo MD  Last diagnosis associated with med order: 1. Chronic atrial fibrillation (H)  - warfarin (COUMADIN/JANTOVEN) 2 MG tablet [Pharmacy Med Name: Warfarin Sodium Oral Tablet 2 MG]; Take 2 tablets (4 mg total) by mouth daily.  Dispense: 60 tablet; Refill: 4    If protocol passes may refill for 6 months if within 3 months of last provider visit (or a total of 9 months).

## 2021-05-30 NOTE — PROGRESS NOTES
Result is/are slightly higher sugar than previous but still looks great.  No change to DM medications.  Kidney function still elevated which we expect.

## 2021-05-30 NOTE — TELEPHONE ENCOUNTER
"Triage note:    Wife called to clarify if it is okay for patient to eat \"beet greens\" while on Warfarin.      They are aware to avoid greens in general but since beet greens (the top of the beet plant) is more uncommon and in season now they want to know if he can eat it?    Tasha Coto RN, [facility] Med Refill/Triage, 7/11/2019 1:27 PM          *Ok to leave a detailed message upon call back        "

## 2021-05-30 NOTE — TELEPHONE ENCOUNTER
ANTICOAGULATION  MANAGEMENT    Assessment     Today's INR result of 1.9 is Subtherapeutic (goal INR of 2.0-3.0)        Warfarin taken as previously instructed    Increased greens/vitamin K intake may be affecting INR    No new medication/supplements affecting INR    Continues to tolerate warfarin with no reported s/s of bleeding or thromboembolism     Previous INR was Supratherapeutic    Plan:     Spoke with Yas, spouse, regarding INR result and instructed:     Warfarin Dosing Instructions:  Take a one time boost dose of 6 mg today only then continue current warfarin dose 4 mg daily    Instructed patient to follow up no later than: 2 weeks at 7/18 OV    Education provided: importance of consistent vitamin K intake, impact of vitamin K foods on INR, vitamin K content of foods, target INR goal and significance of current INR result, importance of following up for INR monitoring at instructed interval, importance of taking warfarin as instructed and importance of notifying clinic for changes in medications    Yas verbalizes understanding and agrees to warfarin dosing plan.    Instructed to call the [facility] for any changes, questions or concerns. (#[phone])   ?   Sabrina Pickard RN    Subjective/Objective:      Griffin Walton, a 85 y.o. male is on warfarin.     Griffin reports:     Home warfarin dose: verbally confirmed home dose with Yas and updated on anticoagulation calendar     Missed doses: No     Medication changes:  No     S/S of bleeding or thromboembolism:  No     New Injury or illness:  No     Changes in diet or alcohol consumption:  Yes: more greens, will decrease intake by 1 serving a week     Upcoming surgery, procedure or cardioversion:  No    Anticoagulation Episode Summary     Current INR goal:   2.0-3.0   TTR:   47.1 % (10.3 mo)   Next INR check:   7/16/2019   INR from last check:   1.90! (7/2/2019)   Weekly max warfarin dose:      Target end date:      INR check location:       Preferred lab:      Send INR reminders to:   [facility]    Indications    Chronic atrial fibrillation (H) [I48.2]           Comments:            Anticoagulation Care Providers     Provider Role Specialty Phone number    Clau Lazo MD [facility] Medicine [phone]

## 2021-05-30 NOTE — TELEPHONE ENCOUNTER
Monthly follow up phone call for the COPD Program. No answer. Left message with call back number for any questions or concerns.

## 2021-05-30 NOTE — TELEPHONE ENCOUNTER
Spoke with Griffin and Yas.  Advised beets have a small amount of vitamin K and he can eat some.  No further questions at this time.

## 2021-05-30 NOTE — PROGRESS NOTES
Family Medicine Office Visit  [facility]  Patient Name: Griffin Walton  Patient Age: 85 y.o.  YOB: 1934  MRN: [id number]    Date of Visit: 2019  Reason for Office Visit:   Chief Complaint   Patient presents with     Diabetes     INR           Assessment / Plan / Medical Decision Makin. Chronic atrial fibrillation (H)  Stable and will check INR today  - INR    2. Type 2 diabetes mellitus with chronic kidney disease on chronic dialysis, with long-term current use of insulin (H)  Well controlled.  Repeat labs today  - Glycosylated Hemoglobin A1c  - Basic Metabolic Panel    3. Acute ischemic stroke (H)  Stable   - INR    4. Dialysis patient (H)  Stable - repeat labs today.  Going 3d/week        Health Maintenance Review  Health Maintenance   Topic Date Due     DIABETES FOLLOW-UP  1934     ASTHMA FOLLOW-UP  1934     ZOSTER VACCINES (1 of 2) 1984     INFLUENZA VACCINE RULE BASED (1) 2019     DIABETES HEMOGLOBIN A1C  2020     DIABETES OPHTHALMOLOGY EXAM  2020     ASTHMA CONTROL TEST  2020     DIABETES FOOT EXAM  2020     DIABETES URINE MICROALBUMIN  2020     FALL RISK ASSESSMENT  2020     ADVANCE DIRECTIVES DISCUSSED WITH PATIENT  2024     TD 18+ HE  2025     PNEUMOCOCCAL POLYSACCHARIDE VACCINE AGE 65 AND OVER  Completed     PNEUMOCOCCAL CONJUGATE VACCINE FOR ADULTS (PCV13 OR PREVNAR)  Completed         I am having Griffin Walton maintain his dorzolamide-timolol, vit B comp no.3-folic-C-biotin, cholecalciferol (vitamin D3), OXYGEN-AIR DELIVERY SYSTEMS MISC, blood glucose test, prednisoLONE acetate, ULTICARE PEN NEEDLE, insulin aspart U-100, pravastatin, cyclopentolate, terazosin, insulin glargine, acetaminophen, clopidogrel, fluticasone propion-salmeterol, ipratropium-albuterol, warfarin, umeclidinium, and diltiazem.      HPI:  Griffin Walton is a 85 y.o. year old who  presents to the office today for follow up on DM.  Doing well.  FS this morning 112.  Prior to that around 100-110.  Need INR check for AF and hx of stroke.  Going to dialysis center and no issues with that.  Hx of RCC and seeing nephrology.  No numbness or tingling.  No cp or shortness of breath.  Wife helping with medications.        Review of Systems- pertinent positive in bold:  Constitutional: Fever, chills, night sweats, fainting, weight change, fatigue, seizures, dizziness, sleeping difficulties, loud snoring/pauses in breathing  Eyes: change in vision, blurred or double vision, redness/eye pain  Ears, nose, mouth, throat: change in hearing, ear pain, hoarseness, difficulty swallowing, sores in the mouth or throat  Respiratory: shortness of breath, cough, bloody sputum, wheezing  Cardiovascular: chest pain, palpitations   Gastrointestinal: abdominal pain, heartburn/indigestion, nausea/vomiting, change in appetite, change in bowel habits, constipation or diarrhea, rectal bleeding/dark stools, difficulty swallowing  Urinary: painful urination, frequent urination, urinary urgency/incontinence, blood in urine/dark urine, nocturia  Musculoskeletal: backache/back pain (new or increasing), weakness, joint pain/stiffness (new or increasing), muscle cramps, swelling of hands, feet, ankles, leg pain/redness  Skin: change in moles/freckles, rash, nodules  Hematologic/lymphatic: swollen lymph glands, abnormal bruising/bleeding  Endocrine: excessive thirst/urination, cold or heat intolerance  Neurologic/emotional: worrisome memory change, numbness/tingling, anxiety, mood swings      Current Scheduled Meds:  Outpatient Encounter Medications as of 7/18/2019   Medication Sig Dispense Refill     acetaminophen (TYLENOL) 325 MG tablet Take 2 tablets (650 mg total) by mouth every 4 (four) hours as needed.  0     blood glucose test strips test 2 times daily  Profile Rx: patient will contact pharmacy when needed        "cholecalciferol, vitamin D3, 1,000 unit tablet Take 1,000 Units by mouth daily.       clopidogrel (PLAVIX) 75 mg tablet Take 1 tablet (75 mg total) by mouth daily. 30 tablet 0     cyclopentolate (CYCLOGYL) 1 % ophthalmic solution Administer 1 drop to the right eye 2 (two) times a day.       diltiazem (CARDIZEM CD) 120 MG 24 hr capsule Take 1 capsule (120 mg total) by mouth daily. 30 capsule 3     dorzolamide-timolol (COSOPT) 22.3-6.8 mg/mL ophthalmic solution Administer 1 drop to the right eye 2 (two) times a day.        fluticasone-salmeterol (ADVAIR DISKUS) 100-50 mcg/dose DISKUS Inhale 1 puff 2 (two) times a day. 2 puff 0     insulin aspart U-100 (NOVOLOG FLEXPEN U-100 INSULIN) 100 unit/mL injection pen Inject 4-6 units under the skin 3 times daily before meals as directed 15 mL 3     insulin glargine (BASAGLAR KWIKPEN) 100 unit/mL (3 mL) pen Inject 7 Units under the skin at bedtime. 5 adj dose pen 0     ipratropium-albuterol (DUO-NEB) 0.5-2.5 mg/3 mL nebulizer Take 3 mL by nebulization every 4 (four) hours as needed. 60 vial 0     OXYGEN-AIR DELIVERY SYSTEMS MISC 2-3 L/min into each nostril continuous. Indications: SOB, low O2 sats       pravastatin (PRAVACHOL) 40 MG tablet Take 1 tablet (40 mg total) by mouth every evening. 90 tablet 1     prednisoLONE acetate (PRED-FORTE) 1 % ophthalmic suspension Administer 1 drop to the right eye 2 (two) times a day.  1     terazosin (HYTRIN) 10 MG capsule Take 10 mg by mouth at bedtime.       ULTICARE PEN NEEDLE 31 gauge x 5/16\" Ndle Inject 1 each under the skin 4 (four) times a day. 400 each 3     umeclidinium (INCRUSE ELLIPTA) 62.5 mcg/actuation DsDv inhaler Inhale 1 puff daily. 1 each 5     vit B comp no.3-folic-C-biotin (NEPHRO-ANGEL) 1- mg-mg-mcg Tab tablet Take 1 tablet by mouth at bedtime.       warfarin (COUMADIN/JANTOVEN) 2 MG tablet Take 0.5 tablets (1 mg total) by mouth daily. Adjust dose based on INR results as directed. 30 tablet 0 "     Facility-Administered Encounter Medications as of 2019   Medication Dose Route Frequency Provider Last Rate Last Dose     acetaminophen tablet 500 mg (TYLENOL)  500 mg Oral Q4H PRN Harry Patel MD         morphine injection 1-2 mg  1-2 mg Intravenous Q3H PRN Harry Patel MD         ondansetron injection 4 mg (ZOFRAN)  4 mg Intravenous Q4H PRN Harry Patel MD   4 mg at 19 0911    Or     ondansetron tablet 8 mg (ZOFRAN)  8 mg Oral Q8H PRN Harry Patel MD         oxyCODONE immediate release tablet 5-10 mg (ROXICODONE)  5-10 mg Oral Q4H PRN Harry Patel MD         Past Medical History:   Diagnosis Date     Acute respiratory failure (H) 2017     Asthma      CHF (congestive heart failure) (H)      Chronic kidney disease      COPD (chronic obstructive pulmonary disease) (H)      Diabetes mellitus (H)      ESRD (end stage renal disease) on dialysis (H) 2017     HCAP (healthcare-associated pneumonia) 2017     Hypertension      Pulmonary congestion 2017     Renal cell carcinoma (H)      Past Surgical History:   Procedure Laterality Date     ABDOMINAL AORTIC ANEURYSM REPAIR  2007     aneursym       CV CORONARY ANGIOGRAM N/A 3/1/2019    Procedure: CORONARY ANGIOGRAM;  Surgeon: Harry Patel MD;  Location: [facility] Cath Lab;  Service: Cardiology     CV OTHER APPROACH TRANSCATHETER (TAVR) N/A 2019    Procedure: Transcatheter Aortic Valve Replacement - subclavian;  Surgeon: Harry Patel MD;  Location: [facility] Cath Lab;  Service: Cardiology     PARTIAL NEPHRECTOMY       Social History     Tobacco Use     Smoking status: Former Smoker     Packs/day: 1.00     Years: 55.00     Pack years: 55.00     Types: Cigarettes     Last attempt to quit: 2005     Years since quittin.5     Smokeless tobacco: Never Used   Substance Use Topics     Alcohol use: No     Drug use: No       Objective / Physical Examination:  Vitals:    19 0945   BP:  124/66   Patient Site: Right Arm   Patient Position: Sitting   Cuff Size: Adult Regular   Pulse: 99   SpO2: 94%   Weight: 190 lb 4.8 oz (86.3 kg)     Wt Readings from Last 3 Encounters:   07/18/19 190 lb 4.8 oz (86.3 kg)   05/02/19 188 lb (85.3 kg)   04/30/19 188 lb (85.3 kg)     BP Readings from Last 3 Encounters:   07/18/19 124/66   05/17/19 140/76   05/02/19 120/60     Body mass index is 28.94 kg/m .   Results for orders placed or performed in visit on 07/18/19   Glycosylated Hemoglobin A1c   Result Value Ref Range    Hemoglobin A1c 6.4 (H) 3.5 - 6.0 %   Basic Metabolic Panel   Result Value Ref Range    Sodium 138 136 - 145 mmol/L    Potassium 4.9 3.5 - 5.0 mmol/L    Chloride 96 (L) 98 - 107 mmol/L    CO2 31 22 - 31 mmol/L    Anion Gap, Calculation 11 5 - 18 mmol/L    Glucose 69 (L) 70 - 125 mg/dL    Calcium 9.5 8.5 - 10.5 mg/dL    BUN 46 (H) 8 - 28 mg/dL    Creatinine 6.60 (HH) 0.70 - 1.30 mg/dL    GFR MDRD Af Amer 10 (L) >60 mL/min/1.73m2    GFR MDRD Non Af Amer 8 (L) >60 mL/min/1.73m2   INR   Result Value Ref Range    INR 2.00 (H) 0.90 - 1.10           General Appearance: Alert and oriented, cooperative, affect appropriate, speech clear, in no apparent distress  Head: Normocephalic, atraumatic  Neck: Supple, trachea midline. No cervical adenopathy  Back: Symmetrical and nontender  Lungs: Clear to auscultation bilaterally. Normal inspiratory and expiratory effort  Cardiovascular: Regular rate, normal S1, S2. No murmurs, rubs, or gallops  Abdomen: Bowel sounds active all four quadrants. Soft, non-tender. No hepatomegaly or splenomegaly. No bruits detected.   Extremities: Pulses 2+ and equal throughout. No edema. Strength equal throughout.  Integumentary: Warm and dry. Without suspicious looking lesions  Neuro: Alert and oriented, follows commands appropriately.     Orders Placed This Encounter   Procedures     Glycosylated Hemoglobin A1c     Basic Metabolic Panel   Followup: No follow-ups on file. earlier if  needed.    Total time spent with patient was 30 min with >50% of time spent in face-to-face counseling regarding the above plan       Clau Lazo MD

## 2021-05-30 NOTE — TELEPHONE ENCOUNTER
ANTICOAGULATION  MANAGEMENT    Assessment     Today's INR result of 2.0 is Therapeutic (goal INR of 2.0-3.0)        Warfarin taken as previously instructed    No new diet changes affecting INR    No new medication/supplements affecting INR    Continues to tolerate warfarin with no reported s/s of bleeding or thromboembolism     Previous INR was Subtherapeutic    Plan:     Spoke with Yas, spouse, regarding INR result and instructed:     Warfarin Dosing Instructions:  Continue current warfarin dose 4 mg daily  (0 % change)    Instructed patient to follow up no later than: 2-3 weeks    Education provided: importance of consistent vitamin K intake, target INR goal and significance of current INR result, importance of following up for INR monitoring at instructed interval and importance of taking warfarin as instructed    Yas verbalizes understanding and agrees to warfarin dosing plan.    Instructed to call the [facility] for any changes, questions or concerns. (#[phone])   ?   Sabrina Pickard RN    Subjective/Objective:      Rajinderabdias Walton, a 85 y.o. male is on warfarin.     Griffin reports:     Home warfarin dose: verbally confirmed home dose with Yas and updated on anticoagulation calendar     Missed doses: No     Medication changes:  No     S/S of bleeding or thromboembolism:  No     New Injury or illness:  No     Changes in diet or alcohol consumption:  No     Upcoming surgery, procedure or cardioversion:  No    Anticoagulation Episode Summary     Current INR goal:   2.0-3.0   TTR:   44.8 % (10.8 mo)   Next INR check:   8/8/2019   INR from last check:   2.00 (7/18/2019)   Weekly max warfarin dose:      Target end date:      INR check location:      Preferred lab:      Send INR reminders to:   TERESITA PALACIOS    Indications    Chronic atrial fibrillation (H) [I48.2]           Comments:            Anticoagulation Care Providers     Provider Role Specialty Phone number    Clau Lazo MD  [facility] [phone]

## 2021-05-31 NOTE — TELEPHONE ENCOUNTER
RN cannot approve Refill Request    RN can NOT refill this medication historical medication requested. Last office visit: 7/18/2019 Clau Lazo MD Last Physical: Visit date not found Last MTM visit: Visit date not found Last visit same specialty: 7/18/2019 Clau Lazo MD.  Next visit within 3 mo: Visit date not found  Next physical within 3 mo: Visit date not found      Dalia Crawford, [facility] Triage/Med Refill 8/25/2019    Requested Prescriptions   Pending Prescriptions Disp Refills     terazosin (HYTRIN) 10 MG capsule [Pharmacy Med Name: Terazosin HCl Oral Capsule 10 MG] 90 capsule 1     Sig: Take 1 capsule (10 mg total) by mouth at bedtime.       Alpha Blockers Refill Protocol  Passed - 8/24/2019  9:18 AM        Passed - PCP or prescribing provider visit in past 12 months       Last office visit with prescriber/PCP: 7/18/2019 Clau Lazo MD OR same dept: 7/18/2019 Clau Lazo MD OR same specialty: 7/18/2019 Clau Lazo MD  Last physical: Visit date not found Last MTM visit: Visit date not found   Next visit within 3 mo: Visit date not found  Next physical within 3 mo: Visit date not found  Prescriber OR PCP: Clau Lazo MD  Last diagnosis associated with med order: There are no diagnoses linked to this encounter.  If protocol passes may refill for 12 months if within 3 months of last provider visit (or a total of 15 months).             Passed - Blood pressure filed in past 12 months     BP Readings from Last 1 Encounters:   07/18/19 124/66

## 2021-05-31 NOTE — TELEPHONE ENCOUNTER
COPD educator note    Talked with ED today. He states he is feeling good. He sounds upbeat. Pt had no questions at this time. We will call again next nino.    RADHA Alcala

## 2021-05-31 NOTE — TELEPHONE ENCOUNTER
RN cannot approve Refill Request: One Touch test strips    RN can NOT refill this medication historical medication requested. Last office visit: 7/18/2019 Clau Lazo MD Last Physical: Visit date not found Last MTM visit: Visit date not found Last visit same specialty: 7/18/2019 Clau Lazo MD.  Next visit within 3 mo: Visit date not found  Next physical within 3 mo: Visit date not found      Tena Rockwell, [facility] Triage/Med Refill 8/31/2019    Requested Prescriptions   Pending Prescriptions Disp Refills     ONETOUCH ULTRA BLUE TEST STRIP strips [Pharmacy Med Name: OneTouch Ultra Blue In Vitro Strip]  4     Sig: TEST TWICE DAILY       Diabetic Supplies Refill Protocol Passed - 8/31/2019  8:43 AM        Passed - Visit with PCP or prescribing provider visit in last 6 months     Last office visit with prescriber/PCP: 7/18/2019 Clau Lazo MD OR same dept: 7/18/2019 Clau Lazo MD OR same specialty: 7/18/2019 Clau Lazo MD  Last physical: Visit date not found Last MTM visit: Visit date not found   Next visit within 3 mo: Visit date not found  Next physical within 3 mo: Visit date not found  Prescriber OR PCP: Clau Lazo MD  Last diagnosis associated with med order: 1. CAD (coronary artery disease)  - pravastatin (PRAVACHOL) 40 MG tablet; Take 1 tablet (40 mg total) by mouth every evening.  Dispense: 90 tablet; Refill: 3    If protocol passes may refill for 12 months if within 3 months of last provider visit (or a total of 15 months).             Passed - A1C in last 6 months     Hemoglobin A1c   Date Value Ref Range Status   07/18/2019 6.4 (H) 3.5 - 6.0 % Final             Signed Prescriptions Disp Refills    pravastatin (PRAVACHOL) 40 MG tablet 90 tablet 3     Sig: Take 1 tablet (40 mg total) by mouth every evening.       Statins Refill Protocol (Hmg CoA Reductase Inhibitors) Passed - 8/31/2019  8:43 AM        Passed - PCP or prescribing provider visit in past 12 months       Last office visit with prescriber/PCP: 7/18/2019 Clau Lazo MD OR colleen dept: 7/18/2019 Clau Lazo MD OR same specialty: 7/18/2019 Clau Lazo MD  Last physical: Visit date not found Last MTM visit: Visit date not found   Next visit within 3 mo: Visit date not found  Next physical within 3 mo: Visit date not found  Prescriber OR PCP: Clau Lazo MD  Last diagnosis associated with med order: 1. CAD (coronary artery disease)  - pravastatin (PRAVACHOL) 40 MG tablet; Take 1 tablet (40 mg total) by mouth every evening.  Dispense: 90 tablet; Refill: 3    If protocol passes may refill for 12 months if within 3 months of last provider visit (or a total of 15 months).                    Refill Approved: Pravastatin    Rx renewed per Medication Renewal Policy. Medication was last renewed on 1/22/2019 with 1 refill.   Last office visit: 7/18/2019 with PCP DR EUSEBIA Lazo .    Tena Rockwell, [facility] Triage/Med Refill 8/31/2019     Requested Prescriptions   Pending Prescriptions Disp Refills     pravastatin (PRAVACHOL) 40 MG tablet [Pharmacy Med Name: Pravastatin Sodium Oral Tablet 40 MG] 90 tablet 0     Sig: Take 1 tablet (40 mg total) by mouth every evening.       Statins Refill Protocol (Hmg CoA Reductase Inhibitors) Passed - 8/31/2019  8:43 AM        Passed - PCP or prescribing provider visit in past 12 months      Last office visit with prescriber/PCP: 7/18/2019 Clau Lazo MD OR colleen dept: 7/18/2019 Clau Lazo MD OR same specialty: 7/18/2019 Clau Lazo MD  Last physical: Visit date not found Last MTM visit: Visit date not found   Next visit within 3 mo: Visit date not found  Next physical within 3 mo: Visit date not found  Prescriber OR PCP: Clau Lazo MD  Last diagnosis associated with med order: There are no diagnoses linked to this encounter.  If protocol passes may refill for 12 months if within 3 months of last provider visit (or a total of 15 months).              ONETOUCH ULTRA BLUE TEST STRIP strips [Pharmacy Med Name: OneTouch Ultra Blue In Vitro Strip]  4     Sig: TEST TWICE DAILY       Diabetic Supplies Refill Protocol Passed - 8/31/2019  8:43 AM        Passed - Visit with PCP or prescribing provider visit in last 6 months     Last office visit with prescriber/PCP: 7/18/2019 Clau Lazo MD OR same dept: 7/18/2019 Clau Lazo MD OR same specialty: 7/18/2019 Clau Lazo MD  Last physical: Visit date not found Last MTM visit: Visit date not found   Next visit within 3 mo: Visit date not found  Next physical within 3 mo: Visit date not found  Prescriber OR PCP: Clau Lazo MD  Last diagnosis associated with med order: There are no diagnoses linked to this encounter.  If protocol passes may refill for 12 months if within 3 months of last provider visit (or a total of 15 months).             Passed - A1C in last 6 months     Hemoglobin A1c   Date Value Ref Range Status   07/18/2019 6.4 (H) 3.5 - 6.0 % Final

## 2021-05-31 NOTE — TELEPHONE ENCOUNTER
ANTICOAGULATION  MANAGEMENT    Assessment     Today's INR result of 3.3 is Supratherapeutic (goal INR of 2.0-3.0)        Warfarin taken as previously instructed    No new diet changes affecting INR    No new medication/supplements affecting INR    Continues to tolerate warfarin with no reported s/s of bleeding or thromboembolism     Previous INR was Therapeutic    Plan:     Spoke with Yas, spouse, regarding INR result and instructed:     Warfarin Dosing Instructions:  Take a one time lower dose of 2 mg today only then continue current warfarin dose 4 mg daily   (0 % change)  Agreed to increase greens intake slightly    Instructed patient to follow up no later than: 2 weeks    Education provided: target INR goal and significance of current INR result, importance of following up for INR monitoring at instructed interval, importance of taking warfarin as instructed and importance of notifying clinic for changes in medications    Yas verbalizes understanding and agrees to warfarin dosing plan.    Instructed to call the [facility] for any changes, questions or concerns. (#[phone])   ?   Sabrina Pickard RN    Subjective/Objective:      Griffin Walton, a 85 y.o. male is on warfarin.     Griffin reports:     Home warfarin dose: verbally confirmed home dose with Yas and updated on anticoagulation calendar     Missed doses: No     Medication changes:  No     S/S of bleeding or thromboembolism:  No     New Injury or illness:  No     Changes in diet or alcohol consumption:  No     Upcoming surgery, procedure or cardioversion:  No    Anticoagulation Episode Summary     Current INR goal:   2.0-3.0   TTR:   46.6 % (11.4 mo)   Next INR check:   8/20/2019   INR from last check:   3.30! (8/6/2019)   Weekly max warfarin dose:      Target end date:      INR check location:      Preferred lab:      Send INR reminders to:   TERESITA PALACIOS    Indications    Chronic atrial fibrillation (H) [I48.2]           Comments:             Anticoagulation Care Providers     Provider Role Specialty Phone number    Clau Lazo MD [facility] [phone]

## 2021-05-31 NOTE — TELEPHONE ENCOUNTER
Refill Approved    Rx renewed per Medication Renewal Policy. Medication was last renewed on 4/22/19.    Dalia Crawford, [facility] Triage/Med Refill 8/25/2019     Requested Prescriptions   Pending Prescriptions Disp Refills     CARTIA  mg 24 hr capsule [Pharmacy Med Name: Cartia XT Oral Capsule Extended Release 24 Hour 120 MG] 30 capsule 2     Sig: TAKE ONE CAPSULE BY MOUTH ONE TIME DAILY       Calcium-Channel Blockers Protocol Passed - 8/24/2019  9:18 AM        Passed - PCP or prescribing provider visit in past 12 months or next 3 months     Last office visit with prescriber/PCP: Visit date not found OR same dept: 7/18/2019 Clau Lazo MD OR same specialty: 7/18/2019 Clau Lazo MD  Last physical: Visit date not found Last MTM visit: Visit date not found   Next visit within 3 mo: Visit date not found  Next physical within 3 mo: Visit date not found  Prescriber OR PCP: Jim Templeton MD  Last diagnosis associated with med order: 1. Chronic atrial fibrillation (H)  - CARTIA  mg 24 hr capsule [Pharmacy Med Name: Cartia XT Oral Capsule Extended Release 24 Hour 120 MG]; TAKE ONE CAPSULE BY MOUTH ONE TIME DAILY   Dispense: 30 capsule; Refill: 2    If protocol passes may refill for 12 months if within 3 months of last provider visit (or a total of 15 months).             Passed - Blood pressure filed in past 12 months     BP Readings from Last 1 Encounters:   07/18/19 124/66

## 2021-06-01 VITALS — BODY MASS INDEX: 30.01 KG/M2 | WEIGHT: 198 LBS | HEIGHT: 68 IN

## 2021-06-01 VITALS — WEIGHT: 195.8 LBS | BODY MASS INDEX: 30.67 KG/M2

## 2021-06-01 VITALS — BODY MASS INDEX: 30.54 KG/M2 | WEIGHT: 195 LBS

## 2021-06-01 NOTE — TELEPHONE ENCOUNTER
Spoke with Yas, pt had tooth pulled yesterday, has had some bleeding since then.  They did see dentist this afternoon and had a couple sutures placed at 3pm today.  Yas believes bleeding is better, but still early to tell.  They were advised to contact on whether or not he should get his warfarin dose tonight    PCP, please advise if okay for pt to take warfarin tonight or should hold tonight's dose.

## 2021-06-01 NOTE — TELEPHONE ENCOUNTER
ANTICOAGULATION  MANAGEMENT    Assessment     Today's INR result of 2.4 is Therapeutic (goal INR of 2.0-3.0)        Warfarin taken as previously instructed    No new diet changes affecting INR    No new medication/supplements affecting INR    Continues to tolerate warfarin with no reported s/s of bleeding or thromboembolism     Previous INR was Therapeutic    Plan:     Left a detailed message for Yas, spouse, regarding INR result and instructed:     Warfarin Dosing Instructions:  Continue current warfarin dose 4 mg daily  (0 % change)    Instructed patient to follow up no later than: 4 weeks    Education provided: target INR goal and significance of current INR result and importance of notifying clinic for changes in medications     Instructed to call the [facility] for any changes, questions or concerns. [phone])   ?   Sabrina Pickard RN    Subjective/Objective:      Griffin Walton, a 85 y.o. male is on warfarin.     Griffin reports:     Home warfarin dose: as updated on anticoagulation calendar per template     Missed doses: No     Medication changes:  No     S/S of bleeding or thromboembolism:  No     New Injury or illness:  No     Changes in diet or alcohol consumption:  No     Upcoming surgery, procedure or cardioversion:  No    Anticoagulation Episode Summary     Current INR goal:   2.0-3.0   TTR:   55.4 %   Next INR check:   10/8/2019   INR from last check:   2.40 (9/10/2019)   Weekly max warfarin dose:      Target end date:      INR check location:      Preferred lab:      Send INR reminders to:   TEREISTA PALACIOS    Indications    Chronic atrial fibrillation (H) [I48.2]           Comments:            Anticoagulation Care Providers     Provider Role Specialty Phone number    Clau Lazo MD [facility] Medicine [phone]

## 2021-06-01 NOTE — TELEPHONE ENCOUNTER
FYI - Status Update  Who is Calling: Spouse  Update: Patient had a tooth pulled yesterday, and is having some bleeding. Was advised to contact Anticoag nurse to discuss blood thinner dose.  Okay to leave a detailed message?:  Yes

## 2021-06-01 NOTE — TELEPHONE ENCOUNTER
COPD educator note    Talked with Ed's wife today. She states he has his good days and his not so good days. He was at dialysis when I called. She had no questions at this time. We will call again next month.    RADHA Alcala

## 2021-06-02 VITALS — BODY MASS INDEX: 30.87 KG/M2 | WEIGHT: 203 LBS

## 2021-06-02 VITALS — BODY MASS INDEX: 25.64 KG/M2 | WEIGHT: 169.2 LBS | HEIGHT: 68 IN

## 2021-06-02 VITALS — WEIGHT: 201 LBS | BODY MASS INDEX: 31.55 KG/M2 | HEIGHT: 67 IN

## 2021-06-02 VITALS — BODY MASS INDEX: 31.55 KG/M2 | WEIGHT: 201 LBS | HEIGHT: 67 IN

## 2021-06-02 VITALS — BODY MASS INDEX: 30.3 KG/M2 | HEIGHT: 68 IN | WEIGHT: 199.9 LBS

## 2021-06-02 VITALS — HEIGHT: 68 IN | BODY MASS INDEX: 28.85 KG/M2 | WEIGHT: 190.4 LBS

## 2021-06-02 VITALS — WEIGHT: 177 LBS | BODY MASS INDEX: 26.91 KG/M2

## 2021-06-02 VITALS — BODY MASS INDEX: 28.61 KG/M2 | WEIGHT: 188.8 LBS | HEIGHT: 68 IN

## 2021-06-02 VITALS — WEIGHT: 199.9 LBS | BODY MASS INDEX: 30.3 KG/M2 | HEIGHT: 68 IN

## 2021-06-02 VITALS — HEIGHT: 68 IN | BODY MASS INDEX: 28.04 KG/M2 | WEIGHT: 185 LBS

## 2021-06-02 VITALS — WEIGHT: 198.1 LBS | BODY MASS INDEX: 30.12 KG/M2

## 2021-06-02 NOTE — TELEPHONE ENCOUNTER
New Appointment Needed  What is the reason for the visit:  INF - [facility] - 10/7 thru 10/9 - AFIB - f/u within 7 days  Inpatient/ED Follow Up Appt Request  At what hospital or facility were you seen?: [facility]  What is the reason you were seen?: AFIB  What date were you admitted?: date: 10/7  What date were you discharged?: date: 10/9  What was the recommended timeframe for your follow up appointment?: within 7 days  Provider Preference: PCP only  How soon do you need to be seen?: within 7 days  Waitlist offered?: No  Okay to leave a detailed message:  Yes      Patient has dialysis on Monday, Wednesday, and Friday. Please call the patient back to assist in scheduling the patient for this appointment, thank you.

## 2021-06-02 NOTE — PROGRESS NOTES
Assessment/Plan:        Problem List Items Addressed This Visit        ENT/CARD/PULM/ENDO Problems    COPD (chronic obstructive pulmonary disease) (H)    On home oxygen therapy    Severe aortic stenosis    S/P TAVR (transcatheter aortic valve replacement)    Atrial fibrillation with rapid ventricular response (H)    Acute systolic congestive heart failure (H) - Primary      No changes in medication were made today.  Patient did have his ears flushed due to request of audiology for hearing aid placements.  Recommend patient keep upcoming appointment with heart failure clinic and cardiology as scheduled.  Patient be scheduled for follow-up with his primary in 1 month, sooner if needed.  All patient and spouse's question's were addressed they verbalized understanding and agree with plan.    Meds have been reconciled.   New meds prescribed today: none  Med changes today: none   Meds pending Specialty consultation: ace inhibitor or beta blocker     Subjective:    Patient ID:   Griffin Walton is a 85 y.o. male presenting for post hospital follow up,  Patient's chronic conditions include acute on chronic respiratory failure with hypoxia, cerebrovascular disease, history of pneumonia, chronic atrial fibrillation, cerebral embolism with cerebral infarction, COPD, type 2 diabetes with long-term use of insulin, hypertension, moderate persistent asthma, acute ischemic stroke, nonrheumatic aortic valve stenosis, chronic pulmonary edema, oxygen dependent, primary hypertension, acute on chronic diastolic congestive heart failure, LVH due to hypertensive disease with heart failure, hypoxia, atrial for ablation with RVR, respiratory compromise, severe aortic stenosis, S/P TAVR, end-stage renal disease on dialysis, anemia, glaucoma, 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 Patient was admitted on 10/7/2019 due to sudden onset shortness of breath.  He has a history of chronic respiratory failure on 2 L of oxygen.  He was found to have atrial fibrillation with RVR with concerns for volume overload and was treated with IV diltiazem and dialysis with resolution of his symptoms.  During hospitalization serial troponins were negative.  An echocardiogram was completed during hospitalization which showed decrease in EF from spring 2019 to current from 62% down to 45%.  It was unclear if this was related to tachycardia induced cardiomyopathy.  Upon discharge was recommended the patient follow-up with the heart failure clinic, consider short-term limited echo to follow-up EF as may be affected by A. fib with RVR also consider reintroducing beta-blocker and ACE inhibitor as patient was previously unable to tolerate prior to TAVR.    He will have a follow up with cardiology next week.  He continues on 2L of oxygen to maintain O2 above 90%. He is using his nebulizer 1 time per day at bedtime, and inhaler 2 times daily.  Patient reports that he is feeling well and denies any concerns today.  He is requesting flushing of his ears at the request of audiology for his hearing aids placement.    Dialysis: Monday,wednesday, Friday.  Continues to be followed by Nephrology  Diabetes: blood sugars reported have been \"good\".  Novolog 4 morning, 6 noon and evening and Basaglar 7 units at bedtime.    Allergies, current medications, past family history, past medical history, past social history, past surgical history and problem list have been reviewed.     Recent hospital admission notes and discharge recommendations reviewed.     Review of Systems  A 12 point comprehensive review of systems was negative except as noted.           Objective:   /62   Pulse 97   Temp 98.3  F (36.8  C)   Resp 22   Ht 5' 8\" (1.727 m)   Wt 192 lb (87.1 kg)   SpO2 95%   BMI 29.19 kg/m        Physical " Exam  General Appearance:    Alert, cooperative, no distress, appears stated age   Head:    Normocephalic, without obvious abnormality, atraumatic   Eyes:     conjunctiva/corneas clear   Throat:   Lips, mucosa, and tongue normal; teeth and gums normal   Neck:   Supple, symmetrical, trachea midline, no adenopathy;    Lungs:     Clear to auscultation bilaterally, respirations unlabored    Heart:    Irregular    Abdomen:     Soft, non-tender   Extremities:   Extremities normal, atraumatic, no cyanosis or edema   Pulses:   2+ and symmetric all extremities   Skin:   Skin color, texture, turgor normal, no rashes or lesions, no significant ecchymosis    Neurologic:   grossly intact

## 2021-06-02 NOTE — TELEPHONE ENCOUNTER
Called patient and assisted with scheduling post hospital appointment.  Patient goes for dialysis on Monday, Wednesday and Friday.  Schedule with Mirella Christian CNP as PCP does not have any open appointments on Tuesday and Thursday sooner than scheduled 10/24/19

## 2021-06-02 NOTE — TELEPHONE ENCOUNTER
ANTICOAGULATION  MANAGEMENT    Assessment     Today's INR result of 3.4 is Supratherapeutic (goal INR of 2.0-3.0)        Warfarin taken as previously instructed    Decreased greens/vitamin K intake may be affecting INR - can eat more greens    No new medication/supplements affecting INR    Continues to tolerate warfarin with no reported s/s of bleeding or thromboembolism     Previous INR was Therapeutic    Plan:     Spoke with Yas, spouse, regarding INR result and instructed:     Warfarin Dosing Instructions:  Take 2 mg today only then continue current warfarin dose 4 mg daily  (0 % change)    Instructed patient to follow up no later than: 2 weeks    Education provided: importance of consistent vitamin K intake, target INR goal and significance of current INR result, importance of following up for INR monitoring at instructed interval, importance of taking warfarin as instructed and importance of notifying clinic for changes in medications    Yas verbalizes understanding and agrees to warfarin dosing plan.    Instructed to call the [facility] for any changes, questions or concerns. (#[phone])   ?   Sabrina Pickard RN    Subjective/Objective:      Griffin Walton, a 85 y.o. male is on warfarin.     Griffin reports:     Home warfarin dose: verbally confirmed home dose with Yas and updated on anticoagulation calendar     Missed doses: No     Medication changes:  No     S/S of bleeding or thromboembolism:  No     New Injury or illness:  No     Changes in diet or alcohol consumption:  Yes: less greens, they can eat more greens     Upcoming surgery, procedure or cardioversion:  No    Anticoagulation Episode Summary     Current INR goal:   2.0-3.0   TTR:   66.5 %   Next INR check:   11/14/2019   INR from last check:   3.40! (10/31/2019)   Weekly max warfarin dose:      Target end date:      INR check location:      Preferred lab:      Send INR reminders to:   TERESITA Engel    Chronic  atrial fibrillation [I48.20]           Comments:            Anticoagulation Care Providers     Provider Role Specialty Phone number    Clau Lazo MD [facility] Medicine [phone]

## 2021-06-02 NOTE — TELEPHONE ENCOUNTER
ANTICOAGULATION  MANAGEMENT    Assessment     Today's INR result of 2.4 is Therapeutic (goal INR of 2.0-3.0)        Warfarin taken as previously instructed    No new diet changes affecting INR    No new medication/supplements affecting INR    Continues to tolerate warfarin with no reported s/s of bleeding or thromboembolism     Previous INR was Supratherapeutic    Plan:     Spoke with wife Yas regarding INR result and instructed:     Warfarin Dosing Instructions:  Continue current warfarin dose 4 mg daily.    Instructed patient to follow up no later than: 2 weeks. Appt is made for 10/31.     Education provided: importance of taking warfarin as instructed    Yas verbalizes understanding and agrees to warfarin dosing plan.    Instructed to call the [facility] for any changes, questions or concerns. (#[phone])   ?   Kj Mishra RN    Subjective/Objective:      Griffin Walton, a 85 y.o. male is on warfarin.     Griffin reports:     Home warfarin dose: verbally confirmed home dose with Yas and updated on anticoagulation calendar     Missed doses: No     Medication changes:  No     S/S of bleeding or thromboembolism:  No     New Injury or illness:  No     Changes in diet or alcohol consumption:  No     Upcoming surgery, procedure or cardioversion:  No    Anticoagulation Episode Summary     Current INR goal:   2.0-3.0   TTR:   63.8 %   Next INR check:   10/31/2019   INR from last check:   2.40 (10/17/2019)   Weekly max warfarin dose:      Target end date:      INR check location:      Preferred lab:      Send INR reminders to:   [facility]    Indications    Chronic atrial fibrillation [I48.20]           Comments:            Anticoagulation Care Providers     Provider Role Specialty Phone number    Clau Lazo MD [facility] Medicine [phone]

## 2021-06-02 NOTE — PROGRESS NOTES
TCM DISCHARGE FOLLOW UP CALL    Discharge Date:  10/9/2019  Reason for hospital stay (discharge diagnosis)::  CHF  Are you feeling better, the same or worse since your discharge?:  Patient is feeling better (Wife reports O2 sats in the low 90s. HR in the 90s most of the time. O2 on at 2L.)  Do you feel like you have a plan in the event of a health emergency?: Yes (Wife or [employer] Nurse Line. Insformed wife of HE/FV 24 hr nurse triage)    As part of your discharge plan, were  home care services ordered for you?: No    Did you receive any new medications, or was there a change to your medications?: Yes    Are you taking those medications, or do you have any established regiment?:  Pt is taking diltiazem 180 mg daily.  Do you have any follow up visits scheduled with your PCP or Specialist?:  Yes, with PCP  (RN) Is PCP appt scheduled soon enough (within 14 days of discharge date)?: Yes (10/17)    RN NOTES::  Gave wife the phone number to call [facility] for appt  In HF Clinic

## 2021-06-02 NOTE — PATIENT INSTRUCTIONS - HE
Griffin Walton,    It was a pleasure to see you today at [facility] HEART [facility].     My recommendations after this visit include:  - Please follow up with Dr Yusuf November 12   - Please follow up with Leticia Correa in 3 months  - No changes to your medications    Leticia Correa CNP      What is the [facility] Heart Failure Program?     The [facility] Heart Failure Program is a heart failure specialty clinic within [facility].  You will work with your cardiologist, nurse practitioner, and nurses to carefully adjust medications and learn how to live with heart failure.  The Heart Failure Program will help you:      Better understand your chronic heart condition    Feel better and avoid hospital stays    Monitoring for Symptoms      Call the Heart Failure Phone Line ([phone]) if you have any of these symptoms:     Increased shortness of breath/shortness of breath at rest    Waking up at night with difficulty breathing    Unable to lie down for sleep due to symptoms or needing to sit upright for sleep    Weight gain of 2 pounds a day for 2 days in a row OR 5 pounds in 1 week    Increased swelling in your ankles or legs    Dizziness or lightheadedness    Medications       Take your medications as prescribed    Bring all your medications in their original bottles to every appointment    Avoid non-steroidal anti-inflammatory medications (Advil, Aleve, Ibuprofen, Naprosyn, Naproxen, Celebrex)    Do not stop taking your medications or begin taking over-the-counter or herbal medications without first talking to your doctor or nurse practitioner    Diet and Lifestyle       Limit sodium/salt to 2000 mg daily   o Read food labels for sodium content  o Do not add salt when cooking or add salt at the table    Weigh yourself every day and record in your daily weight log   o Call if you gain 2 pounds a day for 2 days in a row OR 5 pounds in 1 week  o Bring daily weight log to every  appointment    Stay active, pace yourself, listen to your body, and rest when tired    Elevate your legs if they are swollen. Ask about using compression/support stockings    Stop smoking    Lose weight if you are overweight    Avoid drinking alcohol or limit amount    Stay updated on your immunizations including flu and pneumonia vaccines

## 2021-06-02 NOTE — PROGRESS NOTES
Hospital discharge follow up call to pt, no answer.  Left VM message reminding pt of appt on 10/17. RN will attempt call back at another time.

## 2021-06-02 NOTE — TELEPHONE ENCOUNTER
ANTICOAGULATION  MANAGEMENT: Discharge Review    Griffin Walton chart reviewed for anticoagulation continuity of care    Hospital admission on  10/7 to 10/9 for Acute CHF exacerbation.    Discharge disposition: Home    INR Results:       Recent labs: (last 7 days)     10/07/19  0945 10/08/19  0537 10/09/19  0520   INR 2.63* 3.43* 3.89*       Warfarin inpatient management: held warfarin due to supratherapeutic INR    Warfarin discharge instructions: home regimen continued     Medication Changes Affecting Anticoagulation: No    Additional Factors Affecting Anticoagulation: No    Plan     Check INR at  on 10/17    Spoke with Ed and Yas    Anticoagulation calendar updated    Sabrina Pickard, RN

## 2021-06-03 VITALS
BODY MASS INDEX: 29.15 KG/M2 | WEIGHT: 192.3 LBS | RESPIRATION RATE: 16 BRPM | DIASTOLIC BLOOD PRESSURE: 80 MMHG | HEIGHT: 68 IN | HEART RATE: 53 BPM | SYSTOLIC BLOOD PRESSURE: 120 MMHG

## 2021-06-03 VITALS — HEIGHT: 68 IN | WEIGHT: 188 LBS | BODY MASS INDEX: 28.49 KG/M2

## 2021-06-03 VITALS
DIASTOLIC BLOOD PRESSURE: 52 MMHG | BODY MASS INDEX: 29.8 KG/M2 | RESPIRATION RATE: 14 BRPM | HEART RATE: 70 BPM | SYSTOLIC BLOOD PRESSURE: 108 MMHG | WEIGHT: 196 LBS

## 2021-06-03 VITALS — BODY MASS INDEX: 28.94 KG/M2 | WEIGHT: 190.3 LBS

## 2021-06-03 VITALS
DIASTOLIC BLOOD PRESSURE: 62 MMHG | SYSTOLIC BLOOD PRESSURE: 136 MMHG | OXYGEN SATURATION: 83 % | RESPIRATION RATE: 16 BRPM | HEART RATE: 97 BPM

## 2021-06-03 VITALS
HEIGHT: 68 IN | OXYGEN SATURATION: 95 % | SYSTOLIC BLOOD PRESSURE: 122 MMHG | WEIGHT: 192 LBS | TEMPERATURE: 98.3 F | HEART RATE: 97 BPM | DIASTOLIC BLOOD PRESSURE: 62 MMHG | BODY MASS INDEX: 29.1 KG/M2 | RESPIRATION RATE: 22 BRPM

## 2021-06-03 NOTE — PATIENT INSTRUCTIONS - HE
Griffin Walton,    It was a pleasure to see you today at [facility].     My recommendations after this visit include:  - Please follow up with Dr Yusuf in January   - Please follow up with Leticia Correa in March  - No changes to your medications      Leticia Correa, CNP

## 2021-06-03 NOTE — TELEPHONE ENCOUNTER
Who is calling:  Aden   Reason for Call:  Notification -- see below  Date of last appointment with primary care:   Okay to leave a detailed message: Yes    Aden from  Radiology is calling because INR RN needs to call patient regarding stopping following medications per protocol prior to lung biopsy on 11/13:    Plavix: Stop 7 days prior to procedure  Asprin: Stop 5 days prior to procedure    Aden doesn't need a call back.     Please call patient and notify.

## 2021-06-03 NOTE — TELEPHONE ENCOUNTER
COPD educator note    Tried to call patient. No answer and unable to leave message. We will call again next month.    RADHA Alcala

## 2021-06-03 NOTE — TELEPHONE ENCOUNTER
Per chart note from [facility] 11/12/19:    Plan  1.  Start Bumex 1 mg by mouth twice a day which he was on when he initially met him over a year ago.  2.  Switch diltiazem over to metoprolol given his decreased ejection fraction.  3.  Try to get 4 L off at dialysis tomorrow.  5.  Can titrate oxygen up to 4 L but no higher.  6.  Follow-up heart failure nurse practitioners next week and me thereafter.  7.  Strongly recommend further ischemic evaluation to evaluate right coronary artery intervention needs.      Called patient and his wife. Clarified that Dr. Yusuf does in fact want the diltiazem stopped. They have been following these instructions, they just wanted to clarify. All questions answered. -[facility]

## 2021-06-03 NOTE — TELEPHONE ENCOUNTER
RN cannot approve Refill Request    RN can NOT refill this medication historical medication requested.         Jo Ruiz, [facility] Triage/Med Refill 11/7/2019    Requested Prescriptions   Pending Prescriptions Disp Refills     diltiazem (CARTIA XT) 180 MG 24 hr capsule 90 capsule 3     Sig: Take 1 capsule (180 mg total) by mouth daily.       Calcium-Channel Blockers Protocol Passed - 11/6/2019 12:55 PM        Passed - PCP or prescribing provider visit in past 12 months or next 3 months     Last office visit with prescriber/PCP: 7/18/2019 Clau Lazo MD OR same dept: 7/18/2019 Clau Lazo MD OR same specialty: 7/18/2019 Clau Lazo MD  Last physical: Visit date not found Last MTM visit: Visit date not found   Next visit within 3 mo: Visit date not found  Next physical within 3 mo: Visit date not found  Prescriber OR PCP: Clau Lazo MD  Last diagnosis associated with med order: There are no diagnoses linked to this encounter.  If protocol passes may refill for 12 months if within 3 months of last provider visit (or a total of 15 months).             Passed - Blood pressure filed in past 12 months     BP Readings from Last 1 Encounters:   10/24/19 108/52

## 2021-06-03 NOTE — TELEPHONE ENCOUNTER
ANTICOAGULATION  MANAGEMENT    Assessment     Today's INR result of 2.1 is Therapeutic (goal INR of 2.0-3.0)        Warfarin taken differently than instructed, but no impact to total weekly dose    No new diet changes affecting INR    No new medication/supplements affecting INR    Continues to tolerate warfarin with no reported s/s of bleeding or thromboembolism     Previous INR was Supratherapeutic    Plan:     Spoke with Yas, spouse, regarding INR result and instructed:     Warfarin Dosing Instructions:  Continue current warfarin dose 2 mg daily on Wed; and 4 mg daily rest of week  (0 % change)    Instructed patient to follow up no later than: 2 weeks    Education provided: target INR goal and significance of current INR result, importance of following up for INR monitoring at instructed interval, importance of taking warfarin as instructed, importance of notifying clinic for changes in medications and importance of notifying clinic for diarrhea, nausea/vomiting, reduced intake and/or illness    Yas verbalizes understanding and agrees to warfarin dosing plan.    Instructed to call the [facility] for any changes, questions or concerns. (#[phone])   ?   Sabrina Pickard RN    Subjective/Objective:      Griffin Walton, a 85 y.o. male is on warfarin.     Griffin reports:     Home warfarin dose: verbally confirmed home dose with Yas and updated on anticoagulation calendar     Missed doses: No     Medication changes:  No     S/S of bleeding or thromboembolism:  No     New Injury or illness:  No     Changes in diet or alcohol consumption:  No     Upcoming surgery, procedure or cardioversion:  No    Anticoagulation Episode Summary     Current INR goal:   2.0-3.0   TTR:   64.3 %   Next INR check:   12/2/2019   INR from last check:   2.10 (11/18/2019)   Weekly max warfarin dose:      Target end date:      INR check location:      Preferred lab:      Send INR reminders to:   [facility]    Indications     Chronic atrial fibrillation [I48.20]           Comments:            Anticoagulation Care Providers     Provider Role Specialty Phone number    Clau Lazo MD [facility] [phone]

## 2021-06-03 NOTE — TELEPHONE ENCOUNTER
Cannot make recommendation - please reach out to cardiology or Jorge who did TAVR.  Would assume patient would need bridge.

## 2021-06-03 NOTE — TELEPHONE ENCOUNTER
Warfarin also needs to be held for 5 days prior to procedure with a goal of INR less than 1.8    PCP, please advise if okay to hold warfarin/Plavix/Aspirin per their protocol for upcoming lung biopsy on 11/13

## 2021-06-03 NOTE — TELEPHONE ENCOUNTER
Tracey,     He had a complex PCI 7 mos ago, so while I am okay with holding warfarin, I am hesitant with stopping dual anti-platelets. Bridging may not be necessary, but I would at the very least like to continue ASA 81mg daily without interruption, re-start clopidogrel as soon as possible.     Thx!     Harry    Routing comment

## 2021-06-03 NOTE — TELEPHONE ENCOUNTER
----- Message from Leticia Islas sent at 11/19/2019  9:25 AM CST -----  General phone call:    Caller: Pt  Primary cardiologist: Paramjit  Detailed reason for call: Pt was told by Dr Yusuf to discontinue Diltiazem by Dr Yusuf on 11/12 but after visit trace/Leticia 11/18 Diltiazem was listed on his list of medications to take. Pt is confused on instructions if he should or should not take the medicine.  Best phone number: [phone]  Best time to contact: Any  Ok to leave a detailed message? Yes  Device? No    Additional Info:

## 2021-06-03 NOTE — TELEPHONE ENCOUNTER
Original medication hold request was entered in on wrong pt.  This pt is NOT having a lunch biopsy and will not need to hold any of his medications.  Pt and/or family was never contacted by this RN.

## 2021-06-03 NOTE — TELEPHONE ENCOUNTER
Dr. Patel,    Please review original message. Pt is going to have a lung biopsy. Pt had PCI 3/1/19. (Pt also had TAVR 4/2/19). Is pt ok to hold clopidogrel 7 days prior to lung biopsy?    Pt is on warfarin for chronic afib and would not require bridge while off warfarin d/t TAVR (Mancera Yesi valve). Recent echo on 10/8/19 showed mean gradient of 8mmHg with EF 40-45%.    Thanks,  Tracey

## 2021-06-03 NOTE — PATIENT INSTRUCTIONS - HE
Mr Griffin Walton,  I enjoyed visiting with you again today.  I am glad to hear you are doing well.  Per our conversation start the BUMEX today two times a day and after 4 days stop the DILTIAZEM (CARDIA) and start new METOPROLOL two times a day.  In dialysis ask if they can remove 4 L without issues.  If no better in 2 days go to hospital. You can increase oxygen to 4 L.  I will plan on seeing you in a few months but see my nurse next week.  Randell Yusuf

## 2021-06-03 NOTE — TELEPHONE ENCOUNTER
ANTICOAGULATION  MANAGEMENT    Assessment     Today's INR result of 4.0 is Supratherapeutic (goal INR of 2.0-3.0)        Warfarin taken as previously instructed    fluid overload may be affecting diet and INR    No new medication/supplements affecting INR    Continues to tolerate warfarin with no reported s/s of bleeding or thromboembolism     Previous INR was Supratherapeutic    Plan:     Spoke with Yas, spouse, regarding INR result and instructed:     Warfarin Dosing Instructions:  Hold warfarin today only then change warfarin dose to 2 mg daily on Sun; and 4 mg daily rest of week  (7.1 % change)    Instructed patient to follow up no later than: mon 11/18 at [facility]    Education provided: target INR goal and significance of current INR result, importance of following up for INR monitoring at instructed interval, importance of taking warfarin as instructed and importance of notifying clinic for changes in medications    Yas verbalizes understanding and agrees to warfarin dosing plan.    Instructed to call the [facility] for any changes, questions or concerns. (#[phone])   ?   Sabrina Pickard RN    Subjective/Objective:      Griffin Walton, a 85 y.o. male is on warfarin.     Griffin reports:     Home warfarin dose: verbally confirmed home dose with Yas and updated on anticoagulation calendar     Missed doses: No     Medication changes:  Yes, bumex added, no interaction with warfarin     S/S of bleeding or thromboembolism:  No     New Injury or illness:  Yes: fluid overload, cardiology asking to have close to 4L removed during dialysis tomorrow     Changes in diet or alcohol consumption:  No     Upcoming surgery, procedure or cardioversion:  No    Anticoagulation Episode Summary     Current INR goal:   2.0-3.0   TTR:   65.3 %   Next INR check:   11/18/2019   INR from last check:   4.00! (11/12/2019)   Weekly max warfarin dose:      Target end date:      INR check location:      Preferred lab:      Send  INR reminders to:   [facility]    Indications    Chronic atrial fibrillation [I48.20]           Comments:            Anticoagulation Care Providers     Provider Role Specialty Phone number    Clau Lazo MD [facility] [phone]

## 2021-06-03 NOTE — TELEPHONE ENCOUNTER
----- Message from Leticia Islas sent at 11/26/2019  9:56 AM CST -----  General phone call:    Caller: Pt  Primary cardiologist: Paramjit  Detailed reason for call: Pt stated his oxygen levels fluctuate when he's standing in the kitchen versus when he's walking to the living room to sit down. He would like to discuss.  Best phone number: [phone]  Best time to contact: Any  Ok to leave a detailed message? Yes  Device? No    Additional Info:       Attempted to call patient and LM to call back to discuss concerns. -[facility]        Addendum: received a call back from Ed. He states that sometimes with activity, they will check his O2 after going from the bathroom to the living room and he will be as low as 80% but after a few minutes of rest and turning up O2 to 3 L, it will com eback up to mid-90's. He is chronically on O2 for COPD. Reassured them that this is usually acceptable but given that the O2 is for COPD and not pertaining to his heart and prescribed by his lung doctor, it would be pertinent to call them and update them on these concerns. They verbalized understanding and will do so.

## 2021-06-04 VITALS
RESPIRATION RATE: 20 BRPM | DIASTOLIC BLOOD PRESSURE: 62 MMHG | SYSTOLIC BLOOD PRESSURE: 136 MMHG | OXYGEN SATURATION: 93 % | HEART RATE: 106 BPM

## 2021-06-04 VITALS
WEIGHT: 191 LBS | BODY MASS INDEX: 28.95 KG/M2 | HEART RATE: 92 BPM | DIASTOLIC BLOOD PRESSURE: 60 MMHG | RESPIRATION RATE: 16 BRPM | SYSTOLIC BLOOD PRESSURE: 126 MMHG | HEIGHT: 68 IN

## 2021-06-04 VITALS
SYSTOLIC BLOOD PRESSURE: 165 MMHG | BODY MASS INDEX: 29.04 KG/M2 | DIASTOLIC BLOOD PRESSURE: 66 MMHG | WEIGHT: 191 LBS | TEMPERATURE: 99.5 F

## 2021-06-04 NOTE — TELEPHONE ENCOUNTER
"Refill Approved    Rx renewed per Medication Renewal Policy. Medication was last renewed on 9/24/18.    Jo Ruiz, Care Connection Triage/Med Refill 12/6/2019     Requested Prescriptions   Pending Prescriptions Disp Refills     ULTICARE PEN NEEDLE 31 gauge x 5/16\" Ndle [Pharmacy Med Name: UltiCare Short Pen Needles Miscellaneous 31G X 8 MM] 400 each 2     Sig: Inject 1 each under the skin 4 (four) times a day.       Diabetic Supplies Refill Protocol Passed - 12/5/2019  9:25 AM        Passed - Visit with PCP or prescribing provider visit in last 6 months     Last office visit with prescriber/PCP: 7/18/2019 Clau Lazo MD OR same dept: 7/18/2019 Clau Lazo MD OR same specialty: 7/18/2019 Clau Lazo MD  Last physical: Visit date not found Last MTM visit: Visit date not found   Next visit within 3 mo: Visit date not found  Next physical within 3 mo: Visit date not found  Prescriber OR PCP: Clau Lazo MD  Last diagnosis associated with med order: 1. Controlled type 2 diabetes mellitus with diabetic nephropathy, with long-term current use of insulin (H)  - ULTICARE PEN NEEDLE 31 gauge x 5/16\" Ndle [Pharmacy Med Name: UltiCare Short Pen Needles Miscellaneous 31G X 8 MM]; Inject 1 each under the skin 4 (four) times a day.  Dispense: 400 each; Refill: 2    If protocol passes may refill for 12 months if within 3 months of last provider visit (or a total of 15 months).             Passed - A1C in last 6 months     Hemoglobin A1c   Date Value Ref Range Status   07/18/2019 6.4 (H) 3.5 - 6.0 % Final                           "

## 2021-06-04 NOTE — TELEPHONE ENCOUNTER
COPD educator note    Talked with Ed today. He states he is doing well and no issues. Pt had no questions at this time. We will call again next month.    VERITO AlcalaT

## 2021-06-04 NOTE — TELEPHONE ENCOUNTER
Refill Approved    Rx renewed per Medication Renewal Policy. Medication was last renewed on 9/25/18.    Jo Ruiz, [facility] Connection Triage/Med Refill 1/5/2020     Requested Prescriptions   Pending Prescriptions Disp Refills     insulin aspart U-100 (NOVOLOG FLEXPEN U-100 INSULIN) 100 unit/mL (3 mL) injection pen [Pharmacy Med Name: NovoLOG FlexPen Subcutaneous Solution Pen-injector 100 UNIT/ML] 15 mL 2     Sig: Inject 4-6 units under the skin 3 times daily before meals as directed       Insulin/GLP-1 Refill Protocol Passed - 1/3/2020  7:52 PM        Passed - Visit with PCP or prescribing provider visit in last 6 months     Last office visit with prescriber/PCP: 7/18/2019 OR same dept: 7/18/2019 Clau Lazo MD OR same specialty: 7/18/2019 Clau Lazo MD Last physical: Visit date not found Last MTM visit: Visit date not found     Next appt within 3 mo: Visit date not found  Next physical within 3 mo: Visit date not found  Prescriber OR PCP: Clau Lazo MD  Last diagnosis associated with med order: There are no diagnoses linked to this encounter.  If protocol passes may refill for 6 months if within 3 months of last provider visit (or a total of 9 months).              Passed - A1C in last 6 months     Hemoglobin A1c   Date Value Ref Range Status   07/18/2019 6.4 (H) 3.5 - 6.0 % Final               Passed - Microalbumin in last year     Microalbumin, Random Urine   Date Value Ref Range Status   04/18/2019 44.70 (H) 0.00 - 1.99 mg/dL Final                  Passed - Blood pressure in last year     BP Readings from Last 1 Encounters:   11/18/19 120/80             Passed - Creatinine done in last year     Creatinine   Date Value Ref Range Status   10/07/2019 9.01 (HH) 0.70 - 1.30 mg/dL Final

## 2021-06-04 NOTE — TELEPHONE ENCOUNTER
Refill Approved    Rx renewed per Medication Renewal Policy. Medication was last renewed on 4/17/19    [facility] Connection Triage/Med Refill 12/9/2019     Requested Prescriptions   Pending Prescriptions Disp Refills     umeclidinium (INCRUSE ELLIPTA) 62.5 mcg/actuation DsDv inhaler [Pharmacy Med Name: Incruse Ellipta Inhalation Aerosol Powder Breath Activated 62.5 MCG/INH] 30 puff 4     Sig: Inhale 1 puff daily.       Ipratropium/Tiotropium/Umeclidinium Refill Protocol Passed - 12/9/2019  7:02 AM        Passed - PCP or prescribing provider visit in last 6 months     Last office visit with prescriber/PCP: 7/18/2019 OR same dept: 7/18/2019 Clau Lazo MD OR same specialty: 7/18/2019 Clau Lazo MD Last physical: Visit date not found Last MTM visit: Visit date not found     Next appt within 3 mo: Visit date not found  Next physical within 3 mo: Visit date not found  Prescriber OR PCP: Clau Lazo MD  Last diagnosis associated with med order: 1. Chronic obstructive pulmonary disease, unspecified COPD type (H)  - INCRUSE ELLIPTA 62.5 mcg/actuation DsDv inhaler [Pharmacy Med Name: Incruse Ellipta Inhalation Aerosol Powder Breath Activated 62.5 MCG/INH]; Inhale 1 puff by mouth daily.; Refill: 4    If protocol passes may refill for 6 months if within 3 months of last provider visit (or a total of 9 months).

## 2021-06-05 NOTE — TELEPHONE ENCOUNTER
Critical lab result: Creatinine= 6.46 Drawn @ 10:47 am. Ordered by DESHAWN Andino .   Seen in office today and also by cardiology (heart failure clinic). .   Last creatinine October 7, 2019=  9.01  Hx of end stage renal disease on dialysis 3 days a week..Followed by nephrology.     Dr DARRYL Ayala on call, paged @ 10:28pm  Check with patient. If he missed dialysis today, he should go tomorrow.     Attempted to contact patient: no answer, TCB #1 vm.   Wife called back. Patient is scheduled for dialysis again tomorrow, and he intends to keep his appointment.     Tena Rockwell RN Triage Nurse Advisor    Reason for Disposition    Lab or radiology calling with CRITICAL test results    Protocols used: PCP CALL - NO TRIAGE-A-

## 2021-06-05 NOTE — TELEPHONE ENCOUNTER
Who is calling:  Spouse Justine  Reason for Call:  Returning  Date of last appointment with primary care:   Okay to leave a detailed message: Yes

## 2021-06-05 NOTE — TELEPHONE ENCOUNTER
Called to patient- patient stated that he is feeling a lot better today. Hasn't had anymore diarrhea or vomiting. Patient stated that his wife is now sick but he is 10 times better today. Patient thanked for the call.

## 2021-06-05 NOTE — TELEPHONE ENCOUNTER
Who is calling:  Yas  Reason for Call:  Returning call to [facility].  Date of last appointment with primary care: 10/17/19  Okay to leave a detailed message: Yes

## 2021-06-05 NOTE — PROGRESS NOTES
Results stable.  a1c well controlled - continue with current insulin regime.  Still anemic but stable.

## 2021-06-05 NOTE — TELEPHONE ENCOUNTER
ANTICOAGULATION  MANAGEMENT    Assessment     Today's INR result of 2.6 is Therapeutic (goal INR of 2.0-3.0)        Warfarin taken as previously instructed    No new diet changes affecting INR    Potential interaction between Protonix and warfarin which may affect subsequent INRs- starting today.    Continues to tolerate warfarin with no reported s/s of bleeding or thromboembolism     Previous INR was Subtherapeutic    Plan:     Spoke with wife Yas regarding INR result and instructed:     Warfarin Dosing Instructions:  Continue current warfarin dose 2 mg daily on Wed; and 4 mg daily rest of week  (0 % change)    Instructed patient to follow up no later than: 2 weeks.    Education provided: importance of following up for INR monitoring at instructed interval, importance of taking warfarin as instructed and potential interaction between warfarin and Protonix    Yas verbalizes understanding and agrees to warfarin dosing plan.    Instructed to call the [facility] for any changes, questions or concerns. (#[phone])   ?   Kj Mishra RN    Subjective/Objective:      Griffin Walton, a 85 y.o. male is on warfarin.     Griffin reports:     Home warfarin dose: verbally confirmed home dose with Yas and updated on anticoagulation calendar     Missed doses: No     Medication changes:  Yes, starting Protonix.     S/S of bleeding or thromboembolism:  No     New Injury or illness:  No     Changes in diet or alcohol consumption:  No     Upcoming surgery, procedure or cardioversion:  No    Anticoagulation Episode Summary     Current INR goal:   2.0-3.0   TTR:   60.2 % (10.6 mo)   Next INR check:   2/13/2020   INR from last check:   2.60 (1/30/2020)   Weekly max warfarin dose:      Target end date:      INR check location:      Preferred lab:      Send INR reminders to:   TERESITA PALACIOS    Indications    Chronic atrial fibrillation [I48.20]           Comments:            Anticoagulation Care Providers      Provider Role Specialty Phone number    Clau Lazo MD [facility] [phone]

## 2021-06-05 NOTE — PATIENT INSTRUCTIONS - HE
Mr Griffin Walton,  I enjoyed visiting with you again today.  I am glad to hear you are doing well.  Per our conversation we will check the stress test and if abnormal consider stenting.  I will plan on seeing you 6 months.  Randell Yusuf

## 2021-06-05 NOTE — TELEPHONE ENCOUNTER
"Wife calling. Patient sleeping    On dialysis, copd and currently vomiting and diarrhea.    V&D started this morning.    No cough or influenza symptoms.    Vomiting yellow phlegm.  Sleeping now.    Vomited last 10:30, fluids first 8 hours after last vomit    No fever.    Diarrhea 3 x today.    \"Stomach flu\".    Norovirus going through their building.    If he continues to vomit 5X+ and have diarrhea, call back as he may need to go to Ed for IV hydration.    She needs to talk to dialysis about his plan for tomorrow.    Cate Romano, RN [facility] Triage Nurse Advisor            Reason for Disposition    [1] MODERATE vomiting (e.g., 3 - 5 times/day) AND [2] age > 60    Protocols used: VOMITING-A-AH      "

## 2021-06-05 NOTE — TELEPHONE ENCOUNTER
Monthly follow up phone call for the COPD Program. No answer. Left message with call back number for any questions or concerns. Patient will no longer be called as he has not had any COPD exacerbation admits for a year.

## 2021-06-05 NOTE — PROGRESS NOTES
Family Medicine Office Visit  [facility]  Patient Name: Griffin Walton  Patient Age: 85 y.o.  YOB: 1934  MRN: [id number]    Date of Visit: 2020  Reason for Office Visit:   Chief Complaint   Patient presents with     Diabetes     F/U            Assessment / Plan / Medical Decision Makin. Chronic atrial fibrillation  Stable and will check INR today  - INR    2. Type 2 diabetes mellitus with chronic kidney disease on chronic dialysis, with long-term current use of insulin (H)  Well controlled.  Repeat labs, continue checking sugars.  On dialysis and scheduled to go tomorrow.  No issues  - Glycosylated Hemoglobin A1c  - Basic Metabolic Panel  - HM1(CBC and Differential)  - HM1 (CBC with Diff)    3. Chronic obstructive pulmonary disease, unspecified COPD type (H)  Stable.  On oxygen and will refill nebulizer as his broke.  Continue with the breathing treatments  - Nebulizer with supplies (cup, tubing, mask, & filters)    4. Gastroesophageal reflux disease without esophagitis  Will try protonix and see if any improvement in symtpoms.  - pantoprazole (PROTONIX) 20 MG tablet; Take 1 tablet (20 mg total) by mouth daily.  Dispense: 30 tablet; Refill: 1    5. Nonrheumatic aortic valve stenosis  Stable.  Seeing cardiology    6. S/P TAVR (transcatheter aortic valve replacement)  Stable - seeing cardiology    7. Acute ischemic stroke (H)  Stable.  On coumadin  - INR        Health Maintenance Review  Health Maintenance   Topic Date Due     ASTHMA ACTION PLAN  1934     HEART FAILURE ACTION PLAN  1934     COPD ACTION PLAN  1934     ZOSTER VACCINES (1 of 2) 1984     MEDICARE ANNUAL WELLNESS VISIT  1999     ALT  2020     BMP  2020     DIABETIC EYE EXAM  2020     ASTHMA CONTROL TEST  2020     DIABETIC FOOT EXAM  2020     LIPID  2020     MICROALBUMIN  2020     FALL RISK ASSESSMENT  2020      A1C  07/30/2020     CBC  01/30/2021     ADVANCE CARE PLANNING  03/07/2024     TD 18+ HE  02/04/2025     TSH  Completed     SPIROMETRY  Completed     PNEUMOCOCCAL IMMUNIZATION 65+ HIGH/HIGHEST RISK  Completed     INFLUENZA VACCINE RULE BASED  Completed         I am having Griffin Walton start on pantoprazole. I am also having him maintain his dorzolamide-timolol, vit B comp no.3-folic-C-biotin, cholecalciferol (vitamin D3), OXYGEN-AIR DELIVERY SYSTEMS MISC, prednisoLONE acetate, cyclopentolate, acetaminophen, fluticasone propion-salmeterol, ipratropium-albuterol, warfarin ANTICOAGULANT, insulin glargine, terazosin, pravastatin, OneTouch Ultra Blue Test Strip, bumetanide, metoprolol tartrate, UltiCare Pen Needle, umeclidinium, insulin aspart U-100, and clopidogreL.      HPI:  Griffin Walton is a 85 y.o. year old who presents to the office today for follow up.  Queasy stomach every morning, at times it will last all day and then other times will go away.  Ongoing for the past 2-3 weeks.  Taking tums and helps.      Breathing doing well.  On 2 L normally and will bump it up to 3L with activity if needed.    Mr. Griffin Walton is a 85 y.o. male seen at [facility] heart failure clinic today for continued follow-up.  His wife and son accompany him today.  He follows up for heart failure with reduced ejection fraction.  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  He was hospitalized in October with atrial fibrillation with rapid ventricular response.  His most recent echocardiogram was done October 8, 2018 which showed ejection fraction of 40 to 45%.  He has a past medical history significant for hypertension, coronary artery disease, permanent atrial fibrillation, dyslipidemia, COPD, diabetes, and end-stage renal disease on dialysis.  He is also legally blind in the right eye.  Status post TAVR April 2019.     During the last clinic visit, Dr. Yusuf started him on Bumex 1 mg twice a day and  "recommended dialysis removing 4 L of fluid the next day.  He did have 3.5 L removed at dialysis the next day.  His weight today at dialysis was 87.6 kg.  His weight has been stable since.  He has mild dyspnea on exertion and is on chronic oxygen.  He denies orthopnea or PND.  He denies abdominal bloating or lower extremity edema.  He denies lightheadedness, shortness of breath, orthopnea, PND, palpitations, chest pain, abdominal fullness/bloating and lower extremity edema.     He will have a follow up with cardiology next week.  He continues on 2L of oxygen to maintain O2 above 90%. He is using his nebulizer 1 time per day at bedtime, and inhaler 2 times daily.  Patient reports that he is feeling well and denies any concerns today.  He is requesting flushing of his ears at the request of audiology for his hearing aids placement.     Dialysis: Monday,wednesday, Friday.  Continues to be followed by Nephrology  Diabetes: blood sugars reported have been \"good\".  Novolog 6 morning, 6 noon and evening and Basaglar 6 units at bedtime.  When testing sugars running 100-150.    His weights are monitored at dialysis 3 days a week.  No increased swelling.      Review of Systems- pertinent positive in bold:  Constitutional: Fever, chills, night sweats, fainting, weight change, fatigue, seizures, dizziness, sleeping difficulties, loud snoring/pauses in breathing  Eyes: change in vision, blurred or double vision, redness/eye pain  Ears, nose, mouth, throat: change in hearing, ear pain, hoarseness, difficulty swallowing, sores in the mouth or throat  Respiratory: shortness of breath, cough, bloody sputum, wheezing  Cardiovascular: chest pain, palpitations   Gastrointestinal: abdominal pain, heartburn/indigestion, nausea/vomiting, change in appetite, change in bowel habits, constipation or diarrhea, rectal bleeding/dark stools, difficulty swallowing  Urinary: painful urination, frequent urination, urinary urgency/incontinence, blood " in urine/dark urine, nocturia  Musculoskeletal: backache/back pain (new or increasing), weakness, joint pain/stiffness (new or increasing), muscle cramps, swelling of hands, feet, ankles, leg pain/redness  Skin: change in moles/freckles, rash, nodules  Hematologic/lymphatic: swollen lymph glands, abnormal bruising/bleeding  Endocrine: excessive thirst/urination, cold or heat intolerance  Neurologic/emotional: worrisome memory change, numbness/tingling, anxiety, mood swings      Current Scheduled Meds:  Outpatient Encounter Medications as of 1/30/2020   Medication Sig Dispense Refill     acetaminophen (TYLENOL) 325 MG tablet Take 2 tablets (650 mg total) by mouth every 4 (four) hours as needed.  0     bumetanide (BUMEX) 1 MG tablet Take 1 tablet (1 mg total) by mouth 2 (two) times a day at 9am and 6pm. 180 tablet 4     cholecalciferol, vitamin D3, 1,000 unit tablet Take 1,000 Units by mouth daily.       clopidogreL (PLAVIX) 75 mg tablet Take 1 tablet (75 mg total) by mouth daily. 90 tablet 0     cyclopentolate (CYCLOGYL) 1 % ophthalmic solution Administer 1 drop to the right eye 2 (two) times a day.       dorzolamide-timolol (COSOPT) 22.3-6.8 mg/mL ophthalmic solution Administer 1 drop to the right eye 2 (two) times a day.        fluticasone-salmeterol (ADVAIR DISKUS) 100-50 mcg/dose DISKUS Inhale 1 puff 2 (two) times a day. 2 puff 0     insulin aspart U-100 (NOVOLOG FLEXPEN U-100 INSULIN) 100 unit/mL (3 mL) injection pen Inject 4-6 units under the skin 3 times daily before meals as directed 15 mL 1     insulin glargine (BASAGLAR KWIKPEN) 100 unit/mL (3 mL) pen Inject 7 Units under the skin at bedtime. 5 adj dose pen 0     ipratropium-albuterol (DUO-NEB) 0.5-2.5 mg/3 mL nebulizer Take 3 mL by nebulization every 4 (four) hours as needed. 60 vial 0     metoprolol tartrate (LOPRESSOR) 25 MG tablet Take 1 tablet (25 mg total) by mouth 2 (two) times a day. 180 tablet 4     ONETOUCH ULTRA BLUE TEST STRIP strips TEST TWICE  "DAILY 150 strip 4     OXYGEN-AIR DELIVERY SYSTEMS MISC 2-3 L/min into each nostril continuous. Indications: SOB, low O2 sats       pravastatin (PRAVACHOL) 40 MG tablet Take 1 tablet (40 mg total) by mouth every evening. 90 tablet 3     prednisoLONE acetate (PRED-FORTE) 1 % ophthalmic suspension Administer 1 drop to the right eye 2 (two) times a day.  1     terazosin (HYTRIN) 10 MG capsule Take 1 capsule (10 mg total) by mouth at bedtime. 90 capsule 1     ULTICARE PEN NEEDLE 31 gauge x 5/16\" Ndle Inject 1 each under the skin 4 (four) times a day. 400 each 2     umeclidinium (INCRUSE ELLIPTA) 62.5 mcg/actuation DsDv inhaler Inhale 1 puff daily. 90 puff 0     vit B comp no.3-folic-C-biotin (NEPHRO-ANGEL) 1- mg-mg-mcg Tab tablet Take 1 tablet by mouth at bedtime.       warfarin (COUMADIN/JANTOVEN) 2 MG tablet Take 2 tablets (4 mg total) by mouth daily. 180 tablet 1     pantoprazole (PROTONIX) 20 MG tablet Take 1 tablet (20 mg total) by mouth daily. 30 tablet 1     No facility-administered encounter medications on file as of 1/30/2020.      Past Medical History:   Diagnosis Date     Acute respiratory failure (H) 11/14/2017     Asthma      CHF (congestive heart failure) (H)      Chronic kidney disease      COPD (chronic obstructive pulmonary disease) (H)      Diabetes mellitus (H)      ESRD (end stage renal disease) on dialysis (H) 11/14/2017     HCAP (healthcare-associated pneumonia) 11/14/2017     Hypertension      Pulmonary congestion 11/14/2017     Renal cell carcinoma (H)      Past Surgical History:   Procedure Laterality Date     ABDOMINAL AORTIC ANEURYSM REPAIR  2007     aneursym       CV CORONARY ANGIOGRAM N/A 3/1/2019    Procedure: CORONARY ANGIOGRAM;  Surgeon: Harry Patel MD;  Location: [facility];  Service: Cardiology     CV OTHER APPROACH TRANSCATHETER (TAVR) N/A 4/2/2019    Procedure: Transcatheter Aortic Valve Replacement - subclavian;  Surgeon: Harry Patel MD;  Location: [facility];  Service: Cardiology     PARTIAL NEPHRECTOMY       Social History     Tobacco Use     Smoking status: Former Smoker     Packs/day: 1.00     Years: 55.00     Pack years: 55.00     Types: Cigarettes     Last attempt to quit: 1/1/2005     Years since quitting: 15.0     Smokeless tobacco: Never Used   Substance Use Topics     Alcohol use: No     Drug use: No       Objective / Physical Examination:  Vitals:    01/30/20 1012   BP: 128/66   Patient Site: Right Arm   Patient Position: Sitting   Cuff Size: Adult Regular   Pulse: 81   SpO2: 92%     Wt Readings from Last 3 Encounters:   01/30/20 191 lb (86.6 kg)   11/18/19 192 lb 4.8 oz (87.2 kg)   10/24/19 196 lb (88.9 kg)     BP Readings from Last 3 Encounters:   01/30/20 126/60   01/30/20 128/66   11/18/19 120/80     There is no height or weight on file to calculate BMI.   Results for orders placed or performed in visit on 01/30/20   Glycosylated Hemoglobin A1c   Result Value Ref Range    Hemoglobin A1c 5.7 3.5 - 6.0 %   Basic Metabolic Panel   Result Value Ref Range    Sodium 139 136 - 145 mmol/L    Potassium 3.9 3.5 - 5.0 mmol/L    Chloride 97 (L) 98 - 107 mmol/L    CO2 29 22 - 31 mmol/L    Anion Gap, Calculation 13 5 - 18 mmol/L    Glucose 96 70 - 125 mg/dL    Calcium 8.9 8.5 - 10.5 mg/dL    BUN 39 (H) 8 - 28 mg/dL    Creatinine 6.46 (HH) 0.70 - 1.30 mg/dL    GFR MDRD Af Amer 10 (L) >60 mL/min/1.73m2    GFR MDRD Non Af Amer 8 (L) >60 mL/min/1.73m2   HM1 (CBC with Diff)   Result Value Ref Range    WBC 6.2 4.0 - 11.0 thou/uL    RBC 3.28 (L) 4.40 - 6.20 mill/uL    Hemoglobin 9.7 (L) 14.0 - 18.0 g/dL    Hematocrit 29.8 (L) 40.0 - 54.0 %    MCV 91 80 - 100 fL    MCH 29.7 27.0 - 34.0 pg    MCHC 32.6 32.0 - 36.0 g/dL    RDW 14.3 11.0 - 14.5 %    Platelets 158 140 - 440 thou/uL    MPV 7.4 7.0 - 10.0 fL    Neutrophils % 65 50 - 70 %    Lymphocytes % 18 (L) 20 - 40 %    Monocytes % 12 (H) 2 - 10 %    Eosinophils % 5 0 - 6 %    Basophils % 1 0 - 2 %     Neutrophils Absolute 4.0 2.0 - 7.7 thou/uL    Lymphocytes Absolute 1.1 0.8 - 4.4 thou/uL    Monocytes Absolute 0.7 0.0 - 0.9 thou/uL    Eosinophils Absolute 0.3 0.0 - 0.4 thou/uL    Basophils Absolute 0.0 0.0 - 0.2 thou/uL   INR   Result Value Ref Range    INR 2.60 (H) 0.90 - 1.10           General Appearance: Alert and oriented, cooperative, affect appropriate, speech clear, in no apparent distress  Head: Normocephalic, atraumatic  Ears: Tympanic membrane clear with landmarks well visualized bilaterally  Neck: Supple, trachea midline. No cervical adenopathy  Back: Symmetrical and nontender  Lungs: Clear to auscultation bilaterally. Normal inspiratory and expiratory effort  Cardiovascular: Regular rate, irregular, KLAUS  Abdomen: Bowel sounds active all four quadrants. Soft, non-tender. No hepatomegaly or splenomegaly. No bruits detected.   Extremities: Pulses 2+ and equal throughout. No edema. Strength equal throughout.  Integumentary: Warm and dry. Without suspicious looking lesions  Neuro: Alert and oriented, follows commands appropriately.     Orders Placed This Encounter   Procedures     Nebulizer with supplies (cup, tubing, mask, & filters)     Glycosylated Hemoglobin A1c     Basic Metabolic Panel     HM1 (CBC with Diff)   Followup: No follow-ups on file. earlier if needed.    Total time spent with patient was 30 min with >50% of time spent in face-to-face counseling regarding the above plan       Clau Lazo MD

## 2021-06-05 NOTE — TELEPHONE ENCOUNTER
ANTICOAGULATION  MANAGEMENT    Assessment     Today's INR result of 1.9 is Subtherapeutic (goal INR of 2.0-3.0)        Warfarin taken as previously instructed    Norovirus may be affecting diet and INR    No new medication/supplements affecting INR    Continues to tolerate warfarin with no reported s/s of bleeding or thromboembolism     Previous INR was Supratherapeutic    Plan:     Spoke with Yas, spouse, regarding INR result and instructed:     Warfarin Dosing Instructions:  Take 6 mg today only then continue current warfarin dose 2 mg daily on Wed; and 4 mg daily rest of week  (0 % change)    Instructed patient to follow up no later than: 2 weeks    Education provided: target INR goal and significance of current INR result, importance of following up for INR monitoring at instructed interval, importance of taking warfarin as instructed, importance of notifying clinic for changes in medications and importance of notifying clinic for diarrhea, nausea/vomiting, reduced intake and/or illness    Yas verbalizes understanding and agrees to warfarin dosing plan.    Instructed to call the AC Clinic for any changes, questions or concerns. (#[phone])   ?   Sabrina Pickard RN    Subjective/Objective:      Griffin Walton, a 85 y.o. male is on warfarin.     Griffin reports:     Home warfarin dose: verbally confirmed home dose with Yas and updated on anticoagulation calendar     Missed doses: No     Medication changes:  No     S/S of bleeding or thromboembolism:  No     New Injury or illness:  Yes: norovirus last week     Changes in diet or alcohol consumption:  Yes: poor appetite for a few days while sick     Upcoming surgery, procedure or cardioversion:  No    Anticoagulation Episode Summary     Current INR goal:   2.0-3.0   TTR:   59.5 % (10.3 mo)   Next INR check:   2/4/2020   INR from last check:   1.90! (1/21/2020)   Weekly max warfarin dose:      Target end date:      INR check location:      Preferred  lab:      Send INR reminders to:   TERESITA PALACIOS    Indications    Chronic atrial fibrillation [I48.20]           Comments:            Anticoagulation Care Providers     Provider Role Specialty Phone number    Clau Lazo MD [facility] Medicine [phone]

## 2021-06-06 NOTE — TELEPHONE ENCOUNTER
"The patient's wife Yas is calling the HF line today with concerns which she would like shared with Dr. Yusuf.    She reports that Ed is s/p TAVR from May of 2019 and has been doing quite well over all. However, this past Friday evening, he experienced a period of approximately 3-40 minutes when his pulse oximeter reading was 75-76 range. This was not accompanied by any other concerning symptoms at the time, no shortness of breath, no wheezing, no dizziness, lightheadedness or pre-syncopal feelings.   He was sitting watching television as he generally does in the evenings. His skin was warm and dry, and he was able to communicate with her normally, and was not at all concerned as he felt just fine.    She became concerned as the pulse oximeter is set to alarm when it goes that low. She summoned the paramedics, who arrived quite promptly. When they tested his oxygen saturation, they obtained 90% +.  They assessed him, and performed an EKG for completeness. They did not feel he needed any further evaluation, so did not bring him into the ED.   She is just calling today, to report the incident for Dr. Yusuf to be aware that iit occurred. She states, that \" I may have in my panic rushed to judgement by calling the paramedics so quickly, but I was just so uncertain of what would happen\".    He is afebrile, no cough, no cold symptoms noted.     Recommended review if battery in oximeter may need refreshed. Consider if placement of oximeter on finger is giving accurate signal, by rechecking on another finger.     Dr. Yusuf FYI, contact patient with any additional new recommendations. sk/RN  "

## 2021-06-06 NOTE — PATIENT INSTRUCTIONS - HE
Griffin Walton,    It was a pleasure to see you today at [facility].     My recommendations after this visit include:  - Please follow up with Dr Yusuf in July   - Please follow up with Leticia Correa in 1 month  - I have increased your metoprolol to 50 mg (2 tablets) in the morning and 25 mg (1 tablet) in the afternoon    Leticia Correa, CNP

## 2021-06-06 NOTE — TELEPHONE ENCOUNTER
ANTICOAGULATION  MANAGEMENT    Assessment     Today's INR result of 2.0 is Therapeutic (goal INR of 2.0-3.0)        Warfarin taken as previously instructed    No new diet changes affecting INR    No new medication/supplements affecting INR    Continues to tolerate warfarin with no reported s/s of bleeding or thromboembolism     Previous INR was Therapeutic    Plan:     Spoke with Rajinderabdias and spouse Yas regarding INR result and instructed:     Warfarin Dosing Instructions:  Continue current warfarin dose    2 mg every Wed; 4 mg all other days       (0 % change)    Instructed patient to follow up no later than: 2 weeks - appointment made.    Education provided: importance of therapeutic range, target INR goal and significance of current INR result and when to seek medical attention/emergency care    Griffin and Yas verbalizes understanding and agrees to warfarin dosing plan.    Instructed to call the [facility] for any changes, questions or concerns. (#[phone])   ?   Justine Puente RN    Subjective/Objective:      Rajinderabdias Walton, a 85 y.o. male is on warfarin.     Griffin reports:     Home warfarin dose: verbally confirmed home dose with spouse Yas and updated on anticoagulation calendar     Missed doses: No     Medication changes:  No     S/S of bleeding or thromboembolism:  Yes: Shortness of breath- don't know why  Per patient he is using his oxygen all the time and that he gets short of breath if he is not using his oxygen - instructed to call for worsening of symptoms.     New Injury or illness:  No     Changes in diet or alcohol consumption:  No     Upcoming surgery, procedure or cardioversion:  No    Anticoagulation Episode Summary     Current INR goal:   2.0-3.0   TTR:   61.5 % (10.8 mo)   Next INR check:   2/13/2020   INR from last check:   2.60 (1/30/2020)   Most recent INR:    2.00 (2/11/2020)   Weekly max warfarin dose:      Target end date:      INR check location:      Preferred lab:       Send INR reminders to:   TERESITA PALACIOS    Indications    Chronic atrial fibrillation [I48.20]           Comments:            Anticoagulation Care Providers     Provider Role Specialty Phone number    Clau Lazo MD [facility] [phone]

## 2021-06-06 NOTE — TELEPHONE ENCOUNTER
ANTICOAGULATION  MANAGEMENT    Assessment     Today's INR result of 2.4 is Therapeutic (goal INR of 2.0-3.0)        Warfarin taken as previously instructed    No new diet changes affecting INR    No new medication/supplements affecting INR    Continues to tolerate warfarin with no reported s/s of bleeding or thromboembolism     Previous INR was Therapeutic    Plan:     Spoke with Yas, spouse, regarding INR result and instructed:     Warfarin Dosing Instructions:  Continue current warfarin dose 2 mg daily on Wed; and 4 mg daily rest of week  (0 % change)    Instructed patient to follow up no later than: 4 weeks    Education provided: target INR goal and significance of current INR result, importance of notifying clinic for changes in medications and importance of notifying clinic for diarrhea, nausea/vomiting, reduced intake and/or illness    Yas verbalizes understanding and agrees to warfarin dosing plan.    Instructed to call the AC Clinic for any changes, questions or concerns. (#[phone])   ?   Sabrina Pickard RN    Subjective/Objective:      Griffin LYNCH Isabelle, a 85 y.o. male is on warfarin.     Griffin reports:     Home warfarin dose: verbally confirmed home dose with Yas and updated on anticoagulation calendar     Missed doses: No     Medication changes:  No     S/S of bleeding or thromboembolism:  No     New Injury or illness:  No     Changes in diet or alcohol consumption:  No     Upcoming surgery, procedure or cardioversion:  No    Anticoagulation Episode Summary     Current INR goal:   2.0-3.0   TTR:   64.3 % (11 mo)   Next INR check:   3/24/2020   INR from last check:   2.40 (2/25/2020)   Weekly max warfarin dose:      Target end date:      INR check location:      Preferred lab:      Send INR reminders to:   TERESITA PALACIOS    Indications    Chronic atrial fibrillation [I48.20]           Comments:            Anticoagulation Care Providers     Provider Role Specialty Phone number    [place]  MD Clau [facility] Medicine [phone]

## 2021-06-06 NOTE — TELEPHONE ENCOUNTER
RN cannot approve Refill Request    RN can NOT refill this medication med is not covered by policy/route to provider. Last office visit: 1/30/2020 Clau Lazo MD Last Physical: Visit date not found Last MTM visit: Visit date not found Last visit same specialty: 1/30/2020 Clau Lazo MD.  Next visit within 3 mo: Visit date not found  Next physical within 3 mo: Visit date not found      Dorys Rudd, [facility] Triage/Med Refill 2/29/2020    Requested Prescriptions   Pending Prescriptions Disp Refills     warfarin ANTICOAGULANT (COUMADIN/JANTOVEN) 2 MG tablet [Pharmacy Med Name: Warfarin Sodium Oral Tablet 2 MG] 180 tablet 0     Sig: TAKE TWO TABLETS BY MOUTH DAILY       Warfarin Refill Protocol  Failed - 2/29/2020  7:32 AM        Failed -  Route to appropriate pool/provider     Last Anticoagulation Summary:   Anticoagulation Episode Summary     Current INR goal:   2.0-3.0   TTR:   64.3 % (11 mo)   Next INR check:   3/24/2020   INR from last check:   2.40 (2/25/2020)   Weekly max warfarin dose:      Target end date:      INR check location:      Preferred lab:      Send INR reminders to:   [facility]    Indications    Chronic atrial fibrillation [I48.20]           Comments:            Anticoagulation Care Providers     Provider Role Specialty Phone number    Clau Lazo MD [facility] [phone]                Passed - Provider visit in last year     Last office visit with prescriber/PCP: 1/30/2020 Clau Lazo MD OR same dept: 1/30/2020 Clau Lazo MD OR same specialty: 1/30/2020 Clau Lazo MD  Last physical: Visit date not found Last MTM visit: Visit date not found    Next appt within 3 mo: Visit date not found Next physical within 3 mo: Visit date not found  Prescriber OR PCP: Clau Lazo MD  Last diagnosis associated with med order: 1. Chronic atrial fibrillation  - warfarin ANTICOAGULANT (COUMADIN/JANTOVEN) 2 MG tablet [Pharmacy Med Name:  Warfarin Sodium Oral Tablet 2 MG]; TAKE TWO TABLETS BY MOUTH DAILY   Dispense: 180 tablet; Refill: 0    If protocol passes may refill for 6 months if within 3 months of last provider visit (or a total of 9 months).

## 2021-06-06 NOTE — TELEPHONE ENCOUNTER
RN cannot approve Refill Request    RN can NOT refill this medication med is not covered by policy/route to provider.      Jo Ruiz, [facility] Triage/Med Refill 2/25/2020    Requested Prescriptions   Pending Prescriptions Disp Refills     ADVAIR DISKUS 100-50 mcg/dose DISKUS [Pharmacy Med Name: Advair Diskus Inhalation Aerosol Powder Breath Activated 100-50 MCG/DOSE] 60 each 10     Sig: INHALE ONE PUFF BY MOUTH TWICE DAILY       There is no refill protocol information for this order      Signed Prescriptions Disp Refills    umeclidinium (INCRUSE ELLIPTA) 62.5 mcg/actuation DsDv inhaler 90 puff 1     Sig: INHALE ONE PUFF BY MOUTH ONE TIME DAILY       Ipratropium/Tiotropium/Umeclidinium Refill Protocol Passed - 2/25/2020 12:30 PM        Passed - PCP or prescribing provider visit in last 6 months     Last office visit with prescriber/PCP: 1/30/2020 OR same dept: 1/30/2020 Clau Lazo MD OR same specialty: 1/30/2020 Clau Lazo MD Last physical: Visit date not found Last MTM visit: Visit date not found     Next appt within 3 mo: Visit date not found  Next physical within 3 mo: Visit date not found  Prescriber OR PCP: Clau Lazo MD  Last diagnosis associated with med order: 1. Chronic obstructive pulmonary disease, unspecified COPD type (H)  - ADVAIR DISKUS 100-50 mcg/dose DISKUS [Pharmacy Med Name: Advair Diskus Inhalation Aerosol Powder Breath Activated 100-50 MCG/DOSE]; INHALE ONE PUFF BY MOUTH TWICE DAILY  Dispense: 60 each; Refill: 10  - umeclidinium (INCRUSE ELLIPTA) 62.5 mcg/actuation DsDv inhaler; INHALE ONE PUFF BY MOUTH ONE TIME DAILY   Dispense: 90 puff; Refill: 1    If protocol passes may refill for 6 months if within 3 months of last provider visit (or a total of 9 months).

## 2021-06-06 NOTE — TELEPHONE ENCOUNTER
Refill Approved    Rx renewed per Medication Renewal Policy. Medication was last renewed on 12/10/19.    Jo Ruiz, [facility] Triage/Med Refill 2/25/2020     Requested Prescriptions   Pending Prescriptions Disp Refills     ADVAIR DISKUS 100-50 mcg/dose DISKUS [Pharmacy Med Name: Advair Diskus Inhalation Aerosol Powder Breath Activated 100-50 MCG/DOSE] 60 each 10     Sig: INHALE ONE PUFF BY MOUTH TWICE DAILY       There is no refill protocol information for this order        umeclidinium (INCRUSE ELLIPTA) 62.5 mcg/actuation DsDv inhaler [Pharmacy Med Name: Incruse Ellipta Inhalation Aerosol Powder Breath Activated 62.5 MCG/INH] 90 puff 0     Sig: INHALE ONE PUFF BY MOUTH ONE TIME DAILY       Ipratropium/Tiotropium/Umeclidinium Refill Protocol Passed - 2/25/2020 12:30 PM        Passed - PCP or prescribing provider visit in last 6 months     Last office visit with prescriber/PCP: 1/30/2020 OR same dept: 1/30/2020 Clau Lazo MD OR same specialty: 1/30/2020 Clau Lazo MD Last physical: Visit date not found Last MTM visit: Visit date not found     Next appt within 3 mo: Visit date not found  Next physical within 3 mo: Visit date not found  Prescriber OR PCP: Clau Lazo MD  Last diagnosis associated with med order: 1. Chronic obstructive pulmonary disease, unspecified COPD type (H)  - ADVAIR DISKUS 100-50 mcg/dose DISKUS [Pharmacy Med Name: Advair Diskus Inhalation Aerosol Powder Breath Activated 100-50 MCG/DOSE]; INHALE ONE PUFF BY MOUTH TWICE DAILY  Dispense: 60 each; Refill: 10  - umeclidinium (INCRUSE ELLIPTA) 62.5 mcg/actuation DsDv inhaler [Pharmacy Med Name: Incruse Ellipta Inhalation Aerosol Powder Breath Activated 62.5 MCG/INH]; INHALE ONE PUFF BY MOUTH ONE TIME DAILY   Dispense: 90 puff; Refill: 0    If protocol passes may refill for 6 months if within 3 months of last provider visit (or a total of 9 months).

## 2021-06-06 NOTE — TELEPHONE ENCOUNTER
Refill Approved    Rx renewed per Medication Renewal Policy. Medication was last renewed on 8/26/19.    Jo Ruiz, [facility] Triage/Med Refill 2/24/2020     Requested Prescriptions   Pending Prescriptions Disp Refills     terazosin (HYTRIN) 10 MG capsule [Pharmacy Med Name: Terazosin HCl Oral Capsule 10 MG] 90 capsule 0     Sig: TAKE ONE CAPSULE BY MOUTH every night AT BEDTIME       Alpha Blockers Refill Protocol  Passed - 2/22/2020 10:46 AM        Passed - PCP or prescribing provider visit in past 12 months       Last office visit with prescriber/PCP: 1/30/2020 Clau Lazo MD OR same dept: 1/30/2020 Clau Lazo MD OR same specialty: 1/30/2020 Clau Lazo MD  Last physical: Visit date not found Last MTM visit: Visit date not found   Next visit within 3 mo: Visit date not found  Next physical within 3 mo: Visit date not found  Prescriber OR PCP: Clau Lazo MD  Last diagnosis associated with med order: 1. Acute diastolic congestive heart failure (H)  - terazosin (HYTRIN) 10 MG capsule [Pharmacy Med Name: Terazosin HCl Oral Capsule 10 MG]; TAKE ONE CAPSULE BY MOUTH every night AT BEDTIME   Dispense: 90 capsule; Refill: 0    If protocol passes may refill for 12 months if within 3 months of last provider visit (or a total of 15 months).             Passed - Blood pressure filed in past 12 months     BP Readings from Last 1 Encounters:   01/30/20 126/60

## 2021-06-07 NOTE — PROGRESS NOTES
TCM DISCHARGE FOLLOW UP CALL    Discharge Date:  3/29/2020  Reason for hospital stay (discharge diagnosis)::  Dyspnea, cough, fluid overload  Are you feeling better, the same or worse since your discharge?:  Patient is feeling better (Pt states he isn't dyspneic. O2 on at 3L. He has an occas productive cough, sputum is thick,, he doesn't know the color. Denies fever.)  Do you feel like you have a plan in the event of a health emergency?: Yes (Wife)    As part of your discharge plan, were  home care services ordered for you?: No    Did you receive any new medications, or was there a change to your medications?: Yes    Are you taking those medications, or do you have any established regiment?:  Wife sets up meds. Pt is taking metoprolol tartrate 25 mg three tablets two times a day. Pt is taking warfarin 4 mg daily except for 2mg on Wed. Wife states next INR is due 5/5.  Instructed wife the correct dose is 4 mg daily. She will add 2 mg to his Wed box.  Do you have any follow up visits scheduled with your PCP or Specialist?:  Yes, with PCP  (RN) Is PCP appt scheduled soon enough (within 14 days of discharge date)?: Yes (3/31 phone visit)

## 2021-06-07 NOTE — TELEPHONE ENCOUNTER
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 Has FU telephone visit scheduled next Friday, 4/17/2020    [facility]- FAUSTINA Mckeon - Call transferred.     Care advice as noted    Mariia Shore RN      Reason for Disposition    MODERATE swelling of both ankles (e.g., swelling extends up to the knees) AND new onset or worsening    Protocols used: LEG SWELLING AND EDEMA-A-OH

## 2021-06-07 NOTE — TELEPHONE ENCOUNTER
"----- Message from Pauline FERNANDO Jeancarlos sent at 3/25/2020  8:41 AM CDT -----      Caller: Patient  Primary cardiologist: Dr. Yusuf  Detailed reason for call: Patient stated that he took his oxygen today and it was at 75% and he wants to know what to do. He is going to dialysis today in a half an hour. Please advise    Best phone number: [phone]  Best time to contact: today  Ok to leave a detailed message? yes  Device? no    Additional Info:        Called patient to address his concerns. Spoke with him and his wife. The patient wears chronic O2 3 L and has for some time. He wears it for COPD but cannot recall who prescribed his O2. He states that he checked his O2 this morning and it was 78% on the 3 L. He feels fine- he will be weighed today at dialysis; he denies weight gain that he knows of- denies increased shortness of breath or any other cardiac symptoms. He endorses a cough but denied fever, chills, or any respiratory symptoms. He says his pulse rate is in the 80-90's and he has Afib. He is able to talk- he is not short of breath or winded. His wife says his hands are warm and dry. They tried another finger and it went up to 86% as he was talking. Writer encouraged slow deep breaths and even trying bumping O2 up but they stated that \"The last person they talked to said not to go up beyond 3 L\" . Writer then encouraged them to contact who prescribed the O2 if he feels stable and see what they would recommend. Stated that if he cannot get O2 up- there is not much that writer can do from the clinic besides recommend ER evaluation. They could also check O2 saturation with dialysis equipment and check accuracy. They verbalized understanding. -[facility] [place]  "

## 2021-06-07 NOTE — TELEPHONE ENCOUNTER
Called patient and spouse to inform them of provider's recommendations.    Left message for patient and spouse to call clinic back at their earliest convenience.

## 2021-06-07 NOTE — PROGRESS NOTES
Assessment/Plan:        Problem List Items Addressed This Visit        ENT/CARD/PULM/ENDO Problems    Chronic respiratory failure with hypoxia (H)    Chronic atrial fibrillation    Relevant Medications    metoprolol tartrate 75 mg Tab    Type 2 diabetes mellitus with chronic kidney disease on chronic dialysis, with long-term current use of insulin (H)    Acute ischemic stroke (H)    Nonrheumatic aortic valve stenosis    Relevant Medications    metoprolol tartrate 75 mg Tab    On home oxygen therapy    Acute systolic congestive heart failure (H) - Primary    Relevant Medications    metoprolol tartrate 75 mg Tab    Secondary cardiomyopathy (H)    Relevant Medications    metoprolol tartrate 75 mg Tab      Other Visit Diagnoses     Atrial fibrillation with rapid ventricular response (H)        Relevant Medications    metoprolol tartrate 75 mg Tab    Insomnia, unspecified type            Reviewed with patient and his spouse to utilize Lantus 6 units at bedtime sliding scale only with meals starting at a blood sugar of 141 1 unit and patient does have a copy of the sliding scale his spouse verbalizes understanding how to utilize this.  Did also transition his metoprolol tartrate to 75 mg tablets instead of 325 mg tablets twice a daily.    For patient's insomnia discussed with him possibly increasing his physical activity and reducing the amount of time he is napping throughout the day he may continue to utilize the melatonin at bedtime as needed.    Meds have been reconciled.   New meds prescribed today:      Med changes today:as per above   Meds pending Specialty consultation: none     Post Discharge Medication Reconciliation Status: discharge medications reconciled and changed, per note/orders (see AVS)    Subjective:    Patient ID:   Griffin Walton is a 85 y.o. male presenting for post hospital follow up.Patient's chronic conditions include acute on chronic respiratory failure with hypoxia, cerebrovascular disease,  history of pneumonia, chronic atrial fibrillation, cerebral embolism with cerebral infarction, COPD, type 2 diabetes with long-term use of insulin, hypertension, moderate persistent asthma, acute ischemic stroke, nonrheumatic aortic valve stenosis, chronic pulmonary edema, oxygen dependent, primary hypertension, acute on chronic diastolic congestive heart failure, LVH due to hypertensive disease with heart failure, hypoxia, atrial for ablation with RVR, respiratory compromise, severe aortic stenosis, S/P TAVR, end-stage renal disease on dialysis, anemia, glaucoma, 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  Patient of Dr. Lazo presents to clinic today for hospital follow-up.  Patient was hospitalized from 3/27/2020 to 3/29/2020 due to shortness of breath with coughing.  Patient was noted to have dyspnea Covid- 19 was negative.  He is on chronic home oxygen 3 L and did continue during hospital stay his dyspnea improved after hemodialysis it appears that fluid overload contributed to his dyspnea.  Patient noted to have A. fib with RVR on admission heart rate was 110 120s metoprolol dose was increased to 75 mg twice daily heart rate then controlled level of the 70s INR was subtherapeutic Coumadin dose was adjusted.  He has a diagnosis of type 2 diabetes insulin-dependent he had a hypoglycemic episode during his hospital stay last A1c 5.7 which is low for patient age and recommended patient sliding scale starting at 141-180 1 unit and upward per scale prior to meals and Lantus 6 units at bedtime also recommend that patient continue insulin sliding scale after discharge.  Patient's spouse was unaware that he was to discontinue standard mealtime insulin and to transition only to sliding scale.        Patient has diagnosis of end-stage renal disease  he continues on hemodialysis as scheduled.    Patient has chronic diastolic heart failure and he was euvolemic after dialysis.    Patient is diagnosed COPD chronic respiratory failure with hypoxia no signs or symptoms of exacerbation and recommended he continue on at home oxygen 3 L.  Allergies, current medications, past family history, past medical history, past social history, past surgical history and problem list have been reviewed.       Patient is using his his inhalers as prescribed.     Patient reports difficulty with sleeping at bedtime. He is taking 5-10mg at bedtime as needed though finds minimal relief.  He states he is taking a couple of naps throughout the day he is getting minimal physical activity.    Recent hospital admission notes and discharge recommendations reviewed.     Review of Systems  A 12 point comprehensive review of systems was negative except as noted.           Objective:   /88   Pulse 87   Temp 98.2  F (36.8  C)   Resp 20   SpO2 94% Comment: on 3 liters      Physical Exam  General Appearance:    Alert, cooperative, no distress, appears stated age   Head:    Normocephalic, without obvious abnormality, atraumatic   Throat:   Lips, mucosa, and tongue normal   Lungs:     Clear to auscultation bilaterally, respirations unlabored    Heart:    Regular rate and rhythm, S1 and S2 normal, systolic murmur appreciated    Extremities:   Extremities normal, atraumatic, no cyanosis, trace edema of lower extremities   Pulses:   2+ and symmetric all extremities   Neurologic:   grossly intact,

## 2021-06-07 NOTE — TELEPHONE ENCOUNTER
Patient Returning Call  Reason for call:  Message from clinic  Information relayed to patient:  How is patient doing?  Patient has additional questions:  Yes   If YES, what are your questions/concerns:  What should be done about patients low oxygen levels?  Today his oximetry reading is 79 on right hand and 86 on left hand.  Patient is currently on his way to dialysis and will be home after 2:30.  Could Tarah please call this afternoon?  Okay to leave a detailed message?: Yes

## 2021-06-07 NOTE — PATIENT INSTRUCTIONS - HE
Griffin Walton,    It was a pleasure to see you today at [facility].     My recommendations after this visit include:  - Please follow up with Dr Patel May 7  - Please follow up with Dr Yusuf in July  - Please follow up with Leticia Correa in 6 months  - Continue current medications    Leticia Correa, CNP

## 2021-06-07 NOTE — TELEPHONE ENCOUNTER
As previously commented, patient needs to be evaluated in person. Can not further advise remotely.   pls see previous communications.

## 2021-06-07 NOTE — TELEPHONE ENCOUNTER
"Wife states they received a call from Dr Lazo's office telling him to be screened. He has had a drop in his oxygen saturation. No fever. He has had a cough for the last couple of weeks off and on. Shortness of breath off and on for about a week. Pulse oximeter= varies yesterday am=78, later 88. Today 88 after dialysis. \"It used to be 90-92\". Currently O2 sat= 93% during this call. He is using 3 liters of oxygen continuously. Hx of COPD. shortness of breath comes and goes. No fever noted. Cough is productive of only a little phlegm in the throat--he has not seen it to know what color it is.   He does not want to go to the clinic or the hospital. He does not feel he is having difficulty breathing.   No known exposure to coronavirus. He is at home except for dialysis on MWF three hours a day.   No internet.     Reason for Disposition    [1] Monitoring oxygen level (e.g., pulse oximetry) AND [2] fall in oxygen level 4% or more (below known patient baseline, while awake and resting)    Protocols used: COPD OXYGEN MONITORING AND [id number]    Message will be forwarded to provider for advice.     Tena Rockwell RN Triage Nurse Advisor  "

## 2021-06-07 NOTE — TELEPHONE ENCOUNTER
Called patient to follow up on his symptoms and need for further evaluated and treatment.  Left message for patient to call clinic back at his earliest convenience.

## 2021-06-07 NOTE — TELEPHONE ENCOUNTER
"RN Triage:    Patient's wife is calling d/t: tooth injury    Pt's mouth is bleeding - thinks his tooth may have fallen out.    Happened last evening about 2100    Pt was nebulizing and he \"hit something in there [his mouth].\" Was unsure of exactly what happened.    The bleeding did stop overnight and was ok this am, however now it has started bleeding again.     Pt is unsure if he lost his tooth. His wife says that she thinks it looks like there is a tooth missing but can't confirm.    It is located on the L side on bottom towards the back.    Pt is on Warfarin.    Denies pain and is able to eat meals okay.    PLAN:  Patient advised patient to go to the emergency dentist in [place] to be evaluated ([facility]). Writer gave patient the address and phone number to this office. Writer also encouraged patient to call back if anything changes or symptoms get worse.      Justine Reed RN         Reason for Disposition    Tooth knocked out     Pt will go to the Emergency Dentist office in [facility] Specific Disposition - REQUIRED: [facility] Specific Patient Instructions  COVID 19 Nurse Triage Plan/Patient Instructions    Please be aware that novel coronavirus (COVID-19) may be circulating in the community. If you develop symptoms such as fever, cough, or SOB or if you have concerns about the presence of another infection including coronavirus (COVID-19), please contact your health care provider or visit [email or web address].       Additional COVID19 information to add for patients.     Additional General Information About COVID-19    COVID-19 - General Information:  Regardless of if you have been tested or not:  Patient who have symptoms (cough, fever, or shortness of breath), need to isolate for 7 days from when symptoms started AND 72 hours after fever resolves (without fever reducing medications) AND improvement of respiratory symptoms (whichever is longer).    Isolate yourself at " home (in own room/own bathroom if possible)  Do Not allow any visitors  Do Not go to work or school  Do Not go to [facility],  centers, shopping, or other public places.  Do Not shake hands.  Avoid close and intimate contact with others (hugging, kissing).  Follow CDC recommendations for household cleaning of frequently touched services.     After the initial 7 days, continue to isolate yourself from household members as much as possible. To continue decrease the risk of community spread and exposure, you and any members of your household should limit activities in public for 14 days after starting home isolation.     You can reference the following CDC link for helpful home isolation/care tips:  https://www.cdc.gov/coronavirus/2019-ncov/downloads/10Things.pdf    COVID-19 - Symptoms:   The COVID-19 can cause a respiratory illness, such as bronchitis or pneumonia.  The most common symptoms are: cough, fever, and shortness of breath.   Other symptoms are: body aches, chills, diarrhea, fatigue, headache, runny nose, and sore throat     COVID-19 - Exposure Risk Factors:  Exposure to a person who has been diagnosed with COVID-19 .  Travel from an area with recent local transmission of COVID-19 .  The CDC (www.cdc.gov) has the most up-to-date list of where the COVID-19 outbreak is occurring.    COVID-19 - Spreading:   The virus likely spreads through respiratory droplets produced when a person coughs or sneezes. These respiratory droplets can travel approximately 6 feet and can remain on surfaces.  Common disinfectants will kill the virus.  The CDC currently does not recommend healthy people wearing masks.    COVID-19 - Protect Yourself:   Avoid close contact with people known to have this new COVID-19 infection.  Wash hands often with soap and water or alcohol-based hand .  Avoid touching the eyes, nose or mouth.     Thank you for limiting contact with others, wearing a simple mask to cover your cough,  practice good hand hygiene habits and accessing our virtual services where possible to limit the spread of this virus.    For more information about COVID19 and options for caring for yourself at home, please visit the CDC website at https://www.cdc.gov/coronavirus/2019-ncov/about/steps-when-sick.html  For more options for care at [facility], please visit our website at [email or web address]    For more information, please use the [place] Department of Health ([facility]) COVID-19 Hotlines (Interpreters available):   Health questions: Phone Number: [phone] or [phone] and Hours: 7 a.m. to 7 p.m.  Schools and  questions: Phone Number: [phone] or [phone] and Hours 7 a.m. to 7 p.m.    Protocols used: TOOTH INJURY-A-AH, CORONAVIRUS (COVID-19) EXPOSURE-A-AH 3.30.20

## 2021-06-07 NOTE — TELEPHONE ENCOUNTER
Called and spoke with patient's spouse.  Informed her of the provider's recommendations for patient to be evaluated and treated at the ER today.  She states that patient is currently at dialysis and will go to ER sometime this afternoon.  Patient's spouse verbalized understanding and is agreeable with the plan of care.

## 2021-06-08 NOTE — TELEPHONE ENCOUNTER
SNEHAL Patel home care nurse, calling today because she is due to establish care with patient today for skilled nursing services.  Family says they would not want a visit until 5/19 from skilled nursing.  Would need verbal ok to start home care visits on 5/19 instead of today.     OK to leave voicemail on confidential line.    I will page on call provider for verbal order. Dalia Beckman MD gave verbal ok to start services on 5/19. I relayed this information to FAUSTINA Sheldon.    Reason for Disposition    [1] Follow-up call from patient regarding patient's clinical status AND [2] information NON-URGENT    Protocols used: PCP CALL - NO TRIAGE-A-    Lesvia Arias RN  [facility] Nurse Advisor

## 2021-06-08 NOTE — TELEPHONE ENCOUNTER
Who is calling:  Jah from [employer] [facility] [employer]  Reason for Call:  Caller stated he would like a call back on if Clau Lazo MD will sign the patient's death certificate. Caller stated the patient passed away this morning at 10:20 AM.  Date of last appointment with primary care: 5/18/20  Okay to leave a detailed message: No  [phone], please ask for an investigator

## 2021-06-08 NOTE — TELEPHONE ENCOUNTER
Refill Approved    Rx renewed per Medication Renewal Policy. Medication was last renewed on 1/27/20.    Jo Ruiz, [facility] Triage/Med Refill 5/18/2020     Requested Prescriptions   Pending Prescriptions Disp Refills     clopidogreL (PLAVIX) 75 mg tablet [Pharmacy Med Name: Clopidogrel Bisulfate Oral Tablet 75 MG] 90 tablet 0     Sig: TAKE ONE TABLET BY MOUTH ONE TIME DAILY       Clopidogrel/Prasugrel/Ticagrelor Refill Protocol Passed - 5/14/2020  1:32 PM        Passed - PCP or prescribing provider visit in past 6 months       Last office visit with prescriber/PCP: 1/30/2020 OR same dept: 1/30/2020 Clau Lazo MD OR same specialty: 1/30/2020 Clau Lazo MD Last physical: Visit date not found Last MTM visit: Visit date not found     Next appt within 3 mo: Visit date not found  Next physical within 3 mo: Visit date not found  Prescriber OR PCP: Clau Laoz MD  Last diagnosis associated with med order: 1. Coronary artery disease involving native coronary artery of native heart without angina pectoris  - clopidogreL (PLAVIX) 75 mg tablet [Pharmacy Med Name: Clopidogrel Bisulfate Oral Tablet 75 MG]; TAKE ONE TABLET BY MOUTH ONE TIME DAILY   Dispense: 90 tablet; Refill: 0    If protocol passes may refill for 6 months if within 3 months of last provider visit (or a total of 9 months).              Passed - Hemoglobin in past 12 months     Hemoglobin   Date Value Ref Range Status   05/14/2020 7.9 (L) 14.0 - 18.0 g/dL Final

## 2021-06-08 NOTE — TELEPHONE ENCOUNTER
New Appointment Needed  What is the reason for the visit:    Inpatient/ED Follow Up Appt Request  At what hospital or facility were you seen?: [facility]   What is the reason you were seen?: Fever of unknown origin  What date were you admitted?: date: 05/04/2020  What date were you discharged?: date: 05/08/2020  What was the recommended timeframe for your follow up appointment?: 3-5 days  Provider Preference: PCP or available provider   How soon do you need to be seen?: within 3-5 days  Waitlist offered?: No  Okay to leave a detailed message:  Yes

## 2021-06-08 NOTE — TELEPHONE ENCOUNTER
ANTICOAGULATION  MANAGEMENT PROGRAM    Griffin Walton is being discharged from the [facility] Anticoagulation Management Program (ACM).    Reason for discharge:     ACM referral closed, anticoagulation episode resolved and INR standing order discontinued.       Sabrina Pickard RN

## 2021-06-08 NOTE — TELEPHONE ENCOUNTER
Called and gave okay verbal orders for Home care Skilled nursing, Physical therapy and Occupational therapy as requested.    For home care, assisted living and nursing home needs for initiation of orders that pertain to nursing, physical therapy, occupational therapy and social work.  Established patient, seen by [facility] care provider within the last 2 years (3/31/20)

## 2021-06-08 NOTE — PROGRESS NOTES
"Griffin Walton is a 86 y.o. male who is being evaluated via a billable telephone visit.      The patient has been notified of following:     \"This telephone visit will be conducted via a call between you and your physician/provider. We have found that certain health care needs can be provided without the need for a physical exam.  This service lets us provide the care you need with a short phone conversation.  If a prescription is necessary we can send it directly to your pharmacy.  If lab work is needed we can place an order for that and you can then stop by our lab to have the test done at a later time.    Telephone visits are billed at different rates depending on your insurance coverage. During this emergency period, for some insurers they may be billed the same as an in-person visit.  Please reach out to your insurance provider with any questions.    If during the course of the call the physician/provider feels a telephone visit is not appropriate, you will not be charged for this service.\"    Patient has given verbal consent to a Telephone visit? Yes    What phone number would you like to be contacted at? [phone]    Patient would like to receive their AVS by AVS Preference: Alejo.    See hospital follow up note      Phone call duration:  20 minutes    Ema Hassan  "

## 2021-06-08 NOTE — TELEPHONE ENCOUNTER
Who is calling:  Jo from [facility]  Reason for Call:  Wanting to know if Dr. Lazo is able to sign the death certificate this morning. She is able to do it online Jo stated. Reason being, family is wanting to bury patient on 20.   Date of last appointment with primary care: 20  Okay to leave a detailed message: Yes

## 2021-06-08 NOTE — TELEPHONE ENCOUNTER
Orders being requested: Skilled Nursing 1 X 1 week; 2 X 2 weeks; 1 x 1 Week; 3 PRNs;   Reason service is needed/diagnosis: Respiratory Assessment; Falls prevention; Medication education.   When are orders needed by: ASAP  Where to send Orders: Phone:  [phone]  Okay to leave detailed message?  Yes      Orders being requested: PT and OT for Eval Treatment  Reason service is needed/diagnosis: Gait and balance endurance training ADLS safe transfers  When are orders needed by: ASAP  Where to send Orders: Phone:  [phone]  Okay to leave detailed message?  Yes

## 2021-06-08 NOTE — TELEPHONE ENCOUNTER
Who is calling:  wife  Reason for Call:  Would like PCp to give a call back to possibly explain the cause of death, stating the medical examiner never explained the cause. Please advie  Date of last appointment with primary care: 05/18/20  Okay to leave a detailed message: Yes

## 2021-06-08 NOTE — PROGRESS NOTES
Hospital Follow-up Visit:    Assessment/Plan:     1. Chronic obstructive pulmonary disease, unspecified COPD type (H)  Continue with current medications and continue to monitor oxygen with sats aimed at around 90%.  - umeclidinium (INCRUSE ELLIPTA) 62.5 mcg/actuation DsDv inhaler; Inhale 1 puff daily.  Dispense: 1 each; Refill: 3    2. Chronic atrial fibrillation  Stable.  INR today and continue with current medication    3. Type 2 diabetes mellitus with chronic kidney disease on chronic dialysis, with long-term current use of insulin (H)  Stable.  Continue to check blood sugars and insulin adjustments.  Dialysis today.  a1c 5/12 was 6.1    4. S/P TAVR (transcatheter aortic valve replacement)  Stable.  Continue with current medications.  Any worsening symptoms and go immediately to the ER    5. Acute on chronic combined systolic and diastolic congestive heart failure (H)  Stable.  Continue to monitor daily weights and call if any increase in weight >5lbs.    6. On home oxygen therapy  Back to baseline.    7.  Right basilar infiltrate  Improved.  Finished antibiotic and will continue to monitor oxygenation.  Diarrhea but will monitor and expect improvement with stoppage of the medication.         Subjective:     Griffin Walton is a 86 y.o. male who presents for a hospital discharge follow up.  Initially presented after a fall and weakness.  Found to have pneumonia, elevated bnp, and increased oxygen requirement.  Initially started on vanc and zosyn. Blood cultures negative.  covid testing mulitple times and all found to be negative.  Currently on 3L again.  Dialysis Monday, Wednesday and Fridays. D/c on doxycycline and cefdinir -  Finished the  Course of the antibiotic.  Home health coming out tomorrow.  Planning on PT.  Still feeling a little weak.  Currently doing well with insulin and taking 1-2 doses a day.  Still occasionally coughing - and but not really bringing up anything.  Weight staying steady at  184/185.     Going to assisted living for part of the week in the housing that living in so will allow some help if he falls again.  CXR (5/14/20);  IMPRESSION:   No significant change in the mild interstitial prominence and cardiomegaly. However, there are new small bilateral pleural effusions consistent with CHF. Tortuous calcified thoracic aorta. Surgical clips and vascular stent in the left   subclavian region.     CXR (5/11/20):  FINDINGS: Upright portable chest. Heart valve prosthesis. The heart is enlarged. There is mild interstitial edema at the lung bases. The thoracic aorta is calcified and mildly tortuous. There is right basilar infiltrate which may be a superimposed   pneumonia. The upper lungs are clear. No pneumothorax. Surgical clips in the left axilla.     IMPRESSION:   1. Cardiomegaly and mild interstitial edema.  2. Possible superimposed right basilar pneumonia.       Hospital/Nursing Home/IP Rehab Facility: [facility]  Date of Admission: 5/11/2020  Date of Discharge: 5/15/2020  Reason(s) for Admission: Pneumonia            Do you have any problems taking your medication regularly?  None       Have you had any changes in your medication since discharge? None       Have you had any difficulty following your discharge or treatment plan?  No    Summary of hospitalization:  Hospital discharge summary reviewed  Diagnostic Tests/Treatments reviewed.  Follow up needed: None  Other Healthcare Providers Involved in Patient's Care: Patient Care Team:  Clau Lazo MD as PCP - General (Family Medicine)  Clau Lazo MD as Assigned PCP  Clemencia Hare, PharmD as Pharmacist (Pharmacist)      Update since discharge: {improved   Information from family, SNF, care coordination:      Post Discharge Medication Reconciliation: discharge medications reconciled and changed, per note/orders (see AVS)  Plan of care communicated with: patient and significant other    Objective:         Physical  Exam:  GENERAL: Healthy, alert and no distress  EYES: Eyes grossly normal to inspection. No discharge or erythema, or obvious scleral/conjunctival abnormalities.  RESP: No audible wheeze, cough, or visible cyanosis.  No visible retractions or increased work of breathing.    NEURO: Cranial nerves grossly intact. Mentation and speech appropriate for age.  PSYCH: Mentation appears normal, affect normal/bright, judgement and insight intact, normal speech and appearance well-groomed        Coding guidelines for this visit:  Type of Medical   Decision Making Face-to-Face Visit       within 7 Days of discharge Face-to-Face Visit        within 14 days of discharge   Moderate Complexity [id number]   High Complexity 45485 [id number]       Electronically signed by Clau Lazo MD 05/18/20 8:16 AM

## 2021-06-08 NOTE — TELEPHONE ENCOUNTER
Called wife Yas back and discussed chronic conditions, recent hospital admission with pneumonia and presumed respiratory failure.  All questions answered.

## 2021-06-16 NOTE — TELEPHONE ENCOUNTER
Telephone Encounter by Radha Powell at 3/11/2019  4:33 PM     Author: Radha Powell Service: -- Author Type: --    Filed: 3/11/2019  4:33 PM Encounter Date: 3/8/2019 Status: Signed    : Radha Powell APPROVED:    Approval start date: 12/11/2018  Approval end date: 3/10/2020    Pharmacy has been notified of approval and will contact patient when medication is ready for pickup.

## 2021-06-16 NOTE — TELEPHONE ENCOUNTER
Telephone Encounter by Sabrina Pickard RN at 12/31/2019 11:18 AM     Author: Sabrina Pickard RN Service: -- Author Type: Registered Nurse    Filed: 12/31/2019 11:23 AM Encounter Date: 12/31/2019 Status: Attested    : Sabrina Pickard RN (Registered Nurse) Cosigner: Claudette Giron PA-C at 1/1/2020  8:40 AM    Attestation signed by Claudette Giron PA-C at 1/1/2020  8:40 AM    Agree with plan.                ANTICOAGULATION  MANAGEMENT    Assessment     Today's INR result of 3.3 is Supratherapeutic (goal INR of 2.0-3.0)        Warfarin taken as previously instructed    Holidays may be affecting diet and INR    No new medication/supplements affecting INR    Continues to tolerate warfarin with no reported s/s of bleeding or thromboembolism     Previous INR was Therapeutic    Plan:     Spoke with Yas, spouse, regarding INR result and instructed:     Warfarin Dosing Instructions:  Take 2 mg today only then continue current warfarin dose 2 mg daily on Wed; and 4 mg daily rest of week  (0 % change)    Instructed patient to follow up no later than: 2 weeks    Education provided: importance of consistent vitamin K intake, target INR goal and significance of current INR result, importance of following up for INR monitoring at instructed interval and importance of taking warfarin as instructed    Yas verbalizes understanding and agrees to warfarin dosing plan.    Instructed to call the [facility] Clinic for any changes, questions or concerns. (#[phone])   ?   Sabrina Pickard RN    Subjective/Objective:      Griffin Walton, a 85 y.o. male is on warfarin.     Griffin reports:     Home warfarin dose: verbally confirmed home dose with Yas and updated on anticoagulation calendar     Missed doses: No     Medication changes:  No     S/S of bleeding or thromboembolism:  No     New Injury or illness:  No     Changes in diet or alcohol consumption:  Yes: holidays affecting diet, should go back to  normal     Upcoming surgery, procedure or cardioversion:  No    Anticoagulation Episode Summary     Current INR goal:   2.0-3.0   TTR:   60.8 % (10.1 mo)   Next INR check:   1/14/2020   INR from last check:   3.30! (12/31/2019)   Weekly max warfarin dose:      Target end date:      INR check location:      Preferred lab:      Send INR reminders to:   TERESITA PALACIOS    Indications    Chronic atrial fibrillation [I48.20]           Comments:            Anticoagulation Care Providers     Provider Role Specialty Phone number    Clau Lazo MD [facility] Medicine [phone]

## 2021-06-16 NOTE — TELEPHONE ENCOUNTER
Telephone Encounter by Sabrina Pickard RN at 12/3/2019 11:32 AM     Author: Sabrina Pickard RN Service: -- Author Type: Registered Nurse    Filed: 12/3/2019 11:44 AM Encounter Date: 12/3/2019 Status: Attested    : Sabrina Pickard RN (Registered Nurse) Cosigner: Claudette Giron PA-C at 12/3/2019  1:07 PM    Attestation signed by Claudette Giron PA-C at 12/3/2019  1:07 PM    Agree with plan                ANTICOAGULATION  MANAGEMENT    Assessment     Today's INR result of 2.5 is Therapeutic (goal INR of 2.0-3.0)        Warfarin taken as previously instructed    No new diet changes affecting INR    No new medication/supplements affecting INR    Continues to tolerate warfarin with no reported s/s of bleeding or thromboembolism     Previous INR was Therapeutic    Plan:     Spoke with Yas, spouse, regarding INR result and instructed:     Warfarin Dosing Instructions:  Continue current warfarin dose 2 mg daily on Wed; and 4 mg daily rest of week  (0 % change)    Instructed patient to follow up no later than: 4 weeks    Education provided: target INR goal and significance of current INR result, importance of following up for INR monitoring at instructed interval, importance of taking warfarin as instructed, importance of notifying clinic for changes in medications and importance of notifying clinic for diarrhea, nausea/vomiting, reduced intake and/or illness    Yas verbalizes understanding and agrees to warfarin dosing plan.    Instructed to call the [facility] for any changes, questions or concerns. (#[phone])   ?   Sabrina Pickard RN    Subjective/Objective:      Griffin Walton, a 85 y.o. male is on warfarin.     Griffin reports:     Home warfarin dose: verbally confirmed home dose with Yas and updated on anticoagulation calendar     Missed doses: No     Medication changes:  No     S/S of bleeding or thromboembolism:  No     New Injury or illness:  No     Changes in diet or alcohol  consumption:  No     Upcoming surgery, procedure or cardioversion:  No    Anticoagulation Episode Summary     Current INR goal:   2.0-3.0   TTR:   64.2 % (10.1 mo)   Next INR check:   12/31/2019   INR from last check:   2.50 (12/3/2019)   Weekly max warfarin dose:      Target end date:      INR check location:      Preferred lab:      Send INR reminders to:   TERESITA PALACIOS    Indications    Chronic atrial fibrillation [I48.20]           Comments:            Anticoagulation Care Providers     Provider Role Specialty Phone number    Clau Lazo MD [facility] Medicine [phone]

## 2021-06-16 NOTE — TELEPHONE ENCOUNTER
Telephone Encounter by Sabrina Pickard RN at 8/20/2019 11:56 AM     Author: Sabrina Pickard RN Service: -- Author Type: Registered Nurse    Filed: 8/20/2019 11:59 AM Encounter Date: 8/20/2019 Status: Attested    : Sabrina Pickard RN (Registered Nurse) Cosigner: Horace Oates MD at 8/20/2019 12:43 PM    Attestation signed by Horace Oates MD at 8/20/2019 12:43 PM    Anticoagulation care reviewed.  I agree with the plan of care.    Horace Oates MD  [facility]  8/20/2019, 12:43 PM                   ANTICOAGULATION  MANAGEMENT    Assessment     Today's INR result of 2.5 is Therapeutic (goal INR of 2.0-3.0)        Warfarin taken as previously instructed    No new diet changes affecting INR    No new medication/supplements affecting INR    Continues to tolerate warfarin with no reported s/s of bleeding or thromboembolism     Previous INR was Supratherapeutic    Plan:     Spoke with Yas, spouse, regarding INR result and instructed:     Warfarin Dosing Instructions:  Continue current warfarin dose 4 mg daily     Instructed patient to follow up no later than: 2-3 weeks    Education provided: importance of consistent vitamin K intake, target INR goal and significance of current INR result, importance of following up for INR monitoring at instructed interval and importance of taking warfarin as instructed    Yas verbalizes understanding and agrees to warfarin dosing plan.    Instructed to call the [facility] for any changes, questions or concerns. (#[phone])   ?   Sabrina Pickard RN    Subjective/Objective:      Griffin Walton, a 85 y.o. male is on warfarin.     Griffin reports:     Home warfarin dose: verbally confirmed home dose with Yas and updated on anticoagulation calendar     Missed doses: No     Medication changes:  No     S/S of bleeding or thromboembolism:  No     New Injury or illness:  No     Changes in diet or alcohol consumption:  No     Upcoming surgery,  procedure or cardioversion:  No    Anticoagulation Episode Summary     Current INR goal:   2.0-3.0   TTR:   47.2 % (11.9 mo)   Next INR check:   9/10/2019   INR from last check:   2.50 (8/20/2019)   Weekly max warfarin dose:      Target end date:      INR check location:      Preferred lab:      Send INR reminders to:   TERESITA PALACIOS    Indications    Chronic atrial fibrillation (H) [I48.2]           Comments:            Anticoagulation Care Providers     Provider Role Specialty Phone number    Clau Lazo MD [facility] [phone]

## 2021-06-17 NOTE — TELEPHONE ENCOUNTER
Telephone Encounter by Sabrina Pickard RN at 3/31/2020  5:29 PM     Author: Sabrina Pickard RN Service: -- Author Type: Registered Nurse    Filed: 3/31/2020  5:37 PM Encounter Date: 3/31/2020 Status: Attested    : Sabrina Pickard RN (Registered Nurse) Cosigner: Claudette Giron PA-C at 4/2/2020  1:02 PM    Attestation signed by Claudette Giron PA-C at 4/2/2020  1:02 PM    Agree with plan                ANTICOAGULATION  MANAGEMENT    Assessment     Today's INR result of 3.0 is Therapeutic (goal INR of 2.0-3.0)        Warfarin taken as previously instructed - did get more warfarin while inpatient over the weekend    Was hospitalized from 3/27 to 3/29 for SOB    No new diet changes affecting INR    No new medication/supplements affecting INR    Continues to tolerate warfarin with no reported s/s of bleeding or thromboembolism     Previous INR was Therapeutic    Plan:     Spoke with Yas, spouse, regarding INR result and instructed:     Warfarin Dosing Instructions:  Continue current warfarin dose 2 mg daily on Wed; and 4 mg daily rest of week  (0 % change)    Instructed patient to follow up no later than: 1-2 weeks    Education provided: target INR goal and significance of current INR result, importance of following up for INR monitoring at instructed interval, importance of taking warfarin as instructed, importance of notifying clinic for changes in medications and importance of notifying clinic for diarrhea, nausea/vomiting, reduced intake and/or illness    Yas verbalizes understanding and agrees to warfarin dosing plan.    Instructed to call the [facility] for any changes, questions or concerns. (#[phone])   ?   Sabrina Pickard RN    Subjective/Objective:      Griffin Najerarusty, a 85 y.o. male is on warfarin.     Griffin reports:     Home warfarin dose: verbally confirmed home dose with Yas and updated on anticoagulation calendar     Missed doses: No     Medication changes:   No     S/S of bleeding or thromboembolism:  No     New Injury or illness:  Yes, hospitalized 3/27 to 3/29     Changes in diet or alcohol consumption:  No     Upcoming surgery, procedure or cardioversion:  No    Anticoagulation Episode Summary     Current INR goal:   2.0-3.0   TTR:   66.5 % (11.6 mo)   Next INR check:   4/14/2020   INR from last check:   3.00 (3/31/2020)   Weekly max warfarin dose:      Target end date:      INR check location:      Preferred lab:      Send [facility] reminders [facility]:   [facility] [place]    Indications    Chronic atrial fibrillation [I48.20]           Comments:            Anticoagulation Care Providers     Provider Role Specialty Phone number    Clau Lazo MD [facility] Medicine [phone]

## 2021-06-18 NOTE — LETTER
Letter by Clau Lazo MD at      Author: Clau Lazo MD Service: -- Author Type: --    Filed:  Encounter Date: 2019 Status: (Other)       2019    Griffin Walton   [address]  [place] [address]   : 1934       Patient prescription:      RX:  Oxygen via NC continuous at 2L    DX:      ICD-10-CM    1. Chronic obstructive pulmonary disease, unspecified COPD type (H) J44.9    2. Chronic atrial fibrillation (H) I48.2 clopidogrel (PLAVIX) 75 mg tablet     INR   3. Nonrheumatic aortic valve stenosis I35.0    4. Long term current use of anticoagulant therapy Z79.01    5. Shaking R25.1 Ambulatory referral to Neurology     HM1(CBC and Differential)     Comprehensive Metabolic Panel     1 (CBC with Diff)   6. Acute ischemic stroke (H) I63.9         LENGTH OF NEED (if appropriate):  99 years    Clau Lazo MD  [facility] [id number]   [place] License [id number]

## 2021-06-18 NOTE — PROGRESS NOTES
[facility] Note    Patient Name: Griffin Walton  Patient Age: 84 y.o.  YOB: 1934  MRN: [id number]    Date of visit: 6/27/2018    Patient Active Problem List   Diagnosis     Pneumonia     Acute respiratory failure (H)     HCAP (healthcare-associated pneumonia)     Pulmonary congestion     ESRD (end stage renal disease) on dialysis (H)     Chronic atrial fibrillation (H)     Social History     Social History Narrative     Outpatient Encounter Prescriptions as of 6/27/2018   Medication Sig Dispense Refill     albuterol (PROVENTIL) 2.5 mg /3 mL (0.083 %) nebulizer solution Take 2.5 mg by nebulization every 6 (six) hours as needed for wheezing.       bumetanide (BUMEX) 1 MG tablet Take 1 mg by mouth daily. Take after dialysis on MWF.       cholecalciferol, vitamin D3, 1,000 unit tablet Take 1,000 Units by mouth daily.       clopidogrel (PLAVIX) 75 mg tablet Take 75 mg by mouth daily.       diltiazem (CARDIZEM LA) 240 mg 24 hr tablet Take 1 tablet (240 mg total) by mouth daily. 30 tablet 0     dorzolamide-timolol (COSOPT) 22.3-6.8 mg/mL ophthalmic solution Administer 1 drop to both eyes 2 (two) times a day.       fluticasone-salmeterol (ADVAIR) 100-50 mcg/dose DISKUS Inhale 1 puff 2 (two) times a day.       insulin aspart (NOVOLOG) 100 unit/mL injection Inject 4 Units under the skin 3 (three) times a day before meals. Plus sliding scale.       insulin glargine (LANTUS) 100 unit/mL injection Inject 11 Units under the skin at bedtime.       latanoprost (XALATAN) 0.005 % ophthalmic solution Administer 1 drop to both eyes at bedtime.       losartan (COZAAR) 25 MG tablet Take 1 tablet (25 mg total) by mouth at bedtime. 30 tablet 0     lovastatin (MEVACOR) 40 MG tablet Take 60 mg by mouth daily with supper.       metoprolol succinate (TOPROL-XL) 25 MG Take 25 mg by mouth at bedtime.       OXYGEN-AIR DELIVERY SYSTEMS MISC 2 L/min into each nostril continuous. Indications: SOB, low O2 sats       terazosin  (HYTRIN) 10 MG capsule Take 10 mg by mouth at bedtime.       vit B comp no.3-folic-C-biotin (NEPHRO-ANGEL) 1- mg-mg-mcg Tab tablet Take 1 tablet by mouth at bedtime.       vit C-s.cherry-celery-grape sd (TART CHERRY) -35-75-20 mg cap Take 1 capsule by mouth daily.       warfarin (COUMADIN) 2 MG tablet Take 4 mg by mouth daily.       No facility-administered encounter medications on file as of 6/27/2018.        Chief Complaint:   Chief Complaint   Patient presents with     INR     orders for INR       /62  Pulse 91  Wt 195 lb 12.8 oz (88.8 kg)  SpO2 92%  BMI 30.21 kg/m2  HPI:   Has had atrial fibrillation and has been on anticoagulation for this.  Is here to get set up for regular INR checks.  No bleeding from anywhere.      ROS: Pertinent ros findings in hpi, all other systems negative.    Objective/Physical Exam:     /62  Pulse 91  Wt 195 lb 12.8 oz (88.8 kg)  SpO2 92%  BMI 30.21 kg/m2    Gen: NAD, appears age  Skin: warm, dry  HENT: normocephalic atraumatic, MMM  Eyes: non-icteric, no proptosis  CV: irreg irreg. Normal rate no m/r/g, no peripheral edema  Resp: CTAB no w/r/r, normal respiratory effort  Abd: non-distended, soft  Hematologic: No petechiae or purpura  MSK: no muscle or joint swelling  Neuro: no dysarthria or gross asymmetry  Psych: Cooperative, full affect      Assessment/Plan:  Recent Results (from the past 24 hour(s))   INR   Result Value Ref Range    INR 1.20 (H) 0.90 - 1.10     Encounter Diagnoses   Name Primary?     Chronic atrial fibrillation (H) Yes       Orders Placed This Encounter   Procedures     INR     Ambulatory referral to Anticoagulation Monitoring       Patient is simply needing regular INRs, I did order referral for our anticoagulation monitoring department.  I recommended that he establish care with a primary care physician in the near future.    Patient Instructions   F/u Clau Lazo in next few weeks.      Counseled patient regarding treatments,  treatment options, risks and benefits and diagnosis.  The patient was interactive, attentive, verbalized understanding, and we discussed plan.     Taiwo Mejias MD

## 2021-06-18 NOTE — LETTER
Letter by Clau Lazo MD at      Author: Clau Lazo MD Service: -- Author Type: --    Filed:  Encounter Date: 1/23/2019 Status: (Other)        Griffin Walton  [address] [address]             January 23, 2019         Dear Rajinderabdias Walton,    Below are the results from Griffin's recent visit:    Resulted Orders   Comprehensive Metabolic Panel   Result Value Ref Range    Sodium 134 (L) 136 - 145 mmol/L    Potassium 5.4 (H) 3.5 - 5.0 mmol/L    Chloride 93 (L) 98 - 107 mmol/L    CO2 26 22 - 31 mmol/L    Anion Gap, Calculation 15 5 - 18 mmol/L    Glucose 188 (H) 70 - 125 mg/dL    BUN 71 (H) 8 - 28 mg/dL    Creatinine 6.49 (HH) 0.70 - 1.30 mg/dL    GFR MDRD Af Amer 10 (L) >60 mL/min/1.73m2    GFR MDRD Non Af Amer 8 (L) >60 mL/min/1.73m2    Bilirubin, Total 0.5 0.0 - 1.0 mg/dL    Calcium 8.2 (L) 8.5 - 10.5 mg/dL    Protein, Total 6.3 6.0 - 8.0 g/dL    Albumin 3.0 (L) 3.5 - 5.0 g/dL    Alkaline Phosphatase 77 45 - 120 U/L    AST 17 0 - 40 U/L    ALT 35 0 - 45 U/L    Narrative    Fasting Glucose reference range is 70-99 mg/dL per  American Diabetes Association (ADA) guidelines.   HM1 (CBC with Diff)   Result Value Ref Range    WBC 12.5 (H) 4.0 - 11.0 thou/uL    RBC 3.82 (L) 4.40 - 6.20 mill/uL    Hemoglobin 10.8 (L) 14.0 - 18.0 g/dL    Hematocrit 34.3 (L) 40.0 - 54.0 %    MCV 90 80 - 100 fL    MCH 28.3 27.0 - 34.0 pg    MCHC 31.5 (L) 32.0 - 36.0 g/dL    RDW 17.1 (H) 11.0 - 14.5 %    Platelets 234 140 - 440 thou/uL    MPV 9.8 8.5 - 12.5 fL    Neutrophils % 79 (H) 50 - 70 %    Lymphocytes % 7 (L) 20 - 40 %    Monocytes % 10 2 - 10 %    Eosinophils % 4 0 - 6 %    Basophils % 0 0 - 2 %    Neutrophils Absolute 9.6 (H) 2.0 - 7.7 thou/uL    Lymphocytes Absolute 0.9 0.8 - 4.4 thou/uL    Monocytes Absolute 1.3 (H) 0.0 - 0.9 thou/uL    Eosinophils Absolute 0.4 0.0 - 0.4 thou/uL    Basophils Absolute 0.0 0.0 - 0.2 thou/uL        Slightly elevated white count still - thought to be presumed due to  steroid use.  All other labs stable    Please call with questions or contact us using MyChart.    Sincerely,        Electronically signed by Clau Lazo MD

## 2021-06-19 NOTE — PROGRESS NOTES
DISCHARGE FOLLOW UP CALL    Discharge Date:  8/1/2018  Reason for hospital stay (discharge diagnosis)::  L sided numbness - CVA  Are you feeling better, the same or worse since your discharge?:  Patient is feeling the same (States he's feeling about the same, still tired.  Strength hasn't returned yet, wife still has to help him w/things she never used to, but otherwise doing ok.)  Do you feel like you have a plan in the event of a health emergency?: No    (RN) Patient provided with information to call us in the event of a health emergency: Yes    As part of your discharge plan, did they discuss home care with you?: No (Discussed in hospital, felt it wasn't needed.)    Did you receive any new medications, or was there a change to your medications?: No (No new medicatons, but did have dose change in his Losartan to 100 mg (instead of 50 mg))    Do you have any follow up visits scheduled with your PCP or Specialist?:  Yes, with PCP  (RN) Is it scheduled soon enough (within 14 days of discharge date)?: Yes      Dalia Sahni RN Care Manager, [facility]

## 2021-06-19 NOTE — LETTER
Letter by Clau Lazo MD at      Author: Clau Lazo MD Service: -- Author Type: --    Filed:  Encounter Date: 7/19/2019 Status: (Other)         Griffin Walton  [address] [address]             July 19, 2019         Dear Mr. Walton,    Below are the results from your recent visit:    Result is/are slightly higher sugar than previous but still looks great.  No change to DM medications.  Kidney function still elevated which we expect.    Resulted Orders   Glycosylated Hemoglobin A1c   Result Value Ref Range    Hemoglobin A1c 6.4 (H) 3.5 - 6.0 %   Basic Metabolic Panel   Result Value Ref Range    Sodium 138 136 - 145 mmol/L    Potassium 4.9 3.5 - 5.0 mmol/L    Chloride 96 (L) 98 - 107 mmol/L    CO2 31 22 - 31 mmol/L    Anion Gap, Calculation 11 5 - 18 mmol/L    Glucose 69 (L) 70 - 125 mg/dL    Calcium 9.5 8.5 - 10.5 mg/dL    BUN 46 (H) 8 - 28 mg/dL    Creatinine 6.60 (HH) 0.70 - 1.30 mg/dL    GFR MDRD Af Amer 10 (L) >60 mL/min/1.73m2    GFR MDRD Non Af Amer 8 (L) >60 mL/min/1.73m2    Narrative    Fasting Glucose reference range is 70-99 mg/dL per  American Diabetes Association (ADA) guidelines.       Please call with questions or contact us using [employer].    Sincerely,  Electronically signed by Clau Lazo MD

## 2021-06-19 NOTE — PROGRESS NOTES
Family Medicine Office Visit  [facility]  Patient Name: Griffin Walton  Patient Age: 84 y.o.  YOB: 1934  MRN:[id number]    Date of Visit: 7/23/2018  Reason for Office Visit:   Chief Complaint   Patient presents with     Establish Care     Insomnia     Is having issues with sleeping at night. Takes naps during the day.      COPD     Fulltime o2.        Assessment / Plan / Medical Decision Making:  Griffin Walton is a 84 y.o. blind man who comes to clinic today to establish care. He has a sig. PMH for ESRD, diabetes, Afib, COPD and glaucoma. He saw optho this AM for his glaucoma and was told things were doing well and was prescribed eye drops. He is having no persistent current symptoms with his diabetes. He has not has his A1C checked in the past 6 months but reports good sugar levels twice a day managed w/ insulin. His primary complaints today are stomach discomfort causing nausea unchanged by any foods or current meds with bowel habits that reflect poor digestion time. Differential for this is gastroparesis due to aging or related to diabetes. Tums seem to help and are already suggested with every meal per Nephro, he will continue to monitor this and receive further work-up if symptoms get worse.   Eds insomnia seems related to current dialysis treatment and was suggested to try and limit the amount he naps in the day to try and regain a normal sleep schedule. Labs for INR levels were ordered to establish if the bleeding issues he is having are medication related as is suspected. Meds will be titrated per INR lab value results. He is currently having no issues with COPD or shortness of breath being managed well with oxygen but discussed establishing a lung doctor. Lastly, although his diabetes is managed well currently per his history a maintenance A1C is being checked every 6 months which he is currently due for.      1. Stomach discomfort  -Take 2 tums  with every meal  -Monitor severity and follow up if symptoms worsen    2. Persistent bleeding  -INR checked, will titrate meds to lower level of acceptable range ~2    3. Insomnia  -Limit daytime naps    4. Diabetes management  -6 month A1C check    5. Establish care/ESRD management  -Check electrolyte levels    Health Maintenance Review  Health Maintenance   Topic Date Due     DIABETES HEMOGLOBIN A1C  05/18/1934     DIABETES FOLLOW-UP  05/18/1934     DIABETES FOOT EXAM  05/18/1944     DIABETES OPHTHALMOLOGY EXAM  05/18/1944     DIABETES URINE MICROALBUMIN  05/18/1944     ADVANCE DIRECTIVES DISCUSSED WITH PATIENT  05/18/1952     ZOSTER VACCINE  05/18/1994     INFLUENZA VACCINE RULE BASED (1) 08/01/2018     FALL RISK ASSESSMENT  06/27/2019     TD 18+ HE  02/04/2025     PNEUMOCOCCAL POLYSACCHARIDE VACCINE AGE 65 AND OVER  Completed     PNEUMOCOCCAL CONJUGATE VACCINE FOR ADULTS (PCV13 OR PREVNAR)  Completed         HPI:  Griffin Walton is a 84 y.o blind man with a significant PMH of ESRD, diabetes, Afib, COPD and glaucoma who presents to the clinic today to establish care. His primary complaints at today's visit are issues with intermittent nausea, chronic insomnia and persistent bleeding post-dialysis. Ed has been having increasing intermittent nausea for the past several months that he says comes and goes and usually will subside on its own but improves quicker if he takes Tums. He does not feel like his symptoms are correlated with foods he eats are any meds he takes. Currently per Nephrology's recommendations he is suppose to take several Tums with every meal he has in order to manage his phosphate levels. His bowel movements have been occurring every 2-3 days and either come out as small stones or one large lump.  Ed has also been having some insomnia for the past year that he believes may be related to his dialysis. He admits that he has trouble falling asleep and usually doesn't get to sleep until 2am and  "then wakes up around 7am. He thinks this may be due to the fact that when he gets dialysis 3 times a week he feels very tired after and usually naps for 2-3 hours afterwards so he is not tired in the evening. Lastly, the patient has also noticed that after his dialysis and when he pricks his finger to check his BG he \"spurts like an oil rig\" and then bleeds for 2 days after. He is not having any dizziness or headaches when this happens and controls the bleeding with bandages.   His diabetes has been doing well and his BG is always under 150 and usually around 120. Current symptoms include: infrequent hypoglycemia. Patient denies hyperglycemia and paresthesia of the feet. Evaluation to date has been: fasting blood sugar, hemoglobin A1C and microalbuminuria. Current treatments: Continued insulin which has been effective. Last dilated eye exam 7/24/17.  His COPD is managed well with inhalers and oxygen currently and he does not get short of breath. He walks with a cane and understands his walking limitations and rests when he feels tired        Review of Systems- pertinent positive in bold:  Constitutional: No fever, chills, night sweats, fainting, weight change, fatigue, seizures, dizziness, has sleeping difficulties  Eyes: Blind in both eyes  Ears, nose, mouth, throat: No change in poor hearing, ear pain, hoarseness, difficulty swallowing, sores in the mouth or throat  Respiratory: No shortness of breath, cough, bloody sputum, wheezing  Cardiovascular: No chest pain, palpitations  Gastrointestinal: Some abdominal discomfort, no heartburn/indigestion, nausea/vomiting, no change in appetite, change in bowel habits, constipation, no rectal bleeding/dark stools  Urinary: No painful urination, frequent urination, urinary urgency/incontinence, blood in urine/dark urine, nocturia  Musculoskeletal: No backache/back pain (new or increasing), weakness, joint pain/stiffness (new or increasing), muscle cramps, swelling of hands, " feet, ankles, leg pain/redness  Skin: No change in moles/freckles, rash, nodules  Hematologic/lymphatic: No swollen lymph glands, abnormal bruising/bleeding  Endocrine: No excessive thirst/urination, cold or heat intolerance  Neurologic/emotional: No memory change, numbness/tingling, anxiety, mood swings      Current Scheduled Meds:  Current Outpatient Prescriptions on File Prior to Visit   Medication Sig Dispense Refill     albuterol (PROVENTIL) 2.5 mg /3 mL (0.083 %) nebulizer solution Take 2.5 mg by nebulization every 6 (six) hours as needed for wheezing.       bumetanide (BUMEX) 1 MG tablet Take 1 mg by mouth daily. Take after dialysis on MWF.       cholecalciferol, vitamin D3, 1,000 unit tablet Take 1,000 Units by mouth daily.       clopidogrel (PLAVIX) 75 mg tablet Take 75 mg by mouth daily.       dorzolamide-timolol (COSOPT) 22.3-6.8 mg/mL ophthalmic solution Administer 1 drop to the right eye 2 (two) times a day.        fluticasone-salmeterol (ADVAIR) 100-50 mcg/dose DISKUS Inhale 1 puff 2 (two) times a day.       insulin aspart (NOVOLOG) 100 unit/mL injection Inject 4 Units under the skin 3 (three) times a day before meals. Plus sliding scale.       losartan (COZAAR) 25 MG tablet Take 1 tablet (25 mg total) by mouth at bedtime. 30 tablet 0     lovastatin (MEVACOR) 40 MG tablet Take 60 mg by mouth daily with supper.       metoprolol succinate (TOPROL-XL) 25 MG Take 25 mg by mouth at bedtime.       OXYGEN-AIR DELIVERY SYSTEMS MISC 2 L/min into each nostril continuous. Indications: SOB, low O2 sats       terazosin (HYTRIN) 10 MG capsule Take 10 mg by mouth at bedtime.       vit B comp no.3-folic-C-biotin (NEPHRO-ANGEL) 1- mg-mg-mcg Tab tablet Take 1 tablet by mouth at bedtime.       vit C-s.cherry-celery-grape sd (TART CHERRY) -15-75-20 mg cap Take 1 capsule by mouth daily.       warfarin (COUMADIN) 2 MG tablet Take 4 mg by mouth daily.       [DISCONTINUED] diltiazem (CARDIZEM LA) 240 mg 24 hr  "tablet Take 1 tablet (240 mg total) by mouth daily. 30 tablet 0     latanoprost (XALATAN) 0.005 % ophthalmic solution Administer 1 drop to both eyes at bedtime.       [DISCONTINUED] insulin glargine (LANTUS) 100 unit/mL injection Inject 11 Units under the skin at bedtime.       No current facility-administered medications on file prior to visit.          Past Medical History:   Diagnosis Date     Acute respiratory failure (H) 11/14/2017     ESRD (end stage renal disease) on dialysis (H) 11/14/2017     HCAP (healthcare-associated pneumonia) 11/14/2017     Pulmonary congestion 11/14/2017       No past surgical history on file.    Social History     Social History     Marital status:      Spouse name: N/A     Number of children: N/A     Years of education: N/A     Occupational History     Not on file.     Social History Main Topics     Smoking status: Former Smoker     Smokeless tobacco: Never Used     Alcohol use No     Drug use: No     Sexual activity: No     Other Topics Concern     Not on file     Social History Narrative       Objective / Physical Examination:    Vitals:    07/24/18 1253   BP: 142/72   Pulse: 78   SpO2: 97%   Weight: 198 lb (89.8 kg)   Height: 5' 7.5\" (1.715 m)         Body mass index is Body mass index is 30.55 kg/(m^2).    General Appearance: Alert and oriented, cooperative, affect appropriate, speech clear, in no apparent distress  Head: Normocephalic, atraumatic  Ears: Tympanic membrane clear with landmarks well visualized bilaterally  Eyes:  EOMI. Cloudy sclarea   Nose: Septum midline, nares patent, no visible polyps, mucosa moist and without drainage, on oxygen  Throat: Lips and mucosa moist. Teeth in good repair, pharynx without erythema or exudate  Neck: Supple, trachea midline. No cervical adenopathy  Back: Symmetrical and nontender  Lungs: Clear to auscultation bilaterally. Normal inspiratory and expiratory effort  Cardiovascular: Regular rate, normal S1, S2. 4+ holosystolic " murmur, no rubs, or gallops  Abdomen: Bowel sounds active all four quadrants. Soft, non-tender. No hepatomegaly or splenomegaly. No bruits detected.   Extremities: Pulses 2+ and equal throughout. No edema. Strength equal throughout.  Integumentary: Warm and dry. Without suspicious looking lesions  Neuro: Alert and oriented, follows commands appropriately.         Orders Placed This Encounter   Procedures     Glycosylated Hemoglobin A1c     Comprehensive Metabolic Panel         Teo Jc, Medical Student  I, Dr. Clau murphy , personally performed the services described in this documentation, as written by and performed by Teo cJ in my presence, and it is both accurate and complete.

## 2021-06-19 NOTE — PROGRESS NOTES
Chief Complaint   Patient presents with     poss infection     sore to the left hand for 4x days. admit pusy to the area . pt doesn't recall any injurying it.        HPI    Patient is here for 4 days of pain from a growth on right dorsal hand. No recent injury. There has been about a year of a lesion on right dorsal hand that has not bothered patient until the last 4 days. No swelling, redness, fever, chills.     ROS: Pertinent ROS noted in HPI.     Allergies   Allergen Reactions     Aspirin Angiodema     Tongue and lip swelling     Monosodium Glutamate Angiodema     Tongue and lip swelling     Heparin Unknown     Does not use heparin for dialysis; on low intensity coumadin     Hydralazine Unknown       Patient Active Problem List   Diagnosis     Pneumonia     Acute respiratory failure (H)     HCAP (healthcare-associated pneumonia)     Pulmonary congestion     ESRD (end stage renal disease) on dialysis (H)     Chronic atrial fibrillation (H)       No family history on file.    Social History     Social History     Marital status:      Spouse name: N/A     Number of children: N/A     Years of education: N/A     Occupational History     Not on file.     Social History Main Topics     Smoking status: Unknown If Ever Smoked     Smokeless tobacco: Never Used     Alcohol use No     Drug use: No     Sexual activity: No     Other Topics Concern     Not on file     Social History Narrative     Objective:    Vitals:    07/17/18 1100   BP: 128/76   Pulse: (!) 56   Resp: 14   Temp: 98.5  F (36.9  C)   SpO2: 98%       Gen:NAD  Skin: 1 cm lesion at right dorsal hand with a thick, dry whitish growth in the center with dark surrounding at the base. No erythema nor edema. No fluctuance. Mild tenderness to palpation.       Assessment:    Skin lesion of hand - etiology unclear, no evidence of infection, lesion likely needs to be biopsied.    Plan:    F/u [facility] (info given for patient to call to set up filippo)

## 2021-06-19 NOTE — LETTER
Letter by Jaya Beckman RN at      Author: Jaya Beckman RN Service: -- Author Type: --    Filed:  Encounter Date: 3/29/2019 Status: (Other)         Patient: Griffin Walton   MR Number: [id number]   YOB: 1934   Date of Visit: 3/29/2019     [facility]    Dear Dr Silva:    Thank you for your referral to [facility]. Griffin Walton ([birth year]) was recently evaluated by our Heart Team for severe aortic stenosis.    After assessing your patients diagnostic test results and conducting a thorough clinical, cardiovascular and surgical assessment, our Heart Team has recommended that your patient undergo Transcatheter Aortic Valve Replacement (TAVR).    The procedure is scheduled to take place on Tues 4/2/2019 at [facility] with Dr. Patel and Dr. Allen.     Our multidisciplinary approach and collaboration across specialties are critical to treating patients with valvular heart disease. We look forward to partnering with you to provide quality care for these challenging patients.    Patients typically return to Valve Clinic for follow up at 1 week, 30 days and one year post procedure.  Outside of these follow up points, you can expect to continue providing care to your patient.      Sincerely,     Dr. Yousif Allen, Director, Structural Heart Program  Dr. Harry Patel, Interventional Cardiology  Jaya Beckman RN, Valve Clinic Coordinator    [address], [address]st [place] [address]  Phone [phone], Fax [phone]      CC  Clau Lazo MD

## 2021-06-19 NOTE — PROGRESS NOTES
Assessment/Plan:        1. Acute ischemic stroke (H)  S/p hospital admission.   Improving sx   Still having some left numbness affecting the left three fingers and left lower leg numbness. However, not affecting his gait as much. Able to ambulate with assistive device ie. Cane.     Anticoagulated on Coumadin and plavix     2. Chronic atrial fibrillation (H)  Anticoagulated on Coumadin and plavix   - INR    3. Hypertension, unspecified type  Normotension  Continue current management.     4. Controlled type 2 diabetes mellitus with diabetic nephropathy, with long-term current use of insulin (H)  Continue current management.     5. Chronic obstructive pulmonary disease, unspecified COPD type (H)  Continue current management.     6. ESRD (end stage renal disease) on dialysis (H)  Continue current management.     7. Insomnia  tx options reviewed.   PLan:   - traZODone (DESYREL) 50 MG tablet; Take 1-2 tablets ( mg total) by mouth at bedtime.  Dispense: 60 tablet; Refill: 0          50  minutes spent on this visit with more than 50% time spent on reviewing medical record, education, providing supportive therapy regarding coping with chronic illness, entering orders              Subjective:    Patient ID:   Griffin Walton is a 84 y.o. male comes in for post hospital follow-up seen at [facility] for left-sided numbness diagnosed with acute CVA with the MRI showing a tiny acute stroke in the posterior right dionicio.  There were no motor deficits or impaired swallowing.  He was consulted by neurology and is anticoagulated for chronic atrial fibrillation.   his echo showed mildly impaired LV function with a EF of 45-50%.    He continues to have Left two index and 3rd fingers and left leg numbness   Normally ambulates with assisstance, and today coming in with a wheelchair for ease of taxing      Coumadin dosinmg Mon, 4mg on all other days.     He is also having a trouble sleeping at night and asking for sleep aid  medication.         History of ESRD and on dialysis  Dialyzing on Monday, Wednesday, and Friday who is a patient of Dr. Ha    Chronic atrial fibrillation  Controlled on Cardizem, metoprolol and anticoagulated on Coumadin and Plavix    Lab Results   Component Value Date    INR 1.80 (!) 08/03/2018    INR 3.44 (H) 08/01/2018    INR 3.09 (H) 07/31/2018    INR managed by anticoagulation clinic       COPD  No respiratory or or pulmonary problems during admission  No changes to the home meds were given made  Denies any breathing problems today      DM with neuropathy  On insulin with no change in home dose  Lab Results   Component Value Date    HGBA1C 6.5 (H) 07/24/2018        Hypertension  Presented with accelerated hypertension requiring IV labetalol during the hospitalization  Losartan was increased to 100 milligrams daily and was consulted by nephrology  He has no concerns regarding this medication today and has been taking as prescribed      Review of Systems  A complete 10 point review of systems was obtained and is negative other than what is stated in the HPI.       The following patient's history were reviewed and updated as appropriate:   He  has a past medical history of Acute respiratory failure (H) (11/14/2017); Chronic kidney disease; Diabetes mellitus (H); ESRD (end stage renal disease) on dialysis (H) (11/14/2017); HCAP (healthcare-associated pneumonia) (11/14/2017); Hypertension; Pulmonary congestion (11/14/2017); and Renal cell carcinoma (H).  He  has a past surgical history that includes aneursym and Partial nephrectomy.  His family history includes COPD in his sister; Cancer in his brother and father; Hypertension in his mother.  He  reports that he has quit smoking. He has never used smokeless tobacco. He reports that he does not drink alcohol or use illicit drugs..      Outpatient Encounter Prescriptions as of 8/9/2018   Medication Sig Dispense Refill     albuterol (PROVENTIL) 2.5 mg /3 mL (0.083  "%) nebulizer solution Take 2.5 mg by nebulization every 6 (six) hours as needed for wheezing.       blood glucose test (ONETOUCH ULTRA BLUE TEST STRIP) strips test 2 times daily  Profile Rx: patient will contact pharmacy when needed       blood glucose test strips test 2 times daily  Profile Rx: patient will contact pharmacy when needed       bumetanide (BUMEX) 1 MG tablet Take 2 mg by mouth every morning. Take after dialysis on MWF.       cholecalciferol, vitamin D3, 1,000 unit tablet Take 1,000 Units by mouth daily.       clopidogrel (PLAVIX) 75 mg tablet Take 75 mg by mouth daily.       diltiazem (CARDIZEM CD) 240 MG 24 hr capsule Take 240 mg by mouth daily.       dorzolamide-timolol (COSOPT) 22.3-6.8 mg/mL ophthalmic solution Administer 1 drop to the right eye 2 (two) times a day.        fluticasone-salmeterol (ADVAIR) 100-50 mcg/dose DISKUS Inhale 1 puff 2 (two) times a day.       insulin aspart (NOVOLOG) 100 unit/mL injection Inject 4-6 Units under the skin 3 (three) times a day before meals.        insulin glargine (BASAGLAR KWIKPEN U-100 INSULIN) 100 unit/mL (3 mL) pen Inject 15 Units under the skin at bedtime.       losartan (COZAAR) 50 MG tablet Take 2 tablets (100 mg total) by mouth daily. 30 tablet 0     lovastatin (MEVACOR) 40 MG tablet Take 60 mg by mouth daily with supper.       metoprolol succinate (TOPROL-XL) 25 MG Take 25 mg by mouth at bedtime.       OXYGEN-AIR DELIVERY SYSTEMS MISC 2 L/min into each nostril continuous. Indications: SOB, low O2 sats       prednisoLONE acetate (PRED-FORTE) 1 % ophthalmic suspension Administer 1 drop to the right eye 2 (two) times a day.  1     terazosin (HYTRIN) 10 MG capsule Take 10 mg by mouth at bedtime.       ULTICARE PEN NEEDLE 31 gauge x 5/16\" Ndle Inject 1 each under the skin 4 (four) times a day.  1     vit B comp no.3-folic-C-biotin (NEPHRO-ANGEL) 1- mg-mg-mcg Tab tablet Take 1 tablet by mouth at bedtime.       vit C-s.cherry-celery-grape sd (TART " WALL) -57-75-20 mg cap Take 1 capsule by mouth daily.       warfarin (COUMADIN) 2 MG tablet Take 2 tablets (4 mg total) by mouth daily. 60 tablet 0     No facility-administered encounter medications on file as of 8/9/2018.          Objective:   /74  Pulse 84  SpO2 97% Comment: 2 continuous      Physical Exam  General Appearance:    Alert, well hydrated, no distress, in wheelchair    Neck:   Supple, symmetrical, trachea midline, no adenopathy;        thyroid:  No enlargement/tenderness/nodules; no carotid    bruit or JVD   Lungs:     Clear to auscultation bilaterally, respirations unlabored   Heart:      Irregularly irregular rhythm,  S1 and S2 normal, +murmur, no  rub or gallop   Abdomen:     Soft, non-tender, normal bowel sounds, no rebound or guarding, no masses, no organomegaly   Neuro/ Extremities:  Subjective numbness from the left knee down to the ankle, and left 2,3rd    Extremities otherwise appear normal, atraumatic, no cyanosis or edema with equal strength bilaterally upper and lower   Skin:   Skin color, texture, turgor normal, no rashes or bruises or lesions

## 2021-06-20 NOTE — LETTER
Letter by Clau Lazo MD at      Author: Clau Laoz MD Service: -- Author Type: --    Filed:  Encounter Date: 1/31/2020 Status: (Other)         Griffin Walton  [address] [place] [address]             January 31, 2020         Dear Mr. Walton,    Below are the results from your recent visit:    Resulted Orders   Glycosylated Hemoglobin A1c   Result Value Ref Range    Hemoglobin A1c 5.7 3.5 - 6.0 %   Basic Metabolic Panel   Result Value Ref Range    Sodium 139 136 - 145 mmol/L    Potassium 3.9 3.5 - 5.0 mmol/L    Chloride 97 (L) 98 - 107 mmol/L    CO2 29 22 - 31 mmol/L    Anion Gap, Calculation 13 5 - 18 mmol/L    Glucose 96 70 - 125 mg/dL    Calcium 8.9 8.5 - 10.5 mg/dL    BUN 39 (H) 8 - 28 mg/dL    Creatinine 6.46 (HH) 0.70 - 1.30 mg/dL    GFR MDRD Af Amer 10 (L) >60 mL/min/1.73m2    GFR MDRD Non Af Amer 8 (L) >60 mL/min/1.73m2    Narrative    Fasting Glucose reference range is 70-99 mg/dL per  American Diabetes Association (ADA) guidelines.   HM1 (CBC with Diff)   Result Value Ref Range    WBC 6.2 4.0 - 11.0 thou/uL    RBC 3.28 (L) 4.40 - 6.20 mill/uL    Hemoglobin 9.7 (L) 14.0 - 18.0 g/dL    Hematocrit 29.8 (L) 40.0 - 54.0 %    MCV 91 80 - 100 fL    MCH 29.7 27.0 - 34.0 pg    MCHC 32.6 32.0 - 36.0 g/dL    RDW 14.3 11.0 - 14.5 %    Platelets 158 140 - 440 thou/uL    MPV 7.4 7.0 - 10.0 fL    Neutrophils % 65 50 - 70 %    Lymphocytes % 18 (L) 20 - 40 %    Monocytes % 12 (H) 2 - 10 %    Eosinophils % 5 0 - 6 %    Basophils % 1 0 - 2 %    Neutrophils Absolute 4.0 2.0 - 7.7 thou/uL    Lymphocytes Absolute 1.1 0.8 - 4.4 thou/uL    Monocytes Absolute 0.7 0.0 - 0.9 thou/uL    Eosinophils Absolute 0.3 0.0 - 0.4 thou/uL    Basophils Absolute 0.0 0.0 - 0.2 thou/uL        Results stable.  a1c well controlled - continue with current insulin regime.  Still anemic but stable.    Please call with questions or contact us using [email or web address].    Sincerely,        Electronically signed by Clau Lazo,  MD

## 2021-06-20 NOTE — PROGRESS NOTES
[facility] Office Consult     Assessment:     1. Benign essential hypertension -under good control currently on medications diltiazem, losartan, and metoprolol.   2. Nonrheumatic aortic valve stenosis -moderate based on echo and will recheck echo in our system.  He is asymptomatic and consider TAVR when he becomes symptomatic.   3. Abdominal aortic aneurysm (AAA) without rupture (H) -stable saccular aneurysm at 4.5 cm in maximal diameter.  Would recheck CAT scan in a year to 2 years.   4. Acute ischemic stroke (H) -felt to be embolic and on anticoagulation given atrial fibrillation.   5. Chronic atrial fibrillation (H) -not valvular, asymptomatic, and permanent.  On anticoagulation with primary adjusting.   6. Controlled type 2 diabetes mellitus with diabetic nephropathy, with long-term current use of insulin (H) -defer to primary.      Plan:   1.  Check echocardiogram.  2.  Continue current medications.  3.  Follow-up with me in 6 months or sooner if needed.    History of Present Illness:   Thank you for asking the [facility] team to see Griffin Walton a 84 y.o.  male  in consultation  to evaluate atrial fibrillation and aortic stenosis.   Patient has been in his usual state of health which is fairly infirm.  He lives at home independently with his wife, walks around a little bit, but otherwise is not too functional.  They are just moving into senior housing at [facility] and transferring care down to this area.  He has very poor vision but denies any chest discomfort, palpitations, shortness of breath, PND, orthopnea or peripheral edema.    Past Medical History:     Past Medical History:   Diagnosis Date     Acute respiratory failure (H) 11/14/2017     Diabetes mellitus (H)      ESRD (end stage renal disease) on dialysis (H) 11/14/2017     HCAP (healthcare-associated pneumonia) 11/14/2017     Hypertension      Pulmonary congestion 11/14/2017     Renal cell carcinoma (H)  Aortic  stenosis  COPD  Permanent atrial fibrillation  Remote CVA      Past history is negative for , tuberculosis,  myocardial infarction,  rheumatic fever,  cerebrovascular accident, chronic kidney disease, peptic ulcer disease, , or thyroid disorder  and no lipid disorder.    Past Surgical History:     Past Surgical History:   Procedure Laterality Date     aneursym       PARTIAL NEPHRECTOMY       Family History:   Family history reviewed and negative for coronary artery disease.    Social History:   He lives at home in senior independently with his wife, is retired from [employer], reports that he has quit smoking having smoked less than a pack a day for about 60-65 years quitting 6 years ago. He has never used smokeless tobacco. He reports that he does not drink alcohol or use illicit drugs.  The primary care physician is Clau Lazo MD    Meds:   Scheduled Meds:  Current Outpatient Prescriptions   Medication Sig Dispense Refill     albuterol (PROVENTIL) 2.5 mg /3 mL (0.083 %) nebulizer solution Take 2.5 mg by nebulization every 6 (six) hours as needed for wheezing.       blood glucose test (ONETOUCH ULTRA BLUE TEST STRIP) strips test 2 times daily  Profile Rx: patient will contact pharmacy when needed       blood glucose test strips test 2 times daily  Profile Rx: patient will contact pharmacy when needed       bumetanide (BUMEX) 1 MG tablet Take 2 tablets (2 mg total) by mouth every morning. Take after dialysis on MWF. 180 tablet 2     cholecalciferol, vitamin D3, 1,000 unit tablet Take 1,000 Units by mouth daily.       clopidogrel (PLAVIX) 75 mg tablet Take 1 tablet (75 mg total) by mouth daily. 30 tablet 3     diltiazem (CARDIZEM CD) 240 MG 24 hr capsule Take 240 mg by mouth daily.       dorzolamide-timolol (COSOPT) 22.3-6.8 mg/mL ophthalmic solution Administer 1 drop to the right eye 2 (two) times a day.        fluticasone-salmeterol (ADVAIR) 100-50 mcg/dose DISKUS Inhale 1 puff 2 (two) times a day. 1 each  "2     insulin aspart U-100 (NOVOLOG FLEXPEN U-100 INSULIN) 100 unit/mL injection pen Inject 4-6 units under the skin 3 times daily before meals as directed 15 mL 3     insulin glargine (BASAGLAR KWIKPEN U-100 INSULIN) 100 unit/mL (3 mL) pen Inject 15 Units under the skin at bedtime.       losartan (COZAAR) 50 MG tablet Take 2 tablets (100 mg total) by mouth daily. (Patient taking differently: Take 50 mg by mouth daily. ) 180 tablet 1     lovastatin (MEVACOR) 40 MG tablet Take 60 mg by mouth daily with supper.       metoprolol succinate (TOPROL-XL) 25 MG Take 1 tablet (25 mg total) by mouth at bedtime. 90 tablet 4     OXYGEN-AIR DELIVERY SYSTEMS MISC 2 L/min into each nostril continuous. Indications: SOB, low O2 sats       prednisoLONE acetate (PRED-FORTE) 1 % ophthalmic suspension Administer 1 drop to the right eye 2 (two) times a day.  1     terazosin (HYTRIN) 10 MG capsule Take 1 capsule (10 mg total) by mouth at bedtime. 90 capsule 2     ULTICARE PEN NEEDLE 31 gauge x 5/16\" Ndle Inject 1 each under the skin 4 (four) times a day. 400 each 3     vit B comp no.3-folic-C-biotin (NEPHRO-ANGEL) 1- mg-mg-mcg Tab tablet Take 1 tablet by mouth at bedtime.       vit C-s.cherry-celery-grape sd (TART CHERRY) -22-75-20 mg cap Take 1 capsule by mouth daily.       warfarin (COUMADIN) 2 MG tablet Take 2 tablets (4 mg total) by mouth daily. 60 tablet 0     No current facility-administered medications for this visit.      PRN Meds:.    Allergies:   Aspirin; Monosodium glutamate; Heparin; and Hydralazine    Objective:      Physical Exam  201 lb (91.2 kg)  5' 7\" (1.702 m)  Body mass index is 31.48 kg/(m^2).  /76 (Patient Site: Right Arm, Patient Position: Sitting, Cuff Size: Adult Regular)  Pulse 80  Resp 16  Ht 5' 7\" (1.702 m)  Wt 201 lb (91.2 kg)  BMI 31.48 kg/m2    General Appearance:   Alert, cooperative and in no acute distress.   HEENT:  No scleral icterus; the mucous membranes were pink and moist. "   Neck: JVP 2-3 cm at 30 degrees. No thyromegaly. No HJR   Chest: The spine was straight. The chest was symmetric.   Lungs:   Respirations unlabored; the lungs are clear to auscultation.   Cardiovascular:   S1 and S2 with 2/6 crescendo decrescendo systolic ejection murmur, clicks or rubs. Brachial, radial, carotid and posterior tibial pulses are intact and symetrical.  No carotid bruits noted   Abdomen:  No organomegaly, masses, bruits, or tenderness. Bowels sounds are present   Extremities: No cyanosis, clubbing, or edema.   Skin: No xanthelasma.   Neurologic: Mood and affect are appropriate.         Lab Reviewed Personally by myself  Lab Results   Component Value Date     07/31/2018    K 4.1 07/31/2018    CL 98 07/31/2018    CO2 33 (H) 07/31/2018    BUN 33 (H) 07/31/2018    CREATININE 5.79 (H) 07/31/2018    CALCIUM 9.7 07/31/2018     Lab Results   Component Value Date    WBC 7.9 07/31/2018    HGB 9.0 (L) 07/31/2018    HCT 28.5 (L) 07/31/2018    MCV 93 07/31/2018     07/31/2018     Lab Results   Component Value Date    CHOL 107 08/01/2018    TRIG 41 08/01/2018    HDL 43 08/01/2018     Lab Results   Component Value Date    BNP 2060 (H) 07/31/2018       ECG personally reviewed by myself shows fibrillation with controlled ventricular response no acute changes.     Review of Systems:     Review of Systems:   General: WNL  Eyes: Visual Distubance  Ears/Nose/Throat: WNL  Lungs: WNL  Heart: WNL  Stomach: WNL  Bladder: Frequent Urination at Night  Muscle/Joints: WNL  Skin: WNL  Nervous System: Daytime Sleepiness  Mental Health: Anxiety     Blood: Easy Bleeding, Easy Bruising

## 2021-06-20 NOTE — LETTER
Letter by Clau Lazo MD at      Author: Clau Lazo MD Service: -- Author Type: --    Filed:  Encounter Date: 1/30/2020 Status: (Other)       My COPD Action Plan     Name: Griffin Walton    YOB: 1934   Date: 1/28/2020    My doctor: Clau Lazo MD   My clinic: [address], [address]  [place] [address]  [phone]  My Controller Medicine: Salmeterol /Fluticasone (Advair)   Dose: 100-50mcg     My Rescue Medicine: Albuterol nebulizer solution    Dose:     My Flare Up Medicine: Azithromycin (Zithromax or Zithromax Z-babita)   Dose: 500  My COPD Severity: Severe = FeV1 < 30%-49%      Use of Oxygen: Oxygen Not Prescribed  and 2-3L Liters continuously     Make sure you've had your pneumonia   vaccines.          GREEN ZONE       Doing well today      Usual level of activity and exercise    Usual amount of cough and mucus    No shortness of breath    Usual level of health (thinking clearly, sleeping well, feel like eating) Actions:      Take daily medicines    Use oxygen as prescribed    Follow regular exercise and diet plan    Avoid cigarette smoke and other irritants that harm the lungs              YELLOW ZONE             Having a bad day or flare up      Short of breath more than usual    A lot more sputum (mucus) than usual    Sputum looks yellow, green, tan, brown or bloody    More coughing or wheezing    Fever or chills    Less energy; trouble completing activities    Trouble thinking or focusing    Using quick relief inhaler or nebulizer more often    Poor sleep; symptoms wake me up    Do not feel like eating Actions:      Get plenty of rest    Take daily medicines    Use quick relief inhaler every 4 hours    If you use oxygen, call you doctor to see if you should adjust your oxygen    Do breathing exercises or other things to help you relax    Let a loved one, friend or neighbor know you are feeling worse    Call your care team if you have 2 or  more symptoms.  Start taking steroids or antibiotics if directed by your care team              RED ZONE           Need medical care now      Severe shortness of breath (feel you can't breathe)    Fever, chills    Not enough breath to do any activity    Trouble coughing up mucus, walking or talking    Blood in mucus    Frequent coughing   Rescue medicines are not working    Not able to sleep because of breathing    Feel confused or drowsy    Chest pain    Actions:      Call your health care team.  If you cannot reach your care team, call 911 or go to the emergency room.           Annual Reminders:  Meet with Care Team, Flu Shot every Fall  Pharmacy:   [id number] [address] [address]  Phone: [phone] Fax: [phone]

## 2021-06-20 NOTE — LETTER
Letter by Clau Lazo MD at      Author: Clau Lazo MD Service: -- Author Type: --    Filed:  Encounter Date: 1/30/2020 Status: (Other)         January 31, 2020     Patient: Griffin Walton   YOB: 1934   Date of Visit: 1/30/2020       To Whom it May Concern:    Griffin Walton was seen in my clinic on 1/30/2020.    If you have any questions or concerns, please don't hesitate to call.    Sincerely,         Electronically signed by Clau Lazo MD

## 2021-06-20 NOTE — LETTER
Letter by Clau Lazo MD at      Author: Clau Lazo MD Service: -- Author Type: --    Filed:  Encounter Date: 1/30/2020 Status: (Other)       My Heart Failure Action Plan   Name: Griffin Walton    YOB: 1934   Date: 1/28/2020    My doctor: Clau Lazo MD     [place] [id number] [facility], [address]  [place] [address]  [phone]  My Diagnosis: Reduced (HFr)- EF:40% - 44%   My Exercise Goal: 30 minutes daily  .     My Weight Plan:   Wt Readings from Last 2 Encounters:   11/18/19 192 lb 4.8 oz (87.2 kg)   10/24/19 196 lb (88.9 kg)     Weigh yourself daily using the same scale. If you gain more than 2 pounds in 24 hours or 5 pounds in a week call the clinic    My Diet Goal: No added salt    Emergency Room Visits:    Our goal is to improve your quality of life and help you avoid a visit to the emergency room or hospital.  If we work together, we can achieve this goal. But, if you feel you need to call 911 or go to the emergency room, please do so.  If you go to the emergency room, please bring your list of medicines and your daily weight chart with you.       GREEN ZONE     Doing well today    Weight gained is no more than 2 pounds a day or 5 pounds a week.    No swelling in feet, ankles, legs or stomach.    No more swelling than usual.    No more trouble breathing than usual.    No change in my sleep.    No other problems. Actions:    I am doing fine.  I will take my medicine, follow my diet, see my doctor, exercise, and watch for symptoms.           YELLOW ZONE         Having a bad day or flare up    Weight gain of more than 2 pounds in one day or 5 pounds in one week.    New swelling in ankle, leg, knee or thigh.    Bloating in belly, pants feel tighter.    Swelling in hands or face.    Coughing or trouble breathing while walking or talking.    Harder to breathe last night.    Have trouble sleeping, wake up short of breath.    Much more tired than  usual.    Not eating.    Pain in my chest or bad leg cramps.    Feel weak or dizzy. Actions:    I need to take action and call my doctor or nurse today.                 RED ZONE         Need medical care now    Weight gain of 5 pounds overnight.    Chest pain or pressure that does not go away.    Feel less alert.    Wheezing or have trouble breathing when at rest.    Cannot sleep lying down.    Cannot take my water pill.    Pass out or faint. Actions:    I need to call my doctor or nurse now!    Call 911 if I have chest pain or cannot breathe.

## 2021-06-23 NOTE — TELEPHONE ENCOUNTER
I think he would need to see someone sooner than that.  Can you look into other facilities?  He has new symptoms that need to be addressed

## 2021-06-23 NOTE — TELEPHONE ENCOUNTER
COPD educator note    Talked with Ed today. He states he is doing fine. Had breakfast and breathing is good. Pt had no questions at this time. We will call again next week.    RADHA Alcala

## 2021-06-23 NOTE — TELEPHONE ENCOUNTER
Has COPD and is having difficulty getting O2 level up past 85%. Was hospitalized 10 days ago for lung infection and was doing well, started home PT yesterday. O2 sats had been good (low 90's) until today. Just harder getting breathe this am. Drops down to 78 when moving about.     Denies pain in chest, back, jaw. No ankle swelling. No fever. Drops to upper 70's with movement.               Reason for Disposition    [1] MODERATE difficulty breathing (e.g., speaks in phrases, SOB even at rest) AND [2] worse than normal    Protocols used: COPD OXYGEN MONITORING AND [id number]

## 2021-06-23 NOTE — TELEPHONE ENCOUNTER
[facility] provider statusboomhart message response from Dr. Lazo.    What was your sugar this morning?  I would continue to check your sugar very frequently and hold all insulin if it is low.  Make sure you are eating regularly.  I would continue to hold your insulin if your sugar is low.  If you are giving yourself insulin, don't give more than 15 units.     Clau Lazo MD

## 2021-06-23 NOTE — TELEPHONE ENCOUNTER
ANTICOAGULATION  MANAGEMENT: Discharge Continuity of Care Review    Hospital admission on  1/27 to 1/30 for COPD.    Discharge disposition: Home    INR Results:       Recent labs: (last 7 days)     01/27/19  1453 01/28/19  0510 01/29/19  0557 01/30/19  0532   INR 3.66* 4.19* 3.57* 2.85*       Warfarin inpatient management: Held and resumed on 1/30    Warfarin discharge instructions: decrease dose to: 2 mg daily  (spouse was not aware of this change, pt received 4 mg last evening)     Medication Changes Affecting Anticoagulation: Yes: doxycycline and prednisone    Additional Factors Affecting Anticoagulation: No    Plan     Agree with discharge plan for follow up on 2/1, but unable to come in that day due to Dialysis    Spoke with Yas    Anticoagulation calendar updated    Sabrina Pickard, FAUSTINA

## 2021-06-23 NOTE — TELEPHONE ENCOUNTER
Called and notified patient and patient's wife of provider message.    Yas (wife) verbalized understanding and wrote down instructions.     They will follow up as scheduled.     Clemencia Montgomery RN Triage [facility]

## 2021-06-23 NOTE — TELEPHONE ENCOUNTER
Received MTM referral from transition of care     Patient was not reachable after several attempts, will route to MTM Pharmacist/Provider as an FYI. Left MTM scheduling information on patients voicemail.    Thank you for the referral,    Jennifer Long, MTM Coordinator

## 2021-06-23 NOTE — TELEPHONE ENCOUNTER
It looks like it might have been stopped by the nephrologist (dialysis center).  It was told be continued on the admission from 1/15 and I had it on my med list then, but when you were readmitted on 1/27 it was no longer on the med list.  I would say go back on it and check with nephrology to make sure they want it still.  Do you need refills?

## 2021-06-23 NOTE — PROGRESS NOTES
Valve Clinic - Aortic Stenosis  (See consult noted from Dr. Patel and Dr. Boggs)    Referring provider: Dr. Silva    +Serum albumin (date completed 2/7/19): 2.8  +5 meter walk (date completed --): walker dependent, falls risk  +Aguilar index (date completed 2/7/19): 5/6   frailty score: 3    Preliminary STS Score: 22%      KCCQ12 (date completed 2/7/19), scanned into chart      PMH: Severe AS, Renal cell cancer S/p partial left nephrectomy, ESRD on dialysis, AAA repair in 2005, Ischemic CVA in 2012, COPD on Home O2, IDDM, thrombocytopenia, AF on warfarin    Pt accompanied by several family. Pt lives in [facility] with wife. He is walker dependent and uses Home O2. He is blind and [place].       TTE date 1/27/19  EF 55-60 %  AV mean gradient: 45 mmHg  AV Peak Ruiz: 4.0 m/sec  AV area: 1.0 cm2    Aortic Valve The aortic valve is not well visualized, but suspected to be comprised of three cusps. There is moderate to severe calcification of the aortic valve. There is severe aortic stenosis. There is trace aortic insufficiency.   Mitral Valve There is mild mitral annular calcification. There is mild mitral insufficiency.   Tricuspid Valve The tricuspid valve is grossly morphologically normal. There is mild tricuspid insufficiency.           Plan: CT scan. Dental clearance.    Reviewed TAVR education information with patient and family. No further questions at this time. Patient has my direct contact information and was encouraged to call with questions or concerns.       Current Outpatient Medications   Medication Sig     ADVAIR DISKUS 100-50 mcg/dose DISKUS INHALE ONE PUFF BY MOUTH TWICE DAILY      albuterol (PROVENTIL) 2.5 mg /3 mL (0.083 %) nebulizer solution Take 2.5 mg by nebulization every 4 (four) hours as needed for wheezing.     blood glucose test strips test 2 times daily  Profile Rx: patient will contact pharmacy when needed     cholecalciferol, vitamin D3, 1,000 unit tablet Take 1,000 Units by mouth daily.  "    clopidogrel (PLAVIX) 75 mg tablet Take 1 tablet (75 mg total) by mouth daily.     cyclopentolate (CYCLOGYL) 1 % ophthalmic solution Administer 1 drop to the right eye 2 (two) times a day.     diltiazem (CARDIZEM CD) 240 MG 24 hr capsule Take 240 mg by mouth daily.     dorzolamide-timolol (COSOPT) 22.3-6.8 mg/mL ophthalmic solution Administer 1 drop to the right eye 2 (two) times a day.      insulin aspart U-100 (NOVOLOG FLEXPEN U-100 INSULIN) 100 unit/mL injection pen Inject 4-6 units under the skin 3 times daily before meals as directed     insulin glargine (BASAGLAR KWIKPEN U-100 INSULIN) 100 unit/mL (3 mL) pen Inject 25 Units under the skin at bedtime. (Patient taking differently: Inject 20 Units under the skin at bedtime.       )     losartan (COZAAR) 50 MG tablet Take 2 tablets (100 mg total) by mouth daily.     metoprolol succinate (TOPROL-XL) 25 MG Take 1 tablet (25 mg total) by mouth at bedtime.     OXYGEN-AIR DELIVERY SYSTEMS MISC 2 L/min into each nostril continuous. Indications: SOB, low O2 sats     pravastatin (PRAVACHOL) 40 MG tablet Take 1 tablet (40 mg total) by mouth every evening.     prednisoLONE acetate (PRED-FORTE) 1 % ophthalmic suspension Administer 1 drop to the right eye 2 (two) times a day.     predniSONE (DELTASONE) 20 MG tablet 40mg PO qam x 2d then 20mg PO qam x 5d.     ULTICARE PEN NEEDLE 31 gauge x 5/16\" Ndle Inject 1 each under the skin 4 (four) times a day.     vit B comp no.3-folic-C-biotin (NEPHRO-ANGEL) 1- mg-mg-mcg Tab tablet Take 1 tablet by mouth at bedtime.     vit C-s.cherry-celery-grape sd (TART CHERRY) -43-75-20 mg cap Take 1 capsule by mouth daily.     warfarin (COUMADIN/JANTOVEN) 2 MG tablet Take 1 tablet (2 mg total) by mouth daily. And recheck INR on 2/1/2019 with dose adjustment per your primary physician         Jaya Beckman RN  [facility]  Valve Clinic Coordinator  Phone: [phone]  Fax: [phone]    "

## 2021-06-23 NOTE — TELEPHONE ENCOUNTER
ANTICOAGULATION  MANAGEMENT    Assessment     Today's INR result of 3.1 is Supratherapeutic (goal INR of 2.0-3.0)        Warfarin taken as previously instructed    No new diet changes affecting INR    Interaction between prednisone and warfarin may be affecting INR - finished yesterday    Continues to tolerate warfarin with no reported s/s of bleeding or thromboembolism     Previous INR was Therapeutic at time of hospital discharge    Plan:     Spoke with Yas, spouse, regarding INR result and instructed:     Warfarin Dosing Instructions:  Continue current warfarin dose 4 mg daily  Will eat a green salad tonight    Instructed patient to follow up no later than: 1 week    Education provided: target INR goal and significance of current INR result, importance of following up for INR monitoring at instructed interval, importance of taking warfarin as instructed and potential interaction between warfarin and prednisone    Yas verbalizes understanding and agrees to warfarin dosing plan.    Instructed to call the [facility] for any changes, questions or concerns. (#[phone])   ?   Sabrina Pickard RN    Subjective/Objective:      Griffin Walton, a 84 y.o. male is on warfarin.     Griffin reports:     Home warfarin dose: verbally confirmed home dose with Yas and updated on anticoagulation calendar     Missed doses: No     Medication changes:  Yes: prednisone, finished yesterday     S/S of bleeding or thromboembolism:  No     New Injury or illness:  Yes: hospitalized for COPD exacerbation     Changes in diet or alcohol consumption:  No     Upcoming surgery, procedure or cardioversion:  No    Anticoagulation Episode Summary     Current INR goal:   2.0-3.0   TTR:   47.5 % (6.4 mo)   Next INR check:   1/29/2019   INR from last check:   3.10! (1/22/2019)   Weekly max warfarin dose:      Target end date:      INR check location:      Preferred lab:      Send INR reminders to:   ANTICOAGULATION POOL B  (MPW,MILDREDG,STW,RVL,OAK,RLN)    Indications    Chronic atrial fibrillation (H) [I48.2]           Comments:            Anticoagulation Care Providers     Provider Role Specialty Phone number    Taiwo Mejias MD Referring [facility] Medicine [phone]

## 2021-06-23 NOTE — TELEPHONE ENCOUNTER
24 Hour follow up phone call for the COPD Program. Spoke with Griffin this am for continued COPD Education. Patient doing well, just on his way to Dialysis. Will continue with calls.

## 2021-06-23 NOTE — TELEPHONE ENCOUNTER
RN Triage:    Griffin has end stage renal disease.  Is on dialysis.     calling with critical creatinine level: 6.49.  On call physician paged.  Dr. Ayala returned page.  No action needed at this time.    Sarita Richards, RN   [facility]

## 2021-06-23 NOTE — PROGRESS NOTES
Second attempt not completed due to patient seeing provider.      Dalia Sahni RN Care Manager, [facility]

## 2021-06-23 NOTE — TELEPHONE ENCOUNTER
"Yesterday and today oxygen o2 sat down to 85-89.  Usually its 95-96.  Currently running at 2L.  Feels a little short of breath, \"i villalobos and puff a little\" sitting down.  No HA, fever, or tingling in hands. Does not feel ill or have a cold.    Moved here [place].    Has appt next Tuesday to see PCP.    What advice would PCP give for patient?    Cate Romano, RN, [facility] Nurse Triage/Med Refills RN     Reason for Disposition    [1] Monitoring oxygen level (e.g., pulse oximetry) AND [2] fall in oxygen level 4% or more (below known patient baseline, while awake and resting) AND [3] new difficulty breathing or worse than when discharged from hospital    Protocols used: COPD OXYGEN MONITORING AND BQAFDLF-G-MC      "

## 2021-06-23 NOTE — TELEPHONE ENCOUNTER
Called back and relayed Dr Lazo's message. Patient and his wife were on the phone. They will go over to the ER to be seen this evening. Appreciative of advice.     Nery Cisneros RN BA [facility] Triage/Med Refill 1/15/2019 6:12 PM

## 2021-06-23 NOTE — TELEPHONE ENCOUNTER
Request for Orders    Who s Requesting: Home Care Physical Therapist    Orders being requested: Home care SN visits to do INR    Where to send Orders: Please reply through Epic    Thank you

## 2021-06-23 NOTE — TELEPHONE ENCOUNTER
RN cannot approve Refill Request    RN can NOT refill this medication med is not covered by policy/route to provider     . Last office visit: 1/22/2019 Clau Lazo MD Last Physical: Visit date not found Last MTM visit: Visit date not found Last visit same specialty: 1/22/2019 Clau Lazo MD.  Next visit within 3 mo: Visit date not found  Next physical within 3 mo: Visit date not found      Jo Ruiz, [facility] Triage/Med Refill 2/5/2019    Requested Prescriptions   Pending Prescriptions Disp Refills     ADVAIR DISKUS 100-50 mcg/dose DISKUS [Pharmacy Med Name: Advair Diskus Inhalation Aerosol Powder Breath Activated 100-50 MCG/DOSE] 60 each 0     Sig: INHALE ONE PUFF BY MOUTH TWICE DAILY    There is no refill protocol information for this order

## 2021-06-23 NOTE — TELEPHONE ENCOUNTER
New Appointment Needed  What is the reason for the visit:    Inpatient/ED Follow Up Appt Request  At what hospital or facility were you seen?: [facility]  What is the reason you were seen?: COPD exacerbation  What date were you admitted?: date: 1/27/19  What date were you discharged?: date: 1/30/19  What was the recommended timeframe for your follow up appointment?: 1 week  Provider Preference: PCP only  How soon do you need to be seen?: An appt was scheduled for pt on Wednesday, 2/06/19, but he is at dialysis at that time. Patient requests a Tues or Thurs appointment.  Waitlist offered?: No  Okay to leave a detailed message:  Yes

## 2021-06-23 NOTE — TELEPHONE ENCOUNTER
Patient needs to drop insulin down to 20 units if hypoglycemic.  Most likely due to the dropping of steroid.  Keep appointment for 2/7

## 2021-06-23 NOTE — TELEPHONE ENCOUNTER
Refill Request  Did you contact pharmacy: Yes  Medication name:   Requested Prescriptions     Pending Prescriptions Disp Refills     ADVAIR DISKUS 100-50 mcg/dose DISKUS [Pharmacy Med Name: Advair Diskus Inhalation Aerosol Powder Breath Activated 100-50 MCG/DOSE] 60 each 0     Sig: INHALE ONE PUFF BY MOUTH TWICE DAILY     Who prescribed the medication: PCP  [facility] and [facility]  Is patient out of medication: No.  2 days left  Patient notified refills processed in 72 hours:  yes  Okay to leave a detailed message: no

## 2021-06-23 NOTE — PROGRESS NOTES
Slightly elevated white count still - thought to be presumed due to steroid use.  All other labs stable

## 2021-06-23 NOTE — TELEPHONE ENCOUNTER
Orders being requested: skilled nurse visits to assess and teach COPD, DM,CP status, safety  and check INRs . 1X/ week for 4 weeks. 2 PRN nurse visits for symptom management.   Reason service is needed/diagnosis:  COPD/afib/DM    When are orders needed by:  ASAP  Where to send Orders:  Via EPIC  Okay to leave detailed message?  Yes    Dr Lazo,   Ed had episode of hypoglycemia this am per previous note.    I would just like to add his prednisone taper is almost done has 1 more dose and almost done with his abx as well.  Insulin was increased from 15U to 25 U.     Spouse is waiting for rtc from her call in am so I will instruct to follow instructions provided by your clinic.     Thank You  Annette Angel RN

## 2021-06-23 NOTE — TELEPHONE ENCOUNTER
ANTICOAGULATION  MANAGEMENT- Home Care/Care Facility Result    Assessment     Today's INR result of 1.6 is Subtherapeutic (goal INR of 2.0-3.0)        Warfarin taken as previously instructed - advised to take 2 mg daily at time of hospital discharge    No new diet changes affecting INR    Interaction between doxycycline and prednisone and warfarin may be affecting INR - will finish in the next day or two    Continues to tolerate warfarin with no reported s/s of bleeding or thromboembolism     Previous INR was Therapeutic    Plan:     Spoke with Annette discussed INR result and instructed:     Warfarin Dosing Instructions: Continue current warfarin dose 4 mg daily     Next INR to be drawn: 1 week    Education provided: target INR goal and significance of current INR result, importance of following up for INR monitoring at instructed interval, importance of taking warfarin as instructed and potential interaction between warfarin and doxy and prednisone    Annette verbalizes understanding and agrees to warfarin dosing plan.   ?   Sabrina Pickard RN    Subjective/Objective:      Griffin Walton, a 84 y.o. male is established on warfarin.     Home care/care facility RN's report of Griffin INR, recent warfarin dosing, diet changes, medication changes, and symptoms is documented below.    Additional findings: hypogylcemic episode this AM, Insulin dose was increased because of the prednisone    Anticoagulation Episode Summary     Current INR goal:   2.0-3.0   TTR:   47.5 % (6.4 mo)   Next INR check:   2/12/2019   INR from last check:   1.60! (2/5/2019)   Weekly max warfarin dose:      Target end date:      INR check location:      Preferred lab:      Send INR reminders to:   ANTICOAGULATION POOL B (MPW,HUG,STW,RVL,OAK,RLN)    Indications    Chronic atrial fibrillation (H) [I48.2]           Comments:            Anticoagulation Care Providers     Provider Role Specialty Phone number    Tiawo Mejias MD Referring Family  Medicine [phone]

## 2021-06-23 NOTE — TELEPHONE ENCOUNTER
Dr Lazo,    [facility] received a referral for this patient to start home care services within 48 hours of orders. However, due to our capacity we will be unable to see patient within the time requested. The patient would like to keep their services with [facility], so they are requesting to have PT start on 01/26/19 instead. Wondering if we can get a delay in SOC for patient to start on 01/26/19 for PT services.    Simply respond to this Epic Telephone Call message string, and we can take as a verbal order if appropriate.   Thank you.

## 2021-06-23 NOTE — TELEPHONE ENCOUNTER
Pt was on terazosin prior to hospital admission, no longer on med list/AVS report, but also does not say to stop taking it on AVS report.     Pt's spouse was unaware that this was discontinued or why it was discontinued.     Please advise.    Thank you,  Cate Epps, PT

## 2021-06-23 NOTE — TELEPHONE ENCOUNTER
COPD educator note    Talked with Ed's wife today. She states he is doing very well. He checked his sats last night and they were 97%. He was at dialysis today. Sje had no questions at this time. We will call again next next week .    RADHA Alcala

## 2021-06-23 NOTE — TELEPHONE ENCOUNTER
"Spoke w/ staff at [facility]/Dr Jean's office.  The March appt is first available with Dr Jean and \"they prefer to follow their own patients\", seeing as Miranda saw him in the hospital they won't schedule him with any of the other providers.  I've had him added to the \"high priority\" wait list with Dr Jean so he'll get a call if something opens up sooner.  Is this sufficient or would you like to me to look into another facility all together?  "

## 2021-06-23 NOTE — TELEPHONE ENCOUNTER
Patient has an appointment with neurology but not until march as that was their soonest - pt saw adonis in the hospital.  Is there anywhere else we can get him in for new tremors?

## 2021-06-23 NOTE — PROGRESS NOTES
TCM DISCHARGE FOLLOW UP CALL    Discharge Date:  1/30/2019  Reason for hospital stay (discharge diagnosis)::  COPD exacerbation  Are you feeling better, the same or worse since your discharge?:  Patient is feeling better (Denies SANZ, cough, wheezing.Walked apartment hallway w/ O2 about one block and tolerated it well. O2 sats 92%. O2 on 2L.)  Do you feel like you have a plan in the event of a health emergency?: Yes (He would talk to his wife. She would call the clinic. She is aware of nurse triage. Informed wife of [facility] option.)    (RN) Patient provided with information to call us in the event of a health emergency: Yes    As part of your discharge plan, were  home care services ordered for you?: Yes    Have you seen them yet, or are they scheduled to visit?: Yes    Do you have any follow up visits scheduled with your PCP or Specialist?:  No (cancelled 2/6 appt  (dialysis conflict).)  I'm glad to hear you're doing well and we want you to continue to do well. Your PCP would like to see you for a follow-up visit. Can we help set that up for your today?: Yes ( There is a call in to clinic for appt scheduling.)    (RN) Patient was assisted in making appointment and/or given number to [facility].  If there are immediate concerns, In Basket messge route to the PCP with a summary of concern.:  Patient prefers to schedule on his/her own    
Dr. John Spring ()

## 2021-06-23 NOTE — PROGRESS NOTES
Family Medicine Office Visit  [facility]  Patient Name: Griffin Walton  Patient Age: 84 y.o.  YOB: 1934  MRN: [id number]    Date of Visit: 2019  Reason for Office Visit:   Chief Complaint   Patient presents with     Hospital Visit Follow Up     [facility] 1/15/19-19-COPD Exacerbation, INR check           Assessment / Plan / Medical Decision Makin. Chronic atrial fibrillation (H)  Refill medications and will continue to closely monitor  - clopidogrel (PLAVIX) 75 mg tablet; Take 1 tablet (75 mg total) by mouth daily.  Dispense: 90 tablet; Refill: 2  - INR    2. Chronic obstructive pulmonary disease, unspecified COPD type (H)  Letter written for oxygen from [facility] and call if any issues or concerns    3. Nonrheumatic aortic valve stenosis  Continue with INR checks and plavix.      4. Long term current use of anticoagulant therapy  INR done today    5. Shaking  Will do another referral to neurology.  Unsure what type of tremor.  Home health coming in to help with PT to improve any deconditioning component.  Recommend follow up with eye specialist  - Ambulatory referral to Neurology  - HM1(CBC and Differential)  - Comprehensive Metabolic Panel  - [facility] (CBC with Diff)    6. Acute ischemic stroke (H)  Follow up with neurology        Health Maintenance Review  Health Maintenance   Topic Date Due     ASTHMA CONTROL TEST  1934     DIABETES FOLLOW-UP  1934     ASTHMA FOLLOW-UP  1934     DIABETES FOOT EXAM  1944     DIABETES OPHTHALMOLOGY EXAM  1944     DIABETES URINE MICROALBUMIN  1944     ADVANCE DIRECTIVES DISCUSSED WITH PATIENT  1952     ZOSTER VACCINES (1 of 2) 1984     FALL RISK ASSESSMENT  2019     DIABETES HEMOGLOBIN A1C  2019     TD 18+ HE  2025     PNEUMOCOCCAL POLYSACCHARIDE VACCINE AGE 65 AND OVER  Completed     INFLUENZA VACCINE RULE BASED  Completed     PNEUMOCOCCAL CONJUGATE  "VACCINE FOR ADULTS (PCV13 OR PREVNAR)  Completed         I have discontinued Griffin Walton's lovastatin. I have also changed his diltiazem and clopidogrel. Additionally, I am having him start on pravastatin. Lastly, I am having him maintain his dorzolamide-timolol, vit B comp no.3-folic-C-biotin, cholecalciferol (vitamin D3), vit C-s.cherry-celery-grape sd, OXYGEN-AIR DELIVERY SYSTEMS MISC, insulin glargine, blood glucose test, prednisoLONE acetate, losartan, ULTICARE PEN NEEDLE, terazosin, bumetanide, insulin aspart U-100, metoprolol succinate, warfarin, ADVAIR DISKUS, albuterol, and cyclopentolate.      HPI:  Griffin Walton is a 84 y.o. year old who presents to the office today for follow up from the hospital.  Admitted on 1/15/19-1/17/19 with shortness of breath and COPDE.  States breathing much improved and doing well on steroids.  Hx of AF anticoagulated with coumadin, ESRD on dialysis, controlled DM, CVA hx on plavix and legally blind.  From a breathing perspective, Griffin states he is doing much better and no issues.  Doing well on his 2L O2 NC continuous.  However, when he got home he states about 1/2 day after being d/c started getting \"shakes\" where his whole body would start shaking at times.  Will last from about 15sec to 90 sec and would occur multiple times in a 30 min period.  No fatigue after it.  Not related to movement or intention.  No resting tremor.  States would usually start in arms and then whole body - \"like he was shivering but not actually shivering\".  No fevers or chills.  States when lost eyesight he was informed that this could happen and occurred a few times last year but nothing like the persistent episodes he is having now.  Previously seen Dr Jean for CVA.  Having home health come in for PT and medical management      Review of Systems- pertinent positive in bold:  Constitutional: Fever, chills, night sweats, fainting, weight change, fatigue, seizures, dizziness, " sleeping difficulties, loud snoring/pauses in breathing  Eyes: change in vision, blurred or double vision, redness/eye pain  Ears, nose, mouth, throat: change in hearing, ear pain, hoarseness, difficulty swallowing, sores in the mouth or throat  Respiratory: shortness of breath, cough, bloody sputum, wheezing  Cardiovascular: chest pain, palpitations   Gastrointestinal: abdominal pain, heartburn/indigestion, nausea/vomiting, change in appetite, change in bowel habits, constipation or diarrhea, rectal bleeding/dark stools, difficulty swallowing  Urinary: painful urination, frequent urination, urinary urgency/incontinence, blood in urine/dark urine, nocturia  Musculoskeletal: backache/back pain (new or increasing), weakness, joint pain/stiffness (new or increasing), muscle cramps, swelling of hands, feet, ankles, leg pain/redness  Skin: change in moles/freckles, rash, nodules  Hematologic/lymphatic: swollen lymph glands, abnormal bruising/bleeding  Endocrine: excessive thirst/urination, cold or heat intolerance  Neurologic/emotional: worrisome memory change, numbness/tingling, anxiety, mood swings      Current Scheduled Meds:  Outpatient Encounter Medications as of 1/22/2019   Medication Sig Dispense Refill     ADVAIR DISKUS 100-50 mcg/dose DISKUS Inhale 1 puff 2 (two) times a day. 60 each 1     albuterol (PROVENTIL) 2.5 mg /3 mL (0.083 %) nebulizer solution Take 2.5 mg by nebulization every 4 (four) hours as needed for wheezing.       blood glucose test strips test 2 times daily  Profile Rx: patient will contact pharmacy when needed       bumetanide (BUMEX) 1 MG tablet Take 2 tablets (2 mg total) by mouth every morning. Take after dialysis on MWF. 180 tablet 2     cholecalciferol, vitamin D3, 1,000 unit tablet Take 1,000 Units by mouth daily.       clopidogrel (PLAVIX) 75 mg tablet Take 1 tablet (75 mg total) by mouth daily. 90 tablet 2     cyclopentolate (CYCLOGYL) 1 % ophthalmic solution Administer 1 drop to the  "right eye 2 (two) times a day.       diltiazem (CARDIZEM CD) 240 MG 24 hr capsule Take daily at noon 90 capsule 3     dorzolamide-timolol (COSOPT) 22.3-6.8 mg/mL ophthalmic solution Administer 1 drop to the right eye 2 (two) times a day.        insulin aspart U-100 (NOVOLOG FLEXPEN U-100 INSULIN) 100 unit/mL injection pen Inject 4-6 units under the skin 3 times daily before meals as directed (Patient taking differently: Inject 4-6 Units under the skin 3 (three) times a day before meals. 4 units with breakfast  6 units with lunch and dinner      ) 15 mL 3     insulin glargine (BASAGLAR KWIKPEN U-100 INSULIN) 100 unit/mL (3 mL) pen Inject 15 Units under the skin at bedtime.       losartan (COZAAR) 50 MG tablet Take 2 tablets (100 mg total) by mouth daily. 180 tablet 1     metoprolol succinate (TOPROL-XL) 25 MG Take 1 tablet (25 mg total) by mouth at bedtime. 90 tablet 4     OXYGEN-AIR DELIVERY SYSTEMS MISC 2 L/min into each nostril continuous. Indications: SOB, low O2 sats       prednisoLONE acetate (PRED-FORTE) 1 % ophthalmic suspension Administer 1 drop to the right eye 2 (two) times a day.  1     terazosin (HYTRIN) 10 MG capsule Take 1 capsule (10 mg total) by mouth at bedtime. 90 capsule 2     ULTICARE PEN NEEDLE 31 gauge x 5/16\" Ndle Inject 1 each under the skin 4 (four) times a day. 400 each 3     vit B comp no.3-folic-C-biotin (NEPHRO-ANGEL) 1- mg-mg-mcg Tab tablet Take 1 tablet by mouth at bedtime.       vit C-s.cherry-celery-grape sd (TART CHERRY) -98-75-20 mg cap Take 1 capsule by mouth daily.       warfarin (COUMADIN/JANTOVEN) 2 MG tablet Take 2 tablets (4 mg total) by mouth daily. 60 tablet 5     [DISCONTINUED] clopidogrel (PLAVIX) 75 mg tablet TAKE ONE TABLET BY MOUTH ONE TIME DAILY  30 tablet 2     [DISCONTINUED] diltiazem (CARDIZEM CD) 240 MG 24 hr capsule Take 240 mg by mouth daily.       [DISCONTINUED] lovastatin (MEVACOR) 40 MG tablet Take 40 mg by mouth daily with supper.              " pravastatin (PRAVACHOL) 40 MG tablet Take 1 tablet (40 mg total) by mouth every evening. 90 tablet 1     [] predniSONE (DELTASONE) 20 MG tablet Take 20 mg by mouth daily with breakfast for 3 days. 3 tablet 0     No facility-administered encounter medications on file as of 2019.      Past Medical History:   Diagnosis Date     Acute respiratory failure (H) 2017     Chronic kidney disease      Diabetes mellitus (H)      ESRD (end stage renal disease) on dialysis (H) 2017     HCAP (healthcare-associated pneumonia) 2017     Hypertension      Pulmonary congestion 2017     Renal cell carcinoma (H)      Past Surgical History:   Procedure Laterality Date     aneursym       PARTIAL NEPHRECTOMY       Social History     Tobacco Use     Smoking status: Former Smoker     Packs/day: 1.00     Years: 55.00     Pack years: 55.00     Types: Cigarettes     Last attempt to quit: 2005     Years since quittin.0     Smokeless tobacco: Never Used   Substance Use Topics     Alcohol use: No     Drug use: No       Objective / Physical Examination:  Vitals:    19 1302   BP: 110/62   Patient Site: Left Arm   Patient Position: Sitting   Cuff Size: Adult Regular   Pulse: 98   Temp: 98.1  F (36.7  C)   TempSrc: Oral   SpO2: 93%   Weight: 203 lb (92.1 kg)     Wt Readings from Last 3 Encounters:   19 203 lb (92.1 kg)   19 203 lb (92.1 kg)   10/18/18 201 lb (91.2 kg)     BP Readings from Last 3 Encounters:   19 110/62   19 132/63   10/18/18 154/68     Body mass index is 30.87 kg/m .   Results for orders placed or performed in visit on 19   INR   Result Value Ref Range    INR 3.10 (H) 0.90 - 1.10   Comprehensive Metabolic Panel   Result Value Ref Range    Sodium 134 (L) 136 - 145 mmol/L    Potassium 5.4 (H) 3.5 - 5.0 mmol/L    Chloride 93 (L) 98 - 107 mmol/L    CO2 26 22 - 31 mmol/L    Anion Gap, Calculation 15 5 - 18 mmol/L    Glucose 188 (H) 70 - 125 mg/dL    BUN 71 (H)  8 - 28 mg/dL    Creatinine 6.49 (HH) 0.70 - 1.30 mg/dL    GFR MDRD Af Amer 10 (L) >60 mL/min/1.73m2    GFR MDRD Non Af Amer 8 (L) >60 mL/min/1.73m2    Bilirubin, Total 0.5 0.0 - 1.0 mg/dL    Calcium 8.2 (L) 8.5 - 10.5 mg/dL    Protein, Total 6.3 6.0 - 8.0 g/dL    Albumin 3.0 (L) 3.5 - 5.0 g/dL    Alkaline Phosphatase 77 45 - 120 U/L    AST 17 0 - 40 U/L    ALT 35 0 - 45 U/L   HM1 (CBC with Diff)   Result Value Ref Range    WBC 12.5 (H) 4.0 - 11.0 thou/uL    RBC 3.82 (L) 4.40 - 6.20 mill/uL    Hemoglobin 10.8 (L) 14.0 - 18.0 g/dL    Hematocrit 34.3 (L) 40.0 - 54.0 %    MCV 90 80 - 100 fL    MCH 28.3 27.0 - 34.0 pg    MCHC 31.5 (L) 32.0 - 36.0 g/dL    RDW 17.1 (H) 11.0 - 14.5 %    Platelets 234 140 - 440 thou/uL    MPV 9.8 8.5 - 12.5 fL    Neutrophils % 79 (H) 50 - 70 %    Lymphocytes % 7 (L) 20 - 40 %    Monocytes % 10 2 - 10 %    Eosinophils % 4 0 - 6 %    Basophils % 0 0 - 2 %    Neutrophils Absolute 9.6 (H) 2.0 - 7.7 thou/uL    Lymphocytes Absolute 0.9 0.8 - 4.4 thou/uL    Monocytes Absolute 1.3 (H) 0.0 - 0.9 thou/uL    Eosinophils Absolute 0.4 0.0 - 0.4 thou/uL    Basophils Absolute 0.0 0.0 - 0.2 thou/uL           General Appearance: Alert and oriented, cooperative, affect appropriate, speech clear, in no apparent distress  Head: Normocephalic, atraumatic  Ears: Tympanic membrane clear with landmarks well visualized bilaterally  Eyes: PERRL, fundi appear clear bilaterally. EOMI. Conjunctivae clear and sclerae non-icteric  Nose: Septum midline, nares patent, no visible polyps, mucosa moist and without drainage  Throat: Lips and mucosa moist. Teeth in good repair, pharynx without erythema or exudate  Neck: Supple, trachea midline. No cervical adenopathy  Back: Symmetrical and nontender  Lungs: Clear to auscultation bilaterally. Normal inspiratory and expiratory effort  Cardiovascular: Regular rate, normal S1, S2. No murmurs, rubs, or gallops  Abdomen: Bowel sounds active all four quadrants. Soft, non-tender. No  hepatomegaly or splenomegaly. No bruits detected.   Extremities: Pulses 2+ and equal throughout. No edema. Strength equal throughout.  Integumentary: Warm and dry. Without suspicious looking lesions  Neuro: Alert and oriented, follows commands appropriately.     Orders Placed This Encounter   Procedures     INR     Comprehensive Metabolic Panel     HM1 (CBC with Diff)     Ambulatory referral to Neurology   Followup: No Follow-up on file. earlier if needed.    Total time spent with patient was 40 min with >50% of time spent in face-to-face counseling regarding the above plan       Clau Lazo MD

## 2021-06-23 NOTE — TELEPHONE ENCOUNTER
INR result is 1.6  INR   Date Value Ref Range Status   01/30/2019 2.85 (H) 0.90 - 1.10 Final       Will the patient be seen, or did they already see, MD or CNP today? No    Most Recent Warfarin dose day/week  Sunday Monday Tuesday Wednesday Thursday Friday Saturday      2 2 2 2     Sunday Monday Tuesday Wednesday Thursday Friday Saturday   2 2            Has the patient missed any doses of Coumadin, Warfarin, Jantoven in the past 7 days? No    Has the patients medications changed since the last visit? Yes possibly the terazosine    Has the patient experienced any bleeding recently? No    Has the patient experienced any injuries or illness recently? No    Has the patient experienced any 'new' shortness of breath, severe headaches, or changes in vision recently? No    Has the patient had any changes in their diet, or alcohol consumption? No    Is the patient here today to prepare for any type of upcoming surgery, procedure, or for a cardioversion procedure? No    What phone number can we reach the patient at today? Please call Annette back with dosing.

## 2021-06-23 NOTE — TELEPHONE ENCOUNTER
Request for Orders    Who s Requesting: Home Care Physical Therapist    Orders being requested: PT 2x/wk x 4 weeks to work on strengthening, gait and balance    Where to send Orders: Please reply through Epic    Thank you

## 2021-06-23 NOTE — TELEPHONE ENCOUNTER
MTM Transition of Care Encounter  Assessment & Plan                                                     1. COPD  Recently hospitalized for exacerbation. Completed short course of steroids. Could consider dose increase in ICS/LABA inhaler, Advair, or addition of LAMA, such as Spiriva or Incruse Ellipta.     2. Chronic atrial fibrillation   Appropriately on warfarin with goal INR 2-3. Last INR within range and will be having recheck today. Dosing managed by ACN team.     3. Hypertension  Blood pressure is well controlled and meeting goal of <140/90 mm Hg per JNC-8 hypertension guidelines.     4. Controlled type 2 diabetes mellitus   Most recent A1c within goal of <8% given patient's age. No hypoglycemia. Tolerating low intensity statin with last LDL of 56 mg/dl.     5. ESRD (end stage renal disease)  On dialysis - managed by nephrology.       Follow Up  PRN with MTM      Subjective & Objective                                                       Griffin Walton is a 84 y.o. male called for a transitions of care visit. he was discharged from [facility] on 1/17/19 for COPD exacerbation.    Chief Complaint: Hospital discharge med review  Medication Adherence/Access: He feels he understands his medications well and denies any questions or concerns.     Patient was recently hospitalized for symptoms of shortness of breath felt to be multifactorial, possible COPD exacerbation and volume overload given pulmonary congestion seen on chest x-ray.  He was given a short course of prednisone with instructions to continue at discharge 20 mg daily times 3 days.  He does use Advair 100-50 mcg 1 puff twice daily in addition to albuterol nebulizer at home as needed and continuous oxygen. He is a previous smoker.     His last echocardiogram in October 2018 reported ejection fraction of 65%.  He does take bumetanide 2 mg every morning and takes after dialysis on Monday Wednesday and Friday.  For atrial fibrillation he is on  anticoagulation with warfarin. Denies s/sx bleeding. For rate control he is on metoprolol XL 25 mg every evening and diltiazem  mg daily.  For blood pressure he also takes losartan 100 mg daily.  For cholesterol he takes lovastatin 40 mg every evening.  He is also on clopidogrel 75 mg daily.  History of CVA.    For type 2 diabetes he is on a regimen of basal and mealtime insulin.  He is taking Basaglar 15 units injected every evening and NovoLog 4-6 units 3 times a day with meals. Denies any hypoglycemia.     He has ESRD and is on dialysis. Yesterday he said he was very shaky. He is seeing his PCP. He takes a nephro-pili daily.     Lab Results   Component Value Date    HGBA1C 7.1 (H) 2019    HGBA1C 6.5 (H) 2018     Lab Results   Component Value Date    LDLCALC 56 2018    CREATININE 7.05 (HH) 01/15/2019     Lab Results   Component Value Date    INR 2.98 (H) 2019    INR 2.89 (H) 01/15/2019    INR 2.40 (H) 2018       PMH: reviewed in EPIC   Allergies/ADRs: reviewed in EPIC   Alcohol: reviewed in EPIC   Tobacco:   Social History     Tobacco Use   Smoking Status Former Smoker     Packs/day: 1.00     Years: 55.00     Pack years: 55.00     Types: Cigarettes     Last attempt to quit: 2005     Years since quittin.0   Smokeless Tobacco Never Used     Recent Vitals:   BP Readings from Last 3 Encounters:   19 132/63   10/18/18 154/68   10/04/18 124/76      Wt Readings from Last 3 Encounters:   19 203 lb (92.1 kg)   10/18/18 201 lb (91.2 kg)   10/04/18 201 lb (91.2 kg)     ----------------    Much or all of the text in this note was generated through the use of Dragon Dictate voice-to-text software. Errors in spelling or words which seem out of context are unintentional. Sound alike errors, in particular, may have escaped editing.    The patient declined an after visit summary    I spent 30 minutes with this patient today;  . I offer these suggestions for consideration by  the PCP. A copy of the visit note was provided to the patient's provider.     Clemencia Hare, PharmD, BCACP  Medication Management Pharmacist  [facility]          Current Outpatient Medications   Medication Sig Dispense Refill     ADVAIR DISKUS 100-50 mcg/dose DISKUS Inhale 1 puff 2 (two) times a day. 60 each 1     albuterol (PROVENTIL) 2.5 mg /3 mL (0.083 %) nebulizer solution Take 2.5 mg by nebulization every 4 (four) hours as needed for wheezing.       blood glucose test strips test 2 times daily  Profile Rx: patient will contact pharmacy when needed       bumetanide (BUMEX) 1 MG tablet Take 2 tablets (2 mg total) by mouth every morning. Take after dialysis on MWF. 180 tablet 2     cholecalciferol, vitamin D3, 1,000 unit tablet Take 1,000 Units by mouth daily.       clopidogrel (PLAVIX) 75 mg tablet TAKE ONE TABLET BY MOUTH ONE TIME DAILY  30 tablet 2     cyclopentolate (CYCLOGYL) 1 % ophthalmic solution Administer 1 drop to the right eye 2 (two) times a day.       diltiazem (CARDIZEM CD) 240 MG 24 hr capsule Take 240 mg by mouth daily.       dorzolamide-timolol (COSOPT) 22.3-6.8 mg/mL ophthalmic solution Administer 1 drop to the right eye 2 (two) times a day.        insulin aspart U-100 (NOVOLOG FLEXPEN U-100 INSULIN) 100 unit/mL injection pen Inject 4-6 units under the skin 3 times daily before meals as directed (Patient taking differently: Inject 4-6 Units under the skin 3 (three) times a day before meals. 4 units with breakfast  6 units with lunch and dinner      ) 15 mL 3     insulin glargine (BASAGLAR KWIKPEN U-100 INSULIN) 100 unit/mL (3 mL) pen Inject 15 Units under the skin at bedtime.       losartan (COZAAR) 50 MG tablet Take 2 tablets (100 mg total) by mouth daily. 180 tablet 1     lovastatin (MEVACOR) 40 MG tablet Take 40 mg by mouth daily with supper.              metoprolol succinate (TOPROL-XL) 25 MG Take 1 tablet (25 mg total) by mouth at bedtime. 90 tablet 4      "OXYGEN-AIR DELIVERY SYSTEMS MISC 2 L/min into each nostril continuous. Indications: SOB, low O2 sats       prednisoLONE acetate (PRED-FORTE) 1 % ophthalmic suspension Administer 1 drop to the right eye 2 (two) times a day.  1     terazosin (HYTRIN) 10 MG capsule Take 1 capsule (10 mg total) by mouth at bedtime. 90 capsule 2     ULTICARE PEN NEEDLE 31 gauge x 5/16\" Ndle Inject 1 each under the skin 4 (four) times a day. 400 each 3     vit B comp no.3-folic-C-biotin (NEPHRO-ANGEL) 1- mg-mg-mcg Tab tablet Take 1 tablet by mouth at bedtime.       vit C-s.cherry-celery-grape sd (TART CHERRY) -17-75-20 mg cap Take 1 capsule by mouth daily.       warfarin (COUMADIN/JANTOVEN) 2 MG tablet Take 2 tablets (4 mg total) by mouth daily. 60 tablet 5     No current facility-administered medications for this visit.            "

## 2021-06-23 NOTE — TELEPHONE ENCOUNTER
----- Message from Clau Lazo MD sent at 1/23/2019  6:29 AM CST -----  Slightly elevated white count still - thought to be presumed due to steroid use.  All other labs stable

## 2021-06-23 NOTE — TELEPHONE ENCOUNTER
7 Day follow up phone call for the COPD Program. Spoke with Griffin today for continuing COPD Education. Patient is doing well at home. Said he is pretty shaky but will see a neurologist in March. No other questions or concerns.

## 2021-06-23 NOTE — TELEPHONE ENCOUNTER
ANTICOAGULATION  MANAGEMENT: Discharge Continuity of Care Review    Hospital admission on  1/15 to 1/17 for COPD exacerbation.    Discharge disposition: Home    INR Results:       Recent labs: (last 7 days)     01/15/19  1934 01/17/19  0626   INR 2.89* 2.98*       Warfarin inpatient management: Home regimen continued    Warfarin discharge instructions: One time lower dose of 3 mg on day of discharge, then Home regimen continued     Medication Changes Affecting Anticoagulation: Yes: prednisone    Additional Factors Affecting Anticoagulation: No    Plan     Agree with discharge plan for follow up on 1/22    Anticoagulation calendar updated    Sabrina Pickard, RN

## 2021-06-23 NOTE — TELEPHONE ENCOUNTER
Request for Orders  Patient has dialysis on M, W, F. Current order expires on Friday to resume PT services after hospitalization at [facility]. Need new order to resume PT on 2/2/19      Who s Requesting: Rosita    Orders being requested:  PT to resume on 2/2/19    Where to send Orders: Simply respond to this Epic Telephone Call message string, and we can take as a verbal order if appropriate.   Thank you.

## 2021-06-23 NOTE — TELEPHONE ENCOUNTER
Please let patient know that I am covering Dr. Lazo's inbox for the day. I would advise that he decrease his nighttime lantus from 25 units to 20 units (a 20% reduction). Check fasting AM sugars and keep this log until he sees Dr. Lazo.  I would keep his mealtime insulin the same if he is eating per his norm.    I would also advise that he check his pre-meal sugars and if he has any lows (< 70), he should treat as he has been doing and then call back into clinic. Keep his appointment with Dr. Lazo on 2/7.    I am also routing this to [place] so she is aware.    Dr. Templeton

## 2021-06-23 NOTE — TELEPHONE ENCOUNTER
Call from wife      had a BG this am of 58     He was a little sweaty at that time - she gave him apply juice and rechecked  - now up to 178 and asx     She reported that there was a change in hs basaglar insulin about a 1week ago from 15U now up to 25U since hospitalization     He had not had a spell since that change other than this one this am       She is wondering if any changes needed in his inuslin     I will check with his provider     Confirmed tele# [phone] is good to reach    Cory Mitchell RN   Triage and Medication Refills        Reason for Disposition    Low blood sugar prevention, questions about    Protocols used: DIABETES - LOW BLOOD SUGAR-A-OH

## 2021-06-23 NOTE — TELEPHONE ENCOUNTER
Request for Orders    Who s Requesting: Home Care Physical Therapist    Orders being requested: Home care Physical Therapy 1 x/week x 1 week, 2 x/week x 2 weeks, 1 x/week x 1 week, for gait, balance, strength, transfers, and endurance training, education in HEP and falls prevention.    OT eval and treat for ADL safety, low vision training/modifications, energy conservation, breathing techniques. (Delay OT eval until week of 2/10 per Pt request)    RN eval and treat- INR in home (Pt reports due Tuesday 2/5), disease management, med reconciliation.    Where to send Orders: Please reply to this message    Thank you,  Cate Epps, PT, DPT

## 2021-06-23 NOTE — TELEPHONE ENCOUNTER
Recommend go to the ER for shortness of breath.  Can offer an appointment for the patient to be seen ASAP using reserve spot but recommend urgent treatment

## 2021-06-23 NOTE — PROGRESS NOTES
Hospital discharge follow up call to pt, not available.  RN will attempt call back at another time.    Dalia Sahni RN Care Manager, Population Health

## 2021-06-23 NOTE — TELEPHONE ENCOUNTER
Request for Orders  Patient requests to start home care services that were ordered at [facility] on 1/24 due to Dialysis on Monday, Wednesday and Friday and PCP appt on 1/22. [facility] needs a new home care order to start home care on 1/24/19    Who s Requesting: Rosita    Orders being requested: PT to start on 1/24/19    Where to send Orders: Simply respond to this Epic Telephone Call message string, and we can take as a verbal order if appropriate.   Thank you.

## 2021-06-23 NOTE — PROGRESS NOTES
[facility] Heart Care Note    Assessment:    Griffin Walton is a 84 y.o. old male with AS, AAA s/o repair in '05, AF, HTN, HL, DM, CVA, HFPEF, COPD, LVH, ESRD here for evaluation of management options for his valvular disease.         Plan:  # AS - severe w/ P/M 65/45 KIMBERLY 1 DI 0.2 SVI 44; RVSP estimate 65-70  - STS=22, prohibitive risk surgical candidate, so TAVR may be a better option  - for further w/u will need CTA TAVR protocol to assess access, annular size; will also need angio/LHC/RHC, surgical opinions, dental eval  - other complicating factors include listed ASA and heparin allergies  - will try to figure out the details about the latter, start off w/ anti-PF4 antibodies, may need Hematology consult      MARLO SMALL  [facility] HEART CARE  [phone]  ______________________________________________________________________    Subjective:  CC: AS    I had the opportunity to see Griffin Walton at the [facility]. Griffin Walton is a 84 y.o. malewith a known history of AS, AAA s/o repair in '05, AF, HTN, HL, DM, CVA, HFPEF, LVH, COPD, ESRD here for evaluation of management options for his valvular disease.     He has had 2 admissions in the past month, both for ADHF which improved w/ more aggressive fluid removal w/ HD.   ______________________________________________________________________      Review of Systems:   As noted in HPI, all others reviewed and are negative      Problem List:  Patient Active Problem List   Diagnosis     Pneumonia     Acute respiratory failure (H)     HCAP (healthcare-associated pneumonia)     Pulmonary congestion     ESRD (end stage renal disease) on dialysis (H)     Chronic atrial fibrillation (H)     Abdominal aortic aneurysm (H)     Anemia     Cerebral embolism with cerebral infarction (H)     Chronic obstructive pulmonary disease, unspecified COPD type (H)     Type 2 diabetes mellitus with chronic kidney disease on chronic dialysis, with  long-term current use of insulin (H)     Benign essential hypertension     Glaucoma     Malignant neoplasm of kidney excluding renal pelvis (H)     Long term current use of anticoagulant therapy     Moderate persistent asthma without complication     Primary open angle glaucoma     Seborrheic keratosis     Thrombocytopenia, primary (H)     Legally blind in right eye, as defined in [place]     Left sided numbness     Acute ischemic stroke (H)     Nonrheumatic aortic valve stenosis     COPD exacerbation (H)     SOB (shortness of breath)     Elevated INR     Chronic pulmonary edema     Uncontrolled hypertension     COPD (chronic obstructive pulmonary disease) (H)     Hyponatremia     Palliative care patient     Goals of care, counseling/discussion     Acute on chronic diastolic congestive heart failure (H)     Severe calcific aortic stenosis     LVH (left ventricular hypertrophy) due to hypertensive disease, with heart failure (H)     Medical History:  Past Medical History:   Diagnosis Date     Acute respiratory failure (H) 11/14/2017     Chronic kidney disease      Diabetes mellitus (H)      ESRD (end stage renal disease) on dialysis (H) 11/14/2017     HCAP (healthcare-associated pneumonia) 11/14/2017     Hypertension      Pulmonary congestion 11/14/2017     Renal cell carcinoma (H)      Surgical History:  Past Surgical History:   Procedure Laterality Date     aneursym       PARTIAL NEPHRECTOMY       Social History:  Social History     Socioeconomic History     Marital status:      Spouse name: Not on file     Number of children: Not on file     Years of education: Not on file     Highest education level: Not on file   Social Needs     Financial resource strain: Not on file     Food insecurity - worry: Not on file     Food insecurity - inability: Not on file     Transportation needs - medical: Not on file     Transportation needs - non-medical: Not on file   Occupational History     Not on file   Tobacco Use      Smoking status: Former Smoker     Packs/day: 1.00     Years: 55.00     Pack years: 55.00     Types: Cigarettes     Last attempt to quit: 2005     Years since quittin.1     Smokeless tobacco: Never Used   Substance and Sexual Activity     Alcohol use: No     Drug use: No     Sexual activity: No   Other Topics Concern     Not on file   Social History Narrative     Not on file           Family History:  Family History   Problem Relation Age of Onset     Hypertension Mother      Cancer Father      COPD Sister      Cancer Brother          Allergies:  Allergies   Allergen Reactions     Aspirin Angioedema     Tongue and lip swelling     Monosodium Glutamate Angioedema     Tongue and lip swelling     Heparin Unknown     Does not use heparin for dialysis; on low intensity coumadin     Hydralazine Unknown       Medications:  Current Outpatient Medications   Medication Sig Dispense Refill     ADVAIR DISKUS 100-50 mcg/dose DISKUS INHALE ONE PUFF BY MOUTH TWICE DAILY  60 each 11     albuterol (PROVENTIL) 2.5 mg /3 mL (0.083 %) nebulizer solution Take 2.5 mg by nebulization every 4 (four) hours as needed for wheezing.       blood glucose test strips test 2 times daily  Profile Rx: patient will contact pharmacy when needed       cholecalciferol, vitamin D3, 1,000 unit tablet Take 1,000 Units by mouth daily.       clopidogrel (PLAVIX) 75 mg tablet Take 1 tablet (75 mg total) by mouth daily. 90 tablet 2     cyclopentolate (CYCLOGYL) 1 % ophthalmic solution Administer 1 drop to the right eye 2 (two) times a day.       diltiazem (CARDIZEM CD) 240 MG 24 hr capsule Take 240 mg by mouth daily.       insulin aspart U-100 (NOVOLOG FLEXPEN U-100 INSULIN) 100 unit/mL injection pen Inject 4-6 units under the skin 3 times daily before meals as directed 15 mL 3     insulin glargine (BASAGLAR KWIKPEN U-100 INSULIN) 100 unit/mL (3 mL) pen Inject 25 Units under the skin at bedtime. (Patient taking differently: Inject 20 Units under the  "skin at bedtime.       ) 5 adj dose pen 0     losartan (COZAAR) 50 MG tablet Take 2 tablets (100 mg total) by mouth daily. 180 tablet 1     metoprolol succinate (TOPROL-XL) 25 MG Take 1 tablet (25 mg total) by mouth at bedtime. 90 tablet 4     OXYGEN-AIR DELIVERY SYSTEMS MISC 2 L/min into each nostril continuous. Indications: SOB, low O2 sats       pravastatin (PRAVACHOL) 40 MG tablet Take 1 tablet (40 mg total) by mouth every evening. 90 tablet 1     prednisoLONE acetate (PRED-FORTE) 1 % ophthalmic suspension Administer 1 drop to the right eye 2 (two) times a day.  1     ULTICARE PEN NEEDLE 31 gauge x 5/16\" Ndle Inject 1 each under the skin 4 (four) times a day. 400 each 3     vit B comp no.3-folic-C-biotin (NEPHRO-ANGEL) 1- mg-mg-mcg Tab tablet Take 1 tablet by mouth at bedtime.       vit C-s.cherry-celery-grape sd (TART CHERRY) -43-75-20 mg cap Take 1 capsule by mouth daily.       warfarin (COUMADIN/JANTOVEN) 2 MG tablet Take 1 tablet (2 mg total) by mouth daily. And recheck INR on 2/1/2019 with dose adjustment per your primary physician 60 tablet 5     dorzolamide-timolol (COSOPT) 22.3-6.8 mg/mL ophthalmic solution Administer 1 drop to the right eye 2 (two) times a day.        predniSONE (DELTASONE) 20 MG tablet 40mg PO qam x 2d then 20mg PO qam x 5d. 9 tablet 0     No current facility-administered medications for this visit.        Objective:   Vital signs:  /60 (Patient Site: Right Arm, Patient Position: Sitting, Cuff Size: Adult Large)   Pulse 100   Resp 16   Ht 5' 8\" (1.727 m)   Wt 199 lb 14.4 oz (90.7 kg)   BMI 30.39 kg/m        Physical Exam:    GENERAL APPEARANCE: Alert, cooperative and in no acute distress.   HEENT: No scleral icterus. Oral mucuos membranes pink and moist.   NECK: JVP 7. No Hepatojugular reflux. Thyroid not visualized. No lymphadenopathy   CHEST: clear to auscultation   CARDIOVASCULAR: S1, S2 2/6 systolic murmur, no clicks or rubs. Brachial, radial and posterior " tibial pulses are intact and symetric. No carotid bruits noted. No edema  ABDOMEN: Nontender. BS+. No bruits.   SKIN: No Xanthelasma   Musculoskeletal: No cyanosis, clubbing or swelling.      Lab Results:  LIPIDS:  Lab Results   Component Value Date    CHOL 107 08/01/2018     Lab Results   Component Value Date    HDL 43 08/01/2018     Lab Results   Component Value Date    LDLCALC 56 08/01/2018     Lab Results   Component Value Date    TRIG 41 08/01/2018     No components found for: CHOLHDL    BMP:  Lab Results   Component Value Date    CREATININE 5.93 (H) 01/29/2019    BUN 60 (H) 01/29/2019     (L) 01/29/2019    K 5.3 (H) 01/29/2019    CL 94 (L) 01/29/2019    CO2 27 01/29/2019         [facility]

## 2021-06-24 NOTE — TELEPHONE ENCOUNTER
Orders being requested:  Resumption of home care services as follows:    Skilled nurse visits Resumption on 3/7/19 then 1W1, 2W3, to assess and teach COPD, CP status, medications, check INRs and safety.    2 PRN visits for symptom management     Physical therapy to provide comprehensive evaluation with focus of strength, balance and endurance.    Occupational therapy to preform comprehensive assessment with focus on respiratory exercises.    Home health aide to assist with personal cares and hygiene. 1X/week for 3 weeks.     Reason service is needed/diagnosis:  Deconditioned,  Recent stent placement valve replacement planned.     When are orders needed by:  ASAP  Where to send Orders:  Epic   Okay to leave detailed message?  yes

## 2021-06-24 NOTE — TELEPHONE ENCOUNTER
Keep sats 88-93% so recommend increased O2 to 3 liters or higher if needed to maintain.  Agree with ER visit and will have office call to help ensure pt compliance.

## 2021-06-24 NOTE — TELEPHONE ENCOUNTER
Fax received from [facility], they have started the Prior Authorization Process via Cover My Meds    CoverMyMeds Key: [id number]     Medication Name: Spiriva Handihaler    Insurance Plan: Medicare Part D   PBM: Izaiah  Patient ID: [id number]    Please complete the PA process

## 2021-06-24 NOTE — TELEPHONE ENCOUNTER
Patient discharged from Hospital today but orders did not include skilled nursing which he had previously to the hospitalization.    Requesting orders  Ok for SN resumption of care on 3/7/19

## 2021-06-24 NOTE — PATIENT INSTRUCTIONS - HE
Diabetes:  Continue with the insulins  Basaglar: Start with 15 units at night time and adjust up by 2 units every other night to keep the average blood glucose below 150.    Aspart or rapid acting insulin:  Use sliding scale to refine blood glucose using 3 times a day    At snack at bedtime      Sliding scale:   B-200                     Then inject 2 units   201-250                     Then 4 units   251-300                     Then 6 units  301-350                     Then 8 units   351-400                     Then 10 units   > 401                                   Then 12 units and recheck in 2 hours, and if still high to contact your doctor or go to the ER.     Lung medications:  Start Spiriva use as directed  Continue with Advair and albuterol nebs    Continue with all other meds

## 2021-06-24 NOTE — TELEPHONE ENCOUNTER
Central PA team  [phone]  Pool: HE PA MED ([id number])          PA has been initiated.       PA form completed and faxed insurance via Cover My Meds     Key:  [id number]       Medication: Spiriva HandiHaler 18MCG       Insurance:  [employer]           Response will be received via fax and may take up to 5-10 business days depending on plan

## 2021-06-24 NOTE — TELEPHONE ENCOUNTER
Spoke with patient and wife. Patient is doing well after PCI roto/PCi LAD and BAV. He is home and will continue dialysis as scheduled. He is going to the dentist this afternoon.    Pending plan for TAVR in near future. Will have to see second surgery for consult, if not already completed during recent hospital visit. Pending dental eval.

## 2021-06-24 NOTE — TELEPHONE ENCOUNTER
Request for Orders    Who s Requesting: Home Care Occupational Therapist    Orders being requested: OT  1 visit(s) every 1 week(s) for 4 week(s)  for home safety, compensatory strategies for ADLS/IADLS, fxnal mobility.    REQUESTING MSW ORDER FOR COMMUNITY RESOURCES   [id number], 1 VISIT PRN      Where to send Orders: [facility], response through Epic preferred. Please call if you have questions.    Thank you!  Britney Bacon, OTR/L  [phone]

## 2021-06-24 NOTE — TELEPHONE ENCOUNTER
Medication Question or Clarification  Who is calling: [facility]acy: [facility]   What medication are you calling about?: Spiriva Handihaler  What dose do you take?: 1 capsule   How often are you taking the medication?: daily  Who prescribed the medication?: Clau Lazo MD    What is your question/concern?: Fax received from pharmacy stating that the above medication is not covered by plan.   Will provider send new RX for Incruse Ellipta?  Ph[facility]: [facility]  Okay to leave a detailed message?: No-speak to pharmacy staff  Site CMT - Please call the pharmacy to obtain any additional needed information.

## 2021-06-24 NOTE — TELEPHONE ENCOUNTER
INR result is  3.6  INR   Date Value Ref Range Status   02/05/2019 1.60 (!) 0.9 - 1.1 Final       Will the patient be seen, or did they already see, MD or CNP today? No, however, going for CT scan today.    Most Recent Warfarin dose day/week  Sunday Monday Tuesday Wednesday Thursday Friday Saturday       2 2 2     Sunday Monday Tuesday Wednesday Thursday Friday Saturday   2 2 2 2          Has the patient missed any doses of Coumadin, Warfarin, Jantoven in the past 7 days? No    Has the patients medications changed since the last visit? Yes  Added methylprednisolone dose for CT scan, took one 32 mg tablet last night and will have one more today.    Has the patient experienced any bleeding recently? No    Has the patient experienced any injuries or illness recently? No    Has the patient experienced any 'new' shortness of breath, severe headaches, or changes in vision recently?  Yes, having a little shortness of breath, having trouble keeping oxygen high today.  Has the patient had any changes in their diet, or alcohol consumption? Yes  Eating less greens this week    Is the patient here today to prepare for any type of upcoming surgery, procedure, or for a cardioversion procedure? No    What phone number can we reach the patient at today? [facility] [phone]

## 2021-06-24 NOTE — TELEPHONE ENCOUNTER
Patient was into see Dr. Lopez on 2/12  Patient was sent home with new insulin dosing instructions. I do not see these updated on the med list.   Could this be updated by the clinic?     Also, Spiriva is not covered by the patients insurance and is too expensive to pay out of pocket. The pharmacy should be contacting clinic for an alternative.    Thanks,  Lynne Cespedes RN

## 2021-06-24 NOTE — CONSULTS
DATE OF SERVICE: 02/07/2019    Asked to evaluate this patient for open surgical versus transcatheter aortic valve  replacement by Dr. Harry Patel.    HISTORY OF PRESENT ILLNESS:  Mr. Walton is an 84-year-old gentleman with aortic  stenosis.  He underwent an abdominal aortic aneurysm repair in 2005.  He has a  history of atrial fibrillation, hyperlipidemia, hypertension, diabetes.  He has had  a CVA.  He has HFPEF, COPD, left ventricular hypertrophy and end-stage renal  disease.  His aortic stenosis is severe.  His peak gradient is 65.  His mean  gradient is 45.  His aortic valve area is 1.0.  His dimensionless index is 0.2.  His  SVI is 44.  His RVSP is estimated to be 65 to 70.  His STS mortality score is 22,  making him a prohibitive surgical candidate.    PAST SURGICAL AND MEDICAL HISTORY:  SURGICAL PROCEDURES:  1.  Status post repair of abdominal aortic aneurysm.  2.  Status post partial nephrectomy.    MEDICAL PROBLEMS:  1.  History of acute respiratory failure and 2017.  2.  Chronic kidney disease requiring dialysis.  3.  Diabetes mellitus.  4.  Hypertension.  5.  History of renal cell carcinoma.    Additional medical problems include anemia, cerebral embolism with cerebral  infarction, COPD, glaucoma, and in the past he has been a palliative care patient.    ALLERGIES:  He is allergic to aspirin it causes angioedema, monosodium glutamate  also causes angioedema.  He has a contrast allergy and heparin is also allegedly an  allergy.    CURRENT MEDICATIONS:  See chart.    FAMILY HISTORY:  Positive for hypertension in his mother, cancer in his father, COPD  in a sister and cancer in a brother.    SOCIAL HISTORY:  He is retired.  He lives in a nursing home.  He has a great deal of  support from his children and wife.    REVIEW OF SYSTEMS:  A complete 10-system review of systems is negative except for  the above-stated.    PHYSICAL EXAMINATION:   VITAL SIGNS:  Temperature afebrile, respiratory rate 16,  heart rate 100, blood  pressure 100/60.  SKIN:  Scattered senile changes.  LYMPH NODES:  No palpable lymphadenopathy.  HEENT:  Normocephalic.  PERRL.  EOMs intact.  ENT unremarkable.  NECK:  Supple, without carotid bruits.  CHEST:  Without deformity, increased AP dimension.  LUNGS:  Clear to auscultation.  HEART:  Regular rate and rhythm with a grade 2/6 systolic ejection murmur heard best  over the right upper sternal border.  Pulses 2+ and symmetrical.  ABDOMEN:  Soft, nontender, without palpable organomegaly, normoactive bowel sounds.  GENITOURINARY AND RECTAL:  Deferred.  NEUROLOGIC:  Grossly intact.  BACK:  Without deformity.  EXTREMITIES:  Full range of motion without clubbing, cyanosis, edema.    LABORATORY:  Remarkable for a creatinine of 5.93.    ASSESSMENT:  This gentleman has a prohibitive mortality for standard surgical aortic  valve replacement.  He may be a candidate for a transcatheter aortic valve  replacement.  We certainly will need hematology has help in assessing whether or not  he has a true allergy to heparin and we will work him up to make sure he does not  have heparin-induced thrombocytopenia.  The patient and his family understand that  we are not certain he is a candidate for transcatheter aortic valve replacement and  further workup will be necessary.  They do understand that the potential  complications of a transcatheter aortic valve replacement include bleeding,  infection, stroke, heart block requiring a pacemaker, cardiac perforation, aortic  root rupture, aortic dissection and an operative mortality of about 2%.  It was a  pleasure to meet with Mr. Walton and his family today.    Thank you for this referral.      MD zoraida HOFFMANN 02/12/2019 10:03:35  T 02/12/2019 11:05:50  R 02/12/2019 11:05:50  [id number]        cc:ZO DAS MD

## 2021-06-24 NOTE — TELEPHONE ENCOUNTER
No note from Dr Lopez and he is out of the office until Wednesday.  Tried to call the patient and LMTCB.  I would suggest no more than 15units of insulin at night and if sugars are running low (<100) with symptoms of hypoglycemia, to hold the insulin.  Will have the staff call to make the patient an appointment with me for next week.

## 2021-06-24 NOTE — TELEPHONE ENCOUNTER
ANTICOAGULATION  MANAGEMENT- Home Care/Care Facility Result    Assessment     Today's INR result of 1.8 is Subtherapeutic (goal INR of 2.0-3.0)        Warfarin recently held as instructed which may be affecting INR. Resumed on 3/2.     Hospitalized 2/17-3/5 for aortic stenosis.     No new diet changes affecting INR    Concurrent use of Brilinta and warfarin may increase risk of bleeding, but not expected to affect INR- stopped Plavix and started Brilinta    Continues to tolerate warfarin with no reported s/s of bleeding or thromboembolism     Previous INR was Subtherapeutic     Having root canal done later today. Per patient's wife, dentist said pt can continue with warfarin.     Plan:     Spoke with Annette discussed INR result and instructed:     Warfarin Dosing Instructions: Continue current warfarin dose 4 mg daily.    Next INR to be drawn: OV on 3/11.     Education provided: importance of taking warfarin as instructed    Annette verbalizes understanding and agrees to warfarin dosing plan.   ?   Kj Mishra RN    Subjective/Objective:      Griffin Walton, a 84 y.o. male is established on warfarin.     Home care/care facility RN's report of Griffin INR, recent warfarin dosing, diet changes, medication changes, and symptoms is documented below.    Additional findings: verbally confirmed home dose with Annette and updated on anticoagulation calendar    Anticoagulation Episode Summary     Current INR goal:   2.0-3.0   TTR:   46.8 % (6.8 mo)   Next INR check:   3/11/2019   INR from last check:   1.80! (3/7/2019)   Weekly max warfarin dose:      Target end date:      INR check location:      Preferred lab:      Send INR reminders to:   ANTICOAGULATION POOL B (MPW,HUG,STW,RVL,OAK,RLN)    Indications    Chronic atrial fibrillation (H) [I48.2]           Comments:            Anticoagulation Care Providers     Provider Role Specialty Phone number    Taiwo Mejias MD Referring Family Medicine [phone]

## 2021-06-24 NOTE — TELEPHONE ENCOUNTER
Triage Note:    [facility] with concerns about new episode of difficulty breathing since last night.  He has COPD and usually uses 2L of Oxygen.    Home care nurse noted that his O2 was 79% on 2L oxygen this morning, so she increased him to 3L of O2 and it went up to 88-93%.  Nurse noted that he has crackles in the right lung base with no air in left lung bases, but lungs otherwise clear.  She states he doesn't appear in distress (i.e. No accessory muscle use) or coloring change.  He is short of breath at rest and worse with talking. At rest his respirations are 20-28, pulse .  He used his neb and advair this morning, but hasn't started Spiriva due to insurance issue.     Rn triaged to be seen in ED now.  Home care nurse will advise patient and wife of advice, but wife is no phone right now.  She will call back if there are further questions or concerns.    Tasha Coto RN, [facility] Refill/Triage, 2/14/2019 10:00 AM      Reason for Disposition    MODERATE difficulty breathing (e.g., speaks in phrases, SOB even at rest, pulse 100-120) of new onset or worse than normal    Protocols used: BREATHING DIFFICULTY-A-OH

## 2021-06-24 NOTE — TELEPHONE ENCOUNTER
Wife of 85 y/o male with COPD, A.fib, and aortic stenosis, waiting for heart valve transplant. They call about new onset of shortness of breath upon waking.  He has moved from bed to a recliner, and has been resting there for 1 hour, but continues to have occasional shortness of breath. He is on 3L of oxygen, speaking in full sentences, mild shortness of breath but oximeter is now reading between 88-89% - baseline per wife is 84-85.        RN Action/Disposition:  I had wife confirm all tubing connected correctly and patient receiving oxygen, also had wife change hand and finger of pulse oximeter to be sure reading is correct. RN advised wife take a temp reading, temp is currently 99.5, discussed threshold of 100.5 and to call back if temp gets this high. At this time I advised wife to utilize the PRN nebulizer which is part of the COPD treatments he has, and then we will callback in 1 hour to see if oxygen levels have improved. She verbalized this plan. Call back if symptoms worsen in the meantime    Reason for Disposition    [1] Monitoring oxygen level (e.g., pulse oximetry) AND [2] fall in oxygen level 4% or more (below known patient baseline, while awake and resting)    Protocols used: COPD OXYGEN MONITORING AND [id number]-P-MS

## 2021-06-24 NOTE — TELEPHONE ENCOUNTER
Upcoming Appointment Question  When is the appointment: 03/11/2019 @10am  What is your appointment for?: Inpatient follow up  Who is your appointment scheduled with?: PCP only  What is your question/concern?: Patients wife called, stated the patient has dialysis on Mondays, Wednesdays and Fridays. Patient will not be able to make the Monday appointment at the scheduled time. Patient prefers Tuesday or Thursday appointments, but would be willing to come in on a Monday, Wednesday or Friday afternoon. Thursday March 14th would need to be a morning appointment, since they have an appointment that afternoon. Patient is willing to see a partner provider.  Okay to leave a detailed message?: Yes

## 2021-06-24 NOTE — TELEPHONE ENCOUNTER
Spoke with Yas, reassured her that pt is having warfarin dosed by providers at the hospital.  No further questions at this time.

## 2021-06-24 NOTE — TELEPHONE ENCOUNTER
Ed said he was asked to call this Saturday am and report blood sugar and O2 levels.    95-98% using 3 liters.  Feels good.  Noting usual occasional cough, productive yellow, brown.     Blood Sugar 89 when first woke. Last jn before supper 413. (Dialysis during day).  Before breakfast yesterday (Friday)313   No lunch (Friday)  on Dialysis.    Reports he feels comfortable with med plan, if has low BS plans to use small amount apple juice.     Is in the midst of Valve eval at [facility] which notes his PCP is aware of.     Thanked him for reporting numbers. Asked him to keep us updated with any concerns, questions or changes.          Reason for Disposition    [1] Follow-up call to recent contact AND [2] information only call, no triage required    Protocols used: INFORMATION ONLY CALL-A-

## 2021-06-24 NOTE — TELEPHONE ENCOUNTER
Who is calling:  wife  Reason for Call:  We need that list of carb that we had before. My  is diabetic and we want to count the carbs in his diet. Please mail this to our home address.  Date of last appointment with primary care: yesterday  Has the patient been recently seen:  Yes  Okay to leave a detailed message: Yes

## 2021-06-24 NOTE — TELEPHONE ENCOUNTER
ANTICOAGULATION  MANAGEMENT: Discharge Continuity of Care Review    Hospital admission on  2/17 to 2/5 for aortic stenosis    Discharge disposition: Home    INR Results:       Recent labs: (last 7 days)     02/27/19  0621 02/28/19  0722 02/28/19  1103 02/28/19  1519 03/01/19  0609 03/02/19  0643 03/03/19  0710 03/04/19  0614   INR 2.32* 2.02* 1.91* 1.60* 1.25* 1.26* 1.24* 1.47*       Warfarin inpatient management: Held and reversed with 5 mg Vitamin K on 2/28. Warfarin resumed on 3/2    Warfarin discharge instructions: home regimen continued     Medication Changes Affecting Anticoagulation: No    Additional Factors Affecting Anticoagulation: No    Plan     Agree with discharge plan for follow up on 3/7 with home care    Spoke with home care    Anticoagulation calendar updated    Sabrina Pickard RN

## 2021-06-24 NOTE — TELEPHONE ENCOUNTER
Upcoming Appointment Question  When is the appointment: 03/11/2019  What is your appointment for?: INF  2/27-3/7 aortic stenosis  Who is your appointment scheduled with?: PCP only  What is your question/concern?: Wife was calling to see about trying to reschedule patient's INF. Wife states patient has dialysis Monday, Wednesday, & Friday so the current appointment would not really work.  She was hoping for possibly next week Tuesday or Thursday.  Okay to leave a detailed message?: Yes

## 2021-06-24 NOTE — TELEPHONE ENCOUNTER
Who is calling:  Patient's wife  Reason for Call:  She states that Ed is in the hospital and they took his INR today. She would like healther to call her back with the dosing instructions once she gets the results. Thank you  Date of last appointment with primary care: n/a  Has the patient been recently seen:  Yes  Okay to leave a detailed message: Yes

## 2021-06-24 NOTE — TELEPHONE ENCOUNTER
Triage RN called wife (Yas) back.  After neb, pt SaO2  85-86% (baseline 92-93%), pt continues to be short of breath and fever 99.4.  Wife intends to take pt to ED.    Dorys Rudd RN, [facility] RN Triage

## 2021-06-24 NOTE — TELEPHONE ENCOUNTER
Symptoms: Low O2 sats/Increased SOB    New/Ongoing: New    How long: today    Have you been seen for this:  Not today but in the past.    Patient c/o increased shortness of breath this morning. At nurse visit this morning he was found to have O2 sats of 79% on 2 liters of O2. Oxygen was increased to 3liters and O2 sats come up to 88-93% dependent on activity. Resp ran 20-28 dependent on activity. Lungs had crackles in right base and absent in left base. No fever. Other vital signs stable.  Spoke with nurse triage and they recommended ER evaluation. Patient was scheduled for CT scan at [facility] so family was going to take him to that and have him evaluated.  I spoke with patient at [facility]. He had CT scan done but did not go to ER or have lungs evaluated. Patient states that his O2 sats are at 90% on 3 liters and that he is feeling fine. Inst on s/s that would require ER visit and to call with any changes or questions. He verbalized understanding.       Please advise as to parameters for Oxygen level and O2 sats. Currently on 3 liters. Also, advise as to any other new orders.   Thanks.        Okay to leave a detailed message? Yes. [phone] or [place]

## 2021-06-24 NOTE — TELEPHONE ENCOUNTER
14 Day follow up phone call for the COPD Program. Spoke with Griffin today for on-going COPD Education. He said he is doing really well. No new questions or concerns today. Will call again on the 28th of February.

## 2021-06-24 NOTE — TELEPHONE ENCOUNTER
INR result is   1.8    Will the patient be seen, or did they already see, MD or CNP today? No    Most Recent Warfarin dose day/week  Sunday Monday Tuesday Wednesday Thursday Friday Saturday Sunday Monday Tuesday Wednesday Thursday Friday Saturday    4 4 4          Has the patient missed any doses of Coumadin, Warfarin, Jantoven in the past 7 days? No    Has the patients medications changed since the last visit? Yes the medication changes are all in the medication record. Burlinta was started and plavix was discontinued.    Has the patient experienced any bleeding recently? Yes, patient had a root canal today due to the dental procedure.    Has the patient experienced any injuries or illness recently? Yes, was just in the hospital for a balloon procedure.    Has the patient experienced any 'new' shortness of breath, severe headaches, or changes in vision recently? Yes, shortness of breath.    Has the patient had any changes in their diet, or alcohol consumption? No    Is the patient here today to prepare for any type of upcoming surgery, procedure, or for a cardioversion procedure? Yes, TAVR upcoming, still to be scheduled.    What phone number can we reach the patient at today? [phone].

## 2021-06-24 NOTE — PROGRESS NOTES
Assessment/Plan:        1. Chronic obstructive pulmonary disease, unspecified COPD type (H)     Plan:   Start Spiriva use as directed  Continue with Advair and albuterol nebs         2. Chronic atrial fibrillation (H)    3. Benign essential hypertension   Continue current management per cardiology        4. Controlled type 2 diabetes mellitus with diabetic nephropathy, with long-term current use of insulin (H)   Insulin management reviewed    Continue with the insulins  Basaglar: Start with 15 units at night time and adjust up by 2 units every other night to keep the average blood glucose below 150.    Aspart or rapid acting insulin:  Use sliding scale to refine blood glucose using 3 times a day    At snack at bedtime      Sliding scale:   B-200                     Then inject 2 units   201-250                     Then 4 units   251-300                     Then 6 units  301-350                     Then 8 units   351-400                     Then 10 units   > 401                                   Then 12 units and recheck in 2 hours, and if still high to contact your doctor or go to the ER.        5. Long term current use of anticoagulant therapy    6. ESRD (end stage renal disease) on dialysis (H)         Follow up in a month         45  minutes spent on this visit with more than 50% time spent onreviewing medical record, education and discussion of the above plan, med reconciliation, providing supportive therapy regarding coping with chronic illness, and entering orders         Subjective:    Patient ID:   Griffin Walton is a 84 y.o. male here for post hospital visit admission and discharge date of  and 2019 respectively for shortness of breath has been multiple factors including COPD, aortic stenosis, A. fib with RVR and volume overload in a patient with a ESRD.  There is been a consideration of T AVR by cardiology.       DIABETES:   Managed on Lantus and aspart insulins     ESRD:  On  hemodialysis Monday Wednesday Friday    HTN/ A. fib with RVR:  On rate meds, diltiazem and metoprolol, Losartan and pravastatin  Management per cardiology      Severe COPD:  Chronic hypoxemia  On nebs, prednisone and doxycycline  On Advair and albuterol nebs    Overall feeling well today with no additional concerns.         Review of Systems  A complete 10 point review of systems was obtained and is negative other than what is stated in the HPI.      The following patient's history were reviewed and updated as appropriate:   He  has a past medical history of Acute respiratory failure (H) (11/14/2017), Chronic kidney disease, Diabetes mellitus (H), ESRD (end stage renal disease) on dialysis (H) (11/14/2017), HCAP (healthcare-associated pneumonia) (11/14/2017), Hypertension, Pulmonary congestion (11/14/2017), and Renal cell carcinoma (H).  He  has a past surgical history that includes aneursym and Partial nephrectomy.  He  reports that he quit smoking about 14 years ago. His smoking use included cigarettes. He has a 55.00 pack-year smoking history. he has never used smokeless tobacco. He reports that he does not drink alcohol or use drugs..      Outpatient Encounter Medications as of 2/12/2019   Medication Sig Dispense Refill     ADVAIR DISKUS 100-50 mcg/dose DISKUS INHALE ONE PUFF BY MOUTH TWICE DAILY  60 each 11     albuterol (PROVENTIL) 2.5 mg /3 mL (0.083 %) nebulizer solution Take 2.5 mg by nebulization every 4 (four) hours as needed for wheezing.       blood glucose test strips test 2 times daily  Profile Rx: patient will contact pharmacy when needed       cholecalciferol, vitamin D3, 1,000 unit tablet Take 1,000 Units by mouth daily.       clopidogrel (PLAVIX) 75 mg tablet Take 1 tablet (75 mg total) by mouth daily. 90 tablet 2     cyclopentolate (CYCLOGYL) 1 % ophthalmic solution Administer 1 drop to the right eye 2 (two) times a day.       diltiazem (CARDIZEM CD) 240 MG 24 hr capsule Take 240 mg by mouth  "daily.       dorzolamide-timolol (COSOPT) 22.3-6.8 mg/mL ophthalmic solution Administer 1 drop to the right eye 2 (two) times a day.        insulin aspart U-100 (NOVOLOG FLEXPEN U-100 INSULIN) 100 unit/mL injection pen Inject 4-6 units under the skin 3 times daily before meals as directed 15 mL 3     insulin glargine (BASAGLAR KWIKPEN U-100 INSULIN) 100 unit/mL (3 mL) pen Inject 25 Units under the skin at bedtime. (Patient taking differently: Inject 20 Units under the skin at bedtime.       ) 5 adj dose pen 0     losartan (COZAAR) 50 MG tablet Take 2 tablets (100 mg total) by mouth daily. 180 tablet 1     methylPREDNISolone (MEDROL) 32 MG tablet Take 1 tablet (32 mg total) by mouth see administration instructions for 2 doses. 2 tablet 0     metoprolol succinate (TOPROL-XL) 25 MG Take 1 tablet (25 mg total) by mouth at bedtime. 90 tablet 4     OXYGEN-AIR DELIVERY SYSTEMS MISC 2 L/min into each nostril continuous. Indications: SOB, low O2 sats       pravastatin (PRAVACHOL) 40 MG tablet Take 1 tablet (40 mg total) by mouth every evening. 90 tablet 1     prednisoLONE acetate (PRED-FORTE) 1 % ophthalmic suspension Administer 1 drop to the right eye 2 (two) times a day.  1     ULTICARE PEN NEEDLE 31 gauge x 5/16\" Ndle Inject 1 each under the skin 4 (four) times a day. 400 each 3     vit B comp no.3-folic-C-biotin (NEPHRO-ANGEL) 1- mg-mg-mcg Tab tablet Take 1 tablet by mouth at bedtime.       vit C-s.cherry-celery-grape sd (TART CHERRY) -25-75-20 mg cap Take 1 capsule by mouth daily.       warfarin (COUMADIN/JANTOVEN) 2 MG tablet Take 1 tablet (2 mg total) by mouth daily. And recheck INR on 2/1/2019 with dose adjustment per your primary physician 60 tablet 5     predniSONE (DELTASONE) 20 MG tablet 40mg PO qam x 2d then 20mg PO qam x 5d. 9 tablet 0     No facility-administered encounter medications on file as of 2/12/2019.          Objective:   /62 (Patient Site: Right Arm, Patient Position: Sitting, Cuff " Size: Adult Regular)   Pulse 72   Wt 198 lb 1.6 oz (89.9 kg)   SpO2 (!) 85%   BMI 30.12 kg/m        Physical Exam  General Appearance:    Alert, well hydrated, no distress   Throat:   mucous membranes moist, pharynx normal without lesions   Neck:   Supple, symmetrical, trachea midline, no adenopathy;     thyroid:  no enlargement/tenderness/nodules;    Lungs:     clear to auscultation, no wheezes, rales or rhonchi, symmetric air entry     Heart:      Irregularly irregular rhythm,   S1 and S2 normal, + murmur, rub   or gallop, no edema    Skin:   Skin color, texture, turgor normal, no rashes or lesions

## 2021-06-24 NOTE — TELEPHONE ENCOUNTER
ANTICOAGULATION  MANAGEMENT- Home Care/Care Facility Result    Assessment     Today's INR result of 3.6 is Supratherapeutic (goal INR of 2.0-3.0)        Warfarin taken as previously instructed    Decreased greens/vitamink K intake may be affecting INR    Interaction between methlyprednisone and warfarin may be affecting INR - 2 doses today, 1 last night and 1 today    Continues to tolerate warfarin with no reported s/s of bleeding or thromboembolism     Lynne report shortness of breath in patient, has increased his O2 from 2L to 3L and still O2 levels in low 90's, transferred to Triage after warfarin dosing was done    Previous INR was Subtherapeutic    Plan:     Spoke with Lynne, home care nurse discussed INR result and instructed:     Warfarin Dosing Instructions: Hold warfarin today only then continue current warfarin dose 4 mg daily  (0 % change)  Will return to normal greens intake    Next INR to be drawn: Tue 2/19    Education provided: target INR goal and significance of current INR result and potential interaction between warfarin and methylprednisone    Lynne verbalizes understanding and agrees to warfarin dosing plan.   ?   Sabrina Pickard RN    Subjective/Objective:      Griffin Walton, a 84 y.o. male is established on warfarin.     Home care/care facility RN's report of Griffin INR, recent warfarin dosing, diet changes, medication changes, and symptoms is documented below.    Additional findings: template incorrect; verbally confirmed home dose with Lynne and updated on anticoagulation calendar    Anticoagulation Episode Summary     Current INR goal:   2.0-3.0   TTR:   47.6 % (6.7 mo)   Next INR check:   2/19/2019   INR from last check:   3.60! (2/14/2019)   Weekly max warfarin dose:      Target end date:      INR check location:      Preferred lab:      Send INR reminders to:   ANTICOAGULATION POOL B (MPW,HUG,STW,RVL,OAK,RLN)    Indications    Chronic atrial fibrillation (H) [I48.2]           Comments:             Anticoagulation Care Providers     Provider Role Specialty Phone number    Taiwo Mejias MD Referring Family Medicine [phone]

## 2021-06-24 NOTE — TELEPHONE ENCOUNTER
Pre-TAVR. BAV completed on 3/1 during [facility] hospital admission.    Spoke with wife. Pt doing well. He saw dentist yesterday and got a root canal. Wife tells me that he did not stop any medications for the root canal (brilinta/warfarin).    I also confirmed with CTS team that Dr. Ford saw the patient for consult on Fridat 3/1, but has not completed his dictation.    Discussed with team. Will move forward with scheduling TAVR in near future. Will call wife to schedule after we decide on timing.

## 2021-06-25 NOTE — PROGRESS NOTES
Family Medicine Office Visit  [facility]  Patient Name: Griffin Walton  Patient Age: 84 y.o.  YOB: 1934  MRN: [id number]    Date of Visit: 3/19/2019  Reason for Office Visit:   Chief Complaint   Patient presents with     Follow Up     [facility] 3.10-3.15 aortic valve replacemenet      Other     review inhalers- and BP meds            Assessment / Plan / Medical Decision Makin. Chronic atrial fibrillation (H)  Continue on cardizem and amiodarone as per cardiology.  On coumadin    2. Acute on chronic respiratory failure with hypoxia (H)  Stable on 3L O2.    3. Nonrheumatic aortic valve stenosis  Follow up with cardiology for TAVR -     4. COPD exacerbation (H)  Finish up the spiriva and plan to restart the incruse elipta due to insurance co-pay for next month.    5. Severe calcific aortic stenosis  As per cardiology    6. Atrial fibrillation with RVR (H)  Rate now controlled.    7. On home oxygen therapy  Home health in place    8. Respiratory compromise  Patient advised if symptoms persist, worsen or new symptoms arise they are to seek medical care.  All patients questions addressed. Patient verbalized understanding and agreement with plan.       9. Coronary artery disease involving native coronary artery of native heart without angina pectoris  Stopped brilinta as per cardiology office after stent but now on plavix and coumadin as per Dr Otero states wife    10. Dialysis patient (H)  Continue with dialysis.  Repeat labs today    11. Long term current use of anticoagulant therapy  stable        Health Maintenance Review  Health Maintenance   Topic Date Due     ASTHMA CONTROL TEST  1934     DIABETES FOLLOW-UP  1934     ASTHMA FOLLOW-UP  1934     DIABETES FOOT EXAM  1944     DIABETES OPHTHALMOLOGY EXAM  1944     DIABETES URINE MICROALBUMIN  1944     ZOSTER VACCINES (1 of 2) 1984     FALL RISK ASSESSMENT   06/27/2019     DIABETES HEMOGLOBIN A1C  07/16/2019     ADVANCE DIRECTIVES DISCUSSED WITH PATIENT  03/07/2024     TD 18+ HE  02/04/2025     PNEUMOCOCCAL POLYSACCHARIDE VACCINE AGE 65 AND OVER  Completed     INFLUENZA VACCINE RULE BASED  Completed     PNEUMOCOCCAL CONJUGATE VACCINE FOR ADULTS (PCV13 OR PREVNAR)  Completed         I have discontinued Griffin Walton's dextromethorphan-guaiFENesin. I am also having him maintain his dorzolamide-timolol, vit B comp no.3-folic-C-biotin, cholecalciferol (vitamin D3), vit C-s.cherry-celery-grape sd, OXYGEN-AIR DELIVERY SYSTEMS MISC, blood glucose test, prednisoLONE acetate, losartan, ULTICARE PEN NEEDLE, insulin aspart U-100, pravastatin, cyclopentolate, terazosin, umeclidinium, melatonin, diltiazem, insulin glargine, amiodarone, acetaminophen, clopidogrel, fluticasone-salmeterol, ipratropium-albuterol, warfarin, and SPIRIVA WITH HANDIHALER.      HPI:  Griffin Walton is a 84 y.o. year old who presents to the office today for follow up to the hospital.  Admitted to the hospital 3/10-3/15 with AF with RVR.  Previously admitted 2/27-3/4 with shortness of breath, found to be in CHF, respiratory failure, and AV stenosis and underwent stent of the LAD with balloon valvulopasty on 3/1.  Sent home with home health.  This most recent visit appears to have missed a dialysis visit and admitted to the hospital with dyspnea.  Pt normally getting dialysis M, W, F and given extra dialysis on Saturday 3/16 which improved his dyspnea and respiratory status.  Cardiology moved up the plan for TAVR to 4/2.  On the first hospital visit also a concern for HCAP and received a round of antibiotics.  DM well controlled thus far.  Currently doing lantus 6 units at night and AM sugars running around 100.  AF rate controlled with cardizem and amiodarone currently.  toprol d/c in hospital.  Wife confused about inhalers.  Currently on adviar and then previously on incruse but recently went to   script and was spiriva.  However this was much more expensive.  Pt is tired and fatigued all the time.  Coughing occasionally, but not bringing up anything, no further fevers.  Weight stable since d/c.        Review of Systems- pertinent positive in bold:  Constitutional: Fever, chills, night sweats, fainting, weight change, fatigue, seizures, dizziness, sleeping difficulties, loud snoring/pauses in breathing  Eyes: change in vision, blurred or double vision, redness/eye pain  Ears, nose, mouth, throat: change in hearing, ear pain, hoarseness, difficulty swallowing, sores in the mouth or throat  Respiratory: shortness of breath, cough, bloody sputum, wheezing  Cardiovascular: chest pain, palpitations   Gastrointestinal: abdominal pain, heartburn/indigestion, nausea/vomiting, change in appetite, change in bowel habits, constipation or diarrhea, rectal bleeding/dark stools, difficulty swallowing  Urinary: painful urination, frequent urination, urinary urgency/incontinence, blood in urine/dark urine, nocturia  Musculoskeletal: backache/back pain (new or increasing), weakness, joint pain/stiffness (new or increasing), muscle cramps, swelling of hands, feet, ankles, leg pain/redness  Skin: change in moles/freckles, rash, nodules  Hematologic/lymphatic: swollen lymph glands, abnormal bruising/bleeding  Endocrine: excessive thirst/urination, cold or heat intolerance  Neurologic/emotional: worrisome memory change, numbness/tingling, anxiety, mood swings      Current Scheduled Meds:  Outpatient Encounter Medications as of 3/19/2019   Medication Sig Dispense Refill     acetaminophen (TYLENOL) 325 MG tablet Take 2 tablets (650 mg total) by mouth every 4 (four) hours as needed.  0     amiodarone (PACERONE) 200 MG tablet Take 1 tablet (200 mg total) by mouth daily. 30 tablet 0     blood glucose test strips test 2 times daily  Profile Rx: patient will contact pharmacy when needed       cholecalciferol, vitamin D3, 1,000  "unit tablet Take 1,000 Units by mouth daily.       clopidogrel (PLAVIX) 75 mg tablet Take 1 tablet (75 mg total) by mouth daily. 30 tablet 0     cyclopentolate (CYCLOGYL) 1 % ophthalmic solution Administer 1 drop to the right eye 2 (two) times a day.       diltiazem (CARDIZEM CD) 120 MG 24 hr capsule Take 1 capsule (120 mg total) by mouth daily. 30 capsule 0     dorzolamide-timolol (COSOPT) 22.3-6.8 mg/mL ophthalmic solution Administer 1 drop to the right eye 2 (two) times a day.        fluticasone-salmeterol (ADVAIR DISKUS) 100-50 mcg/dose DISKUS Inhale 1 puff 2 (two) times a day. 2 puff 0     insulin aspart U-100 (NOVOLOG FLEXPEN U-100 INSULIN) 100 unit/mL injection pen Inject 4-6 units under the skin 3 times daily before meals as directed 15 mL 3     insulin glargine (BASAGLAR KWIKPEN) 100 unit/mL (3 mL) pen Inject 7 Units under the skin at bedtime. 5 adj dose pen 0     ipratropium-albuterol (DUO-NEB) 0.5-2.5 mg/3 mL nebulizer Take 3 mL by nebulization every 4 (four) hours as needed. 60 vial 0     losartan (COZAAR) 50 MG tablet Take 2 tablets (100 mg total) by mouth daily. (Patient taking differently: Take 100 mg by mouth every evening.       ) 180 tablet 1     OXYGEN-AIR DELIVERY SYSTEMS MISC 2 L/min into each nostril continuous. Indications: SOB, low O2 sats       pravastatin (PRAVACHOL) 40 MG tablet Take 1 tablet (40 mg total) by mouth every evening. 90 tablet 1     prednisoLONE acetate (PRED-FORTE) 1 % ophthalmic suspension Administer 1 drop to the right eye 2 (two) times a day.  1     terazosin (HYTRIN) 10 MG capsule Take 10 mg by mouth at bedtime.       ULTICARE PEN NEEDLE 31 gauge x 5/16\" Ndle Inject 1 each under the skin 4 (four) times a day. 400 each 3     vit B comp no.3-folic-C-biotin (NEPHRO-ANGEL) 1- mg-mg-mcg Tab tablet Take 1 tablet by mouth at bedtime.       vit C-s.cherry-celery-grape sd (TART CHERRY) -78-75-20 mg cap Take 1 capsule by mouth daily.       warfarin (COUMADIN/JANTOVEN) 2 " MG tablet Take 1 tablet (2 mg total) by mouth daily. Take 1 tablet (2mg) every day. Adjust dose based on INR results as directed. 30 tablet 0     melatonin 1 mg Tab tablet Take 5 mg by mouth at bedtime. 1 tab at HS to promote sleep       SPIRIVA WITH HANDIHALER 18 mcg inhalation capsule   2     umeclidinium (INCRUSE ELLIPTA) 62.5 mcg/actuation DsDv inhaler Inhale 1 puff daily. 1 each 3     [DISCONTINUED] dextromethorphan-guaiFENesin (ROBITUSSIN-DM)  mg/5 mL liquid Take 5 mL by mouth every 4 (four) hours as needed.  0     Facility-Administered Encounter Medications as of 3/19/2019   Medication Dose Route Frequency Provider Last Rate Last Dose     acetaminophen tablet 500 mg (TYLENOL)  500 mg Oral Q4H PRN Harry Patel MD         morphine injection 1-2 mg  1-2 mg Intravenous Q3H PRN Harry Patel MD         ondansetron injection 4 mg (ZOFRAN)  4 mg Intravenous Q4H PRN Harry Patel MD        Or     ondansetron tablet 8 mg (ZOFRAN)  8 mg Oral Q8H PRN Harry Patel MD         oxyCODONE immediate release tablet 5-10 mg (ROXICODONE)  5-10 mg Oral Q4H PRN Harry Patel MD         Past Medical History:   Diagnosis Date     Acute respiratory failure (H) 11/14/2017     Asthma      CHF (congestive heart failure) (H)      Chronic kidney disease      COPD (chronic obstructive pulmonary disease) (H)      Diabetes mellitus (H)      ESRD (end stage renal disease) on dialysis (H) 11/14/2017     HCAP (healthcare-associated pneumonia) 11/14/2017     Hypertension      Pulmonary congestion 11/14/2017     Renal cell carcinoma (H)      Past Surgical History:   Procedure Laterality Date     aneursym       CV CORONARY ANGIOGRAM N/A 3/1/2019    Procedure: CORONARY ANGIOGRAM;  Surgeon: Harry Patel MD;  Location: [facility];  Service: Cardiology     PARTIAL NEPHRECTOMY       Social History     Tobacco Use     Smoking status: Former Smoker     Packs/day: 1.00     Years: 55.00     Pack years: 55.00      Types: Cigarettes     Last attempt to quit: 2005     Years since quittin.2     Smokeless tobacco: Never Used   Substance Use Topics     Alcohol use: No     Drug use: No       Objective / Physical Examination:  Vitals:    19 1530   BP: 128/64   Patient Site: Right Arm   Patient Position: Sitting   Cuff Size: Adult Regular   Pulse: 80   SpO2: 97%     Wt Readings from Last 3 Encounters:   03/15/19 177 lb 12.8 oz (80.6 kg)   19 190 lb 6.4 oz (86.4 kg)   19 184 lb 1.4 oz (83.5 kg)     BP Readings from Last 3 Encounters:   19 128/64   19 (!) 86/50   19 104/56     There is no height or weight on file to calculate BMI.   Results for orders placed or performed in visit on 19   INR   Result Value Ref Range    INR 1.90 (!) 0.9 - 1.1           General Appearance: Alert and oriented, cooperative, affect appropriate, speech clear, in no apparent distress  Lungs: Clear to auscultation bilaterally. Normal inspiratory and expiratory effort  Cardiovascular: Regular rate, normal S1, S2. No murmurs, rubs, or gallops  Abdomen: Bowel sounds active all four quadrants. Soft, non-tender. No hepatomegaly or splenomegaly. No bruits detected.   Extremities: Pulses 2+ and equal throughout. No edema. Strength equal throughout.  Integumentary: Warm and dry. Without suspicious looking lesions  Neuro: Alert and oriented, follows commands appropriately.     No orders of the defined types were placed in this encounter.  Followup: No Follow-up on file. earlier if needed.    Total time spent with patient was 45 min with >50% of time spent in face-to-face counseling regarding the above plan       Clau Lazo MD

## 2021-06-25 NOTE — TELEPHONE ENCOUNTER
----- Message from Clau Lazo MD sent at 3/20/2019  6:31 AM CDT -----  Result is/are stable. Continue with dialysis.  Hemoglobin improved to 9  Continue current medications.  Call if any questions or concerns.

## 2021-06-25 NOTE — PATIENT INSTRUCTIONS - HE
Finish up spiriva for this month and then next month will go back on incruse elipta (only due to cost)  Continue with the advair every day  Stay on long acting insulin at night 6 units.  Follow up with cardiology - stay on the coumadin and plavix.

## 2021-06-25 NOTE — PROGRESS NOTES
Result is/are stable. Continue with dialysis.  Hemoglobin improved to 9  Continue current medications.  Call if any questions or concerns.

## 2021-06-25 NOTE — TELEPHONE ENCOUNTER
Hi Dr. Lazo,  I resumed home care for Mr. Walton after hospital stay and requesting further home care orders for PT evaluation and treatment, SN visits 3 x/week for 1 week, 2 weeks for 1 week, then once a week for 2 weeks.  OK for above orders?    Phone      Please read your discharge instructions. Diet as tolerated. Activity as tolerated. Keep hydrated. OTC acetaminophen or ibuprofen with food as needed for discomfort. Take your home medications as prescribed. Follow up with your doctor in the next 3-5 days to discuss your symptoms, your ED visit, your medication use, as well as to discuss any further evaluation and treatment. Return to the Emergency Department for persistent symptom(s) or any other problem.

## 2021-06-25 NOTE — PROGRESS NOTES
TCM DISCHARGE FOLLOW UP CALL    Discharge Date:  3/15/2019  Reason for hospital stay (discharge diagnosis)::  CHF, AF w/ RVR,   Are you feeling better, the same or worse since your discharge?:  Patient is feeling better (Wife reports pt is walking around the apartment. O2 sats walking room to room with O2 @ 2L, 90-91%. Pt hasn't c/o CP. He has a non-prod cough.)  Do you feel like you have a plan in the event of a health emergency?: Yes (Pt would talk to Dr Lazo or one of his specialists)    As part of your discharge plan, were  home care services ordered for you?: Yes    Have you seen them yet, or are they scheduled to visit?: Yes    Do you have any follow up visits scheduled with your PCP or Specialist?:  Yes, with PCP (3/19)  (RN) Is PCP appt scheduled soon enough (within 14 days of discharge date)?: Yes

## 2021-06-25 NOTE — TELEPHONE ENCOUNTER
ANTICOAGULATION  MANAGEMENT- Home Care/Care Facility Result    Assessment     Today's INR result of 1.90 is Subtherapeutic (goal INR of 2.0-3.0)        Warfarin recently held as instructed which may be affecting INR    No new diet changes affecting INR    Potential interaction between Amiodarone / Clopidogrel and warfarin which may affect subsequent INRs    Continues to tolerate warfarin with no reported s/s of bleeding or thromboembolism     Previous INR was Therapeutic at 2.56 on 3/15/19.    Recent hospitalization from 3/10-15/19.    Plan:     Spoke with FAUSTINA Chambers from [facility] discussed INR result and instructed:    1.  Adjust warfarin dose based on INR results.    Warfarin Dosing Instructions:   - 3/18, advised one time booster with 5mg warfarin dose   - 3/19, take 2mg warfarin dose.   - 3/20, take 4mg warfarin dose.    Next INR to be drawn:  Scheduled Thurs. 3/21/19.    Education provided: target INR goal and significance of current INR result, potential interaction between warfarin and Amiodarone and Clopidagrel  and importance of notifying clinic for changes in medications    FAUSTINA Chambers verbalizes understanding and agrees to warfarin dosing plan.   ?   Ruthy Phelps RN    Subjective/Objective:      Griffin Walton, a 84 y.o. male is established on warfarin.     Home care/care facility RN's report of Griffin INR, recent warfarin dosing, diet changes, medication changes, and symptoms is documented below.    Additional findings: verbally confirmed home dose with FAUSTINA Chambers and updated on anticoagulation calendar    - on 3/1/19 s/p Balloon aortic valvuloplasty.   - end-stage renal disease on hemodialysis 3x/wk on Mon/Wed/Fri.   - new medications on 3/15/19:    Amiodarone 200mg daily    Clopidogrel 75mg daily    Warfarin dose was decreased to 2mg daily.          Anticoagulation Episode Summary     Current INR goal:   2.0-3.0   TTR:   46.3 % (6.9 mo)   Next INR check:   3/21/2019   INR from last  check:   1.90! (3/18/2019)   Weekly max warfarin dose:      Target end date:      INR check location:      Preferred lab:      Send INR reminders to:   ANTICOAGULATION POOL B (MPW,GUERDA,STW,RVL,OAK,RLN)    Indications    Chronic atrial fibrillation (H) [I48.2]           Comments:            Anticoagulation Care Providers     Provider Role Specialty Phone number    Taiwo Mejias MD Referring Family Medicine [phone]

## 2021-06-25 NOTE — TELEPHONE ENCOUNTER
ANTICOAGULATION  MANAGEMENT- Home Care/Care Facility Result    Assessment     Today's INR result of 2.7 is Therapeutic (goal INR of 2.0-3.0)        Warfarin taken as previously instructed    No new diet changes affecting INR    Interaction between amiodarone and warfarin may be affecting INR    Continues to tolerate warfarin with no reported s/s of bleeding or thromboembolism     Previous INR was Subtherapeutic    Plan:     Spoke with Papo, nurse, discussed INR result and instructed:     Warfarin Dosing Instructions: Change warfarin dose to 4 mg daily on Mon, Wed, Fri; and 2 mg daily rest of week  (0 % change from what pt took in the last week)    Next INR to be drawn: Tue 3/26    Education provided: target INR goal and significance of current INR result and potential interaction between warfarin and amiodarone    Papo verbalizes understanding and agrees to warfarin dosing plan.   ?   Sabrina Pickard RN    Subjective/Objective:      Griffin Walton, a 84 y.o. male is established on warfarin.     Home care/care facility RN's report of Edward INR, recent warfarin dosing, diet changes, medication changes, and symptoms is documented below.    Additional findings: none    Anticoagulation Episode Summary     Current INR goal:   2.0-3.0   TTR:   46.9 % (7 mo)   Next INR check:   3/26/2019   INR from last check:   2.70 (3/21/2019)   Weekly max warfarin dose:      Target end date:      INR check location:      Preferred lab:      Send INR reminders to:   ANTICOAGULATION POOL B (MPW,HUG,STW,RVL,OAK,RLN)    Indications    Chronic atrial fibrillation (H) [I48.2]           Comments:            Anticoagulation Care Providers     Provider Role Specialty Phone number    Taiwo Mejias MD Referring Family Medicine [phone]

## 2021-06-25 NOTE — TELEPHONE ENCOUNTER
INR result is 2.7  INR   Date Value Ref Range Status   03/18/2019 1.90 (!) 0.9 - 1.1 Final       Will the patient be seen, or did they already see, MD or CNP today? No    Most Recent Warfarin dose day/week  Sunday Monday Tuesday Wednesday Thursday Friday Saturday Sunday Monday Tuesday Wednesday Thursday Friday Saturday    5 2 4          Has the patient missed any doses of Coumadin, Warfarin, Jantoven in the past 7 days? No    Has the patients medications changed since the last visit? No    Has the patient experienced any bleeding recently? No    Has the patient experienced any injuries or illness recently? No    Has the patient experienced any 'new' shortness of breath, severe headaches, or changes in vision recently? No    Has the patient had any changes in their diet, or alcohol consumption? No    Is the patient here today to prepare for any type of upcoming surgery, procedure, or for a cardioversion procedure? No    What phone number can we reach the patient at today? Please call Trish back with dosing.

## 2021-06-25 NOTE — TELEPHONE ENCOUNTER
INR result is 1.9  INR   Date Value Ref Range Status   03/15/2019 2.56 (H) 0.90 - 1.10 Final       Will the patient be seen, or did they already see, MD or CNP today? No    Most Recent Warfarin dose day/week  Sunday Monday Tuesday Wednesday Thursday Friday Saturday        2mg 2     Sunday Monday Tuesday Wednesday Thursday Friday Saturday   2             Has the patient missed any doses of Coumadin, Warfarin, Jantoven in the past 7 days? No    Has the patients medications changed since the last visit? No    Has the patient experienced any bleeding recently? No    Has the patient experienced any injuries or illness recently? No    Has the patient experienced any 'new' shortness of breath, severe headaches, or changes in vision recently? No    Has the patient had any changes in their diet, or alcohol consumption? No    Is the patient here today to prepare for any type of upcoming surgery, procedure, or for a cardioversion procedure? No    What phone number can we reach the patient at today? [phone] Home care nurse Trish.

## 2021-06-25 NOTE — TELEPHONE ENCOUNTER
Critical lab result: Creatinine=6.33. Drawn @ 1614 today. Ordered by Dr EUSEBIA Lazo, [facility].  Seen in office today, hospital visit follow up.   CIELO 3/10-3/15/2019 aortic valve replacement.   Dialysis patient. 4 times per week per home care visit note.   3/15/2019=7.05  DR LUZ ELENA Park on call: notified of Creatinine result.   No further orders given @ 10:20pm.    Tena Rockwell RN [facility] Triage Nurse.

## 2021-06-27 NOTE — PROGRESS NOTES
Progress Notes by Liliana Garcia PA-C at 4/16/2019 10:00 AM     Author: Liliana Garcia PA-C Service: -- Author Type: Physician Assistant    Filed: 4/16/2019  2:38 PM Encounter Date: 4/16/2019 Status: Signed    : Liliana Garcia PA-C (Physician Assistant)           Click to link to [facility] HEART [facility] NOTE      Assessment/Recommendations   Diagnoses and all orders for this visit:    Severe aortic stenosis - S/P TAVR on April 2 via the left subclavian approach and using a 26 Yesi 3 valve.  Patient is stable at the one-week arelis.  Still tired and sleeping a lot and has not seen a big difference in his functional status.  Left subclavian incision looks good and he can resume activity as tolerated.  He will start in-home occupational therapy soon.  Repeat echocardiogram will be April 30 and he will see Dr. Patel on May 2 for his one-month follow-up visit.    Coronary artery disease -status post PCI to the LAD with rotational atherectomy and drug-eluting stent on March 1, 2019.  Patient now on Plavix  and statin therapy.  No beta-blocker at this time because of hypotension.  He reports no angina.    ESRD (end stage renal disease) - on dialysis.  Weights have been stable around the 83-84 kg.    Chronic atrial fibrillation -rate control strategy with diltiazem.  Discontinue amiodarone at this time as we are not looking to convert him to normal sinus rhythm.  He is on warfarin therapy for stroke prophylaxis    COPD -on home oxygen and breathing stable at this time.  Continue current inhalers    Thank you for the opportunity to participate in the care of Griffin Walton. It's been a pleasure working with him.  Please do not hesitate to call with any questions or concerns.     History of Present Illness    I had the opportunity to see Griffin Walton at the [facility] for his one week follow-up visit after TAVR (transcatheter aortic  valve replacement).  His wife accompanies him to the visit.    Griffin is a 84-year-old gentleman with a history of symptomatic severe aortic stenosis, acute on chronic diastolic heart failure, chronic atrial fibrillation, end-stage renal disease, COPD on home oxygen, CAD with recent stent to the LAD, insulin-dependent diabetes mellitus, anemia of chronic disease and AAA with repair in 2005.  He was evaluated by multidisciplinary team and because of an STS risk score above 20%, had transcatheter valve replacement on April 2.  He did well in the postoperative course and was sent home on April 4.    He is now back to his regular dialysis schedule.  He states that his breathing is stable and dialysis runs have been going well.  His oxygen saturations have been holding steady between 90 and 95%.  His biggest complaint is that he is tired and is still sleeping a lot.   He denies exertional chest discomfort, palpitations, shortness of breath at rest, PND, orthopnea, lightheadedness, dizziness, pre-syncope or syncope.  Griffin Walton also denies any recent weight loss, changes in appetite, nausea or vomiting.   ____________________________________________________________  Echo: Predischarge echocardiogram done on April 3 showed -     Normal left ventricular size.The calculated left ventricular ejection fraction is 62%. This represents a normal ejection fraction. Mild concentric hypertrophy noted. E/e' > 15, suggesting elevated LV filling pressures.    The left ventricular wall motion is normal.    Normal right ventricular size and systolic function. TAPSE is normal, which is consistent with normal right ventricular systolic function.    Trace regurgitation. Aortic insufficiency appears mild and appears paravalvular at about 2:00 on the short axis view. There is a 26 mm Yesi 3 YESI bioprosthetic valve present. Mean gradient across the bioprosthetic valve is 12 mmHg    No pericardial effusion.           Physical  Examination Review of Systems   Vitals:    04/16/19 0954   BP: 104/60   Pulse: 100   Resp: 20     Body mass index is 28.71 kg/m .  Wt Readings from Last 3 Encounters:   04/16/19 188 lb 12.8 oz (85.6 kg)   04/04/19 169 lb 3.2 oz (76.7 kg)   04/01/19 185 lb (83.9 kg)       General Appearance:   Alert, cooperative and in no acute distress   ENT/Mouth: membranes moist, no oral lesions or bleeding gums.      EYES:  no scleral icterus, normal conjunctivae   Neck: no carotid bruits.  Thyroid not visualized   Chest/Lungs:   lungs are clear to auscultation, no rales or wheezing   Cardiovascular:   irregular. Normal first and second heart sounds with no murmurs, rubs, or gallops; the carotid, radial and posterior tibial pulses are intact, no edema bilaterally    Abdomen:  Soft and nontender. Bowel sounds are present in all quadrants   Extremities: no cyanosis or clubbing.  Subclavian incision healing well   Skin: no xanthelasma, warm.    Neurologic: normal gait, normal  bilateral, no tremors   Psychiatric: Normal mood and affect    General: WNL  Eyes: WNL  Ears/Nose/Throat: WNL  Lungs: WNL  Heart: WNL  Stomach: WNL  Bladder: WNL  Muscle/Joints: WNL  Skin: WNL  Nervous System: WNL  Mental Health: WNL     Blood: WNL     Medical History  Surgical History Family History Social History   Past Medical History:   Diagnosis Date   ? Acute respiratory failure (H) 11/14/2017   ? Asthma    ? CHF (congestive heart failure) (H)    ? Chronic kidney disease    ? COPD (chronic obstructive pulmonary disease) (H)    ? Diabetes mellitus (H)    ? ESRD (end stage renal disease) on dialysis (H) 11/14/2017   ? HCAP (healthcare-associated pneumonia) 11/14/2017   ? Hypertension    ? Pulmonary congestion 11/14/2017   ? Renal cell carcinoma (H)     Past Surgical History:   Procedure Laterality Date   ? ABDOMINAL AORTIC ANEURYSM REPAIR  2007   ? aneursym     ? CV CORONARY ANGIOGRAM N/A 3/1/2019    Procedure: CORONARY ANGIOGRAM;  Surgeon: Jorge  MD Harry;  Location: [facility];  Service: Cardiology   ? CV OTHER APPROACH TRANSCATHETER (TAVR) N/A 2019    Procedure: Transcatheter Aortic Valve Replacement - subclavian;  Surgeon: Harry Patel MD;  Location: [facility];  Service: Cardiology   ? PARTIAL NEPHRECTOMY      Family History   Problem Relation Age of Onset   ? Hypertension Mother    ? Cancer Father    ? COPD Sister    ? Cancer Brother     Social History     Socioeconomic History   ? Marital status:      Spouse name: Not on file   ? Number of children: Not on file   ? Years of education: Not on file   ? Highest education level: Not on file   Occupational History   ? Not on file   Social Needs   ? Financial resource strain: Not on file   ? Food insecurity:     Worry: Not on file     Inability: Not on file   ? Transportation needs:     Medical: Not on file     Non-medical: Not on file   Tobacco Use   ? Smoking status: Former Smoker     Packs/day: 1.00     Years: 55.00     Pack years: 55.00     Types: Cigarettes     Last attempt to quit: 2005     Years since quittin.2   ? Smokeless tobacco: Never Used   Substance and Sexual Activity   ? Alcohol use: No   ? Drug use: No   ? Sexual activity: Never   Lifestyle   ? Physical activity:     Days per week: Not on file     Minutes per session: Not on file   ? Stress: Not on file   Relationships   ? Social connections:     Talks on phone: Not on file     Gets together: Not on file     Attends Buddhist service: Not on file     Active member of club or organization: Not on file     Attends meetings of clubs or organizations: Not on file     Relationship status: Not on file   ? Intimate partner violence:     Fear of current or ex partner: Not on file     Emotionally abused: Not on file     Physically abused: Not on file     Forced sexual activity: Not on file   Other Topics Concern   ? Not on file   Social History Narrative   ? Not on file          Medications  Allergies  "  Current Outpatient Medications   Medication Sig Dispense Refill   ? acetaminophen (TYLENOL) 325 MG tablet Take 2 tablets (650 mg total) by mouth every 4 (four) hours as needed.  0   ? blood glucose test strips test 2 times daily  Profile Rx: patient will contact pharmacy when needed     ? cholecalciferol, vitamin D3, 1,000 unit tablet Take 1,000 Units by mouth daily.     ? clopidogrel (PLAVIX) 75 mg tablet Take 1 tablet (75 mg total) by mouth daily. 30 tablet 0   ? cyclopentolate (CYCLOGYL) 1 % ophthalmic solution Administer 1 drop to the right eye 2 (two) times a day.     ? diltiazem (CARDIZEM CD) 120 MG 24 hr capsule Take 1 capsule (120 mg total) by mouth daily. 30 capsule 0   ? dorzolamide-timolol (COSOPT) 22.3-6.8 mg/mL ophthalmic solution Administer 1 drop to the right eye 2 (two) times a day.      ? fluticasone-salmeterol (ADVAIR DISKUS) 100-50 mcg/dose DISKUS Inhale 1 puff 2 (two) times a day. 2 puff 0   ? insulin aspart U-100 (NOVOLOG FLEXPEN U-100 INSULIN) 100 unit/mL injection pen Inject 4-6 units under the skin 3 times daily before meals as directed 15 mL 3   ? insulin glargine (BASAGLAR KWIKPEN) 100 unit/mL (3 mL) pen Inject 7 Units under the skin at bedtime. 5 adj dose pen 0   ? ipratropium-albuterol (DUO-NEB) 0.5-2.5 mg/3 mL nebulizer Take 3 mL by nebulization every 4 (four) hours as needed. 60 vial 0   ? OXYGEN-AIR DELIVERY SYSTEMS MISC 2-3 L/min into each nostril continuous. Indications: SOB, low O2 sats     ? pravastatin (PRAVACHOL) 40 MG tablet Take 1 tablet (40 mg total) by mouth every evening. 90 tablet 1   ? prednisoLONE acetate (PRED-FORTE) 1 % ophthalmic suspension Administer 1 drop to the right eye 2 (two) times a day.  1   ? SPIRIVA WITH HANDIHALER 18 mcg inhalation capsule Place 18 mcg into inhaler and inhale at bedtime. 2 puffs        2   ? terazosin (HYTRIN) 10 MG capsule Take 10 mg by mouth at bedtime.     ? ULTICARE PEN NEEDLE 31 gauge x 5/16\" Ndle Inject 1 each under the skin 4 " (four) times a day. 400 each 3   ? vit B comp no.3-folic-C-biotin (NEPHRO-ANGEL) 1- mg-mg-mcg Tab tablet Take 1 tablet by mouth at bedtime.     ? warfarin (COUMADIN/JANTOVEN) 2 MG tablet Take 0.5 tablets (1 mg total) by mouth daily. Adjust dose based on INR results as directed. 30 tablet 0     No current facility-administered medications for this visit.      Facility-Administered Medications Ordered in Other Visits   Medication Dose Route Frequency Provider Last Rate Last Dose   ? acetaminophen tablet 500 mg (TYLENOL)  500 mg Oral Q4H PRN Harry Patel MD       ? morphine injection 1-2 mg  1-2 mg Intravenous Q3H PRN Harry Patel MD       ? ondansetron injection 4 mg (ZOFRAN)  4 mg Intravenous Q4H PRN Harry Patel MD   4 mg at 04/02/19 0911    Or   ? ondansetron tablet 8 mg (ZOFRAN)  8 mg Oral Q8H PRN Harry Patel MD       ? oxyCODONE immediate release tablet 5-10 mg (ROXICODONE)  5-10 mg Oral Q4H PRN Harry Patel MD          Allergies   Allergen Reactions   ? Aspirin Angioedema     Tongue and lip swelling   ? Monosodium Glutamate Angioedema     Tongue and lip swelling   ? Dye      Contrast allergy   ? Heparin Unknown     Does not use heparin for dialysis; on low intensity coumadin   ? Hydralazine Unknown         Lab Results    Chemistry/lipid CBC Cardiac Enzymes/BNP/TSH/INR   Lab Results   Component Value Date    CHOL 107 08/01/2018    HDL 43 08/01/2018    LDLCALC 56 08/01/2018    TRIG 41 08/01/2018    CREATININE 4.93 (H) 04/04/2019    BUN 35 (H) 04/04/2019    K 5.0 04/04/2019     (L) 04/04/2019    CL 99 04/04/2019    CO2 27 04/04/2019    Lab Results   Component Value Date    WBC 12.0 (H) 04/04/2019    HGB 8.1 (L) 04/04/2019    HCT 26.8 (L) 04/04/2019    MCV 96 04/04/2019     04/04/2019    Lab Results   Component Value Date    CKTOTAL 23 (L) 03/09/2019    TROPONINI 0.06 03/10/2019    BNP 1,839 (H) 04/01/2019    TSH 8.04 (H) 03/09/2019    INR 1.40 (!) 04/12/2019           25 minutes were spent with the patient with greater than 50% spent on education and counseling.    This note has been dictated using voice recognition software. Any grammatical or context distortions are unintentional and inherent to the software.    Liliana Garcia PA-C, MPAS  [facility]  Formerly [facility]

## 2021-06-28 NOTE — PROGRESS NOTES
Progress Notes by Leticia Correa CNP at 10/24/2019 10:30 AM     Author: Leticia Correa CNP Service: -- Author Type: Nurse Practitioner    Filed: 10/24/2019 11:10 AM Encounter Date: 10/24/2019 Status: Signed    : Leticia Correa CNP (Nurse Practitioner)             Assessment/Recommendations   Assessment:    1.  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  His symptoms have improved significantly since hospitalization.  We discussed monitoring symptoms, following a low-sodium diet, monitoring weights, and heart failure treatment.  He has dialysis 3 days a week where his weights are monitored.    2.  Permanent atrial ablation: Rate controlled.  His diltiazem was increased during hospitalization.  Continues warfarin for anticoagulation.    3.  Hypertension: Controlled.  Blood pressure 108/52    Plan:  1.  Continue current medications  2.  Continue low-sodium diet and monitoring weights at dialysis  3.  Encouraged exercise    Griffin Walton will follow up Dr. Yusuf November 12 and in the heart failure clinic in 3 months.     History of Present Illness/Subjective    Griffin Walton is seen at [facility] heart failure clinic today for post-hospitalization follow-up.  His son and wife accompany him today. He was hospitalized at [facility] from October 7 to October 9, 2019 with shortness of breath.  He is found to have atrial fibrillation with rapid ventricular response.  He was treated with IV diltiazem and his diltiazem was increased and improved heart rate.  His ejection fraction is slightly decreased likely due to tachycardia mediated cardiomyopathy.  The most recent evaluation of His ejection fraction was 40-45% from an  echo on 10/8/2019.  His past medical history is also significant for hypertension, coronary artery disease, permanent atrial fibrillation, dyslipidemia, COPD, diabetes and end-stage renal disease on  dialysis.  He is also legally blind.  Status post TAVR April 2019.    Since being discharged from the hospital, Ed feels that he is improving.  He denies any acute heart failure symptoms.  He has mild dyspnea on exertion but states this has improved since hospitalization.  He denies orthopnea or PND.  He denies dizziness or lightheadedness.  He denies bloating or lower extremity edema.  He denies lightheadedness, shortness of breath, orthopnea, PND, palpitations, chest pain, abdominal fullness/bloating and lower extremity edema.      He is not monitoring daily weights due to having dialysis 3 days a week where his weights are monitored. He is following a low sodium diet.      ECHOCARDIOGRAM: 10/8/2019  Summary     1. The left ventricle is normal in size. Left ventricular systolic performance is mild to moderately reduced. The ejection fraction is estimated to be 40-45%.   2. There is mild to moderate global reduction in left ventricular systolic performance.   3. There is mild concentric increase in left ventricular wall thickness.  4. There is a bio-prosthetic aortic valve (documented 26 mm Mancera Yesi 3).     Normal aortic valve prosthesis metrics with a mean systolic gradient of 8 mmHg and a peak anterograde velocity of 1.9 m/sec.      There is trace aortic insufficiency.  5. Normal right ventricular size with mildly reduced right ventricular systolic performance.  6. There is moderate to severe left atrial enlargement. There is mild right atrial enlargement.           Physical Examination Review of Systems   Vitals:    10/24/19 1038   BP: 108/52   Pulse: 70   Resp: 14     Body mass index is 29.8 kg/m .  Wt Readings from Last 3 Encounters:   10/24/19 196 lb (88.9 kg)   10/17/19 192 lb (87.1 kg)   10/09/19 191 lb 1.6 oz (86.7 kg)       General Appearance:   no distress, normal body habitus   ENT/Mouth: membranes moist, no oral lesions or bleeding gums.      EYES:  no scleral icterus, normal conjunctivae,  legally blind   Neck: no carotid bruits or thyromegaly   Chest/Lungs:   lungs are clear to auscultation, no rales or wheezing, equal chest wall expansion    Cardiovascular:    Irregularly irregular. Normal first and second heart sounds with no murmurs, rubs, or gallops; Jugular venous pressure normal, no edema     Abdomen:  no organomegaly, masses, bruits, or tenderness; bowel sounds are present   Extremities: no cyanosis or clubbing   Skin: no xanthelasma, warm.    Neurologic: normal  bilateral, no tremors     Psychiatric: alert and oriented x3                                              Medical History  Surgical History Family History Social History   Past Medical History:   Diagnosis Date   ? Acute respiratory failure (H) 11/14/2017   ? Asthma    ? CHF (congestive heart failure) (H)    ? Chronic kidney disease    ? COPD (chronic obstructive pulmonary disease) (H)    ? Diabetes mellitus (H)    ? ESRD (end stage renal disease) on dialysis (H) 11/14/2017   ? HCAP (healthcare-associated pneumonia) 11/14/2017   ? Hypertension    ? Pulmonary congestion 11/14/2017   ? Renal cell carcinoma (H)     Past Surgical History:   Procedure Laterality Date   ? ABDOMINAL AORTIC ANEURYSM REPAIR  2007   ? aneursym     ? CV CORONARY ANGIOGRAM N/A 3/1/2019    Procedure: CORONARY ANGIOGRAM;  Surgeon: Harry Patel MD;  Location: [facility];  Service: Cardiology   ? CV OTHER APPROACH TRANSCATHETER (TAVR) N/A 4/2/2019    Procedure: Transcatheter Aortic Valve Replacement - subclavian;  Surgeon: Harry Patel MD;  Location: [facility];  Service: Cardiology   ? PARTIAL NEPHRECTOMY      Family History   Problem Relation Age of Onset   ? Hypertension Mother    ? Cancer Father    ? COPD Sister    ? Cancer Brother     Social History     Socioeconomic History   ? Marital status:      Spouse name: Not on file   ? Number of children: Not on file   ? Years of education: Not on file   ? Highest education  level: Not on file   Occupational History   ? Not on file   Social Needs   ? Financial resource strain: Not on file   ? Food insecurity:     Worry: Not on file     Inability: Not on file   ? Transportation needs:     Medical: Not on file     Non-medical: Not on file   Tobacco Use   ? Smoking status: Former Smoker     Packs/day: 1.00     Years: 55.00     Pack years: 55.00     Types: Cigarettes     Last attempt to quit: 2005     Years since quittin.8   ? Smokeless tobacco: Never Used   Substance and Sexual Activity   ? Alcohol use: No   ? Drug use: No   ? Sexual activity: Never   Lifestyle   ? Physical activity:     Days per week: Not on file     Minutes per session: Not on file   ? Stress: Not on file   Relationships   ? Social connections:     Talks on phone: Not on file     Gets together: Not on file     Attends Yarsanism service: Not on file     Active member of club or organization: Not on file     Attends meetings of clubs or organizations: Not on file     Relationship status: Not on file   ? Intimate partner violence:     Fear of current or ex partner: Not on file     Emotionally abused: Not on file     Physically abused: Not on file     Forced sexual activity: Not on file   Other Topics Concern   ? Not on file   Social History Narrative   ? Not on file          Medications  Allergies   Current Outpatient Medications   Medication Sig Dispense Refill   ? acetaminophen (TYLENOL) 325 MG tablet Take 2 tablets (650 mg total) by mouth every 4 (four) hours as needed.  0   ? cholecalciferol, vitamin D3, 1,000 unit tablet Take 1,000 Units by mouth daily.     ? clopidogrel (PLAVIX) 75 mg tablet Take 1 tablet (75 mg total) by mouth daily. 30 tablet 0   ? cyclopentolate (CYCLOGYL) 1 % ophthalmic solution Administer 1 drop to the right eye 2 (two) times a day.     ? diltiazem (CARDIZEM CD) 180 MG 24 hr capsule Take 1 capsule (180 mg total) by mouth daily. 30 capsule 0   ? diltiazem (CARTIA XT) 180 MG 24 hr capsule  "Take 1 capsule by mouth daily.     ? dorzolamide-timolol (COSOPT) 22.3-6.8 mg/mL ophthalmic solution Administer 1 drop to the right eye 2 (two) times a day.      ? fluticasone-salmeterol (ADVAIR DISKUS) 100-50 mcg/dose DISKUS Inhale 1 puff 2 (two) times a day. 2 puff 0   ? insulin aspart U-100 (NOVOLOG FLEXPEN U-100 INSULIN) 100 unit/mL injection pen Inject 4-6 units under the skin 3 times daily before meals as directed 15 mL 3   ? insulin glargine (BASAGLAR KWIKPEN) 100 unit/mL (3 mL) pen Inject 7 Units under the skin at bedtime. 5 adj dose pen 0   ? ipratropium-albuterol (DUO-NEB) 0.5-2.5 mg/3 mL nebulizer Take 3 mL by nebulization every 4 (four) hours as needed. 60 vial 0   ? ONETOUCH ULTRA BLUE TEST STRIP strips TEST TWICE DAILY 150 strip 4   ? OXYGEN-AIR DELIVERY SYSTEMS MISC 2-3 L/min into each nostril continuous. Indications: SOB, low O2 sats     ? pravastatin (PRAVACHOL) 40 MG tablet Take 1 tablet (40 mg total) by mouth every evening. 90 tablet 3   ? prednisoLONE acetate (PRED-FORTE) 1 % ophthalmic suspension Administer 1 drop to the right eye 2 (two) times a day.  1   ? terazosin (HYTRIN) 10 MG capsule Take 1 capsule (10 mg total) by mouth at bedtime. 90 capsule 1   ? ULTICARE PEN NEEDLE 31 gauge x 5/16\" Ndle Inject 1 each under the skin 4 (four) times a day. 400 each 3   ? umeclidinium (INCRUSE ELLIPTA) 62.5 mcg/actuation DsDv inhaler Inhale 1 puff daily. 1 each 5   ? vit B comp no.3-folic-C-biotin (NEPHRO-ANGEL) 1- mg-mg-mcg Tab tablet Take 1 tablet by mouth at bedtime.     ? warfarin (COUMADIN/JANTOVEN) 2 MG tablet Take 2 tablets (4 mg total) by mouth daily. 180 tablet 1     No current facility-administered medications for this visit.     Allergies   Allergen Reactions   ? Aspirin Angioedema     Tongue and lip swelling   ? Monosodium Glutamate Angioedema     Tongue and lip swelling   ? Dye      Contrast allergy   ? Heparin Unknown     Does not use heparin for dialysis; on low intensity coumadin   ? " Hydralazine Unknown         Lab Results    Chemistry/lipid CBC Cardiac Enzymes/BNP/TSH/INR   Lab Results   Component Value Date    CHOL 142 04/17/2019    HDL 57 04/17/2019    LDLCALC 74 04/17/2019    TRIG 57 04/17/2019    CREATININE 9.01 (HH) 10/07/2019    BUN 67 (H) 10/07/2019    K 4.9 10/07/2019     10/07/2019    CL 99 10/07/2019    CO2 25 10/07/2019    Lab Results   Component Value Date    WBC 10.2 10/07/2019    HGB 10.1 (L) 10/07/2019    HCT 31.7 (L) 10/07/2019    MCV 94 10/07/2019     10/07/2019    Lab Results   Component Value Date    CKTOTAL 23 (L) 03/09/2019    TROPONINI 0.06 10/07/2019    BNP 1,839 (H) 04/01/2019    TSH 8.04 (H) 03/09/2019    INR 2.40 (H) 10/17/2019

## 2021-06-28 NOTE — PROGRESS NOTES
Progress Notes by Leticia Correa CNP at 11/18/2019  3:30 PM     Author: Leticia Correa CNP Service: -- Author Type: Nurse Practitioner    Filed: 11/18/2019  4:08 PM Encounter Date: 11/18/2019 Status: Signed    : Leticia Correa CNP (Nurse Practitioner)             Assessment/Recommendations   Assessment:    1.  Heart failure with reduced ejection fraction, NYHA class II: Compensated.  His weight has decreased with close monitoring of dialysis.  He continues to have fatigue and dyspnea on exertion.  He follows a low-sodium diet.    2.  Permanent atrial fibrillation: Rate controlled.  He continues warfarin for anticoagulation.  His INR today was 2.1.  Dr. Yusuf changed his diltiazem to metoprolol.    3.  Hypertension: Controlled.  Blood pressure 120/80    4.  End-stage renal disease on dialysis: He has dialysis Mondays, Wednesdays, and Fridays.  He had 1.7 L removed today.  His weights have been stable at dialysis.  He had labs last week and his wife states his potassium level was within normal limits.    Plan:  1.  Continue current medications  2.  Continue low-sodium diet and monitoring weights at dialysis    Griffin Walton will follow up with Dr. Yusuf in January and in the heart failure clinic in March.     History of Present Illness/Subjective    Mr. Griffin Walton is a 85 y.o. male seen at [facility] heart failure clinic today for continued follow-up.  His wife and son accompany him today.  He follows up for heart failure with reduced ejection fraction.  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  He was hospitalized in October with atrial fibrillation with rapid ventricular response.  His most recent echocardiogram was done October 8, 2018 which showed ejection fraction of 40 to 45%.  He has a past medical history significant for hypertension, coronary artery disease, permanent atrial fibrillation, dyslipidemia, COPD, diabetes, and end-stage renal disease on  dialysis.  He is also legally blind in the right eye.  Status post TAVR April 2019.    During the last clinic visit, Dr. Yusuf started him on Bumex 1 mg twice a day and recommended dialysis removing 4 L of fluid the next day.  He did have 3.5 L removed at dialysis the next day.  His weight today at dialysis was 87.6 kg.  His weight has been stable since.  He has mild dyspnea on exertion and is on chronic oxygen.  He denies orthopnea or PND.  He denies abdominal bloating or lower extremity edema.  He denies lightheadedness, shortness of breath, orthopnea, PND, palpitations, chest pain, abdominal fullness/bloating and lower extremity edema.      His weights are monitored at dialysis 3 days a week.       Physical Examination Review of Systems   Vitals:    11/18/19 1535   BP: 120/80   Pulse: (!) 53   Resp: 16     Body mass index is 29.24 kg/m .  Wt Readings from Last 3 Encounters:   11/18/19 192 lb 4.8 oz (87.2 kg)   10/24/19 196 lb (88.9 kg)   10/17/19 192 lb (87.1 kg)       General Appearance:   no distress, normal body habitus   ENT/Mouth: membranes moist, no oral lesions or bleeding gums.      EYES:  no scleral icterus, normal conjunctivae   Neck: no carotid bruits or thyromegaly   Chest/Lungs:   lungs are clear to auscultation, no rales or wheezing, equal chest wall expansion    Cardiovascular:    Irregularly irregular. Normal first and second heart sounds with no murmurs, rubs, or gallops; Jugular venous pressure normal, no edema     Abdomen:  no organomegaly, masses, bruits, or tenderness; bowel sounds are present   Extremities: no cyanosis or clubbing   Skin: no xanthelasma, warm.    Neurologic: normal  bilateral, no tremors     Psychiatric: alert and oriented x3    General: WNL  Eyes: WNL  Ears/Nose/Throat: WNL  Lungs: WNL  Heart: WNL  Stomach: WNL  Bladder: WNL  Muscle/Joints: Muscle Pain  Skin: WNL  Nervous System: WNL  Mental Health: WNL     Blood: WNL     Medical History  Surgical History Family  History Social History   Past Medical History:   Diagnosis Date   ? Acute respiratory failure (H) 2017   ? Asthma    ? CHF (congestive heart failure) (H)    ? Chronic kidney disease    ? COPD (chronic obstructive pulmonary disease) (H)    ? Diabetes mellitus (H)    ? ESRD (end stage renal disease) on dialysis (H) 2017   ? HCAP (healthcare-associated pneumonia) 2017   ? Hypertension    ? Pulmonary congestion 2017   ? Renal cell carcinoma (H)     Past Surgical History:   Procedure Laterality Date   ? ABDOMINAL AORTIC ANEURYSM REPAIR     ? aneursym     ? CV CORONARY ANGIOGRAM N/A 3/1/2019    Procedure: CORONARY ANGIOGRAM;  Surgeon: Harry Patel MD;  Location: [facility];  Service: Cardiology   ? CV OTHER APPROACH TRANSCATHETER (TAVR) N/A 2019    Procedure: Transcatheter Aortic Valve Replacement - subclavian;  Surgeon: Harry Patel MD;  Location: [facility] Cath Lab;  Service: Cardiology   ? PARTIAL NEPHRECTOMY      Family History   Problem Relation Age of Onset   ? Hypertension Mother    ? Cancer Father    ? COPD Sister    ? Cancer Brother     Social History     Socioeconomic History   ? Marital status:      Spouse name: Not on file   ? Number of children: Not on file   ? Years of education: Not on file   ? Highest education level: Not on file   Occupational History   ? Not on file   Social Needs   ? Financial resource strain: Not on file   ? Food insecurity:     Worry: Not on file     Inability: Not on file   ? Transportation needs:     Medical: Not on file     Non-medical: Not on file   Tobacco Use   ? Smoking status: Former Smoker     Packs/day: 1.00     Years: 55.00     Pack years: 55.00     Types: Cigarettes     Last attempt to quit: 2005     Years since quittin.8   ? Smokeless tobacco: Never Used   Substance and Sexual Activity   ? Alcohol use: No   ? Drug use: No   ? Sexual activity: Never   Lifestyle   ? Physical activity:     Days per week:  Not on file     Minutes per session: Not on file   ? Stress: Not on file   Relationships   ? Social connections:     Talks on phone: Not on file     Gets together: Not on file     Attends Baptism service: Not on file     Active member of club or organization: Not on file     Attends meetings of clubs or organizations: Not on file     Relationship status: Not on file   ? Intimate partner violence:     Fear of current or ex partner: Not on file     Emotionally abused: Not on file     Physically abused: Not on file     Forced sexual activity: Not on file   Other Topics Concern   ? Not on file   Social History Narrative   ? Not on file          Medications  Allergies   Current Outpatient Medications   Medication Sig Dispense Refill   ? acetaminophen (TYLENOL) 325 MG tablet Take 2 tablets (650 mg total) by mouth every 4 (four) hours as needed.  0   ? bumetanide (BUMEX) 1 MG tablet Take 1 tablet (1 mg total) by mouth 2 (two) times a day at 9am and 6pm. 180 tablet 4   ? cholecalciferol, vitamin D3, 1,000 unit tablet Take 1,000 Units by mouth daily.     ? clopidogrel (PLAVIX) 75 mg tablet Take 1 tablet (75 mg total) by mouth daily. 30 tablet 0   ? cyclopentolate (CYCLOGYL) 1 % ophthalmic solution Administer 1 drop to the right eye 2 (two) times a day.     ? dorzolamide-timolol (COSOPT) 22.3-6.8 mg/mL ophthalmic solution Administer 1 drop to the right eye 2 (two) times a day.      ? fluticasone-salmeterol (ADVAIR DISKUS) 100-50 mcg/dose DISKUS Inhale 1 puff 2 (two) times a day. 2 puff 0   ? insulin aspart U-100 (NOVOLOG FLEXPEN U-100 INSULIN) 100 unit/mL injection pen Inject 4-6 units under the skin 3 times daily before meals as directed 15 mL 3   ? insulin glargine (BASAGLAR KWIKPEN) 100 unit/mL (3 mL) pen Inject 7 Units under the skin at bedtime. 5 adj dose pen 0   ? ipratropium-albuterol (DUO-NEB) 0.5-2.5 mg/3 mL nebulizer Take 3 mL by nebulization every 4 (four) hours as needed. 60 vial 0   ? metoprolol tartrate  "(LOPRESSOR) 25 MG tablet Take 1 tablet (25 mg total) by mouth 2 (two) times a day. 180 tablet 4   ? ONETOUCH ULTRA BLUE TEST STRIP strips TEST TWICE DAILY 150 strip 4   ? OXYGEN-AIR DELIVERY SYSTEMS MISC 2-3 L/min into each nostril continuous. Indications: SOB, low O2 sats     ? pravastatin (PRAVACHOL) 40 MG tablet Take 1 tablet (40 mg total) by mouth every evening. 90 tablet 3   ? prednisoLONE acetate (PRED-FORTE) 1 % ophthalmic suspension Administer 1 drop to the right eye 2 (two) times a day.  1   ? terazosin (HYTRIN) 10 MG capsule Take 1 capsule (10 mg total) by mouth at bedtime. 90 capsule 1   ? ULTICARE PEN NEEDLE 31 gauge x 5/16\" Ndle Inject 1 each under the skin 4 (four) times a day. 400 each 3   ? umeclidinium (INCRUSE ELLIPTA) 62.5 mcg/actuation DsDv inhaler Inhale 1 puff daily. 1 each 5   ? vit B comp no.3-folic-C-biotin (NEPHRO-ANGEL) 1- mg-mg-mcg Tab tablet Take 1 tablet by mouth at bedtime.     ? warfarin (COUMADIN/JANTOVEN) 2 MG tablet Take 2 tablets (4 mg total) by mouth daily. 180 tablet 1   ? diltiazem (CARTIA XT) 180 MG 24 hr capsule Take 1 capsule (180 mg total) by mouth daily. 90 capsule 3     No current facility-administered medications for this visit.     Allergies   Allergen Reactions   ? Aspirin Angioedema     Tongue and lip swelling   ? Monosodium Glutamate Angioedema     Tongue and lip swelling   ? Dye      Contrast allergy   ? Heparin Unknown     Does not use heparin for dialysis; on low intensity coumadin   ? Hydralazine Unknown         Lab Results    Chemistry/lipid CBC Cardiac Enzymes/BNP/TSH/INR   Lab Results   Component Value Date    CHOL 142 04/17/2019    HDL 57 04/17/2019    LDLCALC 74 04/17/2019    TRIG 57 04/17/2019    CREATININE 9.01 (HH) 10/07/2019    BUN 67 (H) 10/07/2019    K 4.9 10/07/2019     10/07/2019    CL 99 10/07/2019    CO2 25 10/07/2019    Lab Results   Component Value Date    WBC 10.2 10/07/2019    HGB 10.1 (L) 10/07/2019    HCT 31.7 (L) 10/07/2019    MCV " 94 10/07/2019     10/07/2019    Lab Results   Component Value Date    CKTOTAL 23 (L) 03/09/2019    TROPONINI 0.06 10/07/2019    BNP 1,839 (H) 04/01/2019    TSH 8.04 (H) 03/09/2019    INR 2.10 (!) 11/18/2019             This note has been dictated using voice recognition software. Any grammatical, typographical, or context distortions are unintentional and inherent to the software

## 2021-06-28 NOTE — PROGRESS NOTES
Progress Notes by Leticia Correa CNP at 3/12/2020 12:50 PM     Author: Leticia Correa CNP Service: -- Author Type: Nurse Practitioner    Filed: 3/12/2020  1:15 PM Encounter Date: 3/12/2020 Status: Signed    : Leticia Correa CNP (Nurse Practitioner)             Assessment/Recommendations   Assessment:    1.  Heart failure with reduced ejection fraction, NYHA class III: He states his dyspnea on exertion has worsened the last few days.  His weights are monitored at dialysis 3 days a week.  He follows a low-sodium diet.  He denies any other acute heart failure symptoms.    2.  Permanent atrial fibrillation: Rate slightly elevated.  Tachycardia could be cause of increased shortness of breath the last few days.  He continues warfarin for anticoagulation.  He is on Metroprolol 25 mg twice a day.    3.  Hypertension: Controlled.  Blood pressure 136/62    Plan:  1.  Increase metoprolol to 50 mg in the morning and 25 mg in the afternoon  2.  Please call clinic if shortness of breath does not improve or worsen  3.  Continue low-sodium diet    Griffin Walton will follow up with Dr. Yusuf in July and in the heart failure clinic in 1 month.     History of Present Illness/Subjective    Mr. Griffin Walton is a 85 y.o. male seen at [facility] heart failure clinic today for continued follow-up.  His wife and son accompany him today.  He follows up for heart failure with reduced ejection fraction.  He had a nuclear stress test February 11, 2020 which showed ejection fraction of 49% and no ischemia.  He has a past medical history significant for hypertension, coronary artery disease, permanent atrial fibrillation, dyslipidemia, COPD, diabetes, and end-stage renal disease on dialysis.  He is legally blind in his right eye.  Status post TAVR April 2019.    Today, he comes in stating he has had increased dyspnea on exertion the last few days.  He denies orthopnea or PND.  He denies chest pain.   He has trace lower extremity edema.  He denies lightheadedness, orthopnea, PND, palpitations, chest pain and abdominal fullness/bloating.      He has dialysis 3 days a week where his weights are monitored.  He follows a low-sodium diet.    NM pharmacological stress test 2/11/2020  Conclusion        ?  The nuclear stress test is abnormal.  ?  There is a small area of nontransmural infarction in the apical segment(s) of the left ventricle.  ?  The left ventricular ejection fraction at stress is 49%.  ?  Left ventricular function is mildly reduced.  ?  There is no prior study for comparison.          Physical Examination Review of Systems   Vitals:    03/12/20 1247   BP: 136/62   Pulse: (!) 106   Resp: 20   SpO2: 93%     There is no height or weight on file to calculate BMI.  Wt Readings from Last 3 Encounters:   01/30/20 191 lb (86.6 kg)   11/18/19 192 lb 4.8 oz (87.2 kg)   10/24/19 196 lb (88.9 kg)       General Appearance:   no acute distress   ENT/Mouth: membranes moist, no oral lesions or bleeding gums.      EYES:  no scleral icterus, normal conjunctivae   Neck: no carotid bruits or thyromegaly   Chest/Lungs:   lungs are clear to auscultation, no rales or wheezing, equal chest wall expansion, wearing chronic oxygen   Cardiovascular:    Irregularly irregular. Normal first and second heart sounds with no murmurs, rubs, or gallops; Jugular venous pressure normal, face edema bilaterally    Abdomen:  no organomegaly, masses, bruits, or tenderness; bowel sounds are present   Extremities: no cyanosis or clubbing   Skin: no xanthelasma, warm.    Neurologic: normal  bilateral, no tremors     Psychiatric: alert and oriented x3    General: WNL  Eyes: WNL  Ears/Nose/Throat: WNL  Lungs: Shortness of Breath  Heart: Shortness of Breath with activity, Irregular Heartbeat  Stomach: WNL  Bladder: WNL  Muscle/Joints: Muscle Weakness  Skin: WNL  Nervous System: WNL  Mental Health: Anxiety     Blood: WNL     Medical History   Surgical History Family History Social History   Past Medical History:   Diagnosis Date   ? Acute respiratory failure (H) 11/14/2017   ? Asthma    ? CHF (congestive heart failure) (H)    ? Chronic kidney disease    ? COPD (chronic obstructive pulmonary disease) (H)    ? Diabetes mellitus (H)    ? DNAR (do not attempt resuscitation)    ? ESRD (end stage renal disease) on dialysis (H) 11/14/2017   ? HCAP (healthcare-associated pneumonia) 11/14/2017   ? Hypertension    ? Pulmonary congestion 11/14/2017   ? Renal cell carcinoma (H)     Past Surgical History:   Procedure Laterality Date   ? ABDOMINAL AORTIC ANEURYSM REPAIR  2007   ? CV CORONARY ANGIOGRAM N/A 3/1/2019    Procedure: CORONARY ANGIOGRAM;  Surgeon: Harry Patel MD;  Location: [facility];  Service: Cardiology   ? CV OTHER APPROACH TRANSCATHETER (TAVR) N/A 4/2/2019    Procedure: Transcatheter Aortic Valve Replacement - subclavian;  Surgeon: Harry Patel MD;  Location: [facility] Cath Lab;  Service: Cardiology   ? PARTIAL NEPHRECTOMY      Family History   Problem Relation Age of Onset   ? Hypertension Mother    ? Cancer Father    ? COPD Sister    ? Cancer Brother     Social History     Socioeconomic History   ? Marital status:      Spouse name: Not on file   ? Number of children: Not on file   ? Years of education: Not on file   ? Highest education level: Not on file   Occupational History     Employer: RETIRED   Social Needs   ? Financial resource strain: Not on file   ? Food insecurity     Worry: Not on file     Inability: Not on file   ? Transportation needs     Medical: Not on file     Non-medical: Not on file   Tobacco Use   ? Smoking status: Former Smoker     Packs/day: 1.00     Years: 55.00     Pack years: 55.00     Types: Cigarettes     Last attempt to quit: 1/1/2005     Years since quitting: 15.2   ? Smokeless tobacco: Never Used   Substance and Sexual Activity   ? Alcohol use: No   ? Drug use: No   ? Sexual activity: Never    Lifestyle   ? Physical activity     Days per week: Not on file     Minutes per session: Not on file   ? Stress: Not on file   Relationships   ? Social connections     Talks on phone: Not on file     Gets together: Not on file     Attends [employer] service: Not on file     Active member of club or organization: Not on file     Attends meetings of clubs or organizations: Not on file     Relationship status: Not on file   ? Intimate partner violence     Fear of current or ex partner: Not on file     Emotionally abused: Not on file     Physically abused: Not on file     Forced sexual activity: Not on file   Other Topics Concern   ? Not on file   Social History Narrative   ? Not on file          Medications  Allergies   Current Outpatient Medications   Medication Sig Dispense Refill   ? acetaminophen (TYLENOL) 325 MG tablet Take 2 tablets (650 mg total) by mouth every 4 (four) hours as needed.  0   ? ADVAIR DISKUS 100-50 mcg/dose DISKUS INHALE ONE PUFF BY MOUTH TWICE DAILY 60 each 10   ? bumetanide (BUMEX) 1 MG tablet Take 1 tablet (1 mg total) by mouth 2 (two) times a day at 9am and 6pm. 180 tablet 4   ? cholecalciferol, vitamin D3, 1,000 unit tablet Take 1,000 Units by mouth daily.     ? clopidogreL (PLAVIX) 75 mg tablet Take 1 tablet (75 mg total) by mouth daily. 90 tablet 0   ? cyclopentolate (CYCLOGYL) 1 % ophthalmic solution Administer 1 drop to the right eye 2 (two) times a day.     ? dorzolamide-timolol (COSOPT) 22.3-6.8 mg/mL ophthalmic solution Administer 1 drop to the right eye 2 (two) times a day.      ? insulin aspart U-100 (NOVOLOG FLEXPEN U-100 INSULIN) 100 unit/mL (3 mL) injection pen Inject 4-6 units under the skin 3 times daily before meals as directed 15 mL 1   ? insulin glargine (BASAGLAR KWIKPEN) 100 unit/mL (3 mL) pen Inject 7 Units under the skin at bedtime. 5 adj dose pen 0   ? ipratropium-albuterol (DUO-NEB) 0.5-2.5 mg/3 mL nebulizer Take 3 mL by nebulization every 4 (four) hours as needed. 60  "vial 0   ? metoprolol tartrate (LOPRESSOR) 25 MG tablet Take 50 mg (2 tablets) in the morning and 25 mg ( 1 tablet) in the afternoon 270 tablet 3   ? ONETOUCH ULTRA BLUE TEST STRIP strips TEST TWICE DAILY 150 strip 4   ? OXYGEN-AIR DELIVERY SYSTEMS MISC 2-3 L/min into each nostril continuous. Indications: SOB, low O2 sats     ? pantoprazole (PROTONIX) 20 MG tablet Take 1 tablet (20 mg total) by mouth daily. 30 tablet 1   ? pravastatin (PRAVACHOL) 40 MG tablet Take 1 tablet (40 mg total) by mouth every evening. 90 tablet 3   ? prednisoLONE acetate (PRED-FORTE) 1 % ophthalmic suspension Administer 1 drop to the right eye 2 (two) times a day.  1   ? terazosin (HYTRIN) 10 MG capsule TAKE ONE CAPSULE BY MOUTH every night AT BEDTIME  90 capsule 3   ? ULTICARE PEN NEEDLE 31 gauge x 5/16\" Ndle Inject 1 each under the skin 4 (four) times a day. 400 each 2   ? umeclidinium (INCRUSE ELLIPTA) 62.5 mcg/actuation DsDv inhaler INHALE ONE PUFF BY MOUTH ONE TIME DAILY  90 puff 1   ? vit B comp no.3-folic-C-biotin (NEPHRO-ANGEL) 1- mg-mg-mcg Tab tablet Take 1 tablet by mouth at bedtime.     ? warfarin ANTICOAGULANT (COUMADIN/JANTOVEN) 2 MG tablet Take 1-2 tablets (2-4 mg total) by mouth daily. Adjust dose per INR result as directed 180 tablet 1     No current facility-administered medications for this visit.     Allergies   Allergen Reactions   ? Aspirin Angioedema     Tongue and lip swelling   ? Monosodium Glutamate Angioedema     Tongue and lip swelling   ? Dye      Contrast allergy   ? Heparin Unknown     Does not use heparin for dialysis; on low intensity coumadin   ? Hydralazine Unknown         Lab Results    Chemistry/lipid CBC Cardiac Enzymes/BNP/TSH/INR   Lab Results   Component Value Date    CHOL 142 04/17/2019    HDL 57 04/17/2019    LDLCALC 74 04/17/2019    TRIG 57 04/17/2019    CREATININE 6.46 (HH) 01/30/2020    BUN 39 (H) 01/30/2020    K 3.9 01/30/2020     01/30/2020    CL 97 (L) 01/30/2020    CO2 29 " 01/30/2020    Lab Results   Component Value Date    WBC 6.2 01/30/2020    HGB 9.7 (L) 01/30/2020    HCT 29.8 (L) 01/30/2020    MCV 91 01/30/2020     01/30/2020    Lab Results   Component Value Date    CKTOTAL 23 (L) 03/09/2019    TROPONINI 0.06 10/07/2019    BNP 1,839 (H) 04/01/2019    TSH 8.04 (H) 03/09/2019    INR 2.40 (H) 02/25/2020             This note has been dictated using voice recognition software. Any grammatical, typographical, or context distortions are unintentional and inherent to the software

## 2021-06-28 NOTE — PROGRESS NOTES
Progress Notes by Nery Yusuf MD at 11/12/2019  2:50 PM     Author: Nery Yusuf MD Service: -- Author Type: Physician    Filed: 11/12/2019  3:51 PM Encounter Date: 11/12/2019 Status: Signed    : Nery Yusuf MD (Physician)           [facility]  Heart Care Clinic Follow-up Note    Assessment & Plan        1. Nonrheumatic aortic valve stenosis -valve area 1 cm  with mean gradient of 45 mmHg and peak velocity 4 m/s.  Underwent TAVR with a 26 mm Yesi 3 valve April 2019 with recent echo showing 8 mm mean gradient with a velocity 1.9 m/s with trivial aortic insufficiency.  Appears to be working well.   2. Secondary cardiomyopathy (H) -new ejection fraction 40 to 45%.  Given this will discontinue diltiazem and change over to metoprolol.  Could be due to rate related issues with atrial fibrillation.  Start Bumex 1 mg twice a day given that there is probably an element of heart failure.   3. Type 2 diabetes mellitus with chronic kidney disease on chronic dialysis, with long-term current use of insulin (H) -so noted and defer to primary.  Hemoglobin A1c 6.4.   4. Primary hypertension -under good control currently with plenty of room to take off close to 4 L with dialysis tomorrow.   5. Legally blind in right eye, as defined in [place] -so noted.   6. ESRD (end stage renal disease) on dialysis (H) -most recent creatinine above 9 and going for dialysis Monday Wednesday Friday, have asked that with dialysis tomorrow he potentially have 4 L removed.   7. Coronary artery disease involving native coronary artery of native heart without angina pectoris -angiography on March 2019 showed left main 40 to 50% stenosis, LAD with a focal 80 to 90% mid lesion with a 70% ostial first diagonal lesion, ramus normal, circumflex normal, and the right coronary with a 70% proximal stenosis.  He had Rotablator performed to the LAD and question whether we should intervene on the right coronary artery.  We will need  ischemic evaluation.   8. Chronic atrial fibrillation -asymptomatic, permanent and not valvular and on chronic anticoagulation with Coumadin and will check INR today.  Work on rate control with changing diltiazem or metoprolol.   9. Acute diastolic congestive heart failure (H) -significantly increased shortness of breath over the last several weeks, saturation on 2 L is 83%, I offered patient admission to the hospital and he declined gently.  We will start him on Bumex 1 mg twice a day, and 4 days switch diltiazem over to metoprolol, as dialysis or move up to 4 L tomorrow, come back and see heart failure nurse practitioners next week, or come the hospital should he develop significant symptoms that do not get any better.  I can understand his position since he just recently was discharged from [facility] but yet not on Bumex.     Plan  1.  Start Bumex 1 mg by mouth twice a day which he was on when he initially met him over a year ago.  2.  Switch diltiazem over to metoprolol given his decreased ejection fraction.  3.  Try to get 4 L off at dialysis tomorrow.  5.  Can titrate oxygen up to 4 L but no higher.  6.  Follow-up heart failure nurse practitioners next week and me thereafter.  7.  Strongly recommend further ischemic evaluation to evaluate right coronary artery intervention needs.    Subjective  CC: 85-year-old white gentleman being seen in follow-up, I saw him once a year ago October 2018 and he is proceeded with TAVR as well as intervention as well as balloon valvuloplasty.  He is still living independently in senior housing, is increased short of breath over the last week to 2 weeks with no PND, orthopnea, syncope, dizziness, chest discomfort, palpitations or peripheral edema.    Medications  Current Outpatient Medications   Medication Sig   ? acetaminophen (TYLENOL) 325 MG tablet Take 2 tablets (650 mg total) by mouth every 4 (four) hours as needed.   ? cholecalciferol, vitamin D3, 1,000 unit  "tablet Take 1,000 Units by mouth daily.   ? clopidogrel (PLAVIX) 75 mg tablet Take 1 tablet (75 mg total) by mouth daily.   ? cyclopentolate (CYCLOGYL) 1 % ophthalmic solution Administer 1 drop to the right eye 2 (two) times a day.   ? diltiazem (CARTIA XT) 180 MG 24 hr capsule Take 1 capsule (180 mg total) by mouth daily.   ? dorzolamide-timolol (COSOPT) 22.3-6.8 mg/mL ophthalmic solution Administer 1 drop to the right eye 2 (two) times a day.    ? fluticasone-salmeterol (ADVAIR DISKUS) 100-50 mcg/dose DISKUS Inhale 1 puff 2 (two) times a day.   ? insulin aspart U-100 (NOVOLOG FLEXPEN U-100 INSULIN) 100 unit/mL injection pen Inject 4-6 units under the skin 3 times daily before meals as directed   ? insulin glargine (BASAGLAR KWIKPEN) 100 unit/mL (3 mL) pen Inject 7 Units under the skin at bedtime.   ? ipratropium-albuterol (DUO-NEB) 0.5-2.5 mg/3 mL nebulizer Take 3 mL by nebulization every 4 (four) hours as needed.   ? ONETOUCH ULTRA BLUE TEST STRIP strips TEST TWICE DAILY   ? OXYGEN-AIR DELIVERY SYSTEMS MISC 2-3 L/min into each nostril continuous. Indications: SOB, low O2 sats   ? pravastatin (PRAVACHOL) 40 MG tablet Take 1 tablet (40 mg total) by mouth every evening.   ? prednisoLONE acetate (PRED-FORTE) 1 % ophthalmic suspension Administer 1 drop to the right eye 2 (two) times a day.   ? terazosin (HYTRIN) 10 MG capsule Take 1 capsule (10 mg total) by mouth at bedtime.   ? ULTICARE PEN NEEDLE 31 gauge x 5/16\" Ndle Inject 1 each under the skin 4 (four) times a day.   ? umeclidinium (INCRUSE ELLIPTA) 62.5 mcg/actuation DsDv inhaler Inhale 1 puff daily.   ? vit B comp no.3-folic-C-biotin (NEPHRO-ANGEL) 1- mg-mg-mcg Tab tablet Take 1 tablet by mouth at bedtime.   ? warfarin (COUMADIN/JANTOVEN) 2 MG tablet Take 2 tablets (4 mg total) by mouth daily.   ? bumetanide (BUMEX) 1 MG tablet Take 1 tablet (1 mg total) by mouth 2 (two) times a day at 9am and 6pm.   ? metoprolol tartrate (LOPRESSOR) 25 MG tablet Take 1 " tablet (25 mg total) by mouth 2 (two) times a day.       Objective  /62 (Patient Site: Right Arm, Patient Position: Sitting, Cuff Size: Adult Regular)   Pulse 97   Resp 16   SpO2 (!) 83% Comment: on 2 liters o2    General Appearance:    Alert, cooperative, no distress, appears stated age   Head:    Normocephalic, without obvious abnormality, atraumatic   Throat:   Lips, mucosa, and tongue normal; teeth and gums normal   Neck:   Supple, symmetrical, trachea midline, no adenopathy;        thyroid:  No enlargement/tenderness/nodules; no carotid    bruit or JVD   Back:     Symmetric, no curvature, ROM normal, no CVA tenderness   Lungs:     Clear to auscultation bilaterally, respirations unlabored   Chest wall:    No tenderness or deformity   Heart:   Irregularly irregular S1 and S2 normal, no murmur, rub   or gallop   Abdomen:     Soft, non-tender, bowel sounds active all four quadrants,     no masses, no organomegaly   Extremities:   Normal, atraumatic, no cyanosis or edema   Pulses:   2+ and symmetric all extremities   Skin:   Skin color, texture, turgor normal, no rashes or lesions     Results    Lab Results personally reviewed   Lab Results   Component Value Date    CHOL 142 04/17/2019    CHOL 107 08/01/2018     Lab Results   Component Value Date    HDL 57 04/17/2019    HDL 43 08/01/2018     Lab Results   Component Value Date    LDLCALC 74 04/17/2019    LDLCALC 56 08/01/2018     Lab Results   Component Value Date    TRIG 57 04/17/2019    TRIG 41 08/01/2018     Lab Results   Component Value Date    WBC 10.2 10/07/2019    HGB 10.1 (L) 10/07/2019    HCT 31.7 (L) 10/07/2019     10/07/2019     Lab Results   Component Value Date    CREATININE 9.01 (HH) 10/07/2019    BUN 67 (H) 10/07/2019     10/07/2019    K 4.9 10/07/2019    CO2 25 10/07/2019     Review of Systems:   General: Weight Gain  Eyes: WNL  Ears/Nose/Throat: WNL  Lungs: WNL  Heart: Shortness of Breath with activity  Stomach: WNL  Bladder:  WNL  Muscle/Joints: WNL  Skin: WNL  Nervous System: WNL  Mental Health: WNL     Blood: WNL

## 2021-06-28 NOTE — PROGRESS NOTES
Progress Notes by Nery Yusuf MD at 1/30/2020 11:30 AM     Author: Nery Yusuf MD Service: -- Author Type: Physician    Filed: 1/30/2020 12:15 PM Encounter Date: 1/30/2020 Status: Signed    : Nery Yusuf MD (Physician)           [facility]  Heart Care Clinic Follow-up Note    Assessment & Plan        1. Coronary artery disease involving native coronary artery of native heart without angina pectoris  -angiography on March 2019 showed left main 40 to 50% stenosis, LAD with a focal 80 to 90% mid lesion with a 70% ostial first diagonal lesion, ramus normal, circumflex normal, and the right coronary with a 70% proximal stenosis. He had Rotablator performed to the LAD and question whether we should intervene on the right coronary artery.  We will get ischemic evaluation of the right coronary artery oncological stress nuclear and if ischemic arrange appropriate angiography and intervention.   2. Chronic atrial fibrillation -asymptomatic, permanent and not valvular and on chronic anticoagulation with Coumadin.  Work on rate control with metoprolol.   3. Benign essential hypertension -under good control.   4. Acute systolic congestive heart failure (H) -no significant signs or symptoms on Bumex and dialysis.   5. Dialysis patient (H) -as above Monday Wednesday Friday.   6. Legally blind in right eye, as defined in [place] -so noted and chronic.   7. Nonrheumatic aortic valve stenosis -valve area 1 cm  with mean gradient 45 mmHg and had a TAVR with a 26 mm Yesi 3 valve April 2019.  Echo shows 8 mm mean gradient so it is working well.   8. Secondary cardiomyopathy (H) -new ejection fraction 40 to 45%.  Given this will discontinue diltiazem and changed over to metoprolol.  Could be due to rate related issues with atrial fibrillation   9. Type 2 diabetes mellitus with chronic kidney disease on chronic dialysis, with long-term current use of insulin (H) -for to primary.     Plan  1.   Pharmacological stress nuclear.  If significant inferior ischemia angiography.  2.  Follow-up me 6 months or sooner if needed.    Subjective  CC: 85-year-old white gentleman here with his wife and daughter.  Still living at home independently but not doing much of anything due to lack of eyesight.  Has a vague chest tightness that comes and goes at rest.  Last for about 5 minutes me once or twice a week.  Shortness of breath and heavy activity.  No PND, orthopnea, syncope, dizziness or peripheral edema.    Medications  Current Outpatient Medications   Medication Sig   ? acetaminophen (TYLENOL) 325 MG tablet Take 2 tablets (650 mg total) by mouth every 4 (four) hours as needed.   ? bumetanide (BUMEX) 1 MG tablet Take 1 tablet (1 mg total) by mouth 2 (two) times a day at 9am and 6pm.   ? cholecalciferol, vitamin D3, 1,000 unit tablet Take 1,000 Units by mouth daily.   ? clopidogreL (PLAVIX) 75 mg tablet Take 1 tablet (75 mg total) by mouth daily.   ? cyclopentolate (CYCLOGYL) 1 % ophthalmic solution Administer 1 drop to the right eye 2 (two) times a day.   ? dorzolamide-timolol (COSOPT) 22.3-6.8 mg/mL ophthalmic solution Administer 1 drop to the right eye 2 (two) times a day.    ? fluticasone-salmeterol (ADVAIR DISKUS) 100-50 mcg/dose DISKUS Inhale 1 puff 2 (two) times a day.   ? insulin aspart U-100 (NOVOLOG FLEXPEN U-100 INSULIN) 100 unit/mL (3 mL) injection pen Inject 4-6 units under the skin 3 times daily before meals as directed   ? insulin glargine (BASAGLAR KWIKPEN) 100 unit/mL (3 mL) pen Inject 7 Units under the skin at bedtime.   ? ipratropium-albuterol (DUO-NEB) 0.5-2.5 mg/3 mL nebulizer Take 3 mL by nebulization every 4 (four) hours as needed.   ? metoprolol tartrate (LOPRESSOR) 25 MG tablet Take 1 tablet (25 mg total) by mouth 2 (two) times a day.   ? ONETOUCH ULTRA BLUE TEST STRIP strips TEST TWICE DAILY   ? OXYGEN-AIR DELIVERY SYSTEMS MISC 2-3 L/min into each nostril continuous. Indications: SOB, low O2  "sats   ? pantoprazole (PROTONIX) 20 MG tablet Take 1 tablet (20 mg total) by mouth daily.   ? pravastatin (PRAVACHOL) 40 MG tablet Take 1 tablet (40 mg total) by mouth every evening.   ? prednisoLONE acetate (PRED-FORTE) 1 % ophthalmic suspension Administer 1 drop to the right eye 2 (two) times a day.   ? terazosin (HYTRIN) 10 MG capsule Take 1 capsule (10 mg total) by mouth at bedtime.   ? ULTICARE PEN NEEDLE 31 gauge x 5/16\" Ndle Inject 1 each under the skin 4 (four) times a day.   ? umeclidinium (INCRUSE ELLIPTA) 62.5 mcg/actuation DsDv inhaler Inhale 1 puff daily.   ? vit B comp no.3-folic-C-biotin (NEPHRO-ANGEL) 1- mg-mg-mcg Tab tablet Take 1 tablet by mouth at bedtime.   ? warfarin (COUMADIN/JANTOVEN) 2 MG tablet Take 2 tablets (4 mg total) by mouth daily.       Objective  /60 (Patient Site: Right Arm, Patient Position: Sitting, Cuff Size: Adult Large)   Pulse 92   Resp 16   Ht 5' 8\" (1.727 m)   Wt 191 lb (86.6 kg)   BMI 29.04 kg/m      General Appearance:    Alert, cooperative, no distress, appears stated age   Head:    Normocephalic, without obvious abnormality, atraumatic   Throat:   Lips, mucosa, and tongue normal; teeth and gums normal   Neck:   Supple, symmetrical, trachea midline, no adenopathy;        thyroid:  No enlargement/tenderness/nodules; no carotid    bruit or JVD   Back:     Symmetric, no curvature, ROM normal, no CVA tenderness   Lungs:     Clear to auscultation bilaterally, respirations unlabored   Chest wall:    No tenderness or deformity   Heart:   Irregularly irregular, S1 and S2 normal, no murmur, rub   or gallop   Abdomen:     Soft, non-tender, bowel sounds active all four quadrants,     no masses, no organomegaly   Extremities:   Normal, atraumatic, no cyanosis or edema   Pulses:   2+ and symmetric all extremities   Skin:   Skin color, texture, turgor normal, no rashes or lesions     Results    Lab Results personally reviewed   Lab Results   Component Value Date    CHOL " 142 04/17/2019    CHOL 107 08/01/2018     Lab Results   Component Value Date    HDL 57 04/17/2019    HDL 43 08/01/2018     Lab Results   Component Value Date    LDLCALC 74 04/17/2019    LDLCALC 56 08/01/2018     Lab Results   Component Value Date    TRIG 57 04/17/2019    TRIG 41 08/01/2018     Lab Results   Component Value Date    WBC 6.2 01/30/2020    HGB 9.7 (L) 01/30/2020    HCT 29.8 (L) 01/30/2020     01/30/2020     Lab Results   Component Value Date    CREATININE 9.01 (HH) 10/07/2019    BUN 67 (H) 10/07/2019     10/07/2019    K 4.9 10/07/2019    CO2 25 10/07/2019     Review of Systems:   General: WNL  Eyes: WNL  Ears/Nose/Throat: WNL  Lungs: WNL  Heart: Irregular Heartbeat  Stomach: WNL  Bladder: WNL  Muscle/Joints: Muscle Weakness  Skin: WNL  Nervous System: WNL  Mental Health: WNL     Blood: Easy Bleeding

## 2021-06-29 NOTE — PROGRESS NOTES
"Progress Notes by Leticia Correa CNP at 4/16/2020  9:50 AM     Author: Leticia Correa CNP Service: -- Author Type: Nurse Practitioner    Filed: 4/16/2020 10:10 AM Encounter Date: 4/16/2020 Status: Signed    : Leticia Correa CNP (Nurse Practitioner)           The patient has been notified of following:     \"This telephone visit will be conducted via a call between you and your physician/provider. We have found that certain health care needs can be provided without the need for a physical exam.  This service lets us provide the care you need with a phone conversation.  If a prescription is necessary we can send it directly to your pharmacy.  If lab work is needed we can place an order for that and you can then stop by our lab to have the test done at a later time. If during the course of the call the physician/provider feels a telephone visit is not appropriate, you will not be charged for this service.\" Verbal consent has been obtained for this service by care team member:         HEART CARE PHONE ENCOUNTER        The patient has chosen to have the visit conducted as a telephone visit, to reduce risk of exposure given the current status of Coronavirus in our community. This telephone visit is being conducted via a call between the patient and physician/provider. Health care needs are being provided without a physical exam.     Assessment/Recommendations   Assessment:    1.  Heart failure with reduced ejection fraction, NYHA class III: He continues to have fatigue and dyspnea on exertion.  His weights is monitored at dialysis 3 days a week.  He has mild lower extremity edema.  He follows a low-sodium diet.    2.  Permanent atrial fibrillation: He was recently in the hospital the end of March with A. fib with RVR with heart rates in the 110-120 range.  His Metroprolol was increased to 75 mg twice a day which improved heart rate control.  He continues warfarin for anticoagulation.    3.  " Hypertension: Slightly elevated this morning at 165/66 at home.  His typical blood pressure at home is 130/60.    4.  End-stage renal disease: He continues with dialysis on Mondays, Wednesdays and Fridays.  His dry weight is 192 pounds.    Plan:  1.  Continue current medications  2.  Continue to monitor blood pressure and if greater than 130 please call heart clinic  3.  Continue low-sodium diet  4.  Discussed importance of staying home due to coronavirus      Follow Up Plan: Follow up with Dr Patel May 7, Dr Yusuf in July and in the heart failure clinic in 6 months  I have reviewed the note as documented.  This accurately captures the substance of my conversation with the patient.    Total time of call between patient and provider was 13 minutes   Start Time: 0941   Stop Time: 0954       History of Present Illness/Subjective    Griffin Walton is a 85 y.o. male who is being evaluated via a billable telephone visit.  His wife was also on the phone visit today.  He has a past medical history significant for heart failure with reduced ejection fraction, hypertension, coronary artery disease, permanent atrial fibrillation, dyslipidemia, COPD, diabetes, end-stage renal disease on dialysis.  Status post TAVR April 2019.  He had a nuclear stress test February 2020 which showed an ejection fraction of 49% with no ischemia.    Today, he continues to have fatigue and dyspnea on exertion.  He has mild lower extremity edema.  He denies orthopnea, PND or chest pain.    His weight is monitored at dialysis 3 days a week.  His dry weight is 192 pounds.  He follows a low-sodium diet.      I have reviewed and updated the patient's Past Medical History, Social History, Family History and Medication List.     Physical Examination not performed given phone encounter Review of Systems                                                Medical History  Surgical History Family History Social History   Past Medical History:    Diagnosis Date   ? Acute respiratory failure (H) 11/14/2017   ? Asthma    ? CHF (congestive heart failure) (H)    ? Chronic kidney disease    ? COPD (chronic obstructive pulmonary disease) (H)    ? Diabetes mellitus (H)    ? DNAR (do not attempt resuscitation)    ? ESRD (end stage renal disease) on dialysis (H) 11/14/2017   ? HCAP (healthcare-associated pneumonia) 11/14/2017   ? Hypertension    ? Pulmonary congestion 11/14/2017   ? Renal cell carcinoma (H)     Past Surgical History:   Procedure Laterality Date   ? ABDOMINAL AORTIC ANEURYSM REPAIR  2007   ? CV CORONARY ANGIOGRAM N/A 3/1/2019    Procedure: CORONARY ANGIOGRAM;  Surgeon: Harry Patel MD;  Location: [facility];  Service: Cardiology   ? CV OTHER APPROACH TRANSCATHETER (TAVR) N/A 4/2/2019    Procedure: Transcatheter Aortic Valve Replacement - subclavian;  Surgeon: Harry Patel MD;  Location: [facility] Lab;  Service: Cardiology   ? PARTIAL NEPHRECTOMY      Family History   Problem Relation Age of Onset   ? Hypertension Mother    ? Cancer Father    ? COPD Sister    ? Cancer Brother     Social History     Socioeconomic History   ? Marital status:      Spouse name: Not on file   ? Number of children: Not on file   ? Years of education: Not on file   ? Highest education level: Not on file   Occupational History     Employer: RETIRED   Social Needs   ? Financial resource strain: Not on file   ? Food insecurity     Worry: Not on file     Inability: Not on file   ? Transportation needs     Medical: Not on file     Non-medical: Not on file   Tobacco Use   ? Smoking status: Former Smoker     Packs/day: 1.00     Years: 55.00     Pack years: 55.00     Types: Cigarettes     Last attempt to quit: 1/1/2005     Years since quitting: 15.2   ? Smokeless tobacco: Never Used   Substance and Sexual Activity   ? Alcohol use: No   ? Drug use: No   ? Sexual activity: Never   Lifestyle   ? Physical activity     Days per week: Not on file      Minutes per session: Not on file   ? Stress: Not on file   Relationships   ? Social connections     Talks on phone: Not on file     Gets together: Not on file     Attends [employer] service: Not on file     Active member of club or organization: Not on file     Attends meetings of clubs or organizations: Not on file     Relationship status: Not on file   ? Intimate partner violence     Fear of current or ex partner: Not on file     Emotionally abused: Not on file     Physically abused: Not on file     Forced sexual activity: Not on file   Other Topics Concern   ? Not on file   Social History Narrative   ? Not on file          Medications  Allergies   Current Outpatient Medications   Medication Sig Dispense Refill   ? acetaminophen (TYLENOL) 325 MG tablet Take 2 tablets (650 mg total) by mouth every 4 (four) hours as needed.  0   ? ADVAIR DISKUS 100-50 mcg/dose DISKUS INHALE ONE PUFF BY MOUTH TWICE DAILY 60 each 10   ? bumetanide (BUMEX) 1 MG tablet Take 1 tablet (1 mg total) by mouth 2 (two) times a day at 9am and 6pm. 180 tablet 4   ? cholecalciferol, vitamin D3, 1,000 unit tablet Take 1,000 Units by mouth daily.     ? clopidogreL (PLAVIX) 75 mg tablet Take 1 tablet (75 mg total) by mouth daily. 90 tablet 0   ? cyclopentolate (CYCLOGYL) 1 % ophthalmic solution Administer 1 drop to the right eye 2 (two) times a day.     ? dorzolamide-timolol (COSOPT) 22.3-6.8 mg/mL ophthalmic solution Administer 1 drop to the right eye 2 (two) times a day.      ? insulin glargine,hum.rec.anlog (BASAGLAR KWIKPEN U-100 INSULIN SUBQ) Inject 6 Units under the skin at bedtime.     ? ipratropium-albuterol (DUO-NEB) 0.5-2.5 mg/3 mL nebulizer Take 3 mL by nebulization every 4 (four) hours as needed. 60 vial 0   ? metoprolol tartrate 75 mg Tab Take 75 mg by mouth 2 (two) times a day. 90 tablet 1   ? NOVOLOG U-100 INSULIN ASPART 100 unit/mL injection Check blood sugar three (3) times daily.  11.9 Type 2 without complications  No supplies  "needed  ReliOn 8 mm 31G 0.3 mL syringe - dispense 1 case 10 mL PRN   ? ONETOUCH ULTRA BLUE TEST STRIP strips TEST TWICE DAILY 150 strip 4   ? OXYGEN-AIR DELIVERY SYSTEMS MISC 2-3 L/min into each nostril continuous. Indications: SOB, low O2 sats     ? pravastatin (PRAVACHOL) 40 MG tablet Take 1 tablet (40 mg total) by mouth every evening. 90 tablet 3   ? prednisoLONE acetate (PRED-FORTE) 1 % ophthalmic suspension Administer 1 drop to the right eye 2 (two) times a day.  1   ? terazosin (HYTRIN) 10 MG capsule TAKE ONE CAPSULE BY MOUTH every night AT BEDTIME  90 capsule 3   ? ULTICARE PEN NEEDLE 31 gauge x 5/16\" Ndle Inject 1 each under the skin 4 (four) times a day. 400 each 2   ? umeclidinium (INCRUSE ELLIPTA) 62.5 mcg/actuation DsDv inhaler INHALE ONE PUFF BY MOUTH ONE TIME DAILY  90 puff 1   ? vit B comp no.3-folic-C-biotin (NEPHRO-ANGEL) 1- mg-mg-mcg Tab tablet Take 1 tablet by mouth at bedtime.     ? warfarin ANTICOAGULANT (COUMADIN/JANTOVEN) 2 MG tablet Take 2 tablets (4 mg total) by mouth See Admin Instructions. Adjust dose per INR  0     No current facility-administered medications for this visit.     Allergies   Allergen Reactions   ? Aspirin Angioedema     Tongue and lip swelling   ? Monosodium Glutamate Angioedema     Tongue and lip swelling   ? Dye      Contrast allergy   ? Heparin Unknown     Does not use heparin for dialysis; on low intensity coumadin   ? Hydralazine Unknown         Lab Results    Chemistry/lipid CBC Cardiac Enzymes/BNP/TSH/INR   Lab Results   Component Value Date    CHOL 142 04/17/2019    HDL 57 04/17/2019    LDLCALC 74 04/17/2019    TRIG 57 04/17/2019    CREATININE 6.64 (HH) 03/29/2020    BUN 57 (H) 03/29/2020    K 5.0 03/29/2020     03/29/2020    CL 96 (L) 03/29/2020    CO2 27 03/29/2020    Lab Results   Component Value Date    WBC 6.3 03/29/2020    HGB 9.1 (L) 03/29/2020    HCT 28.7 (L) 03/29/2020    MCV 92 03/29/2020     (L) 03/29/2020    Lab Results   Component " Value Date    CKTOTAL 23 (L) 03/09/2019    TROPONINI 0.04 03/27/2020    BNP 1,839 (H) 04/01/2019    TSH 8.04 (H) 03/09/2019    INR 3.00 (H) 03/31/2020        eLticia Correa

## 2021-07-03 NOTE — ADDENDUM NOTE
Addendum Note by Magaly Gill at 4/17/2019  7:00 AM     Author: Magaly Gill Service: -- Author Type:     Filed: 4/17/2019 10:02 AM Encounter Date: 4/17/2019 Status: Signed    : Magaly Gill ()    Addended by: MAGALY GILL on: 4/17/2019 10:02 AM        Modules accepted: Orders

## 2021-07-03 NOTE — ADDENDUM NOTE
Addendum Note by Jaya Beckman RN at 3/18/2019 11:08 AM     Author: Jaya Beckman RN Service: -- Author Type: Registered Nurse    Filed: 3/19/2019 11:24 AM Encounter Date: 3/18/2019 Status: Signed    : aJya Beckman RN (Registered Nurse)    Addended by: JAYA BECKMAN on: 3/19/2019 11:24 AM        Modules accepted: Orders

## 2021-07-03 NOTE — ANESTHESIA PREPROCEDURE EVALUATION
Anesthesia Preprocedure Evaluation by Good Ceballos MD at 2/28/2019 10:19 PM     Author: Good Ceballos MD Service: -- Author Type: Physician    Filed: 2/28/2019 10:33 PM Date of Service: 2/28/2019 10:19 PM Status: Addendum    : Good Ceballos MD (Physician)    Related Notes: Original Note by Good Ceballos MD (Physician) filed at 2/28/2019 10:32 PM       Anesthesia Evaluation      Patient summary reviewed   No history of anesthetic complications     Airway   Mallampati: III  Neck ROM: full   Pulmonary - normal exam    breath sounds clear to auscultation  (+) pneumonia, COPD (Oxygen dependent), asthma  shortness of breath,   (-) not a smoker (Former 55 pack years)                         Cardiovascular   Exercise tolerance: < 4 METS  (+) hypertension, valvular problems/murmurs (Severe AS) AS, dysrhythmias (Chronic a.fib), CHF, ,     ECG reviewed  Rhythm: irregular  Rate: abnormal,    murmur Location:upper right sternal border      Neuro/Psych    (+) CVA (Blindness) ,     Comments: Legally blind    Endo/Other    (+) diabetes mellitus type 2 using insulin,      Comments: Thrombocytopenia  Anemia  Glaucoma    GI/Hepatic/Renal    (+)   chronic renal disease (Renal cell Ca s/p partial nephrectomy) ESRD and dialysis, last dialysis date: 2/28/2019,      Other findings: XR CHEST 1 VIEW PORTABLE  2/24/2019 4:07 PM     INDICATION: Worsened hypoxia, eval volume status and infiltrates  COMPARISON: 02/19/2019.     FINDINGS: Again noted is cardiac enlargement with bilateral pulmonary opacities with mixed interstitial and airspace opacities most consistent with pulmonary edema. I cannot exclude superimposed pneumonia. Thoracic aorta is tortuous and calcified. No   pneumothorax or large pleural effusion.     Impression persistent pulmonary edema pattern findings would be consistent with congestive heart failure.      01/27/19 1527 Echo Complete  View Image  Addendum:   1. Normal left ventricular size and systolic  performance with a visually estimated ejection fraction of 55-60%.   2. There is mild to moderate concentric increase in left ventricular wall thickness.  3. There is severe aortic stenosis.     The mean gradient is measured at 45 mmHg with a peak velocity of 4.0 m/s. Calculated valve area 1.0 cm . VRVTI = 0.2. VRvel = 0.3.   4. There is trace aortic insufficiency.   5. Normal right ventricular size and systolic performance.   6. There is moderate left atrial enlargement. There is mild right atrial enlargement.   7. Right ventricular systolic pressure relative to right atrial pressure   (cont.)           Dental    (+) caps and chipped    Comment: Crowns throughout top                         Anesthesia Plan  Planned anesthetic: MAC  Per cardiology, anesthesia services may be requested acutely if femoral artery requires cutdown for repair.  Pt agrees to anesthesia if it becomes necessary.    Prn GETA  Prn arterial line    High risk for post operative respiratory failure.  Discussed possibility of prolonged intubation with general anesthesia.  ASA 4   Induction: intravenous   Anesthetic plan and risks discussed with: patient  Anesthesia plan special considerations: video-assisted,   Post-op plan: routine recovery

## 2021-07-03 NOTE — ADDENDUM NOTE
Addendum Note by Jaya Beckman RN at 3/29/2019 10:59 AM     Author: Jaya Beckman RN Service: -- Author Type: Registered Nurse    Filed: 4/1/2019  9:45 AM Encounter Date: 3/29/2019 Status: Signed    : Jaya Beckman RN (Registered Nurse)    Addended by: JAYA BECKMAN on: 4/1/2019 09:45 AM        Modules accepted: Orders

## 2021-07-03 NOTE — ADDENDUM NOTE
Addendum Note by Harry Small MD at 2/7/2019  3:30 PM     Author: Harry Small MD Service: -- Author Type: Physician    Filed: 2/7/2019  4:04 PM Encounter Date: 2/7/2019 Status: Signed    : Harry Small MD (Physician)    Addended by: HARRY SMALL on: 2/7/2019 04:04 PM        Modules accepted: Orders

## 2021-07-03 NOTE — ADDENDUM NOTE
Addendum Note by Jaya Beckman RN at 3/28/2019  9:52 AM     Author: Jaya Beckman RN Service: -- Author Type: Registered Nurse    Filed: 3/28/2019  9:52 AM Encounter Date: 3/25/2019 Status: Signed    : Jaya Beckman RN (Registered Nurse)    Addended by: JAYA BECKMAN on: 3/28/2019 09:52 AM        Modules accepted: Orders

## 2021-09-04 NOTE — MR AVS SNAPSHOT
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]  Atrial fibrillation  new onset (H) [I48.91]  Long-term (current) use of anticoagulants [Z79.01] [Z79.01]         January 2018 Details    Sun Mon Tue Wed Thu Fri Sat      1               2               3               4      4 mg   See details      5      4 mg         6      4 mg           7      4 mg         8      4 mg         9      4 mg         10      4 mg         11            12               13                 14               15               16               17               18               19               20                 21               22               23               24               25               26               27                 28               29               30               31                   Date Details   01/04 This INR check       Date of next INR:  1/11/2018         How to take your warfarin dose     To take:  4 mg Take 2 of the 2 mg tablets.            Patient Seen in: [facility] Emergency Department      History   Patient presents with:  Constipation  GI Bleeding    Stated Complaint: constipation, bright red blood per rectum    HPI/Subjective:   HPI    Constipation an BRBPR- hx of bladder sling s Exam  Vitals and nursing note reviewed. Constitutional:       General: She is not in acute distress. Appearance: She is well-developed. She is obese. She is not diaphoretic. HENT:      Head: Atraumatic.       Right Ear: External ear normal.      Lef Psychiatric:         Behavior: Behavior normal.         Judgment: Judgment normal.      Comments: Patient somewhat anxious             ED Course     Labs Reviewed   COMP METABOLIC PANEL (14) - Abnormal; Notable for the following components:       Result ROUTINE       ED Course as of Sep 04 1457  ------------------------------------------------------------  Time: 09/04 1007  Value: Hemoglobin: 15.7  Comment: (Reviewed)     XR ABDOMEN (1 VIEW) (CPT=74018)   Final Result    PROCEDURE:  XR ABDOMEN (1 VIEW) (C unspecified hemorrhoid type     Disposition:  Discharge  9/4/2021 10:09 am    Follow-up:  Zac Chaudhary, [facility]          Mya Dickinson MD  2 TRANS AM DORY MCCRACKEN [address] [address] [phone]

## 2021-09-08 NOTE — PATIENT INSTRUCTIONS
Follow preoperative instructions given by your surgeon.  Your  recommendations will be available to your surgeon through [facility].  We will notify you of any abnormality with today's tests if present.  Please follow up at your convenience after the surgery for your adult well-exam and chronic condition management.    You may take Losartan, Diltiazem and Metoprolol XL on morning of surgery if they are scheduled to be taken then. Take only with a small sip of water.    Basaglar: 12 units subcutaneous night before surgery as you will not be eating midnight until after surgery.    Novolog: do not administer this on morning of surgery as you will not be eating then.    All other scheduled medication may be held on morning of surgery, and resumed when you are allowed to eat.     Before Your Surgery      Call your surgeon if there is any change in your health. This includes signs of a cold or flu (such as a sore throat, runny nose, cough, rash or fever).    Do not smoke, drink alcohol or take over the counter medicine (unless your surgeon or primary care doctor tells you to) for the 24 hours before and after surgery.    If you take prescribed drugs: Follow your doctor s orders about which medicines to take and which to stop until after surgery.    Eating and drinking prior to surgery: follow the instructions from your surgeon    Take a shower or bath the night before surgery. Use the soap your surgeon gave you to gently clean your skin. If you do not have soap from your surgeon, use your regular soap. Do not shave or scrub the surgery site.  Wear clean pajamas and have clean sheets on your bed.   
Student

## 2021-12-06 NOTE — TELEPHONE ENCOUNTER
Need to evaluate the reason for the oxygen desaturation.   I would advise patient to be seen at the ER for further evaluation and management   
Called and informed patient's spouse of provider's recommendations.    She is reluctant to have patient exposed to the ER.    She was provided with contact information for [email or web address] ([phone]).    Patient's spouse verbalized understanding and is agreeable with the plan of care.    
Called and spoke with patient's spouse.  Patient is still at dialysis until around 2pm. She states that patient has chronic cough at this time.  Patient's spouse was advised to call the clinic at [phone] and ask to speak with the Triage RN to get additional information and determine appropriate plan of care.  Patient's spouse verbalized understanding and is agreeable with the plan of care.    
Who is calling:  Spouse  Reason for Call:  Caller stated that the patient is doing ok but gets tired very fast. Caller stated that the patient's O2 78% this morning. Caller stated that the patient O2 went back up to 89-90%. Caller stated that the patient is currently at dialysis. Caller is questioning if can up the oxygen. Caller stated he was at 2 and now at 3, should he go up? Writer unable to transfer call to nurse triage due to caller not currently with patient.  Date of last appointment with primary care: 1/30/2020  Okay to leave a detailed message: Yes  [phone]    
o2 sats are currently at 89%. And his o2 sats normally run around 90-92%    He had to turn his oxygen up to 3L to get his O2 stats to increase to 89%.    Started having issues about 2 days ago.    Right now he has a light cough. He has had a light cough on and off for about 2 weeks.    He is not feeling shortness of breath    No wheezing    States that everything is normal    No fever 97.2 is his current temp    Current pulse is at 87    No runny nose    Just got done with dialysis with no issues. No extra swelling.    Dr Lazo is the provider who prescribes the oxygen.     The patient only leaves the house for dialyses or the lab    Do you want them to leave the oxygen at 3L? Please advise.    Snow Driscoll RN   [facility] Medication Refill and Triage Nurse  3:20 PM  3/25/2020    Reason for Disposition    [1] Caller has URGENT question AND [2] triager unable to answer question    Protocols used: COPD OXYGEN MONITORING AND [id number]      
36.4

## 2022-04-21 NOTE — TELEPHONE ENCOUNTER
Form completed, signed and faxed to [facility] at [phone]   ANDREW (acute kidney injury)

## 2022-08-02 NOTE — TELEPHONE ENCOUNTER
Relayed message to Yas - she is understanding of the plan    SURGERY SCHEDULING REQUIREMENTS INCLUDE:    Facility:[facility]/  S    Admission Type: Day Surgery   Special Instructions: Under Fluroscopy  Time Needed: 20 minutes  Anesthesia: IV Sedation  Special Equipment: NONE     Procedure:   Sympathetic Nerve Block: (78320) Lumbar/Thoracic sympathetic   Levels and laterality RIGHT    Dx Code: G90.522, M54.16    BLOOD THINNER:   Is patient on any blood thinners? NONE      Is patient on any Aspirin/Excedrin? No       Is patient on any NSAIDS? NONE      *If no hold instructions for meds above given to patient, please route message to nurse pool to give instructions*    Last platelet count:   PLT (K/mcL)   Date Value   04/30/2022 244       Diabetic: No     BMI Estimated body mass index is 27.12 kg/m² as calculated from the following:    Height as of 7/8/22: 6' (1.829 m).    Weight as of this encounter: 90.7 kg (200 lb). (if greater than 50 cannot be at [facility] or [address] [place] [address])    Current pain score: 7/10  Pain level at best: 6/10  Pain level at worst: 8/10  Pain level with activity: 8/10

## 2022-08-17 NOTE — TELEPHONE ENCOUNTER
Telephone Encounter by Kj Mishra RN at 3/24/2020 11:48 AM     Author: Kj Mishra RN Service: -- Author Type: RN, Care Manager    Filed: 3/24/2020 11:53 AM Encounter Date: 3/24/2020 Status: Attested    : Kj Mishra RN (RN, Care Manager) Cosigner: Clau Lazo MD at 3/24/2020  1:18 PM    Attestation signed by Clau Lazo MD at 3/24/2020  1:18 PM    Agree with plan                ANTICOAGULATION  MANAGEMENT    Assessment     Today's INR result of 2.4 is Therapeutic (goal INR of 2.0-3.0)        Warfarin taken as previously instructed    No new diet changes affecting INR    No new medication/supplements affecting INR    Continues to tolerate warfarin with no reported s/s of bleeding or thromboembolism     Previous INR was Therapeutic    Plan:     Spoke with Yas regarding INR result and instructed:     Warfarin Dosing Instructions:  Continue current warfarin dose 2 mg daily on Wed; and 4 mg daily rest of week  (0 % change)    Instructed patient to follow up no later than: 6 weeks.    Education provided: importance of taking warfarin as instructed    Yas verbalizes understanding and agrees to warfarin dosing plan.    Instructed to call the [facility] for any changes, questions or concerns. (#[phone])   ?   Kj Mishra RN    Subjective/Objective:      Griffin Walton, a 85 y.o. male is on warfarin.     Griffin reports:     Home warfarin dose: verbally confirmed home dose with Yas and updated on anticoagulation calendar     Missed doses: No     Medication changes:  No     S/S of bleeding or thromboembolism:  No     New Injury or illness:  No     Changes in diet or alcohol consumption:  No     Upcoming surgery, procedure or cardioversion:  No    Anticoagulation Episode Summary     Current INR goal:   2.0-3.0   TTR:   67.0 % (11.6 mo)   Next INR check:   5/5/2020   INR from last check:   2.40 (3/24/2020)   Weekly max warfarin dose:      Target end date:       INR check location:      Preferred lab:      Send INR reminders to:   TERESITA PALACIOS    Indications    Chronic atrial fibrillation [I48.20]           Comments:            Anticoagulation Care Providers     Provider Role Specialty Phone number    Clau Lazo MD [employer] [phone]              Yes

## 2023-07-28 NOTE — ADDENDUM NOTE
Addendum Note by Sabrina Pereira RN at 7/3/2018 10:53 AM     Author: Sabrina Pereira RN Service: -- Author Type: Registered Nurse    Filed: 7/3/2018 10:53 AM Encounter Date: 6/27/2018 Status: Signed    : Sabrina Pereira RN (Registered Nurse)    Addended by: SABRINA PEREIRA on: 7/3/2018 10:53 AM        Modules accepted: Orders         Yes